# Patient Record
Sex: MALE | Race: WHITE | NOT HISPANIC OR LATINO | URBAN - NONMETROPOLITAN AREA
[De-identification: names, ages, dates, MRNs, and addresses within clinical notes are randomized per-mention and may not be internally consistent; named-entity substitution may affect disease eponyms.]

---

## 2020-11-20 ENCOUNTER — HOSPITAL ENCOUNTER (OUTPATIENT)
Facility: HOSPITAL | Age: 75
Discharge: HOME | End: 2020-11-22
Attending: EMERGENCY MEDICINE | Admitting: EMERGENCY MEDICINE
Payer: MEDICARE

## 2020-11-20 DIAGNOSIS — D64.9 ANEMIA, UNSPECIFIED TYPE: Primary | ICD-10-CM

## 2020-11-20 LAB
ABO + RH BLD: NORMAL
ALBUMIN SERPL BCP-MCNC: 3.3 G/DL (ref 3.5–5.2)
ALP SERPL-CCNC: 49 U/L (ref 55–135)
ALT SERPL W/O P-5'-P-CCNC: 28 U/L (ref 10–44)
ANION GAP SERPL CALC-SCNC: 8 MMOL/L (ref 8–16)
ANISOCYTOSIS BLD QL SMEAR: SLIGHT
APTT BLDCRRT: 23.6 SEC (ref 21–32)
AST SERPL-CCNC: 31 U/L (ref 10–40)
BASOPHILS # BLD AUTO: 0.08 K/UL (ref 0–0.2)
BASOPHILS NFR BLD: 1.3 % (ref 0–1.9)
BILIRUB SERPL-MCNC: 0.7 MG/DL (ref 0.1–1)
BLD GP AB SCN CELLS X3 SERPL QL: NORMAL
BLD PROD TYP BPU: NORMAL
BLD PROD TYP BPU: NORMAL
BLOOD UNIT EXPIRATION DATE: NORMAL
BLOOD UNIT EXPIRATION DATE: NORMAL
BLOOD UNIT TYPE CODE: 6200
BLOOD UNIT TYPE CODE: 6200
BLOOD UNIT TYPE: NORMAL
BLOOD UNIT TYPE: NORMAL
BUN SERPL-MCNC: 12 MG/DL (ref 8–23)
CALCIUM SERPL-MCNC: 9 MG/DL (ref 8.7–10.5)
CHLORIDE SERPL-SCNC: 104 MMOL/L (ref 95–110)
CO2 SERPL-SCNC: 23 MMOL/L (ref 23–29)
CODING SYSTEM: NORMAL
CODING SYSTEM: NORMAL
CREAT SERPL-MCNC: 0.7 MG/DL (ref 0.5–1.4)
CTP QC/QA: YES
DIFFERENTIAL METHOD: ABNORMAL
DISPENSE STATUS: NORMAL
DISPENSE STATUS: NORMAL
EOSINOPHIL # BLD AUTO: 0.1 K/UL (ref 0–0.5)
EOSINOPHIL NFR BLD: 1.9 % (ref 0–8)
ERYTHROCYTE [DISTWIDTH] IN BLOOD BY AUTOMATED COUNT: 23.4 % (ref 11.5–14.5)
EST. GFR  (AFRICAN AMERICAN): >60 ML/MIN/1.73 M^2
EST. GFR  (NON AFRICAN AMERICAN): >60 ML/MIN/1.73 M^2
FERRITIN SERPL-MCNC: 7 NG/ML (ref 20–300)
GLUCOSE SERPL-MCNC: 90 MG/DL (ref 70–110)
HCT VFR BLD AUTO: 24 % (ref 40–54)
HGB BLD-MCNC: 6.7 G/DL (ref 14–18)
IMM GRANULOCYTES # BLD AUTO: 0.02 K/UL (ref 0–0.04)
IMM GRANULOCYTES NFR BLD AUTO: 0.3 % (ref 0–0.5)
INR PPP: 1.1 (ref 0.8–1.2)
IRON SATN MFR SERPL: 2 % (ref 20–50)
IRON SERPL-MCNC: 12 UG/DL (ref 45–160)
LYMPHOCYTES # BLD AUTO: 1.3 K/UL (ref 1–4.8)
LYMPHOCYTES NFR BLD: 20.1 % (ref 18–48)
MAGNESIUM SERPL-MCNC: 2.1 MG/DL (ref 1.6–2.6)
MCH RBC QN AUTO: 16 PG (ref 27–31)
MCHC RBC AUTO-ENTMCNC: 27.9 G/DL (ref 32–36)
MCV RBC AUTO: 57 FL (ref 82–98)
MONOCYTES # BLD AUTO: 0.8 K/UL (ref 0.3–1)
MONOCYTES NFR BLD: 12.1 % (ref 4–15)
NEUTROPHILS # BLD AUTO: 4 K/UL (ref 1.8–7.7)
NEUTROPHILS NFR BLD: 64.3 % (ref 38–73)
NRBC BLD-RTO: 0 /100 WBC
NUM UNITS TRANS PACKED RBC: NORMAL
NUM UNITS TRANS PACKED RBC: NORMAL
PLATELET # BLD AUTO: 550 K/UL (ref 150–350)
PMV BLD AUTO: 9.3 FL (ref 9.2–12.9)
POCT GLUCOSE: 106 MG/DL (ref 70–110)
POTASSIUM SERPL-SCNC: 3.9 MMOL/L (ref 3.5–5.1)
PROT SERPL-MCNC: 6.9 G/DL (ref 6–8.4)
PROTHROMBIN TIME: 11.2 SEC (ref 9–12.5)
RBC # BLD AUTO: 4.19 M/UL (ref 4.6–6.2)
SARS-COV-2 RDRP RESP QL NAA+PROBE: NEGATIVE
SODIUM SERPL-SCNC: 135 MMOL/L (ref 136–145)
TOTAL IRON BINDING CAPACITY: 537 UG/DL (ref 250–450)
WBC # BLD AUTO: 6.27 K/UL (ref 3.9–12.7)

## 2020-11-20 PROCEDURE — 25000003 PHARM REV CODE 250: Performed by: EMERGENCY MEDICINE

## 2020-11-20 PROCEDURE — 36430 TRANSFUSION BLD/BLD COMPNT: CPT

## 2020-11-20 PROCEDURE — 36415 COLL VENOUS BLD VENIPUNCTURE: CPT

## 2020-11-20 PROCEDURE — 94761 N-INVAS EAR/PLS OXIMETRY MLT: CPT

## 2020-11-20 PROCEDURE — 80053 COMPREHEN METABOLIC PANEL: CPT

## 2020-11-20 PROCEDURE — 85025 COMPLETE CBC W/AUTO DIFF WBC: CPT

## 2020-11-20 PROCEDURE — 99900031 HC PATIENT EDUCATION (STAT)

## 2020-11-20 PROCEDURE — 85610 PROTHROMBIN TIME: CPT

## 2020-11-20 PROCEDURE — 82728 ASSAY OF FERRITIN: CPT

## 2020-11-20 PROCEDURE — P9016 RBC LEUKOCYTES REDUCED: HCPCS

## 2020-11-20 PROCEDURE — G0378 HOSPITAL OBSERVATION PER HR: HCPCS

## 2020-11-20 PROCEDURE — 83735 ASSAY OF MAGNESIUM: CPT

## 2020-11-20 PROCEDURE — 86920 COMPATIBILITY TEST SPIN: CPT

## 2020-11-20 PROCEDURE — 85730 THROMBOPLASTIN TIME PARTIAL: CPT

## 2020-11-20 PROCEDURE — 96361 HYDRATE IV INFUSION ADD-ON: CPT

## 2020-11-20 PROCEDURE — 99900035 HC TECH TIME PER 15 MIN (STAT)

## 2020-11-20 PROCEDURE — U0002 COVID-19 LAB TEST NON-CDC: HCPCS | Performed by: EMERGENCY MEDICINE

## 2020-11-20 PROCEDURE — 99285 EMERGENCY DEPT VISIT HI MDM: CPT | Mod: 25

## 2020-11-20 PROCEDURE — 83540 ASSAY OF IRON: CPT

## 2020-11-20 PROCEDURE — 86850 RBC ANTIBODY SCREEN: CPT

## 2020-11-20 PROCEDURE — 96360 HYDRATION IV INFUSION INIT: CPT

## 2020-11-20 RX ORDER — ONDANSETRON 2 MG/ML
4 INJECTION INTRAMUSCULAR; INTRAVENOUS EVERY 8 HOURS PRN
Status: DISCONTINUED | OUTPATIENT
Start: 2020-11-20 | End: 2020-11-22 | Stop reason: HOSPADM

## 2020-11-20 RX ORDER — NAPROXEN SODIUM 220 MG/1
81 TABLET, FILM COATED ORAL
COMMUNITY

## 2020-11-20 RX ORDER — FAMOTIDINE 20 MG/1
20 TABLET, FILM COATED ORAL 2 TIMES DAILY
Status: DISCONTINUED | OUTPATIENT
Start: 2020-11-20 | End: 2020-11-22 | Stop reason: HOSPADM

## 2020-11-20 RX ORDER — POTASSIUM CHLORIDE 750 MG/1
20 CAPSULE, EXTENDED RELEASE ORAL DAILY
COMMUNITY
End: 2023-10-09 | Stop reason: ALTCHOICE

## 2020-11-20 RX ORDER — GLUCOSAMINE/CHONDRO SU A 500-400 MG
1 TABLET ORAL 3 TIMES DAILY
COMMUNITY
End: 2021-01-27 | Stop reason: CLARIF

## 2020-11-20 RX ORDER — HYDROCODONE BITARTRATE AND ACETAMINOPHEN 500; 5 MG/1; MG/1
TABLET ORAL
Status: DISCONTINUED | OUTPATIENT
Start: 2020-11-20 | End: 2020-11-22 | Stop reason: HOSPADM

## 2020-11-20 RX ORDER — RAMIPRIL 5 MG/1
10 CAPSULE ORAL DAILY
Status: DISCONTINUED | OUTPATIENT
Start: 2020-11-20 | End: 2020-11-22 | Stop reason: HOSPADM

## 2020-11-20 RX ORDER — RAMIPRIL 10 MG/1
2.5 CAPSULE ORAL DAILY
Status: ON HOLD | COMMUNITY
End: 2021-02-04 | Stop reason: HOSPADM

## 2020-11-20 RX ORDER — FENOFIBRATE 145 MG/1
145 TABLET, FILM COATED ORAL DAILY
COMMUNITY
End: 2024-04-02 | Stop reason: SDUPTHER

## 2020-11-20 RX ORDER — EZETIMIBE 10 MG/1
10 TABLET ORAL DAILY
COMMUNITY
End: 2024-04-02 | Stop reason: SDUPTHER

## 2020-11-20 RX ORDER — FUROSEMIDE 20 MG/1
20 TABLET ORAL DAILY
Status: DISCONTINUED | OUTPATIENT
Start: 2020-11-20 | End: 2020-11-21

## 2020-11-20 RX ORDER — TALC
6 POWDER (GRAM) TOPICAL NIGHTLY PRN
Status: DISCONTINUED | OUTPATIENT
Start: 2020-11-20 | End: 2020-11-22 | Stop reason: HOSPADM

## 2020-11-20 RX ORDER — ASCORBIC ACID 500 MG
500 TABLET ORAL DAILY
Status: ON HOLD | COMMUNITY
End: 2021-06-07 | Stop reason: HOSPADM

## 2020-11-20 RX ORDER — FUROSEMIDE 20 MG/1
40 TABLET ORAL 2 TIMES DAILY
COMMUNITY
End: 2021-04-21

## 2020-11-20 RX ORDER — SODIUM CHLORIDE 0.9 % (FLUSH) 0.9 %
10 SYRINGE (ML) INJECTION
Status: DISCONTINUED | OUTPATIENT
Start: 2020-11-20 | End: 2020-11-22 | Stop reason: HOSPADM

## 2020-11-20 RX ORDER — SODIUM CHLORIDE 9 MG/ML
INJECTION, SOLUTION INTRAVENOUS CONTINUOUS
Status: DISCONTINUED | OUTPATIENT
Start: 2020-11-20 | End: 2020-11-22 | Stop reason: HOSPADM

## 2020-11-20 RX ADMIN — FAMOTIDINE 20 MG: 20 TABLET, FILM COATED ORAL at 09:11

## 2020-11-20 RX ADMIN — SODIUM CHLORIDE: 0.9 INJECTION, SOLUTION INTRAVENOUS at 02:11

## 2020-11-20 RX ADMIN — FUROSEMIDE 20 MG: 20 TABLET ORAL at 03:11

## 2020-11-20 RX ADMIN — SODIUM CHLORIDE: 0.9 INJECTION, SOLUTION INTRAVENOUS at 03:11

## 2020-11-20 NOTE — ED NOTES
NEUROLOGICAL:   Patient is awake , alert  and oriented x 4 . Pupils are PERRL. Gait is steady.   Moves all extremities without difficulty.   Patient reports fatigue   GCS 15    HEENT:   Head appears normocephalic  and symmetric .   Eyes appear WNL to both eyes. Patient reports no complaints  to both eyes .   Ears appear WNL. Patient reports no complaints  to both ears.   Nares appear patent . Patient reports no nose complaints .  Mouth appears moist, pink and teeth intact. Patient reports no mouth complaints.   Throat appears pink and moist . Patient reports no throat complaints.    CARDIOVASCULAR:   S1 and S2 present, no murmurs, gallops, or rubs, rate regular  and pulses palpable (2+)    OSMIN leg and feet edema +2  Patient reports no CV complaints.  .   Patient vitals are WNL.    RESPIRATORY:   Airway Clear, Open, and Patent.  Respirations are even and unlabored.   Breath sounds clear  to all lung fields.   Patient reports no respiratory complaints.     GASTROINTESTINAL:   Abdomen is soft  and non-tender x 4 quadrants. Bowel sounds are normoactive to all quadrants .   Patient reports no GI complaints .     GENITOURINARY:   Patient reports no  complaints.     MUSCULOSKELETAL:   full range of motion to all extremities, no swelling noted , no tenderness noted and no weakness noted.   Patient reports no musculoskeletal complaints     SKIN:   Skin appears warm , dry , good turgor, color normal for race and intact. Patient reports no skin complaints.

## 2020-11-20 NOTE — PLAN OF CARE
11/20/20 1430   Discharge Assessment   Assessment Type Discharge Planning Assessment   Confirmed/corrected address and phone number on facesheet? Yes   Assessment information obtained from? Patient;Caregiver  (Patient and his spouse.)   Expected Length of Stay (days) 1   Communicated expected length of stay with patient/caregiver yes   Prior to hospitilization cognitive status: Alert/Oriented   Prior to hospitalization functional status: Independent  (Patient lives with his wife. Independent with ADLs. No medical equipment. Still drives.)   Current cognitive status: Alert/Oriented   Current Functional Status: Independent   Facility Arrived From: NA   Lives With spouse   Able to Return to Prior Arrangements yes   Is patient able to care for self after discharge? Yes   Who are your caregiver(s) and their phone number(s)? Emergency contact: Linda Farr (spouse) [phone]   Patient's perception of discharge disposition home or selfcare   Readmission Within the Last 30 Days no previous admission in last 30 days   Patient currently being followed by outpatient case management? No   Patient currently receives any other outside agency services? No   Equipment Currently Used at Home none   Do you have any problems affording any of your prescribed medications? No   Is the patient taking medications as prescribed? yes   Does the patient have transportation home? Yes   Transportation Anticipated family or friend will provide   Dialysis Name and Scheduled days na   Does the patient receive services at the [facility]? No   Discharge Plan A Home with family   Discharge Plan B Home   DME Needed Upon Discharge  none   Patient/Family in Agreement with Plan yes

## 2020-11-20 NOTE — ED PROVIDER NOTES
Encounter Date: 11/20/2020       History     Chief Complaint   Patient presents with    Fatigue     sent here by Dr. Garcia for low Creatnine and low Iron.  I think I may need a blood transfusion.     This is a 75-year-old white male a presents to the emergency department stating he may need transfusion.  Was seen by primary care physician where blood work was done and told that his blood count was low.  Patient is scheduled to get a scope from Dr. Garcia next Tuesday.  He has had hemoccult of his stool which has been negative.  Patient denies any issues.  Wife also states he had iron studies which showed very low iron levels        Review of patient's allergies indicates:  No Known Allergies  Past Medical History:   Diagnosis Date    Shingles      Past Surgical History:   Procedure Laterality Date    NECK SURGERY      x 2    TONSILLECTOMY       History reviewed. No pertinent family history.  Social History     Tobacco Use    Smoking status: Never Smoker   Substance Use Topics    Alcohol use: Not on file    Drug use: Not on file     Review of Systems   Constitutional: Negative for fever.   HENT: Negative for sore throat.    Respiratory: Negative for shortness of breath.    Cardiovascular: Negative for chest pain.   Gastrointestinal: Negative for nausea.   Genitourinary: Negative for dysuria.   Musculoskeletal: Negative for back pain.   Skin: Negative for rash.   Neurological: Negative for weakness.   Hematological: Does not bruise/bleed easily.   All other systems reviewed and are negative.      Physical Exam     Initial Vitals [11/20/20 1133]   BP Pulse Resp Temp SpO2   (!) 104/55 96 19 97.9 °F (36.6 °C) 98 %      MAP       --         Physical Exam    Nursing note and vitals reviewed.  Constitutional: He appears well-developed and well-nourished. He is not diaphoretic. No distress.   HENT:   Head: Normocephalic and atraumatic.   Eyes: Conjunctivae and EOM are normal. Pupils are equal, round, and reactive  to light. Right eye exhibits no discharge. Left eye exhibits no discharge. No scleral icterus.   Neck: Normal range of motion. Neck supple. No JVD present.   Cardiovascular: Normal rate, regular rhythm, normal heart sounds and intact distal pulses.   No murmur heard.  Pulmonary/Chest: Breath sounds normal. No stridor. No respiratory distress. He has no wheezes. He has no rhonchi. He has no rales. He exhibits no tenderness.   Abdominal: Soft. Bowel sounds are normal. He exhibits no distension and no mass. There is no abdominal tenderness. There is no rebound and no guarding.   Musculoskeletal: Normal range of motion. No tenderness or edema.   Neurological: He is alert and oriented to person, place, and time. He has normal strength and normal reflexes. GCS score is 15. GCS eye subscore is 4. GCS verbal subscore is 5. GCS motor subscore is 6.   Skin: Skin is warm and dry. Capillary refill takes less than 2 seconds. There is pallor.         ED Course   Procedures  Labs Reviewed   CBC W/ AUTO DIFFERENTIAL - Abnormal; Notable for the following components:       Result Value    RBC 4.19 (*)     Hemoglobin 6.7 (*)     Hematocrit 24.0 (*)     MCV 57 (*)     MCH 16.0 (*)     MCHC 27.9 (*)     RDW 23.4 (*)     Platelets 550 (*)     All other components within normal limits   COMPREHENSIVE METABOLIC PANEL - Abnormal; Notable for the following components:    Sodium 135 (*)     Albumin 3.3 (*)     Alkaline Phosphatase 49 (*)     All other components within normal limits   IRON AND TIBC - Abnormal; Notable for the following components:    Iron 12 (*)     TIBC 537 (*)     Iron Saturation 2 (*)     All other components within normal limits   FERRITIN - Abnormal; Notable for the following components:    Ferritin 7 (*)     All other components within normal limits   MAGNESIUM   PROTIME-INR   APTT   SARS-COV-2 RDRP GENE    Narrative:     This test utilizes isothermal nucleic acid amplification   technology to detect the SARS-CoV-2 RdRp  nucleic acid segment.   The analytical sensitivity (limit of detection) is 125 genome   equivalents/mL.   A POSITIVE result implies infection with the SARS-CoV-2 virus;   the patient is presumed to be contagious.     A NEGATIVE result means that SARS-CoV-2 nucleic acids are not   present above the limit of detection. A NEGATIVE result should be   treated as presumptive. It does not rule out the possibility of   COVID-19 and should not be the sole basis for treatment decisions.   If COVID-19 is strongly suspected based on clinical and exposure   history, re-testing using an alternate molecular assay should be   considered.   This test is only for use under the Food and Drug   Administration s Emergency Use Authorization (EUA).   Commercial kits are provided by [employer].   Performance characteristics of the EUA have been independently   verified by [facility] Department of   Pathology and Laboratory Medicine.   _________________________________________________________________   The authorized Fact Sheet for Healthcare Providers and the authorized Fact   Sheet for Patients of the ID NOW COVID-19 are available on the FDA   website:     https://www.fda.gov/media/072267/download  https://www.fda.gov/media/560717/download       TYPE & SCREEN          Imaging Results    None          Medical Decision Making:   Differential Diagnosis:   Iron deficiency anemia                   ED Course as of Nov 20 1324   Fri Nov 20, 2020   1319 Discussed with Dr. García - will admit for 2 units of blood    [SD]      ED Course User Index  [SD] Jefferson Martinez MD            Clinical Impression:     ICD-10-CM ICD-9-CM   1. Anemia, unspecified type  D64.9 285.9                          ED Disposition Condition    Observation                             Jefferson Martinez MD  11/20/20 1324

## 2020-11-21 DIAGNOSIS — D50.9 IRON DEFICIENCY ANEMIA, UNSPECIFIED IRON DEFICIENCY ANEMIA TYPE: Primary | ICD-10-CM

## 2020-11-21 DIAGNOSIS — D50.9 IRON DEFICIENCY ANEMIA, UNSPECIFIED IRON DEFICIENCY ANEMIA TYPE: Primary | Chronic | ICD-10-CM

## 2020-11-21 LAB
ALBUMIN SERPL BCP-MCNC: 3.1 G/DL (ref 3.5–5.2)
ALP SERPL-CCNC: 51 U/L (ref 55–135)
ALT SERPL W/O P-5'-P-CCNC: 31 U/L (ref 10–44)
ANION GAP SERPL CALC-SCNC: 9 MMOL/L (ref 8–16)
AST SERPL-CCNC: 36 U/L (ref 10–40)
BASOPHILS # BLD AUTO: 0.07 K/UL (ref 0–0.2)
BASOPHILS NFR BLD: 0.7 % (ref 0–1.9)
BILIRUB SERPL-MCNC: 2.2 MG/DL (ref 0.1–1)
BUN SERPL-MCNC: 20 MG/DL (ref 8–23)
CALCIUM SERPL-MCNC: 8.8 MG/DL (ref 8.7–10.5)
CHLORIDE SERPL-SCNC: 104 MMOL/L (ref 95–110)
CO2 SERPL-SCNC: 24 MMOL/L (ref 23–29)
CREAT SERPL-MCNC: 0.7 MG/DL (ref 0.5–1.4)
DIFFERENTIAL METHOD: ABNORMAL
EOSINOPHIL # BLD AUTO: 0 K/UL (ref 0–0.5)
EOSINOPHIL NFR BLD: 0.3 % (ref 0–8)
ERYTHROCYTE [DISTWIDTH] IN BLOOD BY AUTOMATED COUNT: 26.5 % (ref 11.5–14.5)
EST. GFR  (AFRICAN AMERICAN): >60 ML/MIN/1.73 M^2
EST. GFR  (NON AFRICAN AMERICAN): >60 ML/MIN/1.73 M^2
GLUCOSE SERPL-MCNC: 99 MG/DL (ref 70–110)
HCT VFR BLD AUTO: 29.5 % (ref 40–54)
HGB BLD-MCNC: 8.8 G/DL (ref 14–18)
IMM GRANULOCYTES # BLD AUTO: 0.04 K/UL (ref 0–0.04)
IMM GRANULOCYTES NFR BLD AUTO: 0.4 % (ref 0–0.5)
LYMPHOCYTES # BLD AUTO: 1 K/UL (ref 1–4.8)
LYMPHOCYTES NFR BLD: 10.5 % (ref 18–48)
MCH RBC QN AUTO: 18.1 PG (ref 27–31)
MCHC RBC AUTO-ENTMCNC: 29.8 G/DL (ref 32–36)
MCV RBC AUTO: 61 FL (ref 82–98)
MONOCYTES # BLD AUTO: 1 K/UL (ref 0.3–1)
MONOCYTES NFR BLD: 9.8 % (ref 4–15)
NEUTROPHILS # BLD AUTO: 7.6 K/UL (ref 1.8–7.7)
NEUTROPHILS NFR BLD: 78.3 % (ref 38–73)
NRBC BLD-RTO: 0 /100 WBC
OB PNL STL: NEGATIVE
PLATELET # BLD AUTO: 435 K/UL (ref 150–350)
PMV BLD AUTO: 9.6 FL (ref 9.2–12.9)
POTASSIUM SERPL-SCNC: 3.7 MMOL/L (ref 3.5–5.1)
PROT SERPL-MCNC: 6.6 G/DL (ref 6–8.4)
RBC # BLD AUTO: 4.86 M/UL (ref 4.6–6.2)
SODIUM SERPL-SCNC: 137 MMOL/L (ref 136–145)
WBC # BLD AUTO: 9.66 K/UL (ref 3.9–12.7)

## 2020-11-21 PROCEDURE — 96361 HYDRATE IV INFUSION ADD-ON: CPT

## 2020-11-21 PROCEDURE — 85025 COMPLETE CBC W/AUTO DIFF WBC: CPT

## 2020-11-21 PROCEDURE — G0378 HOSPITAL OBSERVATION PER HR: HCPCS

## 2020-11-21 PROCEDURE — 25000003 PHARM REV CODE 250: Performed by: INTERNAL MEDICINE

## 2020-11-21 PROCEDURE — 25000003 PHARM REV CODE 250: Performed by: EMERGENCY MEDICINE

## 2020-11-21 PROCEDURE — 80053 COMPREHEN METABOLIC PANEL: CPT

## 2020-11-21 PROCEDURE — 94760 N-INVAS EAR/PLS OXIMETRY 1: CPT

## 2020-11-21 PROCEDURE — 99900035 HC TECH TIME PER 15 MIN (STAT)

## 2020-11-21 PROCEDURE — 36415 COLL VENOUS BLD VENIPUNCTURE: CPT

## 2020-11-21 PROCEDURE — 82272 OCCULT BLD FECES 1-3 TESTS: CPT

## 2020-11-21 PROCEDURE — 27000221 HC OXYGEN, UP TO 24 HOURS

## 2020-11-21 RX ORDER — ASCORBIC ACID 500 MG
500 TABLET ORAL DAILY
Status: DISCONTINUED | OUTPATIENT
Start: 2020-11-21 | End: 2020-11-22 | Stop reason: HOSPADM

## 2020-11-21 RX ORDER — FUROSEMIDE 20 MG/1
20 TABLET ORAL 2 TIMES DAILY
Status: DISCONTINUED | OUTPATIENT
Start: 2020-11-21 | End: 2020-11-22 | Stop reason: HOSPADM

## 2020-11-21 RX ORDER — NAPROXEN SODIUM 220 MG/1
81 TABLET, FILM COATED ORAL DAILY
Status: DISCONTINUED | OUTPATIENT
Start: 2020-11-21 | End: 2020-11-21

## 2020-11-21 RX ORDER — EZETIMIBE 10 MG/1
10 TABLET ORAL DAILY
Status: DISCONTINUED | OUTPATIENT
Start: 2020-11-21 | End: 2020-11-22 | Stop reason: HOSPADM

## 2020-11-21 RX ORDER — FENOFIBRATE 145 MG/1
145 TABLET, FILM COATED ORAL DAILY
Status: DISCONTINUED | OUTPATIENT
Start: 2020-11-21 | End: 2020-11-22 | Stop reason: HOSPADM

## 2020-11-21 RX ORDER — RAMIPRIL 5 MG/1
10 CAPSULE ORAL DAILY
Status: DISCONTINUED | OUTPATIENT
Start: 2020-11-21 | End: 2020-11-21

## 2020-11-21 RX ORDER — POTASSIUM CHLORIDE 1.5 G/1.58G
40 POWDER, FOR SOLUTION ORAL DAILY
Status: DISCONTINUED | OUTPATIENT
Start: 2020-11-21 | End: 2020-11-22 | Stop reason: HOSPADM

## 2020-11-21 RX ORDER — SODIUM, POTASSIUM,MAG SULFATES 17.5-3.13G
2 SOLUTION, RECONSTITUTED, ORAL ORAL DAILY
Qty: 1 KIT | Refills: 0 | Status: SHIPPED | OUTPATIENT
Start: 2020-11-22 | End: 2020-11-23

## 2020-11-21 RX ADMIN — RAMIPRIL 10 MG: 5 CAPSULE ORAL at 09:11

## 2020-11-21 RX ADMIN — OXYCODONE HYDROCHLORIDE AND ACETAMINOPHEN 500 MG: 500 TABLET ORAL at 11:11

## 2020-11-21 RX ADMIN — EZETIMIBE 10 MG: 10 TABLET ORAL at 11:11

## 2020-11-21 RX ADMIN — ASPIRIN 81 MG 81 MG: 81 TABLET ORAL at 11:11

## 2020-11-21 RX ADMIN — SODIUM CHLORIDE: 0.9 INJECTION, SOLUTION INTRAVENOUS at 02:11

## 2020-11-21 RX ADMIN — FUROSEMIDE 20 MG: 20 TABLET ORAL at 09:11

## 2020-11-21 RX ADMIN — FAMOTIDINE 20 MG: 20 TABLET, FILM COATED ORAL at 09:11

## 2020-11-21 RX ADMIN — FENOFIBRATE 145 MG: 145 TABLET ORAL at 11:11

## 2020-11-21 RX ADMIN — POTASSIUM CHLORIDE 40 MEQ: 1.5 FOR SOLUTION ORAL at 11:11

## 2020-11-21 RX ADMIN — Medication 6 MG: at 09:11

## 2020-11-21 NOTE — CARE UPDATE
11/21/20 0850   Patient Assessment/Suction   Level of Consciousness (AVPU) alert   Respiratory Effort Unlabored;Normal   Expansion/Accessory Muscles/Retractions no use of accessory muscles   Rhythm/Pattern, Respiratory pattern regular   PRE-TX-O2   O2 Device (Oxygen Therapy) nasal cannula   $ Is the patient on Low Flow Oxygen? Yes   Flow (L/min) 2   Oxygen Concentration (%) 28   SpO2 (!) 93 %   Pulse Oximetry Type Intermittent   $ Pulse Oximetry - Single Charge Pulse Oximetry - Single   Pulse (!) 112   Positioning Sitting on edge of bed (dangling)   Respiratory Evaluation   $ Care Plan Tech Time 15 min

## 2020-11-21 NOTE — H&P (VIEW-ONLY)
[facility]    History & Physical      Patient Name: Richard Farr  MRN: [id number]  Admission Date: 11/20/2020  Attending Physician: Drew Moreira Jr., MD   Primary Care Provider: Juan Kidd MD         Patient information was obtained from patient and ER records.     Subjective:     Principal Problem:Anemia    Chief Complaint:   Chief Complaint   Patient presents with    Fatigue     sent here by Dr. Garcia for low Creatnine and low Iron.  I think I may need a blood transfusion.        HPI: 76 y/o male with history of htn, hld, arthritis, h/o shingles, scheduled for cscope next week for evaluation of anemia and noted to have abn labs and further sent to ed for eval and noted to have low h and h and further admitted for blood transfusions and further work up    Past Medical History:   Diagnosis Date    Arthritis     Hyperlipemia     Hypertension     Shingles        Past Surgical History:   Procedure Laterality Date    NECK SURGERY      x 2    TONSILLECTOMY         Review of patient's allergies indicates:  No Known Allergies    No current facility-administered medications on file prior to encounter.      Current Outpatient Medications on File Prior to Encounter   Medication Sig    ascorbic acid, vitamin C, (VITAMIN C) 500 MG tablet Take 500 mg by mouth once daily.    aspirin 81 MG Chew Take 81 mg by mouth once daily.    ezetimibe (ZETIA) 10 mg tablet Take 10 mg by mouth once daily. 1/2 tabs    fenofibrate (TRICOR) 145 MG tablet Take 145 mg by mouth once daily.    furosemide (LASIX) 20 MG tablet Take 20 mg by mouth 2 (two) times daily.    glucosamine-chondroitin 500-400 mg tablet Take 1 tablet by mouth 3 (three) times daily.    multivitamin capsule Take 1 capsule by mouth once daily.    omega-3 fatty acids/fish oil (FISH OIL-OMEGA-3 FATTY ACIDS) 300-1,000 mg capsule Take by mouth once daily.    potassium chloride (MICRO-K) 10 MEQ CpSR Take 10 mEq by mouth once.    ramipriL  (ALTACE) 10 MG capsule Take 10 mg by mouth once daily.     Family History     None        Tobacco Use    Smoking status: Never Smoker    Smokeless tobacco: Never Used   Substance and Sexual Activity    Alcohol use: Yes     Alcohol/week: 3.0 standard drinks     Types: 3 Cans of beer per week    Drug use: Never    Sexual activity: Not on file     Review of Systems   Constitutional: Negative.    HENT: Negative.    Eyes: Negative.    Respiratory: Positive for shortness of breath.    Cardiovascular: Negative.    Gastrointestinal: Negative.    Endocrine: Negative.    Musculoskeletal: Negative.    Skin: Negative.    Neurological: Positive for weakness.   Hematological: Negative.    Psychiatric/Behavioral: Negative.      Objective:     Vital Signs (Most Recent):  Temp: 98.6 °F (37 °C) (11/21/20 0700)  Pulse: 104 (11/21/20 0700)  Resp: 18 (11/21/20 0700)  BP: (!) 123/94 (11/21/20 0700)  SpO2: (!) 90 % (11/21/20 0700) Vital Signs (24h Range):  Temp:  [97.6 °F (36.4 °C)-98.7 °F (37.1 °C)] 98.6 °F (37 °C)  Pulse:  [] 104  Resp:  [18-24] 18  SpO2:  [85 %-98 %] 90 %  BP: (102-143)/(55-94) 123/94     Weight: 71.2 kg (157 lb)  Body mass index is 24.59 kg/m².    Physical Exam  Constitutional:       Appearance: Normal appearance. He is normal weight.   HENT:      Head: Normocephalic and atraumatic.      Nose: Nose normal.      Mouth/Throat:      Mouth: Mucous membranes are dry.   Eyes:      Extraocular Movements: Extraocular movements intact.      Pupils: Pupils are equal, round, and reactive to light.   Neck:      Musculoskeletal: Normal range of motion and neck supple.   Cardiovascular:      Rate and Rhythm: Regular rhythm. Tachycardia present.      Pulses: Normal pulses.   Pulmonary:      Effort: Pulmonary effort is normal.      Breath sounds: Normal breath sounds.   Abdominal:      General: Abdomen is flat. Bowel sounds are normal.   Musculoskeletal: Normal range of motion.   Skin:     General: Skin is warm and dry.    Neurological:      General: No focal deficit present.      Mental Status: He is alert and oriented to person, place, and time. Mental status is at baseline.           CRANIAL NERVES     CN III, IV, VI   Pupils are equal, round, and reactive to light.      Significant Labs: All pertinent labs within the past 24 hours have been reviewed.  Lab Results   Component Value Date    WBC 9.66 11/21/2020    HGB 8.8 (L) 11/21/2020    HCT 29.5 (L) 11/21/2020    MCV 61 (L) 11/21/2020     (H) 11/21/2020       Recent Labs   Lab 11/21/20  0527      K 3.7      CO2 24   BUN 20   CREATININE 0.7   CALCIUM 8.8       Significant Imaging: I have reviewed and interpreted all pertinent imaging results/findings within the past 24 hours.    Assessment/Plan:     Active Diagnoses:    Diagnosis Date Noted POA    PRINCIPAL PROBLEM:  Anemia [D64.9] 11/20/2020 Yes      Problems Resolved During this Admission:     VTE Risk Mitigation (From admission, onward)         Ordered     IP VTE HIGH RISK PATIENT  Once      11/20/20 1406     Place AVINASH hose  Until discontinued      11/20/20 1406              Symptomatic anemia  htn  hld  Arthritis, unspecified    Plan :  Transfuse 2units prbc  Check fobt  Labs in am  Resume home meds  Gi and dvt ppx  Code status full  Dc smiley Gonzalez MD  Department of Hospital Medicine   [facility]

## 2020-11-21 NOTE — H&P
[facility]    History & Physical      Patient Name: Richard Farr  MRN: [id number]  Admission Date: 11/20/2020  Attending Physician: Drew Moreira Jr., MD   Primary Care Provider: Juan Kidd MD         Patient information was obtained from patient and ER records.     Subjective:     Principal Problem:Anemia    Chief Complaint:   Chief Complaint   Patient presents with    Fatigue     sent here by Dr. Garcia for low Creatnine and low Iron.  I think I may need a blood transfusion.        HPI: 76 y/o male with history of htn, hld, arthritis, h/o shingles, scheduled for cscope next week for evaluation of anemia and noted to have abn labs and further sent to ed for eval and noted to have low h and h and further admitted for blood transfusions and further work up    Past Medical History:   Diagnosis Date    Arthritis     Hyperlipemia     Hypertension     Shingles        Past Surgical History:   Procedure Laterality Date    NECK SURGERY      x 2    TONSILLECTOMY         Review of patient's allergies indicates:  No Known Allergies    No current facility-administered medications on file prior to encounter.      Current Outpatient Medications on File Prior to Encounter   Medication Sig    ascorbic acid, vitamin C, (VITAMIN C) 500 MG tablet Take 500 mg by mouth once daily.    aspirin 81 MG Chew Take 81 mg by mouth once daily.    ezetimibe (ZETIA) 10 mg tablet Take 10 mg by mouth once daily. 1/2 tabs    fenofibrate (TRICOR) 145 MG tablet Take 145 mg by mouth once daily.    furosemide (LASIX) 20 MG tablet Take 20 mg by mouth 2 (two) times daily.    glucosamine-chondroitin 500-400 mg tablet Take 1 tablet by mouth 3 (three) times daily.    multivitamin capsule Take 1 capsule by mouth once daily.    omega-3 fatty acids/fish oil (FISH OIL-OMEGA-3 FATTY ACIDS) 300-1,000 mg capsule Take by mouth once daily.    potassium chloride (MICRO-K) 10 MEQ CpSR Take 10 mEq by mouth once.    ramipriL  (ALTACE) 10 MG capsule Take 10 mg by mouth once daily.     Family History     None        Tobacco Use    Smoking status: Never Smoker    Smokeless tobacco: Never Used   Substance and Sexual Activity    Alcohol use: Yes     Alcohol/week: 3.0 standard drinks     Types: 3 Cans of beer per week    Drug use: Never    Sexual activity: Not on file     Review of Systems   Constitutional: Negative.    HENT: Negative.    Eyes: Negative.    Respiratory: Positive for shortness of breath.    Cardiovascular: Negative.    Gastrointestinal: Negative.    Endocrine: Negative.    Musculoskeletal: Negative.    Skin: Negative.    Neurological: Positive for weakness.   Hematological: Negative.    Psychiatric/Behavioral: Negative.      Objective:     Vital Signs (Most Recent):  Temp: 98.6 °F (37 °C) (11/21/20 0700)  Pulse: 104 (11/21/20 0700)  Resp: 18 (11/21/20 0700)  BP: (!) 123/94 (11/21/20 0700)  SpO2: (!) 90 % (11/21/20 0700) Vital Signs (24h Range):  Temp:  [97.6 °F (36.4 °C)-98.7 °F (37.1 °C)] 98.6 °F (37 °C)  Pulse:  [] 104  Resp:  [18-24] 18  SpO2:  [85 %-98 %] 90 %  BP: (102-143)/(55-94) 123/94     Weight: 71.2 kg (157 lb)  Body mass index is 24.59 kg/m².    Physical Exam  Constitutional:       Appearance: Normal appearance. He is normal weight.   HENT:      Head: Normocephalic and atraumatic.      Nose: Nose normal.      Mouth/Throat:      Mouth: Mucous membranes are dry.   Eyes:      Extraocular Movements: Extraocular movements intact.      Pupils: Pupils are equal, round, and reactive to light.   Neck:      Musculoskeletal: Normal range of motion and neck supple.   Cardiovascular:      Rate and Rhythm: Regular rhythm. Tachycardia present.      Pulses: Normal pulses.   Pulmonary:      Effort: Pulmonary effort is normal.      Breath sounds: Normal breath sounds.   Abdominal:      General: Abdomen is flat. Bowel sounds are normal.   Musculoskeletal: Normal range of motion.   Skin:     General: Skin is warm and dry.    Neurological:      General: No focal deficit present.      Mental Status: He is alert and oriented to person, place, and time. Mental status is at baseline.           CRANIAL NERVES     CN III, IV, VI   Pupils are equal, round, and reactive to light.      Significant Labs: All pertinent labs within the past 24 hours have been reviewed.  Lab Results   Component Value Date    WBC 9.66 11/21/2020    HGB 8.8 (L) 11/21/2020    HCT 29.5 (L) 11/21/2020    MCV 61 (L) 11/21/2020     (H) 11/21/2020       Recent Labs   Lab 11/21/20  0527      K 3.7      CO2 24   BUN 20   CREATININE 0.7   CALCIUM 8.8       Significant Imaging: I have reviewed and interpreted all pertinent imaging results/findings within the past 24 hours.    Assessment/Plan:     Active Diagnoses:    Diagnosis Date Noted POA    PRINCIPAL PROBLEM:  Anemia [D64.9] 11/20/2020 Yes      Problems Resolved During this Admission:     VTE Risk Mitigation (From admission, onward)         Ordered     IP VTE HIGH RISK PATIENT  Once      11/20/20 1406     Place AVINASH hose  Until discontinued      11/20/20 1406              Symptomatic anemia  htn  hld  Arthritis, unspecified    Plan :  Transfuse 2units prbc  Check fobt  Labs in am  Resume home meds  Gi and dvt ppx  Code status full  Dc smiley Gonzalez MD  Department of Hospital Medicine   [facility]

## 2020-11-22 VITALS
HEART RATE: 96 BPM | TEMPERATURE: 98 F | BODY MASS INDEX: 24.64 KG/M2 | HEIGHT: 67 IN | RESPIRATION RATE: 18 BRPM | OXYGEN SATURATION: 96 % | DIASTOLIC BLOOD PRESSURE: 75 MMHG | SYSTOLIC BLOOD PRESSURE: 112 MMHG | WEIGHT: 157 LBS

## 2020-11-22 PROBLEM — D50.9 IRON DEFICIENCY ANEMIA: Status: RESOLVED | Noted: 2020-11-20 | Resolved: 2020-11-22

## 2020-11-22 LAB
ALBUMIN SERPL BCP-MCNC: 3.1 G/DL (ref 3.5–5.2)
ALP SERPL-CCNC: 53 U/L (ref 55–135)
ALT SERPL W/O P-5'-P-CCNC: 76 U/L (ref 10–44)
ANION GAP SERPL CALC-SCNC: 10 MMOL/L (ref 8–16)
AST SERPL-CCNC: 110 U/L (ref 10–40)
BASOPHILS # BLD AUTO: 0.06 K/UL (ref 0–0.2)
BASOPHILS # BLD AUTO: 0.06 K/UL (ref 0–0.2)
BASOPHILS NFR BLD: 0.6 % (ref 0–1.9)
BASOPHILS NFR BLD: 0.6 % (ref 0–1.9)
BILIRUB SERPL-MCNC: 1.4 MG/DL (ref 0.1–1)
BUN SERPL-MCNC: 28 MG/DL (ref 8–23)
CALCIUM SERPL-MCNC: 8.6 MG/DL (ref 8.7–10.5)
CHLORIDE SERPL-SCNC: 107 MMOL/L (ref 95–110)
CO2 SERPL-SCNC: 21 MMOL/L (ref 23–29)
CREAT SERPL-MCNC: 0.9 MG/DL (ref 0.5–1.4)
DIFFERENTIAL METHOD: ABNORMAL
DIFFERENTIAL METHOD: ABNORMAL
EOSINOPHIL # BLD AUTO: 0 K/UL (ref 0–0.5)
EOSINOPHIL # BLD AUTO: 0 K/UL (ref 0–0.5)
EOSINOPHIL NFR BLD: 0.2 % (ref 0–8)
EOSINOPHIL NFR BLD: 0.2 % (ref 0–8)
ERYTHROCYTE [DISTWIDTH] IN BLOOD BY AUTOMATED COUNT: 27.2 % (ref 11.5–14.5)
ERYTHROCYTE [DISTWIDTH] IN BLOOD BY AUTOMATED COUNT: 27.2 % (ref 11.5–14.5)
EST. GFR  (AFRICAN AMERICAN): >60 ML/MIN/1.73 M^2
EST. GFR  (NON AFRICAN AMERICAN): >60 ML/MIN/1.73 M^2
GLUCOSE SERPL-MCNC: 104 MG/DL (ref 70–110)
HCT VFR BLD AUTO: 29.8 % (ref 40–54)
HCT VFR BLD AUTO: 29.8 % (ref 40–54)
HGB BLD-MCNC: 8.6 G/DL (ref 14–18)
HGB BLD-MCNC: 8.6 G/DL (ref 14–18)
IMM GRANULOCYTES # BLD AUTO: 0.05 K/UL (ref 0–0.04)
IMM GRANULOCYTES # BLD AUTO: 0.05 K/UL (ref 0–0.04)
IMM GRANULOCYTES NFR BLD AUTO: 0.5 % (ref 0–0.5)
IMM GRANULOCYTES NFR BLD AUTO: 0.5 % (ref 0–0.5)
LYMPHOCYTES # BLD AUTO: 1.5 K/UL (ref 1–4.8)
LYMPHOCYTES # BLD AUTO: 1.5 K/UL (ref 1–4.8)
LYMPHOCYTES NFR BLD: 15.6 % (ref 18–48)
LYMPHOCYTES NFR BLD: 15.6 % (ref 18–48)
MCH RBC QN AUTO: 17.9 PG (ref 27–31)
MCH RBC QN AUTO: 17.9 PG (ref 27–31)
MCHC RBC AUTO-ENTMCNC: 28.9 G/DL (ref 32–36)
MCHC RBC AUTO-ENTMCNC: 28.9 G/DL (ref 32–36)
MCV RBC AUTO: 62 FL (ref 82–98)
MCV RBC AUTO: 62 FL (ref 82–98)
MONOCYTES # BLD AUTO: 1 K/UL (ref 0.3–1)
MONOCYTES # BLD AUTO: 1 K/UL (ref 0.3–1)
MONOCYTES NFR BLD: 10.6 % (ref 4–15)
MONOCYTES NFR BLD: 10.6 % (ref 4–15)
NEUTROPHILS # BLD AUTO: 7.1 K/UL (ref 1.8–7.7)
NEUTROPHILS # BLD AUTO: 7.1 K/UL (ref 1.8–7.7)
NEUTROPHILS NFR BLD: 72.5 % (ref 38–73)
NEUTROPHILS NFR BLD: 72.5 % (ref 38–73)
NRBC BLD-RTO: 0 /100 WBC
NRBC BLD-RTO: 0 /100 WBC
PLATELET # BLD AUTO: 407 K/UL (ref 150–350)
PLATELET # BLD AUTO: 407 K/UL (ref 150–350)
PMV BLD AUTO: 9.6 FL (ref 9.2–12.9)
PMV BLD AUTO: 9.6 FL (ref 9.2–12.9)
POTASSIUM SERPL-SCNC: 4 MMOL/L (ref 3.5–5.1)
PROT SERPL-MCNC: 6.6 G/DL (ref 6–8.4)
RBC # BLD AUTO: 4.8 M/UL (ref 4.6–6.2)
RBC # BLD AUTO: 4.8 M/UL (ref 4.6–6.2)
SODIUM SERPL-SCNC: 138 MMOL/L (ref 136–145)
WBC # BLD AUTO: 9.81 K/UL (ref 3.9–12.7)
WBC # BLD AUTO: 9.81 K/UL (ref 3.9–12.7)

## 2020-11-22 PROCEDURE — 94760 N-INVAS EAR/PLS OXIMETRY 1: CPT

## 2020-11-22 PROCEDURE — G0378 HOSPITAL OBSERVATION PER HR: HCPCS

## 2020-11-22 PROCEDURE — 80053 COMPREHEN METABOLIC PANEL: CPT

## 2020-11-22 PROCEDURE — 25000003 PHARM REV CODE 250: Performed by: INTERNAL MEDICINE

## 2020-11-22 PROCEDURE — 96361 HYDRATE IV INFUSION ADD-ON: CPT

## 2020-11-22 PROCEDURE — 25000003 PHARM REV CODE 250: Performed by: SURGERY

## 2020-11-22 PROCEDURE — 36415 COLL VENOUS BLD VENIPUNCTURE: CPT

## 2020-11-22 PROCEDURE — 27000221 HC OXYGEN, UP TO 24 HOURS

## 2020-11-22 PROCEDURE — 85025 COMPLETE CBC W/AUTO DIFF WBC: CPT

## 2020-11-22 PROCEDURE — 99900035 HC TECH TIME PER 15 MIN (STAT)

## 2020-11-22 RX ORDER — SODIUM, POTASSIUM,MAG SULFATES 17.5-3.13G
SOLUTION, RECONSTITUTED, ORAL ORAL ONCE
Status: COMPLETED | OUTPATIENT
Start: 2020-11-22 | End: 2020-11-22

## 2020-11-22 RX ORDER — SODIUM CHLORIDE 0.9 % (FLUSH) 0.9 %
10 SYRINGE (ML) INJECTION
Status: CANCELLED | OUTPATIENT
Start: 2020-11-22

## 2020-11-22 RX ORDER — LIDOCAINE HYDROCHLORIDE 10 MG/ML
1 INJECTION, SOLUTION EPIDURAL; INFILTRATION; INTRACAUDAL; PERINEURAL ONCE
Status: CANCELLED | OUTPATIENT
Start: 2020-11-22 | End: 2020-11-22

## 2020-11-22 RX ORDER — SODIUM CHLORIDE 9 MG/ML
INJECTION, SOLUTION INTRAVENOUS CONTINUOUS
Status: CANCELLED | OUTPATIENT
Start: 2020-11-23

## 2020-11-22 RX ADMIN — SODIUM SULFATE, POTASSIUM SULFATE, MAGNESIUM SULFATE: 17.5; 3.13; 1.6 SOLUTION, CONCENTRATE ORAL at 12:11

## 2020-11-22 RX ADMIN — FENOFIBRATE 145 MG: 145 TABLET ORAL at 08:11

## 2020-11-22 RX ADMIN — EZETIMIBE 10 MG: 10 TABLET ORAL at 08:11

## 2020-11-22 RX ADMIN — POTASSIUM CHLORIDE 40 MEQ: 1.5 FOR SOLUTION ORAL at 08:11

## 2020-11-22 RX ADMIN — RAMIPRIL 10 MG: 5 CAPSULE ORAL at 08:11

## 2020-11-22 RX ADMIN — FUROSEMIDE 20 MG: 20 TABLET ORAL at 08:11

## 2020-11-22 RX ADMIN — OXYCODONE HYDROCHLORIDE AND ACETAMINOPHEN 500 MG: 500 TABLET ORAL at 08:11

## 2020-11-22 RX ADMIN — FAMOTIDINE 20 MG: 20 TABLET, FILM COATED ORAL at 08:11

## 2020-11-22 RX ADMIN — SODIUM CHLORIDE: 0.9 INJECTION, SOLUTION INTRAVENOUS at 07:11

## 2020-11-22 NOTE — PLAN OF CARE
Pt being discharged home. Instruction explained and given for procedure in morning. Pt and wife verbalizes understanding.

## 2020-11-22 NOTE — DISCHARGE INSTRUCTIONS
Nothing by mouth after midnight.  Follow instructions for suprep for 5:00pm and 9:00pm.  Bring signed consent in the morning.  Take Blood Pressure medications only.  Arrive at hospital for procedure at 9:00am.    If symptoms worsen or new symptoms occur call PCP or return to ER.  Thanks for choosing [facility].

## 2020-11-22 NOTE — PLAN OF CARE
Pt alert and oriented ambulating in room with assistance.  Denies pain or discomfort at this time.

## 2020-11-22 NOTE — NURSING
Late entry: at 1430 was in pt room, pt was sitting in chair with feet propped up on bed. He stated he was comfortable and had no complaints. His bag of fluids was changed. About 1450 pt called  stating he was on the floor. Nurses Jessica Mancera Melissa, and myself went to pts room. Pt was observed on floor with no apparent  injury and stated no pain. Pt was helped off floor to sitting on side of bed. Pt stated he went to get up from the chair to get back in bed and the chair slide from under him and he slid to the floor. Pt was moved to a room closer to nurses station for closer observation and bed in lowest position with bed alarm on. Pt was educated on calling for assistance when needed to get out of bed. Dr. García notified.

## 2020-11-22 NOTE — DISCHARGE SUMMARY
[facility] Medicine  Discharge Summary      Patient Name: Richard Farr  MRN: [id number]  Admission Date: 11/20/2020  Hospital Length of Stay: 0 days  Discharge Date and Time:  11/22/2020 11:17 AM  Attending Physician: Drew Moreira Jr., MD   Discharging Provider: Ricky Gonzalez MD  Primary Care Provider: Juan Kidd MD             Hospital Course:   Principal Problem:Anemia     Chief Complaint:        Chief Complaint   Patient presents with    Fatigue       sent here by Dr. Garcia for low Creatnine and low Iron.  I think I may need a blood transfusion.         HPI: 74 y/o male with history of htn, hld, arthritis, h/o shingles, scheduled for cscope next week for evaluation of anemia and noted to have abn labs and further sent to ed for eval and noted to have low h and h and further admitted for blood transfusions and further work up          Past Medical History:   Diagnosis Date    Arthritis      Hyperlipemia      Hypertension      Shingles           Past Surgical History:   Procedure Laterality Date    NECK SURGERY         x 2    TONSILLECTOMY             Review of patient's allergies indicates:  No Known Allergies     No current facility-administered medications on file prior to encounter.            Current Outpatient Medications on File Prior to Encounter   Medication Sig    ascorbic acid, vitamin C, (VITAMIN C) 500 MG tablet Take 500 mg by mouth once daily.    aspirin 81 MG Chew Take 81 mg by mouth once daily.    ezetimibe (ZETIA) 10 mg tablet Take 10 mg by mouth once daily. 1/2 tabs    fenofibrate (TRICOR) 145 MG tablet Take 145 mg by mouth once daily.    furosemide (LASIX) 20 MG tablet Take 20 mg by mouth 2 (two) times daily.    glucosamine-chondroitin 500-400 mg tablet Take 1 tablet by mouth 3 (three) times daily.    multivitamin capsule Take 1 capsule by mouth once daily.    omega-3 fatty acids/fish oil (FISH OIL-OMEGA-3 FATTY ACIDS) 300-1,000 mg  capsule Take by mouth once daily.    potassium chloride (MICRO-K) 10 MEQ CpSR Take 10 mEq by mouth once.    ramipriL (ALTACE) 10 MG capsule Take 10 mg by mouth once daily.          Family History      None                Tobacco Use    Smoking status: Never Smoker    Smokeless tobacco: Never Used   Substance and Sexual Activity    Alcohol use: Yes       Alcohol/week: 3.0 standard drinks       Types: 3 Cans of beer per week    Drug use: Never    Sexual activity: Not on file      Review of Systems   Constitutional: Negative.    HENT: Negative.    Eyes: Negative.    Respiratory: Positive for shortness of breath.    Cardiovascular: Negative.    Gastrointestinal: Negative.    Endocrine: Negative.    Musculoskeletal: Negative.    Skin: Negative.    Neurological: Positive for weakness.   Hematological: Negative.    Psychiatric/Behavioral: Negative.       Objective:      Vital Signs (Most Recent):  Temp: 98.6 °F (37 °C) (11/21/20 0700)  Pulse: 104 (11/21/20 0700)  Resp: 18 (11/21/20 0700)  BP: (!) 123/94 (11/21/20 0700)  SpO2: (!) 90 % (11/21/20 0700) Vital Signs (24h Range):  Temp:  [97.6 °F (36.4 °C)-98.7 °F (37.1 °C)] 98.6 °F (37 °C)  Pulse:  [] 104  Resp:  [18-24] 18  SpO2:  [85 %-98 %] 90 %  BP: (102-143)/(55-94) 123/94      Weight: 71.2 kg (157 lb)  Body mass index is 24.59 kg/m².     Physical Exam  Constitutional:       Appearance: Normal appearance. He is normal weight.   HENT:      Head: Normocephalic and atraumatic.      Nose: Nose normal.      Mouth/Throat:      Mouth: Mucous membranes are dry.   Eyes:      Extraocular Movements: Extraocular movements intact.      Pupils: Pupils are equal, round, and reactive to light.   Neck:      Musculoskeletal: Normal range of motion and neck supple.   Cardiovascular:      Rate and Rhythm: Regular rhythm. Tachycardia present.      Pulses: Normal pulses.   Pulmonary:      Effort: Pulmonary effort is normal.      Breath sounds: Normal breath sounds.   Abdominal:       General: Abdomen is flat. Bowel sounds are normal.   Musculoskeletal: Normal range of motion.   Skin:     General: Skin is warm and dry.   Neurological:      General: No focal deficit present.      Mental Status: He is alert and oriented to person, place, and time. Mental status is at baseline.             CRANIAL NERVES      CN III, IV, VI   Pupils are equal, round, and reactive to light.        Significant Labs: All pertinent labs within the past 24 hours have been reviewed.  Lab Results   Component Value Date     WBC 9.66 11/21/2020     HGB 8.8 (L) 11/21/2020     HCT 29.5 (L) 11/21/2020     MCV 61 (L) 11/21/2020      (H) 11/21/2020             Recent Labs   Lab 11/21/20  0527      K 3.7      CO2 24   BUN 20   CREATININE 0.7   CALCIUM 8.8         Significant Imaging: I have reviewed and interpreted all pertinent imaging results/findings within the past 24 hours.     Assessment/Plan:            Active Diagnoses:     Diagnosis Date Noted POA    PRINCIPAL PROBLEM:  Anemia [D64.9] 11/20/2020 Yes       Problems Resolved During this Admission:          VTE Risk Mitigation (From admission, onward)                  Ordered        IP VTE HIGH RISK PATIENT  Once      11/20/20 1406        Place AVINASH hose  Until discontinued      11/20/20 1406                  Symptomatic anemia  htn  hld  Arthritis, unspecified     Plan :  Transfuse 2units prbc  Check fobt  Labs in am  Resume home meds  Gi and dvt ppx  Code status full        h and h fairly stable  fobt negative  Fall reported yesterday, no injuries  Dc home with pcp follow up and cscope as scheduled   Consults:   Consults (From admission, onward)        Status Ordering Provider     Inpatient consult to General Surgery  Once     Provider:  Abelino Garcia MD    Acknowledged GITA RUTH     Inpatient consult to General Surgery  Once     Provider:  Abelino Garcia MD    Acknowledged HOME BARKER JR          Final Active Diagnoses:      Problems  Resolved During this Admission:    Diagnosis Date Noted Date Resolved POA    PRINCIPAL PROBLEM:  Iron deficiency anemia [D50.9] 2020 Yes      Discharged Condition: good    Disposition: Home or Self Care    Follow Up:  Follow-up Information     Drew Moreira Jr, MD In 1 week.    Specialty: Internal Medicine  Contact information:  [address] [place] [id number] [phone]                 Patient Instructions:      Diet Adult Regular     Activity as tolerated     Medications:  None (patient  at medical facility)    Significant Diagnostic Studies: Labs: All labs within the past 24 hours have been reviewed    Pending Diagnostic Studies:     None        Indwelling Lines/Drains at time of discharge:   Lines/Drains/Airways     None                 Time spent on the discharge of patient: 32 minutes  Patient was seen and examined on the date of discharge and determined to be suitable for discharge.         Ricky Gonzalez MD  Department of Hospital Medicine  [facility]

## 2020-11-22 NOTE — CARE UPDATE
11/22/20 0805   Patient Assessment/Suction   Level of Consciousness (AVPU) alert   Respiratory Effort Normal;Unlabored   Expansion/Accessory Muscles/Retractions no use of accessory muscles   Rhythm/Pattern, Respiratory pattern regular   PRE-TX-O2   O2 Device (Oxygen Therapy) nasal cannula   $ Is the patient on Low Flow Oxygen? Yes   Flow (L/min) 2   Oxygen Concentration (%) 28   SpO2 (!) 94 %   Pulse Oximetry Type Intermittent   $ Pulse Oximetry - Single Charge Pulse Oximetry - Single   Pulse 98   Positioning Sitting in chair   Respiratory Evaluation   $ Care Plan Tech Time 15 min

## 2020-11-23 ENCOUNTER — ANESTHESIA EVENT (OUTPATIENT)
Dept: ENDOSCOPY | Facility: HOSPITAL | Age: 75
End: 2020-11-23
Payer: MEDICARE

## 2020-11-23 ENCOUNTER — ANESTHESIA (OUTPATIENT)
Dept: ENDOSCOPY | Facility: HOSPITAL | Age: 75
End: 2020-11-23
Payer: MEDICARE

## 2020-11-23 ENCOUNTER — HOSPITAL ENCOUNTER (OUTPATIENT)
Facility: HOSPITAL | Age: 75
Discharge: HOME | End: 2020-11-23
Attending: SURGERY | Admitting: SURGERY
Payer: MEDICARE

## 2020-11-23 VITALS
RESPIRATION RATE: 18 BRPM | OXYGEN SATURATION: 97 % | HEART RATE: 82 BPM | TEMPERATURE: 98 F | SYSTOLIC BLOOD PRESSURE: 102 MMHG | DIASTOLIC BLOOD PRESSURE: 66 MMHG

## 2020-11-23 DIAGNOSIS — Z01.818 PRE-OP EVALUATION: ICD-10-CM

## 2020-11-23 DIAGNOSIS — D13.2 ADENOMATOUS DUODENAL POLYP: Primary | Chronic | ICD-10-CM

## 2020-11-23 DIAGNOSIS — D50.9 IRON DEFICIENCY ANEMIA, UNSPECIFIED IRON DEFICIENCY ANEMIA TYPE: ICD-10-CM

## 2020-11-23 PROBLEM — K29.00 ACUTE SUPERFICIAL GASTRITIS WITHOUT HEMORRHAGE: Status: ACTIVE | Noted: 2020-11-23

## 2020-11-23 PROBLEM — K57.30 DIVERTICULOSIS OF SIGMOID COLON: Chronic | Status: ACTIVE | Noted: 2020-11-23

## 2020-11-23 PROCEDURE — 25500020 PHARM REV CODE 255: Performed by: SURGERY

## 2020-11-23 PROCEDURE — 93010 EKG 12-LEAD: ICD-10-PCS | Mod: ,,, | Performed by: INTERNAL MEDICINE

## 2020-11-23 PROCEDURE — 93005 ELECTROCARDIOGRAM TRACING: CPT | Mod: 59

## 2020-11-23 PROCEDURE — 25000003 PHARM REV CODE 250: Performed by: SURGERY

## 2020-11-23 PROCEDURE — 27201423 OPTIME MED/SURG SUP & DEVICES STERILE SUPPLY: Performed by: SURGERY

## 2020-11-23 PROCEDURE — 37000009 HC ANESTHESIA EA ADD 15 MINS: Performed by: SURGERY

## 2020-11-23 PROCEDURE — 93010 ELECTROCARDIOGRAM REPORT: CPT | Mod: ,,, | Performed by: INTERNAL MEDICINE

## 2020-11-23 PROCEDURE — 43239 EGD BIOPSY SINGLE/MULTIPLE: CPT | Performed by: SURGERY

## 2020-11-23 PROCEDURE — 88305 TISSUE EXAM BY PATHOLOGIST: CPT | Mod: TC | Performed by: ANESTHESIOLOGY

## 2020-11-23 PROCEDURE — 87081 CULTURE SCREEN ONLY: CPT

## 2020-11-23 PROCEDURE — 45378 DIAGNOSTIC COLONOSCOPY: CPT | Performed by: SURGERY

## 2020-11-23 PROCEDURE — 37000008 HC ANESTHESIA 1ST 15 MINUTES: Performed by: SURGERY

## 2020-11-23 RX ORDER — SODIUM CHLORIDE 0.9 % (FLUSH) 0.9 %
10 SYRINGE (ML) INJECTION
Status: DISCONTINUED | OUTPATIENT
Start: 2020-11-23 | End: 2020-11-23 | Stop reason: HOSPADM

## 2020-11-23 RX ORDER — PROPOFOL 10 MG/ML
VIAL (ML) INTRAVENOUS
Status: DISCONTINUED | OUTPATIENT
Start: 2020-11-23 | End: 2020-11-23

## 2020-11-23 RX ORDER — SODIUM CHLORIDE, SODIUM LACTATE, POTASSIUM CHLORIDE, CALCIUM CHLORIDE 600; 310; 30; 20 MG/100ML; MG/100ML; MG/100ML; MG/100ML
INJECTION, SOLUTION INTRAVENOUS CONTINUOUS PRN
Status: DISCONTINUED | OUTPATIENT
Start: 2020-11-23 | End: 2020-11-23

## 2020-11-23 RX ORDER — LIDOCAINE HYDROCHLORIDE 10 MG/ML
1 INJECTION, SOLUTION EPIDURAL; INFILTRATION; INTRACAUDAL; PERINEURAL ONCE
Status: DISCONTINUED | OUTPATIENT
Start: 2020-11-23 | End: 2020-11-23 | Stop reason: HOSPADM

## 2020-11-23 RX ORDER — SODIUM CHLORIDE 9 MG/ML
INJECTION, SOLUTION INTRAVENOUS CONTINUOUS
Status: DISCONTINUED | OUTPATIENT
Start: 2020-11-23 | End: 2020-11-23 | Stop reason: HOSPADM

## 2020-11-23 RX ADMIN — Medication 25 MG: at 02:11

## 2020-11-23 RX ADMIN — IOHEXOL 100 ML: 350 INJECTION, SOLUTION INTRAVENOUS at 05:11

## 2020-11-23 RX ADMIN — TOPICAL ANESTHETIC 2 EACH: 200 SPRAY DENTAL; PERIODONTAL at 02:11

## 2020-11-23 RX ADMIN — SODIUM CHLORIDE: 0.9 INJECTION, SOLUTION INTRAVENOUS at 02:11

## 2020-11-23 RX ADMIN — SODIUM CHLORIDE: 0.9 INJECTION, SOLUTION INTRAVENOUS at 10:11

## 2020-11-23 NOTE — PLAN OF CARE
15:31 PM  RECEIVED PATIENT FROM ENDO PER STRETCHER. PATIENT TO BE HELD FOR CT-SCAN TODAY. READI-CAT GIVEN PO TO PATIENT. TOLERATED WELL.

## 2020-11-23 NOTE — INTERVAL H&P NOTE
The patient has been examined and the H&P has been reviewed:    There have been no major changes in the history and physical.  The patient did get transfused while in the hospital 3 days ago and was discharged a day later in stable condition as well as a consistent H&H.  He is now being admitted for further evaluation with an EGD and a colonoscopy.    Surgery risks, benefits and alternative options discussed and understood by patient/family.          Active Hospital Problems    Diagnosis  POA    Iron deficiency anemia [D50.9]  Yes      Resolved Hospital Problems   No resolved problems to display.

## 2020-11-23 NOTE — DISCHARGE INSTRUCTIONS
FOLLOW-UP WITH DR BARRON ON Thursday December 3,2020 AT 3:45 P.M  NO DRIVING OR DRINKING ALCOHOL FOR 24 HOURS AFTER PROCEDURE  RESUME HOME MEDICATION  RESUME CURRENT DIET  IF ANY PROBLEMS ,QUESTIONS,OR CONCERNS CALL DR BARRON OFFICE    THANKS FOR CHOOSING [facility]

## 2020-11-23 NOTE — TRANSFER OF CARE
Anesthesia Transfer of Care Note    Patient: Richard Farr    Procedure(s) Performed: Procedure(s) (LRB):  EGD, WITH CLOSED BIOPSY (N/A)  COLONOSCOPY (N/A)    Patient location: Other: ENDO    Anesthesia Type: MAC    Transport from OR: Transported from OR on room air with adequate spontaneous ventilation    Post pain: adequate analgesia    Post assessment: no apparent anesthetic complications    Post vital signs: stable    Level of consciousness: awake    Nausea/Vomiting: no nausea/vomiting    Complications: none    Transfer of care protocol was followed      Last vitals:   93/53  16 RR  81 HR  94 % O2 SAT

## 2020-11-23 NOTE — DISCHARGE SUMMARY
[facility]  Discharge Note  Short Stay    Procedure(s) (LRB):  EGD, WITH CLOSED BIOPSY (N/A)  COLONOSCOPY (N/A)    OUTCOME: Patient tolerated treatment/procedure well without complication and is now ready for discharge.    DISPOSITION: Home or Self Care    FINAL DIAGNOSIS:  Adenomatous duodenal polyp    FOLLOWUP: In clinic in 1 week for pathology report and follow-up    DISCHARGE INSTRUCTIONS:    Discharge Procedure Orders   Diet Adult Regular     Notify your health care provider if you experience any of the following:  temperature >100.4     Notify your health care provider if you experience any of the following:  persistent nausea and vomiting or diarrhea     Notify your health care provider if you experience any of the following:  severe uncontrolled pain     Activity as tolerated

## 2020-11-23 NOTE — ANESTHESIA PREPROCEDURE EVALUATION
11/23/2020  Richard Farr is a 75 y.o., male.    Anesthesia Evaluation    I have reviewed the Patient Summary Reports.   I have reviewed the NPO Status.   I have reviewed the Medications.     Review of Systems  Anesthesia Hx:  No problems with previous Anesthesia  Denies Family Hx of Anesthesia complications.   Denies Personal Hx of Anesthesia complications.   Social:  Non-Smoker    Cardiovascular:   Hypertension, well controlled Valvular problems/Murmurs, MR ECG has been reviewed. SOLET NOTE- SEVERE MR, EF 65%   Pulmonary:  Pulmonary Normal    Renal/:  Renal/ Normal     Hepatic/GI:  Hepatic/GI Normal    Musculoskeletal:   Arthritis     Neurological:  Neurology Normal    Endocrine:  Endocrine Normal    Dermatological:   SHINGLES     Lab Results   Component Value Date    WBC 9.81 11/22/2020    WBC 9.81 11/22/2020    HGB 8.6 (L) 11/22/2020    HGB 8.6 (L) 11/22/2020    HCT 29.8 (L) 11/22/2020    HCT 29.8 (L) 11/22/2020    MCV 62 (L) 11/22/2020    MCV 62 (L) 11/22/2020     (H) 11/22/2020     (H) 11/22/2020     CMP  Sodium   Date Value Ref Range Status   11/22/2020 138 136 - 145 mmol/L Final     Potassium   Date Value Ref Range Status   11/22/2020 4.0 3.5 - 5.1 mmol/L Final     Chloride   Date Value Ref Range Status   11/22/2020 107 95 - 110 mmol/L Final     CO2   Date Value Ref Range Status   11/22/2020 21 (L) 23 - 29 mmol/L Final     Glucose   Date Value Ref Range Status   11/22/2020 104 70 - 110 mg/dL Final     BUN   Date Value Ref Range Status   11/22/2020 28 (H) 8 - 23 mg/dL Final     Creatinine   Date Value Ref Range Status   11/22/2020 0.9 0.5 - 1.4 mg/dL Final     Calcium   Date Value Ref Range Status   11/22/2020 8.6 (L) 8.7 - 10.5 mg/dL Final     Total Protein   Date Value Ref Range Status   11/22/2020 6.6 6.0 - 8.4 g/dL Final     Albumin   Date Value Ref Range Status   11/22/2020 3.1  (L) 3.5 - 5.2 g/dL Final     Total Bilirubin   Date Value Ref Range Status   11/22/2020 1.4 (H) 0.1 - 1.0 mg/dL Final     Comment:     For infants and newborns, interpretation of results should be based  on gestational age, weight and in agreement with clinical  observations.  Premature Infant recommended reference ranges:  Up to 24 hours.............<8.0 mg/dL  Up to 48 hours............<12.0 mg/dL  3-5 days..................<15.0 mg/dL  6-29 days.................<15.0 mg/dL  For patients on Eltrombopag therapy, use of Dimension [place] TBIL is   not   recommended.       Alkaline Phosphatase   Date Value Ref Range Status   11/22/2020 53 (L) 55 - 135 U/L Final     AST   Date Value Ref Range Status   11/22/2020 110 (H) 10 - 40 U/L Final     ALT   Date Value Ref Range Status   11/22/2020 76 (H) 10 - 44 U/L Final     Anion Gap   Date Value Ref Range Status   11/22/2020 10 8 - 16 mmol/L Final     eGFR if    Date Value Ref Range Status   11/22/2020 >60.0 >60 mL/min/1.73 m^2 Final     eGFR if non    Date Value Ref Range Status   11/22/2020 >60.0 >60 mL/min/1.73 m^2 Final     Comment:     Calculation used to obtain the estimated glomerular filtration  rate (eGFR) is the CKD-EPI equation.       EKG shows occasional PVCs LVH and QT abnormality    Physical Exam  General:  Well nourished    Airway/Jaw/Neck:  Airway Findings: Mouth Opening: Normal Tongue: Normal  General Airway Assessment: Adult  Mallampati: III  Improves to II with phonation.  TM Distance: Normal, at least 6 cm  Jaw/Neck Findings:  Neck ROM: Normal ROM      Dental:  Dental Findings: In tact   Chest/Lungs:  Chest/Lungs Findings: Clear to auscultation, Normal Respiratory Rate     Heart/Vascular:  Heart Findings: Rate: Normal  Rhythm: Regular Rhythm  Sounds: Normal  Heart Murmur  Systolic        Mental Status:  Mental Status Findings:  Cooperative         Anesthesia Plan  Type of Anesthesia, risks & benefits discussed:  Anesthesia  Type:  MAC  Patient's Preference:   Intra-op Monitoring Plan: standard ASA monitors  Intra-op Monitoring Plan Comments:   Post Op Pain Control Plan: multimodal analgesia  Post Op Pain Control Plan Comments:   Induction:    Beta Blocker:  Patient is not currently on a Beta-Blocker (No further documentation required).       Informed Consent: Patient understands risks and agrees with Anesthesia plan.  Questions answered. Anesthesia consent signed with patient.  ASA Score: 4     Day of Surgery Review of History & Physical: I have interviewed and examined the patient. I have reviewed the patient's H&P dated:  There are no significant changes.  H&P update referred to the surgeon.         Ready For Surgery From Anesthesia Perspective.

## 2020-11-24 LAB — HELICOBACTER, RAPID UREA: POSITIVE

## 2020-11-24 NOTE — ANESTHESIA POSTPROCEDURE EVALUATION
Anesthesia Post Evaluation    Patient: Richard Farr    Procedure(s) Performed: Procedure(s) (LRB):  EGD, WITH CLOSED BIOPSY (N/A)  COLONOSCOPY (N/A)    Final Anesthesia Type: MAC    Patient location during evaluation: OPS  Patient participation: Yes- Able to Participate  Level of consciousness: awake  Post-procedure vital signs: reviewed and stable  Pain management: adequate  Airway patency: patent    PONV status at discharge: No PONV  Anesthetic complications: no      Cardiovascular status: blood pressure returned to baseline  Respiratory status: spontaneous ventilation  Hydration status: euvolemic  Follow-up not needed.          Vitals Value Taken Time   /66 11/23/20 1531   Temp 36.4 °C (97.6 °F) 11/23/20 1531   Pulse 82 11/23/20 1531   Resp 18 11/23/20 1531   SpO2 97 % 11/23/20 1531         No case tracking events are documented in the log.      Pain/Nato Score: Nato Score: 10 (11/23/2020  3:31 PM)

## 2020-12-03 DIAGNOSIS — D13.2 ADENOMATOUS DUODENAL POLYP: Primary | ICD-10-CM

## 2020-12-06 DIAGNOSIS — D13.2 ADENOMATOUS DUODENAL POLYP: Primary | ICD-10-CM

## 2020-12-06 RX ORDER — SODIUM CHLORIDE 9 MG/ML
INJECTION, SOLUTION INTRAVENOUS CONTINUOUS
Status: CANCELLED | OUTPATIENT
Start: 2020-12-14

## 2020-12-06 RX ORDER — SODIUM CHLORIDE 0.9 % (FLUSH) 0.9 %
10 SYRINGE (ML) INJECTION
Status: CANCELLED | OUTPATIENT
Start: 2020-12-06

## 2020-12-06 RX ORDER — LIDOCAINE HYDROCHLORIDE 10 MG/ML
1 INJECTION, SOLUTION EPIDURAL; INFILTRATION; INTRACAUDAL; PERINEURAL ONCE
Status: CANCELLED | OUTPATIENT
Start: 2020-12-06 | End: 2020-12-06

## 2020-12-09 ENCOUNTER — HOSPITAL ENCOUNTER (OUTPATIENT)
Facility: HOSPITAL | Age: 75
Discharge: HOME HEALTH | End: 2020-12-11
Attending: EMERGENCY MEDICINE | Admitting: EMERGENCY MEDICINE
Payer: MEDICARE

## 2020-12-09 DIAGNOSIS — I63.9 CVA (CEREBRAL VASCULAR ACCIDENT): ICD-10-CM

## 2020-12-09 DIAGNOSIS — R41.82 ALTERED MENTAL STATUS: ICD-10-CM

## 2020-12-09 LAB
ALBUMIN SERPL BCP-MCNC: 3.2 G/DL (ref 3.5–5.2)
ALP SERPL-CCNC: 69 U/L (ref 55–135)
ALT SERPL W/O P-5'-P-CCNC: 64 U/L (ref 10–44)
AMPHET+METHAMPHET UR QL: NEGATIVE
ANION GAP SERPL CALC-SCNC: 6 MMOL/L (ref 8–16)
AST SERPL-CCNC: 54 U/L (ref 10–40)
BARBITURATES UR QL SCN>200 NG/ML: NEGATIVE
BASOPHILS # BLD AUTO: 0.1 K/UL (ref 0–0.2)
BASOPHILS NFR BLD: 1.5 % (ref 0–1.9)
BENZODIAZ UR QL SCN>200 NG/ML: NEGATIVE
BILIRUB SERPL-MCNC: 1 MG/DL (ref 0.1–1)
BILIRUB UR QL STRIP: NEGATIVE
BUN SERPL-MCNC: 19 MG/DL (ref 8–23)
BZE UR QL SCN: NEGATIVE
CALCIUM SERPL-MCNC: 8.9 MG/DL (ref 8.7–10.5)
CANNABINOIDS UR QL SCN: NEGATIVE
CHLORIDE SERPL-SCNC: 103 MMOL/L (ref 95–110)
CLARITY UR: CLEAR
CO2 SERPL-SCNC: 26 MMOL/L (ref 23–29)
COLOR UR: YELLOW
CREAT SERPL-MCNC: 0.8 MG/DL (ref 0.5–1.4)
CREAT UR-MCNC: 13.6 MG/DL (ref 23–375)
CTP QC/QA: YES
DIFFERENTIAL METHOD: ABNORMAL
EOSINOPHIL # BLD AUTO: 0.2 K/UL (ref 0–0.5)
EOSINOPHIL NFR BLD: 2.4 % (ref 0–8)
ERYTHROCYTE [DISTWIDTH] IN BLOOD BY AUTOMATED COUNT: 31.8 % (ref 11.5–14.5)
EST. GFR  (AFRICAN AMERICAN): >60 ML/MIN/1.73 M^2
EST. GFR  (NON AFRICAN AMERICAN): >60 ML/MIN/1.73 M^2
GLUCOSE SERPL-MCNC: 116 MG/DL (ref 70–110)
GLUCOSE UR QL STRIP: NEGATIVE
HCT VFR BLD AUTO: 30.1 % (ref 40–54)
HGB BLD-MCNC: 8.8 G/DL (ref 14–18)
HGB UR QL STRIP: NEGATIVE
IMM GRANULOCYTES # BLD AUTO: 0.02 K/UL (ref 0–0.04)
IMM GRANULOCYTES NFR BLD AUTO: 0.3 % (ref 0–0.5)
KETONES UR QL STRIP: NEGATIVE
LACTATE SERPL-SCNC: 1.4 MMOL/L (ref 0.5–2.2)
LEUKOCYTE ESTERASE UR QL STRIP: NEGATIVE
LYMPHOCYTES # BLD AUTO: 2.2 K/UL (ref 1–4.8)
LYMPHOCYTES NFR BLD: 32.8 % (ref 18–48)
MCH RBC QN AUTO: 18.8 PG (ref 27–31)
MCHC RBC AUTO-ENTMCNC: 29.2 G/DL (ref 32–36)
MCV RBC AUTO: 64 FL (ref 82–98)
METHADONE UR QL SCN>300 NG/ML: NEGATIVE
MONOCYTES # BLD AUTO: 0.7 K/UL (ref 0.3–1)
MONOCYTES NFR BLD: 10.2 % (ref 4–15)
NEUTROPHILS # BLD AUTO: 3.5 K/UL (ref 1.8–7.7)
NEUTROPHILS NFR BLD: 52.8 % (ref 38–73)
NITRITE UR QL STRIP: NEGATIVE
NRBC BLD-RTO: 0 /100 WBC
NT-PROBNP SERPL-MCNC: 4312 PG/ML (ref 5–900)
OPIATES UR QL SCN: NEGATIVE
PCP UR QL SCN>25 NG/ML: NEGATIVE
PH UR STRIP: 7 [PH] (ref 5–8)
PLATELET # BLD AUTO: 363 K/UL (ref 150–350)
PMV BLD AUTO: 9.8 FL (ref 9.2–12.9)
POTASSIUM SERPL-SCNC: 3.7 MMOL/L (ref 3.5–5.1)
PROT SERPL-MCNC: 7 G/DL (ref 6–8.4)
PROT UR QL STRIP: NEGATIVE
RBC # BLD AUTO: 4.68 M/UL (ref 4.6–6.2)
SARS-COV-2 RDRP RESP QL NAA+PROBE: NEGATIVE
SODIUM SERPL-SCNC: 135 MMOL/L (ref 136–145)
SP GR UR STRIP: 1.01 (ref 1–1.03)
TOXICOLOGY INFORMATION: ABNORMAL
TSH SERPL DL<=0.005 MIU/L-ACNC: 3.91 UIU/ML (ref 0.4–4)
URN SPEC COLLECT METH UR: NORMAL
UROBILINOGEN UR STRIP-ACNC: 1 EU/DL
WBC # BLD AUTO: 6.58 K/UL (ref 3.9–12.7)

## 2020-12-09 PROCEDURE — 25000003 PHARM REV CODE 250: Performed by: EMERGENCY MEDICINE

## 2020-12-09 PROCEDURE — 99900035 HC TECH TIME PER 15 MIN (STAT)

## 2020-12-09 PROCEDURE — 63600175 PHARM REV CODE 636 W HCPCS: Performed by: EMERGENCY MEDICINE

## 2020-12-09 PROCEDURE — G0378 HOSPITAL OBSERVATION PER HR: HCPCS | Mod: OBSCO

## 2020-12-09 PROCEDURE — 96375 TX/PRO/DX INJ NEW DRUG ADDON: CPT

## 2020-12-09 PROCEDURE — 81003 URINALYSIS AUTO W/O SCOPE: CPT | Mod: 59

## 2020-12-09 PROCEDURE — 83605 ASSAY OF LACTIC ACID: CPT

## 2020-12-09 PROCEDURE — 85025 COMPLETE CBC W/AUTO DIFF WBC: CPT

## 2020-12-09 PROCEDURE — 96376 TX/PRO/DX INJ SAME DRUG ADON: CPT

## 2020-12-09 PROCEDURE — 99900031 HC PATIENT EDUCATION (STAT)

## 2020-12-09 PROCEDURE — 84443 ASSAY THYROID STIM HORMONE: CPT

## 2020-12-09 PROCEDURE — 96374 THER/PROPH/DIAG INJ IV PUSH: CPT

## 2020-12-09 PROCEDURE — 93005 ELECTROCARDIOGRAM TRACING: CPT

## 2020-12-09 PROCEDURE — U0002 COVID-19 LAB TEST NON-CDC: HCPCS | Performed by: EMERGENCY MEDICINE

## 2020-12-09 PROCEDURE — 93010 ELECTROCARDIOGRAM REPORT: CPT | Mod: ,,, | Performed by: INTERNAL MEDICINE

## 2020-12-09 PROCEDURE — 80053 COMPREHEN METABOLIC PANEL: CPT

## 2020-12-09 PROCEDURE — 83880 ASSAY OF NATRIURETIC PEPTIDE: CPT

## 2020-12-09 PROCEDURE — G0378 HOSPITAL OBSERVATION PER HR: HCPCS

## 2020-12-09 PROCEDURE — 99285 EMERGENCY DEPT VISIT HI MDM: CPT | Mod: 25

## 2020-12-09 PROCEDURE — 93010 EKG 12-LEAD: ICD-10-PCS | Mod: ,,, | Performed by: INTERNAL MEDICINE

## 2020-12-09 PROCEDURE — 94761 N-INVAS EAR/PLS OXIMETRY MLT: CPT

## 2020-12-09 PROCEDURE — 80307 DRUG TEST PRSMV CHEM ANLYZR: CPT

## 2020-12-09 RX ORDER — TALC
6 POWDER (GRAM) TOPICAL NIGHTLY PRN
Status: DISCONTINUED | OUTPATIENT
Start: 2020-12-09 | End: 2020-12-11 | Stop reason: HOSPADM

## 2020-12-09 RX ORDER — FUROSEMIDE 10 MG/ML
20 INJECTION INTRAMUSCULAR; INTRAVENOUS
Status: DISCONTINUED | OUTPATIENT
Start: 2020-12-09 | End: 2020-12-11 | Stop reason: HOSPADM

## 2020-12-09 RX ORDER — RAMIPRIL 5 MG/1
10 CAPSULE ORAL DAILY
Status: DISCONTINUED | OUTPATIENT
Start: 2020-12-09 | End: 2020-12-11 | Stop reason: HOSPADM

## 2020-12-09 RX ORDER — PANTOPRAZOLE SODIUM 40 MG/1
40 TABLET, DELAYED RELEASE ORAL DAILY
COMMUNITY
End: 2022-03-28

## 2020-12-09 RX ORDER — ATORVASTATIN CALCIUM 40 MG/1
40 TABLET, FILM COATED ORAL DAILY
Status: ON HOLD | COMMUNITY
End: 2020-12-11 | Stop reason: HOSPADM

## 2020-12-09 RX ORDER — SODIUM CHLORIDE 0.9 % (FLUSH) 0.9 %
10 SYRINGE (ML) INJECTION
Status: DISCONTINUED | OUTPATIENT
Start: 2020-12-09 | End: 2020-12-11 | Stop reason: HOSPADM

## 2020-12-09 RX ORDER — ACETAMINOPHEN 325 MG/1
650 TABLET ORAL EVERY 8 HOURS PRN
Status: DISCONTINUED | OUTPATIENT
Start: 2020-12-09 | End: 2020-12-11 | Stop reason: HOSPADM

## 2020-12-09 RX ORDER — TURMERIC 400 MG
CAPSULE ORAL
COMMUNITY
End: 2021-01-27 | Stop reason: CLARIF

## 2020-12-09 RX ORDER — ONDANSETRON 4 MG/1
8 TABLET, ORALLY DISINTEGRATING ORAL EVERY 8 HOURS PRN
Status: DISCONTINUED | OUTPATIENT
Start: 2020-12-09 | End: 2020-12-11 | Stop reason: HOSPADM

## 2020-12-09 RX ORDER — ATORVASTATIN CALCIUM 40 MG/1
40 TABLET, FILM COATED ORAL DAILY
Status: DISCONTINUED | OUTPATIENT
Start: 2020-12-09 | End: 2020-12-10

## 2020-12-09 RX ORDER — SODIUM CHLORIDE 9 MG/ML
INJECTION, SOLUTION INTRAVENOUS CONTINUOUS
Status: DISCONTINUED | OUTPATIENT
Start: 2020-12-09 | End: 2020-12-11 | Stop reason: HOSPADM

## 2020-12-09 RX ORDER — PANTOPRAZOLE SODIUM 40 MG/1
40 TABLET, DELAYED RELEASE ORAL DAILY
Status: DISCONTINUED | OUTPATIENT
Start: 2020-12-09 | End: 2020-12-11 | Stop reason: HOSPADM

## 2020-12-09 RX ADMIN — FUROSEMIDE 20 MG: 10 INJECTION, SOLUTION INTRAVENOUS at 12:12

## 2020-12-09 RX ADMIN — PIPERACILLIN AND TAZOBACTAM 4.5 G: 4; .5 INJECTION, POWDER, LYOPHILIZED, FOR SOLUTION INTRAVENOUS; PARENTERAL at 08:12

## 2020-12-09 RX ADMIN — SODIUM CHLORIDE: 0.9 INJECTION, SOLUTION INTRAVENOUS at 11:12

## 2020-12-09 RX ADMIN — FUROSEMIDE 20 MG: 10 INJECTION, SOLUTION INTRAVENOUS at 11:12

## 2020-12-09 RX ADMIN — PIPERACILLIN AND TAZOBACTAM 4.5 G: 4; .5 INJECTION, POWDER, LYOPHILIZED, FOR SOLUTION INTRAVENOUS; PARENTERAL at 01:12

## 2020-12-09 RX ADMIN — PANTOPRAZOLE SODIUM 40 MG: 40 TABLET, DELAYED RELEASE ORAL at 12:12

## 2020-12-09 NOTE — PLAN OF CARE
12/09/20 1311   SUAREZ Message   Medicare Outpatient and Observation Notification regarding financial responsibility Explained to patient/caregiver;Given to patient/caregiver;Signed/date by patient/caregiver   Date SUAREZ was signed 12/09/20   Time SUAREZ was signed 1310   Suarez obtained

## 2020-12-09 NOTE — PLAN OF CARE
"   12/09/20 1314   Discharge Assessment   Assessment Type Discharge Planning Assessment   Confirmed/corrected address and phone number on facesheet? Yes   Assessment information obtained from? Caregiver   Expected Length of Stay (days) 2   Communicated expected length of stay with patient/caregiver yes   Prior to hospitilization cognitive status: Not Oriented to Time;Not Oriented to Place   Prior to hospitalization functional status: Independent   Current cognitive status: Alert/Oriented   Current Functional Status: Independent   Lives With spouse   Able to Return to Prior Arrangements yes   Is patient able to care for self after discharge? Yes   Who are your caregiver(s) and their phone number(s)? Linda Farr [phone]   Patient's perception of discharge disposition home or selfcare   Readmission Within the Last 30 Days no previous admission in last 30 days   Patient currently receives any other outside agency services? No   Equipment Currently Used at Home none   Do you have any problems affording any of your prescribed medications? No   Is the patient taking medications as prescribed? yes   Does the patient have transportation home? Yes   Transportation Anticipated family or friend will provide   Discharge Plan A Home   Discharge Plan B Home   DME Needed Upon Discharge  none   Patient/Family in Agreement with Plan yes     DCP obtained from patient's spouse, Ms. Mckeon.  She states that patient is fully independent at home and requires no DME.  She states that she does have 5 steps to go into home and that patient does struggle a bit to get up but refuses to use a walker as he states, "I am not old and do not need walker."  Patient's spouse fixes meds for patient in pill box but patient takes own meds and takes them appropriately.  She denies recent med changes that could cause patient's confusion.  She states that she notices changes in patient's medical status since last time he was admitted and got a " blood transfusion.  Offered home health services and she states she doesn't think they need it.

## 2020-12-09 NOTE — ED PROVIDER NOTES
Encounter Date: 12/9/2020       History   No chief complaint on file.    This is a 75-year-old white male presents to the emergency department with his wife who reports he has been confused on and off for about a week, but last night was extremely confused.  Head turned on all the lights in the house looking for a spoon, was up till 3:00 a.m..  Denies any fever.  Patient denies any shortness of breath or chest pain.  Patient with recent EGD and colonoscopy due to anemia, and is scheduled for a polyp removal by  early next week.        Review of patient's allergies indicates:  No Known Allergies  Past Medical History:   Diagnosis Date    Arthritis     Hyperlipemia     Hypertension     Shingles      Past Surgical History:   Procedure Laterality Date    COLONOSCOPY N/A 11/23/2020    Procedure: COLONOSCOPY;  Surgeon: Abelino Garcia MD;  Location: [facility];  Service: General;  Laterality: N/A;    NECK SURGERY      x 2    TONSILLECTOMY       No family history on file.  Social History     Tobacco Use    Smoking status: Never Smoker    Smokeless tobacco: Never Used   Substance Use Topics    Alcohol use: Yes     Alcohol/week: 3.0 standard drinks     Types: 3 Cans of beer per week    Drug use: Never     Review of Systems   Constitutional: Negative for fever.   HENT: Negative for sore throat.    Respiratory: Negative for shortness of breath.    Cardiovascular: Negative for chest pain.   Gastrointestinal: Negative for nausea.   Genitourinary: Negative for dysuria.   Musculoskeletal: Negative for back pain.   Skin: Negative for rash.   Neurological: Negative for weakness.        Positive for confusion   Hematological: Does not bruise/bleed easily.   All other systems reviewed and are negative.      Physical Exam     Initial Vitals [12/09/20 0850]   BP Pulse Resp Temp SpO2   111/67 101 18 98 °F (36.7 °C) 95 %      MAP       --         Physical Exam    Nursing note and vitals reviewed.  Constitutional: He  appears well-developed and well-nourished. He is not diaphoretic. No distress.   HENT:   Head: Normocephalic and atraumatic.   Eyes: Conjunctivae and EOM are normal. Pupils are equal, round, and reactive to light. Right eye exhibits no discharge. Left eye exhibits no discharge. No scleral icterus.   Neck: Normal range of motion. Neck supple. No JVD present.   Cardiovascular: Normal rate, regular rhythm, normal heart sounds and intact distal pulses.   No murmur heard.  Pulmonary/Chest: Breath sounds normal. No stridor. No respiratory distress. He has no wheezes. He has no rhonchi. He has no rales. He exhibits no tenderness.   Abdominal: Soft. Bowel sounds are normal. He exhibits no distension and no mass. There is no abdominal tenderness. There is no rebound and no guarding.   Musculoskeletal: Normal range of motion. Edema present. No tenderness.      Comments: 2+ edema to lower extremities wife says is chronic   Neurological: He is alert and oriented to person, place, and time. He has normal strength and normal reflexes. GCS score is 15. GCS eye subscore is 4. GCS verbal subscore is 5. GCS motor subscore is 6.   Skin: Skin is warm and dry. Capillary refill takes less than 2 seconds.         ED Course   Procedures  Labs Reviewed   CBC W/ AUTO DIFFERENTIAL - Abnormal; Notable for the following components:       Result Value    Hemoglobin 8.8 (*)     Hematocrit 30.1 (*)     MCV 64 (*)     MCH 18.8 (*)     MCHC 29.2 (*)     RDW 31.8 (*)     Platelets 363 (*)     All other components within normal limits   COMPREHENSIVE METABOLIC PANEL - Abnormal; Notable for the following components:    Sodium 135 (*)     Glucose 116 (*)     Albumin 3.2 (*)     AST 54 (*)     ALT 64 (*)     Anion Gap 6 (*)     All other components within normal limits   DRUG SCREEN PANEL, URINE EMERGENCY - Abnormal; Notable for the following components:    Creatinine, Urine 13.6 (*)     All other components within normal limits    Narrative:      Specimen Source->Urine   NT-PRO NATRIURETIC PEPTIDE - Abnormal; Notable for the following components:    NT-proBNP 4312 (*)     All other components within normal limits   LACTIC ACID, PLASMA   URINALYSIS, REFLEX TO URINE CULTURE    Narrative:     Specimen Source->Urine   TSH   SARS-COV-2 RDRP GENE    Narrative:     This test utilizes isothermal nucleic acid amplification   technology to detect the SARS-CoV-2 RdRp nucleic acid segment.   The analytical sensitivity (limit of detection) is 125 genome   equivalents/mL.   A POSITIVE result implies infection with the SARS-CoV-2 virus;   the patient is presumed to be contagious.     A NEGATIVE result means that SARS-CoV-2 nucleic acids are not   present above the limit of detection. A NEGATIVE result should be   treated as presumptive. It does not rule out the possibility of   COVID-19 and should not be the sole basis for treatment decisions.   If COVID-19 is strongly suspected based on clinical and exposure   history, re-testing using an alternate molecular assay should be   considered.   This test is only for use under the Food and Drug   Administration s Emergency Use Authorization (EUA).   Commercial kits are provided by [employer].   Performance characteristics of the EUA have been independently   verified by [facility] Department of   Pathology and Laboratory Medicine.   _________________________________________________________________   The authorized Fact Sheet for Healthcare Providers and the authorized Fact   Sheet for Patients of the ID NOW COVID-19 are available on the FDA   website:     https://www.fda.gov/media/318977/download  https://www.fda.gov/media/757395/download         EKG Readings: (Independently Interpreted)   Initial Reading: No STEMI. Rhythm: Normal Sinus Rhythm. Heart Rate: 90 year. Ectopy: PVCs. Conduction: Normal. ST Segments: Normal ST Segments. T Waves: Normal. Axis: Normal. Clinical Impression: Normal Sinus Rhythm with  PVCs   LVH     ECG Results          EKG 12-lead (In process)  Result time 12/09/20 09:29:18    In process by Interface, Lab In [facility] (12/09/20 09:29:18)                 Narrative:    Test Reason : R41.82,    Vent. Rate : 098 BPM     Atrial Rate : 098 BPM     P-R Int : 148 ms          QRS Dur : 108 ms      QT Int : 380 ms       P-R-T Axes : 076 087 -59 degrees     QTc Int : 485 ms    Sinus rhythm with occasional Premature ventricular complexes  LVH with repolarization abnormality  Prolonged QT  Abnormal ECG  When compared with ECG of 23-NOV-2020 09:49,  Nonspecific T wave abnormality has replaced inverted T waves in Lateral  leads    Referred By: [email or web address]   SELF           Confirmed By:                             Imaging Results          CT Head Without Contrast (Final result)  Result time 12/09/20 09:34:43    Final result by Hattie De Leon MD (12/09/20 09:34:43)                 Impression:      No acute intracranial abnormalities      Electronically signed by: Hattie De Leon MD  Date:    12/09/2020  Time:    09:34             Narrative:    EXAMINATION:  CT HEAD WITHOUT CONTRAST    CLINICAL HISTORY:  Altered mental status;    CT/Cardiac Nuclear exams in prior 12 months: 1    TECHNIQUE:  Axial head CT performed without IV contrast.  Iterative reconstruction utilized.    COMPARISON:  None    FINDINGS:  No evidence of intracranial hemorrhage, mass or mass effect.    Small remote appearing right lentiform lacunar infarct, versus dilated perivascular space.  Imaged sinuses, mastoids and middle ear cavities are clear.                               X-Ray Chest 1 View (Final result)  Result time 12/09/20 09:52:11    Final result by Hattie De Leon MD (12/09/20 09:52:11)                 Impression:      Elevated right hemidiaphragm, with pleural effusion and associated atelectasis versus pneumonia      Electronically signed by: Hattie De Leon MD  Date:    12/09/2020  Time:    09:52             Narrative:     EXAMINATION:  XR CHEST 1 VIEW    CLINICAL HISTORY:  Altered mental status, unspecified    COMPARISON:  CT abdomen and pelvis 11/23/2020    FINDINGS:  Elevated right hemidiaphragm with small right pleural effusion, adjacent atelectasis and/or pneumonia.    Right upper lung, and left lung are otherwise grossly clear.  Normal size cardiac silhouette.  Cervical spine fusion hardware noted.                                 Medical Decision Making:   Differential Diagnosis:   UTI, anemia, confusion, dementia, COVID-19                   ED Course as of Dec 09 1040   Wed Dec 09, 2020   0921 Rapid COVID is negative    [SD]   0938 CT head is negative for any acute abnormalities    [SD]   0957 Calling Dr. Moreira for possible admission    [SD]      ED Course User Index  [SD] Jefferson Martinez MD            Clinical Impression:       ICD-10-CM ICD-9-CM   1. Altered mental status  R41.82 780.97                          ED Disposition Condition    Observation                             Jefferson Martinez MD  12/09/20 1040

## 2020-12-10 ENCOUNTER — CLINICAL SUPPORT (OUTPATIENT)
Dept: CARDIOLOGY | Facility: HOSPITAL | Age: 75
End: 2020-12-10
Payer: MEDICARE

## 2020-12-10 VITALS — HEIGHT: 67 IN | WEIGHT: 157 LBS | BODY MASS INDEX: 24.64 KG/M2

## 2020-12-10 PROBLEM — I63.9 CVA (CEREBRAL VASCULAR ACCIDENT): Status: ACTIVE | Noted: 2020-12-10

## 2020-12-10 PROBLEM — I10 ESSENTIAL HYPERTENSION: Status: ACTIVE | Noted: 2020-12-10

## 2020-12-10 PROBLEM — E78.5 HYPERLIPEMIA: Status: ACTIVE | Noted: 2020-12-10

## 2020-12-10 LAB
AORTIC ROOT ANNULUS: 2.81 CM
AV INDEX (PROSTH): 0.85
AV MEAN GRADIENT: 1 MMHG
AV PEAK GRADIENT: 3 MMHG
AV VALVE AREA: 2.64 CM2
AV VELOCITY RATIO: 0.88
BSA FOR ECHO PROCEDURE: 1.83 M2
CV ECHO LV RWT: 0.44 CM
DOP CALC AO PEAK VEL: 0.85 M/S
DOP CALC AO VTI: 14.54 CM
DOP CALC LVOT AREA: 3.1 CM2
DOP CALC LVOT DIAMETER: 1.99 CM
DOP CALC LVOT PEAK VEL: 0.75 M/S
DOP CALC LVOT STROKE VOLUME: 38.45 CM3
DOP CALCLVOT PEAK VEL VTI: 12.37 CM
E WAVE DECELERATION TIME: 133.29 MSEC
E/A RATIO: 1.3
ECHO LV POSTERIOR WALL: 1.29 CM (ref 0.6–1.1)
FRACTIONAL SHORTENING: 40 % (ref 28–44)
INTERVENTRICULAR SEPTUM: 1.08 CM (ref 0.6–1.1)
LEFT ATRIUM SIZE: 4.63 CM
LEFT INTERNAL DIMENSION IN SYSTOLE: 3.5 CM (ref 2.1–4)
LEFT VENTRICLE DIASTOLIC VOLUME INDEX: 91.59 ML/M2
LEFT VENTRICLE DIASTOLIC VOLUME: 167.09 ML
LEFT VENTRICLE MASS INDEX: 161 G/M2
LEFT VENTRICLE SYSTOLIC VOLUME INDEX: 28 ML/M2
LEFT VENTRICLE SYSTOLIC VOLUME: 51.01 ML
LEFT VENTRICULAR INTERNAL DIMENSION IN DIASTOLE: 5.81 CM (ref 3.5–6)
LEFT VENTRICULAR MASS: 292.82 G
MV PEAK A VEL: 0.99 M/S
MV PEAK E VEL: 1.29 M/S
PISA MRMAX VEL: 0.04 M/S
PISA TR MAX VEL: 3.18 M/S
PV PEAK VELOCITY: 0.77 CM/S
RA PRESSURE: 3 MMHG
RIGHT VENTRICULAR END-DIASTOLIC DIMENSION: 2.79 CM
TR MAX PG: 40 MMHG
TV REST PULMONARY ARTERY PRESSURE: 43 MMHG

## 2020-12-10 PROCEDURE — 25000003 PHARM REV CODE 250: Performed by: EMERGENCY MEDICINE

## 2020-12-10 PROCEDURE — 63600175 PHARM REV CODE 636 W HCPCS: Performed by: INTERNAL MEDICINE

## 2020-12-10 PROCEDURE — 93306 TTE W/DOPPLER COMPLETE: CPT

## 2020-12-10 PROCEDURE — 99900031 HC PATIENT EDUCATION (STAT)

## 2020-12-10 PROCEDURE — 99900035 HC TECH TIME PER 15 MIN (STAT)

## 2020-12-10 PROCEDURE — 94761 N-INVAS EAR/PLS OXIMETRY MLT: CPT

## 2020-12-10 PROCEDURE — 97165 OT EVAL LOW COMPLEX 30 MIN: CPT

## 2020-12-10 PROCEDURE — 97161 PT EVAL LOW COMPLEX 20 MIN: CPT

## 2020-12-10 PROCEDURE — G0378 HOSPITAL OBSERVATION PER HR: HCPCS | Mod: OBSCO

## 2020-12-10 PROCEDURE — 63600175 PHARM REV CODE 636 W HCPCS: Performed by: EMERGENCY MEDICINE

## 2020-12-10 PROCEDURE — 96376 TX/PRO/DX INJ SAME DRUG ADON: CPT

## 2020-12-10 PROCEDURE — G0378 HOSPITAL OBSERVATION PER HR: HCPCS

## 2020-12-10 PROCEDURE — 25000003 PHARM REV CODE 250: Performed by: INTERNAL MEDICINE

## 2020-12-10 RX ORDER — ATORVASTATIN CALCIUM 80 MG/1
80 TABLET, FILM COATED ORAL NIGHTLY
Status: DISCONTINUED | OUTPATIENT
Start: 2020-12-10 | End: 2020-12-11 | Stop reason: HOSPADM

## 2020-12-10 RX ORDER — CLOPIDOGREL BISULFATE 75 MG/1
75 TABLET ORAL DAILY
Status: DISCONTINUED | OUTPATIENT
Start: 2020-12-10 | End: 2020-12-11 | Stop reason: HOSPADM

## 2020-12-10 RX ADMIN — FUROSEMIDE 20 MG: 10 INJECTION, SOLUTION INTRAVENOUS at 12:12

## 2020-12-10 RX ADMIN — RAMIPRIL 10 MG: 5 CAPSULE ORAL at 08:12

## 2020-12-10 RX ADMIN — PIPERACILLIN AND TAZOBACTAM 4.5 G: 4; .5 INJECTION, POWDER, LYOPHILIZED, FOR SOLUTION INTRAVENOUS; PARENTERAL at 04:12

## 2020-12-10 RX ADMIN — PIPERACILLIN AND TAZOBACTAM 4.5 G: 4; .5 INJECTION, POWDER, LYOPHILIZED, FOR SOLUTION INTRAVENOUS; PARENTERAL at 12:12

## 2020-12-10 RX ADMIN — ATORVASTATIN CALCIUM 80 MG: 80 TABLET, FILM COATED ORAL at 08:12

## 2020-12-10 RX ADMIN — PANTOPRAZOLE SODIUM 40 MG: 40 TABLET, DELAYED RELEASE ORAL at 08:12

## 2020-12-10 RX ADMIN — CLOPIDOGREL 75 MG: 75 TABLET, FILM COATED ORAL at 03:12

## 2020-12-10 RX ADMIN — ATORVASTATIN CALCIUM 40 MG: 40 TABLET, FILM COATED ORAL at 08:12

## 2020-12-10 RX ADMIN — FUROSEMIDE 20 MG: 10 INJECTION, SOLUTION INTRAVENOUS at 11:12

## 2020-12-10 RX ADMIN — PIPERACILLIN AND TAZOBACTAM 4.5 G: 4; .5 INJECTION, POWDER, LYOPHILIZED, FOR SOLUTION INTRAVENOUS; PARENTERAL at 07:12

## 2020-12-10 NOTE — ASSESSMENT & PLAN NOTE
Current treatment: Atorvastatin 40 mg, ezetimibe 10 mg daily.  Patient also on TriCor 145 mg daily.  Will increase atorvastatin to 80 mg given acute CVA.

## 2020-12-10 NOTE — CONSULTS
[facility]  Cardiology  Consult Note    Patient Name: Richard Farr  Patient : 1945  MRN: [id number]  Admission Date: 2020  Hospital Length of Stay: 0 days  Code Status: Full Code   Attending Provider: Drew Moreira Jr., MD   Consulting Provider: CHAVEZ Felix  Primary Care Physician: Juan Kidd MD  Principal Problem:CVA (cerebral vascular accident)      Patient information was obtained from patient, caregiver / friend, past medical records and ER records.     Consults  Subjective:     Chief Complaint:  Altered mental status     HPI:   Patient is a 75-year-old male with severe mitral regurgitation, hypertension, hyperlipidemia and anemia.    Presented to the emergency department for confusion.  Patient was looking around the house for a spoon at about 3 am and was significantly confused from baseline. CT in ER showed a remote infarction and MRI confirmed an acute CVA in the frontal lobe region.      He denies any recent chest pain or shortness of breath.  This morning during exam, he is oriented to person and place but is describing events that did not happen per his significant other.  Telemetry shows sinus rhythm.  No atrial fibrillation noted at this time.  Further work up is pending.  Patient was planned for GI procedure on Monday for polypectomy.     Past Medical History:   Diagnosis Date    Arthritis     Hyperlipemia     Hypertension     Shingles        Past Surgical History:   Procedure Laterality Date    COLONOSCOPY N/A 2020    Procedure: COLONOSCOPY;  Surgeon: Abelino Garcia MD;  Location: [facility];  Service: General;  Laterality: N/A;    NECK SURGERY      x 2    TONSILLECTOMY         Review of patient's allergies indicates:  No Known Allergies    No current facility-administered medications on file prior to encounter.      Current Outpatient Medications on File Prior to Encounter   Medication Sig    ascorbic acid, vitamin C, (VITAMIN C) 500 MG tablet  Take 500 mg by mouth once daily.    aspirin 81 MG Chew Take 81 mg by mouth once daily.    atorvastatin (LIPITOR) 40 MG tablet Take 40 mg by mouth once daily.    ezetimibe (ZETIA) 10 mg tablet Take 10 mg by mouth once daily. 1/2 tabs    fenofibrate (TRICOR) 145 MG tablet Take 145 mg by mouth once daily.    furosemide (LASIX) 20 MG tablet Take 20 mg by mouth 2 (two) times daily.    glucosamine-chondroitin 500-400 mg tablet Take 1 tablet by mouth 3 (three) times daily.    multivitamin capsule Take 1 capsule by mouth once daily.    omega-3 fatty acids/fish oil (FISH OIL-OMEGA-3 FATTY ACIDS) 300-1,000 mg capsule Take by mouth once daily.    pantoprazole (PROTONIX) 40 MG tablet Take 40 mg by mouth once daily.    potassium chloride (MICRO-K) 10 MEQ CpSR Take 10 mEq by mouth once.    ramipriL (ALTACE) 10 MG capsule Take 10 mg by mouth once daily.    turmeric 400 mg Cap Take by mouth.     Family History     None        Tobacco Use    Smoking status: Never Smoker    Smokeless tobacco: Never Used   Substance and Sexual Activity    Alcohol use: Yes     Alcohol/week: 3.0 standard drinks     Types: 3 Cans of beer per week    Drug use: Never    Sexual activity: Not on file     Review of Systems   Constitutional: Positive for activity change. Negative for fatigue and fever.   HENT: Negative.    Eyes: Negative.    Respiratory: Negative for chest tightness, shortness of breath and wheezing.    Cardiovascular: Positive for leg swelling. Negative for chest pain and palpitations.   Gastrointestinal: Negative.    Endocrine: Negative.    Genitourinary: Negative.    Musculoskeletal: Negative.    Skin: Negative.    Allergic/Immunologic: Negative.    Neurological: Positive for weakness. Negative for dizziness, syncope, facial asymmetry, speech difficulty and headaches.        Weakness to left upper extremity from prior injury.  No acute changes    Psychiatric/Behavioral: Positive for confusion.     Objective:     Vital Signs  (Most Recent):  Temp: 97.4 °F (36.3 °C) (12/10/20 0724)  Pulse: 106 (12/10/20 0724)  Resp: 18 (12/10/20 0724)  BP: 113/64 (12/10/20 0724)  SpO2: (!) 92 % (12/10/20 0724) Vital Signs (24h Range):  Temp:  [97.4 °F (36.3 °C)-98.3 °F (36.8 °C)] 97.4 °F (36.3 °C)  Pulse:  [] 106  Resp:  [16-18] 18  SpO2:  [90 %-97 %] 92 %  BP: ()/(55-81) 113/64     Weight: 71.2 kg (157 lb)  Body mass index is 24.59 kg/m².    SpO2: (!) 92 %  O2 Device (Oxygen Therapy): room air      Intake/Output Summary (Last 24 hours) at 12/10/2020 0936  Last data filed at 12/10/2020 0600  Gross per 24 hour   Intake 1685 ml   Output --   Net 1685 ml       Lines/Drains/Airways     Peripheral Intravenous Line                 Peripheral IV - Single Lumen 20 0904 18 G Left Forearm 1 day                Physical Exam  Vitals signs and nursing note reviewed.   Constitutional:       General: He is not in acute distress.     Appearance: Normal appearance. He is normal weight. He is not ill-appearing.   HENT:      Head: Normocephalic.      Nose: Nose normal.      Mouth/Throat:      Mouth: Mucous membranes are moist.   Eyes:      Pupils: Pupils are equal, round, and reactive to light.   Neck:      Musculoskeletal: Normal range of motion.   Cardiovascular:      Rate and Rhythm: Normal rate and regular rhythm.      Heart sounds: Murmur present. Systolic murmur present with a grade of 3/6.   Pulmonary:      Effort: Pulmonary effort is normal. No respiratory distress.      Breath sounds: Normal breath sounds. No wheezing or rales.   Abdominal:      General: Abdomen is flat. Bowel sounds are normal.      Palpations: Abdomen is soft.   Musculoskeletal: Normal range of motion.      Right lower le+ Edema present.      Left lower le+ Edema present.   Skin:     General: Skin is warm and dry.      Capillary Refill: Capillary refill takes less than 2 seconds.   Neurological:      Mental Status: He is alert.      Motor: Weakness present.       Comments: Weakness to LUE, chronic    Psychiatric:         Mood and Affect: Mood normal.         Behavior: Behavior normal.         Significant Labs:  All pertinent lab results from the last 24 hours have been reviewed.   Recent Lab Results       12/09/20 0922 12/09/20 0921 12/09/20 0908 12/09/20  0907        Benzodiazepines   Negative         Methadone metabolites   Negative         Phencyclidine   Negative         Albumin     3.2       Alkaline Phosphatase     69       ALT     64       Amphetamine Screen, Ur   Negative         Anion Gap     6       Appearance, UA Clear           AST     54       Barbiturate Screen, Ur   Negative         Baso #     0.10       Basophil %     1.5       Bilirubin (UA) Negative           BILIRUBIN TOTAL     1.0  Comment:  For infants and newborns, interpretation of results should be based  on gestational age, weight and in agreement with clinical  observations.  Premature Infant recommended reference ranges:  Up to 24 hours.............<8.0 mg/dL  Up to 48 hours............<12.0 mg/dL  3-5 days..................<15.0 mg/dL  6-29 days.................<15.0 mg/dL  For patients on Eltrombopag therapy, use of Dimension [place] TBIL is   not   recommended.         BUN     19       Calcium     8.9       Chloride     103       CO2     26       Cocaine (Metab.)   Negative         Color, UA Yellow           Creatinine     0.8       Creatinine, Urine   13.6  Comment:  The random urine reference ranges provided were established   for 24 hour urine collections.  No reference ranges exist for  random urine specimens.  Correlate clinically.           Differential Method     Automated       eGFR if      >60.0       eGFR if non      >60.0  Comment:  Calculation used to obtain the estimated glomerular filtration  rate (eGFR) is the CKD-EPI equation.          Eos #     0.2       Eosinophil %     2.4       Glucose     116       Glucose, UA Negative           Gran #  (ANC)     3.5       Gran %     52.8       Hematocrit     30.1       Hemoglobin     8.8       Immature Grans (Abs)     0.02  Comment:  Mild elevation in immature granulocytes is non specific and   can be seen in a variety of conditions including stress response,   acute inflammation, trauma and pregnancy. Correlation with other   laboratory and clinical findings is essential.         Immature Granulocytes     0.3       Ketones, UA Negative           Lactate, Jorge       1.4     Leukocytes, UA Negative           Lymph #     2.2       Lymph %     32.8       MCH     18.8       MCHC     29.2       MCV     64       Mono #     0.7       Mono %     10.2       MPV     9.8       NITRITE UA Negative           nRBC     0       NT-proBNP     4312       Occult Blood UA Negative           Opiate Scrn, Ur   Negative         pH, UA 7.0           Platelets     363       Potassium     3.7       PROTEIN TOTAL     7.0       Protein, UA Negative  Comment:  Recommend a 24 hour urine protein or a urine   protein/creatinine ratio if globulin induced proteinuria is  clinically suspected.              Acceptable   Yes         RBC     4.68       RDW     31.8       SARS-CoV-2 RNA, Amplification, Qual   Negative         Sodium     135       Specific [place], UA 1.010           Specimen UA Urine, Clean Catch           Marijuana (THC) Metabolite   Negative         Toxicology Information   SEE COMMENT  Comment:  This screen includes the following classes of drugs at the   listed cut-off:  Benzodiazepines                  200 ng/ml  Methadone                        300 ng/ml  Cocaine metabolite               300 ng/ml  Opiates                         2000 ng/ml  Barbiturates                     200 ng/ml  Amphetamines                    1000 ng/ml  Marijuana metabs (THC)            50 ng/ml  Phencyclidine (PCP)               25 ng/ml  **Intended use : The UDS methods provide only a preliminary result.    A more   specific alternate  chemical method must be used in order to obtain a   confirmed analytical result.  Gas chromatography mass spectrometry   (GC/MS)   is the preferred confirmatory method.  Clinical consideration and   professional judgement should be applied to any drug of abuse test   result.    Particularly when preliminary results are used.     ** Results:  Positive results are only preliminary and only suggest   the   sample may contain the analyte being tested.  Negative results   indicate that   the sample does not contain the analyte or the analyte is present in   concentrations below the cut-off level.           TSH     3.910  Comment:  ATTENTION: The use of Biotin Supplements may interfere with this   assay.         UROBILINOGEN UA 1.0           WBC     6.58             Significant Imaging:  Imaging Results          CT Head Without Contrast (Final result)  Result time 12/09/20 09:34:43    Final result by Hattie De Leon MD (12/09/20 09:34:43)                 Impression:      No acute intracranial abnormalities      Electronically signed by: Hattie De Leon MD  Date:    12/09/2020  Time:    09:34             Narrative:    EXAMINATION:  CT HEAD WITHOUT CONTRAST    CLINICAL HISTORY:  Altered mental status;    CT/Cardiac Nuclear exams in prior 12 months: 1    TECHNIQUE:  Axial head CT performed without IV contrast.  Iterative reconstruction utilized.    COMPARISON:  None    FINDINGS:  No evidence of intracranial hemorrhage, mass or mass effect.    Small remote appearing right lentiform lacunar infarct, versus dilated perivascular space.  Imaged sinuses, mastoids and middle ear cavities are clear.                               X-Ray Chest 1 View (Final result)  Result time 12/09/20 09:52:11    Final result by Hattie De Leon MD (12/09/20 09:52:11)                 Impression:      Elevated right hemidiaphragm, with pleural effusion and associated atelectasis versus pneumonia      Electronically signed by: Hattie De Leon  MD  Date:    12/09/2020  Time:    09:52             Narrative:    EXAMINATION:  XR CHEST 1 VIEW    CLINICAL HISTORY:  Altered mental status, unspecified    COMPARISON:  CT abdomen and pelvis 11/23/2020    FINDINGS:  Elevated right hemidiaphragm with small right pleural effusion, adjacent atelectasis and/or pneumonia.    Right upper lung, and left lung are otherwise grossly clear.  Normal size cardiac silhouette.  Cervical spine fusion hardware noted.                                          ECHO:       Bubble study pending.     CAROTID: Pending    ECG Sinus rhythm     TELEMETRY: Sinus rhythm       MEDICATIONS:     Current Facility-Administered Medications:     0.9%  NaCl infusion, , Intravenous, Continuous, Drew Moreira Jr., MD, Last Rate: 75 mL/hr at 12/09/20 1108    acetaminophen tablet 650 mg, 650 mg, Oral, Q8H PRN, Drew Moreira Jr., MD    atorvastatin tablet 80 mg, 80 mg, Oral, QHS, Drew Moreira Jr., MD    clopidogreL tablet 75 mg, 75 mg, Oral, Daily, Drew Moreira Jr., MD    furosemide injection 20 mg, 20 mg, Intravenous, Q12H, Drew Moreira Jr., MD, 20 mg at 12/09/20 2345    melatonin tablet 6 mg, 6 mg, Oral, Nightly PRN, Drew Moreira Jr., MD    ondansetron disintegrating tablet 8 mg, 8 mg, Oral, Q8H PRN, Drew Moreira Jr., MD    pantoprazole EC tablet 40 mg, 40 mg, Oral, Daily, Drew Moreira Jr., MD, 40 mg at 12/10/20 0836    piperacillin-tazobactam 4.5 g in dextrose 5 % 100 mL IVPB (ready to mix system), 4.5 g, Intravenous, Q8H, Drew Moreira Jr., MD, Last Rate: 25 mL/hr at 12/10/20 0414, 4.5 g at 12/10/20 0414    ramipriL capsule 10 mg, 10 mg, Oral, Daily, Drew Moreira Jr., MD, 10 mg at 12/10/20 0836    sodium chloride 0.9% flush 10 mL, 10 mL, Intravenous, PRN, Drew Moreira Jr., MD      Assessment and Plan:     Active Diagnoses:    Diagnosis Date Noted POA    PRINCIPAL PROBLEM:  CVA (cerebral vascular accident) [I63.9] 12/10/2020 Unknown    Hyperlipemia  [E78.5] 12/10/2020 Unknown    Essential hypertension [I10] 12/10/2020 Unknown    Altered mental status [R41.82] 12/09/2020 Yes      Problems Resolved During this Admission:       VTE Risk Mitigation (From admission, onward)         Ordered     IP VTE HIGH RISK PATIENT  Once      12/09/20 1128     Place AVINASH hose  Until discontinued      12/09/20 1128                1.  CVA:  -MRI confirmed acute CVA.  Will plan for carotid US today and echo with bubble study.  -Patient's aspirin was on hold at home.  Recommend plavix 75mg PO daily per PCP order but need to discuss with general surgery concerning pending procedure for Monday.    -No atrial fibrillation seen at this time.  Plan for telemetry monitor for 4 weeks after discharge if no atrial fibrillation seen during hospital stay.     2.  HTN:  Blood pressure controlled     3.  Hyperlipidemia:  Continue statin     4.  Severe mitral regurgitation with Pulmonary hypertension:  Plan for GASPER in the future and likely valve repair via mitral clip vs surgical repair    5.  Anemia:  H/H stable, defer to IM and surgery      Thank you for your consult.          CHAVEZ Felix  Cardiology  [facility]  12/10/2020

## 2020-12-10 NOTE — SUBJECTIVE & OBJECTIVE
Past Medical History:   Diagnosis Date    Arthritis     Hyperlipemia     Hypertension     Shingles        Past Surgical History:   Procedure Laterality Date    COLONOSCOPY N/A 11/23/2020    Procedure: COLONOSCOPY;  Surgeon: Abelino Garcia MD;  Location: [facility];  Service: General;  Laterality: N/A;    NECK SURGERY      x 2    TONSILLECTOMY         Review of patient's allergies indicates:  No Known Allergies    No current facility-administered medications on file prior to encounter.      Current Outpatient Medications on File Prior to Encounter   Medication Sig    atorvastatin (LIPITOR) 40 MG tablet Take 40 mg by mouth once daily.    pantoprazole (PROTONIX) 40 MG tablet Take 40 mg by mouth once daily.    turmeric 400 mg Cap Take by mouth.    ascorbic acid, vitamin C, (VITAMIN C) 500 MG tablet Take 500 mg by mouth once daily.    aspirin 81 MG Chew Take 81 mg by mouth once daily.    ezetimibe (ZETIA) 10 mg tablet Take 10 mg by mouth once daily. 1/2 tabs    fenofibrate (TRICOR) 145 MG tablet Take 145 mg by mouth once daily.    furosemide (LASIX) 20 MG tablet Take 20 mg by mouth 2 (two) times daily.    glucosamine-chondroitin 500-400 mg tablet Take 1 tablet by mouth 3 (three) times daily.    multivitamin capsule Take 1 capsule by mouth once daily.    omega-3 fatty acids/fish oil (FISH OIL-OMEGA-3 FATTY ACIDS) 300-1,000 mg capsule Take by mouth once daily.    potassium chloride (MICRO-K) 10 MEQ CpSR Take 10 mEq by mouth once.    ramipriL (ALTACE) 10 MG capsule Take 10 mg by mouth once daily.     Family History     None        Tobacco Use    Smoking status: Never Smoker    Smokeless tobacco: Never Used   Substance and Sexual Activity    Alcohol use: Yes     Alcohol/week: 3.0 standard drinks     Types: 3 Cans of beer per week    Drug use: Never    Sexual activity: Not on file     Review of Systems   Constitutional: Negative for chills and fever.   HENT: Negative for rhinorrhea, sneezing and sore  throat.    Eyes: Negative for pain and visual disturbance.   Respiratory: Negative for cough and shortness of breath.    Cardiovascular: Negative for chest pain.   Gastrointestinal: Negative for abdominal pain, constipation and diarrhea.   Endocrine: Negative for cold intolerance and heat intolerance.   Genitourinary: Negative for difficulty urinating.   Musculoskeletal: Negative for arthralgias and joint swelling.   Skin: Negative for rash.   Allergic/Immunologic: Negative for environmental allergies.   Neurological: Negative for dizziness, syncope and weakness.   Hematological: Does not bruise/bleed easily.   Psychiatric/Behavioral: Negative for dysphoric mood. The patient is not nervous/anxious.      Objective:     Vital Signs (Most Recent):  Temp: 97.4 °F (36.3 °C) (12/10/20 0724)  Pulse: 106 (12/10/20 0724)  Resp: 18 (12/10/20 0724)  BP: 113/64 (12/10/20 0724)  SpO2: (!) 92 % (12/10/20 0724) Vital Signs (24h Range):  Temp:  [97.4 °F (36.3 °C)-98.3 °F (36.8 °C)] 97.4 °F (36.3 °C)  Pulse:  [] 106  Resp:  [16-18] 18  SpO2:  [90 %-97 %] 92 %  BP: ()/(55-81) 113/64     Weight: 71.2 kg (157 lb)  Body mass index is 24.59 kg/m².    Physical Exam  Vitals signs and nursing note reviewed.   Constitutional:       Appearance: Normal appearance.      Comments: Patient confused.  Slightly dysarthric.   HENT:      Head: Normocephalic and atraumatic.      Nose: Nose normal.   Eyes:      Extraocular Movements: Extraocular movements intact.      Pupils: Pupils are equal, round, and reactive to light.   Neck:      Musculoskeletal: Normal range of motion and neck supple.   Cardiovascular:      Rate and Rhythm: Normal rate and regular rhythm.      Heart sounds: Murmur present. No friction rub. No gallop.    Pulmonary:      Effort: Pulmonary effort is normal.      Breath sounds: Normal breath sounds.   Abdominal:      General: Abdomen is flat. Bowel sounds are normal.      Palpations: Abdomen is soft.   Musculoskeletal:          General: No swelling or deformity.      Comments: Contractures noted to bilateral hands with some ulnar deviation.   Skin:     General: Skin is warm and dry.      Capillary Refill: Capillary refill takes less than 2 seconds.   Neurological:      Comments: Patient is oriented to person and place but is easily confused.  He is repeating himself.  He does have some evidence of bilateral Tatiana weakness as well as contractures of both hands which he attributes to carpal tunnel syndrome.   Psychiatric:         Mood and Affect: Mood normal.           CRANIAL NERVES     CN III, IV, VI   Pupils are equal, round, and reactive to light.    MEDICATIONS  INFUSION:   sodium chloride 0.9% 75 mL/hr at 12/09/20 1108      SCHEDULED:   atorvastatin  40 mg Oral Daily    furosemide (LASIX) IV  20 mg Intravenous Q12H    pantoprazole  40 mg Oral Daily    piperacillin-tazobactam (ZOSYN) IVPB  4.5 g Intravenous Q8H    ramipriL  10 mg Oral Daily     PRN MEDS:acetaminophen, melatonin, ondansetron, sodium chloride 0.9%    Lab Results   Component Value Date    WBC 6.58 12/09/2020    RBC 4.68 12/09/2020    HGB 8.8 (L) 12/09/2020    HCT 30.1 (L) 12/09/2020    MCV 64 (L) 12/09/2020    MCH 18.8 (L) 12/09/2020    MCHC 29.2 (L) 12/09/2020    RDW 31.8 (H) 12/09/2020     (H) 12/09/2020    MPV 9.8 12/09/2020    GRAN 3.5 12/09/2020    GRAN 52.8 12/09/2020    LYMPH 2.2 12/09/2020    LYMPH 32.8 12/09/2020    MONO 0.7 12/09/2020    MONO 10.2 12/09/2020    EOS 0.2 12/09/2020    BASO 0.10 12/09/2020    EOSINOPHIL 2.4 12/09/2020    BASOPHIL 1.5 12/09/2020    MG 2.1 11/20/2020        CMP  Lab Results   Component Value Date     (L) 12/09/2020    K 3.7 12/09/2020     12/09/2020    CO2 26 12/09/2020     (H) 12/09/2020    BUN 19 12/09/2020    CREATININE 0.8 12/09/2020    CALCIUM 8.9 12/09/2020    PROT 7.0 12/09/2020    ALBUMIN 3.2 (L) 12/09/2020    BILITOT 1.0 12/09/2020    ALKPHOS 69 12/09/2020    AST 54 (H) 12/09/2020     ALT 64 (H) 12/09/2020    ANIONGAP 6 (L) 12/09/2020    ESTGFRAFRICA >60.0 12/09/2020    EGFRNONAA >60.0 12/09/2020        No results found for: LABBLOO, LABURIN, RESPIRATORYC, GSRESP     Imaging Results          CT Head Without Contrast (Final result)  Result time 12/09/20 09:34:43    Final result by Hattie De Leon MD (12/09/20 09:34:43)                 Impression:      No acute intracranial abnormalities      Electronically signed by: Hattie De Leon MD  Date:    12/09/2020  Time:    09:34             Narrative:    EXAMINATION:  CT HEAD WITHOUT CONTRAST    CLINICAL HISTORY:  Altered mental status;    CT/Cardiac Nuclear exams in prior 12 months: 1    TECHNIQUE:  Axial head CT performed without IV contrast.  Iterative reconstruction utilized.    COMPARISON:  None    FINDINGS:  No evidence of intracranial hemorrhage, mass or mass effect.    Small remote appearing right lentiform lacunar infarct, versus dilated perivascular space.  Imaged sinuses, mastoids and middle ear cavities are clear.                               X-Ray Chest 1 View (Final result)  Result time 12/09/20 09:52:11    Final result by Hattie De Leon MD (12/09/20 09:52:11)                 Impression:      Elevated right hemidiaphragm, with pleural effusion and associated atelectasis versus pneumonia      Electronically signed by: Hattie De Leon MD  Date:    12/09/2020  Time:    09:52             Narrative:    EXAMINATION:  XR CHEST 1 VIEW    CLINICAL HISTORY:  Altered mental status, unspecified    COMPARISON:  CT abdomen and pelvis 11/23/2020    FINDINGS:  Elevated right hemidiaphragm with small right pleural effusion, adjacent atelectasis and/or pneumonia.    Right upper lung, and left lung are otherwise grossly clear.  Normal size cardiac silhouette.  Cervical spine fusion hardware noted.                                  Significant Labs: All pertinent labs within the past 24 hours have been reviewed.    Significant Imaging: I have reviewed  and interpreted all pertinent imaging results/findings within the past 24 hours.

## 2020-12-10 NOTE — PT/OT/SLP EVAL
Physical Therapy Evaluation    Patient Name:  Richard Farr   MRN:  [id number]    Recommendations:     Discharge Recommendations:    home with home health   Discharge Equipment Recommendations:   none  Barriers to discharge: current functional status    Assessment:     Richard Farr is a 75 y.o. male admitted with a medical diagnosis of CVA (cerebral vascular accident).  He presents with the following impairments/functional limitations:    decreased strength and mobility impacting gait, transfers, balance, coordination, safety.    Rehab Prognosis: Good; patient would benefit from acute skilled PT services to address these deficits and reach maximum level of function.    Recent Surgery: * No surgery found *      Plan:     During this hospitalization, patient to be seen (up to 12 times per week.) to address the identified rehab impairments via gait training, therapeutic activities, therapeutic exercises and progress toward the following goals:    · Plan of Care Expires:  12/17/20    Subjective     Chief Complaint: Patient reports feeling better today. Patient reports he is awaiting MRI. Patient ambulates to bathroom with staff assistance. Wife at bedside. Patient sitting up in bedside chair due to reporting bed uncomfortable.   Patient/Family Comments/goals: to return home to [facility].   Pain/Comfort:  ·  none reported    Patients cultural, spiritual, Voodoo conflicts given the current situation: no    Living Environment:  Patient lives with his wife. He does not use an assistive device. Patient reports difficulty ascending/descending steps at times at home and difficulty with balance at times due to previous neck injuries with surgery.   Prior to admission, patients level of function was independent.  Equipment used at home: none.   Upon discharge, patient will have assistance from wife.    Objective:     Communicated with nurse prior to session.  Patient found up in chair with peripheral IV  upon PT entry to  room.    General Precautions: Standard, fall   Orthopedic Precautions:    Braces:       Exams:  · RLE ROM: WFL  · RLE Strength: Deficits: 4-/5  · LLE ROM: WFL  · LLE Strength: Deficits: 4-/5    Functional Mobility:  · Transfers:     · Sit to Stand:  minimum assistance with no AD  · Bed to Chair: minimum assistance with  no AD  using  Stand Pivot  · Gait: 30 feet min assist no AD unsteady gait Wide HERB  · Balance: standing fair-/poor+    Therapeutic Activities and Exercises:   (B) LE therex heel raises/toe raises, LAQ, hip flexion x20 reps each.     Patient left up in chair with all lines intact, call button in reach and wife present.    GOALS:   Multidisciplinary Problems     Physical Therapy Goals        Problem: Physical Therapy Goal    Goal Priority Disciplines Outcome Goal Variances Interventions   Physical Therapy Goal     PT, PT/OT      Description: Goals to be met by: 12/17/2020     Patient will increase functional independence with mobility by performing:     Bed to chair transfer with [place]   Gait  x 100 feet with [place] using no AD.    Lower extremity exercise program  to impact functional use (B) LE.                    History:     Past Medical History:   Diagnosis Date    Arthritis     Hyperlipemia     Hypertension     Shingles        Past Surgical History:   Procedure Laterality Date    COLONOSCOPY N/A 11/23/2020    Procedure: COLONOSCOPY;  Surgeon: Abelino Garcia MD;  Location: [facility];  Service: General;  Laterality: N/A;    NECK SURGERY      x 2    TONSILLECTOMY         Time Tracking:     PT Received On: 12/10/20  PT Start Time: 0945     PT Stop Time: 1015  PT Total Time (min): 30 min     Billable Minutes: Evaluation 30      Jada Ambrocio PT  12/10/2020

## 2020-12-10 NOTE — HPI
History   No chief complaint on file.    This is a 75-year-old white male presents to the emergency department with his wife who reports he has been confused on and off for about a week, but last night was extremely confused.  Head turned on all the lights in the house looking for a spoon, was up till 3:00 a.m..  Denies any fever.  Patient denies any shortness of breath or chest pain.  Patient with recent EGD and colonoscopy due to anemia, and is scheduled for a polyp removal by  early next week.

## 2020-12-10 NOTE — HOSPITAL COURSE
12/10/2020: Patient presented to the hospital due to worsening confusion out of proportion to his baseline.  Initial CT was negative.  For completeness the patient was admitted provide IV fluids and an MRI was pursued.  There is a question of an acute punctate high left frontal parietal subcortical infarct which likely explains his worsening confusion.  He will be placed on aspirin and statin.  Echocardiogram and carotid ultrasound will be pursued.  We will consult his cardiologist Dr. Yanez as a courtesy and for assistance with medical history.    12/11/2020: Patient appears to be back at his baseline.  Echocardiogram fairly unchanged from previous per cardiology records.  Carotid ultrasound does show some bilateral plaque but no significant stenosis.  Monitor has been placed to rule out underlying atrial fibrillation though none of that has been seen throughout his hospital stay.  Currently safe for discharge home.

## 2020-12-10 NOTE — PT/OT/SLP PROGRESS
Speech Language Pathology      Richard Farr  MRN: [id number]    Patient not seen today secondary to patient in testing, Echo and bubble test.      JOVAN DEXTER, KEVON-SLP

## 2020-12-10 NOTE — ASSESSMENT & PLAN NOTE
Patient presents with worsening confusion and delirium.    MRI reveals a potential acute punctate high left frontal parietal subcortical infarct.    We will pursue echocardiography, telemetry, carotid ultrasound.  Cardiology has been consulted for assistance given his cardiovascular history.    Patient on aspirin in the outpatient setting will therefore add Plavix due to aspirin failure.

## 2020-12-10 NOTE — H&P
[facility] Medicine  History & Physical    Patient Name: Richard Farr  MRN: [id number]  Admission Date: 12/9/2020  Attending Physician: Drew Moreira Jr., MD   Primary Care Provider: Juan Kidd MD         Patient information was obtained from ER records.     Subjective:     Principal Problem:<principal problem not specified>    Chief Complaint: No chief complaint on file.       HPI:   History   No chief complaint on file.    This is a 75-year-old white male presents to the emergency department with his wife who reports he has been confused on and off for about a week, but last night was extremely confused.  Head turned on all the lights in the house looking for a spoon, was up till 3:00 a.m..  Denies any fever.  Patient denies any shortness of breath or chest pain.  Patient with recent EGD and colonoscopy due to anemia, and is scheduled for a polyp removal by  early next week.      Past Medical History:   Diagnosis Date    Arthritis     Hyperlipemia     Hypertension     Shingles        Past Surgical History:   Procedure Laterality Date    COLONOSCOPY N/A 11/23/2020    Procedure: COLONOSCOPY;  Surgeon: Abelino Garcia MD;  Location: [facility];  Service: General;  Laterality: N/A;    NECK SURGERY      x 2    TONSILLECTOMY         Review of patient's allergies indicates:  No Known Allergies    No current facility-administered medications on file prior to encounter.      Current Outpatient Medications on File Prior to Encounter   Medication Sig    atorvastatin (LIPITOR) 40 MG tablet Take 40 mg by mouth once daily.    pantoprazole (PROTONIX) 40 MG tablet Take 40 mg by mouth once daily.    turmeric 400 mg Cap Take by mouth.    ascorbic acid, vitamin C, (VITAMIN C) 500 MG tablet Take 500 mg by mouth once daily.    aspirin 81 MG Chew Take 81 mg by mouth once daily.    ezetimibe (ZETIA) 10 mg tablet Take 10 mg by mouth once daily. 1/2 tabs    fenofibrate (TRICOR) 145  MG tablet Take 145 mg by mouth once daily.    furosemide (LASIX) 20 MG tablet Take 20 mg by mouth 2 (two) times daily.    glucosamine-chondroitin 500-400 mg tablet Take 1 tablet by mouth 3 (three) times daily.    multivitamin capsule Take 1 capsule by mouth once daily.    omega-3 fatty acids/fish oil (FISH OIL-OMEGA-3 FATTY ACIDS) 300-1,000 mg capsule Take by mouth once daily.    potassium chloride (MICRO-K) 10 MEQ CpSR Take 10 mEq by mouth once.    ramipriL (ALTACE) 10 MG capsule Take 10 mg by mouth once daily.     Family History     None        Tobacco Use    Smoking status: Never Smoker    Smokeless tobacco: Never Used   Substance and Sexual Activity    Alcohol use: Yes     Alcohol/week: 3.0 standard drinks     Types: 3 Cans of beer per week    Drug use: Never    Sexual activity: Not on file     Review of Systems   Constitutional: Negative for chills and fever.   HENT: Negative for rhinorrhea, sneezing and sore throat.    Eyes: Negative for pain and visual disturbance.   Respiratory: Negative for cough and shortness of breath.    Cardiovascular: Negative for chest pain.   Gastrointestinal: Negative for abdominal pain, constipation and diarrhea.   Endocrine: Negative for cold intolerance and heat intolerance.   Genitourinary: Negative for difficulty urinating.   Musculoskeletal: Negative for arthralgias and joint swelling.   Skin: Negative for rash.   Allergic/Immunologic: Negative for environmental allergies.   Neurological: Negative for dizziness, syncope and weakness.   Hematological: Does not bruise/bleed easily.   Psychiatric/Behavioral: Negative for dysphoric mood. The patient is not nervous/anxious.      Objective:     Vital Signs (Most Recent):  Temp: 97.4 °F (36.3 °C) (12/10/20 0724)  Pulse: 106 (12/10/20 0724)  Resp: 18 (12/10/20 0724)  BP: 113/64 (12/10/20 0724)  SpO2: (!) 92 % (12/10/20 0724) Vital Signs (24h Range):  Temp:  [97.4 °F (36.3 °C)-98.3 °F (36.8 °C)] 97.4 °F (36.3 °C)  Pulse:   [] 106  Resp:  [16-18] 18  SpO2:  [90 %-97 %] 92 %  BP: ()/(55-81) 113/64     Weight: 71.2 kg (157 lb)  Body mass index is 24.59 kg/m².    Physical Exam  Vitals signs and nursing note reviewed.   Constitutional:       Appearance: Normal appearance.      Comments: Patient confused.  Slightly dysarthric.   HENT:      Head: Normocephalic and atraumatic.      Nose: Nose normal.   Eyes:      Extraocular Movements: Extraocular movements intact.      Pupils: Pupils are equal, round, and reactive to light.   Neck:      Musculoskeletal: Normal range of motion and neck supple.   Cardiovascular:      Rate and Rhythm: Normal rate and regular rhythm.      Heart sounds: Murmur present. No friction rub. No gallop.    Pulmonary:      Effort: Pulmonary effort is normal.      Breath sounds: Normal breath sounds.   Abdominal:      General: Abdomen is flat. Bowel sounds are normal.      Palpations: Abdomen is soft.   Musculoskeletal:         General: No swelling or deformity.      Comments: Contractures noted to bilateral hands with some ulnar deviation.   Skin:     General: Skin is warm and dry.      Capillary Refill: Capillary refill takes less than 2 seconds.   Neurological:      Comments: Patient is oriented to person and place but is easily confused.  He is repeating himself.  He does have some evidence of bilateral Tatiana weakness as well as contractures of both hands which he attributes to carpal tunnel syndrome.   Psychiatric:         Mood and Affect: Mood normal.           CRANIAL NERVES     CN III, IV, VI   Pupils are equal, round, and reactive to light.    MEDICATIONS  INFUSION:   sodium chloride 0.9% 75 mL/hr at 12/09/20 1108      SCHEDULED:   atorvastatin  40 mg Oral Daily    furosemide (LASIX) IV  20 mg Intravenous Q12H    pantoprazole  40 mg Oral Daily    piperacillin-tazobactam (ZOSYN) IVPB  4.5 g Intravenous Q8H    ramipriL  10 mg Oral Daily     PRN MEDS:acetaminophen, melatonin, ondansetron, sodium  chloride 0.9%    Lab Results   Component Value Date    WBC 6.58 12/09/2020    RBC 4.68 12/09/2020    HGB 8.8 (L) 12/09/2020    HCT 30.1 (L) 12/09/2020    MCV 64 (L) 12/09/2020    MCH 18.8 (L) 12/09/2020    MCHC 29.2 (L) 12/09/2020    RDW 31.8 (H) 12/09/2020     (H) 12/09/2020    MPV 9.8 12/09/2020    GRAN 3.5 12/09/2020    GRAN 52.8 12/09/2020    LYMPH 2.2 12/09/2020    LYMPH 32.8 12/09/2020    MONO 0.7 12/09/2020    MONO 10.2 12/09/2020    EOS 0.2 12/09/2020    BASO 0.10 12/09/2020    EOSINOPHIL 2.4 12/09/2020    BASOPHIL 1.5 12/09/2020    MG 2.1 11/20/2020        CMP  Lab Results   Component Value Date     (L) 12/09/2020    K 3.7 12/09/2020     12/09/2020    CO2 26 12/09/2020     (H) 12/09/2020    BUN 19 12/09/2020    CREATININE 0.8 12/09/2020    CALCIUM 8.9 12/09/2020    PROT 7.0 12/09/2020    ALBUMIN 3.2 (L) 12/09/2020    BILITOT 1.0 12/09/2020    ALKPHOS 69 12/09/2020    AST 54 (H) 12/09/2020    ALT 64 (H) 12/09/2020    ANIONGAP 6 (L) 12/09/2020    ESTGFRAFRICA >60.0 12/09/2020    EGFRNONAA >60.0 12/09/2020        No results found for: LABBLOO, LABURIN, RESPIRATORYC, GSRESP     Imaging Results          CT Head Without Contrast (Final result)  Result time 12/09/20 09:34:43    Final result by Hattie De Leon MD (12/09/20 09:34:43)                 Impression:      No acute intracranial abnormalities      Electronically signed by: Hattie De Leon MD  Date:    12/09/2020  Time:    09:34             Narrative:    EXAMINATION:  CT HEAD WITHOUT CONTRAST    CLINICAL HISTORY:  Altered mental status;    CT/Cardiac Nuclear exams in prior 12 months: 1    TECHNIQUE:  Axial head CT performed without IV contrast.  Iterative reconstruction utilized.    COMPARISON:  None    FINDINGS:  No evidence of intracranial hemorrhage, mass or mass effect.    Small remote appearing right lentiform lacunar infarct, versus dilated perivascular space.  Imaged sinuses, mastoids and middle ear cavities are clear.                                X-Ray Chest 1 View (Final result)  Result time 12/09/20 09:52:11    Final result by Hattie De Leon MD (12/09/20 09:52:11)                 Impression:      Elevated right hemidiaphragm, with pleural effusion and associated atelectasis versus pneumonia      Electronically signed by: Hattie De Leon MD  Date:    12/09/2020  Time:    09:52             Narrative:    EXAMINATION:  XR CHEST 1 VIEW    CLINICAL HISTORY:  Altered mental status, unspecified    COMPARISON:  CT abdomen and pelvis 11/23/2020    FINDINGS:  Elevated right hemidiaphragm with small right pleural effusion, adjacent atelectasis and/or pneumonia.    Right upper lung, and left lung are otherwise grossly clear.  Normal size cardiac silhouette.  Cervical spine fusion hardware noted.                                  Significant Labs: All pertinent labs within the past 24 hours have been reviewed.    Significant Imaging: I have reviewed and interpreted all pertinent imaging results/findings within the past 24 hours.    Assessment/Plan:     Essential hypertension  Appears well controlled.  Continue home dose of ramipril.      Hyperlipemia  Current treatment: Atorvastatin 40 mg, ezetimibe 10 mg daily.  Patient also on TriCor 145 mg daily.  Will increase atorvastatin to 80 mg given acute CVA.      CVA (cerebral vascular accident)  Patient presents with worsening confusion and delirium.    MRI reveals a potential acute punctate high left frontal parietal subcortical infarct.    We will pursue echocardiography, telemetry, carotid ultrasound.  Cardiology has been consulted for assistance given his cardiovascular history.    Patient on aspirin in the outpatient setting will therefore add Plavix due to aspirin failure.      VTE Risk Mitigation (From admission, onward)         Ordered     IP VTE HIGH RISK PATIENT  Once      12/09/20 1128     Place AVINASH hose  Until discontinued      12/09/20 1128                   Drew Moreira Jr,  MD  Department of Hospital Medicine   [facility]

## 2020-12-11 VITALS
BODY MASS INDEX: 24.64 KG/M2 | WEIGHT: 157 LBS | HEIGHT: 67 IN | RESPIRATION RATE: 20 BRPM | HEART RATE: 108 BPM | DIASTOLIC BLOOD PRESSURE: 69 MMHG | TEMPERATURE: 98 F | SYSTOLIC BLOOD PRESSURE: 110 MMHG | OXYGEN SATURATION: 93 %

## 2020-12-11 PROCEDURE — 99900031 HC PATIENT EDUCATION (STAT)

## 2020-12-11 PROCEDURE — 92523 SPEECH SOUND LANG COMPREHEN: CPT

## 2020-12-11 PROCEDURE — 99900035 HC TECH TIME PER 15 MIN (STAT)

## 2020-12-11 PROCEDURE — 25000003 PHARM REV CODE 250: Performed by: INTERNAL MEDICINE

## 2020-12-11 PROCEDURE — G0378 HOSPITAL OBSERVATION PER HR: HCPCS

## 2020-12-11 PROCEDURE — 97110 THERAPEUTIC EXERCISES: CPT

## 2020-12-11 PROCEDURE — 97116 GAIT TRAINING THERAPY: CPT

## 2020-12-11 PROCEDURE — 63600175 PHARM REV CODE 636 W HCPCS: Performed by: INTERNAL MEDICINE

## 2020-12-11 PROCEDURE — 96376 TX/PRO/DX INJ SAME DRUG ADON: CPT

## 2020-12-11 PROCEDURE — 94761 N-INVAS EAR/PLS OXIMETRY MLT: CPT

## 2020-12-11 RX ORDER — ATORVASTATIN CALCIUM 80 MG/1
80 TABLET, FILM COATED ORAL NIGHTLY
Qty: 90 TABLET | Refills: 0 | Status: SHIPPED | OUTPATIENT
Start: 2020-12-11 | End: 2021-03-15

## 2020-12-11 RX ORDER — CLOPIDOGREL BISULFATE 75 MG/1
75 TABLET ORAL DAILY
Qty: 30 TABLET | Refills: 2 | Status: SHIPPED | OUTPATIENT
Start: 2020-12-12 | End: 2021-03-15

## 2020-12-11 RX ADMIN — PIPERACILLIN AND TAZOBACTAM 4.5 G: 4; .5 INJECTION, POWDER, LYOPHILIZED, FOR SOLUTION INTRAVENOUS; PARENTERAL at 03:12

## 2020-12-11 RX ADMIN — PIPERACILLIN AND TAZOBACTAM 4.5 G: 4; .5 INJECTION, POWDER, LYOPHILIZED, FOR SOLUTION INTRAVENOUS; PARENTERAL at 12:12

## 2020-12-11 RX ADMIN — RAMIPRIL 10 MG: 5 CAPSULE ORAL at 08:12

## 2020-12-11 RX ADMIN — PANTOPRAZOLE SODIUM 40 MG: 40 TABLET, DELAYED RELEASE ORAL at 08:12

## 2020-12-11 RX ADMIN — CLOPIDOGREL 75 MG: 75 TABLET, FILM COATED ORAL at 08:12

## 2020-12-11 RX ADMIN — FUROSEMIDE 20 MG: 10 INJECTION, SOLUTION INTRAVENOUS at 12:12

## 2020-12-11 NOTE — PT/OT/SLP PROGRESS
Physical Therapy  Treatment    Richard Farr   MRN: [id number]   Admitting Diagnosis: CVA (cerebral vascular accident)       Start time: 1030  Stop time: 1055       Billable Minutes:  Gait Training 15 and Therapeutic Exercise 10                     General Precautions: Standard, fall    Spiritual, Cultural Beliefs, Christianity Practices, Values that Affect Care: no    Subjective:  Communicated with nurse prior to and after session.    re: KHUSHI recommendations     pain: pt reports some pain in B hands/ arthritic and chronic. FACES scale 2/10    Objective:        Functional Mobility:  Bed Mobility:    NA, pt is reclined in recliner, states he sleeps in recliner as bed is not comfortable    Transfers:   -sit to/from stand from recliner contact guard assist  -turn and back to chair from gait SBA    Gait:     Performed gait training in room (pt declined gait in hallway) x 50' with contact guard to SBA, pt has WBOS with short step thru pattern,noted decreased stability. Pt also has chronic edema in feet which likely interferes with balance.     Stairs:    Balance:   Static Sit: GOOD: Takes MODERATE challenges from all directions  Dynamic Sit: FAIR+: Maintains balance through MINIMAL excursions of active trunk motion  Static Stand: FAIR: Maintains without assist but unable to take challenges  Dynamic stand: FAIR: Needs CONTACT GUARD during gait       Therapeutic Activities and Exercises:  Reviewed standing and seated B LE there ex for pt to perform at home. Case management notified me that pt is declining a RW and does not feel he needs HH. Discussed safety with pt and his wife. Recommended pt use the RW or at very least the cane to improve stability in the home/avoid a fall with possible injury as this would result in much longer hospitalization and course of therapy. Pt was not opposed and seemed to be listening to recommendations. Pt also stated he has a treadmill and stepper at home. Discussed fall risk with this  as well and encouraged pt to allow HHPT to come out to home and work on balance as well as progress him safely on HEP to include his treadmill and stepper as they deem safe. Pt did voice understanding.    Patient left reclined in recliner with call button in reach and wife present.    Assessment:  Richard Farr is a 75 y.o. male with a medical diagnosis of CVA (cerebral vascular accident) and presents with improving mobility though he demonstrates some balance deficits that would benefit from HH interventions to address safety in the home and balance work.    Rehab identified problem list/impairments:      Rehab potential is good.    Activity tolerance: Good    Discharge recommendations:   HHPT    Barriers to discharge:      Equipment recommendations:   pt has access to RW and cane at home    GOALS:   Multidisciplinary Problems     Physical Therapy Goals        Problem: Physical Therapy Goal    Goal Priority Disciplines Outcome Goal Variances Interventions   Physical Therapy Goal     PT, PT/OT      Description: Goals to be met by: 12/17/2020     Patient will increase functional independence with mobility by performing:     Bed to chair transfer with Kamas   Gait  x 100 feet with Kamas using no AD.    Lower extremity exercise program  to impact functional use (B) LE.                    PLAN:    Patient to be seen (up to 12 times per week.)  to address the above listed problems via gait training, therapeutic activities, therapeutic exercises  Plan of Care expires: 12/17/20  Plan of Care reviewed with: patient, spouse         Haydee Marcos, PT  12/11/2020

## 2020-12-11 NOTE — PT/OT/SLP EVAL
Speech Language Pathology Evaluation  Cognitive/Bedside Swallow    Patient Name:  Richard Farr   MRN:  [id number]  Admitting Diagnosis: CVA (cerebral vascular accident)    Recommendations:       Diet recommendations:  Regular diet consistency/Thin liquids    General Precautions: Standard, fall    History:     Past Medical History:   Diagnosis Date    Arthritis     Hyperlipemia     Hypertension     Shingles        Past Surgical History:   Procedure Laterality Date    COLONOSCOPY N/A 11/23/2020    Procedure: COLONOSCOPY;  Surgeon: Abelino Garcia MD;  Location: [facility];  Service: General;  Laterality: N/A;    NECK SURGERY      x 2    TONSILLECTOMY         Social History: Patient lives with his wife.    Prior diet: Regular diet consistency/Thin liquids  .    Occupation/hobbies/homemaking: Patient enjoys taking care of his grandchildren's dogs and visiting with family. 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 His wife was present during the evaluation.   Patient goals: to return home     Pain/Comfort:  · Pain Rating 1: 0/10    Objective:     Cognitive Status:    Orientation Oriented x4   Sequencing and simple problem solving- within functional limits   He was able to recall recent information/events without difficulty.      Receptive Language:   Comprehension:      WFL    Expressive Language:  Verbal:    WFL      Motor Speech:  WFL    Voice:   WFL    Oral Musculature Evaluation  · Oral Musculature: WFL  · Dentition: present and adequate  · Secretion Management: adequate  · Mandibular Strength and Mobility: WFL  · Oral Labial Strength and Mobility: WFL    Bedside Swallow Eval:   Consistencies Assessed:  · Thin liquids straw sips x 2 oz   · Solids cracker x 1     Oral Phase:   · WFL    Pharyngeal Phase:   · no overt clinical signs/symptoms of pharyngeal dysphagia      Assessment:     Richard Farr is a 75 y.o. male with a  CVA.       Plan:     · Plan of Care reviewed with:  patient   · SLP Follow-Up:  No      Discharge recommendations:    home with his wife     Time Tracking:     Speech Start Time:  1130  Speech Stop Time:  1153     Speech Total Time (min):  23 min    Billable Minutes: Eval 15  and Eval Swallow and Oral Function 8    JOVAN DEXTER CCC-SLP  12/11/2020

## 2020-12-11 NOTE — PLAN OF CARE
No acute episodes throughout shift, Pt remains free from injury/falls, Pt stayed in recliner to sleep, up to bathroom with assistance, no c/o pain and denies needs at this time. Will continue to monitor.

## 2020-12-11 NOTE — PLAN OF CARE
12/11/20 1416   Final Note   Anticipated Discharge Disposition Home-Health   What phone number can be called within the next 1-3 days to see how you are doing after discharge? [phone]   Hospital Follow Up  Appt(s) scheduled? No  (Patient to make appt as MD's office is currently closed.)   Post-Acute Status   Post-Acute Authorization Home Health   Home Health Status Set-up Complete   Patient choice form signed by patient/caregiver List with quality metrics by geographic area provided;List from CMS Compare     Patient discharged to [facility] w[facility].  Initially declining outpatient services and now they are wanting home health services.  Referral faxed to [facility] and notified Violeta of new referral.  Informed primary nurse patient was clear to dc from cm stand point.

## 2020-12-11 NOTE — PT/OT/SLP EVAL
"Occupational Therapy   Evaluation    Name: Richard Farr  MRN: [id number]  Admitting Diagnosis:  CVA (cerebral vascular accident)      Recommendations:     Discharge Recommendations:    Discharge Equipment Recommendations:     Barriers to discharge:       Assessment:     Richard Farr is a 75 y.o. male with a medical diagnosis of CVA (cerebral vascular accident).   Performance deficits affecting function: weakness, impaired endurance, impaired self care skills, impaired functional mobilty, gait instability, decreased upper extremity function, decreased safety awareness, decreased coordination, impaired balance.      Rehab Prognosis: Good; patient would benefit from acute skilled OT services to address these deficits and reach maximum level of function.       Plan:     Patient to be seen 6 x/week to address the above listed problems via self-care/home management, therapeutic activities, therapeutic exercises  · Plan of Care Expires:    · Plan of Care Reviewed with:      Subjective     Chief Complaint: "I'm weaker"  Patient/Family Comments/goals: to get back home to regular activity    Occupational Profile:  Living Environment: home with spouse  Previous level of function: independent with all ADLs, some trouble with zippers and containers due to B hand weakness, decreased ROM in L shoulder due to old RC injury  Roles and Routines: lives with spouse, not working  Equipment Used at Home:  none  Assistance upon Discharge: some help from spouse    Pain/Comfort:  · Pain Rating 1: 0/10    Patients cultural, spiritual, Congregation conflicts given the current situation: no    Objective:     Communicated with: PT prior to session.  Patient found up in chair with peripheral IV upon OT entry to room.    General Precautions: Standard, fall   Orthopedic Precautions:    Braces:       Occupational Performance:    Bed Mobility:    · None completed    Functional Mobility/Transfers:  · Patient completed Sit <> Stand Transfer with " supervision  with  no assistive device   · Functional Mobility: supervision with NO AD    Activities of Daily Living:  · Feeding:  stand by assistance for set up  · Grooming: stand by assistance for set-up  · Lower Body Dressing: stand by assistance for set-up    Cognitive/Visual Perceptual:  Cognitive/Psychosocial Skills:     -       Oriented to: Person, Place, Time and Situation     Physical Exam:  Balance:    -       static standing:  good  Upper Extremity Range of Motion:     -       Right Upper Extremity: WFL  -       Left Upper Extremity: Deficits: dereased shoulder ROM due to old RC injury  Upper Extremity Strength:    -       Right Upper Extremity: WFL  -       Left Upper Extremity: Deficits: decreased (3/5) due to old RC injury   Strength:    -       Right Upper Extremity: WFL  -       Left Upper Extremity: WFL  Fine Motor Coordination:    -       Impaired  decreased ability to open small containers and complete fasterners, etc.    AMPAC 6 Click ADL:  AMPAC Total Score: 18    Treatment & Education:  Pt presented seated in bedside recliner, agreeable to evaluation.  Completed evaluation of strength, balance, ROM and ADLs as noted above.  Pt and spouse in room very agreeable to treament.    Education:    Patient left up in chair with all lines intact, call button in reach and spouse present    GOALS:   Multidisciplinary Problems     Occupational Therapy Goals        Problem: Occupational Therapy Goal    Goal Priority Disciplines Outcome Interventions   Occupational Therapy Goal     OT, PT/OT     Description:   Patient will increase functional independence with ADLs by performing:    LE Dressing with [place]ie.  Grooming while standing with [place].  Toileting from bedside commode with [place]ie for hygiene and clothing management.   Toilet transfer to bedside commode with McDuffie.  Increased functional strength to WFL for RUE.                     History:     Past Medical History:    Diagnosis Date    Arthritis     Hyperlipemia     Hypertension     Shingles        Past Surgical History:   Procedure Laterality Date    COLONOSCOPY N/A 11/23/2020    Procedure: COLONOSCOPY;  Surgeon: Abelino Garcia MD;  Location: [facility];  Service: General;  Laterality: N/A;    NECK SURGERY      x 2    TONSILLECTOMY         Time Tracking:     OT Date of Treatment:    OT Start Time: 1145  OT Stop Time: 1155  OT Total Time (min): 10 min    Billable Minutes:Evaluation 10    Yolanda Hunter OT  12/11/2020

## 2020-12-11 NOTE — NURSING
1449  DISCHARGE INSTRUCTIONS GIVEN TO PATIENT.  SALINE-LOCK D/C'D WITH CATHETER INTACT.  TELEMETRY D/C'D AS WELL.   VOICED COMPLETE UNDERSTANDING OF ALL INSTRUCTIONS.  REPORT CALLED TO [facility]   THEY WILL GO SEE PATIENT TOMORROW.  PATIENT LEFT FLOOR IN STABLE CONDITION PER WHEEL CHAIR.  SAFETY MAINTAINED.  BENEDICT SCOTT LPN

## 2020-12-11 NOTE — DISCHARGE SUMMARY
[facility] Medicine  Discharge Summary      Patient Name: Richard Farr  MRN: [id number]  Admission Date: 12/9/2020  Hospital Length of Stay: 0 days  Discharge Date and Time:  12/11/2020 1:46 PM  Attending Physician: Drew Moreira Jr., MD   Discharging Provider: Drew Moreira Jr, MD  Primary Care Provider: Juan Kidd MD      HPI:   History   No chief complaint on file.    This is a 75-year-old white male presents to the emergency department with his wife who reports he has been confused on and off for about a week, but last night was extremely confused.  Head turned on all the lights in the house looking for a spoon, was up till 3:00 a.m..  Denies any fever.  Patient denies any shortness of breath or chest pain.  Patient with recent EGD and colonoscopy due to anemia, and is scheduled for a polyp removal by  early next week.      * No surgery found *      Hospital Course:   12/10/2020: Patient presented to the hospital due to worsening confusion out of proportion to his baseline.  Initial CT was negative.  For completeness the patient was admitted provide IV fluids and an MRI was pursued.  There is a question of an acute punctate high left frontal parietal subcortical infarct which likely explains his worsening confusion.  He will be placed on aspirin and statin.  Echocardiogram and carotid ultrasound will be pursued.  We will consult his cardiologist Dr. Yanez as a courtesy and for assistance with medical history.    12/11/2020: Patient appears to be back at his baseline.  Echocardiogram fairly unchanged from previous per cardiology records.  Carotid ultrasound does show some bilateral plaque but no significant stenosis.  Monitor has been placed to rule out underlying atrial fibrillation though none of that has been seen throughout his hospital stay.  Currently safe for discharge home.     Consults:   Consults (From admission, onward)        Status Ordering Provider      Inpatient consult to Cardiology  Once     Provider:  Vaughn Yanez MD    Completed [facility]     Inpatient consult to General Surgery  Once     Provider:  Abelino Garcia MD    Acknowledged [facility]          * CVA (cerebral vascular accident)  Patient presents with worsening confusion and delirium.    MRI reveals a potential acute punctate high left frontal parietal subcortical infarct.    Appreciate cardiology's input.  No evidence of atrial fibrillation at this point however who based on a monitor.  He has nonobstructive carotid disease and his echocardiogram is fairly unchanged from previous.    Patient on aspirin in the outpatient setting will therefore add Plavix due to aspirin failure.    Altered mental status  This is resolved.  Patient is back at his baseline.        Final Active Diagnoses:    Diagnosis Date Noted POA    PRINCIPAL PROBLEM:  CVA (cerebral vascular accident) [I63.9] 12/10/2020 Yes    Hyperlipemia [E78.5] 12/10/2020 Unknown    Essential hypertension [I10] 12/10/2020 Unknown    Altered mental status [R41.82] 12/09/2020 Yes      Problems Resolved During this Admission:       Discharged Condition: good    Disposition:     Follow Up:    Patient Instructions:   No discharge procedures on file.    Significant Diagnostic Studies: Labs: All labs within the past 24 hours have been reviewed    Pending Diagnostic Studies:     Procedure Component Value Units Date/Time    US Carotid Bilateral [[id number]]     Order Status: Sent Lab Status: No result          Medications:  Reconciled Home Medications:      Medication List      ASK your doctor about these medications    aspirin 81 MG Chew  Take 81 mg by mouth once daily.     atorvastatin 40 MG tablet  Commonly known as: LIPITOR  Take 40 mg by mouth once daily.     ezetimibe 10 mg tablet  Commonly known as: ZETIA  Take 10 mg by mouth once daily. 1/2 tabs     fenofibrate 145 MG tablet  Commonly known as: TRICOR  Take 145 mg by mouth  once daily.     fish oil-omega-3 fatty acids 300-1,000 mg capsule  Take by mouth once daily.     furosemide 20 MG tablet  Commonly known as: LASIX  Take 20 mg by mouth 2 (two) times daily.     glucosamine-chondroitin 500-400 mg tablet  Take 1 tablet by mouth 3 (three) times daily.     multivitamin capsule  Take 1 capsule by mouth once daily.     pantoprazole 40 MG tablet  Commonly known as: PROTONIX  Take 40 mg by mouth once daily.     potassium chloride 10 MEQ Cpsr  Commonly known as: MICRO-K  Take 10 mEq by mouth once.     ramipriL 10 MG capsule  Commonly known as: ALTACE  Take 10 mg by mouth once daily.     turmeric 400 mg Cap  Take by mouth.     VITAMIN C 500 MG tablet  Generic drug: ascorbic acid (vitamin C)  Take 500 mg by mouth once daily.            Indwelling Lines/Drains at time of discharge:   Lines/Drains/Airways     None                 Time spent on the discharge of patient: 60 minutes  Patient was seen and examined on the date of discharge and determined to be suitable for discharge.         Drew Moreira Jr, MD  Department of Hospital Medicine  [facility]

## 2020-12-11 NOTE — ASSESSMENT & PLAN NOTE
Patient presents with worsening confusion and delirium.    MRI reveals a potential acute punctate high left frontal parietal subcortical infarct.    Appreciate cardiology's input.  No evidence of atrial fibrillation at this point however who based on a monitor.  He has nonobstructive carotid disease and his echocardiogram is fairly unchanged from previous.    Patient on aspirin in the outpatient setting will therefore add Plavix due to aspirin failure.

## 2021-01-10 ENCOUNTER — IMMUNIZATION (OUTPATIENT)
Dept: OBSTETRICS AND GYNECOLOGY | Facility: CLINIC | Age: 76
End: 2021-01-10
Payer: MEDICARE

## 2021-01-10 DIAGNOSIS — Z23 NEED FOR VACCINATION: ICD-10-CM

## 2021-01-10 PROCEDURE — 91300 COVID-19, MRNA, LNP-S, PF, 30 MCG/0.3 ML DOSE VACCINE: CPT | Mod: PBBFAC

## 2021-01-26 ENCOUNTER — APPOINTMENT (OUTPATIENT)
Dept: LAB | Facility: HOSPITAL | Age: 76
End: 2021-01-26
Attending: INTERNAL MEDICINE
Payer: MEDICARE

## 2021-01-26 DIAGNOSIS — Z01.818 OTHER SPECIFIED PRE-OPERATIVE EXAMINATION: Primary | ICD-10-CM

## 2021-01-26 LAB — SARS-COV-2 RNA RESP QL NAA+PROBE: NOT DETECTED

## 2021-01-26 PROCEDURE — U0002 COVID-19 LAB TEST NON-CDC: HCPCS

## 2021-01-29 ENCOUNTER — HOSPITAL ENCOUNTER (EMERGENCY)
Facility: HOSPITAL | Age: 76
Discharge: ANOTHER HOSPITAL | End: 2021-01-30
Attending: EMERGENCY MEDICINE
Payer: MEDICARE

## 2021-01-29 DIAGNOSIS — Z45.2 ENCOUNTER FOR CENTRAL LINE PLACEMENT: ICD-10-CM

## 2021-01-29 DIAGNOSIS — D64.9 ANEMIA, UNSPECIFIED TYPE: ICD-10-CM

## 2021-01-29 DIAGNOSIS — A41.9 SEPSIS, DUE TO UNSPECIFIED ORGANISM, UNSPECIFIED WHETHER ACUTE ORGAN DYSFUNCTION PRESENT: Primary | ICD-10-CM

## 2021-01-29 DIAGNOSIS — R53.1 WEAKNESS: ICD-10-CM

## 2021-01-29 DIAGNOSIS — K81.9 CHOLECYSTITIS: ICD-10-CM

## 2021-01-29 PROBLEM — I34.0 MITRAL REGURGITATION: Status: ACTIVE | Noted: 2021-01-29

## 2021-01-29 PROCEDURE — 96375 TX/PRO/DX INJ NEW DRUG ADDON: CPT

## 2021-01-29 PROCEDURE — 84484 ASSAY OF TROPONIN QUANT: CPT

## 2021-01-29 PROCEDURE — 83605 ASSAY OF LACTIC ACID: CPT

## 2021-01-29 PROCEDURE — U0002 COVID-19 LAB TEST NON-CDC: HCPCS | Performed by: EMERGENCY MEDICINE

## 2021-01-29 PROCEDURE — 86900 BLOOD TYPING SEROLOGIC ABO: CPT

## 2021-01-29 PROCEDURE — 83880 ASSAY OF NATRIURETIC PEPTIDE: CPT

## 2021-01-29 PROCEDURE — 96367 TX/PROPH/DG ADDL SEQ IV INF: CPT

## 2021-01-29 PROCEDURE — 96366 THER/PROPH/DIAG IV INF ADDON: CPT

## 2021-01-29 PROCEDURE — 83690 ASSAY OF LIPASE: CPT

## 2021-01-29 PROCEDURE — 80053 COMPREHEN METABOLIC PANEL: CPT | Mod: 91

## 2021-01-29 PROCEDURE — 87040 BLOOD CULTURE FOR BACTERIA: CPT | Mod: 59

## 2021-01-29 PROCEDURE — 93005 ELECTROCARDIOGRAM TRACING: CPT

## 2021-01-29 PROCEDURE — 36415 COLL VENOUS BLD VENIPUNCTURE: CPT

## 2021-01-29 PROCEDURE — 96365 THER/PROPH/DIAG IV INF INIT: CPT

## 2021-01-29 PROCEDURE — 85025 COMPLETE CBC W/AUTO DIFF WBC: CPT

## 2021-01-29 PROCEDURE — 36430 TRANSFUSION BLD/BLD COMPNT: CPT

## 2021-01-29 PROCEDURE — 99285 EMERGENCY DEPT VISIT HI MDM: CPT | Mod: 25

## 2021-01-29 PROCEDURE — 25000003 PHARM REV CODE 250: Performed by: EMERGENCY MEDICINE

## 2021-01-29 PROCEDURE — 96376 TX/PRO/DX INJ SAME DRUG ADON: CPT

## 2021-01-29 PROCEDURE — 36556 INSERT NON-TUNNEL CV CATH: CPT

## 2021-01-29 PROCEDURE — 86920 COMPATIBILITY TEST SPIN: CPT

## 2021-01-29 RX ADMIN — SODIUM CHLORIDE 1000 ML: 0.9 INJECTION, SOLUTION INTRAVENOUS at 11:01

## 2021-01-30 ENCOUNTER — HOSPITAL ENCOUNTER (INPATIENT)
Facility: HOSPITAL | Age: 76
LOS: 5 days | Discharge: HOME HEALTH | DRG: 291 | End: 2021-02-04
Attending: INTERNAL MEDICINE | Admitting: INTERNAL MEDICINE
Payer: MEDICARE

## 2021-01-30 VITALS
OXYGEN SATURATION: 96 % | BODY MASS INDEX: 25.43 KG/M2 | HEART RATE: 73 BPM | WEIGHT: 162 LBS | DIASTOLIC BLOOD PRESSURE: 64 MMHG | SYSTOLIC BLOOD PRESSURE: 103 MMHG | RESPIRATION RATE: 22 BRPM | TEMPERATURE: 98 F | HEIGHT: 67 IN

## 2021-01-30 DIAGNOSIS — R80.9 NEPHROTIC RANGE PROTEINURIA: ICD-10-CM

## 2021-01-30 DIAGNOSIS — I10 ESSENTIAL HYPERTENSION: ICD-10-CM

## 2021-01-30 DIAGNOSIS — J18.9 SEPSIS DUE TO PNEUMONIA: ICD-10-CM

## 2021-01-30 DIAGNOSIS — I34.0 SEVERE MITRAL REGURGITATION: Primary | ICD-10-CM

## 2021-01-30 DIAGNOSIS — A41.9 SEPSIS DUE TO PNEUMONIA: ICD-10-CM

## 2021-01-30 DIAGNOSIS — J90 PLEURAL EFFUSION, RIGHT: ICD-10-CM

## 2021-01-30 DIAGNOSIS — R53.81 DEBILITY: ICD-10-CM

## 2021-01-30 DIAGNOSIS — I50.32 CHRONIC DIASTOLIC CONGESTIVE HEART FAILURE: ICD-10-CM

## 2021-01-30 DIAGNOSIS — D50.9 IRON DEFICIENCY ANEMIA, UNSPECIFIED IRON DEFICIENCY ANEMIA TYPE: ICD-10-CM

## 2021-01-30 DIAGNOSIS — E87.20 LACTIC ACIDOSIS: ICD-10-CM

## 2021-01-30 DIAGNOSIS — K72.00 SHOCK LIVER: ICD-10-CM

## 2021-01-30 DIAGNOSIS — I50.9 ACUTE DECOMPENSATED HEART FAILURE: ICD-10-CM

## 2021-01-30 DIAGNOSIS — I50.33 ACUTE ON CHRONIC DIASTOLIC CONGESTIVE HEART FAILURE: ICD-10-CM

## 2021-01-30 PROBLEM — E78.2 MIXED HYPERLIPIDEMIA: Status: ACTIVE | Noted: 2020-12-10

## 2021-01-30 PROBLEM — N17.9 AKI (ACUTE KIDNEY INJURY): Status: ACTIVE | Noted: 2021-01-30

## 2021-01-30 PROBLEM — I07.1 TRICUSPID REGURGITATION: Status: ACTIVE | Noted: 2021-01-30

## 2021-01-30 PROBLEM — R57.9 SHOCK: Status: ACTIVE | Noted: 2021-01-30

## 2021-01-30 LAB
ABO + RH BLD: NORMAL
ALBUMIN SERPL BCP-MCNC: 3.1 G/DL (ref 3.5–5.2)
ALBUMIN SERPL BCP-MCNC: 3.1 G/DL (ref 3.5–5.2)
ALBUMIN SERPL BCP-MCNC: 3.3 G/DL (ref 3.5–5.2)
ALLENS TEST: ABNORMAL
ALP SERPL-CCNC: 85 U/L (ref 55–135)
ALP SERPL-CCNC: 86 U/L (ref 55–135)
ALP SERPL-CCNC: 94 U/L (ref 55–135)
ALT SERPL W/O P-5'-P-CCNC: 1100 U/L (ref 10–44)
ALT SERPL W/O P-5'-P-CCNC: 646 U/L (ref 10–44)
ALT SERPL W/O P-5'-P-CCNC: 673 U/L (ref 10–44)
ANION GAP SERPL CALC-SCNC: 13 MMOL/L (ref 8–16)
ANION GAP SERPL CALC-SCNC: 14 MMOL/L (ref 8–16)
ANION GAP SERPL CALC-SCNC: 15 MMOL/L (ref 8–16)
ANION GAP SERPL CALC-SCNC: 9 MMOL/L (ref 8–16)
ANISOCYTOSIS BLD QL SMEAR: ABNORMAL
ANISOCYTOSIS BLD QL SMEAR: SLIGHT
APAP SERPL-MCNC: <3 UG/ML (ref 10–20)
APTT BLDCRRT: 28.8 SEC (ref 21–32)
AST SERPL-CCNC: 1031 U/L (ref 10–40)
AST SERPL-CCNC: 1080 U/L (ref 10–40)
AST SERPL-CCNC: 2083 U/L (ref 10–40)
BACTERIA #/AREA URNS HPF: ABNORMAL /HPF
BASOPHILS # BLD AUTO: 0.02 K/UL (ref 0–0.2)
BASOPHILS # BLD AUTO: 0.03 K/UL (ref 0–0.2)
BASOPHILS # BLD AUTO: 0.05 K/UL (ref 0–0.2)
BASOPHILS NFR BLD: 0.2 % (ref 0–1.9)
BASOPHILS NFR BLD: 0.2 % (ref 0–1.9)
BASOPHILS NFR BLD: 0.4 % (ref 0–1.9)
BILIRUB SERPL-MCNC: 1.6 MG/DL (ref 0.1–1)
BILIRUB SERPL-MCNC: 1.8 MG/DL (ref 0.1–1)
BILIRUB SERPL-MCNC: 2.2 MG/DL (ref 0.1–1)
BILIRUB UR QL STRIP: NEGATIVE
BLD GP AB SCN CELLS X3 SERPL QL: NORMAL
BLD PROD TYP BPU: NORMAL
BLD PROD TYP BPU: NORMAL
BLOOD UNIT EXPIRATION DATE: NORMAL
BLOOD UNIT EXPIRATION DATE: NORMAL
BLOOD UNIT TYPE CODE: 6200
BLOOD UNIT TYPE CODE: 6200
BLOOD UNIT TYPE: NORMAL
BLOOD UNIT TYPE: NORMAL
BNP SERPL-MCNC: 2711 PG/ML (ref 0–99)
BUN SERPL-MCNC: 59 MG/DL (ref 8–23)
BUN SERPL-MCNC: 60 MG/DL (ref 8–23)
BUN SERPL-MCNC: 65 MG/DL (ref 8–23)
BUN SERPL-MCNC: 70 MG/DL (ref 8–23)
BURR CELLS BLD QL SMEAR: ABNORMAL
CALCIUM SERPL-MCNC: 7.9 MG/DL (ref 8.7–10.5)
CALCIUM SERPL-MCNC: 8 MG/DL (ref 8.7–10.5)
CALCIUM SERPL-MCNC: 8.3 MG/DL (ref 8.7–10.5)
CALCIUM SERPL-MCNC: 8.4 MG/DL (ref 8.7–10.5)
CHLORIDE SERPL-SCNC: 104 MMOL/L (ref 95–110)
CHLORIDE SERPL-SCNC: 105 MMOL/L (ref 95–110)
CHLORIDE SERPL-SCNC: 105 MMOL/L (ref 95–110)
CHLORIDE SERPL-SCNC: 106 MMOL/L (ref 95–110)
CLARITY UR: ABNORMAL
CO2 SERPL-SCNC: 17 MMOL/L (ref 23–29)
CO2 SERPL-SCNC: 19 MMOL/L (ref 23–29)
CO2 SERPL-SCNC: 21 MMOL/L (ref 23–29)
CO2 SERPL-SCNC: 24 MMOL/L (ref 23–29)
CODING SYSTEM: NORMAL
CODING SYSTEM: NORMAL
COLOR UR: YELLOW
CORRECTED TEMPERATURE (PCO2): 32.8 MMHG
CORRECTED TEMPERATURE (PH): 7.25
CORRECTED TEMPERATURE (PO2): 165 MMHG
CREAT SERPL-MCNC: 2 MG/DL (ref 0.5–1.4)
CREAT SERPL-MCNC: 2.1 MG/DL (ref 0.5–1.4)
CREAT SERPL-MCNC: 2.1 MG/DL (ref 0.5–1.4)
CREAT SERPL-MCNC: 2.3 MG/DL (ref 0.5–1.4)
CREAT UR-MCNC: 101 MG/DL (ref 23–375)
CTP QC/QA: YES
DELSYS: ABNORMAL
DIFFERENTIAL METHOD: ABNORMAL
DISPENSE STATUS: NORMAL
DISPENSE STATUS: NORMAL
EOSINOPHIL # BLD AUTO: 0 K/UL (ref 0–0.5)
EOSINOPHIL NFR BLD: 0 % (ref 0–8)
EOSINOPHIL NFR BLD: 0.1 % (ref 0–8)
EOSINOPHIL NFR BLD: 0.3 % (ref 0–8)
ERYTHROCYTE [DISTWIDTH] IN BLOOD BY AUTOMATED COUNT: 28.3 % (ref 11.5–14.5)
ERYTHROCYTE [DISTWIDTH] IN BLOOD BY AUTOMATED COUNT: 28.4 % (ref 11.5–14.5)
ERYTHROCYTE [DISTWIDTH] IN BLOOD BY AUTOMATED COUNT: 28.5 % (ref 11.5–14.5)
EST. GFR  (AFRICAN AMERICAN): 31 ML/MIN/1.73 M^2
EST. GFR  (AFRICAN AMERICAN): 34.6 ML/MIN/1.73 M^2
EST. GFR  (AFRICAN AMERICAN): 34.6 ML/MIN/1.73 M^2
EST. GFR  (AFRICAN AMERICAN): 36.7 ML/MIN/1.73 M^2
EST. GFR  (NON AFRICAN AMERICAN): 26.8 ML/MIN/1.73 M^2
EST. GFR  (NON AFRICAN AMERICAN): 29.9 ML/MIN/1.73 M^2
EST. GFR  (NON AFRICAN AMERICAN): 29.9 ML/MIN/1.73 M^2
EST. GFR  (NON AFRICAN AMERICAN): 31.7 ML/MIN/1.73 M^2
ESTIMATED AVG GLUCOSE: 123 MG/DL (ref 68–131)
FIO2: 100 %
GLUCOSE SERPL-MCNC: 100 MG/DL (ref 70–110)
GLUCOSE SERPL-MCNC: 124 MG/DL (ref 70–110)
GLUCOSE SERPL-MCNC: 126 MG/DL (ref 70–110)
GLUCOSE SERPL-MCNC: 45 MG/DL (ref 70–110)
GLUCOSE UR QL STRIP: NEGATIVE
HBA1C MFR BLD: 5.9 % (ref 4–5.6)
HCO3 UR-SCNC: 15.2 MMOL/L
HCO3 UR-SCNC: 23.2 MMOL/L (ref 24–28)
HCT VFR BLD AUTO: 26.9 % (ref 40–54)
HCT VFR BLD AUTO: 30.1 % (ref 40–54)
HCT VFR BLD AUTO: 30.6 % (ref 40–54)
HGB BLD-MCNC: 7.8 G/DL (ref 14–18)
HGB BLD-MCNC: 9.2 G/DL (ref 14–18)
HGB BLD-MCNC: 9.5 G/DL (ref 14–18)
HGB UR QL STRIP: ABNORMAL
HYALINE CASTS #/AREA URNS LPF: 0 /LPF
HYPOCHROMIA BLD QL SMEAR: ABNORMAL
HYPOCHROMIA BLD QL SMEAR: ABNORMAL
IMM GRANULOCYTES # BLD AUTO: 0.07 K/UL (ref 0–0.04)
IMM GRANULOCYTES # BLD AUTO: 0.1 K/UL (ref 0–0.04)
IMM GRANULOCYTES # BLD AUTO: 0.1 K/UL (ref 0–0.04)
IMM GRANULOCYTES NFR BLD AUTO: 0.5 % (ref 0–0.5)
IMM GRANULOCYTES NFR BLD AUTO: 0.8 % (ref 0–0.5)
IMM GRANULOCYTES NFR BLD AUTO: 0.9 % (ref 0–0.5)
INR PPP: 1.9 (ref 0.8–1.2)
KETONES UR QL STRIP: NEGATIVE
LACTATE SERPL-SCNC: 1.6 MMOL/L (ref 0.5–2.2)
LACTATE SERPL-SCNC: 11.4 MMOL/L (ref 0.5–2.2)
LACTATE SERPL-SCNC: 13.5 MMOL/L (ref 0.5–2.2)
LEUKOCYTE ESTERASE UR QL STRIP: ABNORMAL
LIPASE SERPL-CCNC: 315 U/L (ref 23–300)
LYMPHOCYTES # BLD AUTO: 1.4 K/UL (ref 1–4.8)
LYMPHOCYTES # BLD AUTO: 1.9 K/UL (ref 1–4.8)
LYMPHOCYTES # BLD AUTO: 2.6 K/UL (ref 1–4.8)
LYMPHOCYTES NFR BLD: 10.9 % (ref 18–48)
LYMPHOCYTES NFR BLD: 13.6 % (ref 18–48)
LYMPHOCYTES NFR BLD: 22.5 % (ref 18–48)
Lab: ABNORMAL
MAGNESIUM SERPL-MCNC: 2.1 MG/DL (ref 1.6–2.6)
MCH RBC QN AUTO: 21.1 PG (ref 27–31)
MCH RBC QN AUTO: 22.9 PG (ref 27–31)
MCH RBC QN AUTO: 23.2 PG (ref 27–31)
MCHC RBC AUTO-ENTMCNC: 29 G/DL (ref 32–36)
MCHC RBC AUTO-ENTMCNC: 30.6 G/DL (ref 32–36)
MCHC RBC AUTO-ENTMCNC: 31 G/DL (ref 32–36)
MCV RBC AUTO: 73 FL (ref 82–98)
MCV RBC AUTO: 75 FL (ref 82–98)
MCV RBC AUTO: 75 FL (ref 82–98)
MICROSCOPIC COMMENT: ABNORMAL
MODIFIED ALLEN'S TEST: ABNORMAL
MONOCYTES # BLD AUTO: 1.5 K/UL (ref 0.3–1)
MONOCYTES # BLD AUTO: 1.5 K/UL (ref 0.3–1)
MONOCYTES # BLD AUTO: 1.8 K/UL (ref 0.3–1)
MONOCYTES NFR BLD: 11.4 % (ref 4–15)
MONOCYTES NFR BLD: 12.7 % (ref 4–15)
MONOCYTES NFR BLD: 12.8 % (ref 4–15)
NEUTROPHILS # BLD AUTO: 10 K/UL (ref 1.8–7.7)
NEUTROPHILS # BLD AUTO: 7.2 K/UL (ref 1.8–7.7)
NEUTROPHILS # BLD AUTO: 9.9 K/UL (ref 1.8–7.7)
NEUTROPHILS NFR BLD: 63.2 % (ref 38–73)
NEUTROPHILS NFR BLD: 72.8 % (ref 38–73)
NEUTROPHILS NFR BLD: 76.7 % (ref 38–73)
NITRITE UR QL STRIP: NEGATIVE
NOTIFIED BY: ABNORMAL
NRBC BLD-RTO: 2 /100 WBC
NRBC BLD-RTO: 2 /100 WBC
NRBC BLD-RTO: 8 /100 WBC
NT-PROBNP SERPL-MCNC: 9281 PG/ML (ref 5–900)
NUM UNITS TRANS PACKED RBC: NORMAL
NUM UNITS TRANS PACKED RBC: NORMAL
O2DEVICE: ABNORMAL
OB PNL STL: NEGATIVE
OVALOCYTES BLD QL SMEAR: ABNORMAL
PCO2 BLDA: 32.8 MMHG (ref 35–45)
PCO2 BLDA: 39.4 MMHG (ref 35–45)
PEAK FLOW: 15
PH SMN: 7.25 [PH] (ref 7.34–7.45)
PH SMN: 7.38 [PH] (ref 7.35–7.45)
PH UR STRIP: 6 [PH] (ref 5–8)
PHOSPHATE SERPL-MCNC: 6.4 MG/DL (ref 2.7–4.5)
PLATELET # BLD AUTO: 236 K/UL (ref 150–350)
PLATELET # BLD AUTO: 260 K/UL (ref 150–350)
PLATELET # BLD AUTO: 342 K/UL (ref 150–350)
PLATELET BLD QL SMEAR: ABNORMAL
PMV BLD AUTO: ABNORMAL FL (ref 9.2–12.9)
PO2 BLDA: 165 MMHG (ref 80–100)
PO2 BLDA: 27 MMHG (ref 40–60)
POC BASE DEFICIT: -12.7 MMOL/L
POC BE: -2 MMOL/L
POC NOTIFIED NOTE: ABNORMAL
POC PERFORMED BY: ABNORMAL
POC SATURATED O2: 49 % (ref 95–100)
POC SATURATED O2: 99.9 %
POC TCO2: 24 MMOL/L (ref 24–29)
POC TEMPERATURE: 37 C
POCT GLUCOSE: 146 MG/DL (ref 70–110)
POIKILOCYTOSIS BLD QL SMEAR: ABNORMAL
POIKILOCYTOSIS BLD QL SMEAR: SLIGHT
POLYCHROMASIA BLD QL SMEAR: ABNORMAL
POTASSIUM SERPL-SCNC: 4.3 MMOL/L (ref 3.5–5.1)
POTASSIUM SERPL-SCNC: 4.5 MMOL/L (ref 3.5–5.1)
POTASSIUM SERPL-SCNC: 4.9 MMOL/L (ref 3.5–5.1)
POTASSIUM SERPL-SCNC: 5.7 MMOL/L (ref 3.5–5.1)
PROT SERPL-MCNC: 5.9 G/DL (ref 6–8.4)
PROT SERPL-MCNC: 6.3 G/DL (ref 6–8.4)
PROT SERPL-MCNC: 7.1 G/DL (ref 6–8.4)
PROT UR QL STRIP: ABNORMAL
PROT UR-MCNC: 1015 MG/DL (ref 0–15)
PROT/CREAT UR: 10.05 MG/G{CREAT} (ref 0–0.2)
PROTHROMBIN TIME: 20.3 SEC (ref 9–12.5)
PROVIDER NOTIFIED: ABNORMAL
RBC # BLD AUTO: 3.69 M/UL (ref 4.6–6.2)
RBC # BLD AUTO: 4.01 M/UL (ref 4.6–6.2)
RBC # BLD AUTO: 4.1 M/UL (ref 4.6–6.2)
RBC #/AREA URNS HPF: 5 /HPF (ref 0–4)
SALICYLATES SERPL-MCNC: <5 MG/DL (ref 15–30)
SAMPLE: ABNORMAL
SARS-COV-2 RDRP RESP QL NAA+PROBE: NEGATIVE
SCHISTOCYTES BLD QL SMEAR: ABNORMAL
SCHISTOCYTES BLD QL SMEAR: PRESENT
SITE: ABNORMAL
SODIUM SERPL-SCNC: 137 MMOL/L (ref 136–145)
SODIUM SERPL-SCNC: 138 MMOL/L (ref 136–145)
SODIUM SERPL-SCNC: 138 MMOL/L (ref 136–145)
SODIUM SERPL-SCNC: 139 MMOL/L (ref 136–145)
SP GR UR STRIP: 1.02 (ref 1–1.03)
SPECIMEN SOURCE: ABNORMAL
SQUAMOUS #/AREA URNS HPF: 11 /HPF
TARGETS BLD QL SMEAR: ABNORMAL
TROPONIN I SERPL DL<=0.01 NG/ML-MCNC: 0.03 NG/ML (ref 0–0.03)
TROPONIN I SERPL DL<=0.01 NG/ML-MCNC: 0.22 NG/ML (ref 0–0.03)
TROPONIN I SERPL DL<=0.01 NG/ML-MCNC: 0.32 NG/ML (ref 0–0.03)
URN SPEC COLLECT METH UR: ABNORMAL
UROBILINOGEN UR STRIP-ACNC: 1 EU/DL
WBC # BLD AUTO: 11.38 K/UL (ref 3.9–12.7)
WBC # BLD AUTO: 12.87 K/UL (ref 3.9–12.7)
WBC # BLD AUTO: 13.72 K/UL (ref 3.9–12.7)
WBC #/AREA URNS HPF: 8 /HPF (ref 0–5)

## 2021-01-30 PROCEDURE — 99291 CRITICAL CARE FIRST HOUR: CPT | Mod: GC,,, | Performed by: EMERGENCY MEDICINE

## 2021-01-30 PROCEDURE — 63600175 PHARM REV CODE 636 W HCPCS: Performed by: EMERGENCY MEDICINE

## 2021-01-30 PROCEDURE — 82272 OCCULT BLD FECES 1-3 TESTS: CPT

## 2021-01-30 PROCEDURE — 82803 BLOOD GASES ANY COMBINATION: CPT

## 2021-01-30 PROCEDURE — 25000003 PHARM REV CODE 250: Performed by: EMERGENCY MEDICINE

## 2021-01-30 PROCEDURE — 25000003 PHARM REV CODE 250: Performed by: STUDENT IN AN ORGANIZED HEALTH CARE EDUCATION/TRAINING PROGRAM

## 2021-01-30 PROCEDURE — 85730 THROMBOPLASTIN TIME PARTIAL: CPT

## 2021-01-30 PROCEDURE — 99291 PR CRITICAL CARE, E/M 30-74 MINUTES: ICD-10-PCS | Mod: GC,,, | Performed by: EMERGENCY MEDICINE

## 2021-01-30 PROCEDURE — 85610 PROTHROMBIN TIME: CPT

## 2021-01-30 PROCEDURE — 96366 THER/PROPH/DIAG IV INF ADDON: CPT

## 2021-01-30 PROCEDURE — 93010 ELECTROCARDIOGRAM REPORT: CPT | Mod: ,,, | Performed by: INTERNAL MEDICINE

## 2021-01-30 PROCEDURE — 99233 SBSQ HOSP IP/OBS HIGH 50: CPT | Mod: ,,, | Performed by: INTERNAL MEDICINE

## 2021-01-30 PROCEDURE — 83735 ASSAY OF MAGNESIUM: CPT

## 2021-01-30 PROCEDURE — 87040 BLOOD CULTURE FOR BACTERIA: CPT

## 2021-01-30 PROCEDURE — 83605 ASSAY OF LACTIC ACID: CPT

## 2021-01-30 PROCEDURE — 82962 GLUCOSE BLOOD TEST: CPT

## 2021-01-30 PROCEDURE — 81000 URINALYSIS NONAUTO W/SCOPE: CPT

## 2021-01-30 PROCEDURE — 80048 BASIC METABOLIC PNL TOTAL CA: CPT

## 2021-01-30 PROCEDURE — 36600 WITHDRAWAL OF ARTERIAL BLOOD: CPT

## 2021-01-30 PROCEDURE — 63600175 PHARM REV CODE 636 W HCPCS: Performed by: STUDENT IN AN ORGANIZED HEALTH CARE EDUCATION/TRAINING PROGRAM

## 2021-01-30 PROCEDURE — 80053 COMPREHEN METABOLIC PANEL: CPT | Mod: 91

## 2021-01-30 PROCEDURE — P9016 RBC LEUKOCYTES REDUCED: HCPCS

## 2021-01-30 PROCEDURE — 80179 DRUG ASSAY SALICYLATE: CPT

## 2021-01-30 PROCEDURE — 20000000 HC ICU ROOM

## 2021-01-30 PROCEDURE — 80143 DRUG ASSAY ACETAMINOPHEN: CPT

## 2021-01-30 PROCEDURE — 96375 TX/PRO/DX INJ NEW DRUG ADDON: CPT

## 2021-01-30 PROCEDURE — 85025 COMPLETE CBC W/AUTO DIFF WBC: CPT | Mod: 91

## 2021-01-30 PROCEDURE — 83880 ASSAY OF NATRIURETIC PEPTIDE: CPT

## 2021-01-30 PROCEDURE — 36415 COLL VENOUS BLD VENIPUNCTURE: CPT

## 2021-01-30 PROCEDURE — 80053 COMPREHEN METABOLIC PANEL: CPT

## 2021-01-30 PROCEDURE — 96376 TX/PRO/DX INJ SAME DRUG ADON: CPT

## 2021-01-30 PROCEDURE — 99233 PR SUBSEQUENT HOSPITAL CARE,LEVL III: ICD-10-PCS | Mod: ,,, | Performed by: INTERNAL MEDICINE

## 2021-01-30 PROCEDURE — 84100 ASSAY OF PHOSPHORUS: CPT

## 2021-01-30 PROCEDURE — 83036 HEMOGLOBIN GLYCOSYLATED A1C: CPT

## 2021-01-30 PROCEDURE — S0030 INJECTION, METRONIDAZOLE: HCPCS | Performed by: STUDENT IN AN ORGANIZED HEALTH CARE EDUCATION/TRAINING PROGRAM

## 2021-01-30 PROCEDURE — 93010 EKG 12-LEAD: ICD-10-PCS | Mod: ,,, | Performed by: INTERNAL MEDICINE

## 2021-01-30 PROCEDURE — 99900035 HC TECH TIME PER 15 MIN (STAT)

## 2021-01-30 PROCEDURE — 86703 HIV-1/HIV-2 1 RESULT ANTBDY: CPT

## 2021-01-30 PROCEDURE — 96367 TX/PROPH/DG ADDL SEQ IV INF: CPT

## 2021-01-30 PROCEDURE — 93005 ELECTROCARDIOGRAM TRACING: CPT

## 2021-01-30 PROCEDURE — 84484 ASSAY OF TROPONIN QUANT: CPT

## 2021-01-30 PROCEDURE — 83605 ASSAY OF LACTIC ACID: CPT | Mod: 91

## 2021-01-30 PROCEDURE — 96365 THER/PROPH/DIAG IV INF INIT: CPT

## 2021-01-30 PROCEDURE — 82570 ASSAY OF URINE CREATININE: CPT

## 2021-01-30 PROCEDURE — 80074 ACUTE HEPATITIS PANEL: CPT

## 2021-01-30 RX ORDER — SODIUM CHLORIDE 0.9 % (FLUSH) 0.9 %
10 SYRINGE (ML) INJECTION
Status: DISCONTINUED | OUTPATIENT
Start: 2021-01-30 | End: 2021-02-04 | Stop reason: HOSPADM

## 2021-01-30 RX ORDER — DEXTROSE 50 % IN WATER (D50W) INTRAVENOUS SYRINGE
25
Status: COMPLETED | OUTPATIENT
Start: 2021-01-30 | End: 2021-01-30

## 2021-01-30 RX ORDER — MUPIROCIN 20 MG/G
OINTMENT TOPICAL 2 TIMES DAILY
Status: DISPENSED | OUTPATIENT
Start: 2021-01-30 | End: 2021-02-04

## 2021-01-30 RX ORDER — NAPROXEN SODIUM 220 MG/1
81 TABLET, FILM COATED ORAL DAILY
Status: DISCONTINUED | OUTPATIENT
Start: 2021-01-31 | End: 2021-02-04 | Stop reason: HOSPADM

## 2021-01-30 RX ORDER — FUROSEMIDE 10 MG/ML
80 INJECTION INTRAMUSCULAR; INTRAVENOUS EVERY 8 HOURS
Status: COMPLETED | OUTPATIENT
Start: 2021-01-30 | End: 2021-02-04

## 2021-01-30 RX ORDER — HYDROCODONE BITARTRATE AND ACETAMINOPHEN 500; 5 MG/1; MG/1
TABLET ORAL
Status: DISCONTINUED | OUTPATIENT
Start: 2021-01-30 | End: 2021-01-30 | Stop reason: HOSPADM

## 2021-01-30 RX ORDER — CALCIUM GLUCONATE 20 MG/ML
1 INJECTION, SOLUTION INTRAVENOUS
Status: COMPLETED | OUTPATIENT
Start: 2021-01-30 | End: 2021-01-30

## 2021-01-30 RX ORDER — METRONIDAZOLE 500 MG/100ML
500 INJECTION, SOLUTION INTRAVENOUS
Status: DISCONTINUED | OUTPATIENT
Start: 2021-01-30 | End: 2021-02-01

## 2021-01-30 RX ORDER — ONDANSETRON 2 MG/ML
4 INJECTION INTRAMUSCULAR; INTRAVENOUS EVERY 8 HOURS PRN
Status: DISCONTINUED | OUTPATIENT
Start: 2021-01-30 | End: 2021-02-04 | Stop reason: HOSPADM

## 2021-01-30 RX ORDER — PROMETHAZINE HYDROCHLORIDE 12.5 MG/1
25 TABLET ORAL EVERY 6 HOURS PRN
Status: DISCONTINUED | OUTPATIENT
Start: 2021-01-30 | End: 2021-02-04 | Stop reason: HOSPADM

## 2021-01-30 RX ORDER — CEFEPIME HYDROCHLORIDE 2 G/1
2 INJECTION, POWDER, FOR SOLUTION INTRAVENOUS
Status: DISCONTINUED | OUTPATIENT
Start: 2021-01-30 | End: 2021-01-31

## 2021-01-30 RX ORDER — SODIUM BICARBONATE 1 MEQ/ML
50 SYRINGE (ML) INTRAVENOUS
Status: DISCONTINUED | OUTPATIENT
Start: 2021-01-30 | End: 2021-01-30

## 2021-01-30 RX ORDER — VANCOMYCIN HCL IN 5 % DEXTROSE 1G/250ML
1000 PLASTIC BAG, INJECTION (ML) INTRAVENOUS
Status: COMPLETED | OUTPATIENT
Start: 2021-01-30 | End: 2021-01-30

## 2021-01-30 RX ORDER — SODIUM BICARBONATE 1 MEQ/ML
50 SYRINGE (ML) INTRAVENOUS
Status: COMPLETED | OUTPATIENT
Start: 2021-01-30 | End: 2021-01-30

## 2021-01-30 RX ORDER — HEPARIN SODIUM 5000 [USP'U]/ML
5000 INJECTION, SOLUTION INTRAVENOUS; SUBCUTANEOUS EVERY 8 HOURS
Status: DISCONTINUED | OUTPATIENT
Start: 2021-01-30 | End: 2021-02-04 | Stop reason: HOSPADM

## 2021-01-30 RX ORDER — NOREPINEPHRINE BITARTRATE/D5W 4MG/250ML
0.05 PLASTIC BAG, INJECTION (ML) INTRAVENOUS CONTINUOUS
Status: DISCONTINUED | OUTPATIENT
Start: 2021-01-30 | End: 2021-01-30 | Stop reason: HOSPADM

## 2021-01-30 RX ADMIN — DEXTROSE MONOHYDRATE 25 G: 25 INJECTION, SOLUTION INTRAVENOUS at 12:01

## 2021-01-30 RX ADMIN — SODIUM BICARBONATE 50 MEQ: 84 INJECTION, SOLUTION INTRAVENOUS at 12:01

## 2021-01-30 RX ADMIN — FUROSEMIDE 80 MG: 10 INJECTION, SOLUTION INTRAMUSCULAR; INTRAVENOUS at 09:01

## 2021-01-30 RX ADMIN — HEPARIN SODIUM 5000 UNITS: 5000 INJECTION INTRAVENOUS; SUBCUTANEOUS at 09:01

## 2021-01-30 RX ADMIN — SODIUM BICARBONATE 50 MEQ: 84 INJECTION, SOLUTION INTRAVENOUS at 01:01

## 2021-01-30 RX ADMIN — MUPIROCIN: 20 OINTMENT TOPICAL at 08:01

## 2021-01-30 RX ADMIN — METRONIDAZOLE 500 MG: 500 INJECTION, SOLUTION INTRAVENOUS at 01:01

## 2021-01-30 RX ADMIN — SODIUM CHLORIDE: 0.9 INJECTION, SOLUTION INTRAVENOUS at 02:01

## 2021-01-30 RX ADMIN — VANCOMYCIN HYDROCHLORIDE 500 MG: 500 INJECTION, POWDER, LYOPHILIZED, FOR SOLUTION INTRAVENOUS at 12:01

## 2021-01-30 RX ADMIN — FUROSEMIDE 80 MG: 10 INJECTION, SOLUTION INTRAMUSCULAR; INTRAVENOUS at 01:01

## 2021-01-30 RX ADMIN — CALCIUM GLUCONATE 1000 MG: 20 INJECTION, SOLUTION INTRAVENOUS at 01:01

## 2021-01-30 RX ADMIN — CEFEPIME 2 G: 2 INJECTION, POWDER, FOR SOLUTION INTRAVENOUS at 11:01

## 2021-01-30 RX ADMIN — METRONIDAZOLE 500 MG: 500 INJECTION, SOLUTION INTRAVENOUS at 09:01

## 2021-01-30 RX ADMIN — MUPIROCIN: 20 OINTMENT TOPICAL at 12:01

## 2021-01-30 RX ADMIN — CEFEPIME 2 G: 2 INJECTION, POWDER, FOR SOLUTION INTRAVENOUS at 12:01

## 2021-01-30 RX ADMIN — VANCOMYCIN HYDROCHLORIDE 1000 MG: 1 INJECTION, POWDER, LYOPHILIZED, FOR SOLUTION INTRAVENOUS at 02:01

## 2021-01-30 RX ADMIN — CEFTRIAXONE 2 G: 2 INJECTION, SOLUTION INTRAVENOUS at 12:01

## 2021-01-30 RX ADMIN — PIPERACILLIN AND TAZOBACTAM 4.5 G: 4; .5 INJECTION, POWDER, LYOPHILIZED, FOR SOLUTION INTRAVENOUS; PARENTERAL at 04:01

## 2021-01-30 RX ADMIN — SODIUM POLYSTYRENE SULFONATE 15 G: 15 SUSPENSION ORAL; RECTAL at 01:01

## 2021-01-30 RX ADMIN — HEPARIN SODIUM 5000 UNITS: 5000 INJECTION INTRAVENOUS; SUBCUTANEOUS at 02:01

## 2021-01-31 PROBLEM — J90 PLEURAL EFFUSION, RIGHT: Status: ACTIVE | Noted: 2021-01-31

## 2021-01-31 PROBLEM — R80.9 NEPHROTIC RANGE PROTEINURIA: Status: ACTIVE | Noted: 2021-01-31

## 2021-01-31 LAB
ALBUMIN SERPL BCP-MCNC: 3.1 G/DL (ref 3.5–5.2)
ALLENS TEST: ABNORMAL
ALP SERPL-CCNC: 82 U/L (ref 55–135)
ALT SERPL W/O P-5'-P-CCNC: 1182 U/L (ref 10–44)
ANION GAP SERPL CALC-SCNC: 12 MMOL/L (ref 8–16)
AST SERPL-CCNC: 1903 U/L (ref 10–40)
BACTERIA #/AREA URNS AUTO: ABNORMAL /HPF
BILIRUB SERPL-MCNC: 2.2 MG/DL (ref 0.1–1)
BILIRUB UR QL STRIP: NEGATIVE
BUN SERPL-MCNC: 77 MG/DL (ref 8–23)
C3 SERPL-MCNC: 55 MG/DL (ref 50–180)
C4 SERPL-MCNC: 14 MG/DL (ref 11–44)
CA-I BLDV-SCNC: 1.02 MMOL/L (ref 1.06–1.42)
CALCIUM SERPL-MCNC: 7.8 MG/DL (ref 8.7–10.5)
CCP AB SER IA-ACNC: <0.5 U/ML
CHLORIDE SERPL-SCNC: 103 MMOL/L (ref 95–110)
CLARITY UR REFRACT.AUTO: ABNORMAL
CO2 SERPL-SCNC: 23 MMOL/L (ref 23–29)
COLOR UR AUTO: ABNORMAL
CREAT SERPL-MCNC: 2.6 MG/DL (ref 0.5–1.4)
DELSYS: ABNORMAL
EST. GFR  (AFRICAN AMERICAN): 26.7 ML/MIN/1.73 M^2
EST. GFR  (NON AFRICAN AMERICAN): 23.1 ML/MIN/1.73 M^2
GLUCOSE SERPL-MCNC: 88 MG/DL (ref 70–110)
GLUCOSE UR QL STRIP: NEGATIVE
HCO3 UR-SCNC: 24.3 MMOL/L (ref 24–28)
HGB UR QL STRIP: ABNORMAL
HYALINE CASTS UR QL AUTO: 4 /LPF
INR PPP: 2 (ref 0.8–1.2)
KETONES UR QL STRIP: NEGATIVE
LEUKOCYTE ESTERASE UR QL STRIP: ABNORMAL
MAGNESIUM SERPL-MCNC: 2 MG/DL (ref 1.6–2.6)
MICROSCOPIC COMMENT: ABNORMAL
NITRITE UR QL STRIP: NEGATIVE
PCO2 BLDA: 39.1 MMHG (ref 35–45)
PH SMN: 7.4 [PH] (ref 7.35–7.45)
PH UR STRIP: 5 [PH] (ref 5–8)
PHOSPHATE SERPL-MCNC: 5.8 MG/DL (ref 2.7–4.5)
PO2 BLDA: 27 MMHG (ref 40–60)
POC BE: -1 MMOL/L
POC SATURATED O2: 52 % (ref 95–100)
POC TCO2: 25 MMOL/L (ref 24–29)
POTASSIUM SERPL-SCNC: 3.9 MMOL/L (ref 3.5–5.1)
PROT SERPL-MCNC: 5.9 G/DL (ref 6–8.4)
PROT UR QL STRIP: NEGATIVE
PROTHROMBIN TIME: 20.8 SEC (ref 9–12.5)
RBC #/AREA URNS AUTO: 3 /HPF (ref 0–4)
RHEUMATOID FACT SERPL-ACNC: <10 IU/ML (ref 0–15)
SAMPLE: ABNORMAL
SITE: ABNORMAL
SODIUM SERPL-SCNC: 138 MMOL/L (ref 136–145)
SP GR UR STRIP: 1 (ref 1–1.03)
URN SPEC COLLECT METH UR: ABNORMAL
VANCOMYCIN SERPL-MCNC: 13.5 UG/ML
WBC #/AREA URNS AUTO: 3 /HPF (ref 0–5)

## 2021-01-31 PROCEDURE — 85610 PROTHROMBIN TIME: CPT

## 2021-01-31 PROCEDURE — 99233 PR SUBSEQUENT HOSPITAL CARE,LEVL III: ICD-10-PCS | Mod: ,,, | Performed by: INTERNAL MEDICINE

## 2021-01-31 PROCEDURE — 86160 COMPLEMENT ANTIGEN: CPT

## 2021-01-31 PROCEDURE — 25000003 PHARM REV CODE 250: Performed by: EMERGENCY MEDICINE

## 2021-01-31 PROCEDURE — 97165 OT EVAL LOW COMPLEX 30 MIN: CPT

## 2021-01-31 PROCEDURE — 63600175 PHARM REV CODE 636 W HCPCS: Performed by: STUDENT IN AN ORGANIZED HEALTH CARE EDUCATION/TRAINING PROGRAM

## 2021-01-31 PROCEDURE — 63600175 PHARM REV CODE 636 W HCPCS: Performed by: EMERGENCY MEDICINE

## 2021-01-31 PROCEDURE — 20600001 HC STEP DOWN PRIVATE ROOM

## 2021-01-31 PROCEDURE — 94761 N-INVAS EAR/PLS OXIMETRY MLT: CPT

## 2021-01-31 PROCEDURE — 99233 SBSQ HOSP IP/OBS HIGH 50: CPT | Mod: GC,,, | Performed by: EMERGENCY MEDICINE

## 2021-01-31 PROCEDURE — 81001 URINALYSIS AUTO W/SCOPE: CPT

## 2021-01-31 PROCEDURE — 82330 ASSAY OF CALCIUM: CPT

## 2021-01-31 PROCEDURE — S0030 INJECTION, METRONIDAZOLE: HCPCS | Performed by: STUDENT IN AN ORGANIZED HEALTH CARE EDUCATION/TRAINING PROGRAM

## 2021-01-31 PROCEDURE — 97530 THERAPEUTIC ACTIVITIES: CPT

## 2021-01-31 PROCEDURE — 80053 COMPREHEN METABOLIC PANEL: CPT

## 2021-01-31 PROCEDURE — 86200 CCP ANTIBODY: CPT

## 2021-01-31 PROCEDURE — 82803 BLOOD GASES ANY COMBINATION: CPT

## 2021-01-31 PROCEDURE — 25000003 PHARM REV CODE 250: Performed by: STUDENT IN AN ORGANIZED HEALTH CARE EDUCATION/TRAINING PROGRAM

## 2021-01-31 PROCEDURE — 99900035 HC TECH TIME PER 15 MIN (STAT)

## 2021-01-31 PROCEDURE — C1751 CATH, INF, PER/CENT/MIDLINE: HCPCS

## 2021-01-31 PROCEDURE — 80202 ASSAY OF VANCOMYCIN: CPT

## 2021-01-31 PROCEDURE — 86160 COMPLEMENT ANTIGEN: CPT | Mod: 59

## 2021-01-31 PROCEDURE — 83735 ASSAY OF MAGNESIUM: CPT

## 2021-01-31 PROCEDURE — 27000221 HC OXYGEN, UP TO 24 HOURS

## 2021-01-31 PROCEDURE — 86431 RHEUMATOID FACTOR QUANT: CPT

## 2021-01-31 PROCEDURE — 99233 SBSQ HOSP IP/OBS HIGH 50: CPT | Mod: ,,, | Performed by: INTERNAL MEDICINE

## 2021-01-31 PROCEDURE — 36415 COLL VENOUS BLD VENIPUNCTURE: CPT

## 2021-01-31 PROCEDURE — 99233 PR SUBSEQUENT HOSPITAL CARE,LEVL III: ICD-10-PCS | Mod: GC,,, | Performed by: EMERGENCY MEDICINE

## 2021-01-31 PROCEDURE — 84100 ASSAY OF PHOSPHORUS: CPT

## 2021-01-31 RX ORDER — CEFEPIME HYDROCHLORIDE 2 G/1
2 INJECTION, POWDER, FOR SOLUTION INTRAVENOUS
Status: DISCONTINUED | OUTPATIENT
Start: 2021-01-31 | End: 2021-02-02

## 2021-01-31 RX ADMIN — METRONIDAZOLE 500 MG: 500 INJECTION, SOLUTION INTRAVENOUS at 11:01

## 2021-01-31 RX ADMIN — METRONIDAZOLE 500 MG: 500 INJECTION, SOLUTION INTRAVENOUS at 02:01

## 2021-01-31 RX ADMIN — FUROSEMIDE 80 MG: 10 INJECTION, SOLUTION INTRAMUSCULAR; INTRAVENOUS at 05:01

## 2021-01-31 RX ADMIN — HEPARIN SODIUM 5000 UNITS: 5000 INJECTION INTRAVENOUS; SUBCUTANEOUS at 02:01

## 2021-01-31 RX ADMIN — CEFEPIME 2 G: 2 INJECTION, POWDER, FOR SOLUTION INTRAVENOUS at 11:01

## 2021-01-31 RX ADMIN — MUPIROCIN: 20 OINTMENT TOPICAL at 11:01

## 2021-01-31 RX ADMIN — METRONIDAZOLE 500 MG: 500 INJECTION, SOLUTION INTRAVENOUS at 05:01

## 2021-01-31 RX ADMIN — HEPARIN SODIUM 5000 UNITS: 5000 INJECTION INTRAVENOUS; SUBCUTANEOUS at 05:01

## 2021-01-31 RX ADMIN — FUROSEMIDE 80 MG: 10 INJECTION, SOLUTION INTRAMUSCULAR; INTRAVENOUS at 02:01

## 2021-01-31 RX ADMIN — HEPARIN SODIUM 5000 UNITS: 5000 INJECTION INTRAVENOUS; SUBCUTANEOUS at 11:01

## 2021-01-31 RX ADMIN — ASPIRIN 81 MG CHEWABLE TABLET 81 MG: 81 TABLET CHEWABLE at 08:01

## 2021-01-31 RX ADMIN — FUROSEMIDE 80 MG: 10 INJECTION, SOLUTION INTRAMUSCULAR; INTRAVENOUS at 11:01

## 2021-02-01 PROBLEM — I50.23 ACUTE ON CHRONIC SYSTOLIC CONGESTIVE HEART FAILURE: Status: ACTIVE | Noted: 2021-02-01

## 2021-02-01 PROBLEM — I50.33 ACUTE ON CHRONIC DIASTOLIC CONGESTIVE HEART FAILURE: Status: ACTIVE | Noted: 2021-02-01

## 2021-02-01 LAB
ALBUMIN SERPL BCP-MCNC: 3.2 G/DL (ref 3.5–5.2)
ALP SERPL-CCNC: 87 U/L (ref 55–135)
ALT SERPL W/O P-5'-P-CCNC: 957 U/L (ref 10–44)
ANION GAP SERPL CALC-SCNC: 15 MMOL/L (ref 8–16)
AST SERPL-CCNC: 1026 U/L (ref 10–40)
BILIRUB SERPL-MCNC: 3.3 MG/DL (ref 0.1–1)
BUN SERPL-MCNC: 77 MG/DL (ref 8–23)
CALCIUM SERPL-MCNC: 8.2 MG/DL (ref 8.7–10.5)
CHLORIDE SERPL-SCNC: 100 MMOL/L (ref 95–110)
CO2 SERPL-SCNC: 23 MMOL/L (ref 23–29)
CREAT SERPL-MCNC: 2.5 MG/DL (ref 0.5–1.4)
EST. GFR  (AFRICAN AMERICAN): 28 ML/MIN/1.73 M^2
EST. GFR  (NON AFRICAN AMERICAN): 24.2 ML/MIN/1.73 M^2
GLUCOSE SERPL-MCNC: 152 MG/DL (ref 70–110)
HAV IGM SERPL QL IA: NEGATIVE
HBV CORE IGM SERPL QL IA: NEGATIVE
HBV SURFACE AG SERPL QL IA: NEGATIVE
HCV AB SERPL QL IA: NEGATIVE
HIV 1+2 AB+HIV1 P24 AG SERPL QL IA: NEGATIVE
INR PPP: 1.7 (ref 0.8–1.2)
MAGNESIUM SERPL-MCNC: 1.9 MG/DL (ref 1.6–2.6)
PHOSPHATE SERPL-MCNC: 3.9 MG/DL (ref 2.7–4.5)
POTASSIUM SERPL-SCNC: 3.3 MMOL/L (ref 3.5–5.1)
PROT SERPL-MCNC: 6.3 G/DL (ref 6–8.4)
PROTHROMBIN TIME: 17.7 SEC (ref 9–12.5)
SODIUM SERPL-SCNC: 138 MMOL/L (ref 136–145)

## 2021-02-01 PROCEDURE — 83735 ASSAY OF MAGNESIUM: CPT

## 2021-02-01 PROCEDURE — 97530 THERAPEUTIC ACTIVITIES: CPT

## 2021-02-01 PROCEDURE — 94761 N-INVAS EAR/PLS OXIMETRY MLT: CPT

## 2021-02-01 PROCEDURE — 25000003 PHARM REV CODE 250: Performed by: HOSPITALIST

## 2021-02-01 PROCEDURE — 27000221 HC OXYGEN, UP TO 24 HOURS

## 2021-02-01 PROCEDURE — 97161 PT EVAL LOW COMPLEX 20 MIN: CPT

## 2021-02-01 PROCEDURE — 25000003 PHARM REV CODE 250: Performed by: STUDENT IN AN ORGANIZED HEALTH CARE EDUCATION/TRAINING PROGRAM

## 2021-02-01 PROCEDURE — 99223 1ST HOSP IP/OBS HIGH 75: CPT | Mod: GC,,, | Performed by: HOSPITALIST

## 2021-02-01 PROCEDURE — 99223 PR INITIAL HOSPITAL CARE,LEVL III: ICD-10-PCS | Mod: GC,,, | Performed by: HOSPITALIST

## 2021-02-01 PROCEDURE — S0030 INJECTION, METRONIDAZOLE: HCPCS | Performed by: STUDENT IN AN ORGANIZED HEALTH CARE EDUCATION/TRAINING PROGRAM

## 2021-02-01 PROCEDURE — 99233 PR SUBSEQUENT HOSPITAL CARE,LEVL III: ICD-10-PCS | Mod: ,,, | Performed by: INTERNAL MEDICINE

## 2021-02-01 PROCEDURE — 80053 COMPREHEN METABOLIC PANEL: CPT

## 2021-02-01 PROCEDURE — 63600175 PHARM REV CODE 636 W HCPCS: Performed by: STUDENT IN AN ORGANIZED HEALTH CARE EDUCATION/TRAINING PROGRAM

## 2021-02-01 PROCEDURE — 85610 PROTHROMBIN TIME: CPT

## 2021-02-01 PROCEDURE — 99233 SBSQ HOSP IP/OBS HIGH 50: CPT | Mod: ,,, | Performed by: INTERNAL MEDICINE

## 2021-02-01 PROCEDURE — 20600001 HC STEP DOWN PRIVATE ROOM

## 2021-02-01 PROCEDURE — 63600175 PHARM REV CODE 636 W HCPCS: Performed by: EMERGENCY MEDICINE

## 2021-02-01 PROCEDURE — 84100 ASSAY OF PHOSPHORUS: CPT

## 2021-02-01 RX ORDER — POTASSIUM CHLORIDE 20 MEQ/1
40 TABLET, EXTENDED RELEASE ORAL ONCE
Status: COMPLETED | OUTPATIENT
Start: 2021-02-01 | End: 2021-02-01

## 2021-02-01 RX ORDER — METOLAZONE 2.5 MG/1
2.5 TABLET ORAL EVERY 8 HOURS
Status: DISCONTINUED | OUTPATIENT
Start: 2021-02-01 | End: 2021-02-01

## 2021-02-01 RX ORDER — METOLAZONE 2.5 MG/1
2.5 TABLET ORAL DAILY
Status: DISCONTINUED | OUTPATIENT
Start: 2021-02-02 | End: 2021-02-01

## 2021-02-01 RX ADMIN — METRONIDAZOLE 500 MG: 500 INJECTION, SOLUTION INTRAVENOUS at 02:02

## 2021-02-01 RX ADMIN — METOLAZONE 2.5 MG: 2.5 TABLET ORAL at 02:02

## 2021-02-01 RX ADMIN — CEFEPIME 2 G: 2 INJECTION, POWDER, FOR SOLUTION INTRAVENOUS at 09:02

## 2021-02-01 RX ADMIN — HEPARIN SODIUM 5000 UNITS: 5000 INJECTION INTRAVENOUS; SUBCUTANEOUS at 02:02

## 2021-02-01 RX ADMIN — POTASSIUM CHLORIDE 40 MEQ: 1500 TABLET, EXTENDED RELEASE ORAL at 02:02

## 2021-02-01 RX ADMIN — FUROSEMIDE 80 MG: 10 INJECTION, SOLUTION INTRAMUSCULAR; INTRAVENOUS at 06:02

## 2021-02-01 RX ADMIN — MUPIROCIN: 20 OINTMENT TOPICAL at 09:02

## 2021-02-01 RX ADMIN — FUROSEMIDE 80 MG: 10 INJECTION, SOLUTION INTRAMUSCULAR; INTRAVENOUS at 03:02

## 2021-02-01 RX ADMIN — HEPARIN SODIUM 5000 UNITS: 5000 INJECTION INTRAVENOUS; SUBCUTANEOUS at 06:02

## 2021-02-01 RX ADMIN — MUPIROCIN: 20 OINTMENT TOPICAL at 08:02

## 2021-02-01 RX ADMIN — FUROSEMIDE 80 MG: 10 INJECTION, SOLUTION INTRAMUSCULAR; INTRAVENOUS at 09:02

## 2021-02-01 RX ADMIN — HEPARIN SODIUM 5000 UNITS: 5000 INJECTION INTRAVENOUS; SUBCUTANEOUS at 09:02

## 2021-02-01 RX ADMIN — ASPIRIN 81 MG CHEWABLE TABLET 81 MG: 81 TABLET CHEWABLE at 08:02

## 2021-02-01 RX ADMIN — METRONIDAZOLE 500 MG: 500 INJECTION, SOLUTION INTRAVENOUS at 06:02

## 2021-02-02 ENCOUNTER — RESEARCH ENCOUNTER (OUTPATIENT)
Dept: RESEARCH | Facility: HOSPITAL | Age: 76
End: 2021-02-02

## 2021-02-02 LAB
ALBUMIN SERPL BCP-MCNC: 3.1 G/DL (ref 3.5–5.2)
ALP SERPL-CCNC: 83 U/L (ref 55–135)
ALT SERPL W/O P-5'-P-CCNC: 768 U/L (ref 10–44)
ANION GAP SERPL CALC-SCNC: 13 MMOL/L (ref 8–16)
ANISOCYTOSIS BLD QL SMEAR: SLIGHT
AST SERPL-CCNC: 629 U/L (ref 10–40)
BASOPHILS # BLD AUTO: 0.06 K/UL (ref 0–0.2)
BASOPHILS NFR BLD: 0.5 % (ref 0–1.9)
BILIRUB SERPL-MCNC: 3.5 MG/DL (ref 0.1–1)
BUN SERPL-MCNC: 71 MG/DL (ref 8–23)
BURR CELLS BLD QL SMEAR: ABNORMAL
CALCIUM SERPL-MCNC: 8.5 MG/DL (ref 8.7–10.5)
CHLORIDE SERPL-SCNC: 98 MMOL/L (ref 95–110)
CO2 SERPL-SCNC: 32 MMOL/L (ref 23–29)
CREAT SERPL-MCNC: 2 MG/DL (ref 0.5–1.4)
CREAT UR-MCNC: 23 MG/DL (ref 23–375)
DIFFERENTIAL METHOD: ABNORMAL
EOSINOPHIL # BLD AUTO: 0.6 K/UL (ref 0–0.5)
EOSINOPHIL NFR BLD: 4.9 % (ref 0–8)
ERYTHROCYTE [DISTWIDTH] IN BLOOD BY AUTOMATED COUNT: 30.2 % (ref 11.5–14.5)
EST. GFR  (AFRICAN AMERICAN): 36.7 ML/MIN/1.73 M^2
EST. GFR  (NON AFRICAN AMERICAN): 31.7 ML/MIN/1.73 M^2
GLUCOSE SERPL-MCNC: 95 MG/DL (ref 70–110)
HCT VFR BLD AUTO: 30.2 % (ref 40–54)
HGB BLD-MCNC: 9.6 G/DL (ref 14–18)
HYPOCHROMIA BLD QL SMEAR: ABNORMAL
IMM GRANULOCYTES # BLD AUTO: 0.06 K/UL (ref 0–0.04)
IMM GRANULOCYTES NFR BLD AUTO: 0.5 % (ref 0–0.5)
INR PPP: 1.5 (ref 0.8–1.2)
LYMPHOCYTES # BLD AUTO: 1.4 K/UL (ref 1–4.8)
LYMPHOCYTES NFR BLD: 12.3 % (ref 18–48)
MAGNESIUM SERPL-MCNC: 1.9 MG/DL (ref 1.6–2.6)
MCH RBC QN AUTO: 23.1 PG (ref 27–31)
MCHC RBC AUTO-ENTMCNC: 31.8 G/DL (ref 32–36)
MCV RBC AUTO: 73 FL (ref 82–98)
MONOCYTES # BLD AUTO: 1.4 K/UL (ref 0.3–1)
MONOCYTES NFR BLD: 11.9 % (ref 4–15)
NEUTROPHILS # BLD AUTO: 8.2 K/UL (ref 1.8–7.7)
NEUTROPHILS NFR BLD: 69.9 % (ref 38–73)
NRBC BLD-RTO: 0 /100 WBC
OVALOCYTES BLD QL SMEAR: ABNORMAL
PHOSPHATE SERPL-MCNC: 3.7 MG/DL (ref 2.7–4.5)
PLATELET # BLD AUTO: 261 K/UL (ref 150–350)
PLATELET BLD QL SMEAR: ABNORMAL
PMV BLD AUTO: ABNORMAL FL (ref 9.2–12.9)
POIKILOCYTOSIS BLD QL SMEAR: SLIGHT
POLYCHROMASIA BLD QL SMEAR: ABNORMAL
POTASSIUM SERPL-SCNC: 3.2 MMOL/L (ref 3.5–5.1)
PROT SERPL-MCNC: 6.1 G/DL (ref 6–8.4)
PROT UR-MCNC: <7 MG/DL (ref 0–15)
PROT/CREAT UR: NORMAL MG/G{CREAT} (ref 0–0.2)
PROTHROMBIN TIME: 15.7 SEC (ref 9–12.5)
RBC # BLD AUTO: 4.15 M/UL (ref 4.6–6.2)
SCHISTOCYTES BLD QL SMEAR: ABNORMAL
SODIUM SERPL-SCNC: 143 MMOL/L (ref 136–145)
TARGETS BLD QL SMEAR: ABNORMAL
WBC # BLD AUTO: 11.73 K/UL (ref 3.9–12.7)

## 2021-02-02 PROCEDURE — 84100 ASSAY OF PHOSPHORUS: CPT

## 2021-02-02 PROCEDURE — 84165 PROTEIN E-PHORESIS SERUM: CPT | Mod: 26,,, | Performed by: PATHOLOGY

## 2021-02-02 PROCEDURE — 86256 FLUORESCENT ANTIBODY TITER: CPT

## 2021-02-02 PROCEDURE — 99233 SBSQ HOSP IP/OBS HIGH 50: CPT | Mod: GC,,, | Performed by: HOSPITALIST

## 2021-02-02 PROCEDURE — 97530 THERAPEUTIC ACTIVITIES: CPT

## 2021-02-02 PROCEDURE — 25000003 PHARM REV CODE 250: Performed by: EMERGENCY MEDICINE

## 2021-02-02 PROCEDURE — 99232 PR SUBSEQUENT HOSPITAL CARE,LEVL II: ICD-10-PCS | Mod: ,,, | Performed by: HOSPITALIST

## 2021-02-02 PROCEDURE — 63600175 PHARM REV CODE 636 W HCPCS: Performed by: STUDENT IN AN ORGANIZED HEALTH CARE EDUCATION/TRAINING PROGRAM

## 2021-02-02 PROCEDURE — 20600001 HC STEP DOWN PRIVATE ROOM

## 2021-02-02 PROCEDURE — 85610 PROTHROMBIN TIME: CPT

## 2021-02-02 PROCEDURE — 25000003 PHARM REV CODE 250: Performed by: STUDENT IN AN ORGANIZED HEALTH CARE EDUCATION/TRAINING PROGRAM

## 2021-02-02 PROCEDURE — 85025 COMPLETE CBC W/AUTO DIFF WBC: CPT

## 2021-02-02 PROCEDURE — 83735 ASSAY OF MAGNESIUM: CPT

## 2021-02-02 PROCEDURE — 99233 PR SUBSEQUENT HOSPITAL CARE,LEVL III: ICD-10-PCS | Mod: GC,,, | Performed by: HOSPITALIST

## 2021-02-02 PROCEDURE — 84165 PATHOLOGIST INTERPRETATION SPE: ICD-10-PCS | Mod: 26,,, | Performed by: PATHOLOGY

## 2021-02-02 PROCEDURE — 83520 IMMUNOASSAY QUANT NOS NONAB: CPT

## 2021-02-02 PROCEDURE — 99232 SBSQ HOSP IP/OBS MODERATE 35: CPT | Mod: ,,, | Performed by: HOSPITALIST

## 2021-02-02 PROCEDURE — 97535 SELF CARE MNGMENT TRAINING: CPT

## 2021-02-02 PROCEDURE — 25000003 PHARM REV CODE 250: Performed by: HOSPITALIST

## 2021-02-02 PROCEDURE — 82570 ASSAY OF URINE CREATININE: CPT

## 2021-02-02 PROCEDURE — 84165 PROTEIN E-PHORESIS SERUM: CPT

## 2021-02-02 PROCEDURE — 80053 COMPREHEN METABOLIC PANEL: CPT

## 2021-02-02 RX ORDER — POTASSIUM CHLORIDE 20 MEQ/1
40 TABLET, EXTENDED RELEASE ORAL ONCE
Status: COMPLETED | OUTPATIENT
Start: 2021-02-02 | End: 2021-02-02

## 2021-02-02 RX ADMIN — POTASSIUM CHLORIDE 40 MEQ: 1500 TABLET, EXTENDED RELEASE ORAL at 08:02

## 2021-02-02 RX ADMIN — HEPARIN SODIUM 5000 UNITS: 5000 INJECTION INTRAVENOUS; SUBCUTANEOUS at 10:02

## 2021-02-02 RX ADMIN — ASPIRIN 81 MG CHEWABLE TABLET 81 MG: 81 TABLET CHEWABLE at 09:02

## 2021-02-02 RX ADMIN — HEPARIN SODIUM 5000 UNITS: 5000 INJECTION INTRAVENOUS; SUBCUTANEOUS at 05:02

## 2021-02-02 RX ADMIN — FUROSEMIDE 80 MG: 10 INJECTION, SOLUTION INTRAMUSCULAR; INTRAVENOUS at 03:02

## 2021-02-02 RX ADMIN — HEPARIN SODIUM 5000 UNITS: 5000 INJECTION INTRAVENOUS; SUBCUTANEOUS at 02:02

## 2021-02-02 RX ADMIN — MUPIROCIN: 20 OINTMENT TOPICAL at 10:02

## 2021-02-02 RX ADMIN — FUROSEMIDE 80 MG: 10 INJECTION, SOLUTION INTRAMUSCULAR; INTRAVENOUS at 10:02

## 2021-02-02 RX ADMIN — FUROSEMIDE 80 MG: 10 INJECTION, SOLUTION INTRAMUSCULAR; INTRAVENOUS at 05:02

## 2021-02-03 PROBLEM — I07.1 TRICUSPID REGURGITATION: Chronic | Status: ACTIVE | Noted: 2021-01-30

## 2021-02-03 PROBLEM — Z86.73 HISTORY OF STROKE: Chronic | Status: ACTIVE | Noted: 2020-12-10

## 2021-02-03 PROBLEM — I34.0 SEVERE MITRAL REGURGITATION: Chronic | Status: ACTIVE | Noted: 2021-01-29

## 2021-02-03 PROBLEM — E78.2 MIXED HYPERLIPIDEMIA: Chronic | Status: ACTIVE | Noted: 2020-12-10

## 2021-02-03 PROBLEM — R41.82 ALTERED MENTAL STATUS: Status: RESOLVED | Noted: 2020-12-09 | Resolved: 2021-02-03

## 2021-02-03 PROBLEM — I10 ESSENTIAL HYPERTENSION: Chronic | Status: ACTIVE | Noted: 2020-12-10

## 2021-02-03 LAB
ACANTHOCYTES BLD QL SMEAR: PRESENT
ALBUMIN SERPL BCP-MCNC: 3.4 G/DL (ref 3.5–5.2)
ALBUMIN SERPL ELPH-MCNC: 3.29 G/DL (ref 3.35–5.55)
ALP SERPL-CCNC: 93 U/L (ref 55–135)
ALPHA1 GLOB SERPL ELPH-MCNC: 0.24 G/DL (ref 0.17–0.41)
ALPHA2 GLOB SERPL ELPH-MCNC: 0.54 G/DL (ref 0.43–0.99)
ALT SERPL W/O P-5'-P-CCNC: 646 U/L (ref 10–44)
ANION GAP SERPL CALC-SCNC: 11 MMOL/L (ref 8–16)
ANISOCYTOSIS BLD QL SMEAR: ABNORMAL
AST SERPL-CCNC: 430 U/L (ref 10–40)
B-GLOBULIN SERPL ELPH-MCNC: 0.77 G/DL (ref 0.5–1.1)
BASOPHILS # BLD AUTO: 0.07 K/UL (ref 0–0.2)
BASOPHILS NFR BLD: 0.5 % (ref 0–1.9)
BILIRUB SERPL-MCNC: 4.4 MG/DL (ref 0.1–1)
BUN SERPL-MCNC: 72 MG/DL (ref 8–23)
BURR CELLS BLD QL SMEAR: ABNORMAL
CALCIUM SERPL-MCNC: 9 MG/DL (ref 8.7–10.5)
CHLORIDE SERPL-SCNC: 92 MMOL/L (ref 95–110)
CO2 SERPL-SCNC: 39 MMOL/L (ref 23–29)
CREAT SERPL-MCNC: 1.9 MG/DL (ref 0.5–1.4)
DACRYOCYTES BLD QL SMEAR: ABNORMAL
DIFFERENTIAL METHOD: ABNORMAL
EOSINOPHIL # BLD AUTO: 0.8 K/UL (ref 0–0.5)
EOSINOPHIL NFR BLD: 5.6 % (ref 0–8)
ERYTHROCYTE [DISTWIDTH] IN BLOOD BY AUTOMATED COUNT: 31 % (ref 11.5–14.5)
EST. GFR  (AFRICAN AMERICAN): 39 ML/MIN/1.73 M^2
EST. GFR  (NON AFRICAN AMERICAN): 33.7 ML/MIN/1.73 M^2
GAMMA GLOB SERPL ELPH-MCNC: 1.25 G/DL (ref 0.67–1.58)
GLUCOSE SERPL-MCNC: 101 MG/DL (ref 70–110)
HCT VFR BLD AUTO: 32 % (ref 40–54)
HGB BLD-MCNC: 10.3 G/DL (ref 14–18)
HYPOCHROMIA BLD QL SMEAR: ABNORMAL
IMM GRANULOCYTES # BLD AUTO: 0.06 K/UL (ref 0–0.04)
IMM GRANULOCYTES NFR BLD AUTO: 0.4 % (ref 0–0.5)
INR PPP: 1.3 (ref 0.8–1.2)
KAPPA LC SER QL IA: 8.56 MG/DL (ref 0.33–1.94)
KAPPA LC/LAMBDA SER IA: 2.01 (ref 0.26–1.65)
LAMBDA LC SER QL IA: 4.26 MG/DL (ref 0.57–2.63)
LYMPHOCYTES # BLD AUTO: 2.3 K/UL (ref 1–4.8)
LYMPHOCYTES NFR BLD: 16.7 % (ref 18–48)
MAGNESIUM SERPL-MCNC: 1.9 MG/DL (ref 1.6–2.6)
MCH RBC QN AUTO: 23.4 PG (ref 27–31)
MCHC RBC AUTO-ENTMCNC: 32.2 G/DL (ref 32–36)
MCV RBC AUTO: 73 FL (ref 82–98)
MONOCYTES # BLD AUTO: 1.8 K/UL (ref 0.3–1)
MONOCYTES NFR BLD: 13.3 % (ref 4–15)
NEUTROPHILS # BLD AUTO: 8.7 K/UL (ref 1.8–7.7)
NEUTROPHILS NFR BLD: 63.5 % (ref 38–73)
NRBC BLD-RTO: 0 /100 WBC
OVALOCYTES BLD QL SMEAR: ABNORMAL
PATHOLOGIST INTERPRETATION SPE: NORMAL
PHOSPHATE SERPL-MCNC: 3.3 MG/DL (ref 2.7–4.5)
PLATELET # BLD AUTO: 290 K/UL (ref 150–350)
PLATELET BLD QL SMEAR: ABNORMAL
PMV BLD AUTO: ABNORMAL FL (ref 9.2–12.9)
POIKILOCYTOSIS BLD QL SMEAR: ABNORMAL
POLYCHROMASIA BLD QL SMEAR: ABNORMAL
POTASSIUM SERPL-SCNC: 3.4 MMOL/L (ref 3.5–5.1)
PROT SERPL-MCNC: 6.1 G/DL (ref 6–8.4)
PROT SERPL-MCNC: 6.7 G/DL (ref 6–8.4)
PROTHROMBIN TIME: 14.3 SEC (ref 9–12.5)
RBC # BLD AUTO: 4.41 M/UL (ref 4.6–6.2)
SCHISTOCYTES BLD QL SMEAR: ABNORMAL
SCHISTOCYTES BLD QL SMEAR: PRESENT
SODIUM SERPL-SCNC: 142 MMOL/L (ref 136–145)
SPHEROCYTES BLD QL SMEAR: ABNORMAL
TARGETS BLD QL SMEAR: ABNORMAL
WBC # BLD AUTO: 13.75 K/UL (ref 3.9–12.7)

## 2021-02-03 PROCEDURE — 83735 ASSAY OF MAGNESIUM: CPT

## 2021-02-03 PROCEDURE — 99232 SBSQ HOSP IP/OBS MODERATE 35: CPT | Mod: GC,,, | Performed by: INTERNAL MEDICINE

## 2021-02-03 PROCEDURE — 25000003 PHARM REV CODE 250: Performed by: INTERNAL MEDICINE

## 2021-02-03 PROCEDURE — 85610 PROTHROMBIN TIME: CPT

## 2021-02-03 PROCEDURE — 99232 PR SUBSEQUENT HOSPITAL CARE,LEVL II: ICD-10-PCS | Mod: GC,,, | Performed by: INTERNAL MEDICINE

## 2021-02-03 PROCEDURE — 99232 SBSQ HOSP IP/OBS MODERATE 35: CPT | Mod: ,,, | Performed by: HOSPITALIST

## 2021-02-03 PROCEDURE — 25000003 PHARM REV CODE 250: Performed by: STUDENT IN AN ORGANIZED HEALTH CARE EDUCATION/TRAINING PROGRAM

## 2021-02-03 PROCEDURE — 94760 N-INVAS EAR/PLS OXIMETRY 1: CPT

## 2021-02-03 PROCEDURE — 97116 GAIT TRAINING THERAPY: CPT | Mod: CQ

## 2021-02-03 PROCEDURE — 85025 COMPLETE CBC W/AUTO DIFF WBC: CPT

## 2021-02-03 PROCEDURE — 99232 PR SUBSEQUENT HOSPITAL CARE,LEVL II: ICD-10-PCS | Mod: ,,, | Performed by: HOSPITALIST

## 2021-02-03 PROCEDURE — 20600001 HC STEP DOWN PRIVATE ROOM

## 2021-02-03 PROCEDURE — 84100 ASSAY OF PHOSPHORUS: CPT

## 2021-02-03 PROCEDURE — 63600175 PHARM REV CODE 636 W HCPCS: Performed by: STUDENT IN AN ORGANIZED HEALTH CARE EDUCATION/TRAINING PROGRAM

## 2021-02-03 PROCEDURE — 63600175 PHARM REV CODE 636 W HCPCS: Performed by: HOSPITALIST

## 2021-02-03 PROCEDURE — 80053 COMPREHEN METABOLIC PANEL: CPT

## 2021-02-03 RX ORDER — FUROSEMIDE 40 MG/1
40 TABLET ORAL 2 TIMES DAILY
Status: DISCONTINUED | OUTPATIENT
Start: 2021-02-04 | End: 2021-02-04 | Stop reason: HOSPADM

## 2021-02-03 RX ORDER — POTASSIUM CHLORIDE 20 MEQ/1
40 TABLET, EXTENDED RELEASE ORAL
Status: COMPLETED | OUTPATIENT
Start: 2021-02-03 | End: 2021-02-03

## 2021-02-03 RX ADMIN — POTASSIUM CHLORIDE 40 MEQ: 1500 TABLET, EXTENDED RELEASE ORAL at 10:02

## 2021-02-03 RX ADMIN — MUPIROCIN: 20 OINTMENT TOPICAL at 09:02

## 2021-02-03 RX ADMIN — HEPARIN SODIUM 5000 UNITS: 5000 INJECTION INTRAVENOUS; SUBCUTANEOUS at 05:02

## 2021-02-03 RX ADMIN — FUROSEMIDE 80 MG: 10 INJECTION, SOLUTION INTRAMUSCULAR; INTRAVENOUS at 05:02

## 2021-02-03 RX ADMIN — FUROSEMIDE 80 MG: 10 INJECTION, SOLUTION INTRAMUSCULAR; INTRAVENOUS at 02:02

## 2021-02-03 RX ADMIN — FUROSEMIDE 80 MG: 10 INJECTION, SOLUTION INTRAMUSCULAR; INTRAVENOUS at 10:02

## 2021-02-03 RX ADMIN — POTASSIUM CHLORIDE 40 MEQ: 1500 TABLET, EXTENDED RELEASE ORAL at 05:02

## 2021-02-03 RX ADMIN — HEPARIN SODIUM 5000 UNITS: 5000 INJECTION INTRAVENOUS; SUBCUTANEOUS at 09:02

## 2021-02-03 RX ADMIN — ASPIRIN 81 MG CHEWABLE TABLET 81 MG: 81 TABLET CHEWABLE at 09:02

## 2021-02-03 RX ADMIN — HEPARIN SODIUM 5000 UNITS: 5000 INJECTION INTRAVENOUS; SUBCUTANEOUS at 02:02

## 2021-02-04 VITALS
HEART RATE: 89 BPM | TEMPERATURE: 98 F | WEIGHT: 176.81 LBS | SYSTOLIC BLOOD PRESSURE: 105 MMHG | BODY MASS INDEX: 27.75 KG/M2 | HEIGHT: 67 IN | DIASTOLIC BLOOD PRESSURE: 56 MMHG | OXYGEN SATURATION: 97 % | RESPIRATION RATE: 16 BRPM

## 2021-02-04 PROBLEM — K72.00 SHOCK LIVER: Status: RESOLVED | Noted: 2021-01-30 | Resolved: 2021-02-04

## 2021-02-04 PROBLEM — R57.9 SHOCK: Status: RESOLVED | Noted: 2021-01-30 | Resolved: 2021-02-04

## 2021-02-04 PROBLEM — I50.9 ACUTE DECOMPENSATED HEART FAILURE: Status: RESOLVED | Noted: 2021-01-30 | Resolved: 2021-02-04

## 2021-02-04 LAB
ALBUMIN SERPL BCP-MCNC: 3.2 G/DL (ref 3.5–5.2)
ALP SERPL-CCNC: 96 U/L (ref 55–135)
ALT SERPL W/O P-5'-P-CCNC: 464 U/L (ref 10–44)
ANION GAP SERPL CALC-SCNC: 12 MMOL/L (ref 8–16)
AST SERPL-CCNC: 242 U/L (ref 10–40)
BACTERIA BLD CULT: NORMAL
BASOPHILS # BLD AUTO: 0.07 K/UL (ref 0–0.2)
BASOPHILS NFR BLD: 0.6 % (ref 0–1.9)
BILIRUB SERPL-MCNC: 3.8 MG/DL (ref 0.1–1)
BUN SERPL-MCNC: 61 MG/DL (ref 8–23)
CALCIUM SERPL-MCNC: 9 MG/DL (ref 8.7–10.5)
CHLORIDE SERPL-SCNC: 92 MMOL/L (ref 95–110)
CO2 SERPL-SCNC: 37 MMOL/L (ref 23–29)
CREAT SERPL-MCNC: 1.6 MG/DL (ref 0.5–1.4)
DIFFERENTIAL METHOD: ABNORMAL
EOSINOPHIL # BLD AUTO: 0.9 K/UL (ref 0–0.5)
EOSINOPHIL NFR BLD: 7.6 % (ref 0–8)
ERYTHROCYTE [DISTWIDTH] IN BLOOD BY AUTOMATED COUNT: 32 % (ref 11.5–14.5)
EST. GFR  (AFRICAN AMERICAN): 48 ML/MIN/1.73 M^2
EST. GFR  (NON AFRICAN AMERICAN): 41.5 ML/MIN/1.73 M^2
GLUCOSE SERPL-MCNC: 98 MG/DL (ref 70–110)
HCT VFR BLD AUTO: 31.6 % (ref 40–54)
HGB BLD-MCNC: 9.9 G/DL (ref 14–18)
IMM GRANULOCYTES # BLD AUTO: 0.05 K/UL (ref 0–0.04)
IMM GRANULOCYTES NFR BLD AUTO: 0.4 % (ref 0–0.5)
INR PPP: 1.3 (ref 0.8–1.2)
LYMPHOCYTES # BLD AUTO: 2.1 K/UL (ref 1–4.8)
LYMPHOCYTES NFR BLD: 18.5 % (ref 18–48)
MAGNESIUM SERPL-MCNC: 1.9 MG/DL (ref 1.6–2.6)
MCH RBC QN AUTO: 22.8 PG (ref 27–31)
MCHC RBC AUTO-ENTMCNC: 31.3 G/DL (ref 32–36)
MCV RBC AUTO: 73 FL (ref 82–98)
MONOCYTES # BLD AUTO: 1.9 K/UL (ref 0.3–1)
MONOCYTES NFR BLD: 16.4 % (ref 4–15)
NEUTROPHILS # BLD AUTO: 6.4 K/UL (ref 1.8–7.7)
NEUTROPHILS NFR BLD: 56.5 % (ref 38–73)
NRBC BLD-RTO: 0 /100 WBC
PHOSPHATE SERPL-MCNC: 3 MG/DL (ref 2.7–4.5)
PLATELET # BLD AUTO: 280 K/UL (ref 150–350)
PMV BLD AUTO: ABNORMAL FL (ref 9.2–12.9)
POTASSIUM SERPL-SCNC: 3.7 MMOL/L (ref 3.5–5.1)
PROT SERPL-MCNC: 6.4 G/DL (ref 6–8.4)
PROTHROMBIN TIME: 13.8 SEC (ref 9–12.5)
RBC # BLD AUTO: 4.35 M/UL (ref 4.6–6.2)
SODIUM SERPL-SCNC: 141 MMOL/L (ref 136–145)
WBC # BLD AUTO: 11.38 K/UL (ref 3.9–12.7)

## 2021-02-04 PROCEDURE — 25000003 PHARM REV CODE 250: Performed by: HOSPITALIST

## 2021-02-04 PROCEDURE — 99239 HOSP IP/OBS DSCHRG MGMT >30: CPT | Mod: ,,, | Performed by: HOSPITALIST

## 2021-02-04 PROCEDURE — 99239 PR HOSPITAL DISCHARGE DAY,>30 MIN: ICD-10-PCS | Mod: ,,, | Performed by: HOSPITALIST

## 2021-02-04 PROCEDURE — 80053 COMPREHEN METABOLIC PANEL: CPT

## 2021-02-04 PROCEDURE — 94760 N-INVAS EAR/PLS OXIMETRY 1: CPT

## 2021-02-04 PROCEDURE — 85025 COMPLETE CBC W/AUTO DIFF WBC: CPT

## 2021-02-04 PROCEDURE — 63600175 PHARM REV CODE 636 W HCPCS: Performed by: STUDENT IN AN ORGANIZED HEALTH CARE EDUCATION/TRAINING PROGRAM

## 2021-02-04 PROCEDURE — 85610 PROTHROMBIN TIME: CPT

## 2021-02-04 PROCEDURE — 83735 ASSAY OF MAGNESIUM: CPT

## 2021-02-04 PROCEDURE — 25000003 PHARM REV CODE 250: Performed by: STUDENT IN AN ORGANIZED HEALTH CARE EDUCATION/TRAINING PROGRAM

## 2021-02-04 PROCEDURE — 84100 ASSAY OF PHOSPHORUS: CPT

## 2021-02-04 PROCEDURE — 63600175 PHARM REV CODE 636 W HCPCS: Performed by: HOSPITALIST

## 2021-02-04 RX ORDER — METOLAZONE 2.5 MG/1
2.5 TABLET ORAL DAILY
Qty: 30 TABLET | Refills: 11 | Status: SHIPPED | OUTPATIENT
Start: 2021-02-04 | End: 2021-04-21

## 2021-02-04 RX ORDER — LANOLIN ALCOHOL/MO/W.PET/CERES
400 CREAM (GRAM) TOPICAL ONCE
Status: COMPLETED | OUTPATIENT
Start: 2021-02-04 | End: 2021-02-04

## 2021-02-04 RX ADMIN — ASPIRIN 81 MG CHEWABLE TABLET 81 MG: 81 TABLET CHEWABLE at 09:02

## 2021-02-04 RX ADMIN — POTASSIUM BICARBONATE 25 MEQ: 977.5 TABLET, EFFERVESCENT ORAL at 09:02

## 2021-02-04 RX ADMIN — FUROSEMIDE 80 MG: 10 INJECTION, SOLUTION INTRAMUSCULAR; INTRAVENOUS at 07:02

## 2021-02-04 RX ADMIN — FUROSEMIDE 40 MG: 40 TABLET ORAL at 09:02

## 2021-02-04 RX ADMIN — HEPARIN SODIUM 5000 UNITS: 5000 INJECTION INTRAVENOUS; SUBCUTANEOUS at 05:02

## 2021-02-04 RX ADMIN — Medication 400 MG: at 09:02

## 2021-02-05 ENCOUNTER — PATIENT OUTREACH (OUTPATIENT)
Dept: ADMINISTRATIVE | Facility: CLINIC | Age: 76
End: 2021-02-05

## 2021-02-05 LAB
PHOSPHOLIPASE A2 RECEPTOR, ELISA: <2 RU/ML
PHOSPHOLIPASE A2 RECEPTOR, IFA: NEGATIVE

## 2021-02-11 ENCOUNTER — IMMUNIZATION (OUTPATIENT)
Dept: OBSTETRICS AND GYNECOLOGY | Facility: CLINIC | Age: 76
End: 2021-02-11
Payer: MEDICARE

## 2021-02-11 DIAGNOSIS — Z23 NEED FOR VACCINATION: Primary | ICD-10-CM

## 2021-02-11 PROCEDURE — 0002A COVID-19, MRNA, LNP-S, PF, 30 MCG/0.3 ML DOSE VACCINE: CPT | Mod: PBBFAC | Performed by: ANESTHESIOLOGY

## 2021-02-11 PROCEDURE — 91300 COVID-19, MRNA, LNP-S, PF, 30 MCG/0.3 ML DOSE VACCINE: CPT | Mod: PBBFAC | Performed by: ANESTHESIOLOGY

## 2021-02-18 PROBLEM — M25.50 JOINT PAIN: Status: ACTIVE | Noted: 2021-02-18

## 2021-02-18 PROBLEM — K21.9 GERD (GASTROESOPHAGEAL REFLUX DISEASE): Status: ACTIVE | Noted: 2021-02-18

## 2021-03-18 PROBLEM — R53.1 WEAKNESS: Status: ACTIVE | Noted: 2021-03-18

## 2021-03-29 ENCOUNTER — LAB VISIT (OUTPATIENT)
Dept: LAB | Facility: HOSPITAL | Age: 76
End: 2021-03-29
Attending: INTERNAL MEDICINE
Payer: MEDICARE

## 2021-03-29 DIAGNOSIS — Z86.73 PERSONAL HISTORY OF TRANSIENT CEREBRAL ISCHEMIA: ICD-10-CM

## 2021-03-29 DIAGNOSIS — I11.0 HYPERTENSIVE HEART DISEASE WITH CONGESTIVE HEART FAILURE: Primary | ICD-10-CM

## 2021-03-29 DIAGNOSIS — I50.33 ACUTE ON CHRONIC DIASTOLIC HEART FAILURE: ICD-10-CM

## 2021-03-29 DIAGNOSIS — D80.9 SELECTIVE IMMUNOGLOBULIN DEFICIENCY: ICD-10-CM

## 2021-03-29 LAB
ANION GAP SERPL CALC-SCNC: 10 MMOL/L (ref 8–16)
BUN SERPL-MCNC: 47 MG/DL (ref 8–23)
CALCIUM SERPL-MCNC: 9.1 MG/DL (ref 8.7–10.5)
CHLORIDE SERPL-SCNC: 101 MMOL/L (ref 95–110)
CO2 SERPL-SCNC: 30 MMOL/L (ref 23–29)
CREAT SERPL-MCNC: 1.5 MG/DL (ref 0.5–1.4)
EST. GFR  (AFRICAN AMERICAN): 51.5 ML/MIN/1.73 M^2
EST. GFR  (NON AFRICAN AMERICAN): 44.6 ML/MIN/1.73 M^2
GLUCOSE SERPL-MCNC: 134 MG/DL (ref 70–110)
POTASSIUM SERPL-SCNC: 3.7 MMOL/L (ref 3.5–5.1)
SODIUM SERPL-SCNC: 141 MMOL/L (ref 136–145)

## 2021-03-29 PROCEDURE — 80048 BASIC METABOLIC PNL TOTAL CA: CPT | Performed by: INTERNAL MEDICINE

## 2021-03-29 PROCEDURE — 36415 COLL VENOUS BLD VENIPUNCTURE: CPT | Performed by: INTERNAL MEDICINE

## 2021-04-26 ENCOUNTER — APPOINTMENT (OUTPATIENT)
Dept: LAB | Facility: HOSPITAL | Age: 76
End: 2021-04-26
Attending: INTERNAL MEDICINE
Payer: MEDICARE

## 2021-04-26 DIAGNOSIS — Z01.810 PRE-OPERATIVE CARDIOVASCULAR EXAMINATION: Primary | ICD-10-CM

## 2021-04-26 LAB — SARS-COV-2 RNA RESP QL NAA+PROBE: NOT DETECTED

## 2021-04-26 PROCEDURE — U0002 COVID-19 LAB TEST NON-CDC: HCPCS | Performed by: INTERNAL MEDICINE

## 2021-04-27 PROBLEM — I34.0 MITRAL VALVE INSUFFICIENCY: Status: ACTIVE | Noted: 2021-04-27

## 2021-05-04 ENCOUNTER — LAB VISIT (OUTPATIENT)
Dept: LAB | Facility: HOSPITAL | Age: 76
End: 2021-05-04
Attending: INTERNAL MEDICINE
Payer: MEDICARE

## 2021-05-04 DIAGNOSIS — I50.33 ACUTE ON CHRONIC DIASTOLIC HEART FAILURE: Primary | ICD-10-CM

## 2021-05-04 DIAGNOSIS — I11.0 HYPERTENSIVE HEART DISEASE WITH CONGESTIVE HEART FAILURE: ICD-10-CM

## 2021-05-04 LAB
ANION GAP SERPL CALC-SCNC: 8 MMOL/L (ref 8–16)
BUN SERPL-MCNC: 25 MG/DL (ref 8–23)
CALCIUM SERPL-MCNC: 8.8 MG/DL (ref 8.7–10.5)
CHLORIDE SERPL-SCNC: 103 MMOL/L (ref 95–110)
CO2 SERPL-SCNC: 29 MMOL/L (ref 23–29)
CREAT SERPL-MCNC: 1 MG/DL (ref 0.5–1.4)
EST. GFR  (AFRICAN AMERICAN): >60 ML/MIN/1.73 M^2
EST. GFR  (NON AFRICAN AMERICAN): >60 ML/MIN/1.73 M^2
GLUCOSE SERPL-MCNC: 121 MG/DL (ref 70–110)
NT-PROBNP SERPL-MCNC: ABNORMAL PG/ML (ref 5–1800)
POTASSIUM SERPL-SCNC: 3.2 MMOL/L (ref 3.5–5.1)
SODIUM SERPL-SCNC: 140 MMOL/L (ref 136–145)

## 2021-05-04 PROCEDURE — 80048 BASIC METABOLIC PNL TOTAL CA: CPT | Performed by: INTERNAL MEDICINE

## 2021-05-04 PROCEDURE — 83880 ASSAY OF NATRIURETIC PEPTIDE: CPT | Performed by: INTERNAL MEDICINE

## 2021-05-04 PROCEDURE — 36415 COLL VENOUS BLD VENIPUNCTURE: CPT | Performed by: INTERNAL MEDICINE

## 2021-05-07 ENCOUNTER — HOSPITAL ENCOUNTER (INPATIENT)
Facility: HOSPITAL | Age: 76
LOS: 2 days | Discharge: HOME HEALTH | DRG: 293 | End: 2021-05-11
Attending: EMERGENCY MEDICINE | Admitting: INTERNAL MEDICINE
Payer: MEDICARE

## 2021-05-07 DIAGNOSIS — D64.9 SYMPTOMATIC ANEMIA: ICD-10-CM

## 2021-05-07 DIAGNOSIS — R06.02 SOB (SHORTNESS OF BREATH): Primary | ICD-10-CM

## 2021-05-07 DIAGNOSIS — D64.9 ACUTE ON CHRONIC ANEMIA: ICD-10-CM

## 2021-05-07 DIAGNOSIS — I34.0 SEVERE MITRAL VALVE REGURGITATION: ICD-10-CM

## 2021-05-07 LAB
ALBUMIN SERPL BCP-MCNC: 2.9 G/DL (ref 3.5–5.2)
ALP SERPL-CCNC: 54 U/L (ref 55–135)
ALT SERPL W/O P-5'-P-CCNC: 35 U/L (ref 10–44)
ANION GAP SERPL CALC-SCNC: 5 MMOL/L (ref 8–16)
AST SERPL-CCNC: 57 U/L (ref 10–40)
BASOPHILS # BLD AUTO: 0.12 K/UL (ref 0–0.2)
BASOPHILS NFR BLD: 1.1 % (ref 0–1.9)
BILIRUB SERPL-MCNC: 2.4 MG/DL (ref 0.1–1)
BUN SERPL-MCNC: 28 MG/DL (ref 8–23)
CALCIUM SERPL-MCNC: 9.2 MG/DL (ref 8.7–10.5)
CHLORIDE SERPL-SCNC: 103 MMOL/L (ref 95–110)
CO2 SERPL-SCNC: 31 MMOL/L (ref 23–29)
CREAT SERPL-MCNC: 1.2 MG/DL (ref 0.5–1.4)
CTP QC/QA: YES
DIFFERENTIAL METHOD: ABNORMAL
EOSINOPHIL # BLD AUTO: 0.2 K/UL (ref 0–0.5)
EOSINOPHIL NFR BLD: 1.7 % (ref 0–8)
ERYTHROCYTE [DISTWIDTH] IN BLOOD BY AUTOMATED COUNT: 23.6 % (ref 11.5–14.5)
EST. GFR  (AFRICAN AMERICAN): >60 ML/MIN/1.73 M^2
EST. GFR  (NON AFRICAN AMERICAN): 58.4 ML/MIN/1.73 M^2
GLUCOSE SERPL-MCNC: 134 MG/DL (ref 70–110)
HCT VFR BLD AUTO: 26.8 % (ref 40–54)
HGB BLD-MCNC: 8.9 G/DL (ref 14–18)
IMM GRANULOCYTES # BLD AUTO: 0.03 K/UL (ref 0–0.04)
IMM GRANULOCYTES NFR BLD AUTO: 0.3 % (ref 0–0.5)
LYMPHOCYTES # BLD AUTO: 1.6 K/UL (ref 1–4.8)
LYMPHOCYTES NFR BLD: 14.4 % (ref 18–48)
MCH RBC QN AUTO: 28.9 PG (ref 27–31)
MCHC RBC AUTO-ENTMCNC: 33.2 G/DL (ref 32–36)
MCV RBC AUTO: 87 FL (ref 82–98)
MONOCYTES # BLD AUTO: 1 K/UL (ref 0.3–1)
MONOCYTES NFR BLD: 9.5 % (ref 4–15)
NEUTROPHILS # BLD AUTO: 8 K/UL (ref 1.8–7.7)
NEUTROPHILS NFR BLD: 73 % (ref 38–73)
NRBC BLD-RTO: 0 /100 WBC
NT-PROBNP SERPL-MCNC: ABNORMAL PG/ML (ref 5–1800)
PLATELET # BLD AUTO: 569 K/UL (ref 150–450)
PMV BLD AUTO: 10.4 FL (ref 9.2–12.9)
POTASSIUM SERPL-SCNC: 4.6 MMOL/L (ref 3.5–5.1)
PROT SERPL-MCNC: 6.9 G/DL (ref 6–8.4)
RBC # BLD AUTO: 3.08 M/UL (ref 4.6–6.2)
SARS-COV-2 RDRP RESP QL NAA+PROBE: NEGATIVE
SODIUM SERPL-SCNC: 139 MMOL/L (ref 136–145)
TROPONIN I SERPL DL<=0.01 NG/ML-MCNC: 0.03 NG/ML (ref 0–0.03)
WBC # BLD AUTO: 10.87 K/UL (ref 3.9–12.7)

## 2021-05-07 PROCEDURE — 36415 COLL VENOUS BLD VENIPUNCTURE: CPT | Performed by: EMERGENCY MEDICINE

## 2021-05-07 PROCEDURE — 83880 ASSAY OF NATRIURETIC PEPTIDE: CPT | Performed by: EMERGENCY MEDICINE

## 2021-05-07 PROCEDURE — 86900 BLOOD TYPING SEROLOGIC ABO: CPT | Performed by: EMERGENCY MEDICINE

## 2021-05-07 PROCEDURE — 80053 COMPREHEN METABOLIC PANEL: CPT | Performed by: EMERGENCY MEDICINE

## 2021-05-07 PROCEDURE — 84484 ASSAY OF TROPONIN QUANT: CPT | Performed by: EMERGENCY MEDICINE

## 2021-05-07 PROCEDURE — 85025 COMPLETE CBC W/AUTO DIFF WBC: CPT | Performed by: EMERGENCY MEDICINE

## 2021-05-07 PROCEDURE — 86920 COMPATIBILITY TEST SPIN: CPT | Performed by: EMERGENCY MEDICINE

## 2021-05-07 PROCEDURE — 99285 EMERGENCY DEPT VISIT HI MDM: CPT | Mod: 25

## 2021-05-07 PROCEDURE — U0002 COVID-19 LAB TEST NON-CDC: HCPCS | Performed by: EMERGENCY MEDICINE

## 2021-05-07 RX ORDER — HYDROCODONE BITARTRATE AND ACETAMINOPHEN 500; 5 MG/1; MG/1
TABLET ORAL
Status: DISCONTINUED | OUTPATIENT
Start: 2021-05-08 | End: 2021-05-11 | Stop reason: HOSPADM

## 2021-05-08 PROBLEM — I50.43 ACUTE ON CHRONIC COMBINED SYSTOLIC AND DIASTOLIC HEART FAILURE: Status: ACTIVE | Noted: 2021-02-01

## 2021-05-08 PROBLEM — R06.02 SOB (SHORTNESS OF BREATH): Status: ACTIVE | Noted: 2021-05-08

## 2021-05-08 LAB
ABO + RH BLD: NORMAL
BASOPHILS # BLD AUTO: 0.14 K/UL (ref 0–0.2)
BASOPHILS NFR BLD: 1.2 % (ref 0–1.9)
BLD GP AB SCN CELLS X3 SERPL QL: NORMAL
BLD PROD TYP BPU: NORMAL
BLD PROD TYP BPU: NORMAL
BLOOD UNIT EXPIRATION DATE: NORMAL
BLOOD UNIT EXPIRATION DATE: NORMAL
BLOOD UNIT TYPE CODE: 6200
BLOOD UNIT TYPE CODE: 6200
BLOOD UNIT TYPE: NORMAL
BLOOD UNIT TYPE: NORMAL
CODING SYSTEM: NORMAL
CODING SYSTEM: NORMAL
DIFFERENTIAL METHOD: ABNORMAL
DISPENSE STATUS: NORMAL
DISPENSE STATUS: NORMAL
EOSINOPHIL # BLD AUTO: 0.1 K/UL (ref 0–0.5)
EOSINOPHIL NFR BLD: 0.8 % (ref 0–8)
ERYTHROCYTE [DISTWIDTH] IN BLOOD BY AUTOMATED COUNT: 20.2 % (ref 11.5–14.5)
HCT VFR BLD AUTO: 34.8 % (ref 40–54)
HGB BLD-MCNC: 11.7 G/DL (ref 14–18)
IMM GRANULOCYTES # BLD AUTO: 0.08 K/UL (ref 0–0.04)
IMM GRANULOCYTES NFR BLD AUTO: 0.7 % (ref 0–0.5)
LYMPHOCYTES # BLD AUTO: 2.5 K/UL (ref 1–4.8)
LYMPHOCYTES NFR BLD: 21.3 % (ref 18–48)
MCH RBC QN AUTO: 29.3 PG (ref 27–31)
MCHC RBC AUTO-ENTMCNC: 33.6 G/DL (ref 32–36)
MCV RBC AUTO: 87 FL (ref 82–98)
MONOCYTES # BLD AUTO: 1.3 K/UL (ref 0.3–1)
MONOCYTES NFR BLD: 11.4 % (ref 4–15)
NEUTROPHILS # BLD AUTO: 7.5 K/UL (ref 1.8–7.7)
NEUTROPHILS NFR BLD: 64.6 % (ref 38–73)
NRBC BLD-RTO: 0 /100 WBC
NUM UNITS TRANS PACKED RBC: NORMAL
NUM UNITS TRANS PACKED RBC: NORMAL
PLATELET # BLD AUTO: 494 K/UL (ref 150–450)
PMV BLD AUTO: 10.7 FL (ref 9.2–12.9)
RBC # BLD AUTO: 4 M/UL (ref 4.6–6.2)
WBC # BLD AUTO: 11.55 K/UL (ref 3.9–12.7)

## 2021-05-08 PROCEDURE — P9016 RBC LEUKOCYTES REDUCED: HCPCS | Performed by: EMERGENCY MEDICINE

## 2021-05-08 PROCEDURE — 25000003 PHARM REV CODE 250: Performed by: INTERNAL MEDICINE

## 2021-05-08 PROCEDURE — G0378 HOSPITAL OBSERVATION PER HR: HCPCS

## 2021-05-08 PROCEDURE — 36415 COLL VENOUS BLD VENIPUNCTURE: CPT | Performed by: EMERGENCY MEDICINE

## 2021-05-08 PROCEDURE — 96372 THER/PROPH/DIAG INJ SC/IM: CPT | Mod: 59

## 2021-05-08 PROCEDURE — 96374 THER/PROPH/DIAG INJ IV PUSH: CPT

## 2021-05-08 PROCEDURE — 36430 TRANSFUSION BLD/BLD COMPNT: CPT

## 2021-05-08 PROCEDURE — 96376 TX/PRO/DX INJ SAME DRUG ADON: CPT

## 2021-05-08 PROCEDURE — 85025 COMPLETE CBC W/AUTO DIFF WBC: CPT | Performed by: EMERGENCY MEDICINE

## 2021-05-08 PROCEDURE — 63600175 PHARM REV CODE 636 W HCPCS: Performed by: INTERNAL MEDICINE

## 2021-05-08 RX ORDER — TALC
6 POWDER (GRAM) TOPICAL NIGHTLY PRN
Status: DISCONTINUED | OUTPATIENT
Start: 2021-05-08 | End: 2021-05-11 | Stop reason: HOSPADM

## 2021-05-08 RX ORDER — CLOPIDOGREL BISULFATE 75 MG/1
75 TABLET ORAL DAILY
Status: DISCONTINUED | OUTPATIENT
Start: 2021-05-08 | End: 2021-05-11 | Stop reason: HOSPADM

## 2021-05-08 RX ORDER — ATORVASTATIN CALCIUM 80 MG/1
80 TABLET, FILM COATED ORAL NIGHTLY
Status: DISCONTINUED | OUTPATIENT
Start: 2021-05-08 | End: 2021-05-11 | Stop reason: HOSPADM

## 2021-05-08 RX ORDER — SODIUM CHLORIDE 0.9 % (FLUSH) 0.9 %
10 SYRINGE (ML) INJECTION
Status: DISCONTINUED | OUTPATIENT
Start: 2021-05-08 | End: 2021-05-11 | Stop reason: HOSPADM

## 2021-05-08 RX ORDER — NAPROXEN SODIUM 220 MG/1
81 TABLET, FILM COATED ORAL DAILY
Status: DISCONTINUED | OUTPATIENT
Start: 2021-05-08 | End: 2021-05-11 | Stop reason: HOSPADM

## 2021-05-08 RX ORDER — PANTOPRAZOLE SODIUM 40 MG/1
40 TABLET, DELAYED RELEASE ORAL DAILY
Status: DISCONTINUED | OUTPATIENT
Start: 2021-05-08 | End: 2021-05-11 | Stop reason: HOSPADM

## 2021-05-08 RX ORDER — FUROSEMIDE 40 MG/1
40 TABLET ORAL NIGHTLY
Status: DISCONTINUED | OUTPATIENT
Start: 2021-05-08 | End: 2021-05-09

## 2021-05-08 RX ORDER — FUROSEMIDE 10 MG/ML
40 INJECTION INTRAMUSCULAR; INTRAVENOUS
Status: DISCONTINUED | OUTPATIENT
Start: 2021-05-08 | End: 2021-05-11 | Stop reason: HOSPADM

## 2021-05-08 RX ORDER — FENOFIBRATE 145 MG/1
145 TABLET, FILM COATED ORAL DAILY
Status: DISCONTINUED | OUTPATIENT
Start: 2021-05-08 | End: 2021-05-11 | Stop reason: HOSPADM

## 2021-05-08 RX ORDER — ENOXAPARIN SODIUM 100 MG/ML
40 INJECTION SUBCUTANEOUS EVERY 24 HOURS
Status: DISCONTINUED | OUTPATIENT
Start: 2021-05-08 | End: 2021-05-11 | Stop reason: HOSPADM

## 2021-05-08 RX ORDER — ONDANSETRON 2 MG/ML
4 INJECTION INTRAMUSCULAR; INTRAVENOUS EVERY 8 HOURS PRN
Status: DISCONTINUED | OUTPATIENT
Start: 2021-05-08 | End: 2021-05-11 | Stop reason: HOSPADM

## 2021-05-08 RX ORDER — EZETIMIBE 10 MG/1
10 TABLET ORAL DAILY
Status: DISCONTINUED | OUTPATIENT
Start: 2021-05-08 | End: 2021-05-11 | Stop reason: HOSPADM

## 2021-05-08 RX ORDER — ASCORBIC ACID 500 MG
500 TABLET ORAL DAILY
Status: DISCONTINUED | OUTPATIENT
Start: 2021-05-08 | End: 2021-05-11 | Stop reason: HOSPADM

## 2021-05-08 RX ADMIN — CLOPIDOGREL 75 MG: 75 TABLET, FILM COATED ORAL at 10:05

## 2021-05-08 RX ADMIN — ENOXAPARIN SODIUM 40 MG: 40 INJECTION SUBCUTANEOUS at 05:05

## 2021-05-08 RX ADMIN — Medication 6 MG: at 09:05

## 2021-05-08 RX ADMIN — ATORVASTATIN CALCIUM 80 MG: 80 TABLET, FILM COATED ORAL at 09:05

## 2021-05-08 RX ADMIN — FENOFIBRATE 145 MG: 145 TABLET, FILM COATED ORAL at 10:05

## 2021-05-08 RX ADMIN — FUROSEMIDE 40 MG: 10 INJECTION, SOLUTION INTRAVENOUS at 10:05

## 2021-05-08 RX ADMIN — ASPIRIN 81 MG CHEWABLE TABLET 81 MG: 81 TABLET CHEWABLE at 10:05

## 2021-05-08 RX ADMIN — PANTOPRAZOLE SODIUM 40 MG: 40 TABLET, DELAYED RELEASE ORAL at 10:05

## 2021-05-08 RX ADMIN — OXYCODONE HYDROCHLORIDE AND ACETAMINOPHEN 500 MG: 500 TABLET ORAL at 10:05

## 2021-05-08 RX ADMIN — EZETIMIBE 10 MG: 10 TABLET ORAL at 10:05

## 2021-05-08 RX ADMIN — FUROSEMIDE 40 MG: 10 INJECTION, SOLUTION INTRAVENOUS at 09:05

## 2021-05-09 LAB
BASOPHILS # BLD AUTO: 0.11 K/UL (ref 0–0.2)
BASOPHILS NFR BLD: 1.2 % (ref 0–1.9)
DIFFERENTIAL METHOD: ABNORMAL
EOSINOPHIL # BLD AUTO: 0.4 K/UL (ref 0–0.5)
EOSINOPHIL NFR BLD: 4.6 % (ref 0–8)
ERYTHROCYTE [DISTWIDTH] IN BLOOD BY AUTOMATED COUNT: 20.3 % (ref 11.5–14.5)
HCT VFR BLD AUTO: 32.1 % (ref 40–54)
HGB BLD-MCNC: 10.9 G/DL (ref 14–18)
IMM GRANULOCYTES # BLD AUTO: 0.03 K/UL (ref 0–0.04)
IMM GRANULOCYTES NFR BLD AUTO: 0.3 % (ref 0–0.5)
LYMPHOCYTES # BLD AUTO: 2.3 K/UL (ref 1–4.8)
LYMPHOCYTES NFR BLD: 24.7 % (ref 18–48)
MCH RBC QN AUTO: 29.2 PG (ref 27–31)
MCHC RBC AUTO-ENTMCNC: 34 G/DL (ref 32–36)
MCV RBC AUTO: 86 FL (ref 82–98)
MONOCYTES # BLD AUTO: 0.9 K/UL (ref 0.3–1)
MONOCYTES NFR BLD: 9.9 % (ref 4–15)
NEUTROPHILS # BLD AUTO: 5.5 K/UL (ref 1.8–7.7)
NEUTROPHILS NFR BLD: 59.3 % (ref 38–73)
NRBC BLD-RTO: 0 /100 WBC
PLATELET # BLD AUTO: 500 K/UL (ref 150–450)
PMV BLD AUTO: 11.8 FL (ref 9.2–12.9)
RBC # BLD AUTO: 3.73 M/UL (ref 4.6–6.2)
WBC # BLD AUTO: 9.23 K/UL (ref 3.9–12.7)

## 2021-05-09 PROCEDURE — 11000001 HC ACUTE MED/SURG PRIVATE ROOM

## 2021-05-09 PROCEDURE — 63600175 PHARM REV CODE 636 W HCPCS: Performed by: INTERNAL MEDICINE

## 2021-05-09 PROCEDURE — 36415 COLL VENOUS BLD VENIPUNCTURE: CPT | Performed by: INTERNAL MEDICINE

## 2021-05-09 PROCEDURE — 85025 COMPLETE CBC W/AUTO DIFF WBC: CPT | Performed by: INTERNAL MEDICINE

## 2021-05-09 PROCEDURE — 96376 TX/PRO/DX INJ SAME DRUG ADON: CPT

## 2021-05-09 PROCEDURE — 25000003 PHARM REV CODE 250: Performed by: INTERNAL MEDICINE

## 2021-05-09 RX ADMIN — ASPIRIN 81 MG CHEWABLE TABLET 81 MG: 81 TABLET CHEWABLE at 09:05

## 2021-05-09 RX ADMIN — FENOFIBRATE 145 MG: 145 TABLET, FILM COATED ORAL at 09:05

## 2021-05-09 RX ADMIN — OXYCODONE HYDROCHLORIDE AND ACETAMINOPHEN 500 MG: 500 TABLET ORAL at 09:05

## 2021-05-09 RX ADMIN — ATORVASTATIN CALCIUM 80 MG: 80 TABLET, FILM COATED ORAL at 09:05

## 2021-05-09 RX ADMIN — EZETIMIBE 10 MG: 10 TABLET ORAL at 09:05

## 2021-05-09 RX ADMIN — PANTOPRAZOLE SODIUM 40 MG: 40 TABLET, DELAYED RELEASE ORAL at 09:05

## 2021-05-09 RX ADMIN — ENOXAPARIN SODIUM 40 MG: 40 INJECTION SUBCUTANEOUS at 04:05

## 2021-05-09 RX ADMIN — CLOPIDOGREL 75 MG: 75 TABLET, FILM COATED ORAL at 09:05

## 2021-05-09 RX ADMIN — FUROSEMIDE 40 MG: 10 INJECTION, SOLUTION INTRAVENOUS at 09:05

## 2021-05-10 PROBLEM — I95.9 HYPOTENSION: Status: ACTIVE | Noted: 2021-05-10

## 2021-05-10 LAB
BASOPHILS # BLD AUTO: 0.1 K/UL (ref 0–0.2)
BASOPHILS NFR BLD: 1.1 % (ref 0–1.9)
DIFFERENTIAL METHOD: ABNORMAL
EOSINOPHIL # BLD AUTO: 0.4 K/UL (ref 0–0.5)
EOSINOPHIL NFR BLD: 4.7 % (ref 0–8)
ERYTHROCYTE [DISTWIDTH] IN BLOOD BY AUTOMATED COUNT: 20.1 % (ref 11.5–14.5)
HCT VFR BLD AUTO: 31.8 % (ref 40–54)
HGB BLD-MCNC: 10.7 G/DL (ref 14–18)
IMM GRANULOCYTES # BLD AUTO: 0.03 K/UL (ref 0–0.04)
IMM GRANULOCYTES NFR BLD AUTO: 0.3 % (ref 0–0.5)
LYMPHOCYTES # BLD AUTO: 2 K/UL (ref 1–4.8)
LYMPHOCYTES NFR BLD: 21.3 % (ref 18–48)
MCH RBC QN AUTO: 29.5 PG (ref 27–31)
MCHC RBC AUTO-ENTMCNC: 33.6 G/DL (ref 32–36)
MCV RBC AUTO: 88 FL (ref 82–98)
MONOCYTES # BLD AUTO: 0.8 K/UL (ref 0.3–1)
MONOCYTES NFR BLD: 8.9 % (ref 4–15)
NEUTROPHILS # BLD AUTO: 5.9 K/UL (ref 1.8–7.7)
NEUTROPHILS NFR BLD: 63.7 % (ref 38–73)
NRBC BLD-RTO: 0 /100 WBC
PLATELET # BLD AUTO: 463 K/UL (ref 150–450)
PMV BLD AUTO: 10.8 FL (ref 9.2–12.9)
RBC # BLD AUTO: 3.63 M/UL (ref 4.6–6.2)
WBC # BLD AUTO: 9.19 K/UL (ref 3.9–12.7)

## 2021-05-10 PROCEDURE — 25000003 PHARM REV CODE 250: Performed by: INTERNAL MEDICINE

## 2021-05-10 PROCEDURE — 97165 OT EVAL LOW COMPLEX 30 MIN: CPT

## 2021-05-10 PROCEDURE — 36415 COLL VENOUS BLD VENIPUNCTURE: CPT | Performed by: INTERNAL MEDICINE

## 2021-05-10 PROCEDURE — 63600175 PHARM REV CODE 636 W HCPCS: Performed by: INTERNAL MEDICINE

## 2021-05-10 PROCEDURE — A4216 STERILE WATER/SALINE, 10 ML: HCPCS | Performed by: INTERNAL MEDICINE

## 2021-05-10 PROCEDURE — 11000001 HC ACUTE MED/SURG PRIVATE ROOM

## 2021-05-10 PROCEDURE — 97161 PT EVAL LOW COMPLEX 20 MIN: CPT

## 2021-05-10 PROCEDURE — 85025 COMPLETE CBC W/AUTO DIFF WBC: CPT | Performed by: INTERNAL MEDICINE

## 2021-05-10 RX ADMIN — ASPIRIN 81 MG CHEWABLE TABLET 81 MG: 81 TABLET CHEWABLE at 10:05

## 2021-05-10 RX ADMIN — ENOXAPARIN SODIUM 40 MG: 40 INJECTION SUBCUTANEOUS at 05:05

## 2021-05-10 RX ADMIN — EZETIMIBE 10 MG: 10 TABLET ORAL at 10:05

## 2021-05-10 RX ADMIN — CLOPIDOGREL 75 MG: 75 TABLET, FILM COATED ORAL at 10:05

## 2021-05-10 RX ADMIN — OXYCODONE HYDROCHLORIDE AND ACETAMINOPHEN 500 MG: 500 TABLET ORAL at 10:05

## 2021-05-10 RX ADMIN — PANTOPRAZOLE SODIUM 40 MG: 40 TABLET, DELAYED RELEASE ORAL at 10:05

## 2021-05-10 RX ADMIN — FUROSEMIDE 40 MG: 10 INJECTION, SOLUTION INTRAVENOUS at 10:05

## 2021-05-10 RX ADMIN — ATORVASTATIN CALCIUM 80 MG: 80 TABLET, FILM COATED ORAL at 09:05

## 2021-05-10 RX ADMIN — Medication 10 ML: at 10:05

## 2021-05-10 RX ADMIN — FENOFIBRATE 145 MG: 145 TABLET, FILM COATED ORAL at 10:05

## 2021-05-11 VITALS
RESPIRATION RATE: 22 BRPM | SYSTOLIC BLOOD PRESSURE: 105 MMHG | OXYGEN SATURATION: 93 % | DIASTOLIC BLOOD PRESSURE: 63 MMHG | WEIGHT: 133.19 LBS | TEMPERATURE: 97 F | BODY MASS INDEX: 20.9 KG/M2 | HEIGHT: 67 IN | HEART RATE: 102 BPM

## 2021-05-11 LAB
ALBUMIN SERPL BCP-MCNC: 2.5 G/DL (ref 3.5–5.2)
ALP SERPL-CCNC: 47 U/L (ref 55–135)
ALT SERPL W/O P-5'-P-CCNC: 35 U/L (ref 10–44)
ANION GAP SERPL CALC-SCNC: 7 MMOL/L (ref 8–16)
AST SERPL-CCNC: 56 U/L (ref 10–40)
BASOPHILS # BLD AUTO: 0.1 K/UL (ref 0–0.2)
BASOPHILS NFR BLD: 1.2 % (ref 0–1.9)
BILIRUB SERPL-MCNC: 2.5 MG/DL (ref 0.1–1)
BUN SERPL-MCNC: 23 MG/DL (ref 8–23)
CALCIUM SERPL-MCNC: 8.7 MG/DL (ref 8.7–10.5)
CHLORIDE SERPL-SCNC: 106 MMOL/L (ref 95–110)
CO2 SERPL-SCNC: 29 MMOL/L (ref 23–29)
CREAT SERPL-MCNC: 0.7 MG/DL (ref 0.5–1.4)
DIFFERENTIAL METHOD: ABNORMAL
EOSINOPHIL # BLD AUTO: 0.3 K/UL (ref 0–0.5)
EOSINOPHIL NFR BLD: 3.8 % (ref 0–8)
ERYTHROCYTE [DISTWIDTH] IN BLOOD BY AUTOMATED COUNT: 20.5 % (ref 11.5–14.5)
EST. GFR  (AFRICAN AMERICAN): >60 ML/MIN/1.73 M^2
EST. GFR  (NON AFRICAN AMERICAN): >60 ML/MIN/1.73 M^2
GLUCOSE SERPL-MCNC: 89 MG/DL (ref 70–110)
HCT VFR BLD AUTO: 30.3 % (ref 40–54)
HGB BLD-MCNC: 10.3 G/DL (ref 14–18)
IMM GRANULOCYTES # BLD AUTO: 0.03 K/UL (ref 0–0.04)
IMM GRANULOCYTES NFR BLD AUTO: 0.4 % (ref 0–0.5)
LYMPHOCYTES # BLD AUTO: 1.6 K/UL (ref 1–4.8)
LYMPHOCYTES NFR BLD: 19.4 % (ref 18–48)
MCH RBC QN AUTO: 29.3 PG (ref 27–31)
MCHC RBC AUTO-ENTMCNC: 34 G/DL (ref 32–36)
MCV RBC AUTO: 86 FL (ref 82–98)
MONOCYTES # BLD AUTO: 0.8 K/UL (ref 0.3–1)
MONOCYTES NFR BLD: 10.3 % (ref 4–15)
NEUTROPHILS # BLD AUTO: 5.3 K/UL (ref 1.8–7.7)
NEUTROPHILS NFR BLD: 64.9 % (ref 38–73)
NRBC BLD-RTO: 0 /100 WBC
PLATELET # BLD AUTO: 478 K/UL (ref 150–450)
PMV BLD AUTO: 11 FL (ref 9.2–12.9)
POTASSIUM SERPL-SCNC: 3.2 MMOL/L (ref 3.5–5.1)
PROT SERPL-MCNC: 5.9 G/DL (ref 6–8.4)
RBC # BLD AUTO: 3.51 M/UL (ref 4.6–6.2)
SODIUM SERPL-SCNC: 142 MMOL/L (ref 136–145)
WBC # BLD AUTO: 8.18 K/UL (ref 3.9–12.7)

## 2021-05-11 PROCEDURE — 85025 COMPLETE CBC W/AUTO DIFF WBC: CPT | Performed by: INTERNAL MEDICINE

## 2021-05-11 PROCEDURE — 25000003 PHARM REV CODE 250: Performed by: INTERNAL MEDICINE

## 2021-05-11 PROCEDURE — 63600175 PHARM REV CODE 636 W HCPCS: Performed by: INTERNAL MEDICINE

## 2021-05-11 PROCEDURE — 80053 COMPREHEN METABOLIC PANEL: CPT | Performed by: FAMILY MEDICINE

## 2021-05-11 RX ADMIN — CLOPIDOGREL 75 MG: 75 TABLET, FILM COATED ORAL at 08:05

## 2021-05-11 RX ADMIN — ASPIRIN 81 MG CHEWABLE TABLET 81 MG: 81 TABLET CHEWABLE at 08:05

## 2021-05-11 RX ADMIN — OXYCODONE HYDROCHLORIDE AND ACETAMINOPHEN 500 MG: 500 TABLET ORAL at 08:05

## 2021-05-11 RX ADMIN — PANTOPRAZOLE SODIUM 40 MG: 40 TABLET, DELAYED RELEASE ORAL at 08:05

## 2021-05-11 RX ADMIN — FENOFIBRATE 145 MG: 145 TABLET, FILM COATED ORAL at 08:05

## 2021-05-11 RX ADMIN — EZETIMIBE 10 MG: 10 TABLET ORAL at 08:05

## 2021-05-11 RX ADMIN — FUROSEMIDE 40 MG: 10 INJECTION, SOLUTION INTRAVENOUS at 11:05

## 2021-05-12 ENCOUNTER — PATIENT OUTREACH (OUTPATIENT)
Dept: ADMINISTRATIVE | Facility: CLINIC | Age: 76
End: 2021-05-12

## 2021-05-14 PROBLEM — R60.0 LOCALIZED EDEMA: Status: ACTIVE | Noted: 2021-05-14

## 2021-05-18 PROBLEM — I50.33 ACUTE ON CHRONIC DIASTOLIC CONGESTIVE HEART FAILURE, NYHA CLASS 3: Status: ACTIVE | Noted: 2021-05-18

## 2021-05-19 PROBLEM — E63.9 INADEQUATE DIETARY ENERGY INTAKE: Status: ACTIVE | Noted: 2021-05-19

## 2021-05-20 PROBLEM — D62 ACUTE BLOOD LOSS ANEMIA: Status: ACTIVE | Noted: 2021-05-20

## 2021-05-23 PROBLEM — E07.9 THYROID DYSFUNCTION: Status: ACTIVE | Noted: 2021-05-23

## 2021-05-25 PROBLEM — D62 ACUTE BLOOD LOSS ANEMIA: Status: RESOLVED | Noted: 2021-05-20 | Resolved: 2021-05-25

## 2021-06-01 PROBLEM — E63.9 INADEQUATE DIETARY ENERGY INTAKE: Status: RESOLVED | Noted: 2021-05-19 | Resolved: 2021-06-01

## 2021-06-07 PROBLEM — I50.32 CHRONIC HEART FAILURE WITH PRESERVED EJECTION FRACTION (HFPEF): Status: ACTIVE | Noted: 2021-05-18

## 2021-06-07 PROBLEM — E05.80 SECONDARY HYPERTHYROIDISM: Status: ACTIVE | Noted: 2021-06-07

## 2021-06-07 PROBLEM — D53.9 MACROCYTIC ANEMIA: Status: ACTIVE | Noted: 2021-06-07

## 2021-06-07 PROBLEM — I27.20 MODERATE TO SEVERE PULMONARY HYPERTENSION: Status: ACTIVE | Noted: 2021-06-07

## 2021-06-21 ENCOUNTER — LAB VISIT (OUTPATIENT)
Dept: LAB | Facility: HOSPITAL | Age: 76
End: 2021-06-21
Attending: INTERNAL MEDICINE
Payer: MEDICARE

## 2021-06-21 DIAGNOSIS — D62 ACUTE POSTHEMORRHAGIC ANEMIA: ICD-10-CM

## 2021-06-21 DIAGNOSIS — I11.0 HYPERTENSIVE HEART DISEASE WITH CONGESTIVE HEART FAILURE: Primary | ICD-10-CM

## 2021-06-21 DIAGNOSIS — Z79.02 ENCOUNTER FOR LONG-TERM (CURRENT) USE OF ANTIPLATELETS/ANTITHROMBOTICS: ICD-10-CM

## 2021-06-21 DIAGNOSIS — I25.10 CORONARY ATHEROSCLEROSIS OF NATIVE CORONARY ARTERY: ICD-10-CM

## 2021-06-21 LAB
ANION GAP SERPL CALC-SCNC: 9 MMOL/L (ref 8–16)
BASOPHILS # BLD AUTO: 0.07 K/UL (ref 0–0.2)
BASOPHILS NFR BLD: 1.1 % (ref 0–1.9)
BUN SERPL-MCNC: 13 MG/DL (ref 8–23)
CALCIUM SERPL-MCNC: 9.2 MG/DL (ref 8.7–10.5)
CHLORIDE SERPL-SCNC: 108 MMOL/L (ref 95–110)
CO2 SERPL-SCNC: 26 MMOL/L (ref 23–29)
CREAT SERPL-MCNC: 0.4 MG/DL (ref 0.5–1.4)
DIFFERENTIAL METHOD: ABNORMAL
EOSINOPHIL # BLD AUTO: 0.2 K/UL (ref 0–0.5)
EOSINOPHIL NFR BLD: 2.8 % (ref 0–8)
ERYTHROCYTE [DISTWIDTH] IN BLOOD BY AUTOMATED COUNT: 16.6 % (ref 11.5–14.5)
EST. GFR  (AFRICAN AMERICAN): >60 ML/MIN/1.73 M^2
EST. GFR  (NON AFRICAN AMERICAN): >60 ML/MIN/1.73 M^2
GLUCOSE SERPL-MCNC: 79 MG/DL (ref 70–110)
HCT VFR BLD AUTO: 26.6 % (ref 40–54)
HGB BLD-MCNC: 8.6 G/DL (ref 14–18)
IMM GRANULOCYTES # BLD AUTO: 0.02 K/UL (ref 0–0.04)
IMM GRANULOCYTES NFR BLD AUTO: 0.3 % (ref 0–0.5)
LYMPHOCYTES # BLD AUTO: 1.7 K/UL (ref 1–4.8)
LYMPHOCYTES NFR BLD: 26.7 % (ref 18–48)
MCH RBC QN AUTO: 33.3 PG (ref 27–31)
MCHC RBC AUTO-ENTMCNC: 32.3 G/DL (ref 32–36)
MCV RBC AUTO: 103 FL (ref 82–98)
MONOCYTES # BLD AUTO: 0.7 K/UL (ref 0.3–1)
MONOCYTES NFR BLD: 10.2 % (ref 4–15)
NEUTROPHILS # BLD AUTO: 3.8 K/UL (ref 1.8–7.7)
NEUTROPHILS NFR BLD: 58.9 % (ref 38–73)
NRBC BLD-RTO: 0 /100 WBC
PLATELET # BLD AUTO: 352 K/UL (ref 150–450)
PMV BLD AUTO: 10.1 FL (ref 9.2–12.9)
POTASSIUM SERPL-SCNC: 4 MMOL/L (ref 3.5–5.1)
RBC # BLD AUTO: 2.58 M/UL (ref 4.6–6.2)
SODIUM SERPL-SCNC: 143 MMOL/L (ref 136–145)
WBC # BLD AUTO: 6.37 K/UL (ref 3.9–12.7)

## 2021-06-21 PROCEDURE — 80048 BASIC METABOLIC PNL TOTAL CA: CPT | Performed by: INTERNAL MEDICINE

## 2021-06-21 PROCEDURE — 85025 COMPLETE CBC W/AUTO DIFF WBC: CPT | Performed by: INTERNAL MEDICINE

## 2021-06-21 PROCEDURE — 36415 COLL VENOUS BLD VENIPUNCTURE: CPT | Performed by: INTERNAL MEDICINE

## 2021-08-04 ENCOUNTER — LAB VISIT (OUTPATIENT)
Dept: LAB | Facility: HOSPITAL | Age: 76
End: 2021-08-04
Attending: INTERNAL MEDICINE
Payer: MEDICARE

## 2021-08-04 DIAGNOSIS — R53.1 ASTHENIA: Primary | ICD-10-CM

## 2021-08-04 DIAGNOSIS — I34.89 MYXOID TRANSFORMATION OF MITRAL VALVE: ICD-10-CM

## 2021-08-04 DIAGNOSIS — E78.5 HYPERLIPEMIA: ICD-10-CM

## 2021-08-04 DIAGNOSIS — I95.9 HYPOTENSION, UNSPECIFIED: ICD-10-CM

## 2021-08-04 DIAGNOSIS — E53.9 VITAMIN B-COMPLEX DEFICIENCY: ICD-10-CM

## 2021-08-04 DIAGNOSIS — E55.9 AVITAMINOSIS D: ICD-10-CM

## 2021-08-04 LAB
ALBUMIN SERPL BCP-MCNC: 3.4 G/DL (ref 3.5–5.2)
ALBUMIN/CREAT UR: NORMAL MG/MG (ref 0–30)
ALP SERPL-CCNC: 80 U/L (ref 55–135)
ALT SERPL W/O P-5'-P-CCNC: 21 U/L (ref 10–44)
ANION GAP SERPL CALC-SCNC: 5 MMOL/L (ref 8–16)
AST SERPL-CCNC: 34 U/L (ref 10–40)
BASOPHILS # BLD AUTO: 0.07 K/UL (ref 0–0.2)
BASOPHILS NFR BLD: 1.1 % (ref 0–1.9)
BILIRUB SERPL-MCNC: 0.8 MG/DL (ref 0.1–1)
BUN SERPL-MCNC: 18 MG/DL (ref 8–23)
CALCIUM SERPL-MCNC: 9.4 MG/DL (ref 8.7–10.5)
CHLORIDE SERPL-SCNC: 106 MMOL/L (ref 95–110)
CHOLEST SERPL-MCNC: 123 MG/DL (ref 120–199)
CHOLEST/HDLC SERPL: 3.2 {RATIO} (ref 2–5)
CO2 SERPL-SCNC: 31 MMOL/L (ref 23–29)
CREAT SERPL-MCNC: 0.7 MG/DL (ref 0.5–1.4)
CREAT UR-MCNC: 87.9 MG/DL (ref 23–375)
DIFFERENTIAL METHOD: ABNORMAL
EOSINOPHIL # BLD AUTO: 0.3 K/UL (ref 0–0.5)
EOSINOPHIL NFR BLD: 4.6 % (ref 0–8)
ERYTHROCYTE [DISTWIDTH] IN BLOOD BY AUTOMATED COUNT: 14.8 % (ref 11.5–14.5)
EST. GFR  (AFRICAN AMERICAN): >60 ML/MIN/1.73 M^2
EST. GFR  (NON AFRICAN AMERICAN): >60 ML/MIN/1.73 M^2
FERRITIN SERPL-MCNC: 58 NG/ML (ref 20–300)
GLUCOSE SERPL-MCNC: 75 MG/DL (ref 70–110)
HCT VFR BLD AUTO: 32.5 % (ref 40–54)
HDLC SERPL-MCNC: 38 MG/DL (ref 40–75)
HDLC SERPL: 30.9 % (ref 20–50)
HGB BLD-MCNC: 10.4 G/DL (ref 14–18)
IMM GRANULOCYTES # BLD AUTO: 0.02 K/UL (ref 0–0.04)
IMM GRANULOCYTES NFR BLD AUTO: 0.3 % (ref 0–0.5)
IRON SATN MFR SERPL: 44 % (ref 20–50)
IRON SERPL-MCNC: 179 UG/DL (ref 45–160)
LDLC SERPL CALC-MCNC: 69.6 MG/DL (ref 63–159)
LYMPHOCYTES # BLD AUTO: 1.8 K/UL (ref 1–4.8)
LYMPHOCYTES NFR BLD: 28 % (ref 18–48)
MCH RBC QN AUTO: 31.9 PG (ref 27–31)
MCHC RBC AUTO-ENTMCNC: 32 G/DL (ref 32–36)
MCV RBC AUTO: 100 FL (ref 82–98)
MICROALBUMIN UR DL<=1MG/L-MCNC: <5 MG/L
MONOCYTES # BLD AUTO: 0.7 K/UL (ref 0.3–1)
MONOCYTES NFR BLD: 10.8 % (ref 4–15)
NEUTROPHILS # BLD AUTO: 3.6 K/UL (ref 1.8–7.7)
NEUTROPHILS NFR BLD: 55.2 % (ref 38–73)
NONHDLC SERPL-MCNC: 85 MG/DL
NRBC BLD-RTO: 0 /100 WBC
PLATELET # BLD AUTO: 289 K/UL (ref 150–450)
PMV BLD AUTO: 10.6 FL (ref 9.2–12.9)
POTASSIUM SERPL-SCNC: 4.2 MMOL/L (ref 3.5–5.1)
PROT SERPL-MCNC: 6.9 G/DL (ref 6–8.4)
RBC # BLD AUTO: 3.26 M/UL (ref 4.6–6.2)
RETICS/RBC NFR AUTO: 2.3 % (ref 0.4–2)
SODIUM SERPL-SCNC: 142 MMOL/L (ref 136–145)
T4 FREE SERPL-MCNC: 1.2 NG/DL (ref 0.71–1.51)
TOTAL IRON BINDING CAPACITY: 408 UG/DL (ref 250–450)
TRIGL SERPL-MCNC: 77 MG/DL (ref 30–150)
TSH SERPL DL<=0.005 MIU/L-ACNC: 7.21 UIU/ML (ref 0.4–4)
VIT B12 SERPL-MCNC: 383 PG/ML (ref 210–950)
WBC # BLD AUTO: 6.51 K/UL (ref 3.9–12.7)

## 2021-08-04 PROCEDURE — 82306 VITAMIN D 25 HYDROXY: CPT | Performed by: INTERNAL MEDICINE

## 2021-08-04 PROCEDURE — 85045 AUTOMATED RETICULOCYTE COUNT: CPT | Performed by: INTERNAL MEDICINE

## 2021-08-04 PROCEDURE — 80061 LIPID PANEL: CPT | Performed by: INTERNAL MEDICINE

## 2021-08-04 PROCEDURE — 84443 ASSAY THYROID STIM HORMONE: CPT | Performed by: INTERNAL MEDICINE

## 2021-08-04 PROCEDURE — 82607 VITAMIN B-12: CPT | Mod: GA | Performed by: INTERNAL MEDICINE

## 2021-08-04 PROCEDURE — 80053 COMPREHEN METABOLIC PANEL: CPT | Performed by: INTERNAL MEDICINE

## 2021-08-04 PROCEDURE — 85025 COMPLETE CBC W/AUTO DIFF WBC: CPT | Performed by: INTERNAL MEDICINE

## 2021-08-04 PROCEDURE — 82043 UR ALBUMIN QUANTITATIVE: CPT | Performed by: INTERNAL MEDICINE

## 2021-08-04 PROCEDURE — 82746 ASSAY OF FOLIC ACID SERUM: CPT | Mod: GA | Performed by: INTERNAL MEDICINE

## 2021-08-04 PROCEDURE — 83550 IRON BINDING TEST: CPT | Mod: GA | Performed by: INTERNAL MEDICINE

## 2021-08-04 PROCEDURE — 36415 COLL VENOUS BLD VENIPUNCTURE: CPT | Performed by: INTERNAL MEDICINE

## 2021-08-04 PROCEDURE — 84439 ASSAY OF FREE THYROXINE: CPT | Performed by: INTERNAL MEDICINE

## 2021-08-04 PROCEDURE — 82570 ASSAY OF URINE CREATININE: CPT | Performed by: INTERNAL MEDICINE

## 2021-08-04 PROCEDURE — 82728 ASSAY OF FERRITIN: CPT | Mod: GA | Performed by: INTERNAL MEDICINE

## 2021-08-05 LAB — 25(OH)D3+25(OH)D2 SERPL-MCNC: 21 NG/ML (ref 30–96)

## 2021-10-07 ENCOUNTER — IMMUNIZATION (OUTPATIENT)
Dept: OBSTETRICS AND GYNECOLOGY | Facility: CLINIC | Age: 76
End: 2021-10-07
Payer: MEDICARE

## 2021-10-07 DIAGNOSIS — Z23 NEED FOR VACCINATION: Primary | ICD-10-CM

## 2021-10-07 PROCEDURE — 91300 COVID-19, MRNA, LNP-S, PF, 30 MCG/0.3 ML DOSE VACCINE: CPT | Mod: PBBFAC

## 2022-01-10 PROBLEM — E88.89 DEFICIENCY OF COENZYME Q10: Status: ACTIVE | Noted: 2022-01-10

## 2022-01-10 PROBLEM — E61.8 DEFICIENCY OF COENZYME Q10: Status: ACTIVE | Noted: 2022-01-10

## 2022-01-10 PROBLEM — Z00.00 ROUTINE GENERAL MEDICAL EXAMINATION AT A HEALTH CARE FACILITY: Status: ACTIVE | Noted: 2022-01-10

## 2022-01-10 PROBLEM — H61.20 CERUMEN IMPACTION: Status: ACTIVE | Noted: 2022-01-10

## 2022-01-10 PROBLEM — E63.0 ESSENTIAL FATTY ACID (EFA) DEFICIENCY: Status: ACTIVE | Noted: 2022-01-10

## 2022-04-11 PROBLEM — Z00.00 ROUTINE GENERAL MEDICAL EXAMINATION AT A HEALTH CARE FACILITY: Status: RESOLVED | Noted: 2022-01-10 | Resolved: 2022-04-11

## 2022-05-23 PROBLEM — H61.20 CERUMEN IMPACTION: Status: RESOLVED | Noted: 2022-01-10 | Resolved: 2022-05-23

## 2022-09-16 PROBLEM — Z23 ENCOUNTER FOR IMMUNIZATION: Status: ACTIVE | Noted: 2022-09-16

## 2022-12-29 ENCOUNTER — LAB VISIT (OUTPATIENT)
Dept: LAB | Facility: HOSPITAL | Age: 77
End: 2022-12-29
Attending: FAMILY MEDICINE
Payer: MEDICARE

## 2022-12-29 DIAGNOSIS — N30.01 ACUTE CYSTITIS WITH HEMATURIA: ICD-10-CM

## 2022-12-29 LAB
BACTERIA #/AREA URNS HPF: ABNORMAL /HPF
BILIRUB UR QL STRIP: ABNORMAL
CLARITY UR: ABNORMAL
COLOR UR: YELLOW
GLUCOSE UR QL STRIP: NEGATIVE
HGB UR QL STRIP: ABNORMAL
HYALINE CASTS #/AREA URNS LPF: 0 /LPF
KETONES UR QL STRIP: ABNORMAL
LEUKOCYTE ESTERASE UR QL STRIP: ABNORMAL
MICROSCOPIC COMMENT: ABNORMAL
NITRITE UR QL STRIP: POSITIVE
PH UR STRIP: 5 [PH] (ref 5–8)
PROT UR QL STRIP: ABNORMAL
RBC #/AREA URNS HPF: 78 /HPF (ref 0–4)
SP GR UR STRIP: 1.02 (ref 1–1.03)
SQUAMOUS #/AREA URNS HPF: 28 /HPF
URN SPEC COLLECT METH UR: ABNORMAL
UROBILINOGEN UR STRIP-ACNC: 1 EU/DL
WBC #/AREA URNS HPF: >100 /HPF (ref 0–5)

## 2022-12-29 PROCEDURE — 81000 URINALYSIS NONAUTO W/SCOPE: CPT | Performed by: FAMILY MEDICINE

## 2022-12-29 PROCEDURE — 87186 SC STD MICRODIL/AGAR DIL: CPT | Performed by: FAMILY MEDICINE

## 2022-12-29 PROCEDURE — 87077 CULTURE AEROBIC IDENTIFY: CPT | Performed by: FAMILY MEDICINE

## 2022-12-29 PROCEDURE — 87088 URINE BACTERIA CULTURE: CPT | Performed by: FAMILY MEDICINE

## 2022-12-29 PROCEDURE — 87086 URINE CULTURE/COLONY COUNT: CPT | Performed by: FAMILY MEDICINE

## 2022-12-29 NOTE — PROGRESS NOTES
Please inform Mr Ramos that he had a nitrite positive UTI for which we sent in antibiotics for him to [facility]. He can let us know if he doesn't feel some improvement by Monday. We are still waiting for the culture and will be in touch

## 2023-01-01 LAB — BACTERIA UR CULT: ABNORMAL

## 2023-03-24 PROBLEM — Z00.00 ROUTINE GENERAL MEDICAL EXAMINATION AT A HEALTH CARE FACILITY: Status: ACTIVE | Noted: 2023-03-24

## 2023-03-24 PROBLEM — R21 RASH: Status: ACTIVE | Noted: 2023-03-24

## 2023-05-05 PROBLEM — N30.00 ACUTE CYSTITIS WITHOUT HEMATURIA: Status: ACTIVE | Noted: 2023-05-05

## 2023-06-26 PROBLEM — Z00.00 ROUTINE GENERAL MEDICAL EXAMINATION AT A HEALTH CARE FACILITY: Status: RESOLVED | Noted: 2023-03-24 | Resolved: 2023-06-26

## 2023-07-11 ENCOUNTER — LAB VISIT (OUTPATIENT)
Dept: LAB | Facility: HOSPITAL | Age: 78
End: 2023-07-11
Attending: FAMILY MEDICINE
Payer: MEDICARE

## 2023-07-11 DIAGNOSIS — N30.00 ACUTE CYSTITIS WITHOUT HEMATURIA: ICD-10-CM

## 2023-07-11 LAB
BACTERIA #/AREA URNS HPF: ABNORMAL /HPF
BILIRUB UR QL STRIP: NEGATIVE
CLARITY UR: ABNORMAL
COLOR UR: YELLOW
GLUCOSE UR QL STRIP: ABNORMAL
HGB UR QL STRIP: ABNORMAL
HYALINE CASTS #/AREA URNS LPF: 3.1 /LPF
KETONES UR QL STRIP: NEGATIVE
LEUKOCYTE ESTERASE UR QL STRIP: ABNORMAL
MICROSCOPIC COMMENT: ABNORMAL
NITRITE UR QL STRIP: NEGATIVE
PH UR STRIP: 6 [PH] (ref 5–8)
PROT UR QL STRIP: NEGATIVE
RBC #/AREA URNS HPF: 4 /HPF (ref 0–4)
SP GR UR STRIP: 1.01 (ref 1–1.03)
SQUAMOUS #/AREA URNS HPF: 1 /HPF
URN SPEC COLLECT METH UR: ABNORMAL
UROBILINOGEN UR STRIP-ACNC: NEGATIVE EU/DL
WBC #/AREA URNS HPF: >100 /HPF (ref 0–5)
YEAST URNS QL MICRO: ABNORMAL

## 2023-07-11 PROCEDURE — 87088 URINE BACTERIA CULTURE: CPT | Performed by: FAMILY MEDICINE

## 2023-07-11 PROCEDURE — 87186 SC STD MICRODIL/AGAR DIL: CPT | Mod: 59 | Performed by: FAMILY MEDICINE

## 2023-07-11 PROCEDURE — 87086 URINE CULTURE/COLONY COUNT: CPT | Performed by: FAMILY MEDICINE

## 2023-07-11 PROCEDURE — 87077 CULTURE AEROBIC IDENTIFY: CPT | Performed by: FAMILY MEDICINE

## 2023-07-11 PROCEDURE — 81000 URINALYSIS NONAUTO W/SCOPE: CPT | Performed by: FAMILY MEDICINE

## 2023-07-13 LAB — BACTERIA UR CULT: ABNORMAL

## 2023-07-13 NOTE — PROGRESS NOTES
Please let Mr Richard or Mrs Mckeon (spouse) know his urine grew our gram neg rods. We will cover him w/ Cipro   (Spitalenrrique)  while awaiting sensitivities

## 2023-08-28 ENCOUNTER — LAB VISIT (OUTPATIENT)
Dept: LAB | Facility: HOSPITAL | Age: 78
End: 2023-08-28
Attending: INTERNAL MEDICINE
Payer: MEDICARE

## 2023-08-28 DIAGNOSIS — D50.8 IRON DEFICIENCY ANEMIA SECONDARY TO INADEQUATE DIETARY IRON INTAKE: ICD-10-CM

## 2023-08-28 DIAGNOSIS — E78.2 MIXED HYPERLIPIDEMIA: ICD-10-CM

## 2023-08-28 DIAGNOSIS — I34.0 MITRAL VALVE INSUFFICIENCY, UNSPECIFIED ETIOLOGY: ICD-10-CM

## 2023-08-28 DIAGNOSIS — I50.32 CHRONIC HEART FAILURE WITH PRESERVED EJECTION FRACTION (HFPEF): ICD-10-CM

## 2023-08-28 DIAGNOSIS — I10 ESSENTIAL HYPERTENSION: ICD-10-CM

## 2023-08-28 DIAGNOSIS — I95.9 HYPOTENSION, UNSPECIFIED HYPOTENSION TYPE: ICD-10-CM

## 2023-08-28 DIAGNOSIS — I50.32 CHRONIC DIASTOLIC HEART FAILURE: ICD-10-CM

## 2023-08-28 DIAGNOSIS — N18.2 CHRONIC KIDNEY DISEASE, STAGE II (MILD): ICD-10-CM

## 2023-08-28 DIAGNOSIS — Z79.899 ENCOUNTER FOR LONG-TERM (CURRENT) USE OF OTHER MEDICATIONS: ICD-10-CM

## 2023-08-28 DIAGNOSIS — E63.0 ESSENTIAL FATTY ACID (EFA) DEFICIENCY: ICD-10-CM

## 2023-08-28 DIAGNOSIS — D53.9 MACROCYTIC ANEMIA: ICD-10-CM

## 2023-08-28 DIAGNOSIS — E07.9 THYROID DYSFUNCTION: ICD-10-CM

## 2023-08-28 DIAGNOSIS — E88.89 DEFICIENCY OF COENZYME Q10: ICD-10-CM

## 2023-08-28 LAB
ALBUMIN SERPL BCP-MCNC: 3.7 G/DL (ref 3.5–5.2)
ALBUMIN/CREAT UR: ABNORMAL UG/MG (ref 0–30)
ALP SERPL-CCNC: 45 U/L (ref 55–135)
ALT SERPL W/O P-5'-P-CCNC: 23 U/L (ref 10–44)
ANION GAP SERPL CALC-SCNC: 3 MMOL/L (ref 8–16)
ANISOCYTOSIS BLD QL SMEAR: ABNORMAL
AST SERPL-CCNC: 32 U/L (ref 10–40)
BASOPHILS # BLD AUTO: 0.1 K/UL (ref 0–0.2)
BASOPHILS NFR BLD: 1.3 % (ref 0–1.9)
BILIRUB SERPL-MCNC: 0.7 MG/DL (ref 0.1–1)
BUN SERPL-MCNC: 21 MG/DL (ref 8–23)
CALCIUM SERPL-MCNC: 9.3 MG/DL (ref 8.7–10.5)
CHLORIDE SERPL-SCNC: 107 MMOL/L (ref 95–110)
CHOLEST SERPL-MCNC: 85 MG/DL (ref 120–199)
CHOLEST/HDLC SERPL: 2.3 {RATIO} (ref 2–5)
CO2 SERPL-SCNC: 28 MMOL/L (ref 23–29)
CREAT SERPL-MCNC: 0.9 MG/DL (ref 0.5–1.4)
CREAT UR-MCNC: <13 MG/DL (ref 23–375)
DACRYOCYTES BLD QL SMEAR: ABNORMAL
DIFFERENTIAL METHOD: ABNORMAL
EOSINOPHIL # BLD AUTO: 0.2 K/UL (ref 0–0.5)
EOSINOPHIL NFR BLD: 2.5 % (ref 0–8)
ERYTHROCYTE [DISTWIDTH] IN BLOOD BY AUTOMATED COUNT: 24.5 % (ref 11.5–14.5)
EST. GFR  (NO RACE VARIABLE): >60 ML/MIN/1.73 M^2
ESTIMATED AVG GLUCOSE: ABNORMAL MG/DL (ref 68–131)
GLUCOSE SERPL-MCNC: 109 MG/DL (ref 70–110)
HBA1C MFR BLD: <3.8 % (ref 4–5.6)
HCT VFR BLD AUTO: 22.2 % (ref 40–54)
HDLC SERPL-MCNC: 37 MG/DL (ref 40–75)
HDLC SERPL: 43.5 % (ref 20–50)
HGB BLD-MCNC: 6 G/DL (ref 14–18)
HYPOCHROMIA BLD QL SMEAR: ABNORMAL
IMM GRANULOCYTES # BLD AUTO: 0.03 K/UL (ref 0–0.04)
IMM GRANULOCYTES NFR BLD AUTO: 0.4 % (ref 0–0.5)
IRON SATN MFR SERPL: 3 % (ref 20–50)
IRON SERPL-MCNC: 19 UG/DL (ref 45–160)
LDLC SERPL CALC-MCNC: 40.6 MG/DL (ref 63–159)
LYMPHOCYTES # BLD AUTO: 2.3 K/UL (ref 1–4.8)
LYMPHOCYTES NFR BLD: 30.8 % (ref 18–48)
MAGNESIUM SERPL-MCNC: 2.3 MG/DL (ref 1.6–2.6)
MCH RBC QN AUTO: 16.3 PG (ref 27–31)
MCHC RBC AUTO-ENTMCNC: 27 G/DL (ref 32–36)
MCV RBC AUTO: 60 FL (ref 82–98)
MICROALBUMIN UR DL<=1MG/L-MCNC: 32.8 MG/L
MONOCYTES # BLD AUTO: 0.8 K/UL (ref 0.3–1)
MONOCYTES NFR BLD: 10.8 % (ref 4–15)
NEUTROPHILS # BLD AUTO: 4.1 K/UL (ref 1.8–7.7)
NEUTROPHILS NFR BLD: 54.2 % (ref 38–73)
NONHDLC SERPL-MCNC: 48 MG/DL
NRBC BLD-RTO: 0 /100 WBC
OVALOCYTES BLD QL SMEAR: ABNORMAL
PLATELET # BLD AUTO: 498 K/UL (ref 150–450)
PLATELET BLD QL SMEAR: ABNORMAL
PMV BLD AUTO: 9.8 FL (ref 9.2–12.9)
POIKILOCYTOSIS BLD QL SMEAR: ABNORMAL
POLYCHROMASIA BLD QL SMEAR: ABNORMAL
POTASSIUM SERPL-SCNC: 3.7 MMOL/L (ref 3.5–5.1)
PROT SERPL-MCNC: 7.9 G/DL (ref 6–8.4)
RBC # BLD AUTO: 3.68 M/UL (ref 4.6–6.2)
RETICS/RBC NFR AUTO: 2.1 % (ref 0.4–2)
SCHISTOCYTES BLD QL SMEAR: ABNORMAL
SCHISTOCYTES BLD QL SMEAR: PRESENT
SODIUM SERPL-SCNC: 138 MMOL/L (ref 136–145)
TARGETS BLD QL SMEAR: ABNORMAL
TOTAL IRON BINDING CAPACITY: 585 UG/DL (ref 250–450)
TRIGL SERPL-MCNC: 37 MG/DL (ref 30–150)
WBC # BLD AUTO: 7.6 K/UL (ref 3.9–12.7)

## 2023-08-28 PROCEDURE — 80053 COMPREHEN METABOLIC PANEL: CPT | Performed by: INTERNAL MEDICINE

## 2023-08-28 PROCEDURE — 36415 COLL VENOUS BLD VENIPUNCTURE: CPT | Performed by: INTERNAL MEDICINE

## 2023-08-28 PROCEDURE — 83540 ASSAY OF IRON: CPT | Performed by: INTERNAL MEDICINE

## 2023-08-28 PROCEDURE — 83735 ASSAY OF MAGNESIUM: CPT | Performed by: INTERNAL MEDICINE

## 2023-08-28 PROCEDURE — 85045 AUTOMATED RETICULOCYTE COUNT: CPT | Performed by: INTERNAL MEDICINE

## 2023-08-28 PROCEDURE — 82570 ASSAY OF URINE CREATININE: CPT | Performed by: INTERNAL MEDICINE

## 2023-08-28 PROCEDURE — 83550 IRON BINDING TEST: CPT | Performed by: INTERNAL MEDICINE

## 2023-08-28 PROCEDURE — 80061 LIPID PANEL: CPT | Performed by: INTERNAL MEDICINE

## 2023-08-28 PROCEDURE — 85025 COMPLETE CBC W/AUTO DIFF WBC: CPT | Performed by: INTERNAL MEDICINE

## 2023-08-28 PROCEDURE — 83036 HEMOGLOBIN GLYCOSYLATED A1C: CPT | Performed by: INTERNAL MEDICINE

## 2023-09-20 ENCOUNTER — HOSPITAL ENCOUNTER (INPATIENT)
Facility: HOSPITAL | Age: 78
LOS: 1 days | Discharge: HOME | DRG: 812 | End: 2023-09-21
Attending: EMERGENCY MEDICINE | Admitting: INTERNAL MEDICINE
Payer: MEDICARE

## 2023-09-20 DIAGNOSIS — D64.9 SYMPTOMATIC ANEMIA: Primary | ICD-10-CM

## 2023-09-20 LAB
ABO + RH BLD: NORMAL
ALBUMIN SERPL BCP-MCNC: 3.3 G/DL (ref 3.5–5.2)
ALP SERPL-CCNC: 46 U/L (ref 55–135)
ALT SERPL W/O P-5'-P-CCNC: 24 U/L (ref 10–44)
ANION GAP SERPL CALC-SCNC: 6 MMOL/L (ref 3–11)
ANISOCYTOSIS BLD QL SMEAR: ABNORMAL
APTT PPP: 24.8 SEC (ref 21–32)
AST SERPL-CCNC: 28 U/L (ref 10–40)
BACTERIA #/AREA URNS HPF: ABNORMAL /HPF
BASOPHILS # BLD AUTO: 0.04 K/UL (ref 0–0.2)
BASOPHILS NFR BLD: 0.6 % (ref 0–1.9)
BILIRUB SERPL-MCNC: 0.7 MG/DL (ref 0.1–1)
BILIRUB UR QL STRIP: NEGATIVE
BLD GP AB SCN CELLS X3 SERPL QL: NORMAL
BUN SERPL-MCNC: 23 MG/DL (ref 8–23)
BURR CELLS BLD QL SMEAR: ABNORMAL
CALCIUM SERPL-MCNC: 8.5 MG/DL (ref 8.7–10.5)
CHLORIDE SERPL-SCNC: 104 MMOL/L (ref 95–110)
CLARITY UR: ABNORMAL
CO2 SERPL-SCNC: 27 MMOL/L (ref 23–29)
COLOR UR: YELLOW
CREAT SERPL-MCNC: 0.9 MG/DL (ref 0.5–1.4)
DACRYOCYTES BLD QL SMEAR: ABNORMAL
DIFFERENTIAL METHOD: ABNORMAL
EOSINOPHIL # BLD AUTO: 0 K/UL (ref 0–0.5)
EOSINOPHIL NFR BLD: 0.6 % (ref 0–8)
ERYTHROCYTE [DISTWIDTH] IN BLOOD BY AUTOMATED COUNT: 22.9 % (ref 11.5–14.5)
EST. GFR  (NO RACE VARIABLE): >60 ML/MIN/1.73 M^2
ESTIMATED AVG GLUCOSE: ABNORMAL MG/DL (ref 68–131)
FERRITIN SERPL-MCNC: 3 NG/ML (ref 20–300)
GLUCOSE SERPL-MCNC: 132 MG/DL (ref 70–110)
GLUCOSE UR QL STRIP: NEGATIVE
HBA1C MFR BLD: <3.8 % (ref 4–5.6)
HCT VFR BLD AUTO: 17.8 % (ref 40–54)
HGB BLD-MCNC: 4.7 G/DL (ref 14–18)
HGB UR QL STRIP: NEGATIVE
HYALINE CASTS #/AREA URNS LPF: 0.3 /LPF
HYPOCHROMIA BLD QL SMEAR: ABNORMAL
IMM GRANULOCYTES # BLD AUTO: 0.03 K/UL (ref 0–0.04)
IMM GRANULOCYTES NFR BLD AUTO: 0.5 % (ref 0–0.5)
INR PPP: 1.2 (ref 0.8–1.2)
IRON SATN MFR SERPL: 2 % (ref 20–50)
IRON SERPL-MCNC: 11 UG/DL (ref 45–160)
KETONES UR QL STRIP: NEGATIVE
LEUKOCYTE ESTERASE UR QL STRIP: ABNORMAL
LYMPHOCYTES # BLD AUTO: 0.9 K/UL (ref 1–4.8)
LYMPHOCYTES NFR BLD: 14 % (ref 18–48)
MCH RBC QN AUTO: 15.9 PG (ref 27–31)
MCHC RBC AUTO-ENTMCNC: 26.4 G/DL (ref 32–36)
MCV RBC AUTO: 60 FL (ref 82–98)
MICROSCOPIC COMMENT: ABNORMAL
MONOCYTES # BLD AUTO: 0.6 K/UL (ref 0.3–1)
MONOCYTES NFR BLD: 9.2 % (ref 4–15)
NEUTROPHILS # BLD AUTO: 5 K/UL (ref 1.8–7.7)
NEUTROPHILS NFR BLD: 75.1 % (ref 38–73)
NITRITE UR QL STRIP: POSITIVE
NRBC BLD-RTO: 1 /100 WBC
OB PNL STL: NEGATIVE
OVALOCYTES BLD QL SMEAR: ABNORMAL
PH UR STRIP: 6 [PH] (ref 5–8)
PLATELET # BLD AUTO: 490 K/UL (ref 150–450)
PLATELET BLD QL SMEAR: ABNORMAL
PMV BLD AUTO: 9.8 FL (ref 9.2–12.9)
POIKILOCYTOSIS BLD QL SMEAR: ABNORMAL
POTASSIUM SERPL-SCNC: 3.7 MMOL/L (ref 3.5–5.1)
PROT SERPL-MCNC: 7.2 G/DL (ref 6–8.4)
PROT UR QL STRIP: NEGATIVE
PROTHROMBIN TIME: 13 SEC (ref 9–12.5)
RBC # BLD AUTO: 2.95 M/UL (ref 4.6–6.2)
RBC #/AREA URNS HPF: 3 /HPF (ref 0–4)
SCHISTOCYTES BLD QL SMEAR: ABNORMAL
SCHISTOCYTES BLD QL SMEAR: PRESENT
SODIUM SERPL-SCNC: 137 MMOL/L (ref 136–145)
SP GR UR STRIP: 1.01 (ref 1–1.03)
SPECIMEN OUTDATE: NORMAL
SQUAMOUS #/AREA URNS HPF: 0 /HPF
STOMATOCYTES BLD QL SMEAR: PRESENT
TARGETS BLD QL SMEAR: ABNORMAL
TOTAL IRON BINDING CAPACITY: 545 UG/DL (ref 250–450)
URN SPEC COLLECT METH UR: ABNORMAL
UROBILINOGEN UR STRIP-ACNC: 1 EU/DL
VIT B12 SERPL-MCNC: 1372 PG/ML (ref 210–950)
WBC # BLD AUTO: 6.62 K/UL (ref 3.9–12.7)
WBC #/AREA URNS HPF: >100 /HPF (ref 0–5)

## 2023-09-20 PROCEDURE — 85730 THROMBOPLASTIN TIME PARTIAL: CPT | Performed by: EMERGENCY MEDICINE

## 2023-09-20 PROCEDURE — 85025 COMPLETE CBC W/AUTO DIFF WBC: CPT | Performed by: EMERGENCY MEDICINE

## 2023-09-20 PROCEDURE — 81000 URINALYSIS NONAUTO W/SCOPE: CPT | Performed by: EMERGENCY MEDICINE

## 2023-09-20 PROCEDURE — P9016 RBC LEUKOCYTES REDUCED: HCPCS | Performed by: EMERGENCY MEDICINE

## 2023-09-20 PROCEDURE — 86850 RBC ANTIBODY SCREEN: CPT | Performed by: EMERGENCY MEDICINE

## 2023-09-20 PROCEDURE — 87186 SC STD MICRODIL/AGAR DIL: CPT | Performed by: EMERGENCY MEDICINE

## 2023-09-20 PROCEDURE — 82272 OCCULT BLD FECES 1-3 TESTS: CPT | Performed by: EMERGENCY MEDICINE

## 2023-09-20 PROCEDURE — 87088 URINE BACTERIA CULTURE: CPT | Performed by: EMERGENCY MEDICINE

## 2023-09-20 PROCEDURE — 86920 COMPATIBILITY TEST SPIN: CPT | Performed by: EMERGENCY MEDICINE

## 2023-09-20 PROCEDURE — 99291 CRITICAL CARE FIRST HOUR: CPT

## 2023-09-20 PROCEDURE — 85610 PROTHROMBIN TIME: CPT | Performed by: EMERGENCY MEDICINE

## 2023-09-20 PROCEDURE — 83036 HEMOGLOBIN GLYCOSYLATED A1C: CPT | Performed by: EMERGENCY MEDICINE

## 2023-09-20 PROCEDURE — 80053 COMPREHEN METABOLIC PANEL: CPT | Performed by: EMERGENCY MEDICINE

## 2023-09-20 PROCEDURE — 83550 IRON BINDING TEST: CPT | Performed by: EMERGENCY MEDICINE

## 2023-09-20 PROCEDURE — 82728 ASSAY OF FERRITIN: CPT | Performed by: EMERGENCY MEDICINE

## 2023-09-20 PROCEDURE — 87077 CULTURE AEROBIC IDENTIFY: CPT | Performed by: EMERGENCY MEDICINE

## 2023-09-20 PROCEDURE — 25000003 PHARM REV CODE 250: Performed by: EMERGENCY MEDICINE

## 2023-09-20 PROCEDURE — 36415 COLL VENOUS BLD VENIPUNCTURE: CPT | Performed by: EMERGENCY MEDICINE

## 2023-09-20 PROCEDURE — 82607 VITAMIN B-12: CPT | Performed by: EMERGENCY MEDICINE

## 2023-09-20 PROCEDURE — 83540 ASSAY OF IRON: CPT | Performed by: EMERGENCY MEDICINE

## 2023-09-20 PROCEDURE — 36430 TRANSFUSION BLD/BLD COMPNT: CPT

## 2023-09-20 PROCEDURE — 87086 URINE CULTURE/COLONY COUNT: CPT | Performed by: EMERGENCY MEDICINE

## 2023-09-20 PROCEDURE — 21400001 HC TELEMETRY ROOM

## 2023-09-20 RX ORDER — GLUCAGON 1 MG
1 KIT INJECTION
Status: DISCONTINUED | OUTPATIENT
Start: 2023-09-20 | End: 2023-09-20

## 2023-09-20 RX ORDER — NAPROXEN SODIUM 220 MG/1
81 TABLET, FILM COATED ORAL DAILY
Status: DISCONTINUED | OUTPATIENT
Start: 2023-09-21 | End: 2023-09-21 | Stop reason: HOSPADM

## 2023-09-20 RX ORDER — EZETIMIBE 10 MG/1
10 TABLET ORAL DAILY
Status: DISCONTINUED | OUTPATIENT
Start: 2023-09-21 | End: 2023-09-21 | Stop reason: HOSPADM

## 2023-09-20 RX ORDER — INSULIN ASPART 100 [IU]/ML
0-5 INJECTION, SOLUTION INTRAVENOUS; SUBCUTANEOUS
Status: DISCONTINUED | OUTPATIENT
Start: 2023-09-20 | End: 2023-09-20

## 2023-09-20 RX ORDER — SODIUM CHLORIDE 0.9 % (FLUSH) 0.9 %
10 SYRINGE (ML) INJECTION
Status: DISCONTINUED | OUTPATIENT
Start: 2023-09-20 | End: 2023-09-21 | Stop reason: HOSPADM

## 2023-09-20 RX ORDER — TALC
6 POWDER (GRAM) TOPICAL NIGHTLY PRN
Status: DISCONTINUED | OUTPATIENT
Start: 2023-09-20 | End: 2023-09-21 | Stop reason: HOSPADM

## 2023-09-20 RX ORDER — PANTOPRAZOLE SODIUM 40 MG/1
40 TABLET, DELAYED RELEASE ORAL DAILY
Status: DISCONTINUED | OUTPATIENT
Start: 2023-09-21 | End: 2023-09-21 | Stop reason: HOSPADM

## 2023-09-20 RX ORDER — HYDROCODONE BITARTRATE AND ACETAMINOPHEN 500; 5 MG/1; MG/1
TABLET ORAL
Status: DISCONTINUED | OUTPATIENT
Start: 2023-09-20 | End: 2023-09-21 | Stop reason: HOSPADM

## 2023-09-20 RX ORDER — LEVOTHYROXINE SODIUM 50 UG/1
50 TABLET ORAL
Status: DISCONTINUED | OUTPATIENT
Start: 2023-09-21 | End: 2023-09-21 | Stop reason: HOSPADM

## 2023-09-20 RX ORDER — ACETAMINOPHEN 325 MG/1
650 TABLET ORAL EVERY 8 HOURS PRN
Status: DISCONTINUED | OUTPATIENT
Start: 2023-09-20 | End: 2023-09-20

## 2023-09-20 RX ORDER — IBUPROFEN 200 MG
16 TABLET ORAL
Status: DISCONTINUED | OUTPATIENT
Start: 2023-09-20 | End: 2023-09-20

## 2023-09-20 RX ORDER — ATORVASTATIN CALCIUM 80 MG/1
80 TABLET, FILM COATED ORAL NIGHTLY
Status: DISCONTINUED | OUTPATIENT
Start: 2023-09-20 | End: 2023-09-21 | Stop reason: HOSPADM

## 2023-09-20 RX ORDER — IBUPROFEN 200 MG
24 TABLET ORAL
Status: DISCONTINUED | OUTPATIENT
Start: 2023-09-20 | End: 2023-09-20

## 2023-09-20 RX ORDER — ONDANSETRON 2 MG/ML
4 INJECTION INTRAMUSCULAR; INTRAVENOUS EVERY 8 HOURS PRN
Status: DISCONTINUED | OUTPATIENT
Start: 2023-09-20 | End: 2023-09-21 | Stop reason: HOSPADM

## 2023-09-20 RX ADMIN — ATORVASTATIN CALCIUM 80 MG: 80 TABLET, FILM COATED ORAL at 09:09

## 2023-09-20 NOTE — ED PROVIDER NOTES
Encounter Date: 9/20/2023       History     Chief Complaint   Patient presents with    Dizziness     Patient reports dizziness this morning. Hemoglobin was 6 on last labs 3 weeks ago. Referred to ED by Lillie     78-year-old male with history of anemia, gastric ulcer requiring transfusion, hypertension presents to the emergency department after having blood work performed 3 weeks ago, hemoglobin was 6.0.  Patient's daughter called his primary care physician today, was told to come to the emergency department to address this.  His only complaint is generalized weakness.  He is pale.  Denies any obvious blood in stool.  Denies any abdominal pain.  No nausea vomiting.  Denies any chest pain or shortness of breath      Review of patient's allergies indicates:  No Known Allergies  Past Medical History:   Diagnosis Date    Anemia     Arthritis     Bilateral lower extremity edema     Carpal tunnel syndrome, bilateral     Chronic diastolic congestive heart failure     Colon polyp     Coronary artery disease     Depression due to physical illness     Encounter for blood transfusion     Gastric ulcer     History of herpes zoster     Hyperlipemia     Hypertension     Moderate tricuspid insufficiency     NSVT (nonsustained ventricular tachycardia)     PAC (premature atrial contraction)     Patent foramen ovale with right to left shunt     Pneumonia     Pulmonary hypertension     PVC's (premature ventricular contractions)     Severe mitral regurgitation     Shingles     Stroke     MINI TRAUMA; RIGHT SIDED WEAKNESS     Past Surgical History:   Procedure Laterality Date    CARDIAC CATHETERIZATION      COLONOSCOPY N/A 11/23/2020    Procedure: COLONOSCOPY;  Surgeon: Abelino Garcia MD;  Location: [facility];  Service: General;  Laterality: N/A;    ESOPHAGOGASTRODUODENOSCOPY      ESOPHAGOGASTRODUODENOSCOPY N/A 5/21/2021    Procedure: EGD (ESOPHAGOGASTRODUODENOSCOPY);  Surgeon: Sunny Rea MD;  [facility];  Service:  Endoscopy;  Laterality: N/A;  COVID-VACCINATED    IMPLANTATION OF MITRAL VALVE LEAFLET CLIP Right 2021    Procedure: INSERTION, LEAFLET CLIP, MITRAL VALVE;  Surgeon: Zen Reina MD;  Location: [facility] CATH;  Service: Cardiovascular;  Laterality: Right;    NECK SURGERY      anterior cervical fusion x 2    TONSILLECTOMY      TRANSESOPHAGEAL ECHOCARDIOGRAPHY       Family History   Problem Relation Age of Onset    Stroke Mother     Heart attack Father     Cancer Brother      Social History     Tobacco Use    Smoking status: Former     Types: Cigars     Start date:      Quit date:      Years since quittin.7    Smokeless tobacco: Never   Substance Use Topics    Alcohol use: Not Currently    Drug use: Never     Review of Systems   Constitutional:  Negative for fever.   HENT:  Negative for sore throat.    Respiratory:  Negative for shortness of breath.    Cardiovascular:  Negative for chest pain.   Gastrointestinal:  Negative for nausea.   Genitourinary:  Negative for dysuria.   Musculoskeletal:  Negative for back pain.   Skin:  Positive for pallor. Negative for rash.   Neurological:  Positive for weakness.   Hematological:  Does not bruise/bleed easily.       Physical Exam     Initial Vitals [23 1417]   BP Pulse Resp Temp SpO2   (!) 115/54 91 20 98.3 °F (36.8 °C) 98 %      MAP       --         Physical Exam    Nursing note and vitals reviewed.  Constitutional: He appears well-developed and well-nourished. He is not diaphoretic. No distress.   HENT:   Head: Normocephalic and atraumatic.   Mouth/Throat: Oropharynx is clear and moist.   Eyes: Conjunctivae and EOM are normal. Pupils are equal, round, and reactive to light. Right eye exhibits no discharge. Left eye exhibits no discharge. No scleral icterus.   Neck: Neck supple. No JVD present.   Normal range of motion.  Cardiovascular:  Normal rate, regular rhythm, normal heart sounds and intact distal pulses.           No murmur  heard.  Pulmonary/Chest: Breath sounds normal. No stridor. No respiratory distress. He has no wheezes. He has no rhonchi. He has no rales. He exhibits no tenderness.   Abdominal: Abdomen is soft. Bowel sounds are normal. He exhibits no distension and no mass. There is no abdominal tenderness. There is no rebound and no guarding.   Genitourinary:    Genitourinary Comments: Rectal exam performed with family and female RN at bedside, stool is brown in color, no overt melena no hematochezia no hemorrhoids     Musculoskeletal:         General: No tenderness or edema. Normal range of motion.      Cervical back: Normal range of motion and neck supple.     Neurological: He is alert and oriented to person, place, and time. He has normal strength. GCS score is 15. GCS eye subscore is 4. GCS verbal subscore is 5. GCS motor subscore is 6.   Skin: Skin is warm and dry. Capillary refill takes less than 2 seconds. There is pallor.         ED Course   Critical Care    Date/Time: 9/20/2023 3:12 PM    Performed by: Jefferson Martinez MD  Authorized by: Drew Moreira Jr., MD  Direct patient critical care time: 10 minutes  Additional history critical care time: 10 minutes  Ordering / reviewing critical care time: 10 minutes  Documentation critical care time: 10 minutes  Consulting other physicians critical care time: 10 minutes  Consult with family critical care time: 10 minutes  Total critical care time (exclusive of procedural time) : 60 minutes  Critical care was necessary to treat or prevent imminent or life-threatening deterioration of the following conditions: Symptomatic anemia requiring transfusion.  Critical care was time spent personally by me on the following activities: review of old charts, re-evaluation of patient's condition, pulse oximetry, ordering and review of laboratory studies, ordering and performing treatments and interventions, obtaining history from patient or surrogate, examination of patient, evaluation  of patient's response to treatment, discussions with primary provider and development of treatment plan with patient or surrogate.        Labs Reviewed   CBC W/ AUTO DIFFERENTIAL - Abnormal; Notable for the following components:       Result Value    RBC 2.95 (*)     Hemoglobin 4.7 (*)     Hematocrit 17.8 (*)     MCV 60 (*)     MCH 15.9 (*)     MCHC 26.4 (*)     RDW 22.9 (*)     Platelets 490 (*)     Lymph # 0.9 (*)     nRBC 1 (*)     Gran % 75.1 (*)     Lymph % 14.0 (*)     All other components within normal limits    Narrative:      H&H   critical result(s) called and verbal readback obtained from   THEE Gamez by [facility] 09/20/2023 15:01   COMPREHENSIVE METABOLIC PANEL - Abnormal; Notable for the following components:    Glucose 132 (*)     Calcium 8.5 (*)     Albumin 3.3 (*)     Alkaline Phosphatase 46 (*)     All other components within normal limits   OCCULT BLOOD X 1, STOOL   URINALYSIS, REFLEX TO URINE CULTURE   PROTIME-INR   APTT   IRON AND TIBC   VITAMIN B12   FERRITIN   TYPE & SCREEN   PREPARE RBC SOFT          Imaging Results    None          Medications   0.9%  NaCl infusion (for blood administration) (has no administration in time range)     Medical Decision Making  Amount and/or Complexity of Data Reviewed  Labs: ordered. Decision-making details documented in ED Course.    Risk  Prescription drug management.  Decision regarding hospitalization.               ED Course as of 09/20/23 1513   Wed Sep 20, 2023   1454 Occult Blood: Negative [SD]      ED Course User Index  [SD] Jefferson Martinez MD               Medical Decision Making:   Differential Diagnosis:   Anemia, GI bleed      Clinical Impression:   Final diagnoses:  [D64.9] Symptomatic anemia (Primary)        ED Disposition Condition    Admit Stable                Jefferson Martinez MD  09/20/23 1513

## 2023-09-21 VITALS
DIASTOLIC BLOOD PRESSURE: 57 MMHG | HEART RATE: 79 BPM | RESPIRATION RATE: 20 BRPM | OXYGEN SATURATION: 95 % | BODY MASS INDEX: 24.96 KG/M2 | WEIGHT: 159 LBS | HEIGHT: 67 IN | TEMPERATURE: 98 F | SYSTOLIC BLOOD PRESSURE: 113 MMHG

## 2023-09-21 PROBLEM — D64.9 SYMPTOMATIC ANEMIA: Status: ACTIVE | Noted: 2021-06-07

## 2023-09-21 PROBLEM — N39.0 UTI (URINARY TRACT INFECTION): Status: ACTIVE | Noted: 2023-09-21

## 2023-09-21 LAB
ALBUMIN SERPL BCP-MCNC: 3.1 G/DL (ref 3.5–5.2)
ALP SERPL-CCNC: 42 U/L (ref 55–135)
ALT SERPL W/O P-5'-P-CCNC: 22 U/L (ref 10–44)
ANION GAP SERPL CALC-SCNC: 2 MMOL/L (ref 3–11)
AST SERPL-CCNC: 39 U/L (ref 10–40)
BASOPHILS # BLD AUTO: 0.08 K/UL (ref 0–0.2)
BASOPHILS NFR BLD: 0.8 % (ref 0–1.9)
BILIRUB SERPL-MCNC: 2.2 MG/DL (ref 0.1–1)
BLD PROD TYP BPU: NORMAL
BLOOD UNIT EXPIRATION DATE: NORMAL
BLOOD UNIT TYPE CODE: 6200
BLOOD UNIT TYPE: NORMAL
BUN SERPL-MCNC: 17 MG/DL (ref 8–23)
CALCIUM SERPL-MCNC: 8.4 MG/DL (ref 8.7–10.5)
CHLORIDE SERPL-SCNC: 107 MMOL/L (ref 95–110)
CO2 SERPL-SCNC: 29 MMOL/L (ref 23–29)
CODING SYSTEM: NORMAL
CREAT SERPL-MCNC: 0.8 MG/DL (ref 0.5–1.4)
CROSSMATCH INTERPRETATION: NORMAL
DIFFERENTIAL METHOD: ABNORMAL
DISPENSE STATUS: NORMAL
EOSINOPHIL # BLD AUTO: 0.2 K/UL (ref 0–0.5)
EOSINOPHIL NFR BLD: 2 % (ref 0–8)
ERYTHROCYTE [DISTWIDTH] IN BLOOD BY AUTOMATED COUNT: 27.8 % (ref 11.5–14.5)
EST. GFR  (NO RACE VARIABLE): >60 ML/MIN/1.73 M^2
GLUCOSE SERPL-MCNC: 90 MG/DL (ref 70–110)
HCT VFR BLD AUTO: 30.2 % (ref 40–54)
HGB BLD-MCNC: 9.3 G/DL (ref 14–18)
IMM GRANULOCYTES # BLD AUTO: 0.03 K/UL (ref 0–0.04)
IMM GRANULOCYTES NFR BLD AUTO: 0.3 % (ref 0–0.5)
LYMPHOCYTES # BLD AUTO: 1.6 K/UL (ref 1–4.8)
LYMPHOCYTES NFR BLD: 16.4 % (ref 18–48)
MCH RBC QN AUTO: 21.5 PG (ref 27–31)
MCHC RBC AUTO-ENTMCNC: 30.8 G/DL (ref 32–36)
MCV RBC AUTO: 70 FL (ref 82–98)
MONOCYTES # BLD AUTO: 0.8 K/UL (ref 0.3–1)
MONOCYTES NFR BLD: 8.4 % (ref 4–15)
NEUTROPHILS # BLD AUTO: 7 K/UL (ref 1.8–7.7)
NEUTROPHILS NFR BLD: 72.1 % (ref 38–73)
NRBC BLD-RTO: 1 /100 WBC
NUM UNITS TRANS PACKED RBC: NORMAL
PLATELET # BLD AUTO: 386 K/UL (ref 150–450)
PMV BLD AUTO: 9.7 FL (ref 9.2–12.9)
POTASSIUM SERPL-SCNC: 3.7 MMOL/L (ref 3.5–5.1)
PROT SERPL-MCNC: 6.6 G/DL (ref 6–8.4)
RBC # BLD AUTO: 4.33 M/UL (ref 4.6–6.2)
SODIUM SERPL-SCNC: 138 MMOL/L (ref 136–145)
WBC # BLD AUTO: 9.66 K/UL (ref 3.9–12.7)

## 2023-09-21 PROCEDURE — 80053 COMPREHEN METABOLIC PANEL: CPT | Performed by: EMERGENCY MEDICINE

## 2023-09-21 PROCEDURE — 25000003 PHARM REV CODE 250: Performed by: EMERGENCY MEDICINE

## 2023-09-21 PROCEDURE — 85025 COMPLETE CBC W/AUTO DIFF WBC: CPT | Performed by: EMERGENCY MEDICINE

## 2023-09-21 PROCEDURE — P9016 RBC LEUKOCYTES REDUCED: HCPCS | Performed by: EMERGENCY MEDICINE

## 2023-09-21 PROCEDURE — 36415 COLL VENOUS BLD VENIPUNCTURE: CPT | Performed by: EMERGENCY MEDICINE

## 2023-09-21 PROCEDURE — 63600175 PHARM REV CODE 636 W HCPCS: Performed by: INTERNAL MEDICINE

## 2023-09-21 PROCEDURE — 25000003 PHARM REV CODE 250: Performed by: INTERNAL MEDICINE

## 2023-09-21 RX ORDER — CEFUROXIME AXETIL 500 MG/1
500 TABLET ORAL 2 TIMES DAILY
Qty: 14 TABLET | Refills: 0 | Status: ON HOLD | OUTPATIENT
Start: 2023-09-21 | End: 2023-10-06 | Stop reason: HOSPADM

## 2023-09-21 RX ADMIN — ASPIRIN 81 MG 81 MG: 81 TABLET ORAL at 08:09

## 2023-09-21 RX ADMIN — PANTOPRAZOLE SODIUM 40 MG: 40 TABLET, DELAYED RELEASE ORAL at 08:09

## 2023-09-21 RX ADMIN — IRON SUCROSE 500 MG: 20 INJECTION, SOLUTION INTRAVENOUS at 08:09

## 2023-09-21 RX ADMIN — LEVOTHYROXINE SODIUM 50 MCG: 50 TABLET ORAL at 06:09

## 2023-09-21 RX ADMIN — EZETIMIBE 10 MG: 10 TABLET ORAL at 08:09

## 2023-09-21 RX ADMIN — METOPROLOL SUCCINATE 12.5 MG: 25 TABLET, EXTENDED RELEASE ORAL at 08:09

## 2023-09-21 NOTE — H&P
[facility] Medicine  History & Physical    Patient Name: Richard Farr  MRN: [id number]  Patient Class: IP- Inpatient  Admission Date: 9/20/2023  Attending Physician: Drew Moreira Jr., MD   Primary Care Provider: Drew Moreira Jr., MD         Patient information was obtained from ER records.     Subjective:     Principal Problem:Symptomatic anemia    Chief Complaint:   Chief Complaint   Patient presents with    Dizziness     Patient reports dizziness this morning. Hemoglobin was 6 on last labs 3 weeks ago. Referred to ED by Lillie        HPI:   Chief Complaint   Patient presents with    Dizziness       Patient reports dizziness this morning. Hemoglobin was 6 on last labs 3 weeks ago. Referred to ED by Lillie      78-year-old male with history of anemia, gastric ulcer requiring transfusion, hypertension presents to the emergency department after having blood work performed 3 weeks ago, hemoglobin was 6.0.  Patient's daughter called his primary care physician today, was told to come to the emergency department to address this.  His only complaint is generalized weakness.  He is pale.  Denies any obvious blood in stool.  Denies any abdominal pain.  No nausea vomiting.  Denies any chest pain or shortness of breath         Past Medical History:   Diagnosis Date    Anemia     Arthritis     Bilateral lower extremity edema     Carpal tunnel syndrome, bilateral     Chronic diastolic congestive heart failure     Colon polyp     Coronary artery disease     Depression due to physical illness     Encounter for blood transfusion     Gastric ulcer     History of herpes zoster     Hyperlipemia     Hypertension     Moderate tricuspid insufficiency     NSVT (nonsustained ventricular tachycardia)     PAC (premature atrial contraction)     Patent foramen ovale with right to left shunt     Pneumonia     Pulmonary hypertension     PVC's (premature ventricular contractions)     Severe mitral  regurgitation     Shingles     Stroke     MINI TRAUMA; RIGHT SIDED WEAKNESS       Past Surgical History:   Procedure Laterality Date    CARDIAC CATHETERIZATION      COLONOSCOPY N/A 11/23/2020    Procedure: COLONOSCOPY;  Surgeon: Abelino Garcia MD;  Location: [facility];  Service: General;  Laterality: N/A;    ESOPHAGOGASTRODUODENOSCOPY      ESOPHAGOGASTRODUODENOSCOPY N/A 5/21/2021    Procedure: EGD (ESOPHAGOGASTRODUODENOSCOPY);  Surgeon: Sunny Rea MD;  Location: [facility] ENDO;  Service: Endoscopy;  Laterality: N/A;  COVID-VACCINATED    IMPLANTATION OF MITRAL VALVE LEAFLET CLIP Right 5/18/2021    Procedure: INSERTION, LEAFLET CLIP, MITRAL VALVE;  Surgeon: Zen Reina MD;  Location: [facility] CATH;  Service: Cardiovascular;  Laterality: Right;    NECK SURGERY      anterior cervical fusion x 2    TONSILLECTOMY      TRANSESOPHAGEAL ECHOCARDIOGRAPHY         Review of patient's allergies indicates:  No Known Allergies    Current Facility-Administered Medications on File Prior to Encounter   Medication    benzocaine 20 % mouth spray     Current Outpatient Medications on File Prior to Encounter   Medication Sig    aspirin 81 MG Chew Take 81 mg by mouth once daily.    atorvastatin (LIPITOR) 80 MG tablet TAKE 1 TABLET BY MOUTH EVERY EVENING    ezetimibe (ZETIA) 10 mg tablet Take 10 mg by mouth once daily.     fenofibrate (TRICOR) 145 MG tablet Take 145 mg by mouth once daily.    folic acid (FOLVITE) 1 MG tablet Take 1 tablet (1 mg total) by mouth once daily.    furosemide (LASIX) 40 MG tablet Take 1 tablet (40 mg total) by mouth once daily. (Patient taking differently: Take 20 mg by mouth once daily.)    JARDIANCE 10 mg tablet Take 10 mg by mouth once daily.    levothyroxine (SYNTHROID) 50 MCG tablet TAKE 1 TABLET BY MOUTH EVERY DAY BEFORE BREAKFAST.    metoprolol succinate (TOPROL-XL) 25 MG 24 hr tablet TAKE 1/2 TABLET BY MOUTH EVERY DAY    pantoprazole (PROTONIX) 40 MG tablet TAKE 1 TABLET BY  MOUTH EVERY DAY    potassium chloride (MICRO-K) 10 MEQ CpSR Take 20 mEq by mouth once daily.     spironolactone (ALDACTONE) 25 MG tablet Take 0.5 tablets (12.5 mg total) by mouth once daily.     Family History       Problem Relation (Age of Onset)    Cancer Brother    Heart attack Father    Stroke Mother          Tobacco Use    Smoking status: Former     Types: Cigars     Start date:      Quit date:      Years since quittin.7    Smokeless tobacco: Never   Substance and Sexual Activity    Alcohol use: Not Currently    Drug use: Never    Sexual activity: Not on file     Review of Systems   Constitutional:  Positive for fatigue. Negative for chills and fever.   HENT:  Negative for rhinorrhea, sneezing and sore throat.    Eyes:  Negative for pain and visual disturbance.   Respiratory:  Negative for cough and shortness of breath.    Cardiovascular:  Negative for chest pain.   Gastrointestinal:  Negative for abdominal pain, constipation and diarrhea.   Endocrine: Negative for cold intolerance and heat intolerance.   Genitourinary:  Negative for difficulty urinating.   Musculoskeletal:  Negative for arthralgias and joint swelling.   Skin:  Negative for rash.   Allergic/Immunologic: Negative for environmental allergies.   Neurological:  Positive for weakness. Negative for dizziness and syncope.   Hematological:  Does not bruise/bleed easily.   Psychiatric/Behavioral:  Negative for dysphoric mood. The patient is not nervous/anxious.      Objective:     Vital Signs (Most Recent):  Temp: 97.9 °F (36.6 °C) (23)  Pulse: 85 (23)  Resp: 18 (23)  BP: (!) 126/59 (23)  SpO2: 98 % (23) Vital Signs (24h Range):  Temp:  [97.4 °F (36.3 °C)-98.8 °F (37.1 °C)] 97.9 °F (36.6 °C)  Pulse:  [72-94] 85  Resp:  [16-20] 18  SpO2:  [91 %-100 %] 98 %  BP: (109-126)/(54-69) 126/59     Weight: 72.1 kg (158 lb 15.9 oz)  Body mass index is 24.9 kg/m².     Physical Exam  Vitals  and nursing note reviewed.   Constitutional:       General: He is awake. He is not in acute distress.  HENT:      Head: Normocephalic and atraumatic.      Mouth/Throat:      Mouth: Mucous membranes are moist.      Pharynx: Oropharynx is clear.   Eyes:      Conjunctiva/sclera: Conjunctivae normal.      Pupils: Pupils are equal, round, and reactive to light.   Cardiovascular:      Rate and Rhythm: Normal rate and regular rhythm.      Heart sounds: Murmur heard.   Pulmonary:      Effort: Pulmonary effort is normal.      Breath sounds: Normal breath sounds. No wheezing or rales.   Abdominal:      General: Bowel sounds are normal. There is no distension.      Palpations: Abdomen is soft.      Tenderness: There is no abdominal tenderness.   Musculoskeletal:         General: No swelling or tenderness.      Cervical back: No rigidity or tenderness.   Skin:     General: Skin is warm and dry.   Neurological:      Mental Status: He is alert and oriented to person, place, and time.      Motor: Weakness (generalized; improved vs admit) present.      Gait: Gait abnormal.   Psychiatric:         Mood and Affect: Mood normal.         Behavior: Behavior is cooperative.         Thought Content: Thought content normal.              CRANIAL NERVES     CN III, IV, VI   Pupils are equal, round, and reactive to light.       Significant Labs: All pertinent labs within the past 24 hours have been reviewed.    Significant Imaging: I have reviewed all pertinent imaging results/findings within the past 24 hours.    Assessment/Plan:     * Symptomatic anemia  S/p 4 units.  Symptoms resolved.  hemoccult negative.       UTI (urinary tract infection)  Short course of abx at [facility].       Severe mitral regurgitation  At baseline no acute issues.     Essential hypertension  Continue home meds    BP Readings from Last 3 Encounters:   09/21/23 (!) 126/59   07/11/23 (!) 142/58   03/24/23 118/64           Iron deficiency anemia  Provide venofer infusion prior  to dc.       VTE Risk Mitigation (From admission, onward)         Ordered     IP VTE HIGH RISK PATIENT  Once         09/20/23 1608     Place sequential compression device  Until discontinued         09/20/23 1608     Place AVINASH hose  Until discontinued         09/20/23 1608                           Drew Moreira Jr, MD  Department of Hospital Medicine  [facility]

## 2023-09-21 NOTE — ASSESSMENT & PLAN NOTE
Continue home meds    BP Readings from Last 3 Encounters:   09/21/23 (!) 126/59   07/11/23 (!) 142/58   03/24/23 118/64

## 2023-09-21 NOTE — HOSPITAL COURSE
Tolerated 4 units well.  Venofer provided prior to dc.  Stable for dc with close outpatient follow up.

## 2023-09-21 NOTE — ASSESSMENT & PLAN NOTE
S/p 4 units.  Symptoms resolved.  hemoccult negative.     Recent Labs   Lab 08/28/23  0845 09/20/23  1436 09/21/23  0636   Hemoglobin 6.0 L 4.7 LL 9.3 L

## 2023-09-21 NOTE — NURSING
AVS reviewed w/ pt and spouse. IV and telemetry discontinued per discharge orders. Pt discharge home via spouse.

## 2023-09-21 NOTE — DISCHARGE SUMMARY
[facility] Medicine  Discharge Summary      Patient Name: Richard Farr  MRN: [id number]  ESTEFANY: [id number]  Patient Class: IP- Inpatient  Admission Date: 9/20/2023  Hospital Length of Stay: 1 days  Discharge Date and Time:  09/21/2023 8:22 AM  Attending Physician: Drew Moreira Jr., MD   Discharging Provider: Drew Moreira Jr, MD  Primary Care Provider: Drew Moreira Jr., MD    Primary Care Team: Networked reference to record PCT     HPI:   Chief Complaint   Patient presents with    Dizziness       Patient reports dizziness this morning. Hemoglobin was 6 on last labs 3 weeks ago. Referred to ED by Lillie      78-year-old male with history of anemia, gastric ulcer requiring transfusion, hypertension presents to the emergency department after having blood work performed 3 weeks ago, hemoglobin was 6.0.  Patient's daughter called his primary care physician today, was told to come to the emergency department to address this.  His only complaint is generalized weakness.  He is pale.  Denies any obvious blood in stool.  Denies any abdominal pain.  No nausea vomiting.  Denies any chest pain or shortness of breath         * No surgery found *      Hospital Course:   Tolerated 4 units well.  Venofer provided prior to dc.  Stable for dc with close outpatient follow up.       Goals of Care Treatment Preferences:  Code Status: Full Code      Consults:     Cardiac/Vascular  Severe mitral regurgitation  At baseline no acute issues.     Essential hypertension  Continue home meds    BP Readings from Last 3 Encounters:   09/21/23 (!) 126/59   07/11/23 (!) 142/58   03/24/23 118/64           Renal/  UTI (urinary tract infection)  Short course of abx at dc.       Oncology  * Symptomatic anemia  S/p 4 units.  Symptoms resolved.  hemoccult negative.     Recent Labs   Lab 08/28/23  0845 09/20/23  1436 09/21/23  0636   Hemoglobin 6.0 L 4.7 LL 9.3 L           Iron deficiency anemia  Provide venofer infusion  prior to dc.       Final Active Diagnoses:    Diagnosis Date Noted POA    PRINCIPAL PROBLEM:  Symptomatic anemia [D64.9] 06/07/2021 Unknown    UTI (urinary tract infection) [N39.0] 09/21/2023 Unknown    Severe mitral regurgitation [I34.0] 01/29/2021 Yes     Chronic    Essential hypertension [I10] 12/10/2020 Yes     Chronic    Iron deficiency anemia [D50.9] 11/20/2020 Yes      Problems Resolved During this Admission:       Discharged Condition: good    Disposition: Home or Self Care    Follow Up:   Follow-up Information     Drew Moreira Jr., MD Follow up in 6 day(s).    Specialty: Internal Medicine  Why: needs repeat labs  Contact information:  [address] [place] [id number]  [phone]                       Patient Instructions:      Diet diabetic     Activity as tolerated       Significant Diagnostic Studies: Labs: All labs within the past 24 hours have been reviewed    Pending Diagnostic Studies:     None         Medications:  Reconciled Home Medications:      Medication List      START taking these medications    cefUROXime 500 MG tablet  Commonly known as: CEFTIN  Take 1 tablet (500 mg total) by mouth 2 (two) times daily.        CONTINUE taking these medications    aspirin 81 MG Chew  Take 81 mg by mouth once daily.     atorvastatin 80 MG tablet  Commonly known as: LIPITOR  TAKE 1 TABLET BY MOUTH EVERY EVENING     ezetimibe 10 mg tablet  Commonly known as: ZETIA  Take 10 mg by mouth once daily.     fenofibrate 145 MG tablet  Commonly known as: TRICOR  Take 145 mg by mouth once daily.     folic acid 1 MG tablet  Commonly known as: FOLVITE  Take 1 tablet (1 mg total) by mouth once daily.     furosemide 40 MG tablet  Commonly known as: LASIX  Take 1 tablet (40 mg total) by mouth once daily.     levothyroxine 50 MCG tablet  Commonly known as: SYNTHROID  TAKE 1 TABLET BY MOUTH EVERY DAY BEFORE BREAKFAST.     metoprolol succinate 25 MG 24 hr  tablet  Commonly known as: TOPROL-XL  TAKE 1/2 TABLET BY MOUTH EVERY DAY     pantoprazole 40 MG tablet  Commonly known as: PROTONIX  TAKE 1 TABLET BY MOUTH EVERY DAY     potassium chloride 10 MEQ Cpsr  Commonly known as: MICRO-K  Take 20 mEq by mouth once daily.     spironolactone 25 MG tablet  Commonly known as: ALDACTONE  Take 0.5 tablets (12.5 mg total) by mouth once daily.        STOP taking these medications    JARDIANCE 10 mg tablet  Generic drug: empagliflozin            Indwelling Lines/Drains at time of discharge:   Lines/Drains/Airways     None                 Time spent on the discharge of patient: 60 minutes         Drew Moreira Jr, MD  Department of Hospital Medicine  [facility]

## 2023-09-21 NOTE — HPI
Chief Complaint   Patient presents with    Dizziness       Patient reports dizziness this morning. Hemoglobin was 6 on last labs 3 weeks ago. Referred to ED by Lillie      78-year-old male with history of anemia, gastric ulcer requiring transfusion, hypertension presents to the emergency department after having blood work performed 3 weeks ago, hemoglobin was 6.0.  Patient's daughter called his primary care physician today, was told to come to the emergency department to address this.  His only complaint is generalized weakness.  He is pale.  Denies any obvious blood in stool.  Denies any abdominal pain.  No nausea vomiting.  Denies any chest pain or shortness of breath

## 2023-09-21 NOTE — PLAN OF CARE
[place] [facility]  Initial Discharge Assessment       Primary Care Provider: Drew Moreira Jr., MD    Admission Diagnosis: Symptomatic anemia [D64.9]    Admission Date: 9/20/2023  Expected Discharge Date: 9/21/2023    Transition of Care Barriers: None    Payor: MEDICARE / Plan: MEDICARE PART A & B / Product Type: Government /     Extended Emergency Contact Information  Primary Emergency Contact: Linda Farr  Mobile Phone: [phone]  Relation: Spouse  Secondary Emergency Contact: LUIS NEVAREZ  Mobile Phone: [phone]  Relation: Daughter  Preferred language: English   needed? No    Discharge Plan A: Home, Home with family  Discharge Plan B: Home, Home with family      [facility] - [place], [place] - [address]  [address]  [facility] [address]  Phone: [phone] Fax: [phone]      Initial Assessment (most recent)       Adult Discharge Assessment - 09/21/23 0853          Discharge Assessment    Assessment Type Discharge Planning Assessment     When was your last doctors appointment? 05/05/23     Reason For Admission Symptomatic anemia     Walking or Climbing Stairs ambulation difficulty, requires equipment     Mobility Management rollator     Dressing/Bathing bathing difficulty, requires equipment;bathing difficulty, assistance 1 person;dressing difficulty, assistance 1 person     Dressing/Bathing Management showerchair  and grab bars     Do you have any problems with: Needs other help;Errands/Grocery     Home Accessibility other (see comments)   The patient expressed that he has a ramp that is attached to his home.    Home Layout Able to live on 1st floor     Equipment Currently Used at Home rollator;shower chair;grab bar;raised toilet     Readmission within 30 days? No     Patient currently being followed by outpatient case management? No     Do you currently have service(s) that help you manage your care at home? No     Do you take prescription  medications? No     Do you have prescription coverage? Yes     Coverage --   Unable to determine    Do you have any problems affording any of your prescribed medications? No     Is the patient taking medications as prescribed? yes     How do you get to doctors appointments? car, drives self     Are you on dialysis? No     Do you take coumadin? No     DME Needed Upon Discharge  none     Discharge Plan discussed with: Spouse/sig other;Patient     Transition of Care Barriers None     Discharge Plan A Home;Home with family     Discharge Plan B Home;Home with family        Physical Activity    On average, how many days per week do you engage in moderate to strenuous exercise (like a brisk walk)? 0 days     On average, how many minutes do you engage in exercise at this level? 0 min        Financial Resource Strain    How hard is it for you to pay for the very basics like food, housing, medical care, and heating? Not hard at all        Housing Stability    In the last 12 months, was there a time when you were not able to pay the mortgage or rent on time? No     In the last 12 months, how many places have you lived? 1     In the last 12 months, was there a time when you did not have a steady place to sleep or slept in a shelter (including now)? No        Transportation Needs    In the past 12 months, has lack of transportation kept you from medical appointments or from getting medications? No     In the past 12 months, has lack of transportation kept you from meetings, work, or from getting things needed for daily living? No        Food Insecurity    Within the past 12 months, you worried that your food would run out before you got the money to buy more. Never true     Within the past 12 months, the food you bought just didn't last and you didn't have money to get more. Never true        Stress    Do you feel stress - tense, restless, nervous, or anxious, or unable to sleep at night because your mind is troubled all the  time - these days? Only a little        Social Connections    How often do you get together with friends or relatives? Once a week     Do you belong to any clubs or organizations such as [employer] groups, unions, fraternal or athletic groups, or school groups? No     How often do you attend meetings of the clubs or organizations you belong to? Never     Are you , , , , never , or living with a partner?         Alcohol Use    Q2: How many drinks containing alcohol do you have on a typical day when you are drinking? Patient does not drink                 Initial discharge assessment is completed. Spoke to the patient in his room. He was awake, alert, and oriented to the questions being asked. The patient's spouse was present at his bedside. The patient lives at home with his spouse. He plans to return home upon discharge. He does require DME for his care. He also utilize a rollator to ambulate with. At this time, the patient does not require any assistance from case management. SW/CM will remain available.

## 2023-09-21 NOTE — SUBJECTIVE & OBJECTIVE
Past Medical History:   Diagnosis Date    Anemia     Arthritis     Bilateral lower extremity edema     Carpal tunnel syndrome, bilateral     Chronic diastolic congestive heart failure     Colon polyp     Coronary artery disease     Depression due to physical illness     Encounter for blood transfusion     Gastric ulcer     History of herpes zoster     Hyperlipemia     Hypertension     Moderate tricuspid insufficiency     NSVT (nonsustained ventricular tachycardia)     PAC (premature atrial contraction)     Patent foramen ovale with right to left shunt     Pneumonia     Pulmonary hypertension     PVC's (premature ventricular contractions)     Severe mitral regurgitation     Shingles     Stroke     MINI TRAUMA; RIGHT SIDED WEAKNESS       Past Surgical History:   Procedure Laterality Date    CARDIAC CATHETERIZATION      COLONOSCOPY N/A 11/23/2020    Procedure: COLONOSCOPY;  Surgeon: Abelino Garcia MD;  Location: [facility] ENDO;  Service: General;  Laterality: N/A;    ESOPHAGOGASTRODUODENOSCOPY      ESOPHAGOGASTRODUODENOSCOPY N/A 5/21/2021    Procedure: EGD (ESOPHAGOGASTRODUODENOSCOPY);  Surgeon: Sunny Rea MD;  Location: [facility] ENDO;  Service: Endoscopy;  Laterality: N/A;  COVID-VACCINATED    IMPLANTATION OF MITRAL VALVE LEAFLET CLIP Right 5/18/2021    Procedure: INSERTION, LEAFLET CLIP, MITRAL VALVE;  Surgeon: Zen Reina MD;  Location: [facility] CATH;  Service: Cardiovascular;  Laterality: Right;    NECK SURGERY      anterior cervical fusion x 2    TONSILLECTOMY      TRANSESOPHAGEAL ECHOCARDIOGRAPHY         Review of patient's allergies indicates:  No Known Allergies    Current Facility-Administered Medications on File Prior to Encounter   Medication    benzocaine 20 % mouth spray     Current Outpatient Medications on File Prior to Encounter   Medication Sig    aspirin 81 MG Chew Take 81 mg by mouth once daily.    atorvastatin (LIPITOR) 80 MG tablet TAKE 1 TABLET BY MOUTH EVERY EVENING    ezetimibe (ZETIA) 10 mg  tablet Take 10 mg by mouth once daily.     fenofibrate (TRICOR) 145 MG tablet Take 145 mg by mouth once daily.    folic acid (FOLVITE) 1 MG tablet Take 1 tablet (1 mg total) by mouth once daily.    furosemide (LASIX) 40 MG tablet Take 1 tablet (40 mg total) by mouth once daily. (Patient taking differently: Take 20 mg by mouth once daily.)    JARDIANCE 10 mg tablet Take 10 mg by mouth once daily.    levothyroxine (SYNTHROID) 50 MCG tablet TAKE 1 TABLET BY MOUTH EVERY DAY BEFORE BREAKFAST.    metoprolol succinate (TOPROL-XL) 25 MG 24 hr tablet TAKE 1/2 TABLET BY MOUTH EVERY DAY    pantoprazole (PROTONIX) 40 MG tablet TAKE 1 TABLET BY MOUTH EVERY DAY    potassium chloride (MICRO-K) 10 MEQ CpSR Take 20 mEq by mouth once daily.     spironolactone (ALDACTONE) 25 MG tablet Take 0.5 tablets (12.5 mg total) by mouth once daily.     Family History       Problem Relation (Age of Onset)    Cancer Brother    Heart attack Father    Stroke Mother          Tobacco Use    Smoking status: Former     Types: Cigars     Start date:      Quit date:      Years since quittin.7    Smokeless tobacco: Never   Substance and Sexual Activity    Alcohol use: Not Currently    Drug use: Never    Sexual activity: Not on file     Review of Systems   Constitutional:  Positive for fatigue. Negative for chills and fever.   HENT:  Negative for rhinorrhea, sneezing and sore throat.    Eyes:  Negative for pain and visual disturbance.   Respiratory:  Negative for cough and shortness of breath.    Cardiovascular:  Negative for chest pain.   Gastrointestinal:  Negative for abdominal pain, constipation and diarrhea.   Endocrine: Negative for cold intolerance and heat intolerance.   Genitourinary:  Negative for difficulty urinating.   Musculoskeletal:  Negative for arthralgias and joint swelling.   Skin:  Negative for rash.   Allergic/Immunologic: Negative for environmental allergies.   Neurological:  Positive for weakness. Negative for dizziness  and syncope.   Hematological:  Does not bruise/bleed easily.   Psychiatric/Behavioral:  Negative for dysphoric mood. The patient is not nervous/anxious.      Objective:     Vital Signs (Most Recent):  Temp: 97.9 °F (36.6 °C) (09/21/23 0724)  Pulse: 85 (09/21/23 0724)  Resp: 18 (09/21/23 0724)  BP: (!) 126/59 (09/21/23 0724)  SpO2: 98 % (09/21/23 0724) Vital Signs (24h Range):  Temp:  [97.4 °F (36.3 °C)-98.8 °F (37.1 °C)] 97.9 °F (36.6 °C)  Pulse:  [72-94] 85  Resp:  [16-20] 18  SpO2:  [91 %-100 %] 98 %  BP: (109-126)/(54-69) 126/59     Weight: 72.1 kg (158 lb 15.9 oz)  Body mass index is 24.9 kg/m².     Physical Exam  Vitals and nursing note reviewed.   Constitutional:       General: He is awake. He is not in acute distress.  HENT:      Head: Normocephalic and atraumatic.      Mouth/Throat:      Mouth: Mucous membranes are moist.      Pharynx: Oropharynx is clear.   Eyes:      Conjunctiva/sclera: Conjunctivae normal.      Pupils: Pupils are equal, round, and reactive to light.   Cardiovascular:      Rate and Rhythm: Normal rate and regular rhythm.      Heart sounds: Murmur heard.   Pulmonary:      Effort: Pulmonary effort is normal.      Breath sounds: Normal breath sounds. No wheezing or rales.   Abdominal:      General: Bowel sounds are normal. There is no distension.      Palpations: Abdomen is soft.      Tenderness: There is no abdominal tenderness.   Musculoskeletal:         General: No swelling or tenderness.      Cervical back: No rigidity or tenderness.   Skin:     General: Skin is warm and dry.   Neurological:      Mental Status: He is alert and oriented to person, place, and time.      Motor: Weakness (generalized; improved vs admit) present.      Gait: Gait abnormal.   Psychiatric:         Mood and Affect: Mood normal.         Behavior: Behavior is cooperative.         Thought Content: Thought content normal.              CRANIAL NERVES     CN III, IV, VI   Pupils are equal, round, and reactive to  light.       Significant Labs: All pertinent labs within the past 24 hours have been reviewed.    Significant Imaging: I have reviewed all pertinent imaging results/findings within the past 24 hours.

## 2023-09-21 NOTE — PLAN OF CARE
09/21/23 0835   Medicare Message   Important Message from Medicare regarding Discharge Appeal Rights Given to patient/caregiver;Explained to patient/caregiver;Signed/date by patient/caregiver   Date IMM was signed 09/21/23   Time IMM was signed [id number]

## 2023-09-22 ENCOUNTER — HOSPITAL ENCOUNTER (INPATIENT)
Facility: HOSPITAL | Age: 78
LOS: 13 days | Discharge: HOME HEALTH | DRG: 291 | End: 2023-10-06
Attending: EMERGENCY MEDICINE | Admitting: INTERNAL MEDICINE
Payer: MEDICARE

## 2023-09-22 ENCOUNTER — PATIENT OUTREACH (OUTPATIENT)
Dept: ADMINISTRATIVE | Facility: CLINIC | Age: 78
End: 2023-09-22
Payer: MEDICARE

## 2023-09-22 DIAGNOSIS — R13.19 OTHER DYSPHAGIA: ICD-10-CM

## 2023-09-22 DIAGNOSIS — R06.02 SOB (SHORTNESS OF BREATH): ICD-10-CM

## 2023-09-22 DIAGNOSIS — S01.81XA LACERATION OF FOREHEAD, INITIAL ENCOUNTER: ICD-10-CM

## 2023-09-22 DIAGNOSIS — I50.9 HEART FAILURE: ICD-10-CM

## 2023-09-22 DIAGNOSIS — R60.0 LOCALIZED EDEMA: ICD-10-CM

## 2023-09-22 DIAGNOSIS — R09.02 HYPOXIA: ICD-10-CM

## 2023-09-22 DIAGNOSIS — N30.00 ACUTE CYSTITIS WITHOUT HEMATURIA: ICD-10-CM

## 2023-09-22 DIAGNOSIS — I50.9 ACUTE ON CHRONIC CONGESTIVE HEART FAILURE, UNSPECIFIED HEART FAILURE TYPE: ICD-10-CM

## 2023-09-22 DIAGNOSIS — E87.71 TRANSFUSION ASSOCIATED CIRCULATORY OVERLOAD: Primary | ICD-10-CM

## 2023-09-22 LAB
ALBUMIN SERPL BCP-MCNC: 3.2 G/DL (ref 3.5–5.2)
ALP SERPL-CCNC: 50 U/L (ref 55–135)
ALT SERPL W/O P-5'-P-CCNC: 27 U/L (ref 10–44)
ANION GAP SERPL CALC-SCNC: 4 MMOL/L (ref 3–11)
AST SERPL-CCNC: 43 U/L (ref 10–40)
BACTERIA #/AREA URNS HPF: NEGATIVE /HPF
BACTERIA UR CULT: ABNORMAL
BASOPHILS # BLD AUTO: 0.05 K/UL (ref 0–0.2)
BASOPHILS NFR BLD: 0.4 % (ref 0–1.9)
BILIRUB SERPL-MCNC: 1.4 MG/DL (ref 0.1–1)
BILIRUB UR QL STRIP: NEGATIVE
BUN SERPL-MCNC: 14 MG/DL (ref 8–23)
CALCIUM SERPL-MCNC: 8.5 MG/DL (ref 8.7–10.5)
CHLORIDE SERPL-SCNC: 107 MMOL/L (ref 95–110)
CLARITY UR: ABNORMAL
CO2 SERPL-SCNC: 26 MMOL/L (ref 23–29)
COLOR UR: YELLOW
CREAT SERPL-MCNC: 0.8 MG/DL (ref 0.5–1.4)
CTP QC/QA: YES
DIFFERENTIAL METHOD: ABNORMAL
EOSINOPHIL # BLD AUTO: 0 K/UL (ref 0–0.5)
EOSINOPHIL NFR BLD: 0.3 % (ref 0–8)
ERYTHROCYTE [DISTWIDTH] IN BLOOD BY AUTOMATED COUNT: 29.6 % (ref 11.5–14.5)
EST. GFR  (NO RACE VARIABLE): >60 ML/MIN/1.73 M^2
GLUCOSE SERPL-MCNC: 186 MG/DL (ref 70–110)
GLUCOSE UR QL STRIP: NEGATIVE
HCT VFR BLD AUTO: 32.4 % (ref 40–54)
HGB BLD-MCNC: 9.8 G/DL (ref 14–18)
HGB UR QL STRIP: NEGATIVE
HYALINE CASTS #/AREA URNS LPF: 14.6 /LPF
IMM GRANULOCYTES # BLD AUTO: 0.07 K/UL (ref 0–0.04)
IMM GRANULOCYTES NFR BLD AUTO: 0.5 % (ref 0–0.5)
KETONES UR QL STRIP: NEGATIVE
LACTATE SERPL-SCNC: 0.9 MMOL/L (ref 0.5–2.2)
LEUKOCYTE ESTERASE UR QL STRIP: ABNORMAL
LYMPHOCYTES # BLD AUTO: 1.4 K/UL (ref 1–4.8)
LYMPHOCYTES NFR BLD: 10 % (ref 18–48)
MCH RBC QN AUTO: 21.6 PG (ref 27–31)
MCHC RBC AUTO-ENTMCNC: 30.2 G/DL (ref 32–36)
MCV RBC AUTO: 71 FL (ref 82–98)
MICROSCOPIC COMMENT: ABNORMAL
MONOCYTES # BLD AUTO: 1.1 K/UL (ref 0.3–1)
MONOCYTES NFR BLD: 7.6 % (ref 4–15)
NEUTROPHILS # BLD AUTO: 11.4 K/UL (ref 1.8–7.7)
NEUTROPHILS NFR BLD: 81.2 % (ref 38–73)
NITRITE UR QL STRIP: NEGATIVE
NRBC BLD-RTO: 0 /100 WBC
NT-PROBNP SERPL-MCNC: 3287 PG/ML (ref 5–1800)
PH UR STRIP: 6 [PH] (ref 5–8)
PLATELET # BLD AUTO: 402 K/UL (ref 150–450)
PMV BLD AUTO: 9.3 FL (ref 9.2–12.9)
POTASSIUM SERPL-SCNC: 3.9 MMOL/L (ref 3.5–5.1)
PROT SERPL-MCNC: 7.2 G/DL (ref 6–8.4)
PROT UR QL STRIP: ABNORMAL
RBC # BLD AUTO: 4.54 M/UL (ref 4.6–6.2)
RBC #/AREA URNS HPF: 2 /HPF (ref 0–4)
SARS-COV-2 RDRP RESP QL NAA+PROBE: NEGATIVE
SODIUM SERPL-SCNC: 137 MMOL/L (ref 136–145)
SP GR UR STRIP: 1.02 (ref 1–1.03)
SQUAMOUS #/AREA URNS HPF: 1 /HPF
TROPONIN I SERPL DL<=0.01 NG/ML-MCNC: 21.4 PG/ML (ref 0–60)
URN SPEC COLLECT METH UR: ABNORMAL
UROBILINOGEN UR STRIP-ACNC: 1 EU/DL
WBC # BLD AUTO: 13.97 K/UL (ref 3.9–12.7)
WBC #/AREA URNS HPF: >100 /HPF (ref 0–5)

## 2023-09-22 PROCEDURE — 93010 ELECTROCARDIOGRAM REPORT: CPT | Mod: ,,, | Performed by: INTERNAL MEDICINE

## 2023-09-22 PROCEDURE — 87040 BLOOD CULTURE FOR BACTERIA: CPT | Performed by: EMERGENCY MEDICINE

## 2023-09-22 PROCEDURE — 25000242 PHARM REV CODE 250 ALT 637 W/ HCPCS: Performed by: EMERGENCY MEDICINE

## 2023-09-22 PROCEDURE — 83880 ASSAY OF NATRIURETIC PEPTIDE: CPT | Performed by: EMERGENCY MEDICINE

## 2023-09-22 PROCEDURE — 80053 COMPREHEN METABOLIC PANEL: CPT | Performed by: EMERGENCY MEDICINE

## 2023-09-22 PROCEDURE — 83605 ASSAY OF LACTIC ACID: CPT | Performed by: EMERGENCY MEDICINE

## 2023-09-22 PROCEDURE — 93010 EKG 12-LEAD: ICD-10-PCS | Mod: ,,, | Performed by: INTERNAL MEDICINE

## 2023-09-22 PROCEDURE — 36415 COLL VENOUS BLD VENIPUNCTURE: CPT | Performed by: EMERGENCY MEDICINE

## 2023-09-22 PROCEDURE — 87086 URINE CULTURE/COLONY COUNT: CPT | Performed by: EMERGENCY MEDICINE

## 2023-09-22 PROCEDURE — 96374 THER/PROPH/DIAG INJ IV PUSH: CPT

## 2023-09-22 PROCEDURE — 93005 ELECTROCARDIOGRAM TRACING: CPT

## 2023-09-22 PROCEDURE — 94640 AIRWAY INHALATION TREATMENT: CPT | Mod: XB

## 2023-09-22 PROCEDURE — 81000 URINALYSIS NONAUTO W/SCOPE: CPT | Performed by: EMERGENCY MEDICINE

## 2023-09-22 PROCEDURE — 84484 ASSAY OF TROPONIN QUANT: CPT | Performed by: EMERGENCY MEDICINE

## 2023-09-22 PROCEDURE — 87635 SARS-COV-2 COVID-19 AMP PRB: CPT | Performed by: EMERGENCY MEDICINE

## 2023-09-22 PROCEDURE — 94761 N-INVAS EAR/PLS OXIMETRY MLT: CPT

## 2023-09-22 PROCEDURE — 85025 COMPLETE CBC W/AUTO DIFF WBC: CPT | Performed by: EMERGENCY MEDICINE

## 2023-09-22 PROCEDURE — 99285 EMERGENCY DEPT VISIT HI MDM: CPT | Mod: 25

## 2023-09-22 PROCEDURE — 99900035 HC TECH TIME PER 15 MIN (STAT)

## 2023-09-22 PROCEDURE — 99900031 HC PATIENT EDUCATION (STAT)

## 2023-09-22 RX ORDER — ACETAMINOPHEN 500 MG
1000 TABLET ORAL
Status: COMPLETED | OUTPATIENT
Start: 2023-09-22 | End: 2023-09-23

## 2023-09-22 RX ORDER — IPRATROPIUM BROMIDE AND ALBUTEROL SULFATE 2.5; .5 MG/3ML; MG/3ML
3 SOLUTION RESPIRATORY (INHALATION)
Status: COMPLETED | OUTPATIENT
Start: 2023-09-22 | End: 2023-09-22

## 2023-09-22 RX ADMIN — IPRATROPIUM BROMIDE AND ALBUTEROL SULFATE 3 ML: 2.5; .5 SOLUTION RESPIRATORY (INHALATION) at 11:09

## 2023-09-22 NOTE — Clinical Note
Diagnosis: SOB (shortness of breath) [588694]   Admitting Provider:: HOME BARKER JR [[id number]]   Future Attending Provider: HOME BARKER JR [[id number]]   Reason for IP Medical Treatment  (Clinical interventions that can only be accomplished in the IP setting? ) :: CHF   I certify that Inpatient services for greater than or equal to 2 midnights are medically necessary:: Yes   Plans for Post-Acute care--if anticipated (pick the single best option):: A. No post acute care anticipated at this time   Special Needs:: No Special Needs [1]

## 2023-09-22 NOTE — PROGRESS NOTES
C3 nurse attempted to contact Richard Farr for a TCC post hospital discharge follow up call. No answer. No voicemail available. The patient has a scheduled HOSFU appointment with Drew Moreira Jr., MD on 09/27/23 @ 5012.

## 2023-09-23 LAB — LACTATE SERPL-SCNC: 0.9 MMOL/L (ref 0.5–2.2)

## 2023-09-23 PROCEDURE — 63600175 PHARM REV CODE 636 W HCPCS: Performed by: EMERGENCY MEDICINE

## 2023-09-23 PROCEDURE — 36415 COLL VENOUS BLD VENIPUNCTURE: CPT | Performed by: EMERGENCY MEDICINE

## 2023-09-23 PROCEDURE — 27000221 HC OXYGEN, UP TO 24 HOURS

## 2023-09-23 PROCEDURE — 25500020 PHARM REV CODE 255: Performed by: EMERGENCY MEDICINE

## 2023-09-23 PROCEDURE — 94761 N-INVAS EAR/PLS OXIMETRY MLT: CPT

## 2023-09-23 PROCEDURE — 83605 ASSAY OF LACTIC ACID: CPT | Performed by: EMERGENCY MEDICINE

## 2023-09-23 PROCEDURE — 25000003 PHARM REV CODE 250: Performed by: INTERNAL MEDICINE

## 2023-09-23 PROCEDURE — 99900035 HC TECH TIME PER 15 MIN (STAT)

## 2023-09-23 PROCEDURE — 21400001 HC TELEMETRY ROOM

## 2023-09-23 PROCEDURE — 25000003 PHARM REV CODE 250: Performed by: EMERGENCY MEDICINE

## 2023-09-23 PROCEDURE — 99900031 HC PATIENT EDUCATION (STAT)

## 2023-09-23 RX ORDER — FUROSEMIDE 10 MG/ML
40 INJECTION INTRAMUSCULAR; INTRAVENOUS
Status: COMPLETED | OUTPATIENT
Start: 2023-09-23 | End: 2023-09-23

## 2023-09-23 RX ORDER — FOLIC ACID 1 MG/1
1 TABLET ORAL DAILY
Status: DISCONTINUED | OUTPATIENT
Start: 2023-09-23 | End: 2023-10-06 | Stop reason: HOSPADM

## 2023-09-23 RX ORDER — SODIUM CHLORIDE 0.9 % (FLUSH) 0.9 %
10 SYRINGE (ML) INJECTION
Status: DISCONTINUED | OUTPATIENT
Start: 2023-09-23 | End: 2023-10-06 | Stop reason: HOSPADM

## 2023-09-23 RX ORDER — TALC
6 POWDER (GRAM) TOPICAL NIGHTLY PRN
Status: DISCONTINUED | OUTPATIENT
Start: 2023-09-23 | End: 2023-10-06 | Stop reason: HOSPADM

## 2023-09-23 RX ORDER — ATORVASTATIN CALCIUM 80 MG/1
80 TABLET, FILM COATED ORAL NIGHTLY
Status: DISCONTINUED | OUTPATIENT
Start: 2023-09-23 | End: 2023-10-06 | Stop reason: HOSPADM

## 2023-09-23 RX ORDER — PANTOPRAZOLE SODIUM 40 MG/1
40 TABLET, DELAYED RELEASE ORAL DAILY
Status: DISCONTINUED | OUTPATIENT
Start: 2023-09-24 | End: 2023-10-01

## 2023-09-23 RX ORDER — FENOFIBRATE 145 MG/1
145 TABLET, FILM COATED ORAL DAILY
Status: DISCONTINUED | OUTPATIENT
Start: 2023-09-23 | End: 2023-10-06 | Stop reason: HOSPADM

## 2023-09-23 RX ORDER — LANOLIN ALCOHOL/MO/W.PET/CERES
1 CREAM (GRAM) TOPICAL DAILY
Status: DISCONTINUED | OUTPATIENT
Start: 2023-09-23 | End: 2023-10-06 | Stop reason: HOSPADM

## 2023-09-23 RX ORDER — EZETIMIBE 10 MG/1
10 TABLET ORAL DAILY
Status: DISCONTINUED | OUTPATIENT
Start: 2023-09-23 | End: 2023-10-06 | Stop reason: HOSPADM

## 2023-09-23 RX ORDER — FUROSEMIDE 10 MG/ML
40 INJECTION INTRAMUSCULAR; INTRAVENOUS
Status: DISCONTINUED | OUTPATIENT
Start: 2023-09-23 | End: 2023-09-23

## 2023-09-23 RX ORDER — LEVOTHYROXINE SODIUM 50 UG/1
50 TABLET ORAL
Status: DISCONTINUED | OUTPATIENT
Start: 2023-09-24 | End: 2023-10-01

## 2023-09-23 RX ORDER — ONDANSETRON 2 MG/ML
4 INJECTION INTRAMUSCULAR; INTRAVENOUS EVERY 8 HOURS PRN
Status: DISCONTINUED | OUTPATIENT
Start: 2023-09-23 | End: 2023-10-06 | Stop reason: HOSPADM

## 2023-09-23 RX ADMIN — EZETIMIBE 10 MG: 10 TABLET ORAL at 11:09

## 2023-09-23 RX ADMIN — ACETAMINOPHEN 1000 MG: 500 TABLET ORAL at 12:09

## 2023-09-23 RX ADMIN — AZITHROMYCIN MONOHYDRATE 500 MG: 500 INJECTION, POWDER, LYOPHILIZED, FOR SOLUTION INTRAVENOUS at 01:09

## 2023-09-23 RX ADMIN — FERROUS SULFATE TAB 325 MG (65 MG ELEMENTAL FE) 1 EACH: 325 (65 FE) TAB at 11:09

## 2023-09-23 RX ADMIN — FUROSEMIDE 40 MG: 10 INJECTION, SOLUTION INTRAVENOUS at 12:09

## 2023-09-23 RX ADMIN — FENOFIBRATE 145 MG: 145 TABLET, FILM COATED ORAL at 11:09

## 2023-09-23 RX ADMIN — CEFTRIAXONE SODIUM 1 G: 1 INJECTION, POWDER, FOR SOLUTION INTRAMUSCULAR; INTRAVENOUS at 01:09

## 2023-09-23 RX ADMIN — IOHEXOL 100 ML: 350 INJECTION, SOLUTION INTRAVENOUS at 12:09

## 2023-09-23 RX ADMIN — ATORVASTATIN CALCIUM 80 MG: 80 TABLET, FILM COATED ORAL at 08:09

## 2023-09-23 RX ADMIN — FUROSEMIDE 40 MG: 10 INJECTION, SOLUTION INTRAMUSCULAR; INTRAVENOUS at 09:09

## 2023-09-23 RX ADMIN — FOLIC ACID 1 MG: 1 TABLET ORAL at 11:09

## 2023-09-23 NOTE — HPI
ED HPI:  Richard Farr is a 78 y.o. male with PMHX of hypertension, hyperlipidemia, CHF, mitral regurg, tricuspid regurg, pulmonary hypertension, gastritis who presents to the emergency department C/O shortness of breath.     Patient presents after developing shortness of breath 1 hour prior to arrival.  Was recently admitted for anemia and received 4 units PRBCs in hospital as well as iron transfusion 2 days ago.  No fever but patient states he felt hot in emergency department.  Patient was diagnosed with urinary tract infection during previous hospitalization is on antibiotics.     PCP: Drew Moreira Jr., MD     IM HPI:  Patient was recently admitted by primary care and transfuse 4 units of packed red blood cells.  Patient felt much improved return home felt well for about 24 hours then began having increasing dyspnea.  Patient was readmitted found to have a urinary tract infection and a pneumonia.  Patient was started on antibiotics.  He was also given IV diuretics.  Patient has some peripheral edema but overall he states he feels dry and dehydrated.  Patient is having some oropharyngeal dysphagia and on exam I am unable to find the cause.  Patient has a history of CVA as well as a history of arthritis with several upper extremity joint deformities and contractures.

## 2023-09-23 NOTE — ED PROVIDER NOTES
EMERGENCY DEPARTMENT HISTORY AND PHYSICAL EXAM     This note is dictated on M*[employer] word recognition program.  There are word recognition mistakes and grammatical errors that are occasionally missed on review.     Date: 9/22/2023   Patient Name: Richard Farr       History of Presenting Illness      Chief Complaint   Patient presents with    Shortness of Breath     Pt reports being discharged from [facility] after receiving 4 units of packed RBCs to treat anemia. Pt reports experiencing SOB 1 hour PTA. Pt denies COPD. Denies chest pains.            Richard Farr is a 78 y.o. male with PMHX of hypertension, hyperlipidemia, CHF, mitral regurg, tricuspid regurg, pulmonary hypertension, gastritis who presents to the emergency department C/O shortness of breath.    Patient presents after developing shortness of breath 1 hour prior to arrival.  Was recently admitted for anemia and received 4 units PRBCs in hospital as well as iron transfusion 2 days ago.  No fever but patient states he felt hot in emergency department.  Patient was diagnosed with urinary tract infection during previous hospitalization is on antibiotics.    PCP: Drew Moreira Jr., MD        Current Facility-Administered Medications   Medication Dose Route Frequency Provider Last Rate Last Admin    azithromycin (ZITHROMAX) 500 mg in dextrose 5 % (D5W) 250 mL IVPB (Vial-Mate)  500 mg Intravenous Q24H Alek Shah MD        cefTRIAXone (ROCEPHIN) 1 g in dextrose 5 % in water (D5W) 100 mL IVPB (MB+)  1 g Intravenous Q24H Alek Shah MD        furosemide injection 40 mg  40 mg Intravenous Q12H Alek Shah MD         Current Outpatient Medications   Medication Sig Dispense Refill    aspirin 81 MG Chew Take 81 mg by mouth once daily.      atorvastatin (LIPITOR) 80 MG tablet TAKE 1 TABLET BY MOUTH EVERY EVENING 90 tablet 0    cefUROXime (CEFTIN) 500 MG tablet Take 1 tablet (500 mg total) by mouth 2 (two) times daily. 14 tablet 0     ezetimibe (ZETIA) 10 mg tablet Take 10 mg by mouth once daily.       fenofibrate (TRICOR) 145 MG tablet Take 145 mg by mouth once daily.      folic acid (FOLVITE) 1 MG tablet Take 1 tablet (1 mg total) by mouth once daily. 60 tablet 0    furosemide (LASIX) 40 MG tablet Take 1 tablet (40 mg total) by mouth once daily. (Patient taking differently: Take 20 mg by mouth once daily.) 90 tablet 1    levothyroxine (SYNTHROID) 50 MCG tablet TAKE 1 TABLET BY MOUTH EVERY DAY BEFORE BREAKFAST. 90 tablet 1    metoprolol succinate (TOPROL-XL) 25 MG 24 hr tablet TAKE 1/2 TABLET BY MOUTH EVERY DAY 15 tablet 2    pantoprazole (PROTONIX) 40 MG tablet TAKE 1 TABLET BY MOUTH EVERY DAY 30 tablet 2    potassium chloride (MICRO-K) 10 MEQ CpSR Take 20 mEq by mouth once daily.       spironolactone (ALDACTONE) 25 MG tablet Take 0.5 tablets (12.5 mg total) by mouth once daily. 15 tablet 11     Facility-Administered Medications Ordered in Other Encounters   Medication Dose Route Frequency Provider Last Rate Last Admin    benzocaine 20 % mouth spray   Mouth/Throat PRN Beau, Vaughn [facility] MD   1 each at 01/29/21 0638           Past History     Past Medical History:   Past Medical History:   Diagnosis Date    Anemia     Arthritis     Bilateral lower extremity edema     Carpal tunnel syndrome, bilateral     Chronic diastolic congestive heart failure     Colon polyp     Coronary artery disease     Depression due to physical illness     Encounter for blood transfusion     Gastric ulcer     History of herpes zoster     Hyperlipemia     Hypertension     Moderate tricuspid insufficiency     NSVT (nonsustained ventricular tachycardia)     PAC (premature atrial contraction)     Patent foramen ovale with right to left shunt     Pneumonia     Pulmonary hypertension     PVC's (premature ventricular contractions)     Severe mitral regurgitation     Shingles     Stroke     MINI TRAUMA; RIGHT SIDED WEAKNESS        Past Surgical History:   Past Surgical History:    Procedure Laterality Date    CARDIAC CATHETERIZATION      COLONOSCOPY N/A 2020    Procedure: COLONOSCOPY;  Surgeon: Abelino Garcia MD;  Location: [facility] ENDO;  Service: General;  Laterality: N/A;    ESOPHAGOGASTRODUODENOSCOPY      ESOPHAGOGASTRODUODENOSCOPY N/A 2021    Procedure: EGD (ESOPHAGOGASTRODUODENOSCOPY);  Surgeon: Sunny Rea MD;  Location: [facility] ENDO;  Service: Endoscopy;  Laterality: N/A;  COVID-VACCINATED    IMPLANTATION OF MITRAL VALVE LEAFLET CLIP Right 2021    Procedure: INSERTION, LEAFLET CLIP, MITRAL VALVE;  Surgeon: Zen Reina MD;  Location: [facility] CATH;  Service: Cardiovascular;  Laterality: Right;    NECK SURGERY      anterior cervical fusion x 2    TONSILLECTOMY      TRANSESOPHAGEAL ECHOCARDIOGRAPHY          Family History:   Family History   Problem Relation Age of Onset    Stroke Mother     Heart attack Father     Cancer Brother         Social History:   Social History     Tobacco Use    Smoking status: Former     Types: Cigars     Start date:      Quit date:      Years since quittin.7    Smokeless tobacco: Never   Substance Use Topics    Alcohol use: Not Currently    Drug use: Never        Allergies:   Review of patient's allergies indicates:  No Known Allergies       Review of Systems   Review of Systems   See HPI for pertinent positives and negatives       Physical Exam     Vitals:    23 2336 23 2341 23 2346 23 0009   BP:       Pulse: 104 106 109    Resp: (!) 22 (!) 24 (!) 26    Temp:    100.2 °F (37.9 °C)   TempSrc:       SpO2: 96% 98% 98%    Weight:          Physical Exam  Vitals and nursing note reviewed.   Constitutional:       General: He is not in acute distress.     Appearance: Normal appearance. He is well-developed. He is ill-appearing.   HENT:      Head: Normocephalic and atraumatic.   Eyes:      Extraocular Movements: Extraocular movements intact.      Conjunctiva/sclera: Conjunctivae normal.   Cardiovascular:       Rate and Rhythm: Regular rhythm. Tachycardia present.   Pulmonary:      Effort: Pulmonary effort is normal. Tachypnea present. No respiratory distress.      Breath sounds: Examination of the right-lower field reveals rales. Examination of the left-lower field reveals rales. Decreased breath sounds, wheezing and rales present.   Abdominal:      Palpations: Abdomen is soft.   Musculoskeletal:         General: No deformity or signs of injury. Normal range of motion.      Cervical back: Normal range of motion and neck supple. No rigidity.      Right lower leg: Edema present.      Left lower leg: Edema present.   Skin:     General: Skin is dry.      Coloration: Skin is not pale.      Findings: No rash.   Neurological:      General: No focal deficit present.      Mental Status: He is alert and oriented to person, place, and time.      Cranial Nerves: No cranial nerve deficit.      Motor: No weakness.      Coordination: Coordination normal.              Diagnostic Study Results      Labs -   Recent Results (from the past 12 hour(s))   CBC auto differential    Collection Time: 09/22/23 10:44 PM   Result Value Ref Range    WBC 13.97 (H) 3.90 - 12.70 K/uL    RBC 4.54 (L) 4.60 - 6.20 M/uL    Hemoglobin 9.8 (L) 14.0 - 18.0 g/dL    Hematocrit 32.4 (L) 40.0 - 54.0 %    MCV 71 (L) 82 - 98 fL    MCH 21.6 (L) 27.0 - 31.0 pg    MCHC 30.2 (L) 32.0 - 36.0 g/dL    RDW 29.6 (H) 11.5 - 14.5 %    Platelets 402 150 - 450 K/uL    MPV 9.3 9.2 - 12.9 fL    Immature Granulocytes 0.5 0.0 - 0.5 %    Gran # (ANC) 11.4 (H) 1.8 - 7.7 K/uL    Immature Grans (Abs) 0.07 (H) 0.00 - 0.04 K/uL    Lymph # 1.4 1.0 - 4.8 K/uL    Mono # 1.1 (H) 0.3 - 1.0 K/uL    Eos # 0.0 0.0 - 0.5 K/uL    Baso # 0.05 0.00 - 0.20 K/uL    nRBC 0 0 /100 WBC    Gran % 81.2 (H) 38.0 - 73.0 %    Lymph % 10.0 (L) 18.0 - 48.0 %    Mono % 7.6 4.0 - 15.0 %    Eosinophil % 0.3 0.0 - 8.0 %    Basophil % 0.4 0.0 - 1.9 %    Differential Method Automated    Comprehensive metabolic panel     Collection Time: 09/22/23 10:44 PM   Result Value Ref Range    Sodium 137 136 - 145 mmol/L    Potassium 3.9 3.5 - 5.1 mmol/L    Chloride 107 95 - 110 mmol/L    CO2 26 23 - 29 mmol/L    Glucose 186 (H) 70 - 110 mg/dL    BUN 14 8 - 23 mg/dL    Creatinine 0.8 0.5 - 1.4 mg/dL    Calcium 8.5 (L) 8.7 - 10.5 mg/dL    Total Protein 7.2 6.0 - 8.4 g/dL    Albumin 3.2 (L) 3.5 - 5.2 g/dL    Total Bilirubin 1.4 (H) 0.1 - 1.0 mg/dL    Alkaline Phosphatase 50 (L) 55 - 135 U/L    AST 43 (H) 10 - 40 U/L    ALT 27 10 - 44 U/L    eGFR >60.0 >60 mL/min/1.73 m^2    Anion Gap 4 3 - 11 mmol/L   NT-Pro Natriuretic Peptide    Collection Time: 09/22/23 10:44 PM   Result Value Ref Range    NT-proBNP 3287 (H) 5 - 1800 pg/mL   TROPONIN I HIGH SENSITIVITY    Collection Time: 09/22/23 10:44 PM   Result Value Ref Range    Troponin I High Sensitivity 21.4 0.0 - 60.0 pg/mL   Urinalysis, Reflex to Urine Culture Urine, Clean Catch    Collection Time: 09/22/23 10:45 PM    Specimen: Urine, Clean Catch   Result Value Ref Range    Specimen UA Urine, Clean Catch     Color, UA Yellow Yellow, Straw, Shakira    Appearance, UA Cloudy (A) Clear    pH, UA 6.0 5.0 - 8.0    Specific Gravity, UA 1.025 1.005 - 1.030    Protein, UA 1+ (A) Negative    Glucose, UA Negative Negative    Ketones, UA Negative Negative    Bilirubin (UA) Negative Negative    Occult Blood UA Negative Negative    Nitrite, UA Negative Negative    Urobilinogen, UA 1.0 <2.0 EU/dL    Leukocytes, UA 2+ (A) Negative   Urinalysis Microscopic    Collection Time: 09/22/23 10:45 PM   Result Value Ref Range    RBC, UA 2 0 - 4 /hpf    WBC, UA >100 (H) 0 - 5 /hpf    Bacteria Negative None-Occ /hpf    Squam Epithel, UA 1 /hpf    Hyaline Casts, UA 14.6 (A) 0-1/lpf /lpf    Microscopic Comment SEE COMMENT    Lactic acid, plasma #1    Collection Time: 09/22/23 10:54 PM   Result Value Ref Range    Lactate (Lactic Acid) 0.9 0.5 - 2.2 mmol/L   POCT COVID-19 Rapid Screening    Collection Time: 09/22/23 11:23 PM    Result Value Ref Range    POC Rapid COVID Negative Negative     Acceptable Yes         Radiologic Studies -    X-Ray Chest AP Portable    (Results Pending)   CTA Chest Non-Coronary (PE Studies)    (Results Pending)        Medications given in the ED-   Medications   cefTRIAXone (ROCEPHIN) 1 g in dextrose 5 % in water (D5W) 100 mL IVPB (MB+) (has no administration in time range)   azithromycin (ZITHROMAX) 500 mg in dextrose 5 % (D5W) 250 mL IVPB (Vial-Mate) (has no administration in time range)   furosemide injection 40 mg (has no administration in time range)   albuterol-ipratropium 2.5 mg-0.5 mg/3 mL nebulizer solution 3 mL (3 mLs Nebulization Given 9/22/23 2346)   iohexoL (OMNIPAQUE 350) injection 100 mL (100 mLs Intravenous Given 9/23/23 0003)   acetaminophen tablet 1,000 mg (1,000 mg Oral Given 9/23/23 0009)   furosemide injection 40 mg (40 mg Intravenous Given 9/23/23 0023)           Medical Decision Making    I am the first provider for this patient.     I reviewed the vital signs, available nursing notes, past medical history, past surgical history, family history and social history.     Vital Signs:  Reviewed the patient's vital signs.     Pulse Oximetry Analysis and Interpretation:    98% on NC, hypoxia requiring supplemental oxygen      EKG Interpretation: (Per my independent interpretation, pending formal read)   Interpreted by Alek Shah MD    Sinus tachycardia rate of 111, LVH, occasional PVCs, no STEMI     CXR  Interpretation: (Per my independent interpretation, pending formal read)   CXR read by Dr. Alek Shah     Cardiomegaly, no focal infiltrate, no pleural effusion, similar to prior study, hypoinflated lung fields     External Test Results (Pertinent to encounter):    Records Reviewed: Nursing Notes and Old Medical Records    History Obtained By: Patient and Spouse    Provider Notes: Richard Farr is a 78 y.o. male with SOB    Co-morbidities Considered: CHF, valvular  disease, recent blood transfusion    Differential Diagnosis: TRALI/TACO, Sepsis, PNA, UTI, CHF, hypervolemia      ED Course:    12:40 AM  Patient presents with shortness breath and increased work of breathing.  Borderline febrile in .2.  Patient tachypneic and tachycardic requiring supplemental oxygen.  Not in overt respiratory distress.  Patient with chronic lower extremity edema and history of CHF and valvular disease.  Receive 4 units PRBC over 48 hours ago.  Chest x-ray demonstrates cardiomegaly, no focal infiltrate per my read.  CTA was ordered to evaluate for PE.  Possible trolley however is now been over 2 days since transfusion and no overt pulmonary edema on chest x-ray.  More likely volume overload.  Patient is hypertensive and will diurese.  Currently on antibiotics for UTI.  No evidence for pneumonia.  Lactate not elevated.  Troponin not elevated.  ProBNP mildly elevated but decreased from prior values.    ED Course as of 09/23/23 0054   Sat Sep 23, 2023   0019 WBC(!): 13.97 [MO]   0019 Hemoglobin(!): 9.8 [MO]   0019 Creatinine: 0.8 [MO]   0020 Leukocytes, UA(!): 2+ [MO]   0020 WBC, UA(!): >100 [MO]   0020 Bacteria, UA: Negative [MO]   0020 Troponin I High Sensitivity: 21.4 [MO]   0020 NT-proBNP(!): 3287 [MO]   0036 Lactate, Jorge: 0.9 [MO]      ED Course User Index  [MO] Alek Shah MD     CONSULT NOTE:    12:51 AM      Dr. Shah discussed care with?Dr Pierce, Hospitalist   It was a standard discussion,?including history of patients chief complaint, available diagnostic results, and treatment course.?Discussed case. Agrees with admission    12:52 AM  Reviewed CT a report which demonstrates no PE, small right pleural effusion and subsegmental airspace disease of lower lobes which is likely an at length this is but may represent a pneumonia.  Will plan on admitting patient for diuresis.  Given diagnosis of UTI mild leukocytosis will transition to IV antibiotics that would cover both the  UTI and potential pneumonia.  Low suspicion for TRALI given lack of significant airspace disease.  Suspect this is more likely overload in the setting of congestive heart failure and valvular disease             Problems Addressed:  CHF, acute hypoxic respiratory failure    Procedures:   Procedures       Diagnosis and Disposition     Critical Care:             CLINICAL IMPRESSION:         1. Transfusion associated circulatory overload    2. SOB (shortness of breath)    3. Acute on chronic congestive heart failure, unspecified heart failure type    4. Hypoxia              PLAN:   1. Admit to Hospital  2.      Medication List        ASK your doctor about these medications      aspirin 81 MG Chew     atorvastatin 80 MG tablet  Commonly known as: LIPITOR  TAKE 1 TABLET BY MOUTH EVERY EVENING     cefUROXime 500 MG tablet  Commonly known as: CEFTIN  Take 1 tablet (500 mg total) by mouth 2 (two) times daily.     ezetimibe 10 mg tablet  Commonly known as: ZETIA     fenofibrate 145 MG tablet  Commonly known as: TRICOR     folic acid 1 MG tablet  Commonly known as: FOLVITE  Take 1 tablet (1 mg total) by mouth once daily.     furosemide 40 MG tablet  Commonly known as: LASIX  Take 1 tablet (40 mg total) by mouth once daily.     levothyroxine 50 MCG tablet  Commonly known as: SYNTHROID  TAKE 1 TABLET BY MOUTH EVERY DAY BEFORE BREAKFAST.     metoprolol succinate 25 MG 24 hr tablet  Commonly known as: TOPROL-XL  TAKE 1/2 TABLET BY MOUTH EVERY DAY     pantoprazole 40 MG tablet  Commonly known as: PROTONIX  TAKE 1 TABLET BY MOUTH EVERY DAY     potassium chloride 10 MEQ Cpsr  Commonly known as: MICRO-K     spironolactone 25 MG tablet  Commonly known as: ALDACTONE  Take 0.5 tablets (12.5 mg total) by mouth once daily.             3. No follow-up provider specified.     _______________________________     Please note that this dictation was completed with [employer] the [employer] voice recognition software.  Quite often unanticipated  grammatical, syntax, homophones, and other interpretive errors are inadvertently transcribed by the computer software.  Please disregard these errors.  Please excuse any errors that have escaped final proofreading.             Alek Shah MD  09/23/23 0054

## 2023-09-23 NOTE — SUBJECTIVE & OBJECTIVE
Past Medical History:   Diagnosis Date    Anemia     Arthritis     Bilateral lower extremity edema     Carpal tunnel syndrome, bilateral     Chronic diastolic congestive heart failure     Colon polyp     Coronary artery disease     Depression due to physical illness     Encounter for blood transfusion     Gastric ulcer     History of herpes zoster     Hyperlipemia     Hypertension     Moderate tricuspid insufficiency     NSVT (nonsustained ventricular tachycardia)     PAC (premature atrial contraction)     Patent foramen ovale with right to left shunt     Pneumonia     Pulmonary hypertension     PVC's (premature ventricular contractions)     Severe mitral regurgitation     Shingles     Stroke     MINI TRAUMA; RIGHT SIDED WEAKNESS       Past Surgical History:   Procedure Laterality Date    CARDIAC CATHETERIZATION      COLONOSCOPY N/A 11/23/2020    Procedure: COLONOSCOPY;  Surgeon: Abelino Garcia MD;  Location: [facility] ENDO;  Service: General;  Laterality: N/A;    ESOPHAGOGASTRODUODENOSCOPY      ESOPHAGOGASTRODUODENOSCOPY N/A 5/21/2021    Procedure: EGD (ESOPHAGOGASTRODUODENOSCOPY);  Surgeon: Sunny Rea MD;  Location: [facility] ENDO;  Service: Endoscopy;  Laterality: N/A;  COVID-VACCINATED    IMPLANTATION OF MITRAL VALVE LEAFLET CLIP Right 5/18/2021    Procedure: INSERTION, LEAFLET CLIP, MITRAL VALVE;  Surgeon: Zen Reina MD;  Location: [facility] CATH;  Service: Cardiovascular;  Laterality: Right;    NECK SURGERY      anterior cervical fusion x 2    TONSILLECTOMY      TRANSESOPHAGEAL ECHOCARDIOGRAPHY         Review of patient's allergies indicates:  No Known Allergies    Current Facility-Administered Medications on File Prior to Encounter   Medication    benzocaine 20 % mouth spray     Current Outpatient Medications on File Prior to Encounter   Medication Sig    aspirin 81 MG Chew Take 81 mg by mouth once daily.    atorvastatin (LIPITOR) 80 MG tablet TAKE 1 TABLET BY MOUTH EVERY EVENING    cefUROXime (CEFTIN) 500  MG tablet Take 1 tablet (500 mg total) by mouth 2 (two) times daily.    ezetimibe (ZETIA) 10 mg tablet Take 10 mg by mouth once daily.     fenofibrate (TRICOR) 145 MG tablet Take 145 mg by mouth once daily.    folic acid (FOLVITE) 1 MG tablet Take 1 tablet (1 mg total) by mouth once daily.    furosemide (LASIX) 40 MG tablet Take 1 tablet (40 mg total) by mouth once daily. (Patient taking differently: Take 20 mg by mouth once daily.)    levothyroxine (SYNTHROID) 50 MCG tablet TAKE 1 TABLET BY MOUTH EVERY DAY BEFORE BREAKFAST.    metoprolol succinate (TOPROL-XL) 25 MG 24 hr tablet TAKE 1/2 TABLET BY MOUTH EVERY DAY    pantoprazole (PROTONIX) 40 MG tablet TAKE 1 TABLET BY MOUTH EVERY DAY    potassium chloride (MICRO-K) 10 MEQ CpSR Take 20 mEq by mouth once daily.     spironolactone (ALDACTONE) 25 MG tablet Take 0.5 tablets (12.5 mg total) by mouth once daily.     Family History       Problem Relation (Age of Onset)    Cancer Brother    Heart attack Father    Stroke Mother          Tobacco Use    Smoking status: Former     Types: Cigars     Start date:      Quit date:      Years since quittin.7    Smokeless tobacco: Never   Substance and Sexual Activity    Alcohol use: Not Currently    Drug use: Never    Sexual activity: Not on file     Review of Systems   Constitutional:  Positive for activity change and appetite change. Negative for chills and fever.   HENT:  Positive for trouble swallowing. Negative for ear pain, mouth sores, nosebleeds and sore throat.    Eyes:  Negative for visual disturbance.   Respiratory:  Positive for cough and shortness of breath. Negative for wheezing.    Cardiovascular:  Positive for leg swelling. Negative for chest pain and palpitations.   Gastrointestinal:  Negative for abdominal distention, abdominal pain, blood in stool, diarrhea, nausea and vomiting.   Endocrine: Negative for polyphagia.   Genitourinary:  Positive for frequency. Negative for difficulty urinating, dysuria  and flank pain.   Musculoskeletal:  Positive for arthralgias, gait problem and joint swelling.   Skin:  Negative for rash.   Neurological:  Positive for weakness. Negative for dizziness, tremors, seizures, syncope and headaches.   Hematological:  Negative for adenopathy.   Psychiatric/Behavioral:  Negative for agitation, confusion and hallucinations. The patient is not nervous/anxious.      Objective:     Vital Signs (Most Recent):  Temp: 97.6 °F (36.4 °C) (09/23/23 0655)  Pulse: 89 (09/23/23 0854)  Resp: (!) 22 (09/23/23 0655)  BP: (!) 112/55 (09/23/23 0655)  SpO2: (S) 96 % (09/23/23 0823) Vital Signs (24h Range):  Temp:  [97.5 °F (36.4 °C)-100.2 °F (37.9 °C)] 97.6 °F (36.4 °C)  Pulse:  [] 89  Resp:  [16-33] 22  SpO2:  [88 %-100 %] 96 %  BP: ()/(47-72) 112/55     Weight: 71.7 kg (158 lb)  Body mass index is 24.74 kg/m².     Physical Exam  Vitals and nursing note reviewed.   Constitutional:       General: He is not in acute distress.     Appearance: He is ill-appearing. He is not toxic-appearing or diaphoretic.   HENT:      Head: Normocephalic and atraumatic.      Nose: Nose normal. No congestion or rhinorrhea.      Mouth/Throat:      Mouth: Mucous membranes are dry.      Pharynx: No oropharyngeal exudate or posterior oropharyngeal erythema.   Eyes:      General: No scleral icterus.  Neck:      Vascular: No carotid bruit.   Cardiovascular:      Rate and Rhythm: Normal rate and regular rhythm.      Heart sounds: Normal heart sounds. No murmur heard.     No friction rub. No gallop.   Pulmonary:      Effort: Respiratory distress present.      Breath sounds: No stridor. Rhonchi present. No wheezing or rales.   Chest:      Chest wall: No tenderness.   Abdominal:      General: There is no distension.      Palpations: There is no mass.      Tenderness: There is no abdominal tenderness. There is no right CVA tenderness, left CVA tenderness, guarding or rebound.      Hernia: No hernia is present.    Musculoskeletal:         General: Deformity present. No tenderness.      Cervical back: Neck supple. No rigidity.      Right lower leg: Edema present.      Left lower leg: Edema present.   Lymphadenopathy:      Cervical: No cervical adenopathy.   Skin:     Capillary Refill: Capillary refill takes less than 2 seconds.      Coloration: Skin is not jaundiced or pale.      Findings: No rash.   Neurological:      Cranial Nerves: No cranial nerve deficit.      Sensory: No sensory deficit.      Motor: Weakness present.      Coordination: Coordination normal.      Gait: Gait abnormal.   Psychiatric:         Mood and Affect: Mood normal.         Behavior: Behavior normal.         Thought Content: Thought content normal.         Judgment: Judgment normal.                Significant Labs: All pertinent labs within the past 24 hours have been reviewed.    Significant Imaging: I have reviewed all pertinent imaging results/findings within the past 24 hours.

## 2023-09-23 NOTE — PLAN OF CARE
[facility]  Initial Discharge Assessment       Primary Care Provider: Drew Moreira Jr., MD    Admission Diagnosis: SOB (shortness of breath) [R06.02]  Hypoxia [R09.02]  Transfusion associated circulatory overload [E87.71]  Acute on chronic congestive heart failure, unspecified heart failure type [I50.9]    Admission Date: 9/22/2023  Expected Discharge Date:     Transition of Care Barriers: None    Payor: MEDICARE / Plan: MEDICARE PART A & B / Product Type: Government /     Extended Emergency Contact Information  Primary Emergency Contact: Linda Farr  Mobile Phone: [phone]  Relation: Spouse  Secondary Emergency Contact: LUIS NEVAREZ  Mobile Phone: [phone]  Relation: Daughter  Preferred language: English   needed? No    Discharge Plan A: Home with family  Discharge Plan B: Home with family, Home Health      [place], [place] - [address]  [address]  [facility] [address]  Phone: [phone] Fax: [phone]      Initial Assessment (most recent)       Adult Discharge Assessment - 09/23/23 1305          Discharge Assessment    Confirmed/corrected address, phone number and insurance Yes     Confirmed Demographics Correct on Facesheet     Source of Information patient     Communicated JOSÉ with patient/caregiver Date not available/Unable to determine     Reason For Admission short of breath     People in Home spouse     Facility Arrived From: home     Do you expect to return to your current living situation? Yes     Do you have help at home or someone to help you manage your care at home? Yes     Who are your caregiver(s) and their phone number(s)? Linda Farr, wife [phone]     Prior to hospitilization cognitive status: Alert/Oriented     Current cognitive status: Alert/Oriented     Walking or Climbing Stairs ambulation difficulty, requires equipment     Mobility Management has walker at home     Dressing/Bathing bathing  difficulty, assistance 1 person     Home Accessibility stairs to enter home     Number of Stairs, Main Entrance four     Stair Railings, Main Entrance railings safe and in good condition     Home Layout Able to live on 1st floor     Equipment Currently Used at Home walker, rolling     Readmission within 30 days? Yes     Patient currently being followed by outpatient case management? Unable to determine (comments)     Do you currently have service(s) that help you manage your care at home? No     Do you take prescription medications? Yes     Do you have any problems affording any of your prescribed medications? No     Is the patient taking medications as prescribed? --   not known    Who is going to help you get home at discharge? family, son lives very nearby, daughter moving onto lot also     How do you get to doctors appointments? family or friend will provide     Are you on dialysis? No     Do you take coumadin? No     DME Needed Upon Discharge  none     Discharge Plan discussed with: Patient     Transition of Care Barriers None     Discharge Plan A Home with family     Discharge Plan B Home with family;Home Health                   Patient confirms contact information for his wife, Linda Farr, stating she is main contact.  Lives with his wife locally,  son lives very close to them.  [facility] has family to check on he and his wife daily.  He has a walker at home, [facility] no other DME, no oxygen at home.  [facility] is not using a home health service, and doesn't believe he needs that.  He plans to return home when discharged.  Uses [facility].  [facility] has handrails to enter home.

## 2023-09-23 NOTE — H&P
[facility] Medicine  History & Physical    Patient Name: Richard Farr  MRN: [id number]  Patient Class: IP- Inpatient  Admission Date: 9/22/2023  Attending Physician: Jd Pierce III, MD  Primary Care Provider: Drew Moreira Jr., MD         Patient information was obtained from patient, past medical records and ER records.     Subjective:     Principal Problem:<principal problem not specified>    Chief Complaint:   Chief Complaint   Patient presents with    Shortness of Breath     Pt reports being discharged from [facility] after receiving 4 units of packed RBCs to treat anemia. Pt reports experiencing SOB 1 hour PTA. Pt denies COPD. Denies chest pains.         HPI: ED HPI:  Richard Farr is a 78 y.o. male with PMHX of hypertension, hyperlipidemia, CHF, mitral regurg, tricuspid regurg, pulmonary hypertension, gastritis who presents to the emergency department C/O shortness of breath.     Patient presents after developing shortness of breath 1 hour prior to arrival.  Was recently admitted for anemia and received 4 units PRBCs in hospital as well as iron transfusion 2 days ago.  No fever but patient states he felt hot in emergency department.  Patient was diagnosed with urinary tract infection during previous hospitalization is on antibiotics.     PCP: Drew Moreira Jr., MD     IM HPI:  Patient was recently admitted by primary care and transfuse 4 units of packed red blood cells.  Patient felt much improved return home felt well for about 24 hours then began having increasing dyspnea.  Patient was readmitted found to have a urinary tract infection and a pneumonia.  Patient was started on antibiotics.  He was also given IV diuretics.  Patient has some peripheral edema but overall he states he feels dry and dehydrated.  Patient is having some oropharyngeal dysphagia and on exam I am unable to find the cause.  Patient has a history of CVA as well as a history of arthritis with several upper  extremity joint deformities and contractures.      Past Medical History:   Diagnosis Date    Anemia     Arthritis     Bilateral lower extremity edema     Carpal tunnel syndrome, bilateral     Chronic diastolic congestive heart failure     Colon polyp     Coronary artery disease     Depression due to physical illness     Encounter for blood transfusion     Gastric ulcer     History of herpes zoster     Hyperlipemia     Hypertension     Moderate tricuspid insufficiency     NSVT (nonsustained ventricular tachycardia)     PAC (premature atrial contraction)     Patent foramen ovale with right to left shunt     Pneumonia     Pulmonary hypertension     PVC's (premature ventricular contractions)     Severe mitral regurgitation     Shingles     Stroke     MINI TRAUMA; RIGHT SIDED WEAKNESS       Past Surgical History:   Procedure Laterality Date    CARDIAC CATHETERIZATION      COLONOSCOPY N/A 11/23/2020    Procedure: COLONOSCOPY;  Surgeon: Abelino Garcia MD;  [facility] ENDO;  Service: General;  Laterality: N/A;    ESOPHAGOGASTRODUODENOSCOPY      ESOPHAGOGASTRODUODENOSCOPY N/A 5/21/2021    Procedure: EGD (ESOPHAGOGASTRODUODENOSCOPY);  Surgeon: Sunny Rea MD;  [facility]  Service: Endoscopy;  Laterality: N/A;  COVID-VACCINATED    IMPLANTATION OF MITRAL VALVE LEAFLET CLIP Right 5/18/2021    Procedure: INSERTION, LEAFLET CLIP, MITRAL VALVE;  Surgeon: Zen Reina MD;  [facility];  Service: Cardiovascular;  Laterality: Right;    NECK SURGERY      anterior cervical fusion x 2    TONSILLECTOMY      TRANSESOPHAGEAL ECHOCARDIOGRAPHY         Review of patient's allergies indicates:  No Known Allergies    Current Facility-Administered Medications on File Prior to Encounter   Medication    benzocaine 20 % mouth spray     Current Outpatient Medications on File Prior to Encounter   Medication Sig    aspirin 81 MG Chew Take 81 mg by mouth once daily.    atorvastatin  (LIPITOR) 80 MG tablet TAKE 1 TABLET BY MOUTH EVERY EVENING    cefUROXime (CEFTIN) 500 MG tablet Take 1 tablet (500 mg total) by mouth 2 (two) times daily.    ezetimibe (ZETIA) 10 mg tablet Take 10 mg by mouth once daily.     fenofibrate (TRICOR) 145 MG tablet Take 145 mg by mouth once daily.    folic acid (FOLVITE) 1 MG tablet Take 1 tablet (1 mg total) by mouth once daily.    furosemide (LASIX) 40 MG tablet Take 1 tablet (40 mg total) by mouth once daily. (Patient taking differently: Take 20 mg by mouth once daily.)    levothyroxine (SYNTHROID) 50 MCG tablet TAKE 1 TABLET BY MOUTH EVERY DAY BEFORE BREAKFAST.    metoprolol succinate (TOPROL-XL) 25 MG 24 hr tablet TAKE 1/2 TABLET BY MOUTH EVERY DAY    pantoprazole (PROTONIX) 40 MG tablet TAKE 1 TABLET BY MOUTH EVERY DAY    potassium chloride (MICRO-K) 10 MEQ CpSR Take 20 mEq by mouth once daily.     spironolactone (ALDACTONE) 25 MG tablet Take 0.5 tablets (12.5 mg total) by mouth once daily.     Family History       Problem Relation (Age of Onset)    Cancer Brother    Heart attack Father    Stroke Mother          Tobacco Use    Smoking status: Former     Types: Cigars     Start date:      Quit date:      Years since quittin.7    Smokeless tobacco: Never   Substance and Sexual Activity    Alcohol use: Not Currently    Drug use: Never    Sexual activity: Not on file     Review of Systems   Constitutional:  Positive for activity change and appetite change. Negative for chills and fever.   HENT:  Positive for trouble swallowing. Negative for ear pain, mouth sores, nosebleeds and sore throat.    Eyes:  Negative for visual disturbance.   Respiratory:  Positive for cough and shortness of breath. Negative for wheezing.    Cardiovascular:  Positive for leg swelling. Negative for chest pain and palpitations.   Gastrointestinal:  Negative for abdominal distention, abdominal pain, blood in stool, diarrhea, nausea and vomiting.   Endocrine: Negative  for polyphagia.   Genitourinary:  Positive for frequency. Negative for difficulty urinating, dysuria and flank pain.   Musculoskeletal:  Positive for arthralgias, gait problem and joint swelling.   Skin:  Negative for rash.   Neurological:  Positive for weakness. Negative for dizziness, tremors, seizures, syncope and headaches.   Hematological:  Negative for adenopathy.   Psychiatric/Behavioral:  Negative for agitation, confusion and hallucinations. The patient is not nervous/anxious.      Objective:     Vital Signs (Most Recent):  Temp: 97.6 °F (36.4 °C) (09/23/23 0655)  Pulse: 89 (09/23/23 0854)  Resp: (!) 22 (09/23/23 0655)  BP: (!) 112/55 (09/23/23 0655)  SpO2: (S) 96 % (09/23/23 0823) Vital Signs (24h Range):  Temp:  [97.5 °F (36.4 °C)-100.2 °F (37.9 °C)] 97.6 °F (36.4 °C)  Pulse:  [] 89  Resp:  [16-33] 22  SpO2:  [88 %-100 %] 96 %  BP: ()/(47-72) 112/55     Weight: 71.7 kg (158 lb)  Body mass index is 24.74 kg/m².     Physical Exam  Vitals and nursing note reviewed.   Constitutional:       General: He is not in acute distress.     Appearance: He is ill-appearing. He is not toxic-appearing or diaphoretic.   HENT:      Head: Normocephalic and atraumatic.      Nose: Nose normal. No congestion or rhinorrhea.      Mouth/Throat:      Mouth: Mucous membranes are dry.      Pharynx: No oropharyngeal exudate or posterior oropharyngeal erythema.   Eyes:      General: No scleral icterus.  Neck:      Vascular: No carotid bruit.   Cardiovascular:      Rate and Rhythm: Normal rate and regular rhythm.      Heart sounds: Normal heart sounds. No murmur heard.     No friction rub. No gallop.   Pulmonary:      Effort: Respiratory distress present.      Breath sounds: No stridor. Rhonchi present. No wheezing or rales.   Chest:      Chest wall: No tenderness.   Abdominal:      General: There is no distension.      Palpations: There is no mass.      Tenderness: There is no abdominal tenderness. There is no right CVA  tenderness, left CVA tenderness, guarding or rebound.      Hernia: No hernia is present.   Musculoskeletal:         General: Deformity present. No tenderness.      Cervical back: Neck supple. No rigidity.      Right lower leg: Edema present.      Left lower leg: Edema present.   Lymphadenopathy:      Cervical: No cervical adenopathy.   Skin:     Capillary Refill: Capillary refill takes less than 2 seconds.      Coloration: Skin is not jaundiced or pale.      Findings: No rash.   Neurological:      Cranial Nerves: No cranial nerve deficit.      Sensory: No sensory deficit.      Motor: Weakness present.      Coordination: Coordination normal.      Gait: Gait abnormal.   Psychiatric:         Mood and Affect: Mood normal.         Behavior: Behavior normal.         Thought Content: Thought content normal.         Judgment: Judgment normal.                Significant Labs: All pertinent labs within the past 24 hours have been reviewed.    Significant Imaging: I have reviewed all pertinent imaging results/findings within the past 24 hours.    Assessment/Plan:     Acute cystitis without hematuria  Continue antibiotics awaiting cultures      Severe group 5 pulmonary hypertension        Acute on chronic combined systolic and diastolic heart failure  Patient seems intravascularly dry.  Hold IV Lasix for now.      Pleural effusion, right  Unsure if this has been investigated will defer to primary.      Nephrotic range proteinuria        Essential hypertension  Vital signs noted continue antihypertensives      Mixed hyperlipidemia        Diverticulosis of sigmoid colon        Acute superficial gastritis without hemorrhage  Continue home treatment      Iron deficiency anemia  Patient states he had upper and lower endoscopy in 2021        VTE Risk Mitigation (From admission, onward)         Ordered     IP VTE HIGH RISK PATIENT  Once         09/23/23 0215     Place sequential compression device  Until discontinued         09/23/23  021                           Jd Pierce III, MD  Department of Hospital Medicine  [facility]

## 2023-09-23 NOTE — NURSING
Wife called nurse for assistance, saying patient was drinking water and choked. Patient states he only started choking due to his head of bed not being high enough. Will let day shift know to monitor swallowing next shift for further evaluation. Patient remained stable at the remainder of shift. Patient is in bed resting, call bell and remote in reach for assistance.

## 2023-09-23 NOTE — NURSING
Patient arrived to room 641. Azithromycin infusing , vitals stable, wife at bedside. Patient is on 3.5L via NC. Tele box 2345 with 02 continuous 02 monitoring. Call bell and remote in reach for assistance.

## 2023-09-23 NOTE — NURSING
Plan of care discussed with patient and wife at bedside. Patient home medications started. Lasix discontinued. Patient has complaints of difficulty swallowing. Thickener provided during breakfast. He did not tolerate well. He had intermittent gagging while swallowing water with thickener and food.  Oxygen saturation remains stable. Breath sounds are clear but does sound like possible congestion in the upper airways.  Patient tolerated meds when crushed with pudding. Dr. Pierce made aware and assessed patient further. New order to change diet to soft/bite sized and ST order placed. Patient was able to tolerate potatoes for lunch. No further complaints. Will continue to crush meds and make meal adjustments based on patients tolerance.

## 2023-09-24 PROBLEM — I27.20 MODERATE TO SEVERE PULMONARY HYPERTENSION: Status: RESOLVED | Noted: 2021-06-07 | Resolved: 2023-09-24

## 2023-09-24 PROBLEM — E87.6 HYPOKALEMIA: Status: ACTIVE | Noted: 2023-09-24

## 2023-09-24 PROBLEM — R13.19 OTHER DYSPHAGIA: Status: ACTIVE | Noted: 2023-09-24

## 2023-09-24 PROBLEM — R80.9 NEPHROTIC RANGE PROTEINURIA: Status: RESOLVED | Noted: 2021-01-31 | Resolved: 2023-09-24

## 2023-09-24 PROBLEM — K57.30 DIVERTICULOSIS OF SIGMOID COLON: Chronic | Status: RESOLVED | Noted: 2020-11-23 | Resolved: 2023-09-24

## 2023-09-24 LAB
ALBUMIN SERPL BCP-MCNC: 3 G/DL (ref 3.5–5.2)
ALP SERPL-CCNC: 49 U/L (ref 55–135)
ALT SERPL W/O P-5'-P-CCNC: 31 U/L (ref 10–44)
ANION GAP SERPL CALC-SCNC: 2 MMOL/L (ref 3–11)
AST SERPL-CCNC: 42 U/L (ref 10–40)
BACTERIA UR CULT: NORMAL
BASOPHILS # BLD AUTO: 0.07 K/UL (ref 0–0.2)
BASOPHILS NFR BLD: 0.8 % (ref 0–1.9)
BILIRUB SERPL-MCNC: 1.1 MG/DL (ref 0.1–1)
BUN SERPL-MCNC: 10 MG/DL (ref 8–23)
CALCIUM SERPL-MCNC: 8.9 MG/DL (ref 8.7–10.5)
CHLORIDE SERPL-SCNC: 103 MMOL/L (ref 95–110)
CO2 SERPL-SCNC: 35 MMOL/L (ref 23–29)
CREAT SERPL-MCNC: 0.5 MG/DL (ref 0.5–1.4)
DIFFERENTIAL METHOD: ABNORMAL
EOSINOPHIL # BLD AUTO: 0.2 K/UL (ref 0–0.5)
EOSINOPHIL NFR BLD: 2.6 % (ref 0–8)
ERYTHROCYTE [DISTWIDTH] IN BLOOD BY AUTOMATED COUNT: 31.2 % (ref 11.5–14.5)
EST. GFR  (NO RACE VARIABLE): >60 ML/MIN/1.73 M^2
GLUCOSE SERPL-MCNC: 101 MG/DL (ref 70–110)
HCT VFR BLD AUTO: 32.2 % (ref 40–54)
HGB BLD-MCNC: 9.5 G/DL (ref 14–18)
IMM GRANULOCYTES # BLD AUTO: 0.04 K/UL (ref 0–0.04)
IMM GRANULOCYTES NFR BLD AUTO: 0.5 % (ref 0–0.5)
LYMPHOCYTES # BLD AUTO: 1.2 K/UL (ref 1–4.8)
LYMPHOCYTES NFR BLD: 14.7 % (ref 18–48)
MAGNESIUM SERPL-MCNC: 2.1 MG/DL (ref 1.6–2.6)
MCH RBC QN AUTO: 21.6 PG (ref 27–31)
MCHC RBC AUTO-ENTMCNC: 29.5 G/DL (ref 32–36)
MCV RBC AUTO: 73 FL (ref 82–98)
MONOCYTES # BLD AUTO: 0.8 K/UL (ref 0.3–1)
MONOCYTES NFR BLD: 10.1 % (ref 4–15)
NEUTROPHILS # BLD AUTO: 5.9 K/UL (ref 1.8–7.7)
NEUTROPHILS NFR BLD: 71.3 % (ref 38–73)
NRBC BLD-RTO: 0 /100 WBC
PLATELET # BLD AUTO: 380 K/UL (ref 150–450)
PMV BLD AUTO: 9.7 FL (ref 9.2–12.9)
POTASSIUM SERPL-SCNC: 3.4 MMOL/L (ref 3.5–5.1)
PROT SERPL-MCNC: 6.7 G/DL (ref 6–8.4)
RBC # BLD AUTO: 4.39 M/UL (ref 4.6–6.2)
SODIUM SERPL-SCNC: 140 MMOL/L (ref 136–145)
WBC # BLD AUTO: 8.34 K/UL (ref 3.9–12.7)

## 2023-09-24 PROCEDURE — 99900035 HC TECH TIME PER 15 MIN (STAT)

## 2023-09-24 PROCEDURE — 27000221 HC OXYGEN, UP TO 24 HOURS

## 2023-09-24 PROCEDURE — 25000003 PHARM REV CODE 250: Performed by: EMERGENCY MEDICINE

## 2023-09-24 PROCEDURE — 11000001 HC ACUTE MED/SURG PRIVATE ROOM

## 2023-09-24 PROCEDURE — 99900031 HC PATIENT EDUCATION (STAT)

## 2023-09-24 PROCEDURE — 83735 ASSAY OF MAGNESIUM: CPT | Performed by: INTERNAL MEDICINE

## 2023-09-24 PROCEDURE — 36415 COLL VENOUS BLD VENIPUNCTURE: CPT | Performed by: INTERNAL MEDICINE

## 2023-09-24 PROCEDURE — 85025 COMPLETE CBC W/AUTO DIFF WBC: CPT | Performed by: INTERNAL MEDICINE

## 2023-09-24 PROCEDURE — 63600175 PHARM REV CODE 636 W HCPCS

## 2023-09-24 PROCEDURE — 63600175 PHARM REV CODE 636 W HCPCS: Performed by: EMERGENCY MEDICINE

## 2023-09-24 PROCEDURE — 94761 N-INVAS EAR/PLS OXIMETRY MLT: CPT

## 2023-09-24 PROCEDURE — 25000003 PHARM REV CODE 250: Performed by: INTERNAL MEDICINE

## 2023-09-24 PROCEDURE — 80053 COMPREHEN METABOLIC PANEL: CPT | Performed by: INTERNAL MEDICINE

## 2023-09-24 RX ORDER — LIDOCAINE HYDROCHLORIDE 10 MG/ML
1 INJECTION, SOLUTION EPIDURAL; INFILTRATION; INTRACAUDAL; PERINEURAL ONCE
Status: COMPLETED | OUTPATIENT
Start: 2023-09-24 | End: 2023-09-24

## 2023-09-24 RX ORDER — LIDOCAINE HYDROCHLORIDE 10 MG/ML
1 INJECTION, SOLUTION EPIDURAL; INFILTRATION; INTRACAUDAL; PERINEURAL ONCE
Status: DISCONTINUED | OUTPATIENT
Start: 2023-09-24 | End: 2023-09-24

## 2023-09-24 RX ORDER — POTASSIUM CHLORIDE 7.45 MG/ML
10 INJECTION INTRAVENOUS
Status: COMPLETED | OUTPATIENT
Start: 2023-09-24 | End: 2023-09-24

## 2023-09-24 RX ORDER — LIDOCAINE HYDROCHLORIDE 10 MG/ML
2 INJECTION, SOLUTION EPIDURAL; INFILTRATION; INTRACAUDAL; PERINEURAL ONCE
Status: DISCONTINUED | OUTPATIENT
Start: 2023-09-24 | End: 2023-09-24

## 2023-09-24 RX ADMIN — POTASSIUM CHLORIDE 10 MEQ: 7.46 INJECTION, SOLUTION INTRAVENOUS at 09:09

## 2023-09-24 RX ADMIN — CEFTRIAXONE SODIUM 1 G: 1 INJECTION, POWDER, FOR SOLUTION INTRAMUSCULAR; INTRAVENOUS at 01:09

## 2023-09-24 RX ADMIN — FERROUS SULFATE TAB 325 MG (65 MG ELEMENTAL FE) 1 EACH: 325 (65 FE) TAB at 09:09

## 2023-09-24 RX ADMIN — FOLIC ACID 1 MG: 1 TABLET ORAL at 09:09

## 2023-09-24 RX ADMIN — ATORVASTATIN CALCIUM 80 MG: 80 TABLET, FILM COATED ORAL at 08:09

## 2023-09-24 RX ADMIN — POTASSIUM CHLORIDE 10 MEQ: 7.46 INJECTION, SOLUTION INTRAVENOUS at 07:09

## 2023-09-24 RX ADMIN — FENOFIBRATE 145 MG: 145 TABLET, FILM COATED ORAL at 09:09

## 2023-09-24 RX ADMIN — LEVOTHYROXINE SODIUM 50 MCG: 50 TABLET ORAL at 06:09

## 2023-09-24 RX ADMIN — AZITHROMYCIN MONOHYDRATE 500 MG: 500 INJECTION, POWDER, LYOPHILIZED, FOR SOLUTION INTRAVENOUS at 01:09

## 2023-09-24 RX ADMIN — EZETIMIBE 10 MG: 10 TABLET ORAL at 09:09

## 2023-09-24 RX ADMIN — METOPROLOL SUCCINATE 12.5 MG: 25 TABLET, EXTENDED RELEASE ORAL at 09:09

## 2023-09-24 RX ADMIN — PANTOPRAZOLE SODIUM 40 MG: 40 TABLET, DELAYED RELEASE ORAL at 09:09

## 2023-09-24 RX ADMIN — LIDOCAINE HYDROCHLORIDE 10 MG: 10 INJECTION, SOLUTION EPIDURAL; INFILTRATION; INTRACAUDAL; PERINEURAL at 04:09

## 2023-09-24 NOTE — SUBJECTIVE & OBJECTIVE
Past Medical History:   Diagnosis Date    Anemia     Arthritis     Bilateral lower extremity edema     Carpal tunnel syndrome, bilateral     Chronic diastolic congestive heart failure     Colon polyp     Coronary artery disease     Depression due to physical illness     Encounter for blood transfusion     Gastric ulcer     History of herpes zoster     Hyperlipemia     Hypertension     Moderate tricuspid insufficiency     NSVT (nonsustained ventricular tachycardia)     PAC (premature atrial contraction)     Patent foramen ovale with right to left shunt     Pneumonia     Pulmonary hypertension     PVC's (premature ventricular contractions)     Severe mitral regurgitation     Shingles     Stroke     MINI TRAUMA; RIGHT SIDED WEAKNESS       Past Surgical History:   Procedure Laterality Date    CARDIAC CATHETERIZATION      COLONOSCOPY N/A 11/23/2020    Procedure: COLONOSCOPY;  Surgeon: Abelino Garcia MD;  [facility]: [facility] ENDO;  Service: General;  Laterality: N/A;    ESOPHAGOGASTRODUODENOSCOPY      ESOPHAGOGASTRODUODENOSCOPY N/A 5/21/2021    Procedure: EGD (ESOPHAGOGASTRODUODENOSCOPY);  Surgeon: Sunny Rea MD;  Location: [facility] ENDO;  Service: Endoscopy;  Laterality: N/A;  COVID-VACCINATED    IMPLANTATION OF MITRAL VALVE LEAFLET CLIP Right 5/18/2021    Procedure: INSERTION, LEAFLET CLIP, MITRAL VALVE;  Surgeon: Zen Reina MD;  Location: [facility] CATH;  Service: Cardiovascular;  Laterality: Right;    NECK SURGERY      anterior cervical fusion x 2    TONSILLECTOMY      TRANSESOPHAGEAL ECHOCARDIOGRAPHY         Review of patient's allergies indicates:  No Known Allergies    Current Facility-Administered Medications on File Prior to Encounter   Medication    benzocaine 20 % mouth spray     Current Outpatient Medications on File Prior to Encounter   Medication Sig    aspirin 81 MG Chew Take 81 mg by mouth once daily.    atorvastatin (LIPITOR) 80 MG tablet TAKE 1 TABLET BY MOUTH EVERY EVENING    cefUROXime (CEFTIN) 500  MG tablet Take 1 tablet (500 mg total) by mouth 2 (two) times daily.    ezetimibe (ZETIA) 10 mg tablet Take 10 mg by mouth once daily.     fenofibrate (TRICOR) 145 MG tablet Take 145 mg by mouth once daily.    folic acid (FOLVITE) 1 MG tablet Take 1 tablet (1 mg total) by mouth once daily.    furosemide (LASIX) 40 MG tablet Take 1 tablet (40 mg total) by mouth once daily. (Patient taking differently: Take 20 mg by mouth once daily.)    levothyroxine (SYNTHROID) 50 MCG tablet TAKE 1 TABLET BY MOUTH EVERY DAY BEFORE BREAKFAST.    metoprolol succinate (TOPROL-XL) 25 MG 24 hr tablet TAKE 1/2 TABLET BY MOUTH EVERY DAY    pantoprazole (PROTONIX) 40 MG tablet TAKE 1 TABLET BY MOUTH EVERY DAY    potassium chloride (MICRO-K) 10 MEQ CpSR Take 20 mEq by mouth once daily.     spironolactone (ALDACTONE) 25 MG tablet Take 0.5 tablets (12.5 mg total) by mouth once daily.     Family History       Problem Relation (Age of Onset)    Cancer Brother    Heart attack Father    Stroke Mother          Tobacco Use    Smoking status: Former     Types: Cigars     Start date:      Quit date:      Years since quittin.7    Smokeless tobacco: Never   Substance and Sexual Activity    Alcohol use: Not Currently    Drug use: Never    Sexual activity: Not on file     Review of Systems   Constitutional:  Positive for activity change and appetite change. Negative for chills and fever.   HENT:  Positive for trouble swallowing. Negative for ear pain, mouth sores, nosebleeds and sore throat.    Eyes:  Negative for visual disturbance.   Respiratory:  Positive for cough and shortness of breath. Negative for wheezing.    Cardiovascular:  Positive for leg swelling. Negative for chest pain and palpitations.   Gastrointestinal:  Negative for abdominal distention, abdominal pain, blood in stool, diarrhea, nausea and vomiting.   Endocrine: Negative for polyphagia.   Genitourinary:  Positive for frequency. Negative for difficulty urinating, dysuria  and flank pain.   Musculoskeletal:  Positive for arthralgias, gait problem and joint swelling.   Skin:  Negative for rash.   Neurological:  Positive for weakness. Negative for dizziness, tremors, seizures, syncope and headaches.   Hematological:  Negative for adenopathy.   Psychiatric/Behavioral:  Negative for agitation, confusion and hallucinations. The patient is not nervous/anxious.      Objective:     Vital Signs (Most Recent):  Temp: 97.7 °F (36.5 °C) (09/24/23 0449)  Pulse: 96 (09/24/23 0449)  Resp: 18 (09/24/23 0449)  BP: (!) 121/58 (09/24/23 0449)  SpO2: 98 % (09/24/23 0619) Vital Signs (24h Range):  Temp:  [97.5 °F (36.4 °C)-97.7 °F (36.5 °C)] 97.7 °F (36.5 °C)  Pulse:  [] 96  Resp:  [18-22] 18  SpO2:  [95 %-100 %] 98 %  BP: (121-139)/(58-81) 121/58     Weight: 71.7 kg (158 lb)  Body mass index is 24.74 kg/m².     Physical Exam  Vitals and nursing note reviewed.   Constitutional:       General: He is not in acute distress.     Appearance: He is ill-appearing. He is not toxic-appearing or diaphoretic.      Comments: Frail, chronically ill-appearing   HENT:      Head: Normocephalic and atraumatic.      Nose: Nose normal. No congestion or rhinorrhea.      Mouth/Throat:      Mouth: Mucous membranes are dry.      Pharynx: No oropharyngeal exudate or posterior oropharyngeal erythema.   Eyes:      General: No scleral icterus.  Neck:      Vascular: No carotid bruit.   Cardiovascular:      Rate and Rhythm: Normal rate and regular rhythm.      Heart sounds: Murmur heard.      No friction rub. No gallop.   Pulmonary:      Effort: No respiratory distress.      Breath sounds: No stridor. Wheezing, rhonchi and rales present.      Comments: Supplemental oxygen via nasal cannula  Chest:      Chest wall: No tenderness.   Abdominal:      General: There is no distension.      Palpations: There is no mass.      Tenderness: There is no abdominal tenderness. There is no right CVA tenderness, left CVA tenderness, guarding  or rebound.      Hernia: No hernia is present.   Musculoskeletal:         General: Deformity present. No tenderness.      Cervical back: Neck supple. No rigidity.      Right lower leg: Edema present.      Left lower leg: Edema present.   Lymphadenopathy:      Cervical: No cervical adenopathy.   Skin:     General: Skin is warm and dry.      Capillary Refill: Capillary refill takes less than 2 seconds.      Coloration: Skin is not jaundiced or pale.      Findings: No rash.   Neurological:      Cranial Nerves: No cranial nerve deficit.      Sensory: No sensory deficit.      Motor: Weakness present.      Coordination: Coordination normal.      Gait: Gait abnormal.   Psychiatric:         Mood and Affect: Mood normal.         Behavior: Behavior normal.         Thought Content: Thought content normal.         Judgment: Judgment normal.                Significant Labs: All pertinent labs within the past 24 hours have been reviewed.    Significant Imaging: I have reviewed all pertinent imaging results/findings within the past 24 hours.

## 2023-09-24 NOTE — PLAN OF CARE
Plan of care reviewed and ongoing.   @1000 patient had fall with injury. Laceration to forehead and nose. Skin tear on right arm. On call physician notified, no new orders given. Patient denies pain related to fall.   @3091 Dr. Berman at bedside to stitch lacerations.   Fall risk band on, telesitter at bedside. Reiterated fall risk education.

## 2023-09-24 NOTE — NURSING
Patient went into a nonsustained rhythm change. Tele monitor stated vtach, but rhythm presented more like SVT. Patient vitals remain stable, patient had no complaints of chest discomfort. Provided contacted.

## 2023-09-24 NOTE — PLAN OF CARE
Plan of care reviewed. Patient remained free of falls throughout shift. Patient tolerated all medications. 2L via NC. Expresses comfort. Continuous tele and SP02 monitoring in place. Denies pain at this time. Wife at bedside. Uses bedside commode every hour, presents with constipation, patient denies. Rocephin and Azithromycin given. Call bell and remote in reach for assistance. No further complaints at this time.

## 2023-09-24 NOTE — PLAN OF CARE
09/24/23 0927   Medicare Message   Important Message from Medicare regarding Discharge Appeal Rights Given to patient/caregiver;Explained to patient/caregiver;Signed/date by patient/caregiver;Other (comments)  (Verbal consent at bedside.)   Date IMM was signed 09/24/23   Time IMM was signed 0924

## 2023-09-24 NOTE — ASSESSMENT & PLAN NOTE
Continue current antibiotic regimen, supplemental oxygen.  Blood cultures are negative to date at this time.

## 2023-09-24 NOTE — HOSPITAL COURSE
9/24 GT:  Patient is lying in bed this morning with spouse at bedside.  Patient is frail and chronically ill-appearing, but does not appear to be in any acute distress.  Patient reports his shortness a breath is at baseline, and he is tolerating supplemental oxygen via nasal cannula.  Patient had some swallowing difficulties yesterday, SLP has been consulted for evaluation, treatment, dietary modifications have been made.  Patient's H&H is stable, and although anemic he is not at the point that he requires further transfusion.  Potassium mildly decreased, we will replete intravenously today due to swallowing difficulties and monitor with daily labs.  Blood cultures at this time are negative to date.  Patient and spouse deny any issues, concerns, questions.    9/25:  Patient eating breakfast this morning in no acute distress.  Speech therapy to evaluate the patient.  Thus far blood cultures are negative.  Patient feels back to baseline.  Urine culture without significant growth.  Consider discharge in the near future.  Patient having significant gas production check GI panel.    9/26:  No acute events overnight.  GI panel pending.  Provide iron infusion prior to potential discharge.  Patient to undergo modified barium swallow prior to discharge for completeness.  Home health will be coordinated ongoing outpatient needs.    Patient with significant aspiration by MBS.  Discuss with family potential needs for PEG placement vs ongoing ST.     9/27:  patient ct head negative.  Done for worsening dizziness/dysequilibrium after head trauma.  Awaiting  recs regarding PEG tube placement.     9/28/23:  patient resting this morning.  In favor or PEG placement tomorrow.   09/29/2023: No acute events overnight.  Planning potential endoscopy versus laparoscopy for PEG tube placement.  May need cardiac clearance.  Currently hemodynamically stable.  9/30: [place] weekend coverage. Awake and alert sitting on bedside chair. No new  complaints at this time. Patient requires cardiology evaluation for endoscopy followed by laparoscopy guided PEG tube placement. Patient has been NPO, will start peripheral TPN along with IVF.   10/1 [place] weekend coverage. No acute events overnight. Patient not able to swallow PO meds. Convert PO meds to IV or IM. Follow up echocardiogram (10/2). Follow up cardiology (VOLODYMYR Mireles) pre-operative evaluation for endoscopy study on 10/4.     10/02/2023: Patient remains NPO.  Overall stable awaiting cardiac clearance prior to potential endoscopy 10/4.  10/3/23:  patient doing well this am awaiting PEG placement tomorrow.   10/4/23:  Patient undergo PEG placement this morning.  Overall clinically stable.  10/5/23:  Patient is status post PEG tube overall tolerating well will begin trickle feeds.  10/6/23:  tolerating PEG feeding well.  Will dc home after dietary education.

## 2023-09-24 NOTE — PROGRESS NOTES
[facility] Medicine  Progress Note    Patient Name: Richard Farr  MRN: [id number]  Patient Class: IP- Inpatient   Admission Date: 9/22/2023  Length of Stay: 1 days  Attending Physician: Drew Moreira Jr., MD  Primary Care Provider: Drew Moreira Jr., MD        Subjective:     Principal Problem:Acute cystitis without hematuria        HPI:  ED HPI:  Richard Farr is a 78 y.o. male with PMHX of hypertension, hyperlipidemia, CHF, mitral regurg, tricuspid regurg, pulmonary hypertension, gastritis who presents to the emergency department C/O shortness of breath.     Patient presents after developing shortness of breath 1 hour prior to arrival.  Was recently admitted for anemia and received 4 units PRBCs in hospital as well as iron transfusion 2 days ago.  No fever but patient states he felt hot in emergency department.  Patient was diagnosed with urinary tract infection during previous hospitalization is on antibiotics.     PCP: Drew Moreira Jr., MD     IM HPI:  Patient was recently admitted by primary care and transfuse 4 units of packed red blood cells.  Patient felt much improved return home felt well for about 24 hours then began having increasing dyspnea.  Patient was readmitted found to have a urinary tract infection and a pneumonia.  Patient was started on antibiotics.  He was also given IV diuretics.  Patient has some peripheral edema but overall he states he feels dry and dehydrated.  Patient is having some oropharyngeal dysphagia and on exam I am unable to find the cause.  Patient has a history of CVA as well as a history of arthritis with several upper extremity joint deformities and contractures.      Overview/Hospital Course:  9/24 GT:  Patient is lying in bed this morning with spouse at bedside.  Patient is frail and chronically ill-appearing, but does not appear to be in any acute distress.  Patient reports his shortness a breath is at baseline, and he is tolerating supplemental  oxygen via nasal cannula.  Patient had some swallowing difficulties yesterday, SLP has been consulted for evaluation, treatment, dietary modifications have been made.  Patient's H&H is stable, and although anemic he is not at the point that he requires further transfusion.  Potassium mildly decreased, we will replete intravenously today due to swallowing difficulties and monitor with daily labs.  Blood cultures at this time are negative to date.  Patient and spouse deny any issues, concerns, questions.      Past Medical History:   Diagnosis Date    Anemia     Arthritis     Bilateral lower extremity edema     Carpal tunnel syndrome, bilateral     Chronic diastolic congestive heart failure     Colon polyp     Coronary artery disease     Depression due to physical illness     Encounter for blood transfusion     Gastric ulcer     History of herpes zoster     Hyperlipemia     Hypertension     Moderate tricuspid insufficiency     NSVT (nonsustained ventricular tachycardia)     PAC (premature atrial contraction)     Patent foramen ovale with right to left shunt     Pneumonia     Pulmonary hypertension     PVC's (premature ventricular contractions)     Severe mitral regurgitation     Shingles     Stroke     MINI TRAUMA; RIGHT SIDED WEAKNESS       Past Surgical History:   Procedure Laterality Date    CARDIAC CATHETERIZATION      COLONOSCOPY N/A 11/23/2020    Procedure: COLONOSCOPY;  Surgeon: Abelino Garica MD;  Location: [facility];  Service: General;  Laterality: N/A;    ESOPHAGOGASTRODUODENOSCOPY      ESOPHAGOGASTRODUODENOSCOPY N/A 5/21/2021    Procedure: EGD (ESOPHAGOGASTRODUODENOSCOPY);  Surgeon: Sunny Rea MD;  Location: [facility] ENDO;  Service: Endoscopy;  Laterality: N/A;  COVID-VACCINATED    IMPLANTATION OF MITRAL VALVE LEAFLET CLIP Right 5/18/2021    Procedure: INSERTION, LEAFLET CLIP, MITRAL VALVE;  Surgeon: Zen Reina MD;  Location: [facility] CATH;  Service: Cardiovascular;   Laterality: Right;    NECK SURGERY      anterior cervical fusion x 2    TONSILLECTOMY      TRANSESOPHAGEAL ECHOCARDIOGRAPHY         Review of patient's allergies indicates:  No Known Allergies    Current Facility-Administered Medications on File Prior to Encounter   Medication    benzocaine 20 % mouth spray     Current Outpatient Medications on File Prior to Encounter   Medication Sig    aspirin 81 MG Chew Take 81 mg by mouth once daily.    atorvastatin (LIPITOR) 80 MG tablet TAKE 1 TABLET BY MOUTH EVERY EVENING    cefUROXime (CEFTIN) 500 MG tablet Take 1 tablet (500 mg total) by mouth 2 (two) times daily.    ezetimibe (ZETIA) 10 mg tablet Take 10 mg by mouth once daily.     fenofibrate (TRICOR) 145 MG tablet Take 145 mg by mouth once daily.    folic acid (FOLVITE) 1 MG tablet Take 1 tablet (1 mg total) by mouth once daily.    furosemide (LASIX) 40 MG tablet Take 1 tablet (40 mg total) by mouth once daily. (Patient taking differently: Take 20 mg by mouth once daily.)    levothyroxine (SYNTHROID) 50 MCG tablet TAKE 1 TABLET BY MOUTH EVERY DAY BEFORE BREAKFAST.    metoprolol succinate (TOPROL-XL) 25 MG 24 hr tablet TAKE 1/2 TABLET BY MOUTH EVERY DAY    pantoprazole (PROTONIX) 40 MG tablet TAKE 1 TABLET BY MOUTH EVERY DAY    potassium chloride (MICRO-K) 10 MEQ CpSR Take 20 mEq by mouth once daily.     spironolactone (ALDACTONE) 25 MG tablet Take 0.5 tablets (12.5 mg total) by mouth once daily.     Family History       Problem Relation (Age of Onset)    Cancer Brother    Heart attack Father    Stroke Mother          Tobacco Use    Smoking status: Former     Types: Cigars     Start date:      Quit date: 2010     Years since quittin.7    Smokeless tobacco: Never   Substance and Sexual Activity    Alcohol use: Not Currently    Drug use: Never    Sexual activity: Not on file     Review of Systems   Constitutional:  Positive for activity change and appetite change. Negative for chills and fever.    HENT:  Positive for trouble swallowing. Negative for ear pain, mouth sores, nosebleeds and sore throat.    Eyes:  Negative for visual disturbance.   Respiratory:  Positive for cough and shortness of breath. Negative for wheezing.    Cardiovascular:  Positive for leg swelling. Negative for chest pain and palpitations.   Gastrointestinal:  Negative for abdominal distention, abdominal pain, blood in stool, diarrhea, nausea and vomiting.   Endocrine: Negative for polyphagia.   Genitourinary:  Positive for frequency. Negative for difficulty urinating, dysuria and flank pain.   Musculoskeletal:  Positive for arthralgias, gait problem and joint swelling.   Skin:  Negative for rash.   Neurological:  Positive for weakness. Negative for dizziness, tremors, seizures, syncope and headaches.   Hematological:  Negative for adenopathy.   Psychiatric/Behavioral:  Negative for agitation, confusion and hallucinations. The patient is not nervous/anxious.      Objective:     Vital Signs (Most Recent):  Temp: 97.7 °F (36.5 °C) (09/24/23 0449)  Pulse: 96 (09/24/23 0449)  Resp: 18 (09/24/23 0449)  BP: (!) 121/58 (09/24/23 0449)  SpO2: 98 % (09/24/23 0619) Vital Signs (24h Range):  Temp:  [97.5 °F (36.4 °C)-97.7 °F (36.5 °C)] 97.7 °F (36.5 °C)  Pulse:  [] 96  Resp:  [18-22] 18  SpO2:  [95 %-100 %] 98 %  BP: (121-139)/(58-81) 121/58     Weight: 71.7 kg (158 lb)  Body mass index is 24.74 kg/m².     Physical Exam  Vitals and nursing note reviewed.   Constitutional:       General: He is not in acute distress.     Appearance: He is ill-appearing. He is not toxic-appearing or diaphoretic.      Comments: Frail, chronically ill-appearing   HENT:      Head: Normocephalic and atraumatic.      Nose: Nose normal. No congestion or rhinorrhea.      Mouth/Throat:      Mouth: Mucous membranes are dry.      Pharynx: No oropharyngeal exudate or posterior oropharyngeal erythema.   Eyes:      General: No scleral icterus.  Neck:      Vascular: No  carotid bruit.   Cardiovascular:      Rate and Rhythm: Normal rate and regular rhythm.      Heart sounds: Murmur heard.      No friction rub. No gallop.   Pulmonary:      Effort: No respiratory distress.      Breath sounds: No stridor. Wheezing, rhonchi and rales present.      Comments: Supplemental oxygen via nasal cannula  Chest:      Chest wall: No tenderness.   Abdominal:      General: There is no distension.      Palpations: There is no mass.      Tenderness: There is no abdominal tenderness. There is no right CVA tenderness, left CVA tenderness, guarding or rebound.      Hernia: No hernia is present.   Musculoskeletal:         General: Deformity present. No tenderness.      Cervical back: Neck supple. No rigidity.      Right lower leg: Edema present.      Left lower leg: Edema present.   Lymphadenopathy:      Cervical: No cervical adenopathy.   Skin:     General: Skin is warm and dry.      Capillary Refill: Capillary refill takes less than 2 seconds.      Coloration: Skin is not jaundiced or pale.      Findings: No rash.   Neurological:      Cranial Nerves: No cranial nerve deficit.      Sensory: No sensory deficit.      Motor: Weakness present.      Coordination: Coordination normal.      Gait: Gait abnormal.   Psychiatric:         Mood and Affect: Mood normal.         Behavior: Behavior normal.         Thought Content: Thought content normal.         Judgment: Judgment normal.                Significant Labs: All pertinent labs within the past 24 hours have been reviewed.    Significant Imaging: I have reviewed all pertinent imaging results/findings within the past 24 hours.      Assessment/Plan:      * Acute cystitis without hematuria  Continue antibiotics awaiting cultures    9/24 GT:  Blood cultures are negative to date.    Hypokalemia  We will replete intravenously today due to dysphagia.  Follow with daily labs.      Other dysphagia  Dietary modifications have been made.  SLP consulted for evaluation  and treatment.      Acute on chronic combined systolic and diastolic heart failure  Patient seems intravascularly dry.  Hold IV Lasix for now.      Pneumonia due to infectious organism  Continue current antibiotic regimen, supplemental oxygen.  Blood cultures are negative to date at this time.      Pleural effusion, right  Unsure if this has been investigated will defer to primary.      Essential hypertension  Vital signs noted continue antihypertensives      Mixed hyperlipidemia  Continue statin.      Acute superficial gastritis without hemorrhage  Continue home treatment      Iron deficiency anemia  Patient states he had upper and lower endoscopy in 2021        VTE Risk Mitigation (From admission, onward)         Ordered     IP VTE HIGH RISK PATIENT  Once         09/23/23 0215     Place sequential compression device  Until discontinued         09/23/23 0215                Discharge Planning   JOSÉ:      Code Status: Full Code   Is the patient medically ready for discharge?:     Reason for patient still in hospital (select all that apply): Patient trending condition and Treatment  Discharge Plan A: Home with family                  RAZIA PISANO  Department of Hospital Medicine   [facility]

## 2023-09-25 ENCOUNTER — PATIENT OUTREACH (OUTPATIENT)
Dept: ADMINISTRATIVE | Facility: CLINIC | Age: 78
End: 2023-09-25
Payer: MEDICARE

## 2023-09-25 LAB
ACANTHOCYTES BLD QL SMEAR: PRESENT
ALBUMIN SERPL BCP-MCNC: 2.9 G/DL (ref 3.5–5.2)
ALP SERPL-CCNC: 47 U/L (ref 55–135)
ALT SERPL W/O P-5'-P-CCNC: 30 U/L (ref 10–44)
ANION GAP SERPL CALC-SCNC: -1 MMOL/L (ref 3–11)
ANISOCYTOSIS BLD QL SMEAR: ABNORMAL
AST SERPL-CCNC: 44 U/L (ref 10–40)
BASOPHILS # BLD AUTO: 0.07 K/UL (ref 0–0.2)
BASOPHILS NFR BLD: 0.9 % (ref 0–1.9)
BILIRUB SERPL-MCNC: 0.9 MG/DL (ref 0.1–1)
BUN SERPL-MCNC: 12 MG/DL (ref 8–23)
CALCIUM SERPL-MCNC: 9.2 MG/DL (ref 8.7–10.5)
CHLORIDE SERPL-SCNC: 102 MMOL/L (ref 95–110)
CO2 SERPL-SCNC: 36 MMOL/L (ref 23–29)
CREAT SERPL-MCNC: 0.5 MG/DL (ref 0.5–1.4)
DIFFERENTIAL METHOD: ABNORMAL
EOSINOPHIL # BLD AUTO: 0.2 K/UL (ref 0–0.5)
EOSINOPHIL NFR BLD: 2.3 % (ref 0–8)
ERYTHROCYTE [DISTWIDTH] IN BLOOD BY AUTOMATED COUNT: 31.8 % (ref 11.5–14.5)
EST. GFR  (NO RACE VARIABLE): >60 ML/MIN/1.73 M^2
GLUCOSE SERPL-MCNC: 109 MG/DL (ref 70–110)
HCT VFR BLD AUTO: 32.2 % (ref 40–54)
HGB BLD-MCNC: 9.3 G/DL (ref 14–18)
HYPOCHROMIA BLD QL SMEAR: ABNORMAL
IMM GRANULOCYTES # BLD AUTO: 0.03 K/UL (ref 0–0.04)
IMM GRANULOCYTES NFR BLD AUTO: 0.4 % (ref 0–0.5)
LYMPHOCYTES # BLD AUTO: 1.2 K/UL (ref 1–4.8)
LYMPHOCYTES NFR BLD: 14.8 % (ref 18–48)
MAGNESIUM SERPL-MCNC: 1.9 MG/DL (ref 1.6–2.6)
MCH RBC QN AUTO: 21.8 PG (ref 27–31)
MCHC RBC AUTO-ENTMCNC: 28.9 G/DL (ref 32–36)
MCV RBC AUTO: 76 FL (ref 82–98)
MONOCYTES # BLD AUTO: 0.8 K/UL (ref 0.3–1)
MONOCYTES NFR BLD: 9.9 % (ref 4–15)
NEUTROPHILS # BLD AUTO: 5.7 K/UL (ref 1.8–7.7)
NEUTROPHILS NFR BLD: 71.7 % (ref 38–73)
NRBC BLD-RTO: 0 /100 WBC
OVALOCYTES BLD QL SMEAR: ABNORMAL
PLATELET # BLD AUTO: 426 K/UL (ref 150–450)
PLATELET BLD QL SMEAR: ABNORMAL
PMV BLD AUTO: ABNORMAL FL (ref 9.2–12.9)
POIKILOCYTOSIS BLD QL SMEAR: ABNORMAL
POLYCHROMASIA BLD QL SMEAR: ABNORMAL
POTASSIUM SERPL-SCNC: 4.1 MMOL/L (ref 3.5–5.1)
PROT SERPL-MCNC: 6.6 G/DL (ref 6–8.4)
RBC # BLD AUTO: 4.26 M/UL (ref 4.6–6.2)
SCHISTOCYTES BLD QL SMEAR: ABNORMAL
SCHISTOCYTES BLD QL SMEAR: PRESENT
SODIUM SERPL-SCNC: 137 MMOL/L (ref 136–145)
SPHEROCYTES BLD QL SMEAR: ABNORMAL
STOMATOCYTES BLD QL SMEAR: PRESENT
TARGETS BLD QL SMEAR: ABNORMAL
WBC # BLD AUTO: 7.91 K/UL (ref 3.9–12.7)

## 2023-09-25 PROCEDURE — 25000003 PHARM REV CODE 250: Performed by: INTERNAL MEDICINE

## 2023-09-25 PROCEDURE — 63600175 PHARM REV CODE 636 W HCPCS: Performed by: EMERGENCY MEDICINE

## 2023-09-25 PROCEDURE — 85025 COMPLETE CBC W/AUTO DIFF WBC: CPT | Performed by: INTERNAL MEDICINE

## 2023-09-25 PROCEDURE — 36415 COLL VENOUS BLD VENIPUNCTURE: CPT | Performed by: INTERNAL MEDICINE

## 2023-09-25 PROCEDURE — 11000001 HC ACUTE MED/SURG PRIVATE ROOM

## 2023-09-25 PROCEDURE — 83735 ASSAY OF MAGNESIUM: CPT | Performed by: INTERNAL MEDICINE

## 2023-09-25 PROCEDURE — 21400001 HC TELEMETRY ROOM

## 2023-09-25 PROCEDURE — 94761 N-INVAS EAR/PLS OXIMETRY MLT: CPT

## 2023-09-25 PROCEDURE — 80053 COMPREHEN METABOLIC PANEL: CPT | Performed by: INTERNAL MEDICINE

## 2023-09-25 PROCEDURE — 99900031 HC PATIENT EDUCATION (STAT)

## 2023-09-25 PROCEDURE — 92610 EVALUATE SWALLOWING FUNCTION: CPT

## 2023-09-25 PROCEDURE — 99900035 HC TECH TIME PER 15 MIN (STAT)

## 2023-09-25 PROCEDURE — 25000003 PHARM REV CODE 250: Performed by: EMERGENCY MEDICINE

## 2023-09-25 PROCEDURE — 97166 OT EVAL MOD COMPLEX 45 MIN: CPT

## 2023-09-25 PROCEDURE — 27000221 HC OXYGEN, UP TO 24 HOURS

## 2023-09-25 PROCEDURE — 97162 PT EVAL MOD COMPLEX 30 MIN: CPT

## 2023-09-25 RX ADMIN — LEVOTHYROXINE SODIUM 50 MCG: 50 TABLET ORAL at 06:09

## 2023-09-25 RX ADMIN — PANTOPRAZOLE SODIUM 40 MG: 40 TABLET, DELAYED RELEASE ORAL at 08:09

## 2023-09-25 RX ADMIN — FENOFIBRATE 145 MG: 145 TABLET, FILM COATED ORAL at 08:09

## 2023-09-25 RX ADMIN — ATORVASTATIN CALCIUM 80 MG: 80 TABLET, FILM COATED ORAL at 08:09

## 2023-09-25 RX ADMIN — FERROUS SULFATE TAB 325 MG (65 MG ELEMENTAL FE) 1 EACH: 325 (65 FE) TAB at 08:09

## 2023-09-25 RX ADMIN — METOPROLOL SUCCINATE 12.5 MG: 25 TABLET, EXTENDED RELEASE ORAL at 08:09

## 2023-09-25 RX ADMIN — CEFTRIAXONE SODIUM 1 G: 1 INJECTION, POWDER, FOR SOLUTION INTRAMUSCULAR; INTRAVENOUS at 12:09

## 2023-09-25 RX ADMIN — AZITHROMYCIN MONOHYDRATE 500 MG: 500 INJECTION, POWDER, LYOPHILIZED, FOR SOLUTION INTRAVENOUS at 12:09

## 2023-09-25 RX ADMIN — EZETIMIBE 10 MG: 10 TABLET ORAL at 08:09

## 2023-09-25 RX ADMIN — FOLIC ACID 1 MG: 1 TABLET ORAL at 08:09

## 2023-09-25 NOTE — PLAN OF CARE
Problem: SLP  Goal: SLP Goal  Description:   1. Pt will tolerate least restrictive oral diet to maintain adequate nutrition/hydration w/o acute dysphagia-related pulmonary complication.  2. Pt will participate in modified barium swallow study to define swallow physiology and to determine therapy need.    Outcome: Goals initiated

## 2023-09-25 NOTE — SUBJECTIVE & OBJECTIVE
Past Medical History:   Diagnosis Date    Anemia     Arthritis     Bilateral lower extremity edema     Carpal tunnel syndrome, bilateral     Chronic diastolic congestive heart failure     Colon polyp     Coronary artery disease     Depression due to physical illness     Encounter for blood transfusion     Gastric ulcer     History of herpes zoster     Hyperlipemia     Hypertension     Moderate tricuspid insufficiency     NSVT (nonsustained ventricular tachycardia)     PAC (premature atrial contraction)     Patent foramen ovale with right to left shunt     Pneumonia     Pulmonary hypertension     PVC's (premature ventricular contractions)     Severe mitral regurgitation     Shingles     Stroke     MINI TRAUMA; RIGHT SIDED WEAKNESS       Past Surgical History:   Procedure Laterality Date    CARDIAC CATHETERIZATION      COLONOSCOPY N/A 11/23/2020    Procedure: COLONOSCOPY;  Surgeon: Abelino Garcia MD;  Location: [facility] ENDO;  Service: General;  Laterality: N/A;    ESOPHAGOGASTRODUODENOSCOPY      ESOPHAGOGASTRODUODENOSCOPY N/A 5/21/2021    Procedure: EGD (ESOPHAGOGASTRODUODENOSCOPY);  Surgeon: Sunny Rea MD;  Location: [facility] ENDO;  Service: Endoscopy;  Laterality: N/A;  COVID-VACCINATED    IMPLANTATION OF MITRAL VALVE LEAFLET CLIP Right 5/18/2021    Procedure: INSERTION, LEAFLET CLIP, MITRAL VALVE;  Surgeon: Zen Reina MD;  Location: [facility] CATH;  Service: Cardiovascular;  Laterality: Right;    NECK SURGERY      anterior cervical fusion x 2    TONSILLECTOMY      TRANSESOPHAGEAL ECHOCARDIOGRAPHY         Review of patient's allergies indicates:  No Known Allergies    Current Facility-Administered Medications on File Prior to Encounter   Medication    benzocaine 20 % mouth spray     Current Outpatient Medications on File Prior to Encounter   Medication Sig    aspirin 81 MG Chew Take 81 mg by mouth once daily.    atorvastatin (LIPITOR) 80 MG tablet TAKE 1 TABLET BY MOUTH EVERY EVENING    cefUROXime (CEFTIN) 500  MG tablet Take 1 tablet (500 mg total) by mouth 2 (two) times daily.    ezetimibe (ZETIA) 10 mg tablet Take 10 mg by mouth once daily.     fenofibrate (TRICOR) 145 MG tablet Take 145 mg by mouth once daily.    folic acid (FOLVITE) 1 MG tablet Take 1 tablet (1 mg total) by mouth once daily.    furosemide (LASIX) 40 MG tablet Take 1 tablet (40 mg total) by mouth once daily. (Patient taking differently: Take 20 mg by mouth once daily.)    levothyroxine (SYNTHROID) 50 MCG tablet TAKE 1 TABLET BY MOUTH EVERY DAY BEFORE BREAKFAST.    metoprolol succinate (TOPROL-XL) 25 MG 24 hr tablet TAKE 1/2 TABLET BY MOUTH EVERY DAY    pantoprazole (PROTONIX) 40 MG tablet TAKE 1 TABLET BY MOUTH EVERY DAY    potassium chloride (MICRO-K) 10 MEQ CpSR Take 20 mEq by mouth once daily.     spironolactone (ALDACTONE) 25 MG tablet Take 0.5 tablets (12.5 mg total) by mouth once daily.     Family History       Problem Relation (Age of Onset)    Cancer Brother    Heart attack Father    Stroke Mother          Tobacco Use    Smoking status: Former     Types: Cigars     Start date:      Quit date:      Years since quittin.7    Smokeless tobacco: Never   Substance and Sexual Activity    Alcohol use: Not Currently    Drug use: Never    Sexual activity: Not on file     Review of Systems   Constitutional:  Positive for activity change and appetite change. Negative for chills and fever.   HENT:  Positive for trouble swallowing. Negative for ear pain, mouth sores, nosebleeds and sore throat.    Eyes:  Negative for visual disturbance.   Respiratory:  Positive for cough and shortness of breath. Negative for wheezing.    Cardiovascular:  Positive for leg swelling. Negative for chest pain and palpitations.   Gastrointestinal:  Negative for abdominal distention, abdominal pain, blood in stool, diarrhea, nausea and vomiting.   Endocrine: Negative for polyphagia.   Genitourinary:  Positive for frequency. Negative for difficulty urinating, dysuria  and flank pain.   Musculoskeletal:  Positive for arthralgias, gait problem and joint swelling.   Skin:  Negative for rash.   Neurological:  Positive for weakness. Negative for dizziness, tremors, seizures, syncope and headaches.   Hematological:  Negative for adenopathy.   Psychiatric/Behavioral:  Negative for agitation, confusion and hallucinations. The patient is not nervous/anxious.      Objective:     Vital Signs (Most Recent):  Temp: 97.8 °F (36.6 °C) (09/25/23 0713)  Pulse: 85 (09/25/23 0827)  Resp: 14 (09/25/23 0713)  BP: 124/77 (09/25/23 0832)  SpO2: 100 % (09/25/23 0827) Vital Signs (24h Range):  Temp:  [97.6 °F (36.4 °C)-97.9 °F (36.6 °C)] 97.8 °F (36.6 °C)  Pulse:  [] 85  Resp:  [14-24] 14  SpO2:  [95 %-100 %] 100 %  BP: (119-139)/(56-78) 124/77     Weight: 71.7 kg (158 lb)  Body mass index is 24.74 kg/m².     Physical Exam  Vitals and nursing note reviewed.   Constitutional:       General: He is not in acute distress.     Appearance: He is ill-appearing. He is not toxic-appearing or diaphoretic.      Comments: Frail, chronically ill-appearing   HENT:      Head: Normocephalic and atraumatic.      Nose: Nose normal. No congestion or rhinorrhea.      Mouth/Throat:      Mouth: Mucous membranes are dry.      Pharynx: No oropharyngeal exudate or posterior oropharyngeal erythema.   Eyes:      General: No scleral icterus.  Neck:      Vascular: No carotid bruit.   Cardiovascular:      Rate and Rhythm: Normal rate and regular rhythm.      Heart sounds: Murmur heard.      No friction rub. No gallop.   Pulmonary:      Effort: No respiratory distress.      Breath sounds: No stridor. Wheezing, rhonchi and rales present.      Comments: Supplemental oxygen via nasal cannula  Chest:      Chest wall: No tenderness.   Abdominal:      General: There is no distension.      Palpations: There is no mass.      Tenderness: There is no abdominal tenderness. There is no right CVA tenderness, left CVA tenderness, guarding or  rebound.      Hernia: No hernia is present.   Musculoskeletal:         General: Deformity present. No tenderness.      Cervical back: Neck supple. No rigidity.      Right lower leg: Edema present.      Left lower leg: Edema present.   Lymphadenopathy:      Cervical: No cervical adenopathy.   Skin:     General: Skin is warm and dry.      Capillary Refill: Capillary refill takes less than 2 seconds.      Coloration: Skin is not jaundiced or pale.      Findings: No rash.   Neurological:      Cranial Nerves: No cranial nerve deficit.      Sensory: No sensory deficit.      Motor: Weakness present.      Coordination: Coordination normal.      Gait: Gait abnormal.   Psychiatric:         Mood and Affect: Mood normal.         Behavior: Behavior normal.         Thought Content: Thought content normal.         Judgment: Judgment normal.                Significant Labs: All pertinent labs within the past 24 hours have been reviewed.    Significant Imaging: I have reviewed all pertinent imaging results/findings within the past 24 hours.

## 2023-09-25 NOTE — PLAN OF CARE
Problem: Skin Injury Risk Increased  Goal: Skin Health and Integrity  9/25/2023 0241 by Fiordaliza Serrato RN  Outcome: Ongoing, Not Progressing     Problem: Infection (Pneumonia)  Goal: Resolution of Infection Signs and Symptoms  9/25/2023 0241 by Fiordaliza Serrato RN  Outcome: Ongoing, Not Progressing     Problem: Fall Injury Risk  Goal: Absence of Fall and Fall-Related Injury  Outcome: Ongoing, Not Progressing         Problem: Infection  Goal: Absence of Infection Signs and Symptoms  9/25/2023 0241 by Fiordaliza Serrato RN  Outcome: Ongoing, Progressing     Problem: Adult Inpatient Plan of Care  Goal: Plan of Care Review  9/25/2023 0241 by Fiordaliza Serrato RN  Outcome: Ongoing, Progressing  Goal: Patient-Specific Goal (Individualized)  9/25/2023 0241 by Fiordaliza Serrato RN  Outcome: Ongoing, Progressing  Goal: Absence of Hospital-Acquired Illness or Injury  9/25/2023 0241 by Fiordaliza Serrato RN  Outcome: Ongoing, Progressing  Goal: Optimal Comfort and Wellbeing  9/25/2023 0241 by Fiordaliza Serrato RN  Outcome: Ongoing, Progressing  Goal: Readiness for Transition of Care  9/25/2023 0241 by Fiordaliza Serrato RN  Outcome: Ongoing, Progressing     Problem: Fluid and Electrolyte Imbalance (Acute Kidney Injury/Impairment)  Goal: Fluid and Electrolyte Balance  9/25/2023 0241 by Fiordaliza Serrato RN  Outcome: Ongoing, Progressing     Problem: Oral Intake Inadequate (Acute Kidney Injury/Impairment)  Goal: Optimal Nutrition Intake  9/25/2023 0241 by Fiordaliza Serrato RN  Outcome: Ongoing, Progressing     Problem: Renal Function Impairment (Acute Kidney Injury/Impairment)  Goal: Effective Renal Function  9/25/2023 0241 by Fiordaliza Serrato RN  Outcome: Ongoing, Progressing     Problem: Fluid Imbalance (Pneumonia)  Goal: Fluid Balance  9/25/2023 0241 by Fiordaliza Serrato RN  Outcome: Ongoing, Progressing     Problem: Respiratory Compromise (Pneumonia)  Goal: Effective Oxygenation and Ventilation  9/25/2023 0241 by Fiordaliza Serrato RN  Outcome: Ongoing, Progressing     Problem: Impaired Wound  Healing  Goal: Optimal Wound Healing  9/25/2023 0241 by Fiordaliza Serrato, RN  Outcome: Ongoing, Progressing

## 2023-09-25 NOTE — PT/OT/SLP PROGRESS
Speech Language Pathology      Richard Farr  MRN: [id number]    MBSS ordered per consult w/ Dr. Moreira and is tentatively scheduled for 10:30 tomorrow morning.     9/25/2023 Yoshi Huang M.S. CCC-SLP

## 2023-09-25 NOTE — PT/OT/SLP EVAL
Speech Language Pathology Evaluation  Bedside Swallow    Patient Name:  Richard Farr   MRN:  [id number]  Admitting Diagnosis: Acute cystitis without hematuria    Recommendations:                 General Recommendations:  Modified barium swallow study and ENT Evaluation   Diet recommendations:  Soft & Bite Sized Diet - IDDSI Level 6, Thin liquids - IDDSI Level 0   Aspiration Precautions:  1 bite/sip at a time, Alternating bites/sips, Assistance with meals, Avoid talking while eating, Double swallow with each bite/sip, Eliminate distractions, Feed only when awake/alert, Frequent oral care, No straws, Remain upright 30 minutes post meal, Small bites/sips and Standard aspiration precautions   General Precautions: Standard, fall, aspiration  Communication strategies:  none    Assessment:     Richard Farr is a 78 y.o. male with a medical diagnosis of Acute cystitis without hematuria. Pt has a history of CVA and GERD. He presents with s/s of pharyngeal dysphagia at bedside: globus sensation of rice, intermittent throat clear, abnormal vocal quality, audible swallow, and belching post swallow. Pt appears safe for oral diet w/ precautions listed above at this time. ST is warranted for further assessment and diagnosis of swallow function.      History:     Past Medical History:   Diagnosis Date    Anemia     Arthritis     Bilateral lower extremity edema     Carpal tunnel syndrome, bilateral     Chronic diastolic congestive heart failure     Colon polyp     Coronary artery disease     Depression due to physical illness     Encounter for blood transfusion     Gastric ulcer     History of herpes zoster     Hyperlipemia     Hypertension     Moderate tricuspid insufficiency     NSVT (nonsustained ventricular tachycardia)     PAC (premature atrial contraction)     Patent foramen ovale with right to left shunt     Pneumonia     Pulmonary hypertension     PVC's (premature ventricular contractions)     Severe mitral regurgitation      Shingles     Stroke     MINI TRAUMA; RIGHT SIDED WEAKNESS       Past Surgical History:   Procedure Laterality Date    CARDIAC CATHETERIZATION      COLONOSCOPY N/A 11/23/2020    Procedure: COLONOSCOPY;  Surgeon: Abelino Garcia MD;  Location: [facility];  Service: General;  Laterality: N/A;    ESOPHAGOGASTRODUODENOSCOPY      ESOPHAGOGASTRODUODENOSCOPY N/A 5/21/2021    Procedure: EGD (ESOPHAGOGASTRODUODENOSCOPY);  Surgeon: Sunny Rea MD;  Location: [facility] ENDO;  Service: Endoscopy;  Laterality: N/A;  COVID-VACCINATED    IMPLANTATION OF MITRAL VALVE LEAFLET CLIP Right 5/18/2021    Procedure: INSERTION, LEAFLET CLIP, MITRAL VALVE;  Surgeon: Zen Reina MD;  Location: [facility] CATH;  Service: Cardiovascular;  Laterality: Right;    NECK SURGERY      anterior cervical fusion x 2    TONSILLECTOMY      TRANSESOPHAGEAL ECHOCARDIOGRAPHY         Social History: Patient lives with w/ his wife.    Prior Intubation HX:  none during this hospital encounter    Modified Barium Swallow: none previously on file    Chest X-Rays:   X-RAY CHEST 9/22/2023  FINDINGS:  Mild elevation of right hemidiaphragm.  No acute infiltrates or signs of CHF detected.  No pneumothorax or pleural effusion.  Impression:  No acute findings    CTA CHEST Non-Coronary 9/22/2023  FINDINGS:  No evidence of pulmonary emboli.  Suboptimal opacification of pulmonary arterial system.  Minimal right pleural effusion with adjacent atelectasis.  No focal areas of consolidation.  No significant pleural fluid.  No pneumothorax.  No suspicious mass or lymphadenopathy  There is image degradation secondary to motion artifact.  Impression:  No evidence of pulmonary emboli  Minimal right pleural effusion  Otherwise, as above    Prior diet: regular (bite/sized per wife) and thin liquids.    Occupation/hobbies/homemaking: Retired/ enjoys spending time w/ family.     Subjective     SLP communicates w/ nurse who reports swallowing difficulty w/ signs of choking over the  weekend nurse attempts for trial of thickened liquids w/o straw w/ some improvement. Pt now back to tolerating initial diet w/ continues w/ minimal signs of swallow difficulty but noticed some improvement while taking medication w/ thin liquids this morning. SLP provides nursing staff w/ education of risks of thickening at bedside  .   SLP communicates w/ Pt and spouse at bedside. They report some recent difficulty w/ pt swallow but report recent improvements since initial difficulty. Pt/spouse indicate change to vocal quality and wife reports she is having more difficulty understanding pt from his baseline.     SLP consult w/ physician regarding findings and recommendations for MBSS and ENT evaluation. Consult regarding MBSS prior to d/c versus in outpatient setting; MD requests MBSS prior to d/c. Plan for MBSS tomorrow.     Patient goals: no specific goals stated    Pain/Comfort:  Pain Rating 1: 0/10  Pain Rating 2: 0/10    Respiratory Status: Nasal cannula, flow 1 L/min    Objective:     Pt completes the following assessments    EAT-10 Score:    Eating Assessment Tool (EAT-10) is a questionnaire given to the patient to  his swallowing function.      Key: 0= no problem; 4= severe problem  1. My swallowing problem has caused me to lose weight. - 0  2. My swallowing problem interferes with my ability to go out for meals. - 0  3. Swallowing liquids takes extra effort. - 0  4. Swallowing solids takes extra effort. - 0  5. Swallowing pills takes extra effort. - 0  6. Swallowing is painful. - 0  7. The pleasure of eating is affected by my swallowing. - 0  8. When I swallow food sticks in my throat. - 1  9. I cough when I eat. - 1  10. Swallowing is stressful. - 0     The pt ranked his/her swallowing function as a 2/40     Interpretation: Score of greater than 3 is indicative of a swallowing disorder (Iveth et al., 2008); higher scores correlate with increased penetration-aspiration scale scores, and scores  greater than 15 results in the patient being 2.2 times more likely to aspirate (Norah et al., 2015)     References:   SANDRA Lazaro., DARREL Garcia., RAS Ferrer., RUTH ANN Ortiz., RAGHU Barr., RUTH ANN Melchor, & TELMA Ramos (2008). Validity and reliability of the Eating Assessment Tool (EAT-10). Annals of Otology, Rhinology & Laryngology, 117(12), 919-924. https://doi.org/10.1177/[email or web address]  DARREL Almazan., SHARI Arguelles., RUTH ANN Felix., SHARI Vasquez, & SANDRA Lazaro (2015). The ability of the 10-item eating assessment tool (EAT-10) to predict aspiration risk in persons with dysphagia. Annals of Otology, Rhinology & Laryngology, 124(5), 351-354. https://doi.org/10.1177/[email or web address]     PILL-5: (Ankur et al., 2019)    Key: 0= never; 1= almost never; 2=sometimes; 3= almost always; 4=always  1. Pills stick in my throat- 0  2. Pills stick in my chest- 0  3. I have a fear of swallowing pills- 0  4. My problem swallowing pills interferes with my ability to take my medicine- 0  5. I cant take my pills without crushing, coating, or using other forms of assistance- 0  Total score: 0/20    Interpretation*: less than 6 results in minimal or no pill dysphagia; greater than or equal to 6 but less than 11 results in mild to moderate, may manage with pill lubricants*; and greater than or equal to 11 results in moderate to severe pill dysphagia, may require an altered formulation of medication    *Referral to a swallowing specialist is warranted in individuals with a PILL-5 is greater than or equal to 6 to rule out obstructing pathology such an esophageal or cricopharyngeal stricture or web that may be easily treatable.    Pt alert and oriented. Follows all commands. Answers questions WFL. Pt w/ reduced speech intelligibility and abnormal vocal quality.      Oral Musculature Evaluation  Oral Musculature: general weakness  Dentition: present and adequate  Secretion Management: adequate  Mucosal Quality:  adequate  Mandibular Strength and Mobility: WFL  Oral Labial Strength and Mobility: impaired coordination  Lingual Strength and Mobility: WFL  Velar Elevation: WFL  Buccal Strength and Mobility: decreased tone  Volitional Cough: perceptually weak; improves w/ cues to produce hard cough  Volitional Swallow: WFL  Voice Prior to PO Intake: perceptually hyponasal/ strained & strangled  MPT (maximum phonation time): /a/ 14.81     Pt presents w/ facial laceration w/ stiches, bruising and possible swelling 2/2 recent fall. Pt reports past fracture to nose indicating baseline misalignment.     Bedside Swallow Eval:   Consistencies Assessed:  Thin liquids 3 oz water test attempt, SLP-regulated cup sip x5, straw  Puree tbsp bites applesauce x3   Mixed consistencies white beans w/ rice and ham tsp x3  Soft solids shredded chicken x2, smothered greens x2  Solids campbell cracker small bites x3    Oral Phase:   Mouth breathing    Pharyngeal Phase:   3 oz water test: fail; unable to complete sequential swallows   Globus sensation w/ throat clear x2 following rice   Intermittent throat clearing immediately following swallow and delayed throat clearing across consistencies  Audible swallow across consistencies improves some w/ cues for appropriate respiration-swallow pattern  Belching post swallow  Perceptually wet vocal quality post swallow     Compensatory Strategies  Cues for appropriate coordination of respiration-swallow pattern  Effortful swallow  Volitional cough/throat clear    Treatment: Aspiration and reflux precautions provided, ENT evaluation recommended, MBSS recommended.    Goals:   Multidisciplinary Problems       SLP Goals          Problem: SLP    Goal Priority Disciplines Outcome   SLP Goal     SLP Ongoing, Progressing   Description:   1. Pt will tolerate least restrictive oral diet to maintain adequate nutrition/hydration w/o acute dysphagia-related pulmonary complication.  2. Pt will participate in modified barium  swallow study to define swallow physiology and to determine therapy need.                         Plan:     Patient to be seen:  4 x/week   Plan of Care expires:  10/09/23  Plan of Care reviewed with:  patient, spouse   SLP Follow-Up:  No       Discharge recommendations:  rehabilitation facility   Barriers to Discharge:   MBSS    Time Tracking:     SLP Treatment Date:   09/25/23  Speech Start Time:  1100  Speech Stop Time:  1130     Speech Total Time (min):  30 min    Billable Minutes: Eval x30 mins     09/25/2023

## 2023-09-25 NOTE — PLAN OF CARE
Problem: Occupational Therapy  Goal: Occupational Therapy Goal  Description: Goals to be met by: 10/02/23     Patient will increase functional independence with ADLs by performing:    Feeding with Set-up Assistance.  UE Dressing with Minimal Assistance.  LE Dressing with Minimal Assistance.  Grooming while seated with Set-up Assistance.  Toileting from toilet with Minimal Assistance for hygiene and clothing management.   Bathing from  shower chair/bench with Minimal Assistance.  Toilet transfer to toilet with Stand-by Assistance.    Outcome: Established OT POC

## 2023-09-25 NOTE — PT/OT/SLP EVAL
Occupational Therapy   Evaluation    Name: Richard Farr  MRN: [id number]  Admitting Diagnosis: Acute cystitis without hematuria  Recent Surgery: * No surgery found *      Recommendations:     Discharge Recommendations: other (see comments) (Post Acute Rehabilitation)  Discharge Equipment Recommendations:  none  Barriers to discharge:  Other (Comment) (Medical status)    Assessment:     Richard Farr is a 78 y.o. male with a medical diagnosis of Acute cystitis without hematuria.  He presents with functional deficits impacting independence with ADL's including functional mobility. Performance deficits affecting function: weakness, impaired endurance, impaired self care skills, impaired functional mobility, impaired balance, impaired cognition, decreased coordination, decreased upper extremity function, decreased lower extremity function, decreased safety awareness, abnormal tone, decreased ROM, impaired fine motor, impaired cardiopulmonary response to activity, impaired joint extensibility, impaired muscle length.      Rehab Prognosis: Fair; patient would benefit from acute skilled OT services to address these deficits and reach maximum level of function.       Plan:     Patient to be seen 5 x/week to address the above listed problems via self-care/home management, therapeutic activities, therapeutic exercises, cognitive retraining  Plan of Care Expires: 10/02/23  Plan of Care Reviewed with: patient    Subjective     Chief Complaint: SOB with activity  Patient/Family Comments/goals: Pt would like to return home as soon as possible.     Occupational Profile:  Living Environment: Pt lives with his wife in a H with ramp and 4 steps to enter /exit.   Previous level of function: Minimal to Moderate Assistance  Roles and Routines: ADL's  Equipment Used at Home: walker, rolling, rollator, shower chair, grab bar, raised toilet  Assistance upon Discharge: Pt's spouse and family assist as needed in which his son lives next door.      Pain/Comfort:  Pain Rating 1: 0/10  Pain Rating 2: 0/10    Patients cultural, spiritual, Mormon conflicts given the current situation: no    Objective:     Communicated with: nurse prior to session.  Patient found up in chair with telemetry, pulse ox (continuous), peripheral IV, oxygen upon OT entry to room.    General Precautions: Standard, fall, aspiration  Orthopedic Precautions: N/A  Braces: N/A  Respiratory Status: Nasal cannula, flow 1 L/min    Occupational Performance:    Bed Mobility:    Pt did not perform bed mobility on this date.    Functional Mobility/Transfers:  Patient completed Sit <> Stand Transfer with minimum assistance  with  rolling walker   Patient completed Bed <> Chair Transfer using Step Transfer technique with minimum assistance with rolling walker  Patient completed Toilet Transfer Step Transfer technique with minimum assistance with  bedside commode  Functional Mobility: Pt ambulated 73' requiring steadying assist utilizing RW.    Activities of Daily Living:  Feeding:  supervision    Grooming: minimum assistance    Bathing: moderate assistance    Upper Body Dressing: moderate assistance    Lower Body Dressing: moderate assistance    Toileting: moderate assistance      Cognitive/Visual Perceptual:  Cognitive/Psychosocial Skills:  -       Oriented to: Person, Place, and Situation   -       Follows Commands/attention:Follows multistep  commands  -       Communication: anomia  -       Memory: Impaired STM  -       Safety awareness/insight to disability: impaired   -       Mood/Affect/Coping skills/emotional control: Cooperative and Pleasant    Physical Exam:  Postural examination/scapula alignment: -       Rounded shoulders  Dominant hand: -       Right  Upper Extremity Range of Motion:  -       Right Upper Extremity: WFL  -       Left Upper Extremity: Deficits: Minimal to 25% of normal active range with greater ROM distal than proximal  Upper Extremity Strength: -       Right Upper  Extremity: 4- to 4/5  -       Left Upper Extremity: Deficits: 1 to 2/5  Fine Motor Coordination: -       Impaired  Left hand, manipulation of objects    Gross motor coordination: Left hemiplegia/paresis    AMPAC 6 Click ADL:  AMPAC Total Score: 18    Treatment & Education:  Pt was provided education / instruction regarding role of OT and established OT POC.    Patient left HOB elevated with all lines intact, call button in reach, and nurse notified    GOALS:   Goals to be met by: 10/02/23     Patient will increase functional independence with ADLs by performing:    Feeding with Set-up Assistance.  UE Dressing with Minimal Assistance.  LE Dressing with Minimal Assistance.  Grooming while seated with Set-up Assistance.  Toileting from toilet with Minimal Assistance for hygiene and clothing management.   Bathing from  shower chair/bench with Minimal Assistance.  Toilet transfer to toilet with Stand-by Assistance.      History:     Past Medical History:   Diagnosis Date    Anemia     Arthritis     Bilateral lower extremity edema     Carpal tunnel syndrome, bilateral     Chronic diastolic congestive heart failure     Colon polyp     Coronary artery disease     Depression due to physical illness     Encounter for blood transfusion     Gastric ulcer     History of herpes zoster     Hyperlipemia     Hypertension     Moderate tricuspid insufficiency     NSVT (nonsustained ventricular tachycardia)     PAC (premature atrial contraction)     Patent foramen ovale with right to left shunt     Pneumonia     Pulmonary hypertension     PVC's (premature ventricular contractions)     Severe mitral regurgitation     Shingles     Stroke     MINI TRAUMA; RIGHT SIDED WEAKNESS         Past Surgical History:   Procedure Laterality Date    CARDIAC CATHETERIZATION      COLONOSCOPY N/A 11/23/2020    Procedure: COLONOSCOPY;  Surgeon: Abelino Garcia MD;  Location: [facility];  Service: General;  Laterality: N/A;    ESOPHAGOGASTRODUODENOSCOPY       ESOPHAGOGASTRODUODENOSCOPY N/A 5/21/2021    Procedure: EGD (ESOPHAGOGASTRODUODENOSCOPY);  Surgeon: Sunny Rea MD;  Location: [facility] ENDO;  Service: Endoscopy;  Laterality: N/A;  COVID-VACCINATED    IMPLANTATION OF MITRAL VALVE LEAFLET CLIP Right 5/18/2021    Procedure: INSERTION, LEAFLET CLIP, MITRAL VALVE;  Surgeon: Zen Reina MD;  Location: [facility] CATH;  Service: Cardiovascular;  Laterality: Right;    NECK SURGERY      anterior cervical fusion x 2    TONSILLECTOMY      TRANSESOPHAGEAL ECHOCARDIOGRAPHY         Time Tracking:     OT Date of Treatment: 09/25/23  OT Start Time: 1335  OT Stop Time: 1410  OT Total Time (min): 35 min    Billable Minutes:Evaluation 35    9/25/2023

## 2023-09-25 NOTE — ASSESSMENT & PLAN NOTE
Vital signs noted continue antihypertensives      BP Readings from Last 3 Encounters:   09/25/23 124/77   09/21/23 (!) 113/57   07/11/23 (!) 142/58

## 2023-09-25 NOTE — ASSESSMENT & PLAN NOTE
Continue antibiotics awaiting cultures    Blood cultures are negative to date.  Urine culture negative

## 2023-09-25 NOTE — PT/OT/SLP EVAL
Physical Therapy Evaluation     Patient Name: Richard Farr   MRN: [id number]  Recent Surgery: * No surgery found *      Recommendations:     Discharge Recommendations: home, other (see comments) (post acute care rehabilitation)   Discharge Equipment Recommendations: none   Barriers to discharge: None    Assessment:     Richard Farr is a 78 y.o. male admitted with a medical diagnosis of Acute cystitis without hematuria. He presents with the following impairments/functional limitations: weakness, gait instability, decreased upper extremity function, impaired cardiopulmonary response to activity, impaired endurance, impaired balance, decreased lower extremity function, impaired joint extensibility, decreased safety awareness, impaired muscle length, impaired self care skills, impaired functional mobility. Patient reports that he was walking with a walker prior to this hospital admission. He also had a CVA affecting the left side of his body from 10 years ago.  At the time of evaluation, he has decrease grasp on both hands, both hands keep falling off the walker during gait, he is on 1 liter of O2 via nasal cannula. He has dysarthria, he is difficult to understand. He was sitting on the recliner chair when this DPT found him.  He required minimal assistance with sit <> stand from the recliner chair and from the elevated toilet. He ambulated ~275 feet and 373 feet with rolling walker and 1 liter of O2 via nasal cannula with contact guard/minimal assistance.  He had a continent bladder episode  on the toilet during this therapy session.  No DME needed upon discharge from this hospital.  He will benefit from post acute care rehabilitation services to improve strength and balance. .     Rehab Prognosis: Good and Fair; patient would benefit from acute PT services to address these deficits and reach maximum level of function.    Plan:     During this hospitalization, patient to be seen 5 x/week to address the above listed  "problems via gait training, therapeutic activities, therapeutic exercises, neuromuscular re-education    Plan of Care Expires: 10/02/23    Subjective     Chief Complaint: "I need to use the bathroom."   Patient Comments/Goals: Go home.   Pain/Comfort:  Pain Rating 1: 0/10  Pain Rating Post-Intervention 1: 0/10    Social History:  Living Environment: Patient lives with their spouse in a single story home with can live on 1st floor  Prior Level of Function: Prior to admission, patient ambulated household distances using rolling walker  Equipment Used at Home: walker, rolling, rollator, shower chair, raised toilet, grab bar  DME owned (not currently used): none  Assistance Upon Discharge: significant other and family    Objective:     Communicated with nurse and patient  prior to session. Patient found up in chair with peripheral IV, oxygen, telemetry, pulse ox (continuous) upon PT entry to room.    General Precautions: Standard, fall, aspiration   Orthopedic Precautions: N/A   Braces: N/A    Respiratory Status: Nasal cannula, flow 1 L/min    Exams:  Cognition: Patient is oriented to Person and Place  RLE ROM: WFL  RLE Strength: WFL  LLE ROM: WFL  LLE Strength:  grossly graded 3/5  Postural Exam: Patient presented with the following abnormalities:    -       Rounded shoulders  -       Forward head  Skin Integrity/Edema:     -       Skin integrity: intact wound dressing on the forehead    Functional Mobility:  Gait belt applied - Yes  Bed Mobility  Seated in chair at start of session and returned to chair  Transfers  Sit to Stand: minimum assistance with rolling walker and with cues for hand placement and foot placement  Toilet Transfer: minimum assistance with rolling walker and with cues for hand placement and foot placement using Step Transfer  Gait  Patient ambulated ~275 feet and ~373 feet  with rolling walker and minimum assistance. Patient demonstrates unsteady gait, decreased step length, flexed posture, " decreased john, and both hands keep falling off the walker handle  . .. All lines remained intact throughout ambulation trail and portable Supplemental O2 1L utilized.  Balance  Sitting: modified independence  Standing: contact guard assistance    Therapeutic Activities and Exercises:   Patient educated on role of acute care PT and PT POC, safety while in hospital including calling nurse for mobility, and call light usage  Patient educated about importance of OOB mobility and remaining up in chair most of the day.  30 second chair stand: 0, unable to stand up without using both UE , high risk for falls     AM-PAC 6 CLICK MOBILITY  Total Score:17    Patient left up in chair with all lines intact, call button in reach, and RN notified.    GOALS:   Multidisciplinary Problems       Physical Therapy Goals          Problem: Physical Therapy    Goal Priority Disciplines Outcome Goal Variances Interventions   Physical Therapy Goal     PT, PT/OT Ongoing, Progressing     Description: Goals to be met by: 10/2/2023     Patient will increase functional independence with mobility by performin. Supine to sit with Supervision or Set-up Assistance.  2. Sit to supine with Supervision or Set-up Assistance.  3. Bed to chair transfer with Supervision or Set-up Assistance with rolling walker using Step Transfer technique.  4. Sit to Stand with Supervision or Set-up Assistance with rolling walker.  5. Gait  x 500  feet with Supervision or Set-up Assistance with rolling walker.  6. Lower extremity exercise program x10 reps.                          History:     Past Medical History:   Diagnosis Date    Anemia     Arthritis     Bilateral lower extremity edema     Carpal tunnel syndrome, bilateral     Chronic diastolic congestive heart failure     Colon polyp     Coronary artery disease     Depression due to physical illness     Encounter for blood transfusion     Gastric ulcer     History of herpes zoster     Hyperlipemia      Hypertension     Moderate tricuspid insufficiency     NSVT (nonsustained ventricular tachycardia)     PAC (premature atrial contraction)     Patent foramen ovale with right to left shunt     Pneumonia     Pulmonary hypertension     PVC's (premature ventricular contractions)     Severe mitral regurgitation     Shingles     Stroke     MINI TRAUMA; RIGHT SIDED WEAKNESS       Past Surgical History:   Procedure Laterality Date    CARDIAC CATHETERIZATION      COLONOSCOPY N/A 11/23/2020    Procedure: COLONOSCOPY;  Surgeon: Abelino Garcia MD;  Location: [facility] ENDO;  Service: General;  Laterality: N/A;    ESOPHAGOGASTRODUODENOSCOPY      ESOPHAGOGASTRODUODENOSCOPY N/A 5/21/2021    Procedure: EGD (ESOPHAGOGASTRODUODENOSCOPY);  Surgeon: Sunny Rea MD;  Location: [facility] ENDO;  Service: Endoscopy;  Laterality: N/A;  COVID-VACCINATED    IMPLANTATION OF MITRAL VALVE LEAFLET CLIP Right 5/18/2021    Procedure: INSERTION, LEAFLET CLIP, MITRAL VALVE;  Surgeon: Zen Reina MD;  Location: [facility] CATH;  Service: Cardiovascular;  Laterality: Right;    NECK SURGERY      anterior cervical fusion x 2    TONSILLECTOMY      TRANSESOPHAGEAL ECHOCARDIOGRAPHY         Time Tracking:     PT Received On: 09/25/23  PT Start Time: 1450  PT Stop Time: 1510  PT Total Time (min): 20 min     Billable Minutes: Evaluation moderate complexity     9/25/2023

## 2023-09-25 NOTE — PLAN OF CARE
Problem: Physical Therapy  Goal: Physical Therapy Goal  Description: Goals to be met by: 10/2/2023     Patient will increase functional independence with mobility by performin. Supine to sit with Supervision or Set-up Assistance.  2. Sit to supine with Supervision or Set-up Assistance.  3. Bed to chair transfer with Supervision or Set-up Assistance with rolling walker using Step Transfer technique.  4. Sit to Stand with Supervision or Set-up Assistance with rolling walker.  5. Gait  x 500  feet with Supervision or Set-up Assistance with rolling walker.  6. Lower extremity exercise program x10 reps.     Outcome: Plan of care established

## 2023-09-25 NOTE — PROGRESS NOTES
[facility] Medicine  Progress Note    Patient Name: Richard Farr  MRN: [id number]  Patient Class: IP- Inpatient   Admission Date: 9/22/2023  Length of Stay: 2 days  Attending Physician: Drew Moreira Jr., MD  Primary Care Provider: Drew Moreira Jr., MD        Subjective:     Principal Problem:Acute cystitis without hematuria        HPI:  ED HPI:  Richard Farr is a 78 y.o. male with PMHX of hypertension, hyperlipidemia, CHF, mitral regurg, tricuspid regurg, pulmonary hypertension, gastritis who presents to the emergency department C/O shortness of breath.     Patient presents after developing shortness of breath 1 hour prior to arrival.  Was recently admitted for anemia and received 4 units PRBCs in hospital as well as iron transfusion 2 days ago.  No fever but patient states he felt hot in emergency department.  Patient was diagnosed with urinary tract infection during previous hospitalization is on antibiotics.     PCP: Drew Moreira Jr., MD     IM HPI:  Patient was recently admitted by primary care and transfuse 4 units of packed red blood cells.  Patient felt much improved return home felt well for about 24 hours then began having increasing dyspnea.  Patient was readmitted found to have a urinary tract infection and a pneumonia.  Patient was started on antibiotics.  He was also given IV diuretics.  Patient has some peripheral edema but overall he states he feels dry and dehydrated.  Patient is having some oropharyngeal dysphagia and on exam I am unable to find the cause.  Patient has a history of CVA as well as a history of arthritis with several upper extremity joint deformities and contractures.      Overview/Hospital Course:  9/24 GT:  Patient is lying in bed this morning with spouse at bedside.  Patient is frail and chronically ill-appearing, but does not appear to be in any acute distress.  Patient reports his shortness a breath is at baseline, and he is tolerating supplemental  oxygen via nasal cannula.  Patient had some swallowing difficulties yesterday, SLP has been consulted for evaluation, treatment, dietary modifications have been made.  Patient's H&H is stable, and although anemic he is not at the point that he requires further transfusion.  Potassium mildly decreased, we will replete intravenously today due to swallowing difficulties and monitor with daily labs.  Blood cultures at this time are negative to date.  Patient and spouse deny any issues, concerns, questions.    9/25:  Patient eating breakfast this morning in no acute distress.  Speech therapy to evaluate the patient.  Thus far blood cultures are negative.  Patient feels back to baseline.  Urine culture without significant growth.  Consider discharge in the near future.  Patient having significant gas production check GI panel.      Past Medical History:   Diagnosis Date    Anemia     Arthritis     Bilateral lower extremity edema     Carpal tunnel syndrome, bilateral     Chronic diastolic congestive heart failure     Colon polyp     Coronary artery disease     Depression due to physical illness     Encounter for blood transfusion     Gastric ulcer     History of herpes zoster     Hyperlipemia     Hypertension     Moderate tricuspid insufficiency     NSVT (nonsustained ventricular tachycardia)     PAC (premature atrial contraction)     Patent foramen ovale with right to left shunt     Pneumonia     Pulmonary hypertension     PVC's (premature ventricular contractions)     Severe mitral regurgitation     Shingles     Stroke     MINI TRAUMA; RIGHT SIDED WEAKNESS       Past Surgical History:   Procedure Laterality Date    CARDIAC CATHETERIZATION      COLONOSCOPY N/A 11/23/2020    Procedure: COLONOSCOPY;  Surgeon: Abelino Garcia MD;  Location: [facility];  Service: General;  Laterality: N/A;    ESOPHAGOGASTRODUODENOSCOPY      ESOPHAGOGASTRODUODENOSCOPY N/A 5/21/2021    Procedure: EGD  (ESOPHAGOGASTRODUODENOSCOPY);  Surgeon: Sunny Rea MD;  Location: [facility] ENDO;  Service: Endoscopy;  Laterality: N/A;  COVID-VACCINATED    IMPLANTATION OF MITRAL VALVE LEAFLET CLIP Right 5/18/2021    Procedure: INSERTION, LEAFLET CLIP, MITRAL VALVE;  Surgeon: Zen Reina MD;  Location: [facility] CATH;  Service: Cardiovascular;  Laterality: Right;    NECK SURGERY      anterior cervical fusion x 2    TONSILLECTOMY      TRANSESOPHAGEAL ECHOCARDIOGRAPHY         Review of patient's allergies indicates:  No Known Allergies    Current Facility-Administered Medications on File Prior to Encounter   Medication    benzocaine 20 % mouth spray     Current Outpatient Medications on File Prior to Encounter   Medication Sig    aspirin 81 MG Chew Take 81 mg by mouth once daily.    atorvastatin (LIPITOR) 80 MG tablet TAKE 1 TABLET BY MOUTH EVERY EVENING    cefUROXime (CEFTIN) 500 MG tablet Take 1 tablet (500 mg total) by mouth 2 (two) times daily.    ezetimibe (ZETIA) 10 mg tablet Take 10 mg by mouth once daily.     fenofibrate (TRICOR) 145 MG tablet Take 145 mg by mouth once daily.    folic acid (FOLVITE) 1 MG tablet Take 1 tablet (1 mg total) by mouth once daily.    furosemide (LASIX) 40 MG tablet Take 1 tablet (40 mg total) by mouth once daily. (Patient taking differently: Take 20 mg by mouth once daily.)    levothyroxine (SYNTHROID) 50 MCG tablet TAKE 1 TABLET BY MOUTH EVERY DAY BEFORE BREAKFAST.    metoprolol succinate (TOPROL-XL) 25 MG 24 hr tablet TAKE 1/2 TABLET BY MOUTH EVERY DAY    pantoprazole (PROTONIX) 40 MG tablet TAKE 1 TABLET BY MOUTH EVERY DAY    potassium chloride (MICRO-K) 10 MEQ CpSR Take 20 mEq by mouth once daily.     spironolactone (ALDACTONE) 25 MG tablet Take 0.5 tablets (12.5 mg total) by mouth once daily.     Family History       Problem Relation (Age of Onset)    Cancer Brother    Heart attack Father    Stroke Mother          Tobacco Use    Smoking status: Former     Types:  Cigars     Start date:      Quit date:      Years since quittin.7    Smokeless tobacco: Never   Substance and Sexual Activity    Alcohol use: Not Currently    Drug use: Never    Sexual activity: Not on file     Review of Systems   Constitutional:  Positive for activity change and appetite change. Negative for chills and fever.   HENT:  Positive for trouble swallowing. Negative for ear pain, mouth sores, nosebleeds and sore throat.    Eyes:  Negative for visual disturbance.   Respiratory:  Positive for cough and shortness of breath. Negative for wheezing.    Cardiovascular:  Positive for leg swelling. Negative for chest pain and palpitations.   Gastrointestinal:  Negative for abdominal distention, abdominal pain, blood in stool, diarrhea, nausea and vomiting.   Endocrine: Negative for polyphagia.   Genitourinary:  Positive for frequency. Negative for difficulty urinating, dysuria and flank pain.   Musculoskeletal:  Positive for arthralgias, gait problem and joint swelling.   Skin:  Negative for rash.   Neurological:  Positive for weakness. Negative for dizziness, tremors, seizures, syncope and headaches.   Hematological:  Negative for adenopathy.   Psychiatric/Behavioral:  Negative for agitation, confusion and hallucinations. The patient is not nervous/anxious.      Objective:     Vital Signs (Most Recent):  Temp: 97.8 °F (36.6 °C) (23 07)  Pulse: 85 (23 0827)  Resp: 14 (23)  BP: 124/77 (23 0832)  SpO2: 100 % (23 08) Vital Signs (24h Range):  Temp:  [97.6 °F (36.4 °C)-97.9 °F (36.6 °C)] 97.8 °F (36.6 °C)  Pulse:  [] 85  Resp:  [14-24] 14  SpO2:  [95 %-100 %] 100 %  BP: (119-139)/(56-78) 124/77     Weight: 71.7 kg (158 lb)  Body mass index is 24.74 kg/m².     Physical Exam  Vitals and nursing note reviewed.   Constitutional:       General: He is not in acute distress.     Appearance: He is ill-appearing. He is not toxic-appearing or diaphoretic.       Comments: Frail, chronically ill-appearing   HENT:      Head: Normocephalic and atraumatic.      Nose: Nose normal. No congestion or rhinorrhea.      Mouth/Throat:      Mouth: Mucous membranes are dry.      Pharynx: No oropharyngeal exudate or posterior oropharyngeal erythema.   Eyes:      General: No scleral icterus.  Neck:      Vascular: No carotid bruit.   Cardiovascular:      Rate and Rhythm: Normal rate and regular rhythm.      Heart sounds: Murmur heard.      No friction rub. No gallop.   Pulmonary:      Effort: No respiratory distress.      Breath sounds: No stridor. Wheezing, rhonchi and rales present.      Comments: Supplemental oxygen via nasal cannula  Chest:      Chest wall: No tenderness.   Abdominal:      General: There is no distension.      Palpations: There is no mass.      Tenderness: There is no abdominal tenderness. There is no right CVA tenderness, left CVA tenderness, guarding or rebound.      Hernia: No hernia is present.   Musculoskeletal:         General: Deformity present. No tenderness.      Cervical back: Neck supple. No rigidity.      Right lower leg: Edema present.      Left lower leg: Edema present.   Lymphadenopathy:      Cervical: No cervical adenopathy.   Skin:     General: Skin is warm and dry.      Capillary Refill: Capillary refill takes less than 2 seconds.      Coloration: Skin is not jaundiced or pale.      Findings: No rash.   Neurological:      Cranial Nerves: No cranial nerve deficit.      Sensory: No sensory deficit.      Motor: Weakness present.      Coordination: Coordination normal.      Gait: Gait abnormal.   Psychiatric:         Mood and Affect: Mood normal.         Behavior: Behavior normal.         Thought Content: Thought content normal.         Judgment: Judgment normal.                Significant Labs: All pertinent labs within the past 24 hours have been reviewed.    Significant Imaging: I have reviewed all pertinent imaging results/findings within the past 24  hours.      Assessment/Plan:      * Acute cystitis without hematuria  Continue antibiotics awaiting cultures    Blood cultures are negative to date.  Urine culture negative    Hypokalemia  We will replete intravenously today due to dysphagia.  Follow with daily labs.      Other dysphagia  Dietary modifications have been made.  SLP consulted for evaluation and treatment.      Acute on chronic combined systolic and diastolic heart failure  Patient seems intravascularly dry.  Hold IV Lasix for now.      Pneumonia due to infectious organism  Continue current antibiotic regimen, supplemental oxygen.  Blood cultures are negative to date at this time.      Pleural effusion, right  Unsure if this has been investigated will defer to primary.    Stable by hx.       Essential hypertension  Vital signs noted continue antihypertensives      BP Readings from Last 3 Encounters:   09/25/23 124/77   09/21/23 (!) 113/57   07/11/23 (!) 142/58         Mixed hyperlipidemia  Continue statin.      Acute superficial gastritis without hemorrhage  Continue home treatment      Iron deficiency anemia  Patient states he had upper and lower endoscopy in 2021        VTE Risk Mitigation (From admission, onward)         Ordered     IP VTE HIGH RISK PATIENT  Once         09/23/23 0215     Place sequential compression device  Until discontinued         09/23/23 0215                Discharge Planning   JOSÉ:      Code Status: Full Code   Is the patient medically ready for discharge?:     Reason for patient still in hospital (select all that apply): Treatment  Discharge Plan A: Home with family                  Drew Moreira Jr, MD  Department of Hospital Medicine   [facility]

## 2023-09-26 PROBLEM — S01.81XA LACERATION OF FOREHEAD: Status: ACTIVE | Noted: 2023-09-26

## 2023-09-26 LAB
ALBUMIN SERPL BCP-MCNC: 2.8 G/DL (ref 3.5–5.2)
ALP SERPL-CCNC: 45 U/L (ref 55–135)
ALT SERPL W/O P-5'-P-CCNC: 30 U/L (ref 10–44)
ANION GAP SERPL CALC-SCNC: 2 MMOL/L (ref 3–11)
ANISOCYTOSIS BLD QL SMEAR: ABNORMAL
AST SERPL-CCNC: 40 U/L (ref 10–40)
ASTROVIRUS: NOT DETECTED
BASOPHILS # BLD AUTO: 0.07 K/UL (ref 0–0.2)
BASOPHILS NFR BLD: 0.9 % (ref 0–1.9)
BILIRUB SERPL-MCNC: 0.7 MG/DL (ref 0.1–1)
BUN SERPL-MCNC: 15 MG/DL (ref 8–23)
C COLI+JEJ+UPSA DNA STL QL NAA+NON-PROBE: NOT DETECTED
C DIF TOX TCDA+TCDB STL QL NAA+NON-PROBE: NORMAL
CALCIUM SERPL-MCNC: 8.8 MG/DL (ref 8.7–10.5)
CHLORIDE SERPL-SCNC: 101 MMOL/L (ref 95–110)
CO2 SERPL-SCNC: 33 MMOL/L (ref 23–29)
CREAT SERPL-MCNC: 0.5 MG/DL (ref 0.5–1.4)
CYCLOSPORA CAYETANENSIS: NOT DETECTED
DACRYOCYTES BLD QL SMEAR: ABNORMAL
DIFFERENTIAL METHOD: ABNORMAL
ENTEROAGGREGATIVE E COLI: NOT DETECTED
ENTEROPATHOGENIC E COLI: NOT DETECTED
EOSINOPHIL # BLD AUTO: 0.2 K/UL (ref 0–0.5)
EOSINOPHIL NFR BLD: 2.3 % (ref 0–8)
ERYTHROCYTE [DISTWIDTH] IN BLOOD BY AUTOMATED COUNT: 33 % (ref 11.5–14.5)
EST. GFR  (NO RACE VARIABLE): >60 ML/MIN/1.73 M^2
GLUCOSE SERPL-MCNC: 107 MG/DL (ref 70–110)
GPP - ADENOVIRUS 40/41: NOT DETECTED
GPP - CRYPTOSPORIDIUM: NOT DETECTED
GPP - ENTAMOEBA HISTOLYTICA: NOT DETECTED
GPP - ENTEROTOXIGENIC E COLI (ETEC): NOT DETECTED
GPP - GIARDIA LAMBLIA: NOT DETECTED
GPP - NOROVIRUS GI/GII: NOT DETECTED
GPP - ROTAVIRUS A: NOT DETECTED
GPP - SALMONELLA: NOT DETECTED
GPP - VIBRIO CHOLERA: NOT DETECTED
GPP - YERSINIA ENTEROCOLITICA: NOT DETECTED
HCT VFR BLD AUTO: 30 % (ref 40–54)
HGB BLD-MCNC: 8.8 G/DL (ref 14–18)
HYPOCHROMIA BLD QL SMEAR: ABNORMAL
IMM GRANULOCYTES # BLD AUTO: 0.03 K/UL (ref 0–0.04)
IMM GRANULOCYTES NFR BLD AUTO: 0.4 % (ref 0–0.5)
LACTATE PLASV-SCNC: NOT DETECTED MMOL/L
LYMPHOCYTES # BLD AUTO: 1.4 K/UL (ref 1–4.8)
LYMPHOCYTES NFR BLD: 17.6 % (ref 18–48)
MAGNESIUM SERPL-MCNC: 1.8 MG/DL (ref 1.6–2.6)
MCH RBC QN AUTO: 22.2 PG (ref 27–31)
MCHC RBC AUTO-ENTMCNC: 29.3 G/DL (ref 32–36)
MCV RBC AUTO: 76 FL (ref 82–98)
MONOCYTES # BLD AUTO: 0.8 K/UL (ref 0.3–1)
MONOCYTES NFR BLD: 10.2 % (ref 4–15)
NEUTROPHILS # BLD AUTO: 5.4 K/UL (ref 1.8–7.7)
NEUTROPHILS NFR BLD: 68.6 % (ref 38–73)
NRBC BLD-RTO: 0 /100 WBC
OVALOCYTES BLD QL SMEAR: ABNORMAL
PLATELET # BLD AUTO: 363 K/UL (ref 150–450)
PLATELET BLD QL SMEAR: ABNORMAL
PLESIOMONAS SHIGELLOIDES: NOT DETECTED
PMV BLD AUTO: 9.5 FL (ref 9.2–12.9)
POIKILOCYTOSIS BLD QL SMEAR: ABNORMAL
POLYCHROMASIA BLD QL SMEAR: ABNORMAL
POTASSIUM SERPL-SCNC: 3.8 MMOL/L (ref 3.5–5.1)
PROT SERPL-MCNC: 6.3 G/DL (ref 6–8.4)
RBC # BLD AUTO: 3.96 M/UL (ref 4.6–6.2)
SAPOVIRUS: NOT DETECTED
SHIGELLA SP+EIEC IPAH STL QL NAA+PROBE: NOT DETECTED
SODIUM SERPL-SCNC: 136 MMOL/L (ref 136–145)
STOMATOCYTES BLD QL SMEAR: PRESENT
TARGETS BLD QL SMEAR: ABNORMAL
VIBRIO: NOT DETECTED
WBC # BLD AUTO: 7.88 K/UL (ref 3.9–12.7)

## 2023-09-26 PROCEDURE — 11000001 HC ACUTE MED/SURG PRIVATE ROOM

## 2023-09-26 PROCEDURE — 25500020 PHARM REV CODE 255: Performed by: INTERNAL MEDICINE

## 2023-09-26 PROCEDURE — 25000003 PHARM REV CODE 250: Performed by: INTERNAL MEDICINE

## 2023-09-26 PROCEDURE — 27000221 HC OXYGEN, UP TO 24 HOURS

## 2023-09-26 PROCEDURE — 87507 IADNA-DNA/RNA PROBE TQ 12-25: CPT | Performed by: INTERNAL MEDICINE

## 2023-09-26 PROCEDURE — 63600175 PHARM REV CODE 636 W HCPCS: Performed by: INTERNAL MEDICINE

## 2023-09-26 PROCEDURE — 94761 N-INVAS EAR/PLS OXIMETRY MLT: CPT

## 2023-09-26 PROCEDURE — 80053 COMPREHEN METABOLIC PANEL: CPT | Performed by: INTERNAL MEDICINE

## 2023-09-26 PROCEDURE — 97116 GAIT TRAINING THERAPY: CPT

## 2023-09-26 PROCEDURE — 83735 ASSAY OF MAGNESIUM: CPT | Performed by: INTERNAL MEDICINE

## 2023-09-26 PROCEDURE — 12013 RPR F/E/E/N/L/M 2.6-5.0 CM: CPT | Mod: ,,, | Performed by: STUDENT IN AN ORGANIZED HEALTH CARE EDUCATION/TRAINING PROGRAM

## 2023-09-26 PROCEDURE — 97530 THERAPEUTIC ACTIVITIES: CPT

## 2023-09-26 PROCEDURE — 99900035 HC TECH TIME PER 15 MIN (STAT)

## 2023-09-26 PROCEDURE — 21400001 HC TELEMETRY ROOM

## 2023-09-26 PROCEDURE — 63600175 PHARM REV CODE 636 W HCPCS: Performed by: EMERGENCY MEDICINE

## 2023-09-26 PROCEDURE — 97110 THERAPEUTIC EXERCISES: CPT | Mod: CO

## 2023-09-26 PROCEDURE — A9698 NON-RAD CONTRAST MATERIALNOC: HCPCS | Performed by: INTERNAL MEDICINE

## 2023-09-26 PROCEDURE — 99900031 HC PATIENT EDUCATION (STAT)

## 2023-09-26 PROCEDURE — 25000003 PHARM REV CODE 250: Performed by: EMERGENCY MEDICINE

## 2023-09-26 PROCEDURE — 92611 MOTION FLUOROSCOPY/SWALLOW: CPT

## 2023-09-26 PROCEDURE — 85025 COMPLETE CBC W/AUTO DIFF WBC: CPT | Performed by: INTERNAL MEDICINE

## 2023-09-26 PROCEDURE — 12013 PR RESUPERF WND FACE 2.6-5 CM: ICD-10-PCS | Mod: ,,, | Performed by: STUDENT IN AN ORGANIZED HEALTH CARE EDUCATION/TRAINING PROGRAM

## 2023-09-26 PROCEDURE — 36415 COLL VENOUS BLD VENIPUNCTURE: CPT | Performed by: INTERNAL MEDICINE

## 2023-09-26 RX ORDER — ZOLPIDEM TARTRATE 5 MG/1
5 TABLET ORAL NIGHTLY PRN
Status: DISCONTINUED | OUTPATIENT
Start: 2023-09-26 | End: 2023-10-01

## 2023-09-26 RX ADMIN — FERROUS SULFATE TAB 325 MG (65 MG ELEMENTAL FE) 1 EACH: 325 (65 FE) TAB at 08:09

## 2023-09-26 RX ADMIN — ZOLPIDEM TARTRATE 5 MG: 5 TABLET ORAL at 10:09

## 2023-09-26 RX ADMIN — PANTOPRAZOLE SODIUM 40 MG: 40 TABLET, DELAYED RELEASE ORAL at 08:09

## 2023-09-26 RX ADMIN — EZETIMIBE 10 MG: 10 TABLET ORAL at 08:09

## 2023-09-26 RX ADMIN — METOPROLOL SUCCINATE 12.5 MG: 25 TABLET, EXTENDED RELEASE ORAL at 08:09

## 2023-09-26 RX ADMIN — IRON SUCROSE 500 MG: 20 INJECTION, SOLUTION INTRAVENOUS at 08:09

## 2023-09-26 RX ADMIN — ATORVASTATIN CALCIUM 80 MG: 80 TABLET, FILM COATED ORAL at 08:09

## 2023-09-26 RX ADMIN — BARIUM SULFATE 176 G: 960 POWDER, FOR SUSPENSION ORAL at 10:09

## 2023-09-26 RX ADMIN — Medication 6 MG: at 08:09

## 2023-09-26 RX ADMIN — CEFTRIAXONE SODIUM 1 G: 1 INJECTION, POWDER, FOR SOLUTION INTRAMUSCULAR; INTRAVENOUS at 12:09

## 2023-09-26 RX ADMIN — AZITHROMYCIN MONOHYDRATE 500 MG: 500 INJECTION, POWDER, LYOPHILIZED, FOR SOLUTION INTRAVENOUS at 01:09

## 2023-09-26 RX ADMIN — FENOFIBRATE 145 MG: 145 TABLET, FILM COATED ORAL at 08:09

## 2023-09-26 RX ADMIN — FOLIC ACID 1 MG: 1 TABLET ORAL at 08:09

## 2023-09-26 RX ADMIN — LEVOTHYROXINE SODIUM 50 MCG: 50 TABLET ORAL at 06:09

## 2023-09-26 NOTE — ASSESSMENT & PLAN NOTE
Dietary modifications have been made.  SLP consulted for evaluation and treatment.  Significant aspiration.  NPO for now.  GS to eval for PEG.

## 2023-09-26 NOTE — PT/OT/SLP PROGRESS
"Physical Therapy Treatment    Patient Name:  Richard Farr   MRN:  [id number]    Recommendations:     Discharge Recommendations: home, other (see comments) (post acute care rehabilitation)  Discharge Equipment Recommendations: none  Barriers to discharge: None    Assessment:     Richard Farr is a 78 y.o. male admitted with a medical diagnosis of Acute cystitis without hematuria.  He presents with the following impairments/functional limitations: weakness, gait instability, decreased upper extremity function, decreased ROM, impaired cardiopulmonary response to activity, impaired endurance, impaired balance, decreased lower extremity function, impaired joint extensibility, decreased safety awareness, impaired muscle length, impaired functional mobility, impaired self care skills Patient was found supine in bed with student nurses and wife present.  Patient was able to complete supine to sit with SBA.  SBA with sit <> stand from the bed, min assist with sit <> stand from a regular toilet. He ambulated ~130 feet and ~737 feet with a rollator and a dizem  on the left hand to improve grasp. Total assist with toileting. Increasing tolerance to ambulation. .    Rehab Prognosis: Good and Fair; patient would benefit from acute skilled PT services to address these deficits and reach maximum level of function.    Recent Surgery: * No surgery found *      Plan:     During this hospitalization, patient to be seen 5 x/week to address the identified rehab impairments via gait training, therapeutic activities, therapeutic exercises, neuromuscular re-education and progress toward the following goals:    Plan of Care Expires:  10/02/23    Subjective     Chief Complaint: "I am okay."   Patient/Family Comments/goals: "Go home."   Pain/Comfort:  Pain Rating 1: 0/10  Pain Rating Post-Intervention 1: 0/10      Objective:     Communicated with nurse, patient and  prior to session.  Patient found HOB elevated with telemetry, peripheral IV, " oxygen, pulse ox (continuous) upon PT entry to room.     General Precautions: Standard, fall, aspiration  Orthopedic Precautions: N/A  Braces: N/A  Respiratory Status: Nasal cannula, flow 1 L/min     Functional Mobility:  Bed Mobility:     Rolling Left:  supervision  Rolling Right: supervision  Scooting: supervision  Bridging: supervision  Supine to Sit: stand by assistance  Transfers:     Sit to Stand:  stand by assistance with rollator   Toilet Transfer: minimum assistance with  rollator   using  Step Transfer  Gait: 130 feet and 737 feet with rollator and dizem on the left hand  SBA with 1 liter of o2 via nasal cannula  Balance: Modified independent with static sitting, SBA with sttaic standing using a rollator       AM-PAC 6 CLICK MOBILITY  Turning over in bed (including adjusting bedclothes, sheets and blankets)?: 3  Sitting down on and standing up from a chair with arms (e.g., wheelchair, bedside commode, etc.): 3  Moving from lying on back to sitting on the side of the bed?: 3  Moving to and from a bed to a chair (including a wheelchair)?: 3  Need to walk in hospital room?: 3  Climbing 3-5 steps with a railing?: 3  Basic Mobility Total Score: 18       Treatment & Education:  Rolling side <> side  Supine to  sit  Scooting to the front of the bed   Sitting balance/tolerance  Sit <> stand with rollator   Standing tolerance   Tolieting   Toilet transfers  Gait with rollator x 2 trials   Chair  positioning     Patient left up in chair with all lines intact, call button in reach, nurse notified, and wife  present..    GOALS:   Multidisciplinary Problems       Physical Therapy Goals          Problem: Physical Therapy    Goal Priority Disciplines Outcome Goal Variances Interventions   Physical Therapy Goal     PT, PT/OT Ongoing, Progressing     Description: Goals to be met by: 10/2/2023     Patient will increase functional independence with mobility by performin. Supine to sit with Supervision or Set-up  Assistance.  2. Sit to supine with Supervision or Set-up Assistance.  3. Bed to chair transfer with Supervision or Set-up Assistance with rolling walker using Step Transfer technique.  4. Sit to Stand with Supervision or Set-up Assistance with rolling walker.  5. Gait  x 700  feet with Supervision or Set-up Assistance with rolling walker.  6. Lower extremity exercise program x10 reps.                          Time Tracking:     PT Received On: 09/26/23  PT Start Time: 0840     PT Stop Time: 0909  PT Total Time (min): 29 min     Billable Minutes: Gait Training 15 and Therapeutic Activity 14    Treatment Type: Treatment  PT/PTA: PT           09/26/2023

## 2023-09-26 NOTE — HPI
79yo male admitted to medicine for symptomatic anemia who presents with forehead laceration after accidental fall this AM.  GCS 15; HD stable.  No LOS.  General surgery consulted for lac repair.  Pt also presents with progressive dysphagia with severe aspiration as demonstrated on swallow study.  General surery consulted for g-tube placement.

## 2023-09-26 NOTE — ASSESSMENT & PLAN NOTE
Lac irrigated with primary suture repair  Steri strips to nasal bridge lacerations   Keep clean and dry  Follow up in general surgery clinic in 2 weeks for suture removal

## 2023-09-26 NOTE — PLAN OF CARE
Pt tried to get up off and on throughout the night. Telesitter remains in place. Medications administered per MAR. Was not able to collect stool specimen.     Problem: Infection  Goal: Absence of Infection Signs and Symptoms  Outcome: Ongoing, Progressing     Problem: Adult Inpatient Plan of Care  Goal: Plan of Care Review  Outcome: Ongoing, Progressing  Goal: Patient-Specific Goal (Individualized)  Outcome: Ongoing, Progressing  Goal: Absence of Hospital-Acquired Illness or Injury  Outcome: Ongoing, Progressing  Goal: Optimal Comfort and Wellbeing  Outcome: Ongoing, Progressing  Goal: Readiness for Transition of Care  Outcome: Ongoing, Progressing     Problem: Fluid and Electrolyte Imbalance (Acute Kidney Injury/Impairment)  Goal: Fluid and Electrolyte Balance  Outcome: Ongoing, Progressing     Problem: Oral Intake Inadequate (Acute Kidney Injury/Impairment)  Goal: Optimal Nutrition Intake  Outcome: Ongoing, Progressing     Problem: Renal Function Impairment (Acute Kidney Injury/Impairment)  Goal: Effective Renal Function  Outcome: Ongoing, Progressing     Problem: Skin Injury Risk Increased  Goal: Skin Health and Integrity  Outcome: Ongoing, Progressing     Problem: Fluid Imbalance (Pneumonia)  Goal: Fluid Balance  Outcome: Ongoing, Progressing     Problem: Infection (Pneumonia)  Goal: Resolution of Infection Signs and Symptoms  Outcome: Ongoing, Progressing     Problem: Respiratory Compromise (Pneumonia)  Goal: Effective Oxygenation and Ventilation  Outcome: Ongoing, Progressing     Problem: Impaired Wound Healing  Goal: Optimal Wound Healing  Outcome: Ongoing, Progressing     Problem: Fall Injury Risk  Goal: Absence of Fall and Fall-Related Injury  Outcome: Ongoing, Progressing

## 2023-09-26 NOTE — PLAN OF CARE
Problem: Physical Therapy  Goal: Physical Therapy Goal  Description: Goals to be met by: 10/2/2023     Patient will increase functional independence with mobility by performin. Supine to sit with Supervision or Set-up Assistance.  2. Sit to supine with Supervision or Set-up Assistance.  3. Bed to chair transfer with Supervision or Set-up Assistance with rolling walker using Step Transfer technique.  4. Sit to Stand with Supervision or Set-up Assistance with rolling walker.  5. Gait  x 700  feet with Supervision or Set-up Assistance with rolling walker.  6. Lower extremity exercise program x10 reps.     Outcome: Ongoing, Progressing

## 2023-09-26 NOTE — PROCEDURES
Modified Barium Swallow    Patient Name:  Richard Farr   MRN:  [id number]      Recommendations:     Recommendations:                General Recommendations:  ENT evaluation consider scope   Diet recommendations:  NPO, NPO    Aspiration Precautions: Consider Alternate means of nutrition/hydration and Strict aspiration precautions; FREE WATER PROTOCOL   General Precautions: Standard, fall, aspiration  Communication strategies:  none    Referral     Reason for Referral  Patient was referred for a Modified Barium Swallow Study to assess the efficiency of his/her swallow function, rule out aspiration and make recommendations regarding safe dietary consistencies, effective compensatory strategies, and safe eating environment.     Diagnosis: Acute cystitis without hematuria       History:     Past Medical History:   Diagnosis Date    Anemia     Arthritis     Bilateral lower extremity edema     Carpal tunnel syndrome, bilateral     Chronic diastolic congestive heart failure     Colon polyp     Coronary artery disease     Depression due to physical illness     Encounter for blood transfusion     Gastric ulcer     History of herpes zoster     Hyperlipemia     Hypertension     Moderate tricuspid insufficiency     NSVT (nonsustained ventricular tachycardia)     PAC (premature atrial contraction)     Patent foramen ovale with right to left shunt     Pneumonia     Pulmonary hypertension     PVC's (premature ventricular contractions)     Severe mitral regurgitation     Shingles     Stroke     MINI TRAUMA; RIGHT SIDED WEAKNESS             Past Surgical History:   Procedure Laterality Date    CARDIAC CATHETERIZATION        COLONOSCOPY N/A 11/23/2020     Procedure: COLONOSCOPY;  Surgeon: Abelino Garcia MD;  Location: [facility];  Service: General;  Laterality: N/A;    ESOPHAGOGASTRODUODENOSCOPY        ESOPHAGOGASTRODUODENOSCOPY N/A 5/21/2021     Procedure: EGD (ESOPHAGOGASTRODUODENOSCOPY);  Surgeon: Sunny Rea MD;  Location:  [facility];  Service: Endoscopy;  Laterality: N/A;  COVID-VACCINATED    IMPLANTATION OF MITRAL VALVE LEAFLET CLIP Right 5/18/2021     Procedure: INSERTION, LEAFLET CLIP, MITRAL VALVE;  Surgeon: Zen Reina MD;  Location: [facility];  Service: Cardiovascular;  Laterality: Right;    NECK SURGERY         anterior cervical fusion x 2    TONSILLECTOMY        TRANSESOPHAGEAL ECHOCARDIOGRAPHY          UPPER GI (completed in May 2021)  Findings:        The esophagus was normal.        Mild inflammation was found in the entire examined stomach.        A single 10 mm sessile polyp with no bleeding was found in the        second portion of the duodenum.   Impression:    - Normal esophagus.                          - Atrophic gastritis.                          - A single duodenal polyp.                          - No specimens collected.   Estimated Blood Loss:  Estimated blood loss: none.   Recommendation:- Return patient to [facility] for ongoing care.                          - He appears to be mostly bleeding from his mouth.       Objective:     Current Respiratory Status: 09/25/23    Alert: yes    Cooperative: yes    Follows Directions: yes    Visualization  Patient was seen in the lateral view  Patient was seen in the anterior view  Nasal cannula in view  Cervical fusion x2 observed    Oral Peripheral Examination  Oral Musculature: general weakness  Dentition: present and adequate  Secretion Management: adequate  Mucosal Quality: adequate  Mandibular Strength and Mobility: WFL  Oral Labial Strength and Mobility: impaired coordination  Lingual Strength and Mobility: WFL  Velar Elevation: WFL  Buccal Strength and Mobility: decreased tone  Volitional Cough: perceptually weak; improves w/ cues to produce hard cough  Volitional Swallow: WFL  Voice Prior to PO Intake: perceptually hyponasal/ strained & strangled    Consistencies Assessed  Thin tsp x2, cup sip, sequential straw sip    Nectar thick x2  Puree pudding  x3  Soft solids 2 diced peaches x2   Tablet x1     Oral Preparation/Oral Phase  WFL- Pt with adequate bolus acceptance, containment, control and timely A-P transfer across consistencies     Pharyngeal Phase   Thin liquid: deep penetration w/ aspiration x3  Nectar thick liquid: deep penetration w/ aspiration x2,   Puree: deep penetration during swallow, deep penetration w/ aspiration of residuals post swallow, aspiration x2, residue in valleculae post swallow and improves minimally w/ repeat swallows  Soft solids: trace penetration w/ aspiration of residuals during mastication of soft solids, aspiration during swallow  Tablet: lodges near valleculae clears w/ multiple swallows  Absent epiglottic inversion   Reduced hyoid excursion and laryngeal elevation   Increased deviation of bolus to patient's left side during A-P view   Anatomy/physiology appears to have some abnormality; consider further evaluation     Rosenbeck's Penetration/Aspiration Scale (PAS)   Best: (4) Material enters the airway, contacts the vocal folds, and is ejected from the airway    Worst: (7) Material enters the airway, passes below the vocal folds, and is not ejected from the trachea despite effort       Esophageal screen:   Possible delayed emptying/retention of bolus  Limited visualization during A-P view     Functional Oral Intake Scale (FOIS)  The Functional Oral Intake Scale (FOIS) is an ordinal scale that is used to assess the current status and meaningful change in the oral intake. FOIS levels include:    TUBE DEPENDENT (levels 1-3) 1. No oral intake  2. Tube dependent with minimal/inconsistent oral intake  3. Tube supplements with consistent oral intake      TOTAL ORAL INTAKE (levels 4-7) 4. Total oral intake of a single consistency  5. Total oral intake of multiple consistencies requiring special preparation  6. Total oral intake with no special preparation, but must avoid specific foods or liquid items  7. Total oral intake with no  restrictions     Patient is currently judged to be at FOIS level 2.       Assessment:     Impressions    Patient demonstrates severe pharyngeal dysphagia characterized by deep penetration w/ aspiration across multiple consistencies. The pt demonstrates reduced hyoid excursion, laryngeal elevation, and absent epiglottic inversion. Residue found in valleculae post swallow and clears minimally w/ repeat swallows. Trace aspiration of residuals observed. Possible anatomical/physiological abnormality observed also demonstrating deviation of bolus to patient's L-side during A-P view. Further diagnostic evaluation is recommended.     Prognosis: Fair    Barriers:  Respiratory compromise    Plan  Recommendation to follow up w/ ENT and/or general surgery for further evaluation of anatomy/physiology regarding swallow function and vocal quality.     Education  Results were discussed with patient. Results were discussed with Medical Team who was in agreement with plan.       Recommendations:     Consistency Recommendations: NPO status with use of the PEG tube for primary hydration, nutrition, and administration of medications that would typically be taken orally. Consult with primary SLP regarding appropriateness for [place] Free Water Protocol for added hydration. Pt should be educated regarding the risks and benefits.  Risk Management: use good oral hygiene , increase physical mobility as tolerated, and free water protocol  Specialist Referrals: ENT  Ancillary Tests: Consider laryngoscopy.  Therapy: Dysphagia therapy is not recommended at this time. Please follow up w/ ST following ENT evaluation     Goals:   Multidisciplinary Problems       SLP Goals          Problem: SLP    Goal Priority Disciplines Outcome   SLP Goal     SLP Ongoing, Progressing   Description:   1. Pt will tolerate least restrictive oral diet to maintain adequate nutrition/hydration w/o acute dysphagia-related pulmonary complication.  2. Pt will participate  in modified barium swallow study to define swallow physiology and to determine therapy need.                         Plan:   Patient to be seen:  Therapy Frequency: 4 x/week   Plan of Care expires:  10/09/23  Plan of Care reviewed with:  patient, spouse        Discharge recommendations:  rehabilitation facility   Barriers to Discharge:  None    Time Tracking:   SLP Treatment Date:   09/25/23  Speech Start Time:  1100  Speech Stop Time:  1130     Speech Total Time (min):  30 min    09/26/2023    Therapist's Name:   Yoshi Huang MS, CCC-SLP  Speech-Language Pathologist

## 2023-09-26 NOTE — PLAN OF CARE
Spoke with Nida at [facility], informed her of faxed referral and patient likely discharging this afternoon.

## 2023-09-26 NOTE — ASSESSMENT & PLAN NOTE
We will replete intravenously today due to dysphagia.  Follow with daily labs.      Patient has hypokalemia.  Last electrolytes reviewed- Recent Labs   Lab 09/25/23  0509 09/26/23  0452   K 4.1 3.8   Will replace potassium as per sliding scale as needed and monitor electrolytes closely.

## 2023-09-26 NOTE — PROGRESS NOTES
[facility] Medicine  Progress Note    Patient Name: Richard Farr  MRN: [id number]  Patient Class: IP- Inpatient   Admission Date: 9/22/2023  Length of Stay: 3 days  Attending Physician: Drew Moreira Jr., MD  Primary Care Provider: Drew Moreira Jr., MD        Subjective:     Principal Problem:Acute cystitis without hematuria        HPI:  ED HPI:  Richard Farr is a 78 y.o. male with PMHX of hypertension, hyperlipidemia, CHF, mitral regurg, tricuspid regurg, pulmonary hypertension, gastritis who presents to the emergency department C/O shortness of breath.     Patient presents after developing shortness of breath 1 hour prior to arrival.  Was recently admitted for anemia and received 4 units PRBCs in hospital as well as iron transfusion 2 days ago.  No fever but patient states he felt hot in emergency department.  Patient was diagnosed with urinary tract infection during previous hospitalization is on antibiotics.     PCP: Drew Moreira Jr., MD     IM HPI:  Patient was recently admitted by primary care and transfuse 4 units of packed red blood cells.  Patient felt much improved return home felt well for about 24 hours then began having increasing dyspnea.  Patient was readmitted found to have a urinary tract infection and a pneumonia.  Patient was started on antibiotics.  He was also given IV diuretics.  Patient has some peripheral edema but overall he states he feels dry and dehydrated.  Patient is having some oropharyngeal dysphagia and on exam I am unable to find the cause.  Patient has a history of CVA as well as a history of arthritis with several upper extremity joint deformities and contractures.      Overview/Hospital Course:  9/24 GT:  Patient is lying in bed this morning with spouse at bedside.  Patient is frail and chronically ill-appearing, but does not appear to be in any acute distress.  Patient reports his shortness a breath is at baseline, and he is tolerating supplemental  oxygen via nasal cannula.  Patient had some swallowing difficulties yesterday, SLP has been consulted for evaluation, treatment, dietary modifications have been made.  Patient's H&H is stable, and although anemic he is not at the point that he requires further transfusion.  Potassium mildly decreased, we will replete intravenously today due to swallowing difficulties and monitor with daily labs.  Blood cultures at this time are negative to date.  Patient and spouse deny any issues, concerns, questions.    9/25:  Patient eating breakfast this morning in no acute distress.  Speech therapy to evaluate the patient.  Thus far blood cultures are negative.  Patient feels back to baseline.  Urine culture without significant growth.  Consider discharge in the near future.  Patient having significant gas production check GI panel.    9/26:  No acute events overnight.  GI panel pending.  Provide iron infusion prior to potential discharge.  Patient to undergo modified barium swallow prior to discharge for completeness.  Home health will be coordinated ongoing outpatient needs.    Patient with significant aspiration by MBS.  Discuss with family potential needs for PEG placement vs ongoing ST.       No new subjective & objective note has been filed under this hospital service since the last note was generated.      Assessment/Plan:      * Acute cystitis without hematuria  Continue antibiotics awaiting cultures    Blood cultures are negative to date.  Urine culture negative    Laceration of forehead  Treatment per GS.    Hypokalemia  We will replete intravenously today due to dysphagia.  Follow with daily labs.      Patient has hypokalemia.  Last electrolytes reviewed- Recent Labs   Lab 09/25/23  0509 09/26/23  0452   K 4.1 3.8   Will replace potassium as per sliding scale as needed and monitor electrolytes closely.       Other dysphagia  Dietary modifications have been made.  SLP consulted for evaluation and treatment.   Significant aspiration.  NPO for now.  GS to eval for PEG.    Acute on chronic combined systolic and diastolic heart failure  Patient seems intravascularly dry.  Hold IV Lasix for now.      Pneumonia due to infectious organism  Continue current antibiotic regimen, supplemental oxygen.  Blood cultures are negative to date at this time.    Blood Culture, Routine   Date Value Ref Range Status   09/22/2023 No Growth to date  Preliminary   09/22/2023 No Growth to date  Preliminary   09/22/2023 No Growth to date  Preliminary     Urine Culture, Routine   Date Value Ref Range Status   09/22/2023 No significant growth  Final              Pleural effusion, right  Unsure if this has been investigated will defer to primary.    Stable by hx.       Essential hypertension  Vital signs noted continue antihypertensives      BP Readings from Last 3 Encounters:   09/26/23 137/70   09/21/23 (!) 113/57   07/11/23 (!) 142/58         Mixed hyperlipidemia  Continue statin.      Acute superficial gastritis without hemorrhage  Continue home treatment      Iron deficiency anemia  Patient states he had upper and lower endoscopy in 2021.  Iron infusion prior to discharge.    Recent Labs   Lab 09/24/23  0541 09/25/23  0509 09/26/23  0452   Hemoglobin 9.5 L 9.3 L 8.8 L               VTE Risk Mitigation (From admission, onward)         Ordered     IP VTE HIGH RISK PATIENT  Once         09/23/23 0215     Place sequential compression device  Until discontinued         09/23/23 0215                Discharge Planning   JOSÉ:      Code Status: Full Code   Is the patient medically ready for discharge?:     Reason for patient still in hospital (select all that apply): Treatment  Discharge Plan A: Home with family                  Drew Moreira Jr, MD  Department of Hospital Medicine   [facility]

## 2023-09-26 NOTE — CONSULTS
[facility] Surg  General Surgery  Consult Note    Patient Name: Richard Farr  MRN: [id number]  Code Status: Full Code  Admission Date: 9/22/2023  Hospital Length of Stay: 3 days  Attending Physician: Drew Moreira Jr., MD  Primary Care Provider: Drew Moreira Jr., MD    Patient information was obtained from primary team.     Consults  Subjective:     Principal Problem: Acute cystitis without hematuria    History of Present Illness: 79yo male admitted to medicine for symptomatic anemia who presents with forehead laceration after accidental fall this AM.  GCS 15; HD stable.  No LOS.  General surgery consulted for lac repair.       Current Facility-Administered Medications on File Prior to Encounter   Medication    benzocaine 20 % mouth spray     Current Outpatient Medications on File Prior to Encounter   Medication Sig    aspirin 81 MG Chew Take 81 mg by mouth once daily.    atorvastatin (LIPITOR) 80 MG tablet TAKE 1 TABLET BY MOUTH EVERY EVENING    cefUROXime (CEFTIN) 500 MG tablet Take 1 tablet (500 mg total) by mouth 2 (two) times daily.    ezetimibe (ZETIA) 10 mg tablet Take 10 mg by mouth once daily.     fenofibrate (TRICOR) 145 MG tablet Take 145 mg by mouth once daily.    folic acid (FOLVITE) 1 MG tablet Take 1 tablet (1 mg total) by mouth once daily.    furosemide (LASIX) 40 MG tablet Take 1 tablet (40 mg total) by mouth once daily. (Patient taking differently: Take 20 mg by mouth once daily.)    levothyroxine (SYNTHROID) 50 MCG tablet TAKE 1 TABLET BY MOUTH EVERY DAY BEFORE BREAKFAST.    metoprolol succinate (TOPROL-XL) 25 MG 24 hr tablet TAKE 1/2 TABLET BY MOUTH EVERY DAY    pantoprazole (PROTONIX) 40 MG tablet TAKE 1 TABLET BY MOUTH EVERY DAY    potassium chloride (MICRO-K) 10 MEQ CpSR Take 20 mEq by mouth once daily.     spironolactone (ALDACTONE) 25 MG tablet Take 0.5 tablets (12.5 mg total) by mouth once daily.       Review of patient's allergies indicates:  No Known  Allergies    Past Medical History:   Diagnosis Date    Anemia     Arthritis     Bilateral lower extremity edema     Carpal tunnel syndrome, bilateral     Chronic diastolic congestive heart failure     Colon polyp     Coronary artery disease     Depression due to physical illness     Encounter for blood transfusion     Gastric ulcer     History of herpes zoster     Hyperlipemia     Hypertension     Moderate tricuspid insufficiency     NSVT (nonsustained ventricular tachycardia)     PAC (premature atrial contraction)     Patent foramen ovale with right to left shunt     Pneumonia     Pulmonary hypertension     PVC's (premature ventricular contractions)     Severe mitral regurgitation     Shingles     Stroke     MINI TRAUMA; RIGHT SIDED WEAKNESS     Past Surgical History:   Procedure Laterality Date    CARDIAC CATHETERIZATION      COLONOSCOPY N/A 2020    Procedure: COLONOSCOPY;  Surgeon: Abelino Garcia MD;  Location: [facility] ENDO;  Service: General;  Laterality: N/A;    ESOPHAGOGASTRODUODENOSCOPY      ESOPHAGOGASTRODUODENOSCOPY N/A 2021    Procedure: EGD (ESOPHAGOGASTRODUODENOSCOPY);  Surgeon: Sunny Rea MD;  Location: [facility] ENDO;  Service: Endoscopy;  Laterality: N/A;  COVID-VACCINATED    IMPLANTATION OF MITRAL VALVE LEAFLET CLIP Right 2021    Procedure: INSERTION, LEAFLET CLIP, MITRAL VALVE;  Surgeon: Zen Reina MD;  Location: [facility] CATH;  Service: Cardiovascular;  Laterality: Right;    NECK SURGERY      anterior cervical fusion x 2    TONSILLECTOMY      TRANSESOPHAGEAL ECHOCARDIOGRAPHY       Family History       Problem Relation (Age of Onset)    Cancer Brother    Heart attack Father    Stroke Mother          Tobacco Use    Smoking status: Former     Types: Cigars     Start date:      Quit date:      Years since quittin.7    Smokeless tobacco: Never   Substance and Sexual Activity    Alcohol use: Not Currently    Drug use: Never    Sexual  activity: Not on file     Review of Systems   Constitutional:  Negative for activity change, appetite change, chills and fever.   Respiratory:  Negative for chest tightness and shortness of breath.    Cardiovascular:  Negative for chest pain.   Gastrointestinal:  Negative for abdominal distention, abdominal pain, anal bleeding, blood in stool, constipation, diarrhea, nausea, rectal pain and vomiting.     Objective:     Vital Signs (Most Recent):  Temp: 98.2 °F (36.8 °C) (09/26/23 0833)  Pulse: 89 (09/26/23 1105)  Resp: (!) 22 (09/26/23 0843)  BP: 137/70 (09/26/23 0833)  SpO2: 98 % (09/26/23 1105) Vital Signs (24h Range):  Temp:  [97.4 °F (36.3 °C)-98.2 °F (36.8 °C)] 98.2 °F (36.8 °C)  Pulse:  [] 89  Resp:  [20-22] 22  SpO2:  [93 %-100 %] 98 %  BP: (129-140)/(63-72) 137/70     Weight: 71.7 kg (158 lb)  Body mass index is 24.74 kg/m².     Physical Exam  Vitals reviewed.   Constitutional:       General: He is not in acute distress.     Appearance: He is not ill-appearing or toxic-appearing.   HENT:      Head:      Comments: 3.5 cm laceration to the midline forehead with no active bleeding  Two shallow, vertical 1.5cm lacerations to the nose bridge with no bleeding or drainage.  Cardiovascular:      Rate and Rhythm: Normal rate and regular rhythm.   Pulmonary:      Effort: Pulmonary effort is normal. No respiratory distress.   Abdominal:      General: There is no distension.      Palpations: Abdomen is soft.      Tenderness: There is no abdominal tenderness. There is no guarding or rebound.   Musculoskeletal:      Right lower leg: No edema.      Left lower leg: No edema.   Skin:     General: Skin is warm and dry.      Capillary Refill: Capillary refill takes less than 2 seconds.   Neurological:      Mental Status: He is alert. Mental status is at baseline.            I have reviewed all pertinent lab results within the past 24 hours.  CBC:   Recent Labs   Lab 09/26/23  0452   WBC 7.88   RBC 3.96*   HGB 8.8*   HCT  30.0*      MCV 76*   MCH 22.2*   MCHC 29.3*     CMP:   Recent Labs   Lab 09/26/23  0452      CALCIUM 8.8   ALBUMIN 2.8*   PROT 6.3      K 3.8   CO2 33*      BUN 15   CREATININE 0.5   ALKPHOS 45*   ALT 30   AST 40   BILITOT 0.7       Significant Diagnostics:  I have reviewed all pertinent imaging results/findings within the past 24 hours.      Assessment/Plan:     Laceration of forehead  Lac irrigated with primary suture repair  Steri strips to nasal bridge lacerations   Keep clean and dry  Follow up in general surgery clinic in 2 weeks for suture removal       VTE Risk Mitigation (From admission, onward)         Ordered     IP VTE HIGH RISK PATIENT  Once         09/23/23 0215     Place sequential compression device  Until discontinued         09/23/23 0215                Thank you for your consult. I will sign off. Please contact us if you have any additional questions.    Queenie Berman MD  General Surgery  [facility]

## 2023-09-26 NOTE — PROCEDURES
[facility]   General Surgery Department  Procedure Note     SUMMARY     Date of Procedure: 9/24/2023    Procedure: Procedure(s) (LRB):  Repair of laceration of forehead    Surgeon(s) and Role:  Queenie Berman MD     Pre-Operative Diagnosis: Laceration of forehead     Post-Operative Diagnosis: Same         Anesthesia: Local    Findings:  3.5 cm forehead laceration irrigated and repaired with interrupted 3-0 nylon sutures   Thin nasal bridge lacerations approximated with steri strips    Indications for the Procedure:  79yo admitted male s/p accidental fall who presents with laceration to the nasal bridge and forehead     Procedure in Detail:   Patient was placed in the semi-recumbent positioning and the forehead and nasal bridge were prepped and draped in sterile fashion.  A time-out was performed in the room identifying the patient using name, date of birth, and MRN.      Lidocaine was intradermally infiltrated around the forehead laceration.  Jagged and nonviable edges of the were sharply debrided.  The wound was copiously irrigated with Betadine and approximated with 3-0 nylon interrupted sutures.  Shallow 1.5 cm lacerations to the nasal bridge were approximated with Steri-Strips.  Sterile dressing was applied.  Patient tolerated procedure well.    Complications: No    Estimated Blood Loss (EBL): 3cc             Specimens:   None           Condition: Good        Queenie Berman MD  General Surgery   [phone]

## 2023-09-26 NOTE — ASSESSMENT & PLAN NOTE
Patient states he had upper and lower endoscopy in 2021.  Iron infusion prior to discharge.    Recent Labs   Lab 09/24/23  0541 09/25/23  0509 09/26/23  0452   Hemoglobin 9.5 L 9.3 L 8.8 L

## 2023-09-26 NOTE — ASSESSMENT & PLAN NOTE
Vital signs noted continue antihypertensives      BP Readings from Last 3 Encounters:   09/26/23 137/70   09/21/23 (!) 113/57   07/11/23 (!) 142/58

## 2023-09-26 NOTE — ASSESSMENT & PLAN NOTE
Continue current antibiotic regimen, supplemental oxygen.  Blood cultures are negative to date at this time.    Blood Culture, Routine   Date Value Ref Range Status   09/22/2023 No Growth to date  Preliminary   09/22/2023 No Growth to date  Preliminary   09/22/2023 No Growth to date  Preliminary     Urine Culture, Routine   Date Value Ref Range Status   09/22/2023 No significant growth  Final

## 2023-09-26 NOTE — PHYSICIAN QUERY
PT Name: Richard Farr  MR #: [id number]     DOCUMENTATION CLARIFICATION   Lizette Jeronimo RN, CCDS    [email or web address]    Documentation Excellence  This form is a permanent document in the medical record.    Query Date: September 26, 2023    By submitting this query, we are merely seeking further clarification of documentation.  Please utilize your independent clinical judgment when addressing the question(s) below.  The Medical Record contains the following:   Indicators   Supporting Clinical Findings Location in Medical Record   x Pneumonia documented Pneumonia     Pneumonia due to infectious organism   Ed md, H&P -Hosp PN 9/25   x Chest X-Ray/CT Scan CXR:  Cardiomegaly, no focal infiltrate, no pleural effusion, similar to prior study,              hypoinflated lung fields      CT = no PE, small right pleural effusion and subsegmental airspace disease of             lower lobes which is likely an at length this is but may represent a pneumonia.   ED MD   x PaO2    PaCO2     O2 sat   9/22 9/23 9/24 9/25. 9/26   T  P  R  BP >/=  Sat %   O2  Device 100.2  109 - 75  24 - 18  108/53  95-99  3 L NC    97.5-100.2  75-96  20-22  112/55  95-99  2 L NC 97.6-97.9    18-22  119/56    2 L NC 97.4-97.9    14-21  118/58  93-98  1 L NC 97.6-98.2  84-90  22  129/63  96-98  1 L NC     Vs flow sheet & provider notes   x WBC 9/22   13.97  9/24     8.34  9/25     7.91  9/26     7.88 Lab 9/22-26    Vital Signs     x Cultures  Blood cultures are negative to date at this time. 9/24 - 25 [facility] PN     x Treatment  Continue current antibiotic regimen, supplemental oxygen.     albuterol-ipratropium  neb   9/22   Azithromycin IVPB 9/23-26  Ceftriaxone IVPB  9/23-26  Furosemide IV 40 mg every 12 hours start 9/23  Furosemide IV 40 mg in ED     NS 1,983 ml bolus on 9/22 9/24 - 25 [facility] PN      Mar            mar   x Supplemental O2 98% on NC, hypoxia requiring supplemental oxygen Ed md     x Dysphagia/  Swallow study  "oropharyngeal dysphagia  H&P   x Other Tachycardia     Tachypnea       shortness breath  &  increased work of breathing  Rales                Wheezing        Decreased breath sounds      R/L Lower Leg edema  Acute on chronic congestive heart failure   Transfusion associated circulatory overload  Low suspicion for TRALI given lack of significant airspace disease.    Respiratory distress       rhonchi                         Acute cystitis without hematuria  Severe group 5 pulmonary hypertension         Pleural effusion, right  Acute on chronic combined systolic and diastolic heart failure Ed md                    H&P           Provider, please specify "Pneumonia" type -                       - jeannie all that apply -     [ x  ] Bacterial pneumonia, organism unknown     [   ] Aspiration Pneumonia/Pneumonitis     [   ] Other type of pneumonia (please specify): ________     [   ] Other - specify:  _________     [  ]   Clinically undetermined     Please document in your progress notes daily for the duration of treatment, until resolved, and include in your discharge summary.     Form No. [id number]                                                                                                                                                                    "

## 2023-09-26 NOTE — PT/OT/SLP PROGRESS
Occupational Therapy   Treatment    Name: Richard Farr  MRN: [id number]  Admitting Diagnosis:  Acute cystitis without hematuria       Recommendations:     Discharge Recommendations: home, home with home health, home health OT  Discharge Equipment Recommendations:  none  Barriers to discharge:  None    Assessment:     Richard Farr is a 78 y.o. male with a medical diagnosis of Acute cystitis without hematuria.  He presents with fair participation. Performance deficits affecting function are weakness, impaired endurance, impaired self care skills, impaired functional mobility, gait instability, impaired balance, impaired cognition, decreased coordination, decreased upper extremity function, decreased lower extremity function, decreased safety awareness, decreased ROM, impaired coordination, impaired fine motor, impaired cardiopulmonary response to activity. Patient needed min encouragement to complete reporting he is just ready to go home. Patient in agreement with participating in therapeutic exercise, however he did not want to complete functional mobility tasks at this time.     Rehab Prognosis:  Fair; patient would benefit from acute skilled OT services to address these deficits and reach maximum level of function.       Plan:     Patient to be seen 5 x/week to address the above listed problems via self-care/home management, therapeutic activities, therapeutic exercises, cognitive retraining  Plan of Care Expires: 10/02/23  Plan of Care Reviewed with: patient    Subjective     Chief Complaint: I want to go home  Patient/Family Comments/goals: Going home  Pain/Comfort:  Pain Rating 1: 0/10  Pain Rating Post-Intervention 1: 0/10    Objective:     Communicated with: occupational therapist and RN prior to session.  Patient found HOB elevated with telemetry, peripheral IV, oxygen, pulse ox (continuous) upon OT entry to room.    General Precautions: Standard, fall    Orthopedic Precautions:N/A  Braces: N/A  Respiratory  Status: Nasal cannula, flow 2 L/min     Occupational Performance:     Functional Mobility/Transfers:  Functional Mobility: patient refused      AMPAC 6 Click ADL: 18    Treatment & Education:  Patient engaged in active range of motion therapeutic exercise for 2 x 10 sitting up in chair in the following planes: shoulder flexion/extension, shoulder abduction/adduction, elbow flexion/extension, scapular retractions/protraction, scapular elevation/depression, shoulder rotations (forward and reverse), wrist flexion/extension, wrist radial/ulnar deviation, forearm supination/pronation, and composite fist. Patient needed rest breaks between each set due to impaired muscle endurance and activity tolerance. Patient refused to complete functional mobility reporting he is just ready to go home. Patient educated to continue to complete exercise throughout the day to increase strength and endurance to facilitate an increase in ADL/IADLs. Patient verbally understood.       Patient left HOB elevated with all lines intact, call button in reach, and nurse notified of patient wishes     GOALS:   Multidisciplinary Problems       Occupational Therapy Goals          Problem: Occupational Therapy    Goal Priority Disciplines Outcome Interventions   Occupational Therapy Goal     OT, PT/OT Ongoing, Progressing    Description: Goals to be met by: 10/02/23     Patient will increase functional independence with ADLs by performing:    Feeding with Set-up Assistance.  UE Dressing with Minimal Assistance.  LE Dressing with Minimal Assistance.  Grooming while seated with Set-up Assistance.  Toileting from toilet with Minimal Assistance for hygiene and clothing management.   Bathing from  shower chair/bench with Minimal Assistance.  Toilet transfer to toilet with Stand-by Assistance.                         Time Tracking:     OT Date of Treatment: 09/26/23  OT Start Time: 1406  OT Stop Time: 1422  OT Total Time (min): 16 min    Billable  Minutes:Therapeutic Exercise 16 min    OT/SIRI: SIRI     Number of SIRI visits since last OT visit: 1    9/26/2023  Doreen ALMEIDA

## 2023-09-26 NOTE — PLAN OF CARE
Spoke with wife at bedside.  She is anticipating patient will be discharged with home health services.  States has used [facility] in the past.  Signs choice form for [facility]. Case management consult to arrange for home health.

## 2023-09-26 NOTE — PLAN OF CARE
09/26/23 1124   Medicare Message   Important Message from Medicare regarding Discharge Appeal Rights Given to patient/caregiver;Explained to patient/caregiver;Signed/date by patient/caregiver   Date IMM was signed 09/26/23   Time IMM was signed 1124     Wife at bedside.  Patient is sitting up in papo chair at bedside.  Explained Medicare IM appeal rights to patient.  Patient is sleepy, but wife verbalizes understanding of Medicare IM.  Signs IM appeal rights.  Copy given to wife.

## 2023-09-26 NOTE — PLAN OF CARE
Problem: Occupational Therapy  Goal: Occupational Therapy Goal  Description: Goals to be met by: 10/02/23     Patient will increase functional independence with ADLs by performing:    Feeding with Set-up Assistance.  UE Dressing with Minimal Assistance.  LE Dressing with Minimal Assistance.  Grooming while seated with Set-up Assistance.  Toileting from toilet with Minimal Assistance for hygiene and clothing management.   Bathing from  shower chair/bench with Minimal Assistance.  Toilet transfer to toilet with Stand-by Assistance.    Outcome: Ongoing, Progressing

## 2023-09-26 NOTE — SUBJECTIVE & OBJECTIVE
Current Facility-Administered Medications on File Prior to Encounter   Medication    benzocaine 20 % mouth spray     Current Outpatient Medications on File Prior to Encounter   Medication Sig    aspirin 81 MG Chew Take 81 mg by mouth once daily.    atorvastatin (LIPITOR) 80 MG tablet TAKE 1 TABLET BY MOUTH EVERY EVENING    cefUROXime (CEFTIN) 500 MG tablet Take 1 tablet (500 mg total) by mouth 2 (two) times daily.    ezetimibe (ZETIA) 10 mg tablet Take 10 mg by mouth once daily.     fenofibrate (TRICOR) 145 MG tablet Take 145 mg by mouth once daily.    folic acid (FOLVITE) 1 MG tablet Take 1 tablet (1 mg total) by mouth once daily.    furosemide (LASIX) 40 MG tablet Take 1 tablet (40 mg total) by mouth once daily. (Patient taking differently: Take 20 mg by mouth once daily.)    levothyroxine (SYNTHROID) 50 MCG tablet TAKE 1 TABLET BY MOUTH EVERY DAY BEFORE BREAKFAST.    metoprolol succinate (TOPROL-XL) 25 MG 24 hr tablet TAKE 1/2 TABLET BY MOUTH EVERY DAY    pantoprazole (PROTONIX) 40 MG tablet TAKE 1 TABLET BY MOUTH EVERY DAY    potassium chloride (MICRO-K) 10 MEQ CpSR Take 20 mEq by mouth once daily.     spironolactone (ALDACTONE) 25 MG tablet Take 0.5 tablets (12.5 mg total) by mouth once daily.       Review of patient's allergies indicates:  No Known Allergies    Past Medical History:   Diagnosis Date    Anemia     Arthritis     Bilateral lower extremity edema     Carpal tunnel syndrome, bilateral     Chronic diastolic congestive heart failure     Colon polyp     Coronary artery disease     Depression due to physical illness     Encounter for blood transfusion     Gastric ulcer     History of herpes zoster     Hyperlipemia     Hypertension     Moderate tricuspid insufficiency     NSVT (nonsustained ventricular tachycardia)     PAC (premature atrial contraction)     Patent foramen ovale with right to left shunt     Pneumonia     Pulmonary hypertension     PVC's (premature ventricular contractions)     Severe  mitral regurgitation     Shingles     Stroke     MINI TRAUMA; RIGHT SIDED WEAKNESS     Past Surgical History:   Procedure Laterality Date    CARDIAC CATHETERIZATION      COLONOSCOPY N/A 2020    Procedure: COLONOSCOPY;  Surgeon: Abelino Garcia MD;  Location: [facility];  Service: General;  Laterality: N/A;    ESOPHAGOGASTRODUODENOSCOPY      ESOPHAGOGASTRODUODENOSCOPY N/A 2021    Procedure: EGD (ESOPHAGOGASTRODUODENOSCOPY);  Surgeon: Sunny Rea MD;  Location: [facility] ENDO;  Service: Endoscopy;  Laterality: N/A;  COVID-VACCINATED    IMPLANTATION OF MITRAL VALVE LEAFLET CLIP Right 2021    Procedure: INSERTION, LEAFLET CLIP, MITRAL VALVE;  Surgeon: Zen Reina MD;  Location: [facility] CATH;  Service: Cardiovascular;  Laterality: Right;    NECK SURGERY      anterior cervical fusion x 2    TONSILLECTOMY      TRANSESOPHAGEAL ECHOCARDIOGRAPHY       Family History       Problem Relation (Age of Onset)    Cancer Brother    Heart attack Father    Stroke Mother          Tobacco Use    Smoking status: Former     Types: Cigars     Start date:      Quit date:      Years since quittin.7    Smokeless tobacco: Never   Substance and Sexual Activity    Alcohol use: Not Currently    Drug use: Never    Sexual activity: Not on file     Review of Systems   Constitutional:  Negative for activity change, appetite change, chills and fever.   Respiratory:  Negative for chest tightness and shortness of breath.    Cardiovascular:  Negative for chest pain.   Gastrointestinal:  Negative for abdominal distention, abdominal pain, anal bleeding, blood in stool, constipation, diarrhea, nausea, rectal pain and vomiting.     Objective:     Vital Signs (Most Recent):  Temp: 98.2 °F (36.8 °C) (23 0833)  Pulse: 89 (23 1105)  Resp: (!) 22 (23 0843)  BP: 137/70 (23 0833)  SpO2: 98 % (23 1105) Vital Signs (24h Range):  Temp:  [97.4 °F (36.3 °C)-98.2 °F (36.8 °C)] 98.2 °F (36.8 °C)  Pulse:   [] 89  Resp:  [20-22] 22  SpO2:  [93 %-100 %] 98 %  BP: (129-140)/(63-72) 137/70     Weight: 71.7 kg (158 lb)  Body mass index is 24.74 kg/m².     Physical Exam  Vitals reviewed.   Constitutional:       General: He is not in acute distress.     Appearance: He is not ill-appearing or toxic-appearing.   HENT:      Head:      Comments: 3.5 cm laceration to the midline forehead with no active bleeding  Two shallow, vertical 1.5cm lacerations to the nose bridge with no bleeding or drainage.  Cardiovascular:      Rate and Rhythm: Normal rate and regular rhythm.   Pulmonary:      Effort: Pulmonary effort is normal. No respiratory distress.   Abdominal:      General: There is no distension.      Palpations: Abdomen is soft.      Tenderness: There is no abdominal tenderness. There is no guarding or rebound.   Musculoskeletal:      Right lower leg: No edema.      Left lower leg: No edema.   Skin:     General: Skin is warm and dry.      Capillary Refill: Capillary refill takes less than 2 seconds.   Neurological:      Mental Status: He is alert. Mental status is at baseline.            I have reviewed all pertinent lab results within the past 24 hours.  CBC:   Recent Labs   Lab 09/26/23  0452   WBC 7.88   RBC 3.96*   HGB 8.8*   HCT 30.0*      MCV 76*   MCH 22.2*   MCHC 29.3*     CMP:   Recent Labs   Lab 09/26/23 0452      CALCIUM 8.8   ALBUMIN 2.8*   PROT 6.3      K 3.8   CO2 33*      BUN 15   CREATININE 0.5   ALKPHOS 45*   ALT 30   AST 40   BILITOT 0.7       Significant Diagnostics:  I have reviewed all pertinent imaging results/findings within the past 24 hours.

## 2023-09-27 PROBLEM — R27.0 ATAXIA: Status: ACTIVE | Noted: 2023-09-27

## 2023-09-27 LAB
ALBUMIN SERPL BCP-MCNC: 2.8 G/DL (ref 3.5–5.2)
ALP SERPL-CCNC: 44 U/L (ref 55–135)
ALT SERPL W/O P-5'-P-CCNC: 28 U/L (ref 10–44)
ANION GAP SERPL CALC-SCNC: 2 MMOL/L (ref 3–11)
ANISOCYTOSIS BLD QL SMEAR: ABNORMAL
AST SERPL-CCNC: 41 U/L (ref 10–40)
BASOPHILS # BLD AUTO: 0.09 K/UL (ref 0–0.2)
BASOPHILS NFR BLD: 1.2 % (ref 0–1.9)
BILIRUB SERPL-MCNC: 0.6 MG/DL (ref 0.1–1)
BUN SERPL-MCNC: 12 MG/DL (ref 8–23)
CALCIUM SERPL-MCNC: 8.8 MG/DL (ref 8.7–10.5)
CHLORIDE SERPL-SCNC: 100 MMOL/L (ref 95–110)
CO2 SERPL-SCNC: 34 MMOL/L (ref 23–29)
CREAT SERPL-MCNC: 0.4 MG/DL (ref 0.5–1.4)
DIFFERENTIAL METHOD: ABNORMAL
EOSINOPHIL # BLD AUTO: 0.2 K/UL (ref 0–0.5)
EOSINOPHIL NFR BLD: 2.4 % (ref 0–8)
ERYTHROCYTE [DISTWIDTH] IN BLOOD BY AUTOMATED COUNT: 33.2 % (ref 11.5–14.5)
EST. GFR  (NO RACE VARIABLE): >60 ML/MIN/1.73 M^2
GLUCOSE SERPL-MCNC: 91 MG/DL (ref 70–110)
HCT VFR BLD AUTO: 31.2 % (ref 40–54)
HGB BLD-MCNC: 9 G/DL (ref 14–18)
HYPOCHROMIA BLD QL SMEAR: ABNORMAL
IMM GRANULOCYTES # BLD AUTO: 0.03 K/UL (ref 0–0.04)
IMM GRANULOCYTES NFR BLD AUTO: 0.4 % (ref 0–0.5)
LYMPHOCYTES # BLD AUTO: 1.3 K/UL (ref 1–4.8)
LYMPHOCYTES NFR BLD: 16.5 % (ref 18–48)
MAGNESIUM SERPL-MCNC: 1.9 MG/DL (ref 1.6–2.6)
MCH RBC QN AUTO: 22.3 PG (ref 27–31)
MCHC RBC AUTO-ENTMCNC: 28.8 G/DL (ref 32–36)
MCV RBC AUTO: 77 FL (ref 82–98)
MONOCYTES # BLD AUTO: 0.8 K/UL (ref 0.3–1)
MONOCYTES NFR BLD: 9.7 % (ref 4–15)
NEUTROPHILS # BLD AUTO: 5.4 K/UL (ref 1.8–7.7)
NEUTROPHILS NFR BLD: 69.8 % (ref 38–73)
NRBC BLD-RTO: 0 /100 WBC
PLATELET # BLD AUTO: 334 K/UL (ref 150–450)
PMV BLD AUTO: ABNORMAL FL (ref 9.2–12.9)
POCT GLUCOSE: 79 MG/DL (ref 70–110)
POTASSIUM SERPL-SCNC: 3.6 MMOL/L (ref 3.5–5.1)
PROT SERPL-MCNC: 6.2 G/DL (ref 6–8.4)
RBC # BLD AUTO: 4.04 M/UL (ref 4.6–6.2)
SODIUM SERPL-SCNC: 136 MMOL/L (ref 136–145)
WBC # BLD AUTO: 7.8 K/UL (ref 3.9–12.7)

## 2023-09-27 PROCEDURE — 63600175 PHARM REV CODE 636 W HCPCS: Performed by: STUDENT IN AN ORGANIZED HEALTH CARE EDUCATION/TRAINING PROGRAM

## 2023-09-27 PROCEDURE — 27000221 HC OXYGEN, UP TO 24 HOURS

## 2023-09-27 PROCEDURE — 97110 THERAPEUTIC EXERCISES: CPT | Mod: CO

## 2023-09-27 PROCEDURE — 25000003 PHARM REV CODE 250: Performed by: EMERGENCY MEDICINE

## 2023-09-27 PROCEDURE — 99900035 HC TECH TIME PER 15 MIN (STAT)

## 2023-09-27 PROCEDURE — 94761 N-INVAS EAR/PLS OXIMETRY MLT: CPT

## 2023-09-27 PROCEDURE — 83735 ASSAY OF MAGNESIUM: CPT | Performed by: INTERNAL MEDICINE

## 2023-09-27 PROCEDURE — 36415 COLL VENOUS BLD VENIPUNCTURE: CPT | Performed by: INTERNAL MEDICINE

## 2023-09-27 PROCEDURE — 97116 GAIT TRAINING THERAPY: CPT

## 2023-09-27 PROCEDURE — 11000001 HC ACUTE MED/SURG PRIVATE ROOM

## 2023-09-27 PROCEDURE — 97530 THERAPEUTIC ACTIVITIES: CPT

## 2023-09-27 PROCEDURE — 85025 COMPLETE CBC W/AUTO DIFF WBC: CPT | Performed by: INTERNAL MEDICINE

## 2023-09-27 PROCEDURE — 63600175 PHARM REV CODE 636 W HCPCS: Performed by: EMERGENCY MEDICINE

## 2023-09-27 PROCEDURE — 99900031 HC PATIENT EDUCATION (STAT)

## 2023-09-27 PROCEDURE — 25000003 PHARM REV CODE 250: Performed by: INTERNAL MEDICINE

## 2023-09-27 PROCEDURE — 21400001 HC TELEMETRY ROOM

## 2023-09-27 PROCEDURE — 80053 COMPREHEN METABOLIC PANEL: CPT | Performed by: INTERNAL MEDICINE

## 2023-09-27 RX ORDER — DEXTROSE MONOHYDRATE, SODIUM CHLORIDE, AND POTASSIUM CHLORIDE 50; 1.49; 4.5 G/1000ML; G/1000ML; G/1000ML
INJECTION, SOLUTION INTRAVENOUS CONTINUOUS
Status: DISCONTINUED | OUTPATIENT
Start: 2023-09-27 | End: 2023-10-06 | Stop reason: HOSPADM

## 2023-09-27 RX ADMIN — AZITHROMYCIN MONOHYDRATE 500 MG: 500 INJECTION, POWDER, LYOPHILIZED, FOR SOLUTION INTRAVENOUS at 12:09

## 2023-09-27 RX ADMIN — DEXTROSE, SODIUM CHLORIDE, AND POTASSIUM CHLORIDE: 5; .45; .15 INJECTION INTRAVENOUS at 07:09

## 2023-09-27 RX ADMIN — CEFTRIAXONE SODIUM 1 G: 1 INJECTION, POWDER, FOR SOLUTION INTRAMUSCULAR; INTRAVENOUS at 01:09

## 2023-09-27 RX ADMIN — LEVOTHYROXINE SODIUM 50 MCG: 50 TABLET ORAL at 05:09

## 2023-09-27 NOTE — ASSESSMENT & PLAN NOTE
Vital signs noted continue antihypertensives      BP Readings from Last 3 Encounters:   09/27/23 120/62   09/21/23 (!) 113/57   07/11/23 (!) 142/58

## 2023-09-27 NOTE — PROGRESS NOTES
[facility]  Progress Note    Patient Name: Richard Farr  MRN: [id number]  Patient Class: IP- Inpatient   Admission Date: 9/22/2023  Length of Stay: 4 days  Attending Physician: Drew Moreira Jr., MD  Primary Care Provider: Drew Moreira Jr., MD        Subjective:     Principal Problem:Acute cystitis without hematuria        HPI:  ED HPI:  Richard Farr is a 78 y.o. male with PMHX of hypertension, hyperlipidemia, CHF, mitral regurg, tricuspid regurg, pulmonary hypertension, gastritis who presents to the emergency department C/O shortness of breath.     Patient presents after developing shortness of breath 1 hour prior to arrival.  Was recently admitted for anemia and received 4 units PRBCs in hospital as well as iron transfusion 2 days ago.  No fever but patient states he felt hot in emergency department.  Patient was diagnosed with urinary tract infection during previous hospitalization is on antibiotics.     PCP: Drew Moreira Jr., MD     IM HPI:  Patient was recently admitted by primary care and transfuse 4 units of packed red blood cells.  Patient felt much improved return home felt well for about 24 hours then began having increasing dyspnea.  Patient was readmitted found to have a urinary tract infection and a pneumonia.  Patient was started on antibiotics.  He was also given IV diuretics.  Patient has some peripheral edema but overall he states he feels dry and dehydrated.  Patient is having some oropharyngeal dysphagia and on exam I am unable to find the cause.  Patient has a history of CVA as well as a history of arthritis with several upper extremity joint deformities and contractures.      Overview/Hospital Course:  9/24 GT:  Patient is lying in bed this morning with spouse at bedside.  Patient is frail and chronically ill-appearing, but does not appear to be in any acute distress.  Patient reports his shortness a breath is at baseline, and he is tolerating supplemental  oxygen via nasal cannula.  Patient had some swallowing difficulties yesterday, SLP has been consulted for evaluation, treatment, dietary modifications have been made.  Patient's H&H is stable, and although anemic he is not at the point that he requires further transfusion.  Potassium mildly decreased, we will replete intravenously today due to swallowing difficulties and monitor with daily labs.  Blood cultures at this time are negative to date.  Patient and spouse deny any issues, concerns, questions.    9/25:  Patient eating breakfast this morning in no acute distress.  Speech therapy to evaluate the patient.  Thus far blood cultures are negative.  Patient feels back to baseline.  Urine culture without significant growth.  Consider discharge in the near future.  Patient having significant gas production check GI panel.    9/26:  No acute events overnight.  GI panel pending.  Provide iron infusion prior to potential discharge.  Patient to undergo modified barium swallow prior to discharge for completeness.  Home health will be coordinated ongoing outpatient needs.    Patient with significant aspiration by MBS.  Discuss with family potential needs for PEG placement vs ongoing ST.     9/27:  patient ct head negative.  Done for worsening dizziness/dysequilibrium after head trauma.  Awaiting  recs regarding PEG tube placement.       Past Medical History:   Diagnosis Date    Anemia     Arthritis     Bilateral lower extremity edema     Carpal tunnel syndrome, bilateral     Chronic diastolic congestive heart failure     Colon polyp     Coronary artery disease     Depression due to physical illness     Encounter for blood transfusion     Gastric ulcer     History of herpes zoster     Hyperlipemia     Hypertension     Moderate tricuspid insufficiency     NSVT (nonsustained ventricular tachycardia)     PAC (premature atrial contraction)     Patent foramen ovale with right to left shunt     Pneumonia      Pulmonary hypertension     PVC's (premature ventricular contractions)     Severe mitral regurgitation     Shingles     Stroke     MINI TRAUMA; RIGHT SIDED WEAKNESS       Past Surgical History:   Procedure Laterality Date    CARDIAC CATHETERIZATION      COLONOSCOPY N/A 11/23/2020    Procedure: COLONOSCOPY;  Surgeon: Abelino Garcia MD;  Location: [facility];  Service: General;  Laterality: N/A;    ESOPHAGOGASTRODUODENOSCOPY      ESOPHAGOGASTRODUODENOSCOPY N/A 5/21/2021    Procedure: EGD (ESOPHAGOGASTRODUODENOSCOPY);  Surgeon: Sunny Rea MD;  Location: [facility];  Service: Endoscopy;  Laterality: N/A;  COVID-VACCINATED    IMPLANTATION OF MITRAL VALVE LEAFLET CLIP Right 5/18/2021    Procedure: INSERTION, LEAFLET CLIP, MITRAL VALVE;  Surgeon: Zen Reina MD;  Location: [facility] CATH;  Service: Cardiovascular;  Laterality: Right;    NECK SURGERY      anterior cervical fusion x 2    TONSILLECTOMY      TRANSESOPHAGEAL ECHOCARDIOGRAPHY         Review of patient's allergies indicates:  No Known Allergies    Current Facility-Administered Medications on File Prior to Encounter   Medication    benzocaine 20 % mouth spray     Current Outpatient Medications on File Prior to Encounter   Medication Sig    aspirin 81 MG Chew Take 81 mg by mouth once daily.    atorvastatin (LIPITOR) 80 MG tablet TAKE 1 TABLET BY MOUTH EVERY EVENING    cefUROXime (CEFTIN) 500 MG tablet Take 1 tablet (500 mg total) by mouth 2 (two) times daily.    ezetimibe (ZETIA) 10 mg tablet Take 10 mg by mouth once daily.     fenofibrate (TRICOR) 145 MG tablet Take 145 mg by mouth once daily.    folic acid (FOLVITE) 1 MG tablet Take 1 tablet (1 mg total) by mouth once daily.    furosemide (LASIX) 40 MG tablet Take 1 tablet (40 mg total) by mouth once daily. (Patient taking differently: Take 20 mg by mouth once daily.)    levothyroxine (SYNTHROID) 50 MCG tablet TAKE 1 TABLET BY MOUTH EVERY DAY BEFORE BREAKFAST.    metoprolol  succinate (TOPROL-XL) 25 MG 24 hr tablet TAKE 1/2 TABLET BY MOUTH EVERY DAY    pantoprazole (PROTONIX) 40 MG tablet TAKE 1 TABLET BY MOUTH EVERY DAY    potassium chloride (MICRO-K) 10 MEQ CpSR Take 20 mEq by mouth once daily.     spironolactone (ALDACTONE) 25 MG tablet Take 0.5 tablets (12.5 mg total) by mouth once daily.     Family History       Problem Relation (Age of Onset)    Cancer Brother    Heart attack Father    Stroke Mother          Tobacco Use    Smoking status: Former     Types: Cigars     Start date:      Quit date:      Years since quittin.7    Smokeless tobacco: Never   Substance and Sexual Activity    Alcohol use: Not Currently    Drug use: Never    Sexual activity: Not on file     Review of Systems   Constitutional:  Positive for activity change and appetite change. Negative for chills and fever.   HENT:  Positive for trouble swallowing. Negative for ear pain, mouth sores, nosebleeds and sore throat.    Eyes:  Negative for visual disturbance.   Respiratory:  Positive for cough and shortness of breath. Negative for wheezing.    Cardiovascular:  Positive for leg swelling. Negative for chest pain and palpitations.   Gastrointestinal:  Negative for abdominal distention, abdominal pain, blood in stool, diarrhea, nausea and vomiting.   Endocrine: Negative for polyphagia.   Genitourinary:  Positive for frequency. Negative for difficulty urinating, dysuria and flank pain.   Musculoskeletal:  Positive for arthralgias, gait problem and joint swelling.   Skin:  Negative for rash.   Neurological:  Positive for weakness. Negative for dizziness, tremors, seizures, syncope and headaches.   Hematological:  Negative for adenopathy.   Psychiatric/Behavioral:  Negative for agitation, confusion and hallucinations. The patient is not nervous/anxious.      Objective:     Vital Signs (Most Recent):  Temp: 98 °F (36.7 °C) (23)  Pulse: 74 (23)  Resp: (!) 26 (23)  BP:  120/62 (09/27/23 1126)  SpO2: 99 % (09/27/23 1126) Vital Signs (24h Range):  Temp:  [97.6 °F (36.4 °C)-98 °F (36.7 °C)] 98 °F (36.7 °C)  Pulse:  [62-91] 74  Resp:  [18-30] 26  SpO2:  [92 %-99 %] 99 %  BP: (120-146)/(62-77) 120/62     Weight: 71.7 kg (158 lb)  Body mass index is 24.74 kg/m².     Physical Exam  Vitals and nursing note reviewed.   Constitutional:       General: He is not in acute distress.     Appearance: He is ill-appearing. He is not toxic-appearing or diaphoretic.      Comments: Frail, chronically ill-appearing   HENT:      Head: Normocephalic and atraumatic.      Nose: Nose normal. No congestion or rhinorrhea.      Mouth/Throat:      Mouth: Mucous membranes are dry.      Pharynx: No oropharyngeal exudate or posterior oropharyngeal erythema.   Eyes:      General: No scleral icterus.  Neck:      Vascular: No carotid bruit.   Cardiovascular:      Rate and Rhythm: Normal rate and regular rhythm.      Heart sounds: Murmur heard.      No friction rub. No gallop.   Pulmonary:      Effort: No respiratory distress.      Breath sounds: No stridor. Wheezing, rhonchi and rales present.      Comments: Supplemental oxygen via nasal cannula  Chest:      Chest wall: No tenderness.   Abdominal:      General: There is no distension.      Palpations: There is no mass.      Tenderness: There is no abdominal tenderness. There is no right CVA tenderness, left CVA tenderness, guarding or rebound.      Hernia: No hernia is present.   Musculoskeletal:         General: Deformity present. No tenderness.      Cervical back: Neck supple. No rigidity.      Right lower leg: Edema present.      Left lower leg: Edema present.   Lymphadenopathy:      Cervical: No cervical adenopathy.   Skin:     General: Skin is warm and dry.      Capillary Refill: Capillary refill takes less than 2 seconds.      Coloration: Skin is not jaundiced or pale.      Findings: No rash.   Neurological:      Cranial Nerves: No cranial nerve deficit.       Sensory: No sensory deficit.      Motor: Weakness present.      Coordination: Coordination normal.      Gait: Gait abnormal.   Psychiatric:         Mood and Affect: Mood normal.         Behavior: Behavior normal.         Thought Content: Thought content normal.         Judgment: Judgment normal.                Significant Labs: All pertinent labs within the past 24 hours have been reviewed.    Significant Imaging: I have reviewed all pertinent imaging results/findings within the past 24 hours.      Assessment/Plan:      * Acute cystitis without hematuria  Continue antibiotics awaiting cultures    Blood cultures are negative to date.  Urine culture negative    Ataxia  Worsening disequilibrium since fall and head trauma.  CT to rule out intracranial bleeding injury.  CT scan negative.  Family reassured.      Laceration of forehead  Treatment per GS.    Hypokalemia  We will replete intravenously today due to dysphagia.  Follow with daily labs.      Patient has hypokalemia.  Last electrolytes reviewed-   Recent Labs   Lab 09/26/23  0452 09/27/23  0500   K 3.8 3.6   Will replace potassium as per sliding scale as needed and monitor electrolytes closely.       Other dysphagia  Dietary modifications have been made.  SLP consulted for evaluation and treatment.  Significant aspiration.  NPO for now.  GS to eval for PEG.    Acute on chronic combined systolic and diastolic heart failure  Patient seems intravascularly dry.  Hold IV Lasix for now.      Pneumonia due to infectious organism  Continue current antibiotic regimen, supplemental oxygen.  Blood cultures are negative to date at this time.    Blood Culture, Routine   Date Value Ref Range Status   09/22/2023 No Growth to date  Preliminary   09/22/2023 No Growth to date  Preliminary   09/22/2023 No Growth to date  Preliminary   09/22/2023 No Growth to date  Preliminary     Urine Culture, Routine   Date Value Ref Range Status   09/22/2023 No significant growth  Final       9/27/23:  abx completed.       Pleural effusion, right  Unsure if this has been investigated will defer to primary.    Stable by hx.       Essential hypertension  Vital signs noted continue antihypertensives      BP Readings from Last 3 Encounters:   09/27/23 120/62   09/21/23 (!) 113/57   07/11/23 (!) 142/58         Mixed hyperlipidemia  Continue statin.      Acute superficial gastritis without hemorrhage  Continue home treatment      Iron deficiency anemia  Patient states he had upper and lower endoscopy in 2021.  Iron infusion prior to discharge.    Recent Labs   Lab 09/25/23  0509 09/26/23  0452 09/27/23  0500   Hemoglobin 9.3 L 8.8 L 9.0 L               VTE Risk Mitigation (From admission, onward)         Ordered     IP VTE HIGH RISK PATIENT  Once         09/23/23 0215     Place sequential compression device  Until discontinued         09/23/23 0215                Discharge Planning   JOSÉ:      Code Status: Full Code   Is the patient medically ready for discharge?:     Reason for patient still in hospital (select all that apply): Treatment  Discharge Plan A: Home with family                  Drew Moreira Jr, MD  Department of Hospital Medicine   [facility]

## 2023-09-27 NOTE — SUBJECTIVE & OBJECTIVE
Past Medical History:   Diagnosis Date    Anemia     Arthritis     Bilateral lower extremity edema     Carpal tunnel syndrome, bilateral     Chronic diastolic congestive heart failure     Colon polyp     Coronary artery disease     Depression due to physical illness     Encounter for blood transfusion     Gastric ulcer     History of herpes zoster     Hyperlipemia     Hypertension     Moderate tricuspid insufficiency     NSVT (nonsustained ventricular tachycardia)     PAC (premature atrial contraction)     Patent foramen ovale with right to left shunt     Pneumonia     Pulmonary hypertension     PVC's (premature ventricular contractions)     Severe mitral regurgitation     Shingles     Stroke     MINI TRAUMA; RIGHT SIDED WEAKNESS       Past Surgical History:   Procedure Laterality Date    CARDIAC CATHETERIZATION      COLONOSCOPY N/A 11/23/2020    Procedure: COLONOSCOPY;  Surgeon: Abelino Garcia MD;  Location: [facility] ENDO;  Service: General;  Laterality: N/A;    ESOPHAGOGASTRODUODENOSCOPY      ESOPHAGOGASTRODUODENOSCOPY N/A 5/21/2021    Procedure: EGD (ESOPHAGOGASTRODUODENOSCOPY);  Surgeon: Sunny Rea MD;  Location: [facility] ENDO;  Service: Endoscopy;  Laterality: N/A;  COVID-VACCINATED    IMPLANTATION OF MITRAL VALVE LEAFLET CLIP Right 5/18/2021    Procedure: INSERTION, LEAFLET CLIP, MITRAL VALVE;  Surgeon: Zen Reina MD;  Location: [facility] CATH;  Service: Cardiovascular;  Laterality: Right;    NECK SURGERY      anterior cervical fusion x 2    TONSILLECTOMY      TRANSESOPHAGEAL ECHOCARDIOGRAPHY         Review of patient's allergies indicates:  No Known Allergies    Current Facility-Administered Medications on File Prior to Encounter   Medication    benzocaine 20 % mouth spray     Current Outpatient Medications on File Prior to Encounter   Medication Sig    aspirin 81 MG Chew Take 81 mg by mouth once daily.    atorvastatin (LIPITOR) 80 MG tablet TAKE 1 TABLET BY MOUTH EVERY EVENING    cefUROXime (CEFTIN) 500  MG tablet Take 1 tablet (500 mg total) by mouth 2 (two) times daily.    ezetimibe (ZETIA) 10 mg tablet Take 10 mg by mouth once daily.     fenofibrate (TRICOR) 145 MG tablet Take 145 mg by mouth once daily.    folic acid (FOLVITE) 1 MG tablet Take 1 tablet (1 mg total) by mouth once daily.    furosemide (LASIX) 40 MG tablet Take 1 tablet (40 mg total) by mouth once daily. (Patient taking differently: Take 20 mg by mouth once daily.)    levothyroxine (SYNTHROID) 50 MCG tablet TAKE 1 TABLET BY MOUTH EVERY DAY BEFORE BREAKFAST.    metoprolol succinate (TOPROL-XL) 25 MG 24 hr tablet TAKE 1/2 TABLET BY MOUTH EVERY DAY    pantoprazole (PROTONIX) 40 MG tablet TAKE 1 TABLET BY MOUTH EVERY DAY    potassium chloride (MICRO-K) 10 MEQ CpSR Take 20 mEq by mouth once daily.     spironolactone (ALDACTONE) 25 MG tablet Take 0.5 tablets (12.5 mg total) by mouth once daily.     Family History       Problem Relation (Age of Onset)    Cancer Brother    Heart attack Father    Stroke Mother          Tobacco Use    Smoking status: Former     Types: Cigars     Start date:      Quit date:      Years since quittin.7    Smokeless tobacco: Never   Substance and Sexual Activity    Alcohol use: Not Currently    Drug use: Never    Sexual activity: Not on file     Review of Systems   Constitutional:  Positive for activity change and appetite change. Negative for chills and fever.   HENT:  Positive for trouble swallowing. Negative for ear pain, mouth sores, nosebleeds and sore throat.    Eyes:  Negative for visual disturbance.   Respiratory:  Positive for cough and shortness of breath. Negative for wheezing.    Cardiovascular:  Positive for leg swelling. Negative for chest pain and palpitations.   Gastrointestinal:  Negative for abdominal distention, abdominal pain, blood in stool, diarrhea, nausea and vomiting.   Endocrine: Negative for polyphagia.   Genitourinary:  Positive for frequency. Negative for difficulty urinating, dysuria  and flank pain.   Musculoskeletal:  Positive for arthralgias, gait problem and joint swelling.   Skin:  Negative for rash.   Neurological:  Positive for weakness. Negative for dizziness, tremors, seizures, syncope and headaches.   Hematological:  Negative for adenopathy.   Psychiatric/Behavioral:  Negative for agitation, confusion and hallucinations. The patient is not nervous/anxious.      Objective:     Vital Signs (Most Recent):  Temp: 98 °F (36.7 °C) (09/27/23 1126)  Pulse: 74 (09/27/23 1126)  Resp: (!) 26 (09/27/23 1126)  BP: 120/62 (09/27/23 1126)  SpO2: 99 % (09/27/23 1126) Vital Signs (24h Range):  Temp:  [97.6 °F (36.4 °C)-98 °F (36.7 °C)] 98 °F (36.7 °C)  Pulse:  [62-91] 74  Resp:  [18-30] 26  SpO2:  [92 %-99 %] 99 %  BP: (120-146)/(62-77) 120/62     Weight: 71.7 kg (158 lb)  Body mass index is 24.74 kg/m².     Physical Exam  Vitals and nursing note reviewed.   Constitutional:       General: He is not in acute distress.     Appearance: He is ill-appearing. He is not toxic-appearing or diaphoretic.      Comments: Frail, chronically ill-appearing   HENT:      Head: Normocephalic and atraumatic.      Nose: Nose normal. No congestion or rhinorrhea.      Mouth/Throat:      Mouth: Mucous membranes are dry.      Pharynx: No oropharyngeal exudate or posterior oropharyngeal erythema.   Eyes:      General: No scleral icterus.  Neck:      Vascular: No carotid bruit.   Cardiovascular:      Rate and Rhythm: Normal rate and regular rhythm.      Heart sounds: Murmur heard.      No friction rub. No gallop.   Pulmonary:      Effort: No respiratory distress.      Breath sounds: No stridor. Wheezing, rhonchi and rales present.      Comments: Supplemental oxygen via nasal cannula  Chest:      Chest wall: No tenderness.   Abdominal:      General: There is no distension.      Palpations: There is no mass.      Tenderness: There is no abdominal tenderness. There is no right CVA tenderness, left CVA tenderness, guarding or  rebound.      Hernia: No hernia is present.   Musculoskeletal:         General: Deformity present. No tenderness.      Cervical back: Neck supple. No rigidity.      Right lower leg: Edema present.      Left lower leg: Edema present.   Lymphadenopathy:      Cervical: No cervical adenopathy.   Skin:     General: Skin is warm and dry.      Capillary Refill: Capillary refill takes less than 2 seconds.      Coloration: Skin is not jaundiced or pale.      Findings: No rash.   Neurological:      Cranial Nerves: No cranial nerve deficit.      Sensory: No sensory deficit.      Motor: Weakness present.      Coordination: Coordination normal.      Gait: Gait abnormal.   Psychiatric:         Mood and Affect: Mood normal.         Behavior: Behavior normal.         Thought Content: Thought content normal.         Judgment: Judgment normal.                Significant Labs: All pertinent labs within the past 24 hours have been reviewed.    Significant Imaging: I have reviewed all pertinent imaging results/findings within the past 24 hours.

## 2023-09-27 NOTE — PT/OT/SLP PROGRESS
Occupational Therapy   Treatment    Name: Richard Farr  MRN: [id number]  Admitting Diagnosis:  Acute cystitis without hematuria       Recommendations:     Discharge Recommendations: home, home with home health, home health OT  Discharge Equipment Recommendations:  none  Barriers to discharge:  None    Assessment:     Richard Farr is a 78 y.o. male with a medical diagnosis of Acute cystitis without hematuria.  He presents with slight confusion this day but agreeable to participate in therapy. Performance deficits affecting function are weakness, impaired endurance, impaired self care skills, impaired functional mobility, gait instability, impaired balance, impaired cognition, decreased coordination, decreased upper extremity function, decreased lower extremity function, decreased safety awareness, abnormal tone, decreased ROM, impaired coordination, impaired fine motor, impaired skin, impaired cardiopulmonary response to activity, impaired joint extensibility, impaired muscle length. Patient required min assistance for left side active range of motion due to impaired range of motion. Patient needed mod verbal and tactile cues t/o session to not fall asleep due to wide at bedside reporting he doesn't not sleep good at night.     Rehab Prognosis:  Good; patient would benefit from acute skilled OT services to address these deficits and reach maximum level of function.       Plan:     Patient to be seen 5 x/week to address the above listed problems via self-care/home management, therapeutic activities, therapeutic exercises, cognitive retraining  Plan of Care Expires: 10/02/23  Plan of Care Reviewed with: patient, spouse    Subjective     Chief Complaint: Reported none  Patient/Family Comments/goals: Reported none  Pain/Comfort:  Pain Rating 1: 0/10  Pain Rating Post-Intervention 1: 0/10    Objective:     Communicated with: occupational therapist and RN prior to session.  Patient found up in chair with telemetry,  peripheral IV, oxygen, pulse ox (continuous) upon OT entry to room.    General Precautions: Standard, fall, aspiration    Orthopedic Precautions:N/A  Braces: N/A  Respiratory Status: Nasal cannula, flow 2 L/min     Occupational Performance:     Functional Mobility/Transfers:  Patient completed Sit <> Stand Transfer with contact guard assistance and minimum assistance  with  no assistive device   Functional Mobility: patient completed trials of sit to stand with CGA/min assistance w/o AD. Last 2 trials patient needed min assistance due to impaired endurance and fatigue.     AMPAC 6 Click ADL: 19    Treatment & Education:  Patient engaged in active range of motion therapeutic exercise for 2 x 15 sitting up in chair in the following planes: shoulder flexion/extension, shoulder abduction/adduction, elbow flexion/extension, scapular retractions/protraction, scapular elevation/depression, shoulder rotations (forward and reverse), wrist flexion/extension, wrist radial/ulnar deviation, forearm supination/pronation, and composite fist. Patient needed rest breaks between each set due to impaired muscle endurance and activity tolerance. Patient educated to continue to complete exercise throughout the day to increase strength and endurance to facilitate an increase in ADL/IADLs. Patient verbally understood.  Patient educated on energy conservation and task simplification techniques while completed sit to stand t/fs.     Patient left up in chair with all lines intact, call button in reach, and wife present    GOALS:   Multidisciplinary Problems       Occupational Therapy Goals          Problem: Occupational Therapy    Goal Priority Disciplines Outcome Interventions   Occupational Therapy Goal     OT, PT/OT Ongoing, Progressing    Description: Goals to be met by: 10/02/23     Patient will increase functional independence with ADLs by performing:    Feeding with Set-up Assistance.  UE Dressing with Minimal Assistance.  LE Dressing  with Minimal Assistance.  Grooming while seated with Set-up Assistance.  Toileting from toilet with Minimal Assistance for hygiene and clothing management.   Bathing from  shower chair/bench with Minimal Assistance.  Toilet transfer to toilet with Stand-by Assistance.                         Time Tracking:     OT Date of Treatment: 09/27/23  OT Start Time: 1015  OT Stop Time: 1031  OT Total Time (min): 16 min    Billable Minutes:Therapeutic Exercise 16 min    OT/SIRI: SIRI     Number of SIRI visits since last OT visit: 2    9/27/2023  Doreen ALMEIDA

## 2023-09-27 NOTE — ASSESSMENT & PLAN NOTE
Worsening disequilibrium since fall and head trauma.  CT to rule out intracranial bleeding injury.  CT scan negative.  Family reassured.

## 2023-09-27 NOTE — NURSING
PT had restless night. New order for Ambien 5 mg ordered per Dr. Moreira. Pt was still restless unable to fall asleep and pulling tele wires off. Medicine was not effective.   [email or web address]

## 2023-09-27 NOTE — PT/OT/SLP PROGRESS
"Physical Therapy Treatment    Patient Name:  Richard Farr   MRN:  [id number]    Recommendations:     Discharge Recommendations: home with home health, home health PT, home  Discharge Equipment Recommendations: none  Barriers to discharge: None    Assessment:     Richard Farr is a 78 y.o. male admitted with a medical diagnosis of Acute cystitis without hematuria.  He presents with the following impairments/functional limitations: weakness, gait instability, decreased upper extremity function, decreased ROM, impaired cardiopulmonary response to activity, impaired endurance, impaired balance, decreased lower extremity function, impaired joint extensibility, impaired coordination, impaired muscle length, impaired self care skills, impaired functional mobility Patient's wife was present in the room and patient reports that he had a rough night.  Patient is still with 1 liter of O2 via nasal cannula. He required contact guard assistance with supine to sit, ambulated ~898 feet with a rollator  with a dizem on the left hand to help with the , right hand keeps falling off the walker handle.  Increasing tolerance to ambulation. .    Rehab Prognosis: Good and Fair; patient would benefit from acute skilled PT services to address these deficits and reach maximum level of function.    Recent Surgery: * No surgery found *      Plan:     During this hospitalization, patient to be seen 5 x/week to address the identified rehab impairments via gait training, therapeutic activities, therapeutic exercises, neuromuscular re-education and progress toward the following goals:    Plan of Care Expires:  09/28/23    Subjective     Chief Complaint: "I had a rough night."   Patient/Family Comments/goals: Go home.   Pain/Comfort:  Pain Rating 1: 0/10  Pain Rating Post-Intervention 1: 0/10      Objective:     Communicated with nurse, patient and wife  prior to session.  Patient found supine with telemetry, peripheral IV, oxygen, pulse ox " (continuous) upon PT entry to room.     General Precautions: Standard, fall, aspiration  Orthopedic Precautions: N/A  Braces: N/A  Respiratory Status: Nasal cannula, flow 1 L/min     Functional Mobility:  Bed Mobility:     Rolling Left:  contact guard assistance  Scooting: contact guard assistance  Supine to Sit: contact guard assistance  Transfers:     Sit to Stand:  stand by assistance with rollator   Gait: ~898 feet with a rollator, dizem on the left hand , right hand keeps falling off the walker , no LOB noted, c/o fatigue   Balance: SBA with static sitting, CGA with static standing using a rollator       AM-PAC 6 CLICK MOBILITY  Turning over in bed (including adjusting bedclothes, sheets and blankets)?: 3  Sitting down on and standing up from a chair with arms (e.g., wheelchair, bedside commode, etc.): 3  Moving from lying on back to sitting on the side of the bed?: 3  Moving to and from a bed to a chair (including a wheelchair)?: 3  Need to walk in hospital room?: 3  Climbing 3-5 steps with a railing?: 3 (based on clinical judgement)  Basic Mobility Total Score: 18       Treatment & Education:  Rolling to left side  Supine to  sit  Scooting to the edge  of the bed   Sitting balance/tolerance  Sit <> stand with rollator   Standing tolerance   Gait with a rollator and O2 supplement   Chair positioning     Patient left up in chair with all lines intact, call button in reach, nurse  notified, and wife  present..    GOALS:   Multidisciplinary Problems       Physical Therapy Goals          Problem: Physical Therapy    Goal Priority Disciplines Outcome Goal Variances Interventions   Physical Therapy Goal     PT, PT/OT Ongoing, Progressing     Description: Goals to be met by: 10/2/2023     Patient will increase functional independence with mobility by performin. Supine to sit with Supervision or Set-up Assistance.  2. Sit to supine with Supervision or Set-up Assistance.  3. Bed to chair transfer with  Supervision or Set-up Assistance with rolling walker using Step Transfer technique.  4. Sit to Stand with Supervision or Set-up Assistance with rolling walker.  5. Gait  x 700  feet with Supervision or Set-up Assistance with rolling walker.  6. Lower extremity exercise program x10 reps.                          Time Tracking:     PT Received On: 09/27/23  PT Start Time: 0800     PT Stop Time: 0828  PT Total Time (min): 28 min     Billable Minutes: Gait Training 15 and Therapeutic Activity 13    Treatment Type: Treatment  PT/PTA: PT     Number of PTA visits since last PT visit: 0     09/27/2023

## 2023-09-27 NOTE — ASSESSMENT & PLAN NOTE
Patient states he had upper and lower endoscopy in 2021.  Iron infusion prior to discharge.    Recent Labs   Lab 09/25/23  0509 09/26/23  0452 09/27/23  0500   Hemoglobin 9.3 L 8.8 L 9.0 L

## 2023-09-27 NOTE — ASSESSMENT & PLAN NOTE
Continue current antibiotic regimen, supplemental oxygen.  Blood cultures are negative to date at this time.    Blood Culture, Routine   Date Value Ref Range Status   09/22/2023 No Growth to date  Preliminary   09/22/2023 No Growth to date  Preliminary   09/22/2023 No Growth to date  Preliminary   09/22/2023 No Growth to date  Preliminary     Urine Culture, Routine   Date Value Ref Range Status   09/22/2023 No significant growth  Final      9/27/23:  abx completed.

## 2023-09-27 NOTE — ASSESSMENT & PLAN NOTE
We will replete intravenously today due to dysphagia.  Follow with daily labs.      Patient has hypokalemia.  Last electrolytes reviewed-   Recent Labs   Lab 09/26/23  0452 09/27/23  0500   K 3.8 3.6   Will replace potassium as per sliding scale as needed and monitor electrolytes closely.

## 2023-09-27 NOTE — PLAN OF CARE
Problem: Infection  Goal: Absence of Infection Signs and Symptoms  Outcome: Ongoing, Progressing     Problem: Adult Inpatient Plan of Care  Goal: Plan of Care Review  Outcome: Ongoing, Progressing  Goal: Patient-Specific Goal (Individualized)  Outcome: Ongoing, Progressing  Goal: Absence of Hospital-Acquired Illness or Injury  Outcome: Ongoing, Progressing  Goal: Optimal Comfort and Wellbeing  Outcome: Ongoing, Progressing  Goal: Readiness for Transition of Care  Outcome: Ongoing, Progressing     Problem: Fluid and Electrolyte Imbalance (Acute Kidney Injury/Impairment)  Goal: Fluid and Electrolyte Balance  Outcome: Ongoing, Progressing     Problem: Oral Intake Inadequate (Acute Kidney Injury/Impairment)  Goal: Optimal Nutrition Intake  Outcome: Ongoing, Progressing     Problem: Renal Function Impairment (Acute Kidney Injury/Impairment)  Goal: Effective Renal Function  Outcome: Ongoing, Progressing     Problem: Skin Injury Risk Increased  Goal: Skin Health and Integrity  Outcome: Ongoing, Progressing     Problem: Fluid Imbalance (Pneumonia)  Goal: Fluid Balance  Outcome: Ongoing, Progressing     Problem: Infection (Pneumonia)  Goal: Resolution of Infection Signs and Symptoms  Outcome: Ongoing, Progressing     Problem: Respiratory Compromise (Pneumonia)  Goal: Effective Oxygenation and Ventilation  Outcome: Ongoing, Progressing     Problem: Impaired Wound Healing  Goal: Optimal Wound Healing  Outcome: Ongoing, Progressing     Problem: Fall Injury Risk  Goal: Absence of Fall and Fall-Related Injury  Outcome: Ongoing, Progressing

## 2023-09-28 LAB
ALBUMIN SERPL BCP-MCNC: 2.8 G/DL (ref 3.5–5.2)
ALP SERPL-CCNC: 46 U/L (ref 55–135)
ALT SERPL W/O P-5'-P-CCNC: 28 U/L (ref 10–44)
ANION GAP SERPL CALC-SCNC: -2 MMOL/L (ref 3–11)
ANISOCYTOSIS BLD QL SMEAR: ABNORMAL
AST SERPL-CCNC: 42 U/L (ref 10–40)
BACTERIA BLD CULT: NORMAL
BACTERIA BLD CULT: NORMAL
BASOPHILS # BLD AUTO: 0.09 K/UL (ref 0–0.2)
BASOPHILS NFR BLD: 1.1 % (ref 0–1.9)
BILIRUB SERPL-MCNC: 0.7 MG/DL (ref 0.1–1)
BUN SERPL-MCNC: 8 MG/DL (ref 8–23)
BURR CELLS BLD QL SMEAR: ABNORMAL
CALCIUM SERPL-MCNC: 8.9 MG/DL (ref 8.7–10.5)
CHLORIDE SERPL-SCNC: 105 MMOL/L (ref 95–110)
CO2 SERPL-SCNC: 37 MMOL/L (ref 23–29)
CREAT SERPL-MCNC: 0.4 MG/DL (ref 0.5–1.4)
DIFFERENTIAL METHOD: ABNORMAL
EOSINOPHIL # BLD AUTO: 0.2 K/UL (ref 0–0.5)
EOSINOPHIL NFR BLD: 2.2 % (ref 0–8)
ERYTHROCYTE [DISTWIDTH] IN BLOOD BY AUTOMATED COUNT: 34 % (ref 11.5–14.5)
EST. GFR  (NO RACE VARIABLE): >60 ML/MIN/1.73 M^2
GLUCOSE SERPL-MCNC: 95 MG/DL (ref 70–110)
HCT VFR BLD AUTO: 31.2 % (ref 40–54)
HGB BLD-MCNC: 9.1 G/DL (ref 14–18)
HYPOCHROMIA BLD QL SMEAR: ABNORMAL
IMM GRANULOCYTES # BLD AUTO: 0.04 K/UL (ref 0–0.04)
IMM GRANULOCYTES NFR BLD AUTO: 0.5 % (ref 0–0.5)
LYMPHOCYTES # BLD AUTO: 1.4 K/UL (ref 1–4.8)
LYMPHOCYTES NFR BLD: 16.7 % (ref 18–48)
MAGNESIUM SERPL-MCNC: 1.8 MG/DL (ref 1.6–2.6)
MCH RBC QN AUTO: 22.6 PG (ref 27–31)
MCHC RBC AUTO-ENTMCNC: 29.2 G/DL (ref 32–36)
MCV RBC AUTO: 78 FL (ref 82–98)
MONOCYTES # BLD AUTO: 0.7 K/UL (ref 0.3–1)
MONOCYTES NFR BLD: 8.6 % (ref 4–15)
NEUTROPHILS # BLD AUTO: 5.8 K/UL (ref 1.8–7.7)
NEUTROPHILS NFR BLD: 70.9 % (ref 38–73)
NRBC BLD-RTO: 0 /100 WBC
PLATELET # BLD AUTO: 323 K/UL (ref 150–450)
PMV BLD AUTO: ABNORMAL FL (ref 9.2–12.9)
POTASSIUM SERPL-SCNC: 3.4 MMOL/L (ref 3.5–5.1)
PROT SERPL-MCNC: 6.5 G/DL (ref 6–8.4)
RBC # BLD AUTO: 4.02 M/UL (ref 4.6–6.2)
SODIUM SERPL-SCNC: 140 MMOL/L (ref 136–145)
TARGETS BLD QL SMEAR: ABNORMAL
WBC # BLD AUTO: 8.12 K/UL (ref 3.9–12.7)

## 2023-09-28 PROCEDURE — 63600175 PHARM REV CODE 636 W HCPCS: Performed by: INTERNAL MEDICINE

## 2023-09-28 PROCEDURE — 85025 COMPLETE CBC W/AUTO DIFF WBC: CPT | Performed by: INTERNAL MEDICINE

## 2023-09-28 PROCEDURE — 83735 ASSAY OF MAGNESIUM: CPT | Performed by: INTERNAL MEDICINE

## 2023-09-28 PROCEDURE — 97535 SELF CARE MNGMENT TRAINING: CPT

## 2023-09-28 PROCEDURE — 25500020 PHARM REV CODE 255: Performed by: INTERNAL MEDICINE

## 2023-09-28 PROCEDURE — 63600175 PHARM REV CODE 636 W HCPCS: Performed by: STUDENT IN AN ORGANIZED HEALTH CARE EDUCATION/TRAINING PROGRAM

## 2023-09-28 PROCEDURE — 97116 GAIT TRAINING THERAPY: CPT | Mod: CQ

## 2023-09-28 PROCEDURE — 11000001 HC ACUTE MED/SURG PRIVATE ROOM

## 2023-09-28 PROCEDURE — 97110 THERAPEUTIC EXERCISES: CPT | Mod: CO

## 2023-09-28 PROCEDURE — A4216 STERILE WATER/SALINE, 10 ML: HCPCS | Performed by: INTERNAL MEDICINE

## 2023-09-28 PROCEDURE — 25000003 PHARM REV CODE 250: Performed by: INTERNAL MEDICINE

## 2023-09-28 PROCEDURE — 94761 N-INVAS EAR/PLS OXIMETRY MLT: CPT

## 2023-09-28 PROCEDURE — 99900031 HC PATIENT EDUCATION (STAT)

## 2023-09-28 PROCEDURE — 27000221 HC OXYGEN, UP TO 24 HOURS

## 2023-09-28 PROCEDURE — C1751 CATH, INF, PER/CENT/MIDLINE: HCPCS

## 2023-09-28 PROCEDURE — 80053 COMPREHEN METABOLIC PANEL: CPT | Performed by: INTERNAL MEDICINE

## 2023-09-28 PROCEDURE — 36410 VNPNXR 3YR/> PHY/QHP DX/THER: CPT

## 2023-09-28 PROCEDURE — 21400001 HC TELEMETRY ROOM

## 2023-09-28 PROCEDURE — 97110 THERAPEUTIC EXERCISES: CPT | Mod: CQ

## 2023-09-28 PROCEDURE — 36415 COLL VENOUS BLD VENIPUNCTURE: CPT | Performed by: INTERNAL MEDICINE

## 2023-09-28 PROCEDURE — 97535 SELF CARE MNGMENT TRAINING: CPT | Mod: CO

## 2023-09-28 PROCEDURE — 99900035 HC TECH TIME PER 15 MIN (STAT)

## 2023-09-28 RX ORDER — SODIUM CHLORIDE 0.9 % (FLUSH) 0.9 %
10 SYRINGE (ML) INJECTION
Status: DISCONTINUED | OUTPATIENT
Start: 2023-09-28 | End: 2023-10-06 | Stop reason: HOSPADM

## 2023-09-28 RX ORDER — SODIUM CHLORIDE 0.9 % (FLUSH) 0.9 %
10 SYRINGE (ML) INJECTION EVERY 6 HOURS
Status: DISCONTINUED | OUTPATIENT
Start: 2023-09-28 | End: 2023-10-06 | Stop reason: HOSPADM

## 2023-09-28 RX ADMIN — CEFTRIAXONE SODIUM 1 G: 1 INJECTION, POWDER, FOR SOLUTION INTRAMUSCULAR; INTRAVENOUS at 12:09

## 2023-09-28 RX ADMIN — DEXTROSE, SODIUM CHLORIDE, AND POTASSIUM CHLORIDE: 5; .45; .15 INJECTION INTRAVENOUS at 07:09

## 2023-09-28 RX ADMIN — AZITHROMYCIN MONOHYDRATE 500 MG: 500 INJECTION, POWDER, LYOPHILIZED, FOR SOLUTION INTRAVENOUS at 01:09

## 2023-09-28 RX ADMIN — Medication 10 ML: at 06:09

## 2023-09-28 RX ADMIN — IOHEXOL 100 ML: 350 INJECTION, SOLUTION INTRAVENOUS at 05:09

## 2023-09-28 NOTE — PLAN OF CARE
Problem: Physical Therapy  Goal: Physical Therapy Goal  Description: Goals to be met by: 10/2/2023     Patient will increase functional independence with mobility by performin. Supine to sit with Supervision or Set-up Assistance.  2. Sit to supine with Supervision or Set-up Assistance.  3. Bed to chair transfer with Supervision or Set-up Assistance with rolling walker using Step Transfer technique.  4. Sit to Stand with Supervision or Set-up Assistance with rolling walker.  5. Gait  x 700  feet with Supervision or Set-up Assistance with rolling walker.  6. Lower extremity exercise program x10 reps.     Outcome: Ongoing, Progressing   Yuki Castaneda, PTA  2023

## 2023-09-28 NOTE — NURSING
Called  re:  6-beat run of V-tach.  Pt normally gets Metoprolol 24-hr split tab 12.5 mg PO.  Pt did not receive dose today because he was NPO for swallow study, then became NPO because he failed his swallow study.  Possible PEG placement in AM.  Pt vials 141/63, P 82, O2 sats 98%.  MD said to monitor for now.  RB&V'd.

## 2023-09-28 NOTE — PT/OT/SLP PROGRESS
Physical Therapy Treatment    Patient Name:  Richard Farr   MRN:  [id number]    Recommendations:     Discharge Recommendations: home with home health, home health PT  Discharge Equipment Recommendations: none  Barriers to discharge:  ongoing medical needs    Assessment:     Richard Farr is a 78 y.o. male admitted with a medical diagnosis of Acute cystitis without hematuria.  He presents with the following impairments/functional limitations: weakness, impaired endurance, impaired self care skills, impaired functional mobility, gait instability, impaired balance, decreased lower extremity function, decreased upper extremity function, decreased safety awareness, decreased ROM. Patient required cues for hand placement during sit to stand. Patient ambualted 440ft SBA with rollator with dizem on L handle of rollator to assist with . Patient able to keep both hands on rollator throughout entirety of ambulation trial. Patient distance ambulated limited due to patient reporting he needed to go to restroom. Patient instructed to performed exercises 1x10 3xdaily and verbalized understanding.    Rehab Prognosis: Good; patient would benefit from acute skilled PT services to address these deficits and reach maximum level of function.    Recent Surgery: * No surgery found *      Plan:     During this hospitalization, patient to be seen 5 x/week to address the identified rehab impairments via therapeutic exercises, neuromuscular re-education, therapeutic activities, gait training and progress toward the following goals:    Plan of Care Expires:  09/28/23    Subjective     Chief Complaint: need to go to restroom  Patient/Family Comments/goals: to get better  Pain/Comfort:  Pain Rating 1: 0/10  Pain Rating 2: 0/10      Objective:     Communicated with patient prior to session.  Patient found up in chair with telemetry, peripheral IV, oxygen, pulse ox (continuous) upon PT entry to room.     General Precautions: Standard, aspiration,  fall  Orthopedic Precautions: N/A  Braces: N/A  Respiratory Status: Nasal cannula, flow 2 L/min     Functional Mobility:  Transfers:     Sit to Stand:  contact guard assistance with rollator  Gait: Patient ambualted 440ft SBA with rollator and dizem on L handle of rollator to assist with . Patient able to  rollator throughout entire ambulation trial.       AM-PAC 6 CLICK MOBILITY  Turning over in bed (including adjusting bedclothes, sheets and blankets)?: 3  Sitting down on and standing up from a chair with arms (e.g., wheelchair, bedside commode, etc.): 3  Moving from lying on back to sitting on the side of the bed?: 3  Moving to and from a bed to a chair (including a wheelchair)?: 3  Need to walk in hospital room?: 3  Climbing 3-5 steps with a railing?: 3 (clinical judgment)  Basic Mobility Total Score: 18       Treatment & Education:  Patient performed seated hip flexion, adduction, abduction, LAQs, ankle pumps 1x10.   Patient instructed to perform exercises 1x10 3xdaily.    Patient left up in chair with all lines intact and call button in reach..    GOALS:   Multidisciplinary Problems       Physical Therapy Goals          Problem: Physical Therapy    Goal Priority Disciplines Outcome Goal Variances Interventions   Physical Therapy Goal     PT, PT/OT Ongoing, Progressing     Description: Goals to be met by: 10/2/2023     Patient will increase functional independence with mobility by performin. Supine to sit with Supervision or Set-up Assistance.  2. Sit to supine with Supervision or Set-up Assistance.  3. Bed to chair transfer with Supervision or Set-up Assistance with rolling walker using Step Transfer technique.  4. Sit to Stand with Supervision or Set-up Assistance with rolling walker.  5. Gait  x 700  feet with Supervision or Set-up Assistance with rolling walker.  6. Lower extremity exercise program x10 reps.                          Time Tracking:     PT Received On: 23  PT Start Time:  0830     PT Stop Time: 0854  PT Total Time (min): 24 min     Billable Minutes: Gait Training 15 and Therapeutic Exercise 9    Treatment Type: Treatment  PT/PTA: PTA     Number of PTA visits since last PT visit: 1     Yuki Castaneda PTA  09/28/2023

## 2023-09-28 NOTE — SUBJECTIVE & OBJECTIVE
Past Medical History:   Diagnosis Date    Anemia     Arthritis     Bilateral lower extremity edema     Carpal tunnel syndrome, bilateral     Chronic diastolic congestive heart failure     Colon polyp     Coronary artery disease     Depression due to physical illness     Encounter for blood transfusion     Gastric ulcer     History of herpes zoster     Hyperlipemia     Hypertension     Moderate tricuspid insufficiency     NSVT (nonsustained ventricular tachycardia)     PAC (premature atrial contraction)     Patent foramen ovale with right to left shunt     Pneumonia     Pulmonary hypertension     PVC's (premature ventricular contractions)     Severe mitral regurgitation     Shingles     Stroke     MINI TRAUMA; RIGHT SIDED WEAKNESS       Past Surgical History:   Procedure Laterality Date    CARDIAC CATHETERIZATION      COLONOSCOPY N/A 11/23/2020    Procedure: COLONOSCOPY;  Surgeon: Abelino Garcia MD;  Location: [facility] ENDO;  Service: General;  Laterality: N/A;    ESOPHAGOGASTRODUODENOSCOPY      ESOPHAGOGASTRODUODENOSCOPY N/A 5/21/2021    Procedure: EGD (ESOPHAGOGASTRODUODENOSCOPY);  Surgeon: Sunny Rea MD;  Location: [facility] ENDO;  Service: Endoscopy;  Laterality: N/A;  COVID-VACCINATED    IMPLANTATION OF MITRAL VALVE LEAFLET CLIP Right 5/18/2021    Procedure: INSERTION, LEAFLET CLIP, MITRAL VALVE;  Surgeon: Zen Reina MD;  Location: [facility] CATH;  Service: Cardiovascular;  Laterality: Right;    NECK SURGERY      anterior cervical fusion x 2    TONSILLECTOMY      TRANSESOPHAGEAL ECHOCARDIOGRAPHY         Review of patient's allergies indicates:  No Known Allergies    Current Facility-Administered Medications on File Prior to Encounter   Medication    benzocaine 20 % mouth spray     Current Outpatient Medications on File Prior to Encounter   Medication Sig    aspirin 81 MG Chew Take 81 mg by mouth once daily.    atorvastatin (LIPITOR) 80 MG tablet TAKE 1 TABLET BY MOUTH EVERY EVENING    cefUROXime (CEFTIN) 500  MG tablet Take 1 tablet (500 mg total) by mouth 2 (two) times daily.    ezetimibe (ZETIA) 10 mg tablet Take 10 mg by mouth once daily.     fenofibrate (TRICOR) 145 MG tablet Take 145 mg by mouth once daily.    folic acid (FOLVITE) 1 MG tablet Take 1 tablet (1 mg total) by mouth once daily.    furosemide (LASIX) 40 MG tablet Take 1 tablet (40 mg total) by mouth once daily. (Patient taking differently: Take 20 mg by mouth once daily.)    levothyroxine (SYNTHROID) 50 MCG tablet TAKE 1 TABLET BY MOUTH EVERY DAY BEFORE BREAKFAST.    metoprolol succinate (TOPROL-XL) 25 MG 24 hr tablet TAKE 1/2 TABLET BY MOUTH EVERY DAY    pantoprazole (PROTONIX) 40 MG tablet TAKE 1 TABLET BY MOUTH EVERY DAY    potassium chloride (MICRO-K) 10 MEQ CpSR Take 20 mEq by mouth once daily.     spironolactone (ALDACTONE) 25 MG tablet Take 0.5 tablets (12.5 mg total) by mouth once daily.     Family History       Problem Relation (Age of Onset)    Cancer Brother    Heart attack Father    Stroke Mother          Tobacco Use    Smoking status: Former     Types: Cigars     Start date:      Quit date:      Years since quittin.7    Smokeless tobacco: Never   Substance and Sexual Activity    Alcohol use: Not Currently    Drug use: Never    Sexual activity: Not on file     Review of Systems   Constitutional:  Positive for activity change and appetite change. Negative for chills and fever.   HENT:  Positive for trouble swallowing. Negative for ear pain, mouth sores, nosebleeds and sore throat.    Eyes:  Negative for visual disturbance.   Respiratory:  Positive for cough and shortness of breath. Negative for wheezing.    Cardiovascular:  Positive for leg swelling. Negative for chest pain and palpitations.   Gastrointestinal:  Negative for abdominal distention, abdominal pain, blood in stool, diarrhea, nausea and vomiting.   Endocrine: Negative for polyphagia.   Genitourinary:  Positive for frequency. Negative for difficulty urinating, dysuria  and flank pain.   Musculoskeletal:  Positive for arthralgias, gait problem and joint swelling.   Skin:  Negative for rash.   Neurological:  Positive for weakness. Negative for dizziness, tremors, seizures, syncope and headaches.   Hematological:  Negative for adenopathy.   Psychiatric/Behavioral:  Negative for agitation, confusion and hallucinations. The patient is not nervous/anxious.      Objective:     Vital Signs (Most Recent):  Temp: 97.7 °F (36.5 °C) (09/28/23 0758)  Pulse: 76 (09/28/23 0758)  Resp: 18 (09/28/23 0758)  BP: (!) 140/68 (09/28/23 0758)  SpO2: 97 % (09/28/23 0758) Vital Signs (24h Range):  Temp:  [97.3 °F (36.3 °C)-98.6 °F (37 °C)] 97.7 °F (36.5 °C)  Pulse:  [62-89] 76  Resp:  [18-26] 18  SpO2:  [91 %-100 %] 97 %  BP: (120-148)/(57-69) 140/68     Weight: 71.7 kg (158 lb)  Body mass index is 24.74 kg/m².     Physical Exam  Vitals and nursing note reviewed.   Constitutional:       General: He is not in acute distress.     Appearance: He is ill-appearing. He is not toxic-appearing or diaphoretic.      Comments: Frail, chronically ill-appearing   HENT:      Head: Normocephalic and atraumatic.      Nose: Nose normal. No congestion or rhinorrhea.      Mouth/Throat:      Mouth: Mucous membranes are dry.      Pharynx: No oropharyngeal exudate or posterior oropharyngeal erythema.   Eyes:      General: No scleral icterus.  Neck:      Vascular: No carotid bruit.   Cardiovascular:      Rate and Rhythm: Normal rate and regular rhythm.      Heart sounds: Murmur heard.      No friction rub. No gallop.   Pulmonary:      Effort: No respiratory distress.      Breath sounds: No stridor. Wheezing, rhonchi and rales present.      Comments: Supplemental oxygen via nasal cannula  Chest:      Chest wall: No tenderness.   Abdominal:      General: There is no distension.      Palpations: There is no mass.      Tenderness: There is no abdominal tenderness. There is no right CVA tenderness, left CVA tenderness, guarding or  rebound.      Hernia: No hernia is present.   Musculoskeletal:         General: Deformity present. No tenderness.      Cervical back: Neck supple. No rigidity.      Right lower leg: Edema present.      Left lower leg: Edema present.   Lymphadenopathy:      Cervical: No cervical adenopathy.   Skin:     General: Skin is warm and dry.      Capillary Refill: Capillary refill takes less than 2 seconds.      Coloration: Skin is not jaundiced or pale.      Findings: No rash.   Neurological:      Cranial Nerves: No cranial nerve deficit.      Sensory: No sensory deficit.      Motor: Weakness present.      Coordination: Coordination normal.      Gait: Gait abnormal.   Psychiatric:         Mood and Affect: Mood normal.         Behavior: Behavior normal.         Thought Content: Thought content normal.         Judgment: Judgment normal.                Significant Labs: All pertinent labs within the past 24 hours have been reviewed.    Significant Imaging: I have reviewed all pertinent imaging results/findings within the past 24 hours.

## 2023-09-28 NOTE — PROCEDURES
"Richard Farr is a 78 y.o. male patient.    Temp: 97.7 °F (36.5 °C) (09/28/23 1127)  Pulse: 74 (09/28/23 1519)  Resp: 20 (09/28/23 1209)  BP: 137/68 (09/28/23 1127)  SpO2: 98 % (09/28/23 1519)  Weight: 71.7 kg (158 lb) (09/23/23 0212)  Height: 5' 7.01" (170.2 cm) (09/23/23 0212)    PICC  Date/Time: 9/28/2023 4:23 PM  Performed by: Padmaja Whitmore RN  Consent Done: Yes  Time out: Immediately prior to procedure a time out was called to verify the correct patient, procedure, equipment, support staff and site/side marked as required  Indications: med administration and vascular access  Anesthesia: local infiltration  Local anesthetic: lidocaine 1% without epinephrine  Anesthetic Total (mL): 3  Preparation: skin prepped with ChloraPrep  Skin prep agent dried: skin prep agent completely dried prior to procedure  Sterile barriers: all five maximum sterile barriers used - cap, mask, sterile gown, sterile gloves, and large sterile sheet  Hand hygiene: hand hygiene performed prior to central venous catheter insertion  Location details: right basilic  Catheter type: single lumen  Catheter size: 4 Fr  Catheter Length: 14cm    Ultrasound guidance: yes  Vessel Caliber: medium and patent, compressibility normal  Needle advanced into vessel with real time Ultrasound guidance.  Guidewire confirmed in vessel.  Sterile sheath used.  Number of attempts: 1  Post-procedure: blood return through all ports, sterile dressing applied and chlorhexidine patch    Complications: none  Comments: Arm circ- 26cm          Padmaja Whitmore RN  9/28/2023    "

## 2023-09-28 NOTE — ASSESSMENT & PLAN NOTE
Vital signs noted continue antihypertensives      BP Readings from Last 3 Encounters:   09/28/23 (!) 140/68   09/21/23 (!) 113/57   07/11/23 (!) 142/58

## 2023-09-28 NOTE — PT/OT/SLP PROGRESS
Occupational Therapy   Treatment    Name: Richard Farr  MRN: [id number]  Admitting Diagnosis:  Acute cystitis without hematuria       Recommendations:     Discharge Recommendations: home with home health, home health OT  Discharge Equipment Recommendations:  none  Barriers to discharge:  None    Assessment:     Richard Farr is a 78 y.o. male with a medical diagnosis of Acute cystitis without hematuria.  He presents with good participation and motivation. Performance deficits affecting function are weakness, impaired endurance, impaired self care skills, impaired functional mobility, gait instability, impaired balance, impaired cognition, decreased coordination, decreased upper extremity function, decreased lower extremity function, decreased safety awareness, abnormal tone, decreased ROM, impaired cardiopulmonary response to activity, impaired joint extensibility, impaired muscle length. Patient needed min verbal cues on fall prevention during toileting tasks.     Rehab Prognosis:  Good; patient would benefit from acute skilled OT services to address these deficits and reach maximum level of function.       Plan:     Patient to be seen 5 x/week to address the above listed problems via self-care/home management, therapeutic activities, therapeutic exercises, cognitive retraining  Plan of Care Expires: 10/02/23  Plan of Care Reviewed with: patient    Subjective     Chief Complaint: Reported none  Patient/Family Comments/goals: Reported none  Pain/Comfort:  Pain Rating 1: 0/10  Pain Rating Post-Intervention 1: 0/10    Objective:     Communicated with: occupational therapist, registered nurse, and PTA prior to session.  Patient found HOB elevated with telemetry, peripheral IV, oxygen upon OT entry to room.    General Precautions: Standard, aspiration, fall    Orthopedic Precautions:N/A  Braces: N/A  Respiratory Status: Nasal cannula, flow 2 L/min     Occupational Performance:     Functional Mobility/Transfers:  Patient  completed Sit <> Stand Transfer with supervision  with  no assistive device   Functional Mobility: patient completed 3 x 5    Activities of Daily Living:  Toileting: minimum assistance for pericare      [facility] 6 Click ADL: 19    Treatment & Education:  Patient engaged in active range of motion therapeutic exercise for 2 x 15 sitting up in chair in the following planes: shoulder flexion/extension, shoulder abduction/adduction, elbow flexion/extension, scapular retractions/protraction, scapular elevation/depression, shoulder rotations (forward and reverse), wrist flexion/extension, wrist radial/ulnar deviation, forearm supination/pronation, and composite fist. Patient needed rest breaks between each set due to impaired muscle endurance and activity tolerance.  Patient educated to continue to complete exercise throughout the day to increase strength and endurance to facilitate an increase in ADL/IADLs. Patient verbally understood.      Patient left up in chair with all lines intact and call button in reach    GOALS:   Multidisciplinary Problems       Occupational Therapy Goals          Problem: Occupational Therapy    Goal Priority Disciplines Outcome Interventions   Occupational Therapy Goal     OT, PT/OT Ongoing, Progressing    Description: Goals to be met by: 10/02/23     Patient will increase functional independence with ADLs by performing:    Feeding with Set-up Assistance.  UE Dressing with Minimal Assistance.  LE Dressing with Minimal Assistance.  Grooming while seated with Set-up Assistance.  Toileting from toilet with Minimal Assistance for hygiene and clothing management.   Bathing from  shower chair/bench with Minimal Assistance.  Toilet transfer to toilet with Stand-by Assistance.                         Time Tracking:     OT Date of Treatment: 09/28/23  OT Start Time: 1419  OT Stop Time: 1450  OT Total Time (min): 31 min    Billable Minutes:Self Care/Home Management 11 min  Therapeutic Exercise 20  min    OT/SIRI: SIRI     Number of SIRI visits since last OT visit: 3    9/28/2023  Doreen ALMEIDA

## 2023-09-28 NOTE — ASSESSMENT & PLAN NOTE
Patient states he had upper and lower endoscopy in 2021.  Iron infusion prior to discharge.    Recent Labs   Lab 09/26/23  0452 09/27/23  0500 09/28/23  0545   Hemoglobin 8.8 L 9.0 L 9.1 L

## 2023-09-28 NOTE — PLAN OF CARE
Problem: Adult Inpatient Plan of Care  Goal: Readiness for Transition of Care  Outcome: Ongoing, Not Progressing     Problem: Oral Intake Inadequate (Acute Kidney Injury/Impairment)  Goal: Optimal Nutrition Intake  Outcome: Ongoing, Not Progressing     Problem: Respiratory Compromise (Pneumonia)  Goal: Effective Oxygenation and Ventilation  Outcome: Ongoing, Not Progressing         Problem: Infection  Goal: Absence of Infection Signs and Symptoms  Outcome: Ongoing, Progressing     Problem: Adult Inpatient Plan of Care  Goal: Plan of Care Review  Outcome: Ongoing, Progressing  Goal: Patient-Specific Goal (Individualized)  Outcome: Ongoing, Progressing  Goal: Absence of Hospital-Acquired Illness or Injury  Outcome: Ongoing, Progressing  Goal: Optimal Comfort and Wellbeing  Outcome: Ongoing, Progressing     Problem: Fluid and Electrolyte Imbalance (Acute Kidney Injury/Impairment)  Goal: Fluid and Electrolyte Balance  Outcome: Ongoing, Progressing     Problem: Renal Function Impairment (Acute Kidney Injury/Impairment)  Goal: Effective Renal Function  Outcome: Ongoing, Progressing     Problem: Skin Injury Risk Increased  Goal: Skin Health and Integrity  Outcome: Ongoing, Progressing     Problem: Fluid Imbalance (Pneumonia)  Goal: Fluid Balance  Outcome: Ongoing, Progressing     Problem: Infection (Pneumonia)  Goal: Resolution of Infection Signs and Symptoms  Outcome: Ongoing, Progressing     Problem: Impaired Wound Healing  Goal: Optimal Wound Healing  Outcome: Ongoing, Progressing     Problem: Fall Injury Risk  Goal: Absence of Fall and Fall-Related Injury  Outcome: Ongoing, Progressing

## 2023-09-28 NOTE — PT/OT/SLP PROGRESS
Speech Language Pathology Treatment    Patient Name:  Richard Farr   MRN:  [id number]  Admitting Diagnosis: Acute cystitis without hematuria    Recommendations:          Recommendations:                General Recommendations:  ENT evaluation consider scope   Diet recommendations:  NPO, NPO    Aspiration Precautions: Consider Alternate means of nutrition/hydration and Strict aspiration precautions; FREE WATER PROTOCOL   General Precautions: Standard, fall, aspiration  Communication strategies:  none    Assessment:     Richard Farr is a 78 y.o. male with an SLP diagnosis of pharyngeal dysphagia.  He presents with the following impressions from recent MBSS: deep penetration w/ aspiration across multiple consistencies. The pt demonstrates reduced hyoid excursion, laryngeal elevation, and absent epiglottic inversion. Residue found in valleculae post swallow and clears minimally w/ repeat swallows. Trace aspiration of residuals observed. Possible physiological abnormality observed also demonstrating patient L-side deviation of bolus during A-P view. Further diagnostic evaluation is recommended. Pt swallow safety and efficiency are both compromised. Pt is at risk for aspiration pneumonia. NPO status w/ free water protocol is recommended at this time. Pt and spouse v/u of risks, recommendations, and precautions. ST is no longer warranted at this time until further assessment and MD recommendation.     Subjective     Pt found sitting upright in chair w/ wife at bedside; Dr. Berman joins session to also provide education and recommendations to patient and spouse.   Patient goals: none indicated at this time     Pain/Comfort:  Pain Rating 1: 0/10    Respiratory Status: Nasal cannula, flow 3 L/min    Objective:     Has the patient been evaluated by SLP for swallowing?   Yes  Keep patient NPO? Yes     Findings and impressions of recent MBSS reviewed w/ patient and spouse. Pt provided education and risks of aspiration including  aspiration pneumonia and associated precautions. Pt and spouse v/u. Free water protocol provided w/ emphasis on importance of frequent oral care. All questions answered w/n SLP scope; Dr. Berman also present w/ recommendations and answers any questions. POC reviewed and ST no longer warranted. Pt and spouse in agreement w/ dismissal at this time; re-consult following further assessment and MD recommendation.    Laryngeal palpitation completed w/o pain. Minimal laryngeal movement noted. Swelling also noted to area of pt left parotid gland; Dr. Berman present and SLP consult w/ Dr. Moreira regarding this finding.     Goals:   Multidisciplinary Problems       SLP Goals          Problem: SLP    Goal Priority Disciplines Outcome   SLP Goal     SLP Ongoing, Progressing   Description:   1. Pt will tolerate least restrictive oral diet to maintain adequate nutrition/hydration w/o acute dysphagia-related pulmonary complication. NOT MET  2. Pt will participate in modified barium swallow study to define swallow physiology and to determine therapy need. MET                         Plan:     Patient to be seen:  4 x/week   Plan of Care expires:  10/09/23  Plan of Care reviewed with:  patient, spouse   SLP Follow-Up:  No       Discharge recommendations:  rehabilitation facility   Barriers to Discharge:  None    Time Tracking:     SLP Treatment Date:   09/28/23  Speech Start Time:  1030  Speech Stop Time:  1043     Speech Total Time (min):  13 min    Billable Minutes: Speech Therapy Individual x13 min    09/28/2023

## 2023-09-28 NOTE — PROGRESS NOTES
[facility] Medicine  Progress Note    Patient Name: Richard Farr  MRN: [id number]  Patient Class: IP- Inpatient   Admission Date: 9/22/2023  Length of Stay: 5 days  Attending Physician: Drew Moreira Jr., MD  Primary Care Provider: Drew Moreira Jr., MD        Subjective:     Principal Problem:Acute cystitis without hematuria        HPI:  ED HPI:  Richard Farr is a 78 y.o. male with PMHX of hypertension, hyperlipidemia, CHF, mitral regurg, tricuspid regurg, pulmonary hypertension, gastritis who presents to the emergency department C/O shortness of breath.     Patient presents after developing shortness of breath 1 hour prior to arrival.  Was recently admitted for anemia and received 4 units PRBCs in hospital as well as iron transfusion 2 days ago.  No fever but patient states he felt hot in emergency department.  Patient was diagnosed with urinary tract infection during previous hospitalization is on antibiotics.     PCP: Drew Moreira Jr., MD     IM HPI:  Patient was recently admitted by primary care and transfuse 4 units of packed red blood cells.  Patient felt much improved return home felt well for about 24 hours then began having increasing dyspnea.  Patient was readmitted found to have a urinary tract infection and a pneumonia.  Patient was started on antibiotics.  He was also given IV diuretics.  Patient has some peripheral edema but overall he states he feels dry and dehydrated.  Patient is having some oropharyngeal dysphagia and on exam I am unable to find the cause.  Patient has a history of CVA as well as a history of arthritis with several upper extremity joint deformities and contractures.      Overview/Hospital Course:  9/24 GT:  Patient is lying in bed this morning with spouse at bedside.  Patient is frail and chronically ill-appearing, but does not appear to be in any acute distress.  Patient reports his shortness a breath is at baseline, and he is tolerating supplemental  oxygen via nasal cannula.  Patient had some swallowing difficulties yesterday, SLP has been consulted for evaluation, treatment, dietary modifications have been made.  Patient's H&H is stable, and although anemic he is not at the point that he requires further transfusion.  Potassium mildly decreased, we will replete intravenously today due to swallowing difficulties and monitor with daily labs.  Blood cultures at this time are negative to date.  Patient and spouse deny any issues, concerns, questions.    9/25:  Patient eating breakfast this morning in no acute distress.  Speech therapy to evaluate the patient.  Thus far blood cultures are negative.  Patient feels back to baseline.  Urine culture without significant growth.  Consider discharge in the near future.  Patient having significant gas production check GI panel.    9/26:  No acute events overnight.  GI panel pending.  Provide iron infusion prior to potential discharge.  Patient to undergo modified barium swallow prior to discharge for completeness.  Home health will be coordinated ongoing outpatient needs.    Patient with significant aspiration by MBS.  Discuss with family potential needs for PEG placement vs ongoing ST.     9/27:  patient ct head negative.  Done for worsening dizziness/dysequilibrium after head trauma.  Awaiting  recs regarding PEG tube placement.     9/28/23:  patient resting this morning.  In favor or PEG placement tomorrow.       Past Medical History:   Diagnosis Date    Anemia     Arthritis     Bilateral lower extremity edema     Carpal tunnel syndrome, bilateral     Chronic diastolic congestive heart failure     Colon polyp     Coronary artery disease     Depression due to physical illness     Encounter for blood transfusion     Gastric ulcer     History of herpes zoster     Hyperlipemia     Hypertension     Moderate tricuspid insufficiency     NSVT (nonsustained ventricular tachycardia)     PAC (premature atrial  contraction)     Patent foramen ovale with right to left shunt     Pneumonia     Pulmonary hypertension     PVC's (premature ventricular contractions)     Severe mitral regurgitation     Shingles     Stroke     MINI TRAUMA; RIGHT SIDED WEAKNESS       Past Surgical History:   Procedure Laterality Date    CARDIAC CATHETERIZATION      COLONOSCOPY N/A 11/23/2020    Procedure: COLONOSCOPY;  Surgeon: Abelino Garcia MD;  Location: [facility] ENDO;  Service: General;  Laterality: N/A;    ESOPHAGOGASTRODUODENOSCOPY      ESOPHAGOGASTRODUODENOSCOPY N/A 5/21/2021    Procedure: EGD (ESOPHAGOGASTRODUODENOSCOPY);  Surgeon: Sunny Rea MD;  Location: [facility] ENDO;  Service: Endoscopy;  Laterality: N/A;  COVID-VACCINATED    IMPLANTATION OF MITRAL VALVE LEAFLET CLIP Right 5/18/2021    Procedure: INSERTION, LEAFLET CLIP, MITRAL VALVE;  Surgeon: Zen Reina MD;  Location: [facility] CATH;  Service: Cardiovascular;  Laterality: Right;    NECK SURGERY      anterior cervical fusion x 2    TONSILLECTOMY      TRANSESOPHAGEAL ECHOCARDIOGRAPHY         Review of patient's allergies indicates:  No Known Allergies    Current Facility-Administered Medications on File Prior to Encounter   Medication    benzocaine 20 % mouth spray     Current Outpatient Medications on File Prior to Encounter   Medication Sig    aspirin 81 MG Chew Take 81 mg by mouth once daily.    atorvastatin (LIPITOR) 80 MG tablet TAKE 1 TABLET BY MOUTH EVERY EVENING    cefUROXime (CEFTIN) 500 MG tablet Take 1 tablet (500 mg total) by mouth 2 (two) times daily.    ezetimibe (ZETIA) 10 mg tablet Take 10 mg by mouth once daily.     fenofibrate (TRICOR) 145 MG tablet Take 145 mg by mouth once daily.    folic acid (FOLVITE) 1 MG tablet Take 1 tablet (1 mg total) by mouth once daily.    furosemide (LASIX) 40 MG tablet Take 1 tablet (40 mg total) by mouth once daily. (Patient taking differently: Take 20 mg by mouth once daily.)    levothyroxine (SYNTHROID) 50  MCG tablet TAKE 1 TABLET BY MOUTH EVERY DAY BEFORE BREAKFAST.    metoprolol succinate (TOPROL-XL) 25 MG 24 hr tablet TAKE 1/2 TABLET BY MOUTH EVERY DAY    pantoprazole (PROTONIX) 40 MG tablet TAKE 1 TABLET BY MOUTH EVERY DAY    potassium chloride (MICRO-K) 10 MEQ CpSR Take 20 mEq by mouth once daily.     spironolactone (ALDACTONE) 25 MG tablet Take 0.5 tablets (12.5 mg total) by mouth once daily.     Family History       Problem Relation (Age of Onset)    Cancer Brother    Heart attack Father    Stroke Mother          Tobacco Use    Smoking status: Former     Types: Cigars     Start date:      Quit date:      Years since quittin.7    Smokeless tobacco: Never   Substance and Sexual Activity    Alcohol use: Not Currently    Drug use: Never    Sexual activity: Not on file     Review of Systems   Constitutional:  Positive for activity change and appetite change. Negative for chills and fever.   HENT:  Positive for trouble swallowing. Negative for ear pain, mouth sores, nosebleeds and sore throat.    Eyes:  Negative for visual disturbance.   Respiratory:  Positive for cough and shortness of breath. Negative for wheezing.    Cardiovascular:  Positive for leg swelling. Negative for chest pain and palpitations.   Gastrointestinal:  Negative for abdominal distention, abdominal pain, blood in stool, diarrhea, nausea and vomiting.   Endocrine: Negative for polyphagia.   Genitourinary:  Positive for frequency. Negative for difficulty urinating, dysuria and flank pain.   Musculoskeletal:  Positive for arthralgias, gait problem and joint swelling.   Skin:  Negative for rash.   Neurological:  Positive for weakness. Negative for dizziness, tremors, seizures, syncope and headaches.   Hematological:  Negative for adenopathy.   Psychiatric/Behavioral:  Negative for agitation, confusion and hallucinations. The patient is not nervous/anxious.      Objective:     Vital Signs (Most Recent):  Temp: 97.7 °F (36.5 °C)  (09/28/23 0758)  Pulse: 76 (09/28/23 0758)  Resp: 18 (09/28/23 0758)  BP: (!) 140/68 (09/28/23 0758)  SpO2: 97 % (09/28/23 0758) Vital Signs (24h Range):  Temp:  [97.3 °F (36.3 °C)-98.6 °F (37 °C)] 97.7 °F (36.5 °C)  Pulse:  [62-89] 76  Resp:  [18-26] 18  SpO2:  [91 %-100 %] 97 %  BP: (120-148)/(57-69) 140/68     Weight: 71.7 kg (158 lb)  Body mass index is 24.74 kg/m².     Physical Exam  Vitals and nursing note reviewed.   Constitutional:       General: He is not in acute distress.     Appearance: He is ill-appearing. He is not toxic-appearing or diaphoretic.      Comments: Frail, chronically ill-appearing   HENT:      Head: Normocephalic and atraumatic.      Nose: Nose normal. No congestion or rhinorrhea.      Mouth/Throat:      Mouth: Mucous membranes are dry.      Pharynx: No oropharyngeal exudate or posterior oropharyngeal erythema.   Eyes:      General: No scleral icterus.  Neck:      Vascular: No carotid bruit.   Cardiovascular:      Rate and Rhythm: Normal rate and regular rhythm.      Heart sounds: Murmur heard.      No friction rub. No gallop.   Pulmonary:      Effort: No respiratory distress.      Breath sounds: No stridor. Wheezing, rhonchi and rales present.      Comments: Supplemental oxygen via nasal cannula  Chest:      Chest wall: No tenderness.   Abdominal:      General: There is no distension.      Palpations: There is no mass.      Tenderness: There is no abdominal tenderness. There is no right CVA tenderness, left CVA tenderness, guarding or rebound.      Hernia: No hernia is present.   Musculoskeletal:         General: Deformity present. No tenderness.      Cervical back: Neck supple. No rigidity.      Right lower leg: Edema present.      Left lower leg: Edema present.   Lymphadenopathy:      Cervical: No cervical adenopathy.   Skin:     General: Skin is warm and dry.      Capillary Refill: Capillary refill takes less than 2 seconds.      Coloration: Skin is not jaundiced or pale.       Findings: No rash.   Neurological:      Cranial Nerves: No cranial nerve deficit.      Sensory: No sensory deficit.      Motor: Weakness present.      Coordination: Coordination normal.      Gait: Gait abnormal.   Psychiatric:         Mood and Affect: Mood normal.         Behavior: Behavior normal.         Thought Content: Thought content normal.         Judgment: Judgment normal.                Significant Labs: All pertinent labs within the past 24 hours have been reviewed.    Significant Imaging: I have reviewed all pertinent imaging results/findings within the past 24 hours.      Assessment/Plan:      * Acute cystitis without hematuria  Continue antibiotics awaiting cultures    Blood cultures are negative to date.  Urine culture negative    Ataxia  Worsening disequilibrium since fall and head trauma.  CT to rule out intracranial bleeding injury.  CT scan negative.  Family reassured.      Laceration of forehead  Treatment per GS.    Hypokalemia  We will replete intravenously today due to dysphagia.  Follow with daily labs.      Patient has hypokalemia.  Last electrolytes reviewed-   Recent Labs   Lab 09/26/23  0452 09/27/23  0500   K 3.8 3.6   Will replace potassium as per sliding scale as needed and monitor electrolytes closely.       Other dysphagia  Dietary modifications have been made.  SLP consulted for evaluation and treatment.  Significant aspiration.  NPO for now.  GS to eval for PEG.    Acute on chronic combined systolic and diastolic heart failure  Patient seems intravascularly dry.  Hold IV Lasix for now.      Pneumonia due to infectious organism  Continue current antibiotic regimen, supplemental oxygen.  Blood cultures are negative to date at this time.    Blood Culture, Routine   Date Value Ref Range Status   09/22/2023 No Growth to date  Preliminary   09/22/2023 No Growth to date  Preliminary   09/22/2023 No Growth to date  Preliminary   09/22/2023 No Growth to date  Preliminary   09/22/2023 No  Growth to date  Preliminary     Urine Culture, Routine   Date Value Ref Range Status   09/22/2023 No significant growth  Final      9/27/23:  abx completed.       Pleural effusion, right  Unsure if this has been investigated will defer to primary.    Stable by hx.       Essential hypertension  Vital signs noted continue antihypertensives      BP Readings from Last 3 Encounters:   09/28/23 (!) 140/68   09/21/23 (!) 113/57   07/11/23 (!) 142/58         Mixed hyperlipidemia  Continue statin.      Acute superficial gastritis without hemorrhage  Continue home treatment      Iron deficiency anemia  Patient states he had upper and lower endoscopy in 2021.  Iron infusion prior to discharge.    Recent Labs   Lab 09/26/23  0452 09/27/23  0500 09/28/23  0545   Hemoglobin 8.8 L 9.0 L 9.1 L               VTE Risk Mitigation (From admission, onward)         Ordered     IP VTE HIGH RISK PATIENT  Once         09/23/23 0215     Place sequential compression device  Until discontinued         09/23/23 0215                Discharge Planning   JOSÉ:      Code Status: Full Code   Is the patient medically ready for discharge?:     Reason for patient still in hospital (select all that apply): Treatment  Discharge Plan A: Home with family                  Drew Moreira Jr, MD  Department of Hospital Medicine   [facility] Surg

## 2023-09-28 NOTE — ASSESSMENT & PLAN NOTE
Continue current antibiotic regimen, supplemental oxygen.  Blood cultures are negative to date at this time.    Blood Culture, Routine   Date Value Ref Range Status   09/22/2023 No Growth to date  Preliminary   09/22/2023 No Growth to date  Preliminary   09/22/2023 No Growth to date  Preliminary   09/22/2023 No Growth to date  Preliminary   09/22/2023 No Growth to date  Preliminary     Urine Culture, Routine   Date Value Ref Range Status   09/22/2023 No significant growth  Final      9/27/23:  abx completed.

## 2023-09-29 ENCOUNTER — ANESTHESIA EVENT (OUTPATIENT)
Dept: SURGERY | Facility: HOSPITAL | Age: 78
DRG: 291 | End: 2023-09-29
Payer: MEDICARE

## 2023-09-29 ENCOUNTER — ANESTHESIA (OUTPATIENT)
Dept: SURGERY | Facility: HOSPITAL | Age: 78
DRG: 291 | End: 2023-09-29
Payer: MEDICARE

## 2023-09-29 LAB
ALBUMIN SERPL BCP-MCNC: 2.7 G/DL (ref 3.5–5.2)
ALP SERPL-CCNC: 50 U/L (ref 55–135)
ALT SERPL W/O P-5'-P-CCNC: 28 U/L (ref 10–44)
ANION GAP SERPL CALC-SCNC: 2 MMOL/L (ref 3–11)
ANISOCYTOSIS BLD QL SMEAR: ABNORMAL
AST SERPL-CCNC: 40 U/L (ref 10–40)
BASOPHILS # BLD AUTO: 0.08 K/UL (ref 0–0.2)
BASOPHILS NFR BLD: 0.9 % (ref 0–1.9)
BILIRUB SERPL-MCNC: 0.9 MG/DL (ref 0.1–1)
BUN SERPL-MCNC: 7 MG/DL (ref 8–23)
BURR CELLS BLD QL SMEAR: ABNORMAL
CALCIUM SERPL-MCNC: 8.5 MG/DL (ref 8.7–10.5)
CHLORIDE SERPL-SCNC: 108 MMOL/L (ref 95–110)
CO2 SERPL-SCNC: 31 MMOL/L (ref 23–29)
CREAT SERPL-MCNC: 0.4 MG/DL (ref 0.5–1.4)
DIFFERENTIAL METHOD: ABNORMAL
EOSINOPHIL # BLD AUTO: 0.2 K/UL (ref 0–0.5)
EOSINOPHIL NFR BLD: 2.7 % (ref 0–8)
ERYTHROCYTE [DISTWIDTH] IN BLOOD BY AUTOMATED COUNT: 34.5 % (ref 11.5–14.5)
EST. GFR  (NO RACE VARIABLE): >60 ML/MIN/1.73 M^2
GLUCOSE SERPL-MCNC: 96 MG/DL (ref 70–110)
HCT VFR BLD AUTO: 31.6 % (ref 40–54)
HGB BLD-MCNC: 9.2 G/DL (ref 14–18)
HYPOCHROMIA BLD QL SMEAR: ABNORMAL
IMM GRANULOCYTES # BLD AUTO: 0.03 K/UL (ref 0–0.04)
IMM GRANULOCYTES NFR BLD AUTO: 0.3 % (ref 0–0.5)
LYMPHOCYTES # BLD AUTO: 1.1 K/UL (ref 1–4.8)
LYMPHOCYTES NFR BLD: 12.3 % (ref 18–48)
MAGNESIUM SERPL-MCNC: 1.7 MG/DL (ref 1.6–2.6)
MCH RBC QN AUTO: 22.9 PG (ref 27–31)
MCHC RBC AUTO-ENTMCNC: 29.1 G/DL (ref 32–36)
MCV RBC AUTO: 79 FL (ref 82–98)
MONOCYTES # BLD AUTO: 0.8 K/UL (ref 0.3–1)
MONOCYTES NFR BLD: 8.6 % (ref 4–15)
NEUTROPHILS # BLD AUTO: 6.8 K/UL (ref 1.8–7.7)
NEUTROPHILS NFR BLD: 75.2 % (ref 38–73)
NRBC BLD-RTO: 0 /100 WBC
PLATELET # BLD AUTO: 308 K/UL (ref 150–450)
PMV BLD AUTO: ABNORMAL FL (ref 9.2–12.9)
POTASSIUM SERPL-SCNC: 3.6 MMOL/L (ref 3.5–5.1)
PROT SERPL-MCNC: 6.2 G/DL (ref 6–8.4)
RBC # BLD AUTO: 4.02 M/UL (ref 4.6–6.2)
SODIUM SERPL-SCNC: 141 MMOL/L (ref 136–145)
T4 FREE SERPL-MCNC: 1.46 NG/DL (ref 0.71–1.51)
TARGETS BLD QL SMEAR: ABNORMAL
TSH SERPL DL<=0.005 MIU/L-ACNC: 2.66 UIU/ML (ref 0.4–4)
WBC # BLD AUTO: 9 K/UL (ref 3.9–12.7)

## 2023-09-29 PROCEDURE — A4216 STERILE WATER/SALINE, 10 ML: HCPCS | Performed by: INTERNAL MEDICINE

## 2023-09-29 PROCEDURE — 80053 COMPREHEN METABOLIC PANEL: CPT | Performed by: INTERNAL MEDICINE

## 2023-09-29 PROCEDURE — 21400001 HC TELEMETRY ROOM

## 2023-09-29 PROCEDURE — 11000001 HC ACUTE MED/SURG PRIVATE ROOM

## 2023-09-29 PROCEDURE — 36415 COLL VENOUS BLD VENIPUNCTURE: CPT | Performed by: INTERNAL MEDICINE

## 2023-09-29 PROCEDURE — 99900031 HC PATIENT EDUCATION (STAT)

## 2023-09-29 PROCEDURE — 99900035 HC TECH TIME PER 15 MIN (STAT)

## 2023-09-29 PROCEDURE — 94761 N-INVAS EAR/PLS OXIMETRY MLT: CPT

## 2023-09-29 PROCEDURE — 83735 ASSAY OF MAGNESIUM: CPT | Performed by: INTERNAL MEDICINE

## 2023-09-29 PROCEDURE — 97535 SELF CARE MNGMENT TRAINING: CPT | Mod: CO

## 2023-09-29 PROCEDURE — 97110 THERAPEUTIC EXERCISES: CPT | Mod: CO

## 2023-09-29 PROCEDURE — 63600175 PHARM REV CODE 636 W HCPCS: Performed by: INTERNAL MEDICINE

## 2023-09-29 PROCEDURE — 99233 PR SUBSEQUENT HOSPITAL CARE,LEVL III: ICD-10-PCS | Mod: ,,, | Performed by: STUDENT IN AN ORGANIZED HEALTH CARE EDUCATION/TRAINING PROGRAM

## 2023-09-29 PROCEDURE — 99233 SBSQ HOSP IP/OBS HIGH 50: CPT | Mod: ,,, | Performed by: STUDENT IN AN ORGANIZED HEALTH CARE EDUCATION/TRAINING PROGRAM

## 2023-09-29 PROCEDURE — 84443 ASSAY THYROID STIM HORMONE: CPT | Performed by: INTERNAL MEDICINE

## 2023-09-29 PROCEDURE — 85025 COMPLETE CBC W/AUTO DIFF WBC: CPT | Performed by: INTERNAL MEDICINE

## 2023-09-29 PROCEDURE — 84439 ASSAY OF FREE THYROXINE: CPT | Performed by: INTERNAL MEDICINE

## 2023-09-29 PROCEDURE — 25000003 PHARM REV CODE 250: Performed by: INTERNAL MEDICINE

## 2023-09-29 PROCEDURE — 27000221 HC OXYGEN, UP TO 24 HOURS

## 2023-09-29 RX ADMIN — DEXTROSE, SODIUM CHLORIDE, AND POTASSIUM CHLORIDE: 5; .45; .15 INJECTION INTRAVENOUS at 08:09

## 2023-09-29 RX ADMIN — DEXTROSE, SODIUM CHLORIDE, AND POTASSIUM CHLORIDE: 5; .45; .15 INJECTION INTRAVENOUS at 10:09

## 2023-09-29 RX ADMIN — Medication 10 ML: at 06:09

## 2023-09-29 RX ADMIN — Medication 10 ML: at 11:09

## 2023-09-29 RX ADMIN — Medication 10 ML: at 12:09

## 2023-09-29 NOTE — CONSULTS
[facility] Surg  Adult Nutrition  Consult Note    SUMMARY     Recommendations  1. Rec'd EN: Jevity 1.2 @ rate of 10mL/hr increasing by 10mL every 4-6 hrs to goal rate of 60mL/hr to provide 1682kcal (102% EEN), 78g of protein (91% EPN), and 1162mL FW with a continuous 20mL/hr FWF.  2. RD to follow and make rec's accordingly.  Goals:   1. Pt will be started on EN by next RD follow up.   2. Pt will reach TF goal rate within 48hrs of initiation.  Nutrition Goal Status: new  Communication of RD Recs: reviewed with RN    Assessment and Plan    Nutrition Problem  Inadequate oral intake    Related to (etiology):   NPO status    Signs and Symptoms (as evidenced by):   0% PO intake    Interventions/Recommendations (treatment strategy):  1. Rec'd EN: Jevity 1.2 @ rate of 10mL/hr increasing by 10mL every 4-6 hrs to goal rate of 60mL/hr to provide 1682kcal (102% EEN), 78g of protein (91% EPN), and 1162mL FW with a continuous 20mL/hr FWF.  2. RD to follow and make rec's accordingly.    Nutrition Diagnosis Status:   New       Reason for Assessment    Reason For Assessment: consult (Tube Feeding Rec's)  Diagnosis: other (see comments) (Acute cystitis without hematuria)  Relevant Medical History: Anemia, Arthritis, Bilateral lower extremity edema, Carpal tunnel syndrome:bilateral, Chronic diastolic congestive heart failure, Colon polyp, Coronary artery disease, Depression due to physical illness, Encounter for blood transfusion, Gastric ulcer, History of herpes zoster, Hyperlipemia, Hypertension, Moderate tricuspid insufficiency, NSVT, PAC, Patent foramen ovale with right to left shunt, Pneumonia, Pulmonary hypertension, PVC's, Severe mitral regurgitation, Shingles, Stroke  Interdisciplinary Rounds: did not attend  General Information Comments: RD consulted for TF rec's. Will provide. RD to follow and make rec's accordingly.  Nutrition Discharge Planning: TBD as care progresses    Nutrition Risk Screen    Nutrition Risk  "Screen: difficulty chewing/swallowing    Nutrition/Diet History    Spiritual, Cultural Beliefs, Sabianist Practices, Values that Affect Care: no  Food Allergies: NKFA    Anthropometrics    Temp: 98.3 °F (36.8 °C)  Height: 5' 7.01" (170.2 cm)  Height (inches): 67.01 in  Weight Method: Bed Scale  Weight: 71.7 kg (158 lb 1.1 oz)  Weight (lb): 158.07 lb  Ideal Body Weight (IBW), Male: 148.06 lb  % Ideal Body Weight, Male (lb): 106.76 %  BMI (Calculated): 24.8  BMI Grade: 18.5-24.9 - normal    Lab/Procedures/Meds    Pertinent Labs Reviewed: reviewed  Pertinent Medications Reviewed: reviewed    Estimated/Assessed Needs    Weight Used For Calorie Calculations: 71.7 kg (158 lb 1.1 oz)  Energy Calorie Requirements (kcal): 1682 (MSJ x 1.2 SF)  Energy Need Method: Solano-St Jeor  Protein Requirements: 86 (1.2g/kg)  Weight Used For Protein Calculations: 71.7 kg (158 lb 1.1 oz)  Fluid Requirements (mL): 1682 (1mL/kcal)  Estimated Fluid Requirement Method: RDA Method  RDA Method (mL): 1682    Nutrition Prescription Ordered    Current Diet Order: NPO  Oral Nutrition Supplement: None    Evaluation of Received Nutrient/Fluid Intake    % Kcal Needs: 0%  % Protein Needs: 0%  I/O: +900  Energy Calories Required: not meeting needs  Protein Required: not meeting needs  Tolerance: other (see comments) (NPO/TF not started yet)  % Intake of Estimated Energy Needs: 0 - 25 %  % Meal Intake: NPO    Nutrition Risk    Level of Risk/Frequency of Follow-up: moderate     Monitor and Evaluation    Food and Nutrient Intake: energy intake, food and beverage intake  Food and Nutrient Adminstration: enteral and parenteral nutrition administration, diet order  Knowledge/Beliefs/Attitudes: food and nutrition knowledge/skill, beliefs and attitudes  Physical Activity and Function: nutrition-related ADLs and IADLs  Anthropometric Measurements: height/length, weight, weight change, body mass index  Biochemical Data, Medical Tests and Procedures: electrolyte " and renal panel, gastrointestinal profile, glucose/endocrine profile, inflammatory profile, lipid profile  Nutrition-Focused Physical Findings: overall appearance     Nutrition Follow-Up    RD Follow-up?: Yes

## 2023-09-29 NOTE — PLAN OF CARE
Recommendations  1. Rec'd EN: Jevity 1.2 @ rate of 10mL/hr increasing by 10mL every 4-6 hrs to goal rate of 60mL/hr to provide 1682kcal (102% EEN), 78g of protein (91% EPN), and 1162mL FW with a continuous 20mL/hr FWF.  2. RD to follow and make rec's accordingly.  Goals:   1. Pt will be started on EN by next RD follow up.   2. Pt will reach TF goal rate within 48hrs of initiation.  Nutrition Goal Status: new

## 2023-09-29 NOTE — PHYSICIAN QUERY
"PT Name: Richard Farr  MR #: [id number]    DOCUMENTATION CLARIFICATION     Lizette Jeronimo RN, CCDS    [email or web address]    Documentation Excellence  This form is a permanent document in the medical record.  Query Date: September 29, 2023  By submitting this query, we are merely seeking further clarification of documentation. Please utilize your independent clinical judgment when addressing the question(s) below.    The Medical Record contains the following:   Indicator Supporting Clinical Findings Location in Medical Record   x Documentation of "Debridement" Procedure:  Repair of laceration of forehead    Procedure in Detail:   Lidocaine was intradermally infiltrated around the forehead laceration.      Jagged and nonviable edges of the were sharply debrided.      The wound was copiously irrigated with Betadine and approximated with 3-0 nylon interrupted sutures Antonella GANNON Procedure Note 9/26    Documentation of "I&D"      Other       Excisional debridement is the surgical removal or cutting away of such tissue, necrosis, or slough and is classified to the root operation "Excision." Use of a sharp instrument does not always indicate that an excisional debridement was performed. Minor removal of loose fragments with scissors or using a sharp instrument to scrape away tissue is not an excisional debridement. Excisional debridement involves the use of a scalpel to remove devitalized tissue.  Nonexcisional debridement is the nonoperative brushing, irrigating, scrubbing, or washing of devitalized tissue, necrosis, slough, or foreign material. Most nonexcisional debridement procedures are classified to the root operation "Extraction" (pulling or stripping out or off all or a portion of a body part by the use of force).     Provider,  Please specify type of debridement performed on "Forehead" on 9/26/23:    [ x  ] Excisional Debridement       [   ] Non-excisionalDebridement       [   ] Other Procedure (please specify): " _____________          Reference:    ICD-10-CM/PCS Coding Clinic Third Quarter ICD-10, Effective with discharges: October 7, 2015 [facility] § Excisional and nonexcisional debridement (2015).    Form No. 17574

## 2023-09-29 NOTE — ASSESSMENT & PLAN NOTE
Pt with left shift of ingested contents on coronal view of barium swallow   CT neck with suspected non specific ST irregularity near C4/C5 near area of previous surgery - no distinct mass detected  To OR tomorrow for EGD with possible PEG vs. Endoscopic guided NGT placement   If unable, will required lap gtube placement, which will require cards eval   Continue NPO   Informed consent obtained

## 2023-09-29 NOTE — ASSESSMENT & PLAN NOTE
Continue current antibiotic regimen, supplemental oxygen.  Blood cultures are negative to date at this time.    Blood Culture, Routine   Date Value Ref Range Status   09/22/2023 No growth after 5 days.  Final     Urine Culture, Routine   Date Value Ref Range Status   09/22/2023 No significant growth  Final      9/27/23:  abx completed.

## 2023-09-29 NOTE — SUBJECTIVE & OBJECTIVE
Past Medical History:   Diagnosis Date    Anemia     Arthritis     Bilateral lower extremity edema     Carpal tunnel syndrome, bilateral     Chronic diastolic congestive heart failure     Colon polyp     Coronary artery disease     Depression due to physical illness     Encounter for blood transfusion     Gastric ulcer     History of herpes zoster     Hyperlipemia     Hypertension     Moderate tricuspid insufficiency     NSVT (nonsustained ventricular tachycardia)     PAC (premature atrial contraction)     Patent foramen ovale with right to left shunt     Pneumonia     Pulmonary hypertension     PVC's (premature ventricular contractions)     Severe mitral regurgitation     Shingles     Stroke     MINI TRAUMA; RIGHT SIDED WEAKNESS       Past Surgical History:   Procedure Laterality Date    CARDIAC CATHETERIZATION      COLONOSCOPY N/A 11/23/2020    Procedure: COLONOSCOPY;  Surgeon: Abelino Garcia MD;  Location: [facility] ENDO;  Service: General;  Laterality: N/A;    ESOPHAGOGASTRODUODENOSCOPY      ESOPHAGOGASTRODUODENOSCOPY N/A 5/21/2021    Procedure: EGD (ESOPHAGOGASTRODUODENOSCOPY);  Surgeon: Sunny Rea MD;  Location: [facility] ENDO;  Service: Endoscopy;  Laterality: N/A;  COVID-VACCINATED    IMPLANTATION OF MITRAL VALVE LEAFLET CLIP Right 5/18/2021    Procedure: INSERTION, LEAFLET CLIP, MITRAL VALVE;  Surgeon: Zen Reina MD;  Location: [facility] CATH;  Service: Cardiovascular;  Laterality: Right;    NECK SURGERY      anterior cervical fusion x 2    TONSILLECTOMY      TRANSESOPHAGEAL ECHOCARDIOGRAPHY         Review of patient's allergies indicates:  No Known Allergies    Current Facility-Administered Medications on File Prior to Encounter   Medication    benzocaine 20 % mouth spray     Current Outpatient Medications on File Prior to Encounter   Medication Sig    aspirin 81 MG Chew Take 81 mg by mouth once daily.    atorvastatin (LIPITOR) 80 MG tablet TAKE 1 TABLET BY MOUTH EVERY EVENING    cefUROXime (CEFTIN) 500  MG tablet Take 1 tablet (500 mg total) by mouth 2 (two) times daily.    ezetimibe (ZETIA) 10 mg tablet Take 10 mg by mouth once daily.     fenofibrate (TRICOR) 145 MG tablet Take 145 mg by mouth once daily.    folic acid (FOLVITE) 1 MG tablet Take 1 tablet (1 mg total) by mouth once daily.    furosemide (LASIX) 40 MG tablet Take 1 tablet (40 mg total) by mouth once daily. (Patient taking differently: Take 20 mg by mouth once daily.)    levothyroxine (SYNTHROID) 50 MCG tablet TAKE 1 TABLET BY MOUTH EVERY DAY BEFORE BREAKFAST.    metoprolol succinate (TOPROL-XL) 25 MG 24 hr tablet TAKE 1/2 TABLET BY MOUTH EVERY DAY    pantoprazole (PROTONIX) 40 MG tablet TAKE 1 TABLET BY MOUTH EVERY DAY    potassium chloride (MICRO-K) 10 MEQ CpSR Take 20 mEq by mouth once daily.     spironolactone (ALDACTONE) 25 MG tablet Take 0.5 tablets (12.5 mg total) by mouth once daily.     Family History       Problem Relation (Age of Onset)    Cancer Brother    Heart attack Father    Stroke Mother          Tobacco Use    Smoking status: Former     Types: Cigars     Start date:      Quit date:      Years since quittin.7    Smokeless tobacco: Never   Substance and Sexual Activity    Alcohol use: Not Currently    Drug use: Never    Sexual activity: Not on file     Review of Systems   Constitutional:  Positive for activity change and appetite change. Negative for chills and fever.   HENT:  Positive for trouble swallowing. Negative for ear pain, mouth sores, nosebleeds and sore throat.    Eyes:  Negative for visual disturbance.   Respiratory:  Positive for cough and shortness of breath. Negative for wheezing.    Cardiovascular:  Positive for leg swelling. Negative for chest pain and palpitations.   Gastrointestinal:  Negative for abdominal distention, abdominal pain, blood in stool, diarrhea, nausea and vomiting.   Endocrine: Negative for polyphagia.   Genitourinary:  Positive for frequency. Negative for difficulty urinating, dysuria  and flank pain.   Musculoskeletal:  Positive for arthralgias, gait problem and joint swelling.   Skin:  Negative for rash.   Neurological:  Positive for weakness. Negative for dizziness, tremors, seizures, syncope and headaches.   Hematological:  Negative for adenopathy.   Psychiatric/Behavioral:  Negative for agitation, confusion and hallucinations. The patient is not nervous/anxious.      Objective:     Vital Signs (Most Recent):  Temp: 97.6 °F (36.4 °C) (09/29/23 0804)  Pulse: 91 (09/29/23 0804)  Resp: 20 (09/29/23 0804)  BP: 138/78 (09/29/23 0804)  SpO2: (!) 93 % (09/29/23 0804) Vital Signs (24h Range):  Temp:  [97.6 °F (36.4 °C)-98 °F (36.7 °C)] 97.6 °F (36.4 °C)  Pulse:  [74-93] 91  Resp:  [19-22] 20  SpO2:  [90 %-99 %] 93 %  BP: (127-144)/(58-78) 138/78     Weight: 71.7 kg (158 lb)  Body mass index is 24.74 kg/m².     Physical Exam  Vitals and nursing note reviewed.   Constitutional:       General: He is not in acute distress.     Appearance: He is ill-appearing. He is not toxic-appearing or diaphoretic.      Comments: Frail, chronically ill-appearing   HENT:      Head: Normocephalic and atraumatic.      Nose: Nose normal. No congestion or rhinorrhea.      Mouth/Throat:      Mouth: Mucous membranes are dry.      Pharynx: No oropharyngeal exudate or posterior oropharyngeal erythema.   Eyes:      General: No scleral icterus.  Neck:      Vascular: No carotid bruit.   Cardiovascular:      Rate and Rhythm: Normal rate and regular rhythm.      Heart sounds: Murmur heard.      No friction rub. No gallop.   Pulmonary:      Effort: No respiratory distress.      Breath sounds: No stridor. Wheezing, rhonchi and rales present.      Comments: Supplemental oxygen via nasal cannula  Chest:      Chest wall: No tenderness.   Abdominal:      General: There is no distension.      Palpations: There is no mass.      Tenderness: There is no abdominal tenderness. There is no right CVA tenderness, left CVA tenderness, guarding or  rebound.      Hernia: No hernia is present.   Musculoskeletal:         General: Deformity present. No tenderness.      Cervical back: Neck supple. No rigidity.      Right lower leg: Edema present.      Left lower leg: Edema present.   Lymphadenopathy:      Cervical: No cervical adenopathy.   Skin:     General: Skin is warm and dry.      Capillary Refill: Capillary refill takes less than 2 seconds.      Coloration: Skin is not jaundiced or pale.      Findings: No rash.   Neurological:      Cranial Nerves: No cranial nerve deficit.      Sensory: No sensory deficit.      Motor: Weakness present.      Coordination: Coordination normal.      Gait: Gait abnormal.   Psychiatric:         Mood and Affect: Mood normal.         Behavior: Behavior normal.         Thought Content: Thought content normal.         Judgment: Judgment normal.                Significant Labs: All pertinent labs within the past 24 hours have been reviewed.    Significant Imaging: I have reviewed all pertinent imaging results/findings within the past 24 hours.

## 2023-09-29 NOTE — ASSESSMENT & PLAN NOTE
We will replete intravenously today due to dysphagia.  Follow with daily labs.      Patient has hypokalemia.  Last electrolytes reviewed-   Recent Labs   Lab 09/28/23  0545 09/29/23  0566   K 3.4* 3.6   Will replace potassium as per sliding scale as needed and monitor electrolytes closely.

## 2023-09-29 NOTE — ANESTHESIA PREPROCEDURE EVALUATION
09/29/2023  Richard Farr is a 78 y.o., male.      Pre-op Assessment    I have reviewed the Patient Summary Reports.    I have reviewed the NPO Status.   I have reviewed the Medications.     Review of Systems  Anesthesia Hx:  No problems with previous Anesthesia  Denies Family Hx of Anesthesia complications.   Denies Personal Hx of Anesthesia complications.   Social:  Former Smoker    EENT/Dental:   HX CERVICAL FUSION   Cardiovascular:   Hypertension, well controlled CAD asymptomatic Dysrhythmias  CHF ECG has been reviewed. PAC,PVC   Pulmonary:   Pneumonia Shortness of breath    Renal/:  Renal/ Normal     Hepatic/GI:   PUD, GERD, well controlled    Neurological:   CVA, no residual symptoms    Endocrine:  Endocrine Normal    Psych:   depression        Lab Results   Component Value Date    WBC 9.00 09/29/2023    HGB 9.2 (L) 09/29/2023    HCT 31.6 (L) 09/29/2023    MCV 79 (L) 09/29/2023     09/29/2023     CMP  Sodium   Date Value Ref Range Status   09/28/2023 140 136 - 145 mmol/L Final     Potassium   Date Value Ref Range Status   09/28/2023 3.4 (L) 3.5 - 5.1 mmol/L Final     Chloride   Date Value Ref Range Status   09/28/2023 105 95 - 110 mmol/L Final     CO2   Date Value Ref Range Status   09/28/2023 37 (H) 23 - 29 mmol/L Final     Glucose   Date Value Ref Range Status   09/28/2023 95 70 - 110 mg/dL Final     BUN   Date Value Ref Range Status   09/28/2023 8 8 - 23 mg/dL Final     Creatinine   Date Value Ref Range Status   09/28/2023 0.4 (L) 0.5 - 1.4 mg/dL Final     Calcium   Date Value Ref Range Status   09/28/2023 8.9 8.7 - 10.5 mg/dL Final     Total Protein   Date Value Ref Range Status   09/28/2023 6.5 6.0 - 8.4 g/dL Final     Albumin   Date Value Ref Range Status   09/28/2023 2.8 (L) 3.5 - 5.2 g/dL Final     Total Bilirubin   Date Value Ref Range Status   09/28/2023 0.7 0.1 - 1.0 mg/dL Final      Comment:     For infants and newborns, interpretation of results should be based  on gestational age, weight and in agreement with clinical  observations.    Premature Infant recommended reference ranges:  Up to 24 hours.............<8.0 mg/dL  Up to 48 hours............<12.0 mg/dL  3-5 days..................<15.0 mg/dL  6-29 days.................<15.0 mg/dL    For patients on Eltrombopag therapy, use of [place] TBIL is   not   recommended.       Alkaline Phosphatase   Date Value Ref Range Status   09/28/2023 46 (L) 55 - 135 U/L Final     AST   Date Value Ref Range Status   09/28/2023 42 (H) 10 - 40 U/L Final     ALT   Date Value Ref Range Status   09/28/2023 28 10 - 44 U/L Final     Anion Gap   Date Value Ref Range Status   09/28/2023 -2 (L) 3 - 11 mmol/L Final     eGFR   Date Value Ref Range Status   09/28/2023 >60.0 >60 mL/min/1.73 m^2 Final       Physical Exam  General: Well nourished    Airway:  Mallampati: III / II  Mouth Opening: Normal  TM Distance: Normal  Tongue: Normal  Neck ROM: Normal ROM    Dental:  Intact    Chest/Lungs:  Clear to auscultation    Heart:  Rate: Normal  Rhythm: Regular Rhythm  Sounds: Normal        Anesthesia Plan  Type of Anesthesia, risks & benefits discussed:    Anesthesia Type: MAC, Gen Supraglottic Airway  Intra-op Monitoring Plan: Standard ASA Monitors  Post Op Pain Control Plan: multimodal analgesia  Induction:  IV  Airway Plan: Direct  Informed Consent: Informed consent signed with the Patient and all parties understand the risks and agree with anesthesia plan.  All questions answered.   ASA Score: 4  Day of Surgery Review of History & Physical: I have interviewed and examined the patient. I have reviewed the patient's H&P dated: There are no significant changes.     Ready For Surgery From Anesthesia Perspective.     .

## 2023-09-29 NOTE — CONSULTS
[facility] Surg  General Surgery  Consult Note    Patient Name: Richard Farr  MRN: [id number]  Code Status: Full Code  Admission Date: 9/22/2023  Hospital Length of Stay: 6 days  Attending Physician: Drew Moreira Jr., MD  Primary Care Provider: Drew Moreira Jr., MD    Patient information was obtained from spouse/SO, past medical records and ER records.     Inpatient consult to General Surgery  Consult performed by: Queenie Berman MD  Consult ordered by: Drew Moreira Jr., MD        Subjective:     Principal Problem: Dysphagia with aspiration    History of Present Illness: 79yo male admitted to medicine for symptomatic anemia who presents with forehead laceration after accidental fall this AM.  GCS 15; HD stable.  No LOS.  General surgery consulted for lac repair.  Pt also presents with progressive dysphagia with severe aspiration as demonstrated on swallow study.  General surery consulted for g-tube placement.        Current Facility-Administered Medications on File Prior to Encounter   Medication    benzocaine 20 % mouth spray     Current Outpatient Medications on File Prior to Encounter   Medication Sig    aspirin 81 MG Chew Take 81 mg by mouth once daily.    atorvastatin (LIPITOR) 80 MG tablet TAKE 1 TABLET BY MOUTH EVERY EVENING    cefUROXime (CEFTIN) 500 MG tablet Take 1 tablet (500 mg total) by mouth 2 (two) times daily.    ezetimibe (ZETIA) 10 mg tablet Take 10 mg by mouth once daily.     fenofibrate (TRICOR) 145 MG tablet Take 145 mg by mouth once daily.    folic acid (FOLVITE) 1 MG tablet Take 1 tablet (1 mg total) by mouth once daily.    furosemide (LASIX) 40 MG tablet Take 1 tablet (40 mg total) by mouth once daily. (Patient taking differently: Take 20 mg by mouth once daily.)    levothyroxine (SYNTHROID) 50 MCG tablet TAKE 1 TABLET BY MOUTH EVERY DAY BEFORE BREAKFAST.    metoprolol succinate (TOPROL-XL) 25 MG 24 hr tablet TAKE 1/2 TABLET BY MOUTH EVERY DAY    pantoprazole  (PROTONIX) 40 MG tablet TAKE 1 TABLET BY MOUTH EVERY DAY    potassium chloride (MICRO-K) 10 MEQ CpSR Take 20 mEq by mouth once daily.     spironolactone (ALDACTONE) 25 MG tablet Take 0.5 tablets (12.5 mg total) by mouth once daily.       Review of patient's allergies indicates:  No Known Allergies    Past Medical History:   Diagnosis Date    Anemia     Arthritis     Bilateral lower extremity edema     Carpal tunnel syndrome, bilateral     Chronic diastolic congestive heart failure     Colon polyp     Coronary artery disease     Depression due to physical illness     Encounter for blood transfusion     Gastric ulcer     History of herpes zoster     Hyperlipemia     Hypertension     Moderate tricuspid insufficiency     NSVT (nonsustained ventricular tachycardia)     PAC (premature atrial contraction)     Patent foramen ovale with right to left shunt     Pneumonia     Pulmonary hypertension     PVC's (premature ventricular contractions)     Severe mitral regurgitation     Shingles     Stroke     MINI TRAUMA; RIGHT SIDED WEAKNESS     Past Surgical History:   Procedure Laterality Date    CARDIAC CATHETERIZATION      COLONOSCOPY N/A 11/23/2020    Procedure: COLONOSCOPY;  Surgeon: Abelino Garcia MD;  Location: [facility];  Service: General;  Laterality: N/A;    ESOPHAGOGASTRODUODENOSCOPY      ESOPHAGOGASTRODUODENOSCOPY N/A 5/21/2021    Procedure: EGD (ESOPHAGOGASTRODUODENOSCOPY);  Surgeon: Sunny Rea MD;  Location: [facility] ENDO;  Service: Endoscopy;  Laterality: N/A;  COVID-VACCINATED    IMPLANTATION OF MITRAL VALVE LEAFLET CLIP Right 5/18/2021    Procedure: INSERTION, LEAFLET CLIP, MITRAL VALVE;  Surgeon: Zen Reina MD;  Location: [facility] CATH;  Service: Cardiovascular;  Laterality: Right;    NECK SURGERY      anterior cervical fusion x 2    TONSILLECTOMY      TRANSESOPHAGEAL ECHOCARDIOGRAPHY       Family History       Problem Relation (Age of Onset)    Cancer Brother    Heart  attack Father    Stroke Mother          Tobacco Use    Smoking status: Former     Types: Cigars     Start date:      Quit date:      Years since quittin.7    Smokeless tobacco: Never   Substance and Sexual Activity    Alcohol use: Not Currently    Drug use: Never    Sexual activity: Not on file     Review of Systems   Constitutional:  Negative for activity change, appetite change, chills and fever.   Respiratory:  Negative for chest tightness and shortness of breath.    Cardiovascular:  Negative for chest pain.   Gastrointestinal:  Negative for abdominal distention, abdominal pain, anal bleeding, blood in stool, constipation, diarrhea, nausea, rectal pain and vomiting.     Objective:     Vital Signs (Most Recent):  Temp: 97.9 °F (36.6 °C) (23)  Pulse: 83 (23)  Resp: (!) 22 (23)  BP: (!) 127/58 (23)  SpO2: 98 % (23) Vital Signs (24h Range):  Temp:  [97.7 °F (36.5 °C)-98 °F (36.7 °C)] 97.9 °F (36.6 °C)  Pulse:  [68-87] 83  Resp:  [18-22] 22  SpO2:  [90 %-100 %] 98 %  BP: (121-148)/(57-76) 127/58     Weight: 71.7 kg (158 lb)  Body mass index is 24.74 kg/m².     Physical Exam  Vitals reviewed.   Constitutional:       General: He is not in acute distress.     Appearance: He is not ill-appearing or toxic-appearing.   HENT:      Head:      Comments: Left periorbital ecchymosis   Forehead incision intact with sutures in place   Nasal bridge lacerations intact with steri strips in place   Cardiovascular:      Rate and Rhythm: Normal rate and regular rhythm.   Pulmonary:      Effort: Pulmonary effort is normal. No respiratory distress.   Abdominal:      General: There is no distension.      Palpations: Abdomen is soft.      Tenderness: There is no abdominal tenderness. There is no guarding or rebound.   Musculoskeletal:      Right lower leg: No edema.      Left lower leg: No edema.   Skin:     General: Skin is warm and dry.      Capillary Refill:  Capillary refill takes less than 2 seconds.   Neurological:      Mental Status: He is alert. Mental status is at baseline.            I have reviewed all pertinent lab results within the past 24 hours.  CBC:   Recent Labs   Lab 09/28/23  0545   WBC 8.12   RBC 4.02*   HGB 9.1*   HCT 31.2*      MCV 78*   MCH 22.6*   MCHC 29.2*     CMP:   Recent Labs   Lab 09/28/23  0545   GLU 95   CALCIUM 8.9   ALBUMIN 2.8*   PROT 6.5      K 3.4*   CO2 37*      BUN 8   CREATININE 0.4*   ALKPHOS 46*   ALT 28   AST 42*   BILITOT 0.7       Significant Diagnostics:  I have reviewed all pertinent imaging results/findings within the past 24 hours.      Assessment/Plan:     Laceration of forehead  Lac irrigated with primary suture repair  Steri strips to nasal bridge lacerations   Keep clean and dry  Follow up in general surgery clinic in 2 weeks for suture removal     Other dysphagia  Pt with left shift of ingested contents on coronal view of barium swallow   CT neck with suspected non specific ST irregularity near C4/C5 near area of previous surgery - no distinct mass detected  To OR tomorrow for EGD with possible PEG vs. Endoscopic guided NGT placement   If unable, will required lap gtube placement, which will require cards eval   Continue NPO   Informed consent obtained       VTE Risk Mitigation (From admission, onward)         Ordered     IP VTE HIGH RISK PATIENT  Once         09/23/23 0215     Place sequential compression device  Until discontinued         09/23/23 0215                Thank you for your consult. I will follow-up with patient. Please contact us if you have any additional questions.    Queenie Berman MD  General Surgery  [facility] Surg

## 2023-09-29 NOTE — SUBJECTIVE & OBJECTIVE
Current Facility-Administered Medications on File Prior to Encounter   Medication    benzocaine 20 % mouth spray     Current Outpatient Medications on File Prior to Encounter   Medication Sig    aspirin 81 MG Chew Take 81 mg by mouth once daily.    atorvastatin (LIPITOR) 80 MG tablet TAKE 1 TABLET BY MOUTH EVERY EVENING    cefUROXime (CEFTIN) 500 MG tablet Take 1 tablet (500 mg total) by mouth 2 (two) times daily.    ezetimibe (ZETIA) 10 mg tablet Take 10 mg by mouth once daily.     fenofibrate (TRICOR) 145 MG tablet Take 145 mg by mouth once daily.    folic acid (FOLVITE) 1 MG tablet Take 1 tablet (1 mg total) by mouth once daily.    furosemide (LASIX) 40 MG tablet Take 1 tablet (40 mg total) by mouth once daily. (Patient taking differently: Take 20 mg by mouth once daily.)    levothyroxine (SYNTHROID) 50 MCG tablet TAKE 1 TABLET BY MOUTH EVERY DAY BEFORE BREAKFAST.    metoprolol succinate (TOPROL-XL) 25 MG 24 hr tablet TAKE 1/2 TABLET BY MOUTH EVERY DAY    pantoprazole (PROTONIX) 40 MG tablet TAKE 1 TABLET BY MOUTH EVERY DAY    potassium chloride (MICRO-K) 10 MEQ CpSR Take 20 mEq by mouth once daily.     spironolactone (ALDACTONE) 25 MG tablet Take 0.5 tablets (12.5 mg total) by mouth once daily.       Review of patient's allergies indicates:  No Known Allergies    Past Medical History:   Diagnosis Date    Anemia     Arthritis     Bilateral lower extremity edema     Carpal tunnel syndrome, bilateral     Chronic diastolic congestive heart failure     Colon polyp     Coronary artery disease     Depression due to physical illness     Encounter for blood transfusion     Gastric ulcer     History of herpes zoster     Hyperlipemia     Hypertension     Moderate tricuspid insufficiency     NSVT (nonsustained ventricular tachycardia)     PAC (premature atrial contraction)     Patent foramen ovale with right to left shunt     Pneumonia     Pulmonary hypertension     PVC's (premature ventricular contractions)     Severe  mitral regurgitation     Shingles     Stroke     MINI TRAUMA; RIGHT SIDED WEAKNESS     Past Surgical History:   Procedure Laterality Date    CARDIAC CATHETERIZATION      COLONOSCOPY N/A 2020    Procedure: COLONOSCOPY;  Surgeon: Abelino Garcia MD;  Location: [facility];  Service: General;  Laterality: N/A;    ESOPHAGOGASTRODUODENOSCOPY      ESOPHAGOGASTRODUODENOSCOPY N/A 2021    Procedure: EGD (ESOPHAGOGASTRODUODENOSCOPY);  Surgeon: Sunny Rea MD;  Location: [facility] ENDO;  Service: Endoscopy;  Laterality: N/A;  COVID-VACCINATED    IMPLANTATION OF MITRAL VALVE LEAFLET CLIP Right 2021    Procedure: INSERTION, LEAFLET CLIP, MITRAL VALVE;  Surgeon: Zen Reina MD;  Location: [facility] CATH;  Service: Cardiovascular;  Laterality: Right;    NECK SURGERY      anterior cervical fusion x 2    TONSILLECTOMY      TRANSESOPHAGEAL ECHOCARDIOGRAPHY       Family History       Problem Relation (Age of Onset)    Cancer Brother    Heart attack Father    Stroke Mother          Tobacco Use    Smoking status: Former     Types: Cigars     Start date:      Quit date:      Years since quittin.7    Smokeless tobacco: Never   Substance and Sexual Activity    Alcohol use: Not Currently    Drug use: Never    Sexual activity: Not on file     Review of Systems   Constitutional:  Negative for activity change, appetite change, chills and fever.   Respiratory:  Negative for chest tightness and shortness of breath.    Cardiovascular:  Negative for chest pain.   Gastrointestinal:  Negative for abdominal distention, abdominal pain, anal bleeding, blood in stool, constipation, diarrhea, nausea, rectal pain and vomiting.     Objective:     Vital Signs (Most Recent):  Temp: 97.9 °F (36.6 °C) (23)  Pulse: 83 (23)  Resp: (!) 22 (23)  BP: (!) 127/58 (23)  SpO2: 98 % (23) Vital Signs (24h Range):  Temp:  [97.7 °F (36.5 °C)-98 °F (36.7 °C)] 97.9 °F (36.6 °C)  Pulse:   [68-87] 83  Resp:  [18-22] 22  SpO2:  [90 %-100 %] 98 %  BP: (121-148)/(57-76) 127/58     Weight: 71.7 kg (158 lb)  Body mass index is 24.74 kg/m².     Physical Exam  Vitals reviewed.   Constitutional:       General: He is not in acute distress.     Appearance: He is not ill-appearing or toxic-appearing.   HENT:      Head:      Comments: Left periorbital ecchymosis   Forehead incision intact with sutures in place   Nasal bridge lacerations intact with steri strips in place   Cardiovascular:      Rate and Rhythm: Normal rate and regular rhythm.   Pulmonary:      Effort: Pulmonary effort is normal. No respiratory distress.   Abdominal:      General: There is no distension.      Palpations: Abdomen is soft.      Tenderness: There is no abdominal tenderness. There is no guarding or rebound.   Musculoskeletal:      Right lower leg: No edema.      Left lower leg: No edema.   Skin:     General: Skin is warm and dry.      Capillary Refill: Capillary refill takes less than 2 seconds.   Neurological:      Mental Status: He is alert. Mental status is at baseline.            I have reviewed all pertinent lab results within the past 24 hours.  CBC:   Recent Labs   Lab 09/28/23  0545   WBC 8.12   RBC 4.02*   HGB 9.1*   HCT 31.2*      MCV 78*   MCH 22.6*   MCHC 29.2*     CMP:   Recent Labs   Lab 09/28/23  0545   GLU 95   CALCIUM 8.9   ALBUMIN 2.8*   PROT 6.5      K 3.4*   CO2 37*      BUN 8   CREATININE 0.4*   ALKPHOS 46*   ALT 28   AST 42*   BILITOT 0.7       Significant Diagnostics:  I have reviewed all pertinent imaging results/findings within the past 24 hours.

## 2023-09-29 NOTE — PLAN OF CARE
POC reviewed w/ pt and purposeful rounding ongoing. VSS on 2L NC. IVF maintained per order. PO meds held per NPO order and inability to swallow. Pt up to BSC w 1 assist. Pt denies needs at this time.     Problem: Infection  Goal: Absence of Infection Signs and Symptoms  Outcome: Ongoing, Progressing     Problem: Adult Inpatient Plan of Care  Goal: Plan of Care Review  Outcome: Ongoing, Progressing  Goal: Patient-Specific Goal (Individualized)  Outcome: Ongoing, Progressing  Goal: Absence of Hospital-Acquired Illness or Injury  Outcome: Ongoing, Progressing  Goal: Optimal Comfort and Wellbeing  Outcome: Ongoing, Progressing  Goal: Readiness for Transition of Care  Outcome: Ongoing, Progressing     Problem: Fluid and Electrolyte Imbalance (Acute Kidney Injury/Impairment)  Goal: Fluid and Electrolyte Balance  Outcome: Ongoing, Progressing     Problem: Oral Intake Inadequate (Acute Kidney Injury/Impairment)  Goal: Optimal Nutrition Intake  Outcome: Ongoing, Progressing     Problem: Renal Function Impairment (Acute Kidney Injury/Impairment)  Goal: Effective Renal Function  Outcome: Ongoing, Progressing     Problem: Skin Injury Risk Increased  Goal: Skin Health and Integrity  Outcome: Ongoing, Progressing     Problem: Fluid Imbalance (Pneumonia)  Goal: Fluid Balance  Outcome: Ongoing, Progressing     Problem: Infection (Pneumonia)  Goal: Resolution of Infection Signs and Symptoms  Outcome: Ongoing, Progressing     Problem: Respiratory Compromise (Pneumonia)  Goal: Effective Oxygenation and Ventilation  Outcome: Ongoing, Progressing     Problem: Impaired Wound Healing  Goal: Optimal Wound Healing  Outcome: Ongoing, Progressing     Problem: Fall Injury Risk  Goal: Absence of Fall and Fall-Related Injury  Outcome: Ongoing, Progressing

## 2023-09-29 NOTE — PT/OT/SLP PROGRESS
Occupational Therapy   Treatment    Name: Richard Farr  MRN: [id number]  Admitting Diagnosis:  Acute cystitis without hematuria       Recommendations:     Discharge Recommendations: home with home health, home health OT  Discharge Equipment Recommendations:  none  Barriers to discharge:  None    Assessment:     Richard Farr is a 78 y.o. male with a medical diagnosis of Acute cystitis without hematuria.  He presents with good motivation and participation. Performance deficits affecting function are weakness, impaired endurance, impaired self care skills, impaired functional mobility, gait instability, impaired balance, impaired cognition, decreased coordination, decreased upper extremity function, decreased lower extremity function, decreased safety awareness, abnormal tone, decreased ROM, impaired coordination, impaired fine motor, impaired cardiopulmonary response to activity, impaired joint extensibility, impaired muscle length. Patient with severe ulnar deviation in right hand de to arthritis. Patient also with severe limitations in range of motion in right upper extrimity due to arthritis.     Rehab Prognosis:  Fair; patient would benefit from acute skilled OT services to address these deficits and reach maximum level of function.       Plan:     Patient to be seen 5 x/week to address the above listed problems via self-care/home management, therapeutic activities, therapeutic exercises, cognitive retraining  Plan of Care Expires: 10/02/23  Plan of Care Reviewed with: patient, spouse    Subjective     Chief Complaint: Reported none  Patient/Family Comments/goals: Going back home  Pain/Comfort:  Pain Rating 1: 0/10  Pain Rating Post-Intervention 1: 0/10    Objective:     Communicated with: occupational therapist and RN prior to session.  Patient found HOB elevated with telemetry, peripheral IV, oxygen upon OT entry to room.    General Precautions: Standard, aspiration, fall    Orthopedic Precautions:N/A  Braces:  N/A  Respiratory Status: Nasal cannula, flow 2 L/min     Occupational Performance:     Bed Mobility:    Patient completed Rolling/Turning to Left with  contact guard assistance  Patient completed Scooting/Bridging with contact guard assistance  Patient completed Supine to Sit with contact guard assistance and minimum assistance     Functional Mobility/Transfers:  Patient completed Sit <> Stand Transfer with stand by assistance  with  chair arms   Patient completed Bed <> Chair Transfer using Step Transfer technique with contact guard assistance with hand-held assist  Patient completed Toilet Transfer Step Transfer technique with contact guard assistance with  hand-held assist  Functional Mobility: patient completed 2 x 10 sit to stand stand t/fs with SBA and chair arms to assist patient when needed.     Activities of Daily Living:  Toileting: maximal assistance to patrick/doff briefs and myrna-care this day ; patient able to stand while therapist assisted patient with briefs and myrna-care. Patient with AD while standing and w/o any LOB.       [facility] 6 Click ADL: 19    Treatment & Education:  Patient engaged in active range of motion therapeutic exercise for 2 x 15 sitting up in chair in the following planes: shoulder flexion/extension, shoulder abduction/adduction, elbow flexion/extension, scapular retractions/protraction, scapular elevation/depression, shoulder rotations (forward and reverse), wrist flexion/extension, wrist radial/ulnar deviation, forearm supination/pronation, and composite fist. Patient needed rest breaks between each set due to impaired muscle endurance and activity tolerance.      Patient left up in chair with all lines intact, call button in reach, and spouse present    GOALS:   Multidisciplinary Problems       Occupational Therapy Goals          Problem: Occupational Therapy    Goal Priority Disciplines Outcome Interventions   Occupational Therapy Goal     OT, PT/OT Ongoing, Progressing     Description: Goals to be met by: 10/02/23     Patient will increase functional independence with ADLs by performing:    Feeding with Set-up Assistance.  UE Dressing with Minimal Assistance.  LE Dressing with Minimal Assistance.  Grooming while seated with Set-up Assistance.  Toileting from toilet with Minimal Assistance for hygiene and clothing management.   Bathing from  shower chair/bench with Minimal Assistance.  Toilet transfer to toilet with Stand-by Assistance.                         Time Tracking:     OT Date of Treatment: 09/29/23  OT Start Time: 0943  OT Stop Time: 1010  OT Total Time (min): 27 min    Billable Minutes:Self Care/Home Management 10 min  Therapeutic Exercise 17 min    OT/SIRI: SIRI     Number of SIRI visits since last OT visit: 4    9/29/2023  Doreen ALMEIDA

## 2023-09-29 NOTE — ASSESSMENT & PLAN NOTE
Patient states he had upper and lower endoscopy in 2021.  Iron infusion prior to discharge.    Recent Labs   Lab 09/27/23  0500 09/28/23  0545 09/29/23  0525   Hemoglobin 9.0 L 9.1 L 9.2 L

## 2023-09-29 NOTE — PLAN OF CARE
09/29/23 1235   Medicare Message   Important Message from Medicare regarding Discharge Appeal Rights Given to patient/caregiver;Explained to patient/caregiver;Signed/date by patient/caregiver;Other (comments)  (Signed by patient's spouse at the bedside.)   Date IMM was signed 09/29/23   Time IMM was signed 1034

## 2023-09-29 NOTE — PT/OT/SLP PROGRESS
Physical Therapy      Patient Name:  Richard Farr   MRN:  [id number]    Patient not seen today secondary to patient is unwilling to participate in therapy at this time, he is waiting for his PEG tube placement that will happen today.  Family at bedside, nurse Chiara talamantes aware. . Will follow-up on Monday 10/2/2023 .

## 2023-09-29 NOTE — ASSESSMENT & PLAN NOTE
Followed by Cardiology in the outpatient setting.  Euvolemic and well compensated currently.  Continue home medical therapy

## 2023-09-29 NOTE — ASSESSMENT & PLAN NOTE
Patient seems intravascularly dry.  Hold IV Lasix for now.    Appears euvolemic.     Telephone Encounter by Allyson Goodson RN at 08/31/18 09:51 AM     Author:  Allyson Goodson RN Service:  (none) Author Type:  Registered Nurse     Filed:  08/31/18 09:51 AM Encounter Date:  8/31/2018 Status:  Signed     :  Allyson Goodson RN (Registered Nurse)            Msg to KJ.[JV1.1M]  Electronically Signed by:    Allyson Goodson RN , 8/31/2018[JV1.1T]        Revision History        User Key Date/Time User Provider Type Action    > JV1.1 08/31/18 09:51 AM Allyson Goodson RN Registered Nurse Sign    M - Manual, T - Template

## 2023-09-29 NOTE — ASSESSMENT & PLAN NOTE
Vital signs noted continue antihypertensives      BP Readings from Last 3 Encounters:   09/29/23 138/78   09/21/23 (!) 113/57   07/11/23 (!) 142/58

## 2023-09-29 NOTE — PROGRESS NOTES
[facility] Medicine  Progress Note    Patient Name: Richard Farr  MRN: [id number]  Patient Class: IP- Inpatient   Admission Date: 9/22/2023  Length of Stay: 6 days  Attending Physician: Drew Moreira Jr., MD  Primary Care Provider: Drew Moreira Jr., MD        Subjective:     Principal Problem:Acute cystitis without hematuria        HPI:  ED HPI:  Richard Farr is a 78 y.o. male with PMHX of hypertension, hyperlipidemia, CHF, mitral regurg, tricuspid regurg, pulmonary hypertension, gastritis who presents to the emergency department C/O shortness of breath.     Patient presents after developing shortness of breath 1 hour prior to arrival.  Was recently admitted for anemia and received 4 units PRBCs in hospital as well as iron transfusion 2 days ago.  No fever but patient states he felt hot in emergency department.  Patient was diagnosed with urinary tract infection during previous hospitalization is on antibiotics.     PCP: Drew Moreira Jr., MD     IM HPI:  Patient was recently admitted by primary care and transfuse 4 units of packed red blood cells.  Patient felt much improved return home felt well for about 24 hours then began having increasing dyspnea.  Patient was readmitted found to have a urinary tract infection and a pneumonia.  Patient was started on antibiotics.  He was also given IV diuretics.  Patient has some peripheral edema but overall he states he feels dry and dehydrated.  Patient is having some oropharyngeal dysphagia and on exam I am unable to find the cause.  Patient has a history of CVA as well as a history of arthritis with several upper extremity joint deformities and contractures.      Overview/Hospital Course:  9/24 GT:  Patient is lying in bed this morning with spouse at bedside.  Patient is frail and chronically ill-appearing, but does not appear to be in any acute distress.  Patient reports his shortness a breath is at baseline, and he is tolerating supplemental  oxygen via nasal cannula.  Patient had some swallowing difficulties yesterday, SLP has been consulted for evaluation, treatment, dietary modifications have been made.  Patient's H&H is stable, and although anemic he is not at the point that he requires further transfusion.  Potassium mildly decreased, we will replete intravenously today due to swallowing difficulties and monitor with daily labs.  Blood cultures at this time are negative to date.  Patient and spouse deny any issues, concerns, questions.    9/25:  Patient eating breakfast this morning in no acute distress.  Speech therapy to evaluate the patient.  Thus far blood cultures are negative.  Patient feels back to baseline.  Urine culture without significant growth.  Consider discharge in the near future.  Patient having significant gas production check GI panel.    9/26:  No acute events overnight.  GI panel pending.  Provide iron infusion prior to potential discharge.  Patient to undergo modified barium swallow prior to discharge for completeness.  Home health will be coordinated ongoing outpatient needs.    Patient with significant aspiration by MBS.  Discuss with family potential needs for PEG placement vs ongoing ST.     9/27:  patient ct head negative.  Done for worsening dizziness/dysequilibrium after head trauma.  Awaiting  recs regarding PEG tube placement.     9/28/23:  patient resting this morning.  In favor or PEG placement tomorrow.   09/29/2023: No acute events overnight.  Planning potential endoscopy versus laparoscopy for PEG tube placement.  May need cardiac clearance.  Currently hemodynamically stable.      Past Medical History:   Diagnosis Date    Anemia     Arthritis     Bilateral lower extremity edema     Carpal tunnel syndrome, bilateral     Chronic diastolic congestive heart failure     Colon polyp     Coronary artery disease     Depression due to physical illness     Encounter for blood transfusion     Gastric ulcer      History of herpes zoster     Hyperlipemia     Hypertension     Moderate tricuspid insufficiency     NSVT (nonsustained ventricular tachycardia)     PAC (premature atrial contraction)     Patent foramen ovale with right to left shunt     Pneumonia     Pulmonary hypertension     PVC's (premature ventricular contractions)     Severe mitral regurgitation     Shingles     Stroke     MINI TRAUMA; RIGHT SIDED WEAKNESS       Past Surgical History:   Procedure Laterality Date    CARDIAC CATHETERIZATION      COLONOSCOPY N/A 11/23/2020    Procedure: COLONOSCOPY;  Surgeon: Abelino Garcia MD;  Location: [facility] ENDO;  Service: General;  Laterality: N/A;    ESOPHAGOGASTRODUODENOSCOPY      ESOPHAGOGASTRODUODENOSCOPY N/A 5/21/2021    Procedure: EGD (ESOPHAGOGASTRODUODENOSCOPY);  Surgeon: Sunny Rea MD;  Location: [facility] ENDO;  Service: Endoscopy;  Laterality: N/A;  COVID-VACCINATED    IMPLANTATION OF MITRAL VALVE LEAFLET CLIP Right 5/18/2021    Procedure: INSERTION, LEAFLET CLIP, MITRAL VALVE;  Surgeon: Zen Reina MD;  Location: [facility] CATH;  Service: Cardiovascular;  Laterality: Right;    NECK SURGERY      anterior cervical fusion x 2    TONSILLECTOMY      TRANSESOPHAGEAL ECHOCARDIOGRAPHY         Review of patient's allergies indicates:  No Known Allergies    Current Facility-Administered Medications on File Prior to Encounter   Medication    benzocaine 20 % mouth spray     Current Outpatient Medications on File Prior to Encounter   Medication Sig    aspirin 81 MG Chew Take 81 mg by mouth once daily.    atorvastatin (LIPITOR) 80 MG tablet TAKE 1 TABLET BY MOUTH EVERY EVENING    cefUROXime (CEFTIN) 500 MG tablet Take 1 tablet (500 mg total) by mouth 2 (two) times daily.    ezetimibe (ZETIA) 10 mg tablet Take 10 mg by mouth once daily.     fenofibrate (TRICOR) 145 MG tablet Take 145 mg by mouth once daily.    folic acid (FOLVITE) 1 MG tablet Take 1 tablet (1 mg total) by mouth once daily.     furosemide (LASIX) 40 MG tablet Take 1 tablet (40 mg total) by mouth once daily. (Patient taking differently: Take 20 mg by mouth once daily.)    levothyroxine (SYNTHROID) 50 MCG tablet TAKE 1 TABLET BY MOUTH EVERY DAY BEFORE BREAKFAST.    metoprolol succinate (TOPROL-XL) 25 MG 24 hr tablet TAKE 1/2 TABLET BY MOUTH EVERY DAY    pantoprazole (PROTONIX) 40 MG tablet TAKE 1 TABLET BY MOUTH EVERY DAY    potassium chloride (MICRO-K) 10 MEQ CpSR Take 20 mEq by mouth once daily.     spironolactone (ALDACTONE) 25 MG tablet Take 0.5 tablets (12.5 mg total) by mouth once daily.     Family History       Problem Relation (Age of Onset)    Cancer Brother    Heart attack Father    Stroke Mother          Tobacco Use    Smoking status: Former     Types: Cigars     Start date:      Quit date:      Years since quittin.7    Smokeless tobacco: Never   Substance and Sexual Activity    Alcohol use: Not Currently    Drug use: Never    Sexual activity: Not on file     Review of Systems   Constitutional:  Positive for activity change and appetite change. Negative for chills and fever.   HENT:  Positive for trouble swallowing. Negative for ear pain, mouth sores, nosebleeds and sore throat.    Eyes:  Negative for visual disturbance.   Respiratory:  Positive for cough and shortness of breath. Negative for wheezing.    Cardiovascular:  Positive for leg swelling. Negative for chest pain and palpitations.   Gastrointestinal:  Negative for abdominal distention, abdominal pain, blood in stool, diarrhea, nausea and vomiting.   Endocrine: Negative for polyphagia.   Genitourinary:  Positive for frequency. Negative for difficulty urinating, dysuria and flank pain.   Musculoskeletal:  Positive for arthralgias, gait problem and joint swelling.   Skin:  Negative for rash.   Neurological:  Positive for weakness. Negative for dizziness, tremors, seizures, syncope and headaches.   Hematological:  Negative for adenopathy.    Psychiatric/Behavioral:  Negative for agitation, confusion and hallucinations. The patient is not nervous/anxious.      Objective:     Vital Signs (Most Recent):  Temp: 97.6 °F (36.4 °C) (09/29/23 0804)  Pulse: 91 (09/29/23 0804)  Resp: 20 (09/29/23 0804)  BP: 138/78 (09/29/23 0804)  SpO2: (!) 93 % (09/29/23 0804) Vital Signs (24h Range):  Temp:  [97.6 °F (36.4 °C)-98 °F (36.7 °C)] 97.6 °F (36.4 °C)  Pulse:  [74-93] 91  Resp:  [19-22] 20  SpO2:  [90 %-99 %] 93 %  BP: (127-144)/(58-78) 138/78     Weight: 71.7 kg (158 lb)  Body mass index is 24.74 kg/m².     Physical Exam  Vitals and nursing note reviewed.   Constitutional:       General: He is not in acute distress.     Appearance: He is ill-appearing. He is not toxic-appearing or diaphoretic.      Comments: Frail, chronically ill-appearing   HENT:      Head: Normocephalic and atraumatic.      Nose: Nose normal. No congestion or rhinorrhea.      Mouth/Throat:      Mouth: Mucous membranes are dry.      Pharynx: No oropharyngeal exudate or posterior oropharyngeal erythema.   Eyes:      General: No scleral icterus.  Neck:      Vascular: No carotid bruit.   Cardiovascular:      Rate and Rhythm: Normal rate and regular rhythm.      Heart sounds: Murmur heard.      No friction rub. No gallop.   Pulmonary:      Effort: No respiratory distress.      Breath sounds: No stridor. Wheezing, rhonchi and rales present.      Comments: Supplemental oxygen via nasal cannula  Chest:      Chest wall: No tenderness.   Abdominal:      General: There is no distension.      Palpations: There is no mass.      Tenderness: There is no abdominal tenderness. There is no right CVA tenderness, left CVA tenderness, guarding or rebound.      Hernia: No hernia is present.   Musculoskeletal:         General: Deformity present. No tenderness.      Cervical back: Neck supple. No rigidity.      Right lower leg: Edema present.      Left lower leg: Edema present.   Lymphadenopathy:      Cervical: No  cervical adenopathy.   Skin:     General: Skin is warm and dry.      Capillary Refill: Capillary refill takes less than 2 seconds.      Coloration: Skin is not jaundiced or pale.      Findings: No rash.   Neurological:      Cranial Nerves: No cranial nerve deficit.      Sensory: No sensory deficit.      Motor: Weakness present.      Coordination: Coordination normal.      Gait: Gait abnormal.   Psychiatric:         Mood and Affect: Mood normal.         Behavior: Behavior normal.         Thought Content: Thought content normal.         Judgment: Judgment normal.                Significant Labs: All pertinent labs within the past 24 hours have been reviewed.    Significant Imaging: I have reviewed all pertinent imaging results/findings within the past 24 hours.      Assessment/Plan:      * Acute cystitis without hematuria  Continue antibiotics awaiting cultures    Blood cultures are negative to date.  Urine culture negative    Ataxia  Worsening disequilibrium since fall and head trauma.  CT to rule out intracranial bleeding injury.  CT scan negative.  Family reassured.      Laceration of forehead  Treatment per GS.    Hypokalemia  We will replete intravenously today due to dysphagia.  Follow with daily labs.      Patient has hypokalemia.  Last electrolytes reviewed-   Recent Labs   Lab 09/28/23  0545 09/29/23  0525   K 3.4* 3.6   Will replace potassium as per sliding scale as needed and monitor electrolytes closely.       Other dysphagia  Dietary modifications have been made.  SLP consulted for evaluation and treatment.  Significant aspiration.  NPO for now.  GS to eval for PEG.    Chronic heart failure with preserved ejection fraction (HFpEF) NICM NYHA3  Followed by Cardiology in the outpatient setting.  Euvolemic and well compensated currently.  Continue home medical therapy      Acute on chronic combined systolic and diastolic heart failure  Patient seems intravascularly dry.  Hold IV Lasix for now.    Appears  euvolemic.      Pneumonia due to infectious organism  Continue current antibiotic regimen, supplemental oxygen.  Blood cultures are negative to date at this time.    Blood Culture, Routine   Date Value Ref Range Status   09/22/2023 No growth after 5 days.  Final     Urine Culture, Routine   Date Value Ref Range Status   09/22/2023 No significant growth  Final      9/27/23:  abx completed.       Pleural effusion, right  Unsure if this has been investigated will defer to primary.    Stable by hx.       Severe mitral regurgitation  At baseline.  Continue home medical therapy.      Essential hypertension  Vital signs noted continue antihypertensives      BP Readings from Last 3 Encounters:   09/29/23 138/78   09/21/23 (!) 113/57   07/11/23 (!) 142/58         Mixed hyperlipidemia  Continue statin.      Acute superficial gastritis without hemorrhage  Continue home treatment      Iron deficiency anemia  Patient states he had upper and lower endoscopy in 2021.  Iron infusion prior to discharge.    Recent Labs   Lab 09/27/23  0500 09/28/23  0545 09/29/23  0525   Hemoglobin 9.0 L 9.1 L 9.2 L               VTE Risk Mitigation (From admission, onward)         Ordered     IP VTE HIGH RISK PATIENT  Once         09/23/23 0215     Place sequential compression device  Until discontinued         09/23/23 0215                Discharge Planning   JOSÉ:      Code Status: Full Code   Is the patient medically ready for discharge?:     Reason for patient still in hospital (select all that apply): Treatment  Discharge Plan A: Home with family                  Drew Moreira Jr, MD  Department of Hospital Medicine   [facility] S[facility]

## 2023-09-30 ENCOUNTER — CLINICAL SUPPORT (OUTPATIENT)
Dept: CARDIOLOGY | Facility: HOSPITAL | Age: 78
DRG: 291 | End: 2023-09-30
Attending: INTERNAL MEDICINE
Payer: MEDICARE

## 2023-09-30 LAB
ALBUMIN SERPL BCP-MCNC: 2.7 G/DL (ref 3.5–5.2)
ALP SERPL-CCNC: 52 U/L (ref 55–135)
ALT SERPL W/O P-5'-P-CCNC: 27 U/L (ref 10–44)
ANION GAP SERPL CALC-SCNC: 2 MMOL/L (ref 3–11)
ANISOCYTOSIS BLD QL SMEAR: ABNORMAL
AST SERPL-CCNC: 40 U/L (ref 10–40)
BASOPHILS # BLD AUTO: 0.1 K/UL (ref 0–0.2)
BASOPHILS NFR BLD: 1.2 % (ref 0–1.9)
BILIRUB SERPL-MCNC: 0.9 MG/DL (ref 0.1–1)
BUN SERPL-MCNC: 5 MG/DL (ref 8–23)
BURR CELLS BLD QL SMEAR: ABNORMAL
CALCIUM SERPL-MCNC: 8.7 MG/DL (ref 8.7–10.5)
CHLORIDE SERPL-SCNC: 107 MMOL/L (ref 95–110)
CO2 SERPL-SCNC: 32 MMOL/L (ref 23–29)
CREAT SERPL-MCNC: 0.4 MG/DL (ref 0.5–1.4)
DIFFERENTIAL METHOD: ABNORMAL
EOSINOPHIL # BLD AUTO: 0.3 K/UL (ref 0–0.5)
EOSINOPHIL NFR BLD: 3.4 % (ref 0–8)
ERYTHROCYTE [DISTWIDTH] IN BLOOD BY AUTOMATED COUNT: ABNORMAL % (ref 11.5–14.5)
EST. GFR  (NO RACE VARIABLE): >60 ML/MIN/1.73 M^2
GLUCOSE SERPL-MCNC: 107 MG/DL (ref 70–110)
HCT VFR BLD AUTO: 31.7 % (ref 40–54)
HGB BLD-MCNC: 9.2 G/DL (ref 14–18)
HYPOCHROMIA BLD QL SMEAR: ABNORMAL
IMM GRANULOCYTES # BLD AUTO: 0.04 K/UL (ref 0–0.04)
IMM GRANULOCYTES NFR BLD AUTO: 0.5 % (ref 0–0.5)
LYMPHOCYTES # BLD AUTO: 1.3 K/UL (ref 1–4.8)
LYMPHOCYTES NFR BLD: 16.3 % (ref 18–48)
MAGNESIUM SERPL-MCNC: 1.7 MG/DL (ref 1.6–2.6)
MCH RBC QN AUTO: 22.9 PG (ref 27–31)
MCHC RBC AUTO-ENTMCNC: 29 G/DL (ref 32–36)
MCV RBC AUTO: 79 FL (ref 82–98)
MONOCYTES # BLD AUTO: 0.7 K/UL (ref 0.3–1)
MONOCYTES NFR BLD: 8.3 % (ref 4–15)
NEUTROPHILS # BLD AUTO: 5.8 K/UL (ref 1.8–7.7)
NEUTROPHILS NFR BLD: 70.3 % (ref 38–73)
NRBC BLD-RTO: 0 /100 WBC
PLATELET # BLD AUTO: 304 K/UL (ref 150–450)
PMV BLD AUTO: ABNORMAL FL (ref 9.2–12.9)
POIKILOCYTOSIS BLD QL SMEAR: ABNORMAL
POTASSIUM SERPL-SCNC: 3.5 MMOL/L (ref 3.5–5.1)
PROT SERPL-MCNC: 6.1 G/DL (ref 6–8.4)
RBC # BLD AUTO: 4.01 M/UL (ref 4.6–6.2)
SCHISTOCYTES BLD QL SMEAR: ABNORMAL
SODIUM SERPL-SCNC: 141 MMOL/L (ref 136–145)
SPHEROCYTES BLD QL SMEAR: ABNORMAL
TARGETS BLD QL SMEAR: ABNORMAL
WBC # BLD AUTO: 8.23 K/UL (ref 3.9–12.7)

## 2023-09-30 PROCEDURE — 11000001 HC ACUTE MED/SURG PRIVATE ROOM

## 2023-09-30 PROCEDURE — 80053 COMPREHEN METABOLIC PANEL: CPT | Performed by: INTERNAL MEDICINE

## 2023-09-30 PROCEDURE — 99900035 HC TECH TIME PER 15 MIN (STAT)

## 2023-09-30 PROCEDURE — 63600175 PHARM REV CODE 636 W HCPCS: Performed by: STUDENT IN AN ORGANIZED HEALTH CARE EDUCATION/TRAINING PROGRAM

## 2023-09-30 PROCEDURE — 21400001 HC TELEMETRY ROOM

## 2023-09-30 PROCEDURE — 99233 PR SUBSEQUENT HOSPITAL CARE,LEVL III: ICD-10-PCS | Mod: ,,, | Performed by: STUDENT IN AN ORGANIZED HEALTH CARE EDUCATION/TRAINING PROGRAM

## 2023-09-30 PROCEDURE — 99232 SBSQ HOSP IP/OBS MODERATE 35: CPT | Mod: ,,, | Performed by: STUDENT IN AN ORGANIZED HEALTH CARE EDUCATION/TRAINING PROGRAM

## 2023-09-30 PROCEDURE — 27000221 HC OXYGEN, UP TO 24 HOURS

## 2023-09-30 PROCEDURE — 25000003 PHARM REV CODE 250: Performed by: INTERNAL MEDICINE

## 2023-09-30 PROCEDURE — 99233 SBSQ HOSP IP/OBS HIGH 50: CPT | Mod: ,,, | Performed by: STUDENT IN AN ORGANIZED HEALTH CARE EDUCATION/TRAINING PROGRAM

## 2023-09-30 PROCEDURE — 94761 N-INVAS EAR/PLS OXIMETRY MLT: CPT

## 2023-09-30 PROCEDURE — A4216 STERILE WATER/SALINE, 10 ML: HCPCS | Performed by: INTERNAL MEDICINE

## 2023-09-30 PROCEDURE — 83735 ASSAY OF MAGNESIUM: CPT | Performed by: INTERNAL MEDICINE

## 2023-09-30 PROCEDURE — 85025 COMPLETE CBC W/AUTO DIFF WBC: CPT | Performed by: INTERNAL MEDICINE

## 2023-09-30 PROCEDURE — 93306 TTE W/DOPPLER COMPLETE: CPT

## 2023-09-30 PROCEDURE — 63600175 PHARM REV CODE 636 W HCPCS: Performed by: INTERNAL MEDICINE

## 2023-09-30 PROCEDURE — 36415 COLL VENOUS BLD VENIPUNCTURE: CPT | Performed by: INTERNAL MEDICINE

## 2023-09-30 PROCEDURE — 99232 PR SUBSEQUENT HOSPITAL CARE,LEVL II: ICD-10-PCS | Mod: ,,, | Performed by: STUDENT IN AN ORGANIZED HEALTH CARE EDUCATION/TRAINING PROGRAM

## 2023-09-30 PROCEDURE — 25000003 PHARM REV CODE 250: Performed by: STUDENT IN AN ORGANIZED HEALTH CARE EDUCATION/TRAINING PROGRAM

## 2023-09-30 RX ORDER — MAGNESIUM SULFATE HEPTAHYDRATE 40 MG/ML
2 INJECTION, SOLUTION INTRAVENOUS ONCE
Status: COMPLETED | OUTPATIENT
Start: 2023-09-30 | End: 2023-09-30

## 2023-09-30 RX ADMIN — Medication 10 ML: at 12:09

## 2023-09-30 RX ADMIN — Medication 10 ML: at 05:09

## 2023-09-30 RX ADMIN — Medication 10 ML: at 11:09

## 2023-09-30 RX ADMIN — LEUCINE, PHENYLALANINE, LYSINE, METHIONINE, ISOLEUCINE, VALINE, HISTIDINE, THREONINE, TRYPTOPHAN, ALANINE, GLYCINE, ARGININE, PROLINE, SERINE, TYROSINE, SODIUM ACETATE, DIBASIC POTASSIUM PHOSPHATE, MAGNESIUM CHLORIDE, SODIUM CHLORIDE, CALCIUM CHLORIDE, DEXTROSE
311; 238; 247; 170; 255; 247; 204; 179; 77; 880; 438; 489; 289; 213; 17; 297; 261; 51; 77; 33; 5 INJECTION INTRAVENOUS at 03:09

## 2023-09-30 RX ADMIN — MAGNESIUM SULFATE HEPTAHYDRATE 2 G: 40 INJECTION, SOLUTION INTRAVENOUS at 12:09

## 2023-09-30 RX ADMIN — DEXTROSE, SODIUM CHLORIDE, AND POTASSIUM CHLORIDE: 5; .45; .15 INJECTION INTRAVENOUS at 08:09

## 2023-09-30 NOTE — ASSESSMENT & PLAN NOTE
Vital signs noted continue antihypertensives    BP Readings from Last 3 Encounters:   09/30/23 135/71   09/21/23 (!) 113/57   07/11/23 (!) 142/58

## 2023-09-30 NOTE — ASSESSMENT & PLAN NOTE
Dietary modifications have been made.  SLP consulted for evaluation and treatment.  Significant aspiration.  NPO for now.  Pending Cardiology evaluation for GS to eval for PEG.  9/30 Remains NPO, will start PTPN feeds with continued IVF

## 2023-09-30 NOTE — PROGRESS NOTES
"[facility] S[facility]  General Surgery  Progress Note    Subjective:     History of Present Illness:  77yo male admitted to medicine for symptomatic anemia who presents with forehead laceration after accidental fall this AM.  GCS 15; HD stable.  No LOS.  General surgery consulted for lac repair.  Pt also presents with progressive dysphagia with severe aspiration as demonstrated on swallow study.  General surery consulted for g-tube placement.        Post-Op Info:  Procedure(s) (LRB):  INSERTION, PEG TUBE (N/A)         Interval History:   Patient s/e.  Unable to pass NGT overnight due to "blockage" per nursing team.  Tolerating secretions.  Voices no other complaints.     Medications:  Continuous Infusions:   Amino acid 4.25% - dextrose 5% (CLINIMIX-E) solution with additives (1L provides 42.5 gm AA, 50 gm CHO (170 kcal/L dextrose), Na 35, K 30, Mg 5, Ca 4.5, Acetate 70, Cl 39, Phos 15)      dextrose 5 % and 0.45 % NaCl with KCl 20 mEq 75 mL/hr at 09/30/23 0829     Scheduled Meds:   atorvastatin  80 mg Oral QHS    ezetimibe  10 mg Oral Daily    fenofibrate  145 mg Oral Daily    ferrous sulfate  1 tablet Oral Daily    folic acid  1 mg Oral Daily    levothyroxine  50 mcg Oral Before breakfast    magnesium sulfate IVPB  2 g Intravenous Once    metoprolol succinate  12.5 mg Oral Daily    pantoprazole  40 mg Oral Daily    sodium chloride 0.9%  10 mL Intravenous Q6H    Tdap  0.5 mL Intramuscular Once     PRN Meds:melatonin, ondansetron, sodium chloride 0.9%, Flushing PICC/Midline Protocol **AND** sodium chloride 0.9% **AND** sodium chloride 0.9%, zolpidem     Review of patient's allergies indicates:  No Known Allergies  Objective:     Vital Signs (Most Recent):  Temp: 97.9 °F (36.6 °C) (09/30/23 0400)  Pulse: 67 (09/30/23 1034)  Resp: 18 (09/30/23 0400)  BP: 130/79 (09/30/23 0400)  SpO2: 98 % (09/30/23 1034) Vital Signs (24h Range):  Temp:  [97.8 °F (36.6 °C)-98.1 °F (36.7 °C)] 97.9 °F (36.6 °C)  Pulse:  [67-87] " 67  Resp:  [18-19] 18  SpO2:  [95 %-100 %] 98 %  BP: (130-151)/(63-79) 130/79     Weight: 71.7 kg (158 lb 1.1 oz)  Body mass index is 24.75 kg/m².    Intake/Output - Last 3 Shifts         09/28 0700 09/29 0659 09/29 0700  09/30 0659 09/30 0700  10/01 0659    P.O. 0 0     I.V. (mL/kg) 900 (12.6) 1773.8 (24.7)     IV Piggyback       Total Intake(mL/kg) 900 (12.6) 1773.8 (24.7)     Urine (mL/kg/hr)  500 (0.3)     Stool  0     Total Output  500     Net +900 +1273.8            Urine Occurrence 4 x 3 x     Stool Occurrence 1 x 4 x 1 x             Physical Exam  Vitals reviewed.   Constitutional:       General: He is not in acute distress.     Appearance: He is not ill-appearing or toxic-appearing.   HENT:      Head:      Comments: Forehead and nasal bridge lacerations intact with dressing in place.  No SOI  Cardiovascular:      Rate and Rhythm: Normal rate and regular rhythm.   Pulmonary:      Effort: Pulmonary effort is normal. No respiratory distress.   Abdominal:      General: There is no distension.      Palpations: Abdomen is soft.      Tenderness: There is no abdominal tenderness. There is no guarding or rebound.   Musculoskeletal:      Right lower leg: No edema.      Left lower leg: No edema.   Skin:     General: Skin is warm and dry.      Capillary Refill: Capillary refill takes less than 2 seconds.   Neurological:      Mental Status: He is alert. Mental status is at baseline.          Significant Labs:  I have reviewed all pertinent lab results within the past 24 hours.  CBC:   Recent Labs   Lab 09/30/23  0519   WBC 8.23   RBC 4.01*   HGB 9.2*   HCT 31.7*      MCV 79*   MCH 22.9*   MCHC 29.0*     CMP:   Recent Labs   Lab 09/30/23  0519      CALCIUM 8.7   ALBUMIN 2.7*   PROT 6.1      K 3.5   CO2 32*      BUN 5*   CREATININE 0.4*   ALKPHOS 52*   ALT 27   AST 40   BILITOT 0.9       Significant Diagnostics:  I have reviewed all pertinent imaging results/findings within the past 24  hours.    Assessment/Plan:     Laceration of forehead  Lac irrigated with primary suture repair  Steri strips to nasal bridge lacerations   Keep clean and dry  Follow up in general surgery clinic in 2 weeks for suture removal     Other dysphagia  Pt with left shift of ingested contents on coronal view of barium swallow   CT neck with suspected non specific ST irregularity near C4/C5 near area of previous surgery - no distinct mass detected  Awaiting cardiac clearance - echo scheduled for Monday   Tentatively to endo Wednesday 10/4 for EGD with possible PEG tube placement.  Suspected external compression but espohageal mass still on differential  Recommend PPN vs TPN in benjie of enteral feeds        Queenie Berman MD  General Surgery  [facility] S[facility]

## 2023-09-30 NOTE — PLAN OF CARE
POC reviewed w/ pt and spouse. PO meds held d/t NPO order and inability to swallow. TPN initiated and maintained per orders. VSS on 2L NC. Pt AAOx4 and up to BSC w/ 1 assist. Safety precautions intact; no injuries or falls. Pt denies needs at this time.   Problem: Infection  Goal: Absence of Infection Signs and Symptoms  Outcome: Ongoing, Progressing     Problem: Adult Inpatient Plan of Care  Goal: Plan of Care Review  Outcome: Ongoing, Progressing  Goal: Patient-Specific Goal (Individualized)  Outcome: Ongoing, Progressing  Goal: Absence of Hospital-Acquired Illness or Injury  Outcome: Ongoing, Progressing  Goal: Optimal Comfort and Wellbeing  Outcome: Ongoing, Progressing  Goal: Readiness for Transition of Care  Outcome: Ongoing, Progressing     Problem: Fluid and Electrolyte Imbalance (Acute Kidney Injury/Impairment)  Goal: Fluid and Electrolyte Balance  Outcome: Ongoing, Progressing     Problem: Oral Intake Inadequate (Acute Kidney Injury/Impairment)  Goal: Optimal Nutrition Intake  Outcome: Ongoing, Progressing     Problem: Renal Function Impairment (Acute Kidney Injury/Impairment)  Goal: Effective Renal Function  Outcome: Ongoing, Progressing     Problem: Skin Injury Risk Increased  Goal: Skin Health and Integrity  Outcome: Ongoing, Progressing     Problem: Fluid Imbalance (Pneumonia)  Goal: Fluid Balance  Outcome: Ongoing, Progressing     Problem: Infection (Pneumonia)  Goal: Resolution of Infection Signs and Symptoms  Outcome: Ongoing, Progressing     Problem: Respiratory Compromise (Pneumonia)  Goal: Effective Oxygenation and Ventilation  Outcome: Ongoing, Progressing     Problem: Impaired Wound Healing  Goal: Optimal Wound Healing  Outcome: Ongoing, Progressing     Problem: Fall Injury Risk  Goal: Absence of Fall and Fall-Related Injury  Outcome: Ongoing, Progressing

## 2023-09-30 NOTE — ASSESSMENT & PLAN NOTE
We will replete intravenously today due to dysphagia.  Follow with daily labs.      Patient has hypokalemia.  Last electrolytes reviewed-   Recent Labs   Lab 09/29/23  0525 09/30/23  0519   K 3.6 3.5   Will replace potassium as per sliding scale as needed and monitor electrolytes closely.

## 2023-09-30 NOTE — ASSESSMENT & PLAN NOTE
Patient states he had upper and lower endoscopy in 2021.  Iron infusion prior to discharge.    Recent Labs   Lab 09/28/23  0545 09/29/23  0525 09/30/23  0519   Hemoglobin 9.1 L 9.2 L 9.2 L

## 2023-09-30 NOTE — SUBJECTIVE & OBJECTIVE
"Interval History: Patient seen and examined.     Review of Systems   Constitutional:  Positive for activity change and appetite change. Negative for chills and fever.   HENT:  Positive for trouble swallowing. Negative for ear pain, mouth sores, nosebleeds and sore throat.    Eyes:  Negative for visual disturbance.   Respiratory:  Positive for cough and shortness of breath. Negative for wheezing.    Cardiovascular:  Negative for chest pain, palpitations and leg swelling ("much improved").   Gastrointestinal:  Negative for abdominal distention, abdominal pain, blood in stool, diarrhea, nausea and vomiting.   Endocrine: Negative for polyphagia.   Genitourinary:  Positive for frequency. Negative for difficulty urinating, dysuria and flank pain.   Musculoskeletal:  Positive for arthralgias, gait problem and joint swelling.   Skin:  Negative for rash.   Neurological:  Positive for weakness. Negative for dizziness, tremors, seizures, syncope and headaches.   Hematological:  Negative for adenopathy.   Psychiatric/Behavioral:  Negative for agitation, confusion and hallucinations. The patient is not nervous/anxious.      Objective:     Vital Signs (Most Recent):  Temp: 97.6 °F (36.4 °C) (09/30/23 1607)  Pulse: 78 (09/30/23 1607)  Resp: 18 (09/30/23 1607)  BP: 135/71 (09/30/23 1607)  SpO2: 97 % (09/30/23 1607) Vital Signs (24h Range):  Temp:  [97.6 °F (36.4 °C)-97.9 °F (36.6 °C)] 97.6 °F (36.4 °C)  Pulse:  [64-87] 78  Resp:  [18] 18  SpO2:  [95 %-100 %] 97 %  BP: (130-135)/(63-79) 135/71     Weight: 71.7 kg (158 lb 1.1 oz)  Body mass index is 24.75 kg/m².    Intake/Output Summary (Last 24 hours) at 9/30/2023 1819  Last data filed at 9/30/2023 1802  Gross per 24 hour   Intake 1773.75 ml   Output 800 ml   Net 973.75 ml         Physical Exam  Vitals and nursing note reviewed.   Constitutional:       General: He is not in acute distress.     Appearance: He is ill-appearing. He is not toxic-appearing or diaphoretic.      Comments: " Frail, chronically ill-appearing   HENT:      Head: Normocephalic and atraumatic.      Nose: Nose normal. No congestion or rhinorrhea.      Mouth/Throat:      Mouth: Mucous membranes are dry.      Pharynx: No oropharyngeal exudate or posterior oropharyngeal erythema.   Eyes:      General: No scleral icterus.  Neck:      Vascular: No carotid bruit.   Cardiovascular:      Rate and Rhythm: Normal rate and regular rhythm.      Heart sounds: Murmur heard.      No friction rub. No gallop.   Pulmonary:      Effort: No respiratory distress.      Breath sounds: No stridor. Wheezing (faint distant) and rales present. No rhonchi.      Comments: Supplemental oxygen via nasal cannula  Chest:      Chest wall: No tenderness.   Abdominal:      General: There is no distension.      Palpations: There is no mass.      Tenderness: There is no abdominal tenderness. There is no right CVA tenderness, left CVA tenderness, guarding or rebound.      Hernia: No hernia is present.   Musculoskeletal:         General: Deformity present. No tenderness.      Cervical back: Neck supple. No rigidity.      Right lower leg: Edema present.      Left lower leg: Edema present.   Lymphadenopathy:      Cervical: No cervical adenopathy.   Skin:     General: Skin is warm and dry.      Capillary Refill: Capillary refill takes less than 2 seconds.      Coloration: Skin is not jaundiced or pale.      Findings: No rash.   Neurological:      Cranial Nerves: No cranial nerve deficit.      Sensory: No sensory deficit.      Motor: Weakness present.      Coordination: Coordination normal.      Gait: Gait abnormal.   Psychiatric:         Mood and Affect: Mood normal.         Behavior: Behavior normal.         Thought Content: Thought content normal.         Judgment: Judgment normal.             Significant Labs: All pertinent labs within the past 24 hours have been reviewed.  A1C:   Recent Labs   Lab 08/28/23  0845 09/20/23  1436   HGBA1C <3.8* <3.8*     ABGs: No  "results for input(s): "PH", "PCO2", "HCO3", "POCSATURATED", "BE", "TOTALHB", "COHB", "METHB", "O2HB", "POCFIO2", "PO2" in the last 48 hours.  Bilirubin:   Recent Labs   Lab 09/26/23  0452 09/27/23  0500 09/28/23  0545 09/29/23  0525 09/30/23  0519   BILITOT 0.7 0.6 0.7 0.9 0.9     Blood Culture: No results for input(s): "LABBLOO" in the last 48 hours.  BMP:   Recent Labs   Lab 09/30/23  0519         K 3.5      CO2 32*   BUN 5*   CREATININE 0.4*   CALCIUM 8.7   MG 1.7     CBC:   Recent Labs   Lab 09/29/23  0525 09/30/23  0519   WBC 9.00 8.23   HGB 9.2* 9.2*   HCT 31.6* 31.7*    304     CMP:   Recent Labs   Lab 09/29/23  0525 09/30/23  0519    141   K 3.6 3.5    107   CO2 31* 32*   GLU 96 107   BUN 7* 5*   CREATININE 0.4* 0.4*   CALCIUM 8.5* 8.7   PROT 6.2 6.1   ALBUMIN 2.7* 2.7*   BILITOT 0.9 0.9   ALKPHOS 50* 52*   AST 40 40   ALT 28 27   ANIONGAP 2* 2*     Magnesium:   Recent Labs   Lab 09/29/23  0525 09/30/23  0519   MG 1.7 1.7     TSH:   Recent Labs   Lab 09/29/23  0525   TSH 2.660     CT Soft Tissue Neck With Contrast  Narrative: EXAMINATION:  CT SOFT TISSUE NECK WITH CONTRAST    CLINICAL HISTORY:  Transfusion associated circulatory overloadSoft tissue swelling, infection suspected, neck xray done;Neck mass, nonpulsatile;Neck mass, pulsatile;    CT/Cardiac Nuclear exams in prior 12 months: 3    TECHNIQUE:  CT neck with IV contrast.  Iterative reconstruction utilized.    COMPARISON:  CT neck earlier same day without IV contrast    FINDINGS:  Soft tissue attenuation thickened supraglottic prevertebral soft tissues at C4 and C5 without peripherally enhancing fluid collection.  The finding is nonspecific and potentially postoperative.  The finding does not have the typical appearance of an abscess.    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    No pathologically enlarged lymph nodes.  No soft tissue emphysema.    Severe C1-2 arthropathy.  " Prior anterior-interbody C3-4 and C6-7 fusion.  Impression: Soft tissue attenuation thickened supraglottic prevertebral soft tissues, a nonspecific appearance, possibly postoperative scarring.  No peripherally enhancing fluid or soft tissue emphysema.    Severe C1-2 arthropathy, prior C3-4 and C6-7 fusion    If further imaging is clinically warranted, MRI cervical spine with and without IV contrast would be recommended    Electronically signed by: Hattie De Leon MD  Date:    09/28/2023  Time:    17:39    CT Soft Tissue Neck WO Contrast  Narrative: EXAMINATION:  CT SOFT TISSUE NECK WITHOUT CONTRAST    CLINICAL HISTORY:  Neck mass, nonpulsatile;    TECHNIQUE:  Iterative reconstruction technique was used.    CT/cardiac nuclear exam/s in prior 12 months:  2.    FINDINGS:  Mildly asymmetric parotid glands with the left being larger than the right.  No focal parotid lesion seen on this noncontrast exam.  Somewhat atrophic submandibular glands.  No enlarged cervical lymph nodes.  Thickened prevertebral soft tissues.  Prevertebral fluid collection possible but unable to be evaluated without contrast.  Somewhat atrophic thyroid.  Soft tissue density adjacent to the left sternoclavicular joint, possible joint effusion.  No abnormality seen within the visualized upper lungs.  Previous multilevel cervical fusion.  Impression: 1. Thickening of the prevertebral soft tissues.  Fluid collection possible.  If further clinical imaging warranted, consider postcontrast exam.  2. Somewhat asymmetric parotid glands with the left being larger than the right.  3. Soft tissue density adjacent to the left sternoclavicular joint, possible joint effusion.    Electronically signed by: Eliud Charles MD  Date:    09/28/2023  Time:    13:10     Significant Imaging: I have reviewed all pertinent imaging results/findings within the past 24 hours.

## 2023-09-30 NOTE — PROGRESS NOTES
[facility]  Progress Note    Patient Name: Richard Farr  MRN: [id number]  Patient Class: IP- Inpatient   Admission Date: 9/22/2023  Length of Stay: 7 days  Attending Physician: Drew Moreira Jr., MD  Primary Care Provider: Drew Moreira Jr., MD        Subjective:     Principal Problem:Acute cystitis without hematuria        HPI:  ED HPI:  Richard Farr is a 78 y.o. male with PMHX of hypertension, hyperlipidemia, CHF, mitral regurg, tricuspid regurg, pulmonary hypertension, gastritis who presents to the emergency department C/O shortness of breath.     Patient presents after developing shortness of breath 1 hour prior to arrival.  Was recently admitted for anemia and received 4 units PRBCs in hospital as well as iron transfusion 2 days ago.  No fever but patient states he felt hot in emergency department.  Patient was diagnosed with urinary tract infection during previous hospitalization is on antibiotics.     PCP: Drew Moreira Jr., MD     IM HPI:  Patient was recently admitted by primary care and transfuse 4 units of packed red blood cells.  Patient felt much improved return home felt well for about 24 hours then began having increasing dyspnea.  Patient was readmitted found to have a urinary tract infection and a pneumonia.  Patient was started on antibiotics.  He was also given IV diuretics.  Patient has some peripheral edema but overall he states he feels dry and dehydrated.  Patient is having some oropharyngeal dysphagia and on exam I am unable to find the cause.  Patient has a history of CVA as well as a history of arthritis with several upper extremity joint deformities and contractures.      Overview/Hospital Course:  9/24 GT:  Patient is lying in bed this morning with spouse at bedside.  Patient is frail and chronically ill-appearing, but does not appear to be in any acute distress.  Patient reports his shortness a breath is at baseline, and he is tolerating supplemental  oxygen via nasal cannula.  Patient had some swallowing difficulties yesterday, SLP has been consulted for evaluation, treatment, dietary modifications have been made.  Patient's H&H is stable, and although anemic he is not at the point that he requires further transfusion.  Potassium mildly decreased, we will replete intravenously today due to swallowing difficulties and monitor with daily labs.  Blood cultures at this time are negative to date.  Patient and spouse deny any issues, concerns, questions.    9/25:  Patient eating breakfast this morning in no acute distress.  Speech therapy to evaluate the patient.  Thus far blood cultures are negative.  Patient feels back to baseline.  Urine culture without significant growth.  Consider discharge in the near future.  Patient having significant gas production check GI panel.    9/26:  No acute events overnight.  GI panel pending.  Provide iron infusion prior to potential discharge.  Patient to undergo modified barium swallow prior to discharge for completeness.  Home health will be coordinated ongoing outpatient needs.    Patient with significant aspiration by MBS.  Discuss with family potential needs for PEG placement vs ongoing ST.     9/27:  patient ct head negative.  Done for worsening dizziness/dysequilibrium after head trauma.  Awaiting  recs regarding PEG tube placement.     9/28/23:  patient resting this morning.  In favor or PEG placement tomorrow.   09/29/2023: No acute events overnight.  Planning potential endoscopy versus laparoscopy for PEG tube placement.  May need cardiac clearance.  Currently hemodynamically stable.  9/30: [place] weekend coverage. Awake and alert sitting on bedside chair. No new complaints at this time. Patient requires cardiology evaluation for endoscopy followed by laparoscopy guided PEG tube placement. Patient has been NPO, will start peripheral TPN along with IVF.       Interval History: Patient seen and examined.     Review of Systems  "  Constitutional:  Positive for activity change and appetite change. Negative for chills and fever.   HENT:  Positive for trouble swallowing. Negative for ear pain, mouth sores, nosebleeds and sore throat.    Eyes:  Negative for visual disturbance.   Respiratory:  Positive for cough and shortness of breath. Negative for wheezing.    Cardiovascular:  Negative for chest pain, palpitations and leg swelling ("much improved").   Gastrointestinal:  Negative for abdominal distention, abdominal pain, blood in stool, diarrhea, nausea and vomiting.   Endocrine: Negative for polyphagia.   Genitourinary:  Positive for frequency. Negative for difficulty urinating, dysuria and flank pain.   Musculoskeletal:  Positive for arthralgias, gait problem and joint swelling.   Skin:  Negative for rash.   Neurological:  Positive for weakness. Negative for dizziness, tremors, seizures, syncope and headaches.   Hematological:  Negative for adenopathy.   Psychiatric/Behavioral:  Negative for agitation, confusion and hallucinations. The patient is not nervous/anxious.      Objective:     Vital Signs (Most Recent):  Temp: 97.6 °F (36.4 °C) (09/30/23 1607)  Pulse: 78 (09/30/23 1607)  Resp: 18 (09/30/23 1607)  BP: 135/71 (09/30/23 1607)  SpO2: 97 % (09/30/23 1607) Vital Signs (24h Range):  Temp:  [97.6 °F (36.4 °C)-97.9 °F (36.6 °C)] 97.6 °F (36.4 °C)  Pulse:  [64-87] 78  Resp:  [18] 18  SpO2:  [95 %-100 %] 97 %  BP: (130-135)/(63-79) 135/71     Weight: 71.7 kg (158 lb 1.1 oz)  Body mass index is 24.75 kg/m².    Intake/Output Summary (Last 24 hours) at 9/30/2023 1819  Last data filed at 9/30/2023 1802  Gross per 24 hour   Intake 1773.75 ml   Output 800 ml   Net 973.75 ml         Physical Exam  Vitals and nursing note reviewed.   Constitutional:       General: He is not in acute distress.     Appearance: He is ill-appearing. He is not toxic-appearing or diaphoretic.      Comments: Frail, chronically ill-appearing   HENT:      Head: Normocephalic " "and atraumatic.      Nose: Nose normal. No congestion or rhinorrhea.      Mouth/Throat:      Mouth: Mucous membranes are dry.      Pharynx: No oropharyngeal exudate or posterior oropharyngeal erythema.   Eyes:      General: No scleral icterus.  Neck:      Vascular: No carotid bruit.   Cardiovascular:      Rate and Rhythm: Normal rate and regular rhythm.      Heart sounds: Murmur heard.      No friction rub. No gallop.   Pulmonary:      Effort: No respiratory distress.      Breath sounds: No stridor. Wheezing (faint distant) and rales present. No rhonchi.      Comments: Supplemental oxygen via nasal cannula  Chest:      Chest wall: No tenderness.   Abdominal:      General: There is no distension.      Palpations: There is no mass.      Tenderness: There is no abdominal tenderness. There is no right CVA tenderness, left CVA tenderness, guarding or rebound.      Hernia: No hernia is present.   Musculoskeletal:         General: Deformity present. No tenderness.      Cervical back: Neck supple. No rigidity.      Right lower leg: Edema present.      Left lower leg: Edema present.   Lymphadenopathy:      Cervical: No cervical adenopathy.   Skin:     General: Skin is warm and dry.      Capillary Refill: Capillary refill takes less than 2 seconds.      Coloration: Skin is not jaundiced or pale.      Findings: No rash.   Neurological:      Cranial Nerves: No cranial nerve deficit.      Sensory: No sensory deficit.      Motor: Weakness present.      Coordination: Coordination normal.      Gait: Gait abnormal.   Psychiatric:         Mood and Affect: Mood normal.         Behavior: Behavior normal.         Thought Content: Thought content normal.         Judgment: Judgment normal.             Significant Labs: All pertinent labs within the past 24 hours have been reviewed.  A1C:   Recent Labs   Lab 08/28/23  0845 09/20/23  1436   HGBA1C <3.8* <3.8*     ABGs: No results for input(s): "PH", "PCO2", "HCO3", "POCSATURATED", "BE", " ""TOTALHB", "COHB", "METHB", "O2HB", "POCFIO2", "PO2" in the last 48 hours.  Bilirubin:   Recent Labs   Lab 09/26/23  0452 09/27/23  0500 09/28/23  0545 09/29/23  0525 09/30/23  0519   BILITOT 0.7 0.6 0.7 0.9 0.9     Blood Culture: No results for input(s): "LABBLOO" in the last 48 hours.  BMP:   Recent Labs   Lab 09/30/23  0519         K 3.5      CO2 32*   BUN 5*   CREATININE 0.4*   CALCIUM 8.7   MG 1.7     CBC:   Recent Labs   Lab 09/29/23  0525 09/30/23  0519   WBC 9.00 8.23   HGB 9.2* 9.2*   HCT 31.6* 31.7*    304     CMP:   Recent Labs   Lab 09/29/23  0525 09/30/23  0519    141   K 3.6 3.5    107   CO2 31* 32*   GLU 96 107   BUN 7* 5*   CREATININE 0.4* 0.4*   CALCIUM 8.5* 8.7   PROT 6.2 6.1   ALBUMIN 2.7* 2.7*   BILITOT 0.9 0.9   ALKPHOS 50* 52*   AST 40 40   ALT 28 27   ANIONGAP 2* 2*     Magnesium:   Recent Labs   Lab 09/29/23  0525 09/30/23  0519   MG 1.7 1.7     TSH:   Recent Labs   Lab 09/29/23  0525   TSH 2.660     CT Soft Tissue Neck With Contrast  Narrative: EXAMINATION:  CT SOFT TISSUE NECK WITH CONTRAST    CLINICAL HISTORY:  Transfusion associated circulatory overloadSoft tissue swelling, infection suspected, neck xray done;Neck mass, nonpulsatile;Neck mass, pulsatile;    CT/Cardiac Nuclear exams in prior 12 months: 3    TECHNIQUE:  CT neck with IV contrast.  Iterative reconstruction utilized.    COMPARISON:  CT neck earlier same day without IV contrast    FINDINGS:  Soft tissue attenuation thickened supraglottic prevertebral soft tissues at C4 and C5 without peripherally enhancing fluid collection.  The finding is nonspecific and potentially postoperative.  The finding does not have the typical appearance of an abscess.    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    No pathologically enlarged lymph nodes.  No soft tissue emphysema.    Severe C1-2 arthropathy.  Prior anterior-interbody C3-4 and C6-7 fusion.  Impression: Soft " tissue attenuation thickened supraglottic prevertebral soft tissues, a nonspecific appearance, possibly postoperative scarring.  No peripherally enhancing fluid or soft tissue emphysema.    Severe C1-2 arthropathy, prior C3-4 and C6-7 fusion    If further imaging is clinically warranted, MRI cervical spine with and without IV contrast would be recommended    Electronically signed by: Hattie De Leon MD  Date:    09/28/2023  Time:    17:39    CT Soft Tissue Neck WO Contrast  Narrative: EXAMINATION:  CT SOFT TISSUE NECK WITHOUT CONTRAST    CLINICAL HISTORY:  Neck mass, nonpulsatile;    TECHNIQUE:  Iterative reconstruction technique was used.    CT/cardiac nuclear exam/s in prior 12 months:  2.    FINDINGS:  Mildly asymmetric parotid glands with the left being larger than the right.  No focal parotid lesion seen on this noncontrast exam.  Somewhat atrophic submandibular glands.  No enlarged cervical lymph nodes.  Thickened prevertebral soft tissues.  Prevertebral fluid collection possible but unable to be evaluated without contrast.  Somewhat atrophic thyroid.  Soft tissue density adjacent to the left sternoclavicular joint, possible joint effusion.  No abnormality seen within the visualized upper lungs.  Previous multilevel cervical fusion.  Impression: 1. Thickening of the prevertebral soft tissues.  Fluid collection possible.  If further clinical imaging warranted, consider postcontrast exam.  2. Somewhat asymmetric parotid glands with the left being larger than the right.  3. Soft tissue density adjacent to the left sternoclavicular joint, possible joint effusion.    Electronically signed by: Eliud Charles MD  Date:    09/28/2023  Time:    13:10     Significant Imaging: I have reviewed all pertinent imaging results/findings within the past 24 hours.      Assessment/Plan:      * Acute cystitis without hematuria  Continue antibiotics awaiting cultures    Blood cultures are negative to date.  Urine culture  negative    Ataxia  Worsening disequilibrium since fall and head trauma.  CT to rule out intracranial bleeding injury.  CT scan negative.  Family reassured.      Laceration of forehead  Treatment per GS.    Hypokalemia  We will replete intravenously today due to dysphagia.  Follow with daily labs.      Patient has hypokalemia.  Last electrolytes reviewed-   Recent Labs   Lab 09/29/23  0525 09/30/23  0519   K 3.6 3.5   Will replace potassium as per sliding scale as needed and monitor electrolytes closely.       Other dysphagia  Dietary modifications have been made.  SLP consulted for evaluation and treatment.  Significant aspiration.  NPO for now.  Pending Cardiology evaluation for GS to eval for PEG.  9/30 Remains NPO, will start PTPN feeds with continued IVF    Chronic heart failure with preserved ejection fraction (HFpEF) NICM NYHA3  Followed by Cardiology in the outpatient setting.  Euvolemic and well compensated currently.  Continue home medical therapy      Acute on chronic combined systolic and diastolic heart failure  Patient seems intravascularly dry.  Hold IV Lasix for now.    Appears euvolemic.        Pneumonia due to infectious organism  Continue current antibiotic regimen, supplemental oxygen.  Blood cultures are negative to date at this time.    Blood Culture, Routine   Date Value Ref Range Status   09/22/2023 No growth after 5 days.  Final     Urine Culture, Routine   Date Value Ref Range Status   09/22/2023 No significant growth  Final      9/27/23:  abx completed.       Pleural effusion, right  Unsure if this has been investigated will defer to primary.    Stable by hx.       Severe mitral regurgitation  At baseline.  Continue home medical therapy.      Essential hypertension  Vital signs noted continue antihypertensives    BP Readings from Last 3 Encounters:   09/30/23 135/71   09/21/23 (!) 113/57   07/11/23 (!) 142/58         Mixed hyperlipidemia  Continue statin.      Acute superficial gastritis  without hemorrhage  Continue home treatment      Iron deficiency anemia  Patient states he had upper and lower endoscopy in 2021.  Iron infusion prior to discharge.    Recent Labs   Lab 09/28/23  0545 09/29/23  0525 09/30/23  0519   Hemoglobin 9.1 L 9.2 L 9.2 L             VTE Risk Mitigation (From admission, onward)         Ordered     IP VTE HIGH RISK PATIENT  Once         09/23/23 0215     Place sequential compression device  Until discontinued         09/23/23 0215                Discharge Planning   JOSÉ:      Code Status: Full Code   Is the patient medically ready for discharge?:     Reason for patient still in hospital (select all that apply): Patient new problem, Patient trending condition, Treatment, Consult recommendations and PT / OT recommendations  Discharge Plan A: Home with family                  Rasta Trent DO  Department of Hospital Medicine   [facility]

## 2023-09-30 NOTE — SUBJECTIVE & OBJECTIVE
"Interval History:   Patient s/e.  Unable to pass NGT overnight due to "blockage" per nursing team.  Tolerating secretions.  Voices no other complaints.     Medications:  Continuous Infusions:   Amino acid 4.25% - dextrose 5% (CLINIMIX-E) solution with additives (1L provides 42.5 gm AA, 50 gm CHO (170 kcal/L dextrose), Na 35, K 30, Mg 5, Ca 4.5, Acetate 70, Cl 39, Phos 15)      dextrose 5 % and 0.45 % NaCl with KCl 20 mEq 75 mL/hr at 09/30/23 0829     Scheduled Meds:   atorvastatin  80 mg Oral QHS    ezetimibe  10 mg Oral Daily    fenofibrate  145 mg Oral Daily    ferrous sulfate  1 tablet Oral Daily    folic acid  1 mg Oral Daily    levothyroxine  50 mcg Oral Before breakfast    magnesium sulfate IVPB  2 g Intravenous Once    metoprolol succinate  12.5 mg Oral Daily    pantoprazole  40 mg Oral Daily    sodium chloride 0.9%  10 mL Intravenous Q6H    Tdap  0.5 mL Intramuscular Once     PRN Meds:melatonin, ondansetron, sodium chloride 0.9%, Flushing PICC/Midline Protocol **AND** sodium chloride 0.9% **AND** sodium chloride 0.9%, zolpidem     Review of patient's allergies indicates:  No Known Allergies  Objective:     Vital Signs (Most Recent):  Temp: 97.9 °F (36.6 °C) (09/30/23 0400)  Pulse: 67 (09/30/23 1034)  Resp: 18 (09/30/23 0400)  BP: 130/79 (09/30/23 0400)  SpO2: 98 % (09/30/23 1034) Vital Signs (24h Range):  Temp:  [97.8 °F (36.6 °C)-98.1 °F (36.7 °C)] 97.9 °F (36.6 °C)  Pulse:  [67-87] 67  Resp:  [18-19] 18  SpO2:  [95 %-100 %] 98 %  BP: (130-151)/(63-79) 130/79     Weight: 71.7 kg (158 lb 1.1 oz)  Body mass index is 24.75 kg/m².    Intake/Output - Last 3 Shifts         09/28 0700 09/29 0659 09/29 0700 09/30 0659 09/30 0700  10/01 0659    P.O. 0 0     I.V. (mL/kg) 900 (12.6) 1773.8 (24.7)     IV Piggyback       Total Intake(mL/kg) 900 (12.6) 1773.8 (24.7)     Urine (mL/kg/hr)  500 (0.3)     Stool  0     Total Output  500     Net +900 +1273.8            Urine Occurrence 4 x 3 x     Stool Occurrence 1 x 4 x 1 " x             Physical Exam  Vitals reviewed.   Constitutional:       General: He is not in acute distress.     Appearance: He is not ill-appearing or toxic-appearing.   HENT:      Head:      Comments: Forehead and nasal bridge lacerations intact with dressing in place.  No SOI  Cardiovascular:      Rate and Rhythm: Normal rate and regular rhythm.   Pulmonary:      Effort: Pulmonary effort is normal. No respiratory distress.   Abdominal:      General: There is no distension.      Palpations: Abdomen is soft.      Tenderness: There is no abdominal tenderness. There is no guarding or rebound.   Musculoskeletal:      Right lower leg: No edema.      Left lower leg: No edema.   Skin:     General: Skin is warm and dry.      Capillary Refill: Capillary refill takes less than 2 seconds.   Neurological:      Mental Status: He is alert. Mental status is at baseline.          Significant Labs:  I have reviewed all pertinent lab results within the past 24 hours.  CBC:   Recent Labs   Lab 09/30/23  0519   WBC 8.23   RBC 4.01*   HGB 9.2*   HCT 31.7*      MCV 79*   MCH 22.9*   MCHC 29.0*     CMP:   Recent Labs   Lab 09/30/23  0519      CALCIUM 8.7   ALBUMIN 2.7*   PROT 6.1      K 3.5   CO2 32*      BUN 5*   CREATININE 0.4*   ALKPHOS 52*   ALT 27   AST 40   BILITOT 0.9       Significant Diagnostics:  I have reviewed all pertinent imaging results/findings within the past 24 hours.

## 2023-09-30 NOTE — NURSING
Multiple attempts in putting NG tube in was unsuccessful. Contacted Dr. Berman, [facility] while waiting for placement of peg tube.

## 2023-09-30 NOTE — ASSESSMENT & PLAN NOTE
Pt with left shift of ingested contents on coronal view of barium swallow   CT neck with suspected non specific ST irregularity near C4/C5 near area of previous surgery - no distinct mass detected  Awaiting cardiac clearance - echo scheduled for Monday   Tentatively to endo Wednesday 10/4 for EGD with possible PEG tube placement.  Suspected external compression but espohageal mass still on differential  Recommend PPN vs TPN in benjie of enteral feeds

## 2023-10-01 LAB
ALBUMIN SERPL BCP-MCNC: 2.7 G/DL (ref 3.5–5.2)
ALP SERPL-CCNC: 55 U/L (ref 55–135)
ALT SERPL W/O P-5'-P-CCNC: 29 U/L (ref 10–44)
ANION GAP SERPL CALC-SCNC: 0 MMOL/L (ref 3–11)
ANISOCYTOSIS BLD QL SMEAR: ABNORMAL
AORTIC ROOT ANNULUS: 2.7 CM
AST SERPL-CCNC: 39 U/L (ref 10–40)
AV INDEX (PROSTH): 0.83
AV MEAN GRADIENT: 3 MMHG
AV PEAK GRADIENT: 7 MMHG
AV VALVE AREA BY VELOCITY RATIO: 3.08 CM²
AV VALVE AREA: 2.68 CM²
AV VELOCITY RATIO: 0.95
BASOPHILS # BLD AUTO: 0.09 K/UL (ref 0–0.2)
BASOPHILS NFR BLD: 1 % (ref 0–1.9)
BILIRUB SERPL-MCNC: 0.9 MG/DL (ref 0.1–1)
BSA FOR ECHO PROCEDURE: 1.84 M2
BUN SERPL-MCNC: 9 MG/DL (ref 8–23)
BURR CELLS BLD QL SMEAR: ABNORMAL
CALCIUM SERPL-MCNC: 8.5 MG/DL (ref 8.7–10.5)
CHLORIDE SERPL-SCNC: 106 MMOL/L (ref 95–110)
CO2 SERPL-SCNC: 34 MMOL/L (ref 23–29)
CREAT SERPL-MCNC: 0.4 MG/DL (ref 0.5–1.4)
CV ECHO LV RWT: 0.31 CM
DIFFERENTIAL METHOD: ABNORMAL
DOP CALC AO PEAK VEL: 1.28 M/S
DOP CALC AO VTI: 25.8 CM
DOP CALC LVOT AREA: 3.2 CM2
DOP CALC LVOT DIAMETER: 2.03 CM
DOP CALC LVOT PEAK VEL: 1.22 M/S
DOP CALC LVOT STROKE VOLUME: 69.23 CM3
DOP CALCLVOT PEAK VEL VTI: 21.4 CM
E WAVE DECELERATION TIME: 399.12 MSEC
E/A RATIO: 1.54
E/E' RATIO: 13.5 M/S
ECHO LV POSTERIOR WALL: 0.91 CM (ref 0.6–1.1)
EOSINOPHIL # BLD AUTO: 0.3 K/UL (ref 0–0.5)
EOSINOPHIL NFR BLD: 2.9 % (ref 0–8)
ERYTHROCYTE [DISTWIDTH] IN BLOOD BY AUTOMATED COUNT: ABNORMAL % (ref 11.5–14.5)
EST. GFR  (NO RACE VARIABLE): >60 ML/MIN/1.73 M^2
FRACTIONAL SHORTENING: 35 % (ref 28–44)
GLUCOSE SERPL-MCNC: 115 MG/DL (ref 70–110)
HCT VFR BLD AUTO: 32.8 % (ref 40–54)
HGB BLD-MCNC: 9.5 G/DL (ref 14–18)
HYPOCHROMIA BLD QL SMEAR: ABNORMAL
IMM GRANULOCYTES # BLD AUTO: 0.17 K/UL (ref 0–0.04)
IMM GRANULOCYTES NFR BLD AUTO: 1.8 % (ref 0–0.5)
INTERVENTRICULAR SEPTUM: 0.95 CM (ref 0.6–1.1)
IVC DIAMETER: 1.22 CM
LA MAJOR: 5.74 CM
LEFT ATRIUM SIZE: 5.24 CM
LEFT ATRIUM VOLUME INDEX MOD: 103.8 ML/M2
LEFT ATRIUM VOLUME MOD: 189.94 CM3
LEFT INTERNAL DIMENSION IN SYSTOLE: 3.85 CM (ref 2.1–4)
LEFT VENTRICLE DIASTOLIC VOLUME INDEX: 94.27 ML/M2
LEFT VENTRICLE DIASTOLIC VOLUME: 172.51 ML
LEFT VENTRICLE MASS INDEX: 119 G/M2
LEFT VENTRICLE SYSTOLIC VOLUME INDEX: 34.9 ML/M2
LEFT VENTRICLE SYSTOLIC VOLUME: 63.81 ML
LEFT VENTRICULAR INTERNAL DIMENSION IN DIASTOLE: 5.89 CM (ref 3.5–6)
LEFT VENTRICULAR MASS: 217.88 G
LV LATERAL E/E' RATIO: 10.13 M/S
LV SEPTAL E/E' RATIO: 20.25 M/S
LVOT MG: 2.49 MMHG
LVOT MV: 0.72 CM/S
LYMPHOCYTES # BLD AUTO: 1.5 K/UL (ref 1–4.8)
LYMPHOCYTES NFR BLD: 16.5 % (ref 18–48)
MAGNESIUM SERPL-MCNC: 2 MG/DL (ref 1.6–2.6)
MCH RBC QN AUTO: 23.2 PG (ref 27–31)
MCHC RBC AUTO-ENTMCNC: 29 G/DL (ref 32–36)
MCV RBC AUTO: 80 FL (ref 82–98)
MONOCYTES # BLD AUTO: 0.7 K/UL (ref 0.3–1)
MONOCYTES NFR BLD: 7.9 % (ref 4–15)
MV PEAK A VEL: 1.05 M/S
MV PEAK E VEL: 1.62 M/S
MV STENOSIS PRESSURE HALF TIME: 115.74 MS
MV VALVE AREA P 1/2 METHOD: 1.9 CM2
NEUTROPHILS # BLD AUTO: 6.4 K/UL (ref 1.8–7.7)
NEUTROPHILS NFR BLD: 69.9 % (ref 38–73)
NRBC BLD-RTO: 0 /100 WBC
PISA MRMAX VEL: 4.51 M/S
PISA TR MAX VEL: 2.99 M/S
PLATELET # BLD AUTO: 292 K/UL (ref 150–450)
PMV BLD AUTO: ABNORMAL FL (ref 9.2–12.9)
POIKILOCYTOSIS BLD QL SMEAR: ABNORMAL
POTASSIUM SERPL-SCNC: 3.8 MMOL/L (ref 3.5–5.1)
PROT SERPL-MCNC: 6.3 G/DL (ref 6–8.4)
PULM VEIN S/D RATIO: 0.65
PV PEAK D VEL: 0.68 M/S
PV PEAK GRADIENT: 4 MMHG
PV PEAK S VEL: 0.44 M/S
PV PEAK VELOCITY: 1.06 M/S
RA MAJOR: 4.78 CM
RBC # BLD AUTO: 4.09 M/UL (ref 4.6–6.2)
RIGHT VENTRICULAR END-DIASTOLIC DIMENSION: 1.86 CM
RV TISSUE DOPPLER FREE WALL SYSTOLIC VELOCITY 1 (APICAL 4 CHAMBER VIEW): 17.26 CM/S
SCHISTOCYTES BLD QL SMEAR: ABNORMAL
SODIUM SERPL-SCNC: 140 MMOL/L (ref 136–145)
SPHEROCYTES BLD QL SMEAR: ABNORMAL
TARGETS BLD QL SMEAR: ABNORMAL
TDI LATERAL: 0.16 M/S
TDI SEPTAL: 0.08 M/S
TDI: 0.12 M/S
TR MAX PG: 36 MMHG
WBC # BLD AUTO: 9.19 K/UL (ref 3.9–12.7)
Z-SCORE OF LEFT VENTRICULAR DIMENSION IN END DIASTOLE: 1.5
Z-SCORE OF LEFT VENTRICULAR DIMENSION IN END SYSTOLE: 1.63

## 2023-10-01 PROCEDURE — 80053 COMPREHEN METABOLIC PANEL: CPT | Performed by: INTERNAL MEDICINE

## 2023-10-01 PROCEDURE — 99900035 HC TECH TIME PER 15 MIN (STAT)

## 2023-10-01 PROCEDURE — C9113 INJ PANTOPRAZOLE SODIUM, VIA: HCPCS | Performed by: INTERNAL MEDICINE

## 2023-10-01 PROCEDURE — 99233 PR SUBSEQUENT HOSPITAL CARE,LEVL III: ICD-10-PCS | Mod: ,,, | Performed by: STUDENT IN AN ORGANIZED HEALTH CARE EDUCATION/TRAINING PROGRAM

## 2023-10-01 PROCEDURE — 99900031 HC PATIENT EDUCATION (STAT)

## 2023-10-01 PROCEDURE — 21400001 HC TELEMETRY ROOM

## 2023-10-01 PROCEDURE — 36415 COLL VENOUS BLD VENIPUNCTURE: CPT | Performed by: INTERNAL MEDICINE

## 2023-10-01 PROCEDURE — 63600175 PHARM REV CODE 636 W HCPCS: Performed by: STUDENT IN AN ORGANIZED HEALTH CARE EDUCATION/TRAINING PROGRAM

## 2023-10-01 PROCEDURE — 25000003 PHARM REV CODE 250: Performed by: INTERNAL MEDICINE

## 2023-10-01 PROCEDURE — 99233 SBSQ HOSP IP/OBS HIGH 50: CPT | Mod: ,,, | Performed by: STUDENT IN AN ORGANIZED HEALTH CARE EDUCATION/TRAINING PROGRAM

## 2023-10-01 PROCEDURE — 27000221 HC OXYGEN, UP TO 24 HOURS

## 2023-10-01 PROCEDURE — 11000001 HC ACUTE MED/SURG PRIVATE ROOM

## 2023-10-01 PROCEDURE — 83735 ASSAY OF MAGNESIUM: CPT | Performed by: INTERNAL MEDICINE

## 2023-10-01 PROCEDURE — A4216 STERILE WATER/SALINE, 10 ML: HCPCS | Performed by: INTERNAL MEDICINE

## 2023-10-01 PROCEDURE — 85025 COMPLETE CBC W/AUTO DIFF WBC: CPT | Performed by: INTERNAL MEDICINE

## 2023-10-01 PROCEDURE — 25000003 PHARM REV CODE 250: Performed by: STUDENT IN AN ORGANIZED HEALTH CARE EDUCATION/TRAINING PROGRAM

## 2023-10-01 PROCEDURE — 63600175 PHARM REV CODE 636 W HCPCS: Performed by: INTERNAL MEDICINE

## 2023-10-01 PROCEDURE — 94761 N-INVAS EAR/PLS OXIMETRY MLT: CPT

## 2023-10-01 RX ORDER — LEVOTHYROXINE SODIUM 20 UG/ML
25 INJECTION, SOLUTION INTRAVENOUS DAILY
Status: DISCONTINUED | OUTPATIENT
Start: 2023-10-01 | End: 2023-10-05

## 2023-10-01 RX ORDER — POTASSIUM CHLORIDE 7.45 MG/ML
10 INJECTION INTRAVENOUS
Status: COMPLETED | OUTPATIENT
Start: 2023-10-01 | End: 2023-10-01

## 2023-10-01 RX ORDER — PANTOPRAZOLE SODIUM 40 MG/10ML
40 INJECTION, POWDER, LYOPHILIZED, FOR SOLUTION INTRAVENOUS DAILY
Status: DISCONTINUED | OUTPATIENT
Start: 2023-10-01 | End: 2023-10-05

## 2023-10-01 RX ORDER — METOPROLOL TARTRATE 1 MG/ML
2.5 INJECTION, SOLUTION INTRAVENOUS EVERY 12 HOURS
Status: DISCONTINUED | OUTPATIENT
Start: 2023-10-01 | End: 2023-10-06 | Stop reason: HOSPADM

## 2023-10-01 RX ADMIN — LORAZEPAM 2 MG: 2 INJECTION INTRAMUSCULAR; INTRAVENOUS at 09:10

## 2023-10-01 RX ADMIN — POTASSIUM CHLORIDE 10 MEQ: 7.46 INJECTION, SOLUTION INTRAVENOUS at 06:10

## 2023-10-01 RX ADMIN — Medication 10 ML: at 12:10

## 2023-10-01 RX ADMIN — POTASSIUM CHLORIDE 10 MEQ: 7.46 INJECTION, SOLUTION INTRAVENOUS at 05:10

## 2023-10-01 RX ADMIN — Medication 10 ML: at 05:10

## 2023-10-01 RX ADMIN — LEUCINE, PHENYLALANINE, LYSINE, METHIONINE, ISOLEUCINE, VALINE, HISTIDINE, THREONINE, TRYPTOPHAN, ALANINE, GLYCINE, ARGININE, PROLINE, SERINE, TYROSINE, SODIUM ACETATE, DIBASIC POTASSIUM PHOSPHATE, MAGNESIUM CHLORIDE, SODIUM CHLORIDE, CALCIUM CHLORIDE, DEXTROSE
311; 238; 247; 170; 255; 247; 204; 179; 77; 880; 438; 489; 289; 213; 17; 297; 261; 51; 77; 33; 5 INJECTION INTRAVENOUS at 04:10

## 2023-10-01 RX ADMIN — PANTOPRAZOLE SODIUM 40 MG: 40 INJECTION, POWDER, FOR SOLUTION INTRAVENOUS at 08:10

## 2023-10-01 RX ADMIN — Medication 10 ML: at 11:10

## 2023-10-01 RX ADMIN — LEVOTHYROXINE SODIUM 25 MCG: 20 INJECTION, SOLUTION INTRAVENOUS at 04:10

## 2023-10-01 NOTE — ASSESSMENT & PLAN NOTE
Patient states he had upper and lower endoscopy in 2021.  Iron infusion prior to discharge.    Recent Labs   Lab 09/29/23  0525 09/30/23  0519 10/01/23  0458   Hemoglobin 9.2 L 9.2 L 9.5 L

## 2023-10-01 NOTE — ASSESSMENT & PLAN NOTE
Vital signs noted continue antihypertensives    BP Readings from Last 3 Encounters:   10/01/23 139/62   09/21/23 (!) 113/57   07/11/23 (!) 142/58

## 2023-10-01 NOTE — SUBJECTIVE & OBJECTIVE
"Interval History: Patient seen and examined.     Review of Systems   Constitutional:  Positive for activity change and appetite change. Negative for chills and fever.   HENT:  Positive for trouble swallowing. Negative for ear pain, mouth sores, nosebleeds and sore throat.    Eyes:  Negative for visual disturbance.   Respiratory:  Positive for shortness of breath. Negative for wheezing.    Cardiovascular:  Negative for chest pain, palpitations and leg swelling ("much improved").   Gastrointestinal:  Negative for abdominal distention, abdominal pain, blood in stool, diarrhea, nausea and vomiting.   Endocrine: Negative for polyphagia.   Genitourinary:  Positive for frequency. Negative for difficulty urinating, dysuria and flank pain.   Musculoskeletal:  Positive for arthralgias, gait problem and joint swelling.   Skin:  Negative for rash.   Neurological:  Positive for weakness. Negative for dizziness, tremors, seizures, syncope and headaches.   Hematological:  Negative for adenopathy.   Psychiatric/Behavioral:  Negative for agitation, confusion and hallucinations. The patient is not nervous/anxious.      Objective:     Vital Signs (Most Recent):  Temp: 98.8 °F (37.1 °C) (10/01/23 1107)  Pulse: 66 (10/01/23 1453)  Resp: 19 (10/01/23 1107)  BP: 139/62 (10/01/23 1107)  SpO2: 97 % (10/01/23 1453) Vital Signs (24h Range):  Temp:  [97.6 °F (36.4 °C)-98.8 °F (37.1 °C)] 98.8 °F (37.1 °C)  Pulse:  [64-82] 66  Resp:  [18-21] 19  SpO2:  [93 %-100 %] 97 %  BP: (133-189)/(62-76) 139/62     Weight: 71.7 kg (158 lb 1.1 oz)  Body mass index is 24.75 kg/m².    Intake/Output Summary (Last 24 hours) at 10/1/2023 5453  Last data filed at 10/1/2023 0536  Gross per 24 hour   Intake 750 ml   Output 300 ml   Net 450 ml         Physical Exam  Vitals and nursing note reviewed.   Constitutional:       General: He is not in acute distress.     Appearance: He is not ill-appearing, toxic-appearing or diaphoretic.      Comments: Frail, chronically " ill-appearing   HENT:      Head: Normocephalic and atraumatic.      Comments: Forehead and nasal bridge lacerations intact with dressing in place.  No SOI     Nose: Nose normal. No congestion or rhinorrhea.      Mouth/Throat:      Mouth: Mucous membranes are dry.      Pharynx: No oropharyngeal exudate or posterior oropharyngeal erythema.   Eyes:      General: No scleral icterus.  Neck:      Vascular: No carotid bruit.   Cardiovascular:      Rate and Rhythm: Normal rate and regular rhythm.      Heart sounds: Murmur heard.      No friction rub. No gallop.   Pulmonary:      Effort: Pulmonary effort is normal. No respiratory distress.      Breath sounds: No stridor. Wheezing (faint distant) and rales present. No rhonchi.      Comments: Supplemental oxygen via nasal cannula  Chest:      Chest wall: No tenderness.   Abdominal:      General: There is no distension.      Palpations: Abdomen is soft. There is no mass.      Tenderness: There is no abdominal tenderness. There is no right CVA tenderness, left CVA tenderness, guarding or rebound.      Hernia: No hernia is present.   Musculoskeletal:         General: Deformity present. No tenderness.      Cervical back: Neck supple. No rigidity.      Right lower leg: No edema.      Left lower leg: No edema.   Lymphadenopathy:      Cervical: No cervical adenopathy.   Skin:     General: Skin is warm and dry.      Capillary Refill: Capillary refill takes less than 2 seconds.      Coloration: Skin is not jaundiced or pale.      Findings: No rash.   Neurological:      Mental Status: He is alert. Mental status is at baseline.      Cranial Nerves: No cranial nerve deficit.      Sensory: No sensory deficit.      Motor: Weakness present.      Coordination: Coordination normal.      Gait: Gait abnormal.   Psychiatric:         Mood and Affect: Mood normal.         Behavior: Behavior normal.         Thought Content: Thought content normal.         Judgment: Judgment normal.              Significant Labs: All pertinent labs within the past 24 hours have been reviewed.  BMP:   Recent Labs   Lab 10/01/23  0458   *      K 3.8      CO2 34*   BUN 9   CREATININE 0.4*   CALCIUM 8.5*   MG 2.0     CBC:   Recent Labs   Lab 09/30/23  0519 10/01/23  0458   WBC 8.23 9.19   HGB 9.2* 9.5*   HCT 31.7* 32.8*    292     CMP:   Recent Labs   Lab 09/30/23  0519 10/01/23  0458    140   K 3.5 3.8    106   CO2 32* 34*    115*   BUN 5* 9   CREATININE 0.4* 0.4*   CALCIUM 8.7 8.5*   PROT 6.1 6.3   ALBUMIN 2.7* 2.7*   BILITOT 0.9 0.9   ALKPHOS 52* 55   AST 40 39   ALT 27 29   ANIONGAP 2* 0*     Magnesium:   Recent Labs   Lab 09/30/23  0519 10/01/23  0458   MG 1.7 2.0       Significant Imaging: I have reviewed all pertinent imaging results/findings within the past 24 hours.

## 2023-10-01 NOTE — PLAN OF CARE
POC reviewed w/ pt and purposeful rounding ongoing. VSS on 2L NC. PPN infusing per orders. No PO meds given d/t to NPO status and inability to swallow. Safety precautions intact; no injuries or falls. Pt denies needs at this time.     Problem: Infection  Goal: Absence of Infection Signs and Symptoms  Outcome: Ongoing, Progressing     Problem: Adult Inpatient Plan of Care  Goal: Plan of Care Review  Outcome: Ongoing, Progressing  Goal: Patient-Specific Goal (Individualized)  Outcome: Ongoing, Progressing  Goal: Absence of Hospital-Acquired Illness or Injury  Outcome: Ongoing, Progressing  Goal: Optimal Comfort and Wellbeing  Outcome: Ongoing, Progressing  Goal: Readiness for Transition of Care  Outcome: Ongoing, Progressing     Problem: Fluid and Electrolyte Imbalance (Acute Kidney Injury/Impairment)  Goal: Fluid and Electrolyte Balance  Outcome: Ongoing, Progressing     Problem: Oral Intake Inadequate (Acute Kidney Injury/Impairment)  Goal: Optimal Nutrition Intake  Outcome: Ongoing, Progressing     Problem: Renal Function Impairment (Acute Kidney Injury/Impairment)  Goal: Effective Renal Function  Outcome: Ongoing, Progressing     Problem: Skin Injury Risk Increased  Goal: Skin Health and Integrity  Outcome: Ongoing, Progressing     Problem: Fluid Imbalance (Pneumonia)  Goal: Fluid Balance  Outcome: Ongoing, Progressing     Problem: Infection (Pneumonia)  Goal: Resolution of Infection Signs and Symptoms  Outcome: Ongoing, Progressing     Problem: Respiratory Compromise (Pneumonia)  Goal: Effective Oxygenation and Ventilation  Outcome: Ongoing, Progressing     Problem: Impaired Wound Healing  Goal: Optimal Wound Healing  Outcome: Ongoing, Progressing     Problem: Fall Injury Risk  Goal: Absence of Fall and Fall-Related Injury  Outcome: Ongoing, Progressing

## 2023-10-01 NOTE — PROGRESS NOTES
[facility] Medicine  Progress Note    Patient Name: Richard Farr  MRN: [id number]  Patient Class: IP- Inpatient   Admission Date: 9/22/2023  Length of Stay: 8 days  Attending Physician: Drew Moreira Jr., MD  Primary Care Provider: Drew Moreira Jr., MD        Subjective:     Principal Problem:Acute cystitis without hematuria        HPI:  ED HPI:  Richard Farr is a 78 y.o. male with PMHX of hypertension, hyperlipidemia, CHF, mitral regurg, tricuspid regurg, pulmonary hypertension, gastritis who presents to the emergency department C/O shortness of breath.     Patient presents after developing shortness of breath 1 hour prior to arrival.  Was recently admitted for anemia and received 4 units PRBCs in hospital as well as iron transfusion 2 days ago.  No fever but patient states he felt hot in emergency department.  Patient was diagnosed with urinary tract infection during previous hospitalization is on antibiotics.     PCP: Drew Moreira Jr., MD     IM HPI:  Patient was recently admitted by primary care and transfuse 4 units of packed red blood cells.  Patient felt much improved return home felt well for about 24 hours then began having increasing dyspnea.  Patient was readmitted found to have a urinary tract infection and a pneumonia.  Patient was started on antibiotics.  He was also given IV diuretics.  Patient has some peripheral edema but overall he states he feels dry and dehydrated.  Patient is having some oropharyngeal dysphagia and on exam I am unable to find the cause.  Patient has a history of CVA as well as a history of arthritis with several upper extremity joint deformities and contractures.      Overview/Hospital Course:  9/24 GT:  Patient is lying in bed this morning with spouse at bedside.  Patient is frail and chronically ill-appearing, but does not appear to be in any acute distress.  Patient reports his shortness a breath is at baseline, and he is tolerating supplemental  oxygen via nasal cannula.  Patient had some swallowing difficulties yesterday, SLP has been consulted for evaluation, treatment, dietary modifications have been made.  Patient's H&H is stable, and although anemic he is not at the point that he requires further transfusion.  Potassium mildly decreased, we will replete intravenously today due to swallowing difficulties and monitor with daily labs.  Blood cultures at this time are negative to date.  Patient and spouse deny any issues, concerns, questions.    9/25:  Patient eating breakfast this morning in no acute distress.  Speech therapy to evaluate the patient.  Thus far blood cultures are negative.  Patient feels back to baseline.  Urine culture without significant growth.  Consider discharge in the near future.  Patient having significant gas production check GI panel.    9/26:  No acute events overnight.  GI panel pending.  Provide iron infusion prior to potential discharge.  Patient to undergo modified barium swallow prior to discharge for completeness.  Home health will be coordinated ongoing outpatient needs.    Patient with significant aspiration by MBS.  Discuss with family potential needs for PEG placement vs ongoing ST.     9/27:  patient ct head negative.  Done for worsening dizziness/dysequilibrium after head trauma.  Awaiting  recs regarding PEG tube placement.     9/28/23:  patient resting this morning.  In favor or PEG placement tomorrow.   09/29/2023: No acute events overnight.  Planning potential endoscopy versus laparoscopy for PEG tube placement.  May need cardiac clearance.  Currently hemodynamically stable.  9/30: [place] weekend coverage. Awake and alert sitting on bedside chair. No new complaints at this time. Patient requires cardiology evaluation for endoscopy followed by laparoscopy guided PEG tube placement. Patient has been NPO, will start peripheral TPN along with IVF.   10/1 [place] weekend coverage. No acute events overnight. Patient not able  "to swallow PO meds. Convert PO meds to IV or IM. Follow up echocardiogram (10/2). Follow up cardiology ([facility]) pre-operative evaluation for endoscopy study on 10/4.       Interval History: Patient seen and examined.     Review of Systems   Constitutional:  Positive for activity change and appetite change. Negative for chills and fever.   HENT:  Positive for trouble swallowing. Negative for ear pain, mouth sores, nosebleeds and sore throat.    Eyes:  Negative for visual disturbance.   Respiratory:  Positive for shortness of breath. Negative for wheezing.    Cardiovascular:  Negative for chest pain, palpitations and leg swelling ("much improved").   Gastrointestinal:  Negative for abdominal distention, abdominal pain, blood in stool, diarrhea, nausea and vomiting.   Endocrine: Negative for polyphagia.   Genitourinary:  Positive for frequency. Negative for difficulty urinating, dysuria and flank pain.   Musculoskeletal:  Positive for arthralgias, gait problem and joint swelling.   Skin:  Negative for rash.   Neurological:  Positive for weakness. Negative for dizziness, tremors, seizures, syncope and headaches.   Hematological:  Negative for adenopathy.   Psychiatric/Behavioral:  Negative for agitation, confusion and hallucinations. The patient is not nervous/anxious.      Objective:     Vital Signs (Most Recent):  Temp: 98.8 °F (37.1 °C) (10/01/23 1107)  Pulse: 66 (10/01/23 1453)  Resp: 19 (10/01/23 1107)  BP: 139/62 (10/01/23 1107)  SpO2: 97 % (10/01/23 1453) Vital Signs (24h Range):  Temp:  [97.6 °F (36.4 °C)-98.8 °F (37.1 °C)] 98.8 °F (37.1 °C)  Pulse:  [64-82] 66  Resp:  [18-21] 19  SpO2:  [93 %-100 %] 97 %  BP: (133-189)/(62-76) 139/62     Weight: 71.7 kg (158 lb 1.1 oz)  Body mass index is 24.75 kg/m².    Intake/Output Summary (Last 24 hours) at 10/1/2023 5166  Last data filed at 10/1/2023 0536  Gross per 24 hour   Intake 750 ml   Output 300 ml   Net 450 ml         Physical Exam  Vitals and nursing note " reviewed.   Constitutional:       General: He is not in acute distress.     Appearance: He is not ill-appearing, toxic-appearing or diaphoretic.      Comments: Frail, chronically ill-appearing   HENT:      Head: Normocephalic and atraumatic.      Comments: Forehead and nasal bridge lacerations intact with dressing in place.  No SOI     Nose: Nose normal. No congestion or rhinorrhea.      Mouth/Throat:      Mouth: Mucous membranes are dry.      Pharynx: No oropharyngeal exudate or posterior oropharyngeal erythema.   Eyes:      General: No scleral icterus.  Neck:      Vascular: No carotid bruit.   Cardiovascular:      Rate and Rhythm: Normal rate and regular rhythm.      Heart sounds: Murmur heard.      No friction rub. No gallop.   Pulmonary:      Effort: Pulmonary effort is normal. No respiratory distress.      Breath sounds: No stridor. Wheezing (faint distant) and rales present. No rhonchi.      Comments: Supplemental oxygen via nasal cannula  Chest:      Chest wall: No tenderness.   Abdominal:      General: There is no distension.      Palpations: Abdomen is soft. There is no mass.      Tenderness: There is no abdominal tenderness. There is no right CVA tenderness, left CVA tenderness, guarding or rebound.      Hernia: No hernia is present.   Musculoskeletal:         General: Deformity present. No tenderness.      Cervical back: Neck supple. No rigidity.      Right lower leg: No edema.      Left lower leg: No edema.   Lymphadenopathy:      Cervical: No cervical adenopathy.   Skin:     General: Skin is warm and dry.      Capillary Refill: Capillary refill takes less than 2 seconds.      Coloration: Skin is not jaundiced or pale.      Findings: No rash.   Neurological:      Mental Status: He is alert. Mental status is at baseline.      Cranial Nerves: No cranial nerve deficit.      Sensory: No sensory deficit.      Motor: Weakness present.      Coordination: Coordination normal.      Gait: Gait abnormal.    Psychiatric:         Mood and Affect: Mood normal.         Behavior: Behavior normal.         Thought Content: Thought content normal.         Judgment: Judgment normal.             Significant Labs: All pertinent labs within the past 24 hours have been reviewed.  BMP:   Recent Labs   Lab 10/01/23  0458   *      K 3.8      CO2 34*   BUN 9   CREATININE 0.4*   CALCIUM 8.5*   MG 2.0     CBC:   Recent Labs   Lab 09/30/23  0519 10/01/23  0458   WBC 8.23 9.19   HGB 9.2* 9.5*   HCT 31.7* 32.8*    292     CMP:   Recent Labs   Lab 09/30/23  0519 10/01/23  0458    140   K 3.5 3.8    106   CO2 32* 34*    115*   BUN 5* 9   CREATININE 0.4* 0.4*   CALCIUM 8.7 8.5*   PROT 6.1 6.3   ALBUMIN 2.7* 2.7*   BILITOT 0.9 0.9   ALKPHOS 52* 55   AST 40 39   ALT 27 29   ANIONGAP 2* 0*     Magnesium:   Recent Labs   Lab 09/30/23  0519 10/01/23  0458   MG 1.7 2.0       Significant Imaging: I have reviewed all pertinent imaging results/findings within the past 24 hours.      Assessment/Plan:      * Acute cystitis without hematuria  Continue antibiotics awaiting cultures    Blood cultures are negative to date.  Urine culture negative    Ataxia  Worsening disequilibrium since fall and head trauma.  CT to rule out intracranial bleeding injury.  CT scan negative.  Family reassured.      Laceration of forehead  Healing. Treatment per GS.      Hypokalemia  We will replete intravenously today due to dysphagia.  Follow with daily labs.      Patient has hypokalemia.  Last electrolytes reviewed-   Recent Labs   Lab 09/30/23  0519 10/01/23  0458   K 3.5 3.8   Will replace potassium as per sliding scale as needed and monitor electrolytes closely.       Other dysphagia  Dietary modifications have been made.  SLP consulted for evaluation and treatment.  Significant aspiration.  NPO for now.  Pending Cardiology evaluation for GS to eval for PEG.  9/30 Remains NPO, will start PTPN feeds with continued IVF  10/1 PO  meds changed to IV/IM. Continue with PTPN. PICC Line placement ordered. General Surgery awaiting cardiology evaluation for procedure    Chronic heart failure with preserved ejection fraction (HFpEF) NICM NYHA3  Followed by Cardiology in the outpatient setting.  Euvolemic and well compensated currently.  Continue home medical therapy      Acute on chronic combined systolic and diastolic heart failure  Patient seems intravascularly dry.  Hold IV Lasix for now.    Appears euvolemic.        Pneumonia due to infectious organism  Continue current antibiotic regimen, supplemental oxygen.  Blood cultures are negative to date at this time.    Blood Culture, Routine   Date Value Ref Range Status   09/22/2023 No growth after 5 days.  Final     Urine Culture, Routine   Date Value Ref Range Status   09/22/2023 No significant growth  Final      9/27/23:  abx completed.       Pleural effusion, right  Unsure if this has been investigated will defer to primary.    Stable by hx.       Severe mitral regurgitation  At baseline.  Continue home medical therapy.      Essential hypertension  Vital signs noted continue antihypertensives    BP Readings from Last 3 Encounters:   10/01/23 139/62   09/21/23 (!) 113/57   07/11/23 (!) 142/58         Mixed hyperlipidemia  PO meds held as patient's current status NPO with no enteral access at this time.       Acute superficial gastritis without hemorrhage  Continue home treatment      Iron deficiency anemia  Patient states he had upper and lower endoscopy in 2021.  Iron infusion prior to discharge.    Recent Labs   Lab 09/29/23  0525 09/30/23  0519 10/01/23  0458   Hemoglobin 9.2 L 9.2 L 9.5 L               VTE Risk Mitigation (From admission, onward)         Ordered     IP VTE HIGH RISK PATIENT  Once         09/23/23 0215     Place sequential compression device  Until discontinued         09/23/23 0215                Discharge Planning   JOSÉ:      Code Status: Full Code   Is the patient medically  ready for discharge?:     Reason for patient still in hospital (select all that apply): Patient trending condition, Consult recommendations, PT / OT recommendations and Pending disposition  Discharge Plan A: Home with family                  Rasta Trent DO  Department of Hospital Medicine   [facility]

## 2023-10-01 NOTE — PLAN OF CARE
10/01/23 0959   Medicare Message   Important Message from Medicare regarding Discharge Appeal Rights Given to patient/caregiver;Explained to patient/caregiver;Signed/date by patient/caregiver   Date IMM was signed 10/01/23   Time IMM was signed 0911

## 2023-10-01 NOTE — PLAN OF CARE
POC reviewed w/ pt. PO meds held d/t NPO order and inability to swallow. TPN initiated and maintained per orders. VSS on 2L NC. Pt AAOx4 and up to BSC w/ 1 assist. Safety precautions intact; no injuries or falls. Pt denies needs at this time.     Problem: Infection  Goal: Absence of Infection Signs and Symptoms  Outcome: Ongoing, Progressing     Problem: Adult Inpatient Plan of Care  Goal: Plan of Care Review  Outcome: Ongoing, Progressing  Goal: Patient-Specific Goal (Individualized)  Outcome: Ongoing, Progressing  Goal: Absence of Hospital-Acquired Illness or Injury  Outcome: Ongoing, Progressing  Goal: Optimal Comfort and Wellbeing  Outcome: Ongoing, Progressing  Goal: Readiness for Transition of Care  Outcome: Ongoing, Progressing     Problem: Fluid and Electrolyte Imbalance (Acute Kidney Injury/Impairment)  Goal: Fluid and Electrolyte Balance  Outcome: Ongoing, Progressing     Problem: Oral Intake Inadequate (Acute Kidney Injury/Impairment)  Goal: Optimal Nutrition Intake  Outcome: Ongoing, Progressing     Problem: Renal Function Impairment (Acute Kidney Injury/Impairment)  Goal: Effective Renal Function  Outcome: Ongoing, Progressing     Problem: Skin Injury Risk Increased  Goal: Skin Health and Integrity  Outcome: Ongoing, Progressing     Problem: Fluid Imbalance (Pneumonia)  Goal: Fluid Balance  Outcome: Ongoing, Progressing     Problem: Infection (Pneumonia)  Goal: Resolution of Infection Signs and Symptoms  Outcome: Ongoing, Progressing     Problem: Respiratory Compromise (Pneumonia)  Goal: Effective Oxygenation and Ventilation  Outcome: Ongoing, Progressing     Problem: Impaired Wound Healing  Goal: Optimal Wound Healing  Outcome: Ongoing, Progressing     Problem: Fall Injury Risk  Goal: Absence of Fall and Fall-Related Injury  Outcome: Ongoing, Progressing

## 2023-10-01 NOTE — ASSESSMENT & PLAN NOTE
We will replete intravenously today due to dysphagia.  Follow with daily labs.      Patient has hypokalemia.  Last electrolytes reviewed-   Recent Labs   Lab 09/30/23  0519 10/01/23  0458   K 3.5 3.8   Will replace potassium as per sliding scale as needed and monitor electrolytes closely.

## 2023-10-01 NOTE — ASSESSMENT & PLAN NOTE
Dietary modifications have been made.  SLP consulted for evaluation and treatment.  Significant aspiration.  NPO for now.  Pending Cardiology evaluation for GS to eval for PEG.  9/30 Remains NPO, will start PTPN feeds with continued IVF  10/1 PO meds changed to IV/IM. Continue with PTPN. PICC Line placement ordered. General Surgery awaiting cardiology evaluation for procedure

## 2023-10-02 LAB
ALBUMIN SERPL BCP-MCNC: 2.6 G/DL (ref 3.5–5.2)
ALP SERPL-CCNC: 54 U/L (ref 55–135)
ALT SERPL W/O P-5'-P-CCNC: 25 U/L (ref 10–44)
ANION GAP SERPL CALC-SCNC: -1 MMOL/L (ref 3–11)
ANISOCYTOSIS BLD QL SMEAR: ABNORMAL
AST SERPL-CCNC: 34 U/L (ref 10–40)
BASOPHILS # BLD AUTO: 0.09 K/UL (ref 0–0.2)
BASOPHILS NFR BLD: 1.1 % (ref 0–1.9)
BILIRUB SERPL-MCNC: 0.7 MG/DL (ref 0.1–1)
BUN SERPL-MCNC: 13 MG/DL (ref 8–23)
BURR CELLS BLD QL SMEAR: ABNORMAL
CALCIUM SERPL-MCNC: 8.7 MG/DL (ref 8.7–10.5)
CHLORIDE SERPL-SCNC: 106 MMOL/L (ref 95–110)
CO2 SERPL-SCNC: 34 MMOL/L (ref 23–29)
CREAT SERPL-MCNC: 0.4 MG/DL (ref 0.5–1.4)
DIFFERENTIAL METHOD: ABNORMAL
EOSINOPHIL # BLD AUTO: 0.2 K/UL (ref 0–0.5)
EOSINOPHIL NFR BLD: 3 % (ref 0–8)
ERYTHROCYTE [DISTWIDTH] IN BLOOD BY AUTOMATED COUNT: ABNORMAL % (ref 11.5–14.5)
EST. GFR  (NO RACE VARIABLE): >60 ML/MIN/1.73 M^2
GLUCOSE SERPL-MCNC: 110 MG/DL (ref 70–110)
HCT VFR BLD AUTO: 32.4 % (ref 40–54)
HGB BLD-MCNC: 9.5 G/DL (ref 14–18)
HYPOCHROMIA BLD QL SMEAR: ABNORMAL
IMM GRANULOCYTES # BLD AUTO: 0.03 K/UL (ref 0–0.04)
IMM GRANULOCYTES NFR BLD AUTO: 0.4 % (ref 0–0.5)
LYMPHOCYTES # BLD AUTO: 1.6 K/UL (ref 1–4.8)
LYMPHOCYTES NFR BLD: 20.6 % (ref 18–48)
MAGNESIUM SERPL-MCNC: 1.8 MG/DL (ref 1.6–2.6)
MCH RBC QN AUTO: 23.6 PG (ref 27–31)
MCHC RBC AUTO-ENTMCNC: 29.3 G/DL (ref 32–36)
MCV RBC AUTO: 81 FL (ref 82–98)
MONOCYTES # BLD AUTO: 0.8 K/UL (ref 0.3–1)
MONOCYTES NFR BLD: 9.5 % (ref 4–15)
NEUTROPHILS # BLD AUTO: 5.2 K/UL (ref 1.8–7.7)
NEUTROPHILS NFR BLD: 65.4 % (ref 38–73)
NRBC BLD-RTO: 0 /100 WBC
PLATELET # BLD AUTO: 275 K/UL (ref 150–450)
PMV BLD AUTO: ABNORMAL FL (ref 9.2–12.9)
POIKILOCYTOSIS BLD QL SMEAR: ABNORMAL
POTASSIUM SERPL-SCNC: 4.2 MMOL/L (ref 3.5–5.1)
PROT SERPL-MCNC: 6.4 G/DL (ref 6–8.4)
RBC # BLD AUTO: 4.02 M/UL (ref 4.6–6.2)
SCHISTOCYTES BLD QL SMEAR: ABNORMAL
SCHISTOCYTES BLD QL SMEAR: ABNORMAL
SODIUM SERPL-SCNC: 139 MMOL/L (ref 136–145)
SPHEROCYTES BLD QL SMEAR: ABNORMAL
TARGETS BLD QL SMEAR: ABNORMAL
WBC # BLD AUTO: 7.97 K/UL (ref 3.9–12.7)

## 2023-10-02 PROCEDURE — 85025 COMPLETE CBC W/AUTO DIFF WBC: CPT | Performed by: INTERNAL MEDICINE

## 2023-10-02 PROCEDURE — 80053 COMPREHEN METABOLIC PANEL: CPT | Performed by: INTERNAL MEDICINE

## 2023-10-02 PROCEDURE — 11000001 HC ACUTE MED/SURG PRIVATE ROOM

## 2023-10-02 PROCEDURE — 97535 SELF CARE MNGMENT TRAINING: CPT | Mod: CO

## 2023-10-02 PROCEDURE — 36415 COLL VENOUS BLD VENIPUNCTURE: CPT | Performed by: INTERNAL MEDICINE

## 2023-10-02 PROCEDURE — 97110 THERAPEUTIC EXERCISES: CPT

## 2023-10-02 PROCEDURE — 97530 THERAPEUTIC ACTIVITIES: CPT

## 2023-10-02 PROCEDURE — 63600175 PHARM REV CODE 636 W HCPCS: Performed by: INTERNAL MEDICINE

## 2023-10-02 PROCEDURE — 94761 N-INVAS EAR/PLS OXIMETRY MLT: CPT

## 2023-10-02 PROCEDURE — 27000221 HC OXYGEN, UP TO 24 HOURS

## 2023-10-02 PROCEDURE — 83735 ASSAY OF MAGNESIUM: CPT | Performed by: INTERNAL MEDICINE

## 2023-10-02 PROCEDURE — 25000003 PHARM REV CODE 250: Performed by: INTERNAL MEDICINE

## 2023-10-02 PROCEDURE — C9113 INJ PANTOPRAZOLE SODIUM, VIA: HCPCS | Performed by: INTERNAL MEDICINE

## 2023-10-02 PROCEDURE — 99900035 HC TECH TIME PER 15 MIN (STAT)

## 2023-10-02 PROCEDURE — 25000003 PHARM REV CODE 250: Performed by: STUDENT IN AN ORGANIZED HEALTH CARE EDUCATION/TRAINING PROGRAM

## 2023-10-02 PROCEDURE — 99900031 HC PATIENT EDUCATION (STAT)

## 2023-10-02 PROCEDURE — 21400001 HC TELEMETRY ROOM

## 2023-10-02 PROCEDURE — A4216 STERILE WATER/SALINE, 10 ML: HCPCS | Performed by: INTERNAL MEDICINE

## 2023-10-02 RX ORDER — FUROSEMIDE 10 MG/ML
40 INJECTION INTRAMUSCULAR; INTRAVENOUS DAILY
Status: DISCONTINUED | OUTPATIENT
Start: 2023-10-02 | End: 2023-10-06 | Stop reason: HOSPADM

## 2023-10-02 RX ADMIN — Medication 10 ML: at 12:10

## 2023-10-02 RX ADMIN — PANTOPRAZOLE SODIUM 40 MG: 40 INJECTION, POWDER, FOR SOLUTION INTRAVENOUS at 10:10

## 2023-10-02 RX ADMIN — METOROPROLOL TARTRATE 2.5 MG: 5 INJECTION, SOLUTION INTRAVENOUS at 09:10

## 2023-10-02 RX ADMIN — LEVOTHYROXINE SODIUM 25 MCG: 20 INJECTION, SOLUTION INTRAVENOUS at 10:10

## 2023-10-02 RX ADMIN — Medication 10 ML: at 06:10

## 2023-10-02 RX ADMIN — LEUCINE, PHENYLALANINE, LYSINE, METHIONINE, ISOLEUCINE, VALINE, HISTIDINE, THREONINE, TRYPTOPHAN, ALANINE, GLYCINE, ARGININE, PROLINE, SERINE, TYROSINE, SODIUM ACETATE, DIBASIC POTASSIUM PHOSPHATE, MAGNESIUM CHLORIDE, SODIUM CHLORIDE, CALCIUM CHLORIDE, DEXTROSE
311; 238; 247; 170; 255; 247; 204; 179; 77; 880; 438; 489; 289; 213; 17; 297; 261; 51; 77; 33; 5 INJECTION INTRAVENOUS at 06:10

## 2023-10-02 RX ADMIN — FUROSEMIDE 40 MG: 10 INJECTION, SOLUTION INTRAVENOUS at 10:10

## 2023-10-02 NOTE — PT/OT/SLP PROGRESS
Occupational Therapy   Treatment    Name: Richard Farr  MRN: [id number]  Admitting Diagnosis:  Acute cystitis without hematuria       Recommendations:     Discharge Recommendations: home with home health, home health OT  Discharge Equipment Recommendations:  none  Barriers to discharge:  None    Assessment:     Richard Farr is a 78 y.o. male with a medical diagnosis of Acute cystitis without hematuria.  He presents with fatigue. Performance deficits affecting function are weakness, impaired endurance, impaired self care skills, impaired functional mobility, gait instability, impaired balance, impaired cognition, decreased coordination, decreased upper extremity function, decreased lower extremity function, decreased safety awareness, abnormal tone, decreased ROM, impaired coordination, impaired fine motor, impaired cardiopulmonary response to activity, impaired joint extensibility, impaired muscle length. Arrived to room with wife at bed side and patient still asleep. Patient easy to wake up but continued to fall asleep t/o session needing max verbal and tactile cues. Patient and wife educated that it could be to medication administered last night. Patient agreeable to participate in therapy despite fatigue. Patient needed mod assistance from supine to sitting edge of bed. Patient completed upper body dressing needing mod assistance and set up assistance with buttons and correct positioning. Patient completed while sitting edge of bed needing mod verbal and tactile cues. Patient then completed sit to stand t/f with min assistance needed to stand and mod assistance for side stepping for steady assistance due to patient decrease balance this day and fatigue. Patient side steps x 7 attempts for better positing in bed. Patient then returned to supine due to fall risk since patient is very fatigue and unbalanced. Patient and wide educated on safety and fall prevention post session. Wife requesting information on meds  administered last night due to patient significant decline in functional status. Upon returning back to supine, wife was asking patient questions and patient was unable to answer due to fall asleep. Patient speech was impaired and not understandable. Bed alarm placed back on and camera on in room due to fall risk.     Rehab Prognosis:  Good; patient would benefit from acute skilled OT services to address these deficits and reach maximum level of function.       Plan:     Patient to be seen 5 x/week to address the above listed problems via self-care/home management, therapeutic activities, therapeutic exercises, cognitive retraining  Plan of Care Expires: 10/02/23  Plan of Care Reviewed with: patient    Subjective     Chief Complaint: Unable to answer  Patient/Family Comments/goals: Unable to answer  Pain/Comfort:  Pain Rating 1: 0/10  Pain Rating Post-Intervention 1: 0/10    Objective:     Communicated with: OT prior to session.  Patient found HOB elevated with telemetry, peripheral IV, PureWick, oxygen upon OT entry to room.    General Precautions: Standard, aspiration, fall    Orthopedic Precautions:N/A  Braces: N/A  Respiratory Status: Nasal cannula, flow 2 L/min     Occupational Performance:     Bed Mobility:    Patient completed Rolling/Turning to Left with  contact guard assistance and with side rail  Patient completed Scooting/Bridging with contact guard assistance and with side rail  Patient completed Supine to Sit with moderate assistance  Patient completed Sit to Supine with minimum assistance     Functional Mobility/Transfers:  Patient completed Sit <> Stand Transfer with moderate assistance  with  hand-held assist   Functional Mobility: patient completed side stepping x 7 trails with mod assistance due to impaired balance    Activities of Daily Living:  Upper Body Dressing: moderate assistance to patrick gown      [facility] 6 Click ADL: 19    Treatment & Education:  Patient and wife educated on side effects of  med can cause fatigue. Patient and wife also educated on fall preventions and to importance of bed alarm and camera. Patient and wife educated on completing exercise if patient is more alert this afternoon.     Patient left HOB elevated with all lines intact, call button in reach, bed alarm on, rn notified, and wife present    GOALS:   Multidisciplinary Problems       Occupational Therapy Goals          Problem: Occupational Therapy    Goal Priority Disciplines Outcome Interventions   Occupational Therapy Goal     OT, PT/OT Ongoing, Progressing    Description: Goals to be met by: 10/02/23     Patient will increase functional independence with ADLs by performing:    Feeding with Set-up Assistance.  UE Dressing with Minimal Assistance.  LE Dressing with Minimal Assistance.  Grooming while seated with Set-up Assistance.  Toileting from toilet with Minimal Assistance for hygiene and clothing management.   Bathing from  shower chair/bench with Minimal Assistance.  Toilet transfer to toilet with Stand-by Assistance.                         Time Tracking:     OT Date of Treatment: 10/02/23  OT Start Time: 0947  OT Stop Time: 1005  OT Total Time (min): 18 min    Billable Minutes:Self Care/Home Management 18 min    OT/SIRI: SIRI     Number of SIRI visits since last OT visit: 5    10/2/2023  Doreen ALMEIDA

## 2023-10-02 NOTE — PLAN OF CARE
POC reviewed w/ pt and purposeful rounding ongoing. VSS on 2L NC. PPN infusing per orders. No PO meds given d/t to NPO status and inability to swallow.  Ativan giving for restlessness, relief obtained. Safety precautions intact; no injuries or falls. Pt denies needs at this time.      Problem: Infection  Goal: Absence of Infection Signs and Symptoms  Outcome: Ongoing, Progressing     Problem: Adult Inpatient Plan of Care  Goal: Plan of Care Review  Outcome: Ongoing, Progressing  Goal: Patient-Specific Goal (Individualized)  Outcome: Ongoing, Progressing  Goal: Absence of Hospital-Acquired Illness or Injury  Outcome: Ongoing, Progressing  Goal: Optimal Comfort and Wellbeing  Outcome: Ongoing, Progressing  Goal: Readiness for Transition of Care  Outcome: Ongoing, Progressing     Problem: Fluid and Electrolyte Imbalance (Acute Kidney Injury/Impairment)  Goal: Fluid and Electrolyte Balance  Outcome: Ongoing, Progressing     Problem: Oral Intake Inadequate (Acute Kidney Injury/Impairment)  Goal: Optimal Nutrition Intake  Outcome: Ongoing, Progressing     Problem: Renal Function Impairment (Acute Kidney Injury/Impairment)  Goal: Effective Renal Function  Outcome: Ongoing, Progressing     Problem: Skin Injury Risk Increased  Goal: Skin Health and Integrity  Outcome: Ongoing, Progressing     Problem: Fluid Imbalance (Pneumonia)  Goal: Fluid Balance  Outcome: Ongoing, Progressing     Problem: Infection (Pneumonia)  Goal: Resolution of Infection Signs and Symptoms  Outcome: Ongoing, Progressing     Problem: Respiratory Compromise (Pneumonia)  Goal: Effective Oxygenation and Ventilation  Outcome: Ongoing, Progressing     Problem: Impaired Wound Healing  Goal: Optimal Wound Healing  Outcome: Ongoing, Progressing     Problem: Fall Injury Risk  Goal: Absence of Fall and Fall-Related Injury  Outcome: Ongoing, Progressing

## 2023-10-02 NOTE — PROGRESS NOTES
[facility] Medicine  Progress Note    Patient Name: Richard Farr  MRN: [id number]  Patient Class: IP- Inpatient   Admission Date: 9/22/2023  Length of Stay: 9 days  Attending Physician: Drew Moreira Jr., MD  Primary Care Provider: Drew Moreira Jr., MD        Subjective:     Principal Problem:Acute cystitis without hematuria        HPI:  ED HPI:  Richard Farr is a 78 y.o. male with PMHX of hypertension, hyperlipidemia, CHF, mitral regurg, tricuspid regurg, pulmonary hypertension, gastritis who presents to the emergency department C/O shortness of breath.     Patient presents after developing shortness of breath 1 hour prior to arrival.  Was recently admitted for anemia and received 4 units PRBCs in hospital as well as iron transfusion 2 days ago.  No fever but patient states he felt hot in emergency department.  Patient was diagnosed with urinary tract infection during previous hospitalization is on antibiotics.     PCP: Drew Moreira Jr., MD     IM HPI:  Patient was recently admitted by primary care and transfuse 4 units of packed red blood cells.  Patient felt much improved return home felt well for about 24 hours then began having increasing dyspnea.  Patient was readmitted found to have a urinary tract infection and a pneumonia.  Patient was started on antibiotics.  He was also given IV diuretics.  Patient has some peripheral edema but overall he states he feels dry and dehydrated.  Patient is having some oropharyngeal dysphagia and on exam I am unable to find the cause.  Patient has a history of CVA as well as a history of arthritis with several upper extremity joint deformities and contractures.      Overview/Hospital Course:  9/24 GT:  Patient is lying in bed this morning with spouse at bedside.  Patient is frail and chronically ill-appearing, but does not appear to be in any acute distress.  Patient reports his shortness a breath is at baseline, and he is tolerating supplemental  oxygen via nasal cannula.  Patient had some swallowing difficulties yesterday, SLP has been consulted for evaluation, treatment, dietary modifications have been made.  Patient's H&H is stable, and although anemic he is not at the point that he requires further transfusion.  Potassium mildly decreased, we will replete intravenously today due to swallowing difficulties and monitor with daily labs.  Blood cultures at this time are negative to date.  Patient and spouse deny any issues, concerns, questions.    9/25:  Patient eating breakfast this morning in no acute distress.  Speech therapy to evaluate the patient.  Thus far blood cultures are negative.  Patient feels back to baseline.  Urine culture without significant growth.  Consider discharge in the near future.  Patient having significant gas production check GI panel.    9/26:  No acute events overnight.  GI panel pending.  Provide iron infusion prior to potential discharge.  Patient to undergo modified barium swallow prior to discharge for completeness.  Home health will be coordinated ongoing outpatient needs.    Patient with significant aspiration by MBS.  Discuss with family potential needs for PEG placement vs ongoing ST.     9/27:  patient ct head negative.  Done for worsening dizziness/dysequilibrium after head trauma.  Awaiting  recs regarding PEG tube placement.     9/28/23:  patient resting this morning.  In favor or PEG placement tomorrow.   09/29/2023: No acute events overnight.  Planning potential endoscopy versus laparoscopy for PEG tube placement.  May need cardiac clearance.  Currently hemodynamically stable.  9/30: [place] weekend coverage. Awake and alert sitting on bedside chair. No new complaints at this time. Patient requires cardiology evaluation for endoscopy followed by laparoscopy guided PEG tube placement. Patient has been NPO, will start peripheral TPN along with IVF.   10/1 [place] weekend coverage. No acute events overnight. Patient not able  "to swallow PO meds. Convert PO meds to IV or IM. Follow up echocardiogram (10/2). Follow up cardiology ([facility]) pre-operative evaluation for endoscopy study on 10/4.     10/02/2023: Patient remains NPO.  Overall stable awaiting cardiac clearance prior to potential endoscopy 10/4.      Interval History: Patient seen and examined.     Review of Systems   Constitutional:  Positive for activity change and appetite change. Negative for chills and fever.   HENT:  Positive for trouble swallowing. Negative for ear pain, mouth sores, nosebleeds and sore throat.    Eyes:  Negative for visual disturbance.   Respiratory:  Positive for shortness of breath. Negative for wheezing.    Cardiovascular:  Negative for chest pain, palpitations and leg swelling ("much improved").   Gastrointestinal:  Negative for abdominal distention, abdominal pain, blood in stool, diarrhea, nausea and vomiting.   Endocrine: Negative for polyphagia.   Genitourinary:  Positive for frequency. Negative for difficulty urinating, dysuria and flank pain.   Musculoskeletal:  Positive for arthralgias, gait problem and joint swelling.   Skin:  Negative for rash.   Neurological:  Positive for weakness. Negative for dizziness, tremors, seizures, syncope and headaches.   Hematological:  Negative for adenopathy.   Psychiatric/Behavioral:  Negative for agitation, confusion and hallucinations. The patient is not nervous/anxious.      Objective:     Vital Signs (Most Recent):  Temp: 97.8 °F (36.6 °C) (10/02/23 0749)  Pulse: 84 (10/02/23 0822)  Resp: 18 (10/02/23 0822)  BP: (!) 152/76 (10/02/23 0749)  SpO2: 96 % (10/02/23 0822) Vital Signs (24h Range):  Temp:  [97 °F (36.1 °C)-98.8 °F (37.1 °C)] 97.8 °F (36.6 °C)  Pulse:  [66-99] 84  Resp:  [18-22] 18  SpO2:  [87 %-100 %] 96 %  BP: (131-152)/(62-76) 152/76     Weight: 71.7 kg (158 lb 1.1 oz)  Body mass index is 24.75 kg/m².    Intake/Output Summary (Last 24 hours) at 10/2/2023 0843  Last data filed at 10/2/2023 " 0600  Gross per 24 hour   Intake 912.5 ml   Output 201 ml   Net 711.5 ml           Physical Exam  Vitals and nursing note reviewed.   Constitutional:       General: He is not in acute distress.     Appearance: He is not ill-appearing, toxic-appearing or diaphoretic.      Comments: Frail, chronically ill-appearing   HENT:      Head: Normocephalic and atraumatic.      Comments: Forehead and nasal bridge lacerations intact with dressing in place.  No SOI     Nose: Nose normal. No congestion or rhinorrhea.      Mouth/Throat:      Mouth: Mucous membranes are dry.      Pharynx: No oropharyngeal exudate or posterior oropharyngeal erythema.   Eyes:      General: No scleral icterus.  Neck:      Vascular: No carotid bruit.   Cardiovascular:      Rate and Rhythm: Normal rate and regular rhythm.      Heart sounds: Murmur heard.      No friction rub. No gallop.   Pulmonary:      Effort: Pulmonary effort is normal. No respiratory distress.      Breath sounds: No stridor. Wheezing (faint distant) and rales present. No rhonchi.      Comments: Supplemental oxygen via nasal cannula  Chest:      Chest wall: No tenderness.   Abdominal:      General: There is no distension.      Palpations: Abdomen is soft. There is no mass.      Tenderness: There is no abdominal tenderness. There is no right CVA tenderness, left CVA tenderness, guarding or rebound.      Hernia: No hernia is present.   Musculoskeletal:         General: Deformity present. No tenderness.      Cervical back: Neck supple. No rigidity.      Right lower leg: No edema.      Left lower leg: No edema.   Lymphadenopathy:      Cervical: No cervical adenopathy.   Skin:     General: Skin is warm and dry.      Capillary Refill: Capillary refill takes less than 2 seconds.      Coloration: Skin is not jaundiced or pale.      Findings: No rash.   Neurological:      Mental Status: He is alert. Mental status is at baseline.      Cranial Nerves: No cranial nerve deficit.      Sensory: No  sensory deficit.      Motor: Weakness present.      Coordination: Coordination normal.      Gait: Gait abnormal.   Psychiatric:         Mood and Affect: Mood normal.         Behavior: Behavior normal.         Thought Content: Thought content normal.         Judgment: Judgment normal.             Significant Labs: All pertinent labs within the past 24 hours have been reviewed.  BMP:   Recent Labs   Lab 10/02/23  0428         K 4.2      CO2 34*   BUN 13   CREATININE 0.4*   CALCIUM 8.7   MG 1.8       CBC:   Recent Labs   Lab 10/01/23  0458 10/02/23  0428   WBC 9.19 7.97   HGB 9.5* 9.5*   HCT 32.8* 32.4*    275       CMP:   Recent Labs   Lab 10/01/23  0458 10/02/23  0428    139   K 3.8 4.2    106   CO2 34* 34*   * 110   BUN 9 13   CREATININE 0.4* 0.4*   CALCIUM 8.5* 8.7   PROT 6.3 6.4   ALBUMIN 2.7* 2.6*   BILITOT 0.9 0.7   ALKPHOS 55 54*   AST 39 34   ALT 29 25   ANIONGAP 0* -1*       Magnesium:   Recent Labs   Lab 10/01/23  0458 10/02/23  0428   MG 2.0 1.8         Significant Imaging: I have reviewed all pertinent imaging results/findings within the past 24 hours.      Assessment/Plan:      * Acute cystitis without hematuria  Continue antibiotics awaiting cultures    Blood cultures are negative to date.  Urine culture negative    Ataxia  Worsening disequilibrium since fall and head trauma.  CT to rule out intracranial bleeding injury.  CT scan negative.  Family reassured.      Laceration of forehead  Healing. Treatment per GS.      Hypokalemia  We will replete intravenously today due to dysphagia.  Follow with daily labs.      Patient has hypokalemia.  Last electrolytes reviewed-   Recent Labs   Lab 10/01/23  0458 10/02/23  0428   K 3.8 4.2   Will replace potassium as per sliding scale as needed and monitor electrolytes closely.       Other dysphagia  Dietary modifications have been made.  SLP consulted for evaluation and treatment.  Significant aspiration.  NPO for now.   Pending Cardiology evaluation for GS to eval for PEG.  9/30 Remains NPO, will start PTPN feeds with continued IVF  10/1 PO meds changed to IV/IM. Continue with PTPN. PICC Line placement ordered. General Surgery awaiting cardiology evaluation for procedure  10/2:  Awaiting cardiac clearance prior to PEG placement. Continue TPN    Chronic heart failure with preserved ejection fraction (HFpEF) NICM NYHA3  Followed by Cardiology in the outpatient setting.  Euvolemic and well compensated currently.  Continue home medical therapy      Acute on chronic combined systolic and diastolic heart failure  Patient seems intravascularly dry.  Hold IV Lasix for now.    Appears euvolemic.        Pneumonia due to infectious organism  Continue current antibiotic regimen, supplemental oxygen.  Blood cultures are negative to date at this time.    Blood Culture, Routine   Date Value Ref Range Status   09/22/2023 No growth after 5 days.  Final     Urine Culture, Routine   Date Value Ref Range Status   09/22/2023 No significant growth  Final      9/27/23:  abx completed.       Pleural effusion, right  Unsure if this has been investigated will defer to primary.    Stable by hx.       Severe mitral regurgitation  At baseline.  Continue home medical therapy.      Essential hypertension  Vital signs noted continue antihypertensives    BP Readings from Last 3 Encounters:   10/02/23 (!) 152/76   09/21/23 (!) 113/57   07/11/23 (!) 142/58         Mixed hyperlipidemia  PO meds held as patient's current status NPO with no enteral access at this time.       Acute superficial gastritis without hemorrhage  Continue home treatment      Iron deficiency anemia  Patient states he had upper and lower endoscopy in 2021.  Iron infusion prior to discharge.    Recent Labs   Lab 09/30/23  0519 10/01/23  0458 10/02/23  0428   Hemoglobin 9.2 L 9.5 L 9.5 L               VTE Risk Mitigation (From admission, onward)         Ordered     IP VTE HIGH RISK PATIENT  Once          09/23/23 0215     Place sequential compression device  Until discontinued         09/23/23 0215                Discharge Planning   JOSÉ:      Code Status: Full Code   Is the patient medically ready for discharge?:     Reason for patient still in hospital (select all that apply): Treatment  Discharge Plan A: Home with family                  Drew Moreira Jr, MD  Department of Hospital Medicine   [facility]

## 2023-10-02 NOTE — ASSESSMENT & PLAN NOTE
Dietary modifications have been made.  SLP consulted for evaluation and treatment.  Significant aspiration.  NPO for now.  Pending Cardiology evaluation for GS to eval for PEG.  9/30 Remains NPO, will start PTPN feeds with continued IVF  10/1 PO meds changed to IV/IM. Continue with PTPN. PICC Line placement ordered. General Surgery awaiting cardiology evaluation for procedure  10/2:  Awaiting cardiac clearance prior to PEG placement. Continue TPN

## 2023-10-02 NOTE — SUBJECTIVE & OBJECTIVE
"Interval History: Patient seen and examined.     Review of Systems   Constitutional:  Positive for activity change and appetite change. Negative for chills and fever.   HENT:  Positive for trouble swallowing. Negative for ear pain, mouth sores, nosebleeds and sore throat.    Eyes:  Negative for visual disturbance.   Respiratory:  Positive for shortness of breath. Negative for wheezing.    Cardiovascular:  Negative for chest pain, palpitations and leg swelling ("much improved").   Gastrointestinal:  Negative for abdominal distention, abdominal pain, blood in stool, diarrhea, nausea and vomiting.   Endocrine: Negative for polyphagia.   Genitourinary:  Positive for frequency. Negative for difficulty urinating, dysuria and flank pain.   Musculoskeletal:  Positive for arthralgias, gait problem and joint swelling.   Skin:  Negative for rash.   Neurological:  Positive for weakness. Negative for dizziness, tremors, seizures, syncope and headaches.   Hematological:  Negative for adenopathy.   Psychiatric/Behavioral:  Negative for agitation, confusion and hallucinations. The patient is not nervous/anxious.      Objective:     Vital Signs (Most Recent):  Temp: 97.8 °F (36.6 °C) (10/02/23 0749)  Pulse: 84 (10/02/23 0822)  Resp: 18 (10/02/23 0822)  BP: (!) 152/76 (10/02/23 0749)  SpO2: 96 % (10/02/23 0822) Vital Signs (24h Range):  Temp:  [97 °F (36.1 °C)-98.8 °F (37.1 °C)] 97.8 °F (36.6 °C)  Pulse:  [66-99] 84  Resp:  [18-22] 18  SpO2:  [87 %-100 %] 96 %  BP: (131-152)/(62-76) 152/76     Weight: 71.7 kg (158 lb 1.1 oz)  Body mass index is 24.75 kg/m².    Intake/Output Summary (Last 24 hours) at 10/2/2023 0843  Last data filed at 10/2/2023 0600  Gross per 24 hour   Intake 912.5 ml   Output 201 ml   Net 711.5 ml           Physical Exam  Vitals and nursing note reviewed.   Constitutional:       General: He is not in acute distress.     Appearance: He is not ill-appearing, toxic-appearing or diaphoretic.      Comments: Frail, " chronically ill-appearing   HENT:      Head: Normocephalic and atraumatic.      Comments: Forehead and nasal bridge lacerations intact with dressing in place.  No SOI     Nose: Nose normal. No congestion or rhinorrhea.      Mouth/Throat:      Mouth: Mucous membranes are dry.      Pharynx: No oropharyngeal exudate or posterior oropharyngeal erythema.   Eyes:      General: No scleral icterus.  Neck:      Vascular: No carotid bruit.   Cardiovascular:      Rate and Rhythm: Normal rate and regular rhythm.      Heart sounds: Murmur heard.      No friction rub. No gallop.   Pulmonary:      Effort: Pulmonary effort is normal. No respiratory distress.      Breath sounds: No stridor. Wheezing (faint distant) and rales present. No rhonchi.      Comments: Supplemental oxygen via nasal cannula  Chest:      Chest wall: No tenderness.   Abdominal:      General: There is no distension.      Palpations: Abdomen is soft. There is no mass.      Tenderness: There is no abdominal tenderness. There is no right CVA tenderness, left CVA tenderness, guarding or rebound.      Hernia: No hernia is present.   Musculoskeletal:         General: Deformity present. No tenderness.      Cervical back: Neck supple. No rigidity.      Right lower leg: No edema.      Left lower leg: No edema.   Lymphadenopathy:      Cervical: No cervical adenopathy.   Skin:     General: Skin is warm and dry.      Capillary Refill: Capillary refill takes less than 2 seconds.      Coloration: Skin is not jaundiced or pale.      Findings: No rash.   Neurological:      Mental Status: He is alert. Mental status is at baseline.      Cranial Nerves: No cranial nerve deficit.      Sensory: No sensory deficit.      Motor: Weakness present.      Coordination: Coordination normal.      Gait: Gait abnormal.   Psychiatric:         Mood and Affect: Mood normal.         Behavior: Behavior normal.         Thought Content: Thought content normal.         Judgment: Judgment normal.              Significant Labs: All pertinent labs within the past 24 hours have been reviewed.  BMP:   Recent Labs   Lab 10/02/23  0428         K 4.2      CO2 34*   BUN 13   CREATININE 0.4*   CALCIUM 8.7   MG 1.8       CBC:   Recent Labs   Lab 10/01/23  0458 10/02/23  0428   WBC 9.19 7.97   HGB 9.5* 9.5*   HCT 32.8* 32.4*    275       CMP:   Recent Labs   Lab 10/01/23  0458 10/02/23  0428    139   K 3.8 4.2    106   CO2 34* 34*   * 110   BUN 9 13   CREATININE 0.4* 0.4*   CALCIUM 8.5* 8.7   PROT 6.3 6.4   ALBUMIN 2.7* 2.6*   BILITOT 0.9 0.7   ALKPHOS 55 54*   AST 39 34   ALT 29 25   ANIONGAP 0* -1*       Magnesium:   Recent Labs   Lab 10/01/23  0458 10/02/23  0428   MG 2.0 1.8         Significant Imaging: I have reviewed all pertinent imaging results/findings within the past 24 hours.

## 2023-10-02 NOTE — ASSESSMENT & PLAN NOTE
We will replete intravenously today due to dysphagia.  Follow with daily labs.      Patient has hypokalemia.  Last electrolytes reviewed-   Recent Labs   Lab 10/01/23  9082 10/02/23  0429   K 3.8 4.2   Will replace potassium as per sliding scale as needed and monitor electrolytes closely.

## 2023-10-02 NOTE — ASSESSMENT & PLAN NOTE
Vital signs noted continue antihypertensives    BP Readings from Last 3 Encounters:   10/02/23 (!) 152/76   09/21/23 (!) 113/57   07/11/23 (!) 142/58

## 2023-10-02 NOTE — PT/OT/SLP PROGRESS
Physical Therapy Treatment    Patient Name:  Richard Farr   MRN:  [id number]    Recommendations:     Discharge Recommendations: home with home health, home health PT, home health OT  Discharge Equipment Recommendations: none  Barriers to discharge: Inaccessible home and Decreased caregiver support    Assessment:     Richard Farr is a 78 y.o. male admitted with a medical diagnosis of Acute cystitis without hematuria.  He presents with the following impairments/functional limitations: weakness, impaired endurance, impaired self care skills, impaired functional mobility, gait instability, impaired balance, decreased lower extremity function, decreased upper extremity function, impaired coordination, impaired fine motor, decreased safety awareness, abnormal tone, decreased ROM, impaired cardiopulmonary response to activity. Pt tolerated treatment session well and displayed increased independence with gait tasks.    Rehab Prognosis: Fair; patient would benefit from acute skilled PT services to address these deficits and reach maximum level of function.    Recent Surgery: Procedure(s) (LRB):  INSERTION, PEG TUBE (N/A)      Plan:     During this hospitalization, patient to be seen 5 x/week to address the identified rehab impairments via gait training, therapeutic activities, therapeutic exercises, neuromuscular re-education and progress toward the following goals:    Plan of Care Expires:  10/02/23    Subjective     Chief Complaint: none stated  Patient/Family Comments/goals: none stated  Pain/Comfort:  Pain Rating 1: 0/10      Objective:     Communicated with pt prior to session.  Patient found supine with telemetry, PureWick, oxygen, PICC line upon PT entry to room.     General Precautions: Standard, aspiration, fall  Orthopedic Precautions: N/A  Braces: N/A  Respiratory Status: Nasal cannula, flow 2 L/min     Functional Mobility:  Bed Mobility:     Rolling Left:  stand by assistance  Rolling Right: contact guard  assistance  Scooting: contact guard assistance  Supine to Sit: minimum assistance  Sit to Supine: minimum assistance  Transfers:     Sit to Stand:  contact guard assistance with rolling walker  Bed to Chair: contact guard assistance with  rolling walker  using  Step Transfer  Gait: CGA to Min A 2 x 10' w/ RW      AM-PAC 6 CLICK MOBILITY  Turning over in bed (including adjusting bedclothes, sheets and blankets)?: 3  Sitting down on and standing up from a chair with arms (e.g., wheelchair, bedside commode, etc.): 3  Moving from lying on back to sitting on the side of the bed?: 3  Moving to and from a bed to a chair (including a wheelchair)?: 3  Need to walk in hospital room?: 3  Climbing 3-5 steps with a railing?: 3 (Clinical judgment)  Basic Mobility Total Score: 18       Treatment & Education:  Supine to sit x 2  Sit to stand x 2  Gait 2 x 10' w/ RW  B ankle pumps x 20  B quad sets x 20  B heel slides x 20  B SLRs x 20    Patient left supine with all lines intact and call button in reach..    GOALS:   Multidisciplinary Problems       Physical Therapy Goals          Problem: Physical Therapy    Goal Priority Disciplines Outcome Goal Variances Interventions   Physical Therapy Goal     PT, PT/OT Ongoing, Progressing     Description: Goals to be met by: 10/2/2023     Patient will increase functional independence with mobility by performin. Supine to sit with Supervision or Set-up Assistance.  2. Sit to supine with Supervision or Set-up Assistance.  3. Bed to chair transfer with Supervision or Set-up Assistance with rolling walker using Step Transfer technique.  4. Sit to Stand with Supervision or Set-up Assistance with rolling walker.  5. Gait  x 700  feet with Supervision or Set-up Assistance with rolling walker.  6. Lower extremity exercise program x10 reps.                          Time Tracking:     PT Received On: 10/02/23  PT Start Time: 1346     PT Stop Time: 1409  PT Total Time (min): 23 min     Billable  Minutes: Therapeutic Activity 15 and Therapeutic Exercise 8    Treatment Type: Treatment  PT/PTA: PT     Number of PTA visits since last PT visit: 1     10/02/2023

## 2023-10-02 NOTE — PT/OT/SLP PROGRESS
Physical Therapy      Patient Name:  Richard Farr   MRN:  [id number]    Patient not seen today secondary to patient unwilling to participate in therapy this morning, wife reports that patient had a rough night and was given medications to calm him down this morning.. Will follow-up later this afternoon. .

## 2023-10-02 NOTE — ASSESSMENT & PLAN NOTE
Patient states he had upper and lower endoscopy in 2021.  Iron infusion prior to discharge.    Recent Labs   Lab 09/30/23  0519 10/01/23  0458 10/02/23  0428   Hemoglobin 9.2 L 9.5 L 9.5 L

## 2023-10-03 LAB
ALBUMIN SERPL BCP-MCNC: 3 G/DL (ref 3.5–5.2)
ALP SERPL-CCNC: 63 U/L (ref 55–135)
ALT SERPL W/O P-5'-P-CCNC: 24 U/L (ref 10–44)
ANION GAP SERPL CALC-SCNC: -1 MMOL/L (ref 3–11)
ANISOCYTOSIS BLD QL SMEAR: ABNORMAL
AST SERPL-CCNC: 35 U/L (ref 10–40)
BASOPHILS # BLD AUTO: 0.13 K/UL (ref 0–0.2)
BASOPHILS NFR BLD: 1.5 % (ref 0–1.9)
BILIRUB SERPL-MCNC: 0.8 MG/DL (ref 0.1–1)
BUN SERPL-MCNC: 15 MG/DL (ref 8–23)
BURR CELLS BLD QL SMEAR: ABNORMAL
CALCIUM SERPL-MCNC: 9.3 MG/DL (ref 8.7–10.5)
CHLORIDE SERPL-SCNC: 105 MMOL/L (ref 95–110)
CO2 SERPL-SCNC: 34 MMOL/L (ref 23–29)
CREAT SERPL-MCNC: 0.4 MG/DL (ref 0.5–1.4)
DACRYOCYTES BLD QL SMEAR: ABNORMAL
DIFFERENTIAL METHOD: ABNORMAL
EOSINOPHIL # BLD AUTO: 0.4 K/UL (ref 0–0.5)
EOSINOPHIL NFR BLD: 4.5 % (ref 0–8)
ERYTHROCYTE [DISTWIDTH] IN BLOOD BY AUTOMATED COUNT: ABNORMAL % (ref 11.5–14.5)
EST. GFR  (NO RACE VARIABLE): >60 ML/MIN/1.73 M^2
GLUCOSE SERPL-MCNC: 109 MG/DL (ref 70–110)
HCT VFR BLD AUTO: 36.3 % (ref 40–54)
HGB BLD-MCNC: 10.6 G/DL (ref 14–18)
HYPOCHROMIA BLD QL SMEAR: ABNORMAL
IMM GRANULOCYTES # BLD AUTO: 0.04 K/UL (ref 0–0.04)
IMM GRANULOCYTES NFR BLD AUTO: 0.5 % (ref 0–0.5)
LYMPHOCYTES # BLD AUTO: 1.9 K/UL (ref 1–4.8)
LYMPHOCYTES NFR BLD: 21.5 % (ref 18–48)
MAGNESIUM SERPL-MCNC: 1.8 MG/DL (ref 1.6–2.6)
MCH RBC QN AUTO: 23.4 PG (ref 27–31)
MCHC RBC AUTO-ENTMCNC: 29.2 G/DL (ref 32–36)
MCV RBC AUTO: 80 FL (ref 82–98)
MONOCYTES # BLD AUTO: 0.7 K/UL (ref 0.3–1)
MONOCYTES NFR BLD: 8.4 % (ref 4–15)
NEUTROPHILS # BLD AUTO: 5.6 K/UL (ref 1.8–7.7)
NEUTROPHILS NFR BLD: 63.6 % (ref 38–73)
NRBC BLD-RTO: 0 /100 WBC
PLATELET # BLD AUTO: 307 K/UL (ref 150–450)
PMV BLD AUTO: ABNORMAL FL (ref 9.2–12.9)
POIKILOCYTOSIS BLD QL SMEAR: ABNORMAL
POTASSIUM SERPL-SCNC: 4 MMOL/L (ref 3.5–5.1)
PROT SERPL-MCNC: 6.9 G/DL (ref 6–8.4)
RBC # BLD AUTO: 4.53 M/UL (ref 4.6–6.2)
SCHISTOCYTES BLD QL SMEAR: ABNORMAL
SODIUM SERPL-SCNC: 138 MMOL/L (ref 136–145)
SPHEROCYTES BLD QL SMEAR: ABNORMAL
TARGETS BLD QL SMEAR: ABNORMAL
WBC # BLD AUTO: 8.84 K/UL (ref 3.9–12.7)

## 2023-10-03 PROCEDURE — 99232 PR SUBSEQUENT HOSPITAL CARE,LEVL II: ICD-10-PCS | Mod: ,,, | Performed by: STUDENT IN AN ORGANIZED HEALTH CARE EDUCATION/TRAINING PROGRAM

## 2023-10-03 PROCEDURE — A4216 STERILE WATER/SALINE, 10 ML: HCPCS | Performed by: INTERNAL MEDICINE

## 2023-10-03 PROCEDURE — 85025 COMPLETE CBC W/AUTO DIFF WBC: CPT | Performed by: INTERNAL MEDICINE

## 2023-10-03 PROCEDURE — 97530 THERAPEUTIC ACTIVITIES: CPT

## 2023-10-03 PROCEDURE — 99900035 HC TECH TIME PER 15 MIN (STAT)

## 2023-10-03 PROCEDURE — 99232 SBSQ HOSP IP/OBS MODERATE 35: CPT | Mod: ,,, | Performed by: STUDENT IN AN ORGANIZED HEALTH CARE EDUCATION/TRAINING PROGRAM

## 2023-10-03 PROCEDURE — 80053 COMPREHEN METABOLIC PANEL: CPT | Performed by: INTERNAL MEDICINE

## 2023-10-03 PROCEDURE — 97535 SELF CARE MNGMENT TRAINING: CPT

## 2023-10-03 PROCEDURE — 27000221 HC OXYGEN, UP TO 24 HOURS

## 2023-10-03 PROCEDURE — 11000001 HC ACUTE MED/SURG PRIVATE ROOM

## 2023-10-03 PROCEDURE — 94761 N-INVAS EAR/PLS OXIMETRY MLT: CPT

## 2023-10-03 PROCEDURE — 83735 ASSAY OF MAGNESIUM: CPT | Performed by: INTERNAL MEDICINE

## 2023-10-03 PROCEDURE — 25000003 PHARM REV CODE 250: Performed by: INTERNAL MEDICINE

## 2023-10-03 PROCEDURE — 99900031 HC PATIENT EDUCATION (STAT)

## 2023-10-03 PROCEDURE — 36415 COLL VENOUS BLD VENIPUNCTURE: CPT | Performed by: INTERNAL MEDICINE

## 2023-10-03 PROCEDURE — 97110 THERAPEUTIC EXERCISES: CPT

## 2023-10-03 PROCEDURE — 63600175 PHARM REV CODE 636 W HCPCS: Performed by: INTERNAL MEDICINE

## 2023-10-03 PROCEDURE — 21400001 HC TELEMETRY ROOM

## 2023-10-03 PROCEDURE — 25000003 PHARM REV CODE 250: Performed by: STUDENT IN AN ORGANIZED HEALTH CARE EDUCATION/TRAINING PROGRAM

## 2023-10-03 PROCEDURE — C9113 INJ PANTOPRAZOLE SODIUM, VIA: HCPCS | Performed by: INTERNAL MEDICINE

## 2023-10-03 RX ADMIN — PANTOPRAZOLE SODIUM 40 MG: 40 INJECTION, POWDER, FOR SOLUTION INTRAVENOUS at 08:10

## 2023-10-03 RX ADMIN — FUROSEMIDE 40 MG: 10 INJECTION, SOLUTION INTRAVENOUS at 08:10

## 2023-10-03 RX ADMIN — Medication 10 ML: at 05:10

## 2023-10-03 RX ADMIN — LEUCINE, PHENYLALANINE, LYSINE, METHIONINE, ISOLEUCINE, VALINE, HISTIDINE, THREONINE, TRYPTOPHAN, ALANINE, GLYCINE, ARGININE, PROLINE, SERINE, TYROSINE, SODIUM ACETATE, DIBASIC POTASSIUM PHOSPHATE, MAGNESIUM CHLORIDE, SODIUM CHLORIDE, CALCIUM CHLORIDE, DEXTROSE
311; 238; 247; 170; 255; 247; 204; 179; 77; 880; 438; 489; 289; 213; 17; 297; 261; 51; 77; 33; 5 INJECTION INTRAVENOUS at 05:10

## 2023-10-03 RX ADMIN — Medication 10 ML: at 08:10

## 2023-10-03 RX ADMIN — Medication 10 ML: at 11:10

## 2023-10-03 RX ADMIN — METOROPROLOL TARTRATE 2.5 MG: 5 INJECTION, SOLUTION INTRAVENOUS at 09:10

## 2023-10-03 RX ADMIN — METOROPROLOL TARTRATE 2.5 MG: 5 INJECTION, SOLUTION INTRAVENOUS at 08:10

## 2023-10-03 RX ADMIN — LEVOTHYROXINE SODIUM 25 MCG: 20 INJECTION, SOLUTION INTRAVENOUS at 08:10

## 2023-10-03 NOTE — ASSESSMENT & PLAN NOTE
Dietary modifications have been made.  SLP consulted for evaluation and treatment.  Significant aspiration.  NPO for now.  Pending Cardiology evaluation for GS to eval for PEG.  9/30 Remains NPO, will start PTPN feeds with continued IVF  10/1 PO meds changed to IV/IM. Continue with PTPN. PICC Line placement ordered. General Surgery awaiting cardiology evaluation for procedure  10/2:  Awaiting cardiac clearance prior to PEG placement. Continue TPN.  10/3:  PEG planned for tomorrow.

## 2023-10-03 NOTE — PHYSICIAN QUERY
PT Name: Richard Farr  MR #: [id number]    DOCUMENTATION CLARIFICATION   Lizette Jeronimo RN, CCDS    [email or web address]    Documentation Excellence  This form is a permanent document in the medical record.     Query Date: October 3, 2023    By submitting this query, we are merely seeking further clarification of documentation.    Please utilize your independent clinical judgment when addressing the question(s) below.    The Medical Record reflects the following:  Clinical Information Location in Medical Record   Problems Addressed:  CHF,  acute hypoxic respiratory failure   Ed md   Acute on chronic congestive heart failure      Acute on chronic combined systolic and diastolic heart failure     Chronic heart failure with preserved ejection fraction (HFpEF) NICM NYHA3  -Followed by Cardiology in the outpatient setting.   -Euvolemic and well compensated currently. Continue home medical therapy   Acute on chronic combined systolic and diastolic heart failure  Patient seems intravascularly dry. Hold IV Lasix for now.    Ed md     H&P thru Hosp PN 9/28        Hosp PN 9/29 thru 10/1         NT-proBNP(!): 3287     ProBNP mildly elevated but decreased from prior values.    Ed md      Cardiomegaly, no focal infiltrate, no pleural effusion, similar to prior study, hypoinflated lung fields      CT = no PE, small right pleural effusion and subsegmental airspace disease of lower             lobes which is likely an at length this is but may represent a pneumonia.      Ed md           Ed md   being discharged from med surg after receiving 4 units of packed RBCs to treat anemia. Pt reports experiencing SOB 1 hour PTA.     Tachycardia       Tachypnea       shortness of breath &  increased work of breathing  Rales                  Wheezing        Decreased breath sounds      Hypoxia requiring supplemental oxygen     Respiratory distress       rhonchi          reports his shortness a breath is at baseline, and he is tolerating  "supplemental   oxygen via nasal cannula.   Wheezing, rhonchi and rales Ed md           Ed md                 H&P        9/24 - 25 HOsp PN   R/L Lower Leg edema  some peripheral edema     Ed md  H&P   Furosemide IV 40 mg in ED  Furosemide IV 40 mg every 12 hours -start 9/23  Furosemide IV 40 mg Daily -start 10/2     albuterol-ipratropium  neb   9/22     In pt mar   Patient is hypertensive and will diurese  Will plan on admitting patient for diuresis     Patient seems intravascularly dry. Hold IV Lasix for now.    Ed md        H&P   Likely volume overload in setting of CHF & valvular disease      Low suspicion for TRALI given lack of significant airspace disease.   Transfusion associated circulatory overload     Severe group 5 pulmonary hypertension           Pleural effusion, right - stable by history        9/22 9/23 9/24 9/25. 9/26-27 9/28 9/29   T max  P  R  BP >/=  Sat %      O2  Device 100.2  109 - 75  26 - 18  108/53  95      3.5 L   NC  97.5    33 - 20  91/47       3 - 2 L   NC 97.6-97.9    18-22  119/56       2 L NC 97.4-97.9    14-21  118/58  93-98     2 - 1 L  97.6-98.6  62-90  20-26  120/62  92 on 1 L     Changed  To 3L  98.0  76-93  18-22  121/57  91-99     3 L 98.0  85  19  134/60  94     Ed md Yves rodriguez        H&P     9/24 - 25 HOsp PN           Vs flow sheet & provider notes            Please clarify/confirm the Emergency Medicine diagnosis of  "acute hypoxic respiratory failure" ?     [   ] Diagnosis ruled in   [  x ] Diagnosis ruled in, but it resolved prior to my assessment of the patient   [   ] Diagnosis ruled out   [   ] Other diagnosis (please specify): _____________   [  ] Clinically undetermined       "

## 2023-10-03 NOTE — PROGRESS NOTES
[facility] Medicine  Progress Note    Patient Name: Richard Farr  MRN: [id number]  Patient Class: IP- Inpatient   Admission Date: 9/22/2023  Length of Stay: 10 days  Attending Physician: Drew Moreira Jr., MD  Primary Care Provider: Drew Moreira Jr., MD        Subjective:     Principal Problem:Acute cystitis without hematuria        HPI:  ED HPI:  Richard Farr is a 78 y.o. male with PMHX of hypertension, hyperlipidemia, CHF, mitral regurg, tricuspid regurg, pulmonary hypertension, gastritis who presents to the emergency department C/O shortness of breath.     Patient presents after developing shortness of breath 1 hour prior to arrival.  Was recently admitted for anemia and received 4 units PRBCs in hospital as well as iron transfusion 2 days ago.  No fever but patient states he felt hot in emergency department.  Patient was diagnosed with urinary tract infection during previous hospitalization is on antibiotics.     PCP: Drew Moreira Jr., MD     IM HPI:  Patient was recently admitted by primary care and transfuse 4 units of packed red blood cells.  Patient felt much improved return home felt well for about 24 hours then began having increasing dyspnea.  Patient was readmitted found to have a urinary tract infection and a pneumonia.  Patient was started on antibiotics.  He was also given IV diuretics.  Patient has some peripheral edema but overall he states he feels dry and dehydrated.  Patient is having some oropharyngeal dysphagia and on exam I am unable to find the cause.  Patient has a history of CVA as well as a history of arthritis with several upper extremity joint deformities and contractures.      Overview/Hospital Course:  9/24 GT:  Patient is lying in bed this morning with spouse at bedside.  Patient is frail and chronically ill-appearing, but does not appear to be in any acute distress.  Patient reports his shortness a breath is at baseline, and he is tolerating  supplemental oxygen via nasal cannula.  Patient had some swallowing difficulties yesterday, SLP has been consulted for evaluation, treatment, dietary modifications have been made.  Patient's H&H is stable, and although anemic he is not at the point that he requires further transfusion.  Potassium mildly decreased, we will replete intravenously today due to swallowing difficulties and monitor with daily labs.  Blood cultures at this time are negative to date.  Patient and spouse deny any issues, concerns, questions.    9/25:  Patient eating breakfast this morning in no acute distress.  Speech therapy to evaluate the patient.  Thus far blood cultures are negative.  Patient feels back to baseline.  Urine culture without significant growth.  Consider discharge in the near future.  Patient having significant gas production check GI panel.    9/26:  No acute events overnight.  GI panel pending.  Provide iron infusion prior to potential discharge.  Patient to undergo modified barium swallow prior to discharge for completeness.  Home health will be coordinated ongoing outpatient needs.    Patient with significant aspiration by MBS.  Discuss with family potential needs for PEG placement vs ongoing ST.     9/27:  patient ct head negative.  Done for worsening dizziness/dysequilibrium after head trauma.  Awaiting  recs regarding PEG tube placement.     9/28/23:  patient resting this morning.  In favor or PEG placement tomorrow.   09/29/2023: No acute events overnight.  Planning potential endoscopy versus laparoscopy for PEG tube placement.  May need cardiac clearance.  Currently hemodynamically stable.  9/30: [place] weekend coverage. Awake and alert sitting on bedside chair. No new complaints at this time. Patient requires cardiology evaluation for endoscopy followed by laparoscopy guided PEG tube placement. Patient has been NPO, will start peripheral TPN along with IVF.   10/1 [place] weekend coverage. No acute events overnight.  "Patient not able to swallow PO meds. Convert PO meds to IV or IM. Follow up echocardiogram (10/2). Follow up cardiology ([facility]) pre-operative evaluation for endoscopy study on 10/4.     10/02/2023: Patient remains NPO.  Overall stable awaiting cardiac clearance prior to potential endoscopy 10/4.  10/3/23:  patient doing well this am awaiting PEG placement tomorrow.       Interval History: Patient seen and examined.     Review of Systems   Constitutional:  Positive for activity change and appetite change. Negative for chills and fever.   HENT:  Positive for trouble swallowing. Negative for ear pain, mouth sores, nosebleeds and sore throat.    Eyes:  Negative for visual disturbance.   Respiratory:  Positive for shortness of breath. Negative for wheezing.    Cardiovascular:  Negative for chest pain, palpitations and leg swelling ("much improved").   Gastrointestinal:  Negative for abdominal distention, abdominal pain, blood in stool, diarrhea, nausea and vomiting.   Endocrine: Negative for polyphagia.   Genitourinary:  Positive for frequency. Negative for difficulty urinating, dysuria and flank pain.   Musculoskeletal:  Positive for arthralgias, gait problem and joint swelling.   Skin:  Negative for rash.   Neurological:  Positive for weakness. Negative for dizziness, tremors, seizures, syncope and headaches.   Hematological:  Negative for adenopathy.   Psychiatric/Behavioral:  Negative for agitation, confusion and hallucinations. The patient is not nervous/anxious.      Objective:     Vital Signs (Most Recent):  Temp: 97.8 °F (36.6 °C) (10/03/23 0734)  Pulse: 85 (10/03/23 0734)  Resp: 20 (10/03/23 0734)  BP: 139/65 (10/03/23 0734)  SpO2: 97 % (10/03/23 0900) Vital Signs (24h Range):  Temp:  [97.8 °F (36.6 °C)-98.5 °F (36.9 °C)] 97.8 °F (36.6 °C)  Pulse:  [76-91] 85  Resp:  [18-22] 20  SpO2:  [92 %-97 %] 97 %  BP: (120-150)/(65-79) 139/65     Weight: 71.7 kg (158 lb 1.1 oz)  Body mass index is 24.75 " kg/m².    Intake/Output Summary (Last 24 hours) at 10/3/2023 1016  Last data filed at 10/3/2023 0846  Gross per 24 hour   Intake 1697.5 ml   Output --   Net 1697.5 ml           Physical Exam  Vitals and nursing note reviewed.   Constitutional:       General: He is not in acute distress.     Appearance: He is not ill-appearing, toxic-appearing or diaphoretic.      Comments: Frail, chronically ill-appearing   HENT:      Head: Normocephalic and atraumatic.      Comments: Forehead and nasal bridge lacerations intact with dressing in place.  No SOI     Nose: Nose normal. No congestion or rhinorrhea.      Mouth/Throat:      Mouth: Mucous membranes are dry.      Pharynx: No oropharyngeal exudate or posterior oropharyngeal erythema.   Eyes:      General: No scleral icterus.  Neck:      Vascular: No carotid bruit.   Cardiovascular:      Rate and Rhythm: Normal rate and regular rhythm.      Heart sounds: Murmur heard.      No friction rub. No gallop.   Pulmonary:      Effort: Pulmonary effort is normal. No respiratory distress.      Breath sounds: No stridor. Wheezing (faint distant) and rales present. No rhonchi.      Comments: Supplemental oxygen via nasal cannula  Chest:      Chest wall: No tenderness.   Abdominal:      General: There is no distension.      Palpations: Abdomen is soft. There is no mass.      Tenderness: There is no abdominal tenderness. There is no right CVA tenderness, left CVA tenderness, guarding or rebound.      Hernia: No hernia is present.   Musculoskeletal:         General: Deformity present. No tenderness.      Cervical back: Neck supple. No rigidity.      Right lower leg: No edema.      Left lower leg: No edema.   Lymphadenopathy:      Cervical: No cervical adenopathy.   Skin:     General: Skin is warm and dry.      Capillary Refill: Capillary refill takes less than 2 seconds.      Coloration: Skin is not jaundiced or pale.      Findings: No rash.   Neurological:      Mental Status: He is alert.  Mental status is at baseline.      Cranial Nerves: No cranial nerve deficit.      Sensory: No sensory deficit.      Motor: Weakness present.      Coordination: Coordination normal.      Gait: Gait abnormal.   Psychiatric:         Mood and Affect: Mood normal.         Behavior: Behavior normal.         Thought Content: Thought content normal.         Judgment: Judgment normal.             Significant Labs: All pertinent labs within the past 24 hours have been reviewed.  BMP:   Recent Labs   Lab 10/03/23  0517         K 4.0      CO2 34*   BUN 15   CREATININE 0.4*   CALCIUM 9.3   MG 1.8       CBC:   Recent Labs   Lab 10/02/23  0428 10/03/23  0517   WBC 7.97 8.84   HGB 9.5* 10.6*   HCT 32.4* 36.3*    307       CMP:   Recent Labs   Lab 10/02/23  0428 10/03/23  0517    138   K 4.2 4.0    105   CO2 34* 34*    109   BUN 13 15   CREATININE 0.4* 0.4*   CALCIUM 8.7 9.3   PROT 6.4 6.9   ALBUMIN 2.6* 3.0*   BILITOT 0.7 0.8   ALKPHOS 54* 63   AST 34 35   ALT 25 24   ANIONGAP -1* -1*       Magnesium:   Recent Labs   Lab 10/02/23  0428 10/03/23  0517   MG 1.8 1.8         Significant Imaging: I have reviewed all pertinent imaging results/findings within the past 24 hours.      Assessment/Plan:      * Acute cystitis without hematuria  Continue antibiotics awaiting cultures    Blood cultures are negative to date.  Urine culture negative    Ataxia  Worsening disequilibrium since fall and head trauma.  CT to rule out intracranial bleeding injury.  CT scan negative.  Family reassured.      Laceration of forehead  Healing. Treatment per GS.      Hypokalemia  We will replete intravenously today due to dysphagia.  Follow with daily labs.      Patient has hypokalemia.  Last electrolytes reviewed-   Recent Labs   Lab 10/02/23  0428 10/03/23  0517   K 4.2 4.0   Will replace potassium as per sliding scale as needed and monitor electrolytes closely.       Other dysphagia  Dietary modifications have  been made.  SLP consulted for evaluation and treatment.  Significant aspiration.  NPO for now.  Pending Cardiology evaluation for GS to eval for PEG.  9/30 Remains NPO, will start PTPN feeds with continued IVF  10/1 PO meds changed to IV/IM. Continue with PTPN. PICC Line placement ordered. General Surgery awaiting cardiology evaluation for procedure  10/2:  Awaiting cardiac clearance prior to PEG placement. Continue TPN.  10/3:  PEG planned for tomorrow.    Chronic heart failure with preserved ejection fraction (HFpEF) NICM NYHA3  Followed by Cardiology in the outpatient setting.  Euvolemic and well compensated currently.  Continue home medical therapy      Acute on chronic combined systolic and diastolic heart failure  Patient seems intravascularly dry.  Hold IV Lasix for now.    Appears euvolemic.        Pneumonia due to infectious organism  Continue current antibiotic regimen, supplemental oxygen.  Blood cultures are negative to date at this time.    Blood Culture, Routine   Date Value Ref Range Status   09/22/2023 No growth after 5 days.  Final     Urine Culture, Routine   Date Value Ref Range Status   09/22/2023 No significant growth  Final      9/27/23:  abx completed.       Pleural effusion, right  Unsure if this has been investigated will defer to primary.    Stable by hx.       Severe mitral regurgitation  At baseline.  Continue home medical therapy.      Essential hypertension  Vital signs noted continue antihypertensives    BP Readings from Last 3 Encounters:   10/03/23 139/65   09/21/23 (!) 113/57   07/11/23 (!) 142/58         Mixed hyperlipidemia  PO meds held as patient's current status NPO with no enteral access at this time.       Acute superficial gastritis without hemorrhage  Continue home treatment      Iron deficiency anemia  Patient states he had upper and lower endoscopy in 2021.  Iron infusion prior to discharge.    Recent Labs   Lab 10/01/23  0458 10/02/23  0428 10/03/23  0517   Hemoglobin  9.5 L 9.5 L 10.6 L               VTE Risk Mitigation (From admission, onward)         Ordered     IP VTE HIGH RISK PATIENT  Once         09/23/23 0215     Place sequential compression device  Until discontinued         09/23/23 0215                Discharge Planning   JOSÉ:      Code Status: Full Code   Is the patient medically ready for discharge?:     Reason for patient still in hospital (select all that apply): Treatment  Discharge Plan A: Home with family                  Drew Moreira Jr, MD  Department of [facility] Medicine   [facility] Surg

## 2023-10-03 NOTE — PLAN OF CARE
10/03/23 1106   Medicare Message   Important Message from Medicare regarding Discharge Appeal Rights Explained to patient/caregiver;Signed/date by patient/caregiver   Date IMM was signed 10/03/23   Time IMM was signed 1106     Spoke with patient and his wife at bedside.  Explained Medicare IM message.  Wife verbalizes understanding.  Signs IM.

## 2023-10-03 NOTE — PHYSICIAN QUERY
PT Name: Richard Farr  MR #: [id number]     DOCUMENTATION CLARIFICATION   Lizette Jeronimo RN, CCDS    [email or web address]    Documentation Excellence  This form is a permanent document in the medical record.     Query Date: October 3, 2023    By submitting this query, we are merely seeking further clarification of documentation.    Please utilize your independent clinical judgment when addressing the question(s) below.  The Medical Record contains the following   Indicators Supporting Clinical Findings Location in Medical Record   x Heart Failure documented Acute on chronic congestive heart failure     Acute on chronic combined systolic and diastolic heart failure    Chronic heart failure with preserved ejection fraction (HFpEF) NICM NYHA3  -Followed by Cardiology in the outpatient setting.    -Euvolemic and well compensated currently.  Continue home medical therapy   Acute on chronic combined systolic and diastolic heart failure  Patient seems intravascularly dry.  Hold IV Lasix for now.   Ed md    H&P thru Hosp PN 9/28      Hosp PN 9/29 thru 10/1          x BNP    NT-proBNP(!):  3287    ProBNP mildly elevated but decreased from prior values.   Ed md     x EF/Echo   Left Ventricle: The left ventricle is mildly dilated. Normal wall motion. There is normal systolic function with a visually estimated ejection fraction of 55 - 60%. There is normal diastolic function.    Left Atrium: Left atrium is moderately dilated.    Right Ventricle: Normal right ventricular cavity size.    Right Atrium: Right atrium is mildly dilated.    Aortic Valve: The aortic valve is a trileaflet valve. Mildly calcified right cusp. There is mild annular calcification present. Mildly restricted motion.    Mitral Valve: The mitral valve is repaired by MitraClip   (XTw  MitraClip: 5/18/2021- Dr. Reina and Dr. Yanez..  Indication: Severe mitral valve prolapse.  Preop MR: 4+.  Postop MR: 2+) There is moderate regurgitation with an anteromedial  eccentrically directed jet.    Tricuspid Valve: There is mild regurgitation with a centrally directed jet.    Aorta: Calcified atherosclerosis of the ascending aorta.   9/30 echo   x Radiology findings Cardiomegaly, no focal infiltrate, no pleural effusion, similar to prior study, hypoinflated lung fields     CT = no PE, small right pleural effusion and subsegmental airspace disease of lower             lobes which is likely an at length this is but may represent a pneumonia.     Ed md        Ed md   x Subjective/Objective Respiratory Conditions being discharged from med surg after receiving 4 units of packed RBCs to treat anemia. Pt reports experiencing SOB 1 hour PTA.    Tachycardia       Tachypnea       shortness of breath  &  increased work of breathing  Rales                  Wheezing        Decreased breath sounds      Hypoxia requiring supplemental oxygen    Respiratory distress       rhonchi         reports his shortness a breath is at baseline, and he is tolerating supplemental   oxygen via nasal cannula.    Wheezing, rhonchi and rales Ed md        Ed md               H&P      9/24 - 25 [facility] PN    Recent/Current MI      Heart Transplant, LVAD     x Edema, JVD R/L Lower Leg edema  some peripheral edema     Ed md  H&P    Ascites     x Diuretics/Meds Furosemide IV 40 mg in ED  Furosemide IV 40 mg every 12 hours -start 9/23  Furosemide IV 40 mg Daily -start 10/2    albuterol-ipratropium  neb   9/22     In pt mar   x Other Treatment Patient is hypertensive and will diurese  Will plan on admitting patient for diuresis    Patient seems intravascularly dry.  Hold IV Lasix for now.   Ed md      H&P   x Other Likely volume overload  in setting of CHF & valvular disease       Low suspicion for TRALI given lack of significant airspace disease.   Transfusion associated circulatory overload    Severe group 5 pulmonary hypertension          Pleural effusion, right - stable by history      9/22 9/23 9/24 9/25. 9/26-27 9/28  9/29   T max  P  R  BP >/=  Sat %     O2  Device 100.2  109 - 75  26 - 18  108/53  95     3.5 L   NC  97.5    33 - 20  91/47      3 - 2 L   NC 97.6-97.9    18-22  119/56      2 L NC 97.4-97.9    14-21  118/58  93-98    2 - 1 L  97.6-98.6  62-90  20-26  120/62  92 on 1 L    Changed  To 3L  98.0  76-93  18-22  121/57  91-99    3 L 98.0  85  19  134/60  94     Ed md    Ed md      H&P    9/24 - 25 [facility] PN        Vs flow sheet & provider notes         Heart failure is a clinical diagnosis which includes symptomatic fluid retention, elevated intracardiac pressures, and/or the inability of the heart to deliver adequate blood flow.    Heart Failure with reduced Ejection Fraction (HFrEF) or Systolic Heart Failure (loses ability to contract normally, EF is <40%)    Heart Failure with preserved Ejection Fraction (HFpEF) or Diastolic Heart Failure (stiff ventricles, does not relax properly, EF is >50%)     Heart Failure with Combined Systolic and Diastolic Failure (stiff ventricles, does not relax properly and EF is <50%)    Mid-range or mildly reduced ejection fraction (HFmrEF) is classified as systolic heart failure.  Congestive heart failure with a recovered EF is classified as Diastolic Heart Failure.  Common clues to acute exacerbation:  Rapidly progressive symptoms (w/in 2 weeks of presentation), using IV diuretics, using supplemental O2, pulmonary edema on Xray, new or worsening pleural effusion, +JVD or other signs of volume overload, MI w/in 4 weeks, and/or BNP >500  The clinical guidelines noted are only system guidelines, and do not replace the providers clinical judgment.    Provider,  please  clarify  acuity  &  type  of  CHF  at  presentation.     [  x ]  Acute on Chronic Diastolic Heart Failure (HFpEF) - worsening of CHF signs/symptoms in preexisting CHF     [   ]  Acute on Chronic Combined Systolic and Diastolic Heart Failure - worsening of CHF signs/symptoms in preexisting CHF      [   ]  Other type & acuity:        - specify type: _______________    - specify acuity: _____________     [   ]  Other - specify: _________________     [  ]    Clinically Undetermined     Please document in your progress notes daily for the duration of treatment until resolved and include in your discharge summary.    References:  American Heart Association editorial staff. (2017, May). Ejection Fraction Heart Failure Measurement. American Heart Association. https://www.heart.org/en/health-topics/heart-failure/diagnosing-heart-failure/ejection-fraction-heart-failure-measurement#:~:text=Ejection%20fraction%20(EF)%20is%20a,pushed%20out%20with%20each%20heartbeat  HUONG Harrison (2020, December 15). Heart failure with preserved ejection fraction: Clinical manifestations and diagnosis. [place]. https://www.[employer].Wally/contents/heart-failure-with-preserved-ejection-fraction-clinical-manifestations-and-diagnosis.  ICD-10-CM/PCS Coding Clinic Third Quarter ICD-10, Effective with discharges: September 8, 2020 [facility] § Heart failure with mid-range or mildly reduced ejection fraction (2020).  ICD-10-CM/PCS Coding Clinic Third Quarter ICD-10, Effective with discharges: September 8, 2020 [facility] § Heart failure with recovered ejection fraction (2020).  Form No. [id number]

## 2023-10-03 NOTE — SUBJECTIVE & OBJECTIVE
"Interval History: Patient seen and examined.     Review of Systems   Constitutional:  Positive for activity change and appetite change. Negative for chills and fever.   HENT:  Positive for trouble swallowing. Negative for ear pain, mouth sores, nosebleeds and sore throat.    Eyes:  Negative for visual disturbance.   Respiratory:  Positive for shortness of breath. Negative for wheezing.    Cardiovascular:  Negative for chest pain, palpitations and leg swelling ("much improved").   Gastrointestinal:  Negative for abdominal distention, abdominal pain, blood in stool, diarrhea, nausea and vomiting.   Endocrine: Negative for polyphagia.   Genitourinary:  Positive for frequency. Negative for difficulty urinating, dysuria and flank pain.   Musculoskeletal:  Positive for arthralgias, gait problem and joint swelling.   Skin:  Negative for rash.   Neurological:  Positive for weakness. Negative for dizziness, tremors, seizures, syncope and headaches.   Hematological:  Negative for adenopathy.   Psychiatric/Behavioral:  Negative for agitation, confusion and hallucinations. The patient is not nervous/anxious.      Objective:     Vital Signs (Most Recent):  Temp: 97.8 °F (36.6 °C) (10/03/23 0734)  Pulse: 85 (10/03/23 0734)  Resp: 20 (10/03/23 0734)  BP: 139/65 (10/03/23 0734)  SpO2: 97 % (10/03/23 0900) Vital Signs (24h Range):  Temp:  [97.8 °F (36.6 °C)-98.5 °F (36.9 °C)] 97.8 °F (36.6 °C)  Pulse:  [76-91] 85  Resp:  [18-22] 20  SpO2:  [92 %-97 %] 97 %  BP: (120-150)/(65-79) 139/65     Weight: 71.7 kg (158 lb 1.1 oz)  Body mass index is 24.75 kg/m².    Intake/Output Summary (Last 24 hours) at 10/3/2023 1016  Last data filed at 10/3/2023 0846  Gross per 24 hour   Intake 1697.5 ml   Output --   Net 1697.5 ml           Physical Exam  Vitals and nursing note reviewed.   Constitutional:       General: He is not in acute distress.     Appearance: He is not ill-appearing, toxic-appearing or diaphoretic.      Comments: Frail, " chronically ill-appearing   HENT:      Head: Normocephalic and atraumatic.      Comments: Forehead and nasal bridge lacerations intact with dressing in place.  No SOI     Nose: Nose normal. No congestion or rhinorrhea.      Mouth/Throat:      Mouth: Mucous membranes are dry.      Pharynx: No oropharyngeal exudate or posterior oropharyngeal erythema.   Eyes:      General: No scleral icterus.  Neck:      Vascular: No carotid bruit.   Cardiovascular:      Rate and Rhythm: Normal rate and regular rhythm.      Heart sounds: Murmur heard.      No friction rub. No gallop.   Pulmonary:      Effort: Pulmonary effort is normal. No respiratory distress.      Breath sounds: No stridor. Wheezing (faint distant) and rales present. No rhonchi.      Comments: Supplemental oxygen via nasal cannula  Chest:      Chest wall: No tenderness.   Abdominal:      General: There is no distension.      Palpations: Abdomen is soft. There is no mass.      Tenderness: There is no abdominal tenderness. There is no right CVA tenderness, left CVA tenderness, guarding or rebound.      Hernia: No hernia is present.   Musculoskeletal:         General: Deformity present. No tenderness.      Cervical back: Neck supple. No rigidity.      Right lower leg: No edema.      Left lower leg: No edema.   Lymphadenopathy:      Cervical: No cervical adenopathy.   Skin:     General: Skin is warm and dry.      Capillary Refill: Capillary refill takes less than 2 seconds.      Coloration: Skin is not jaundiced or pale.      Findings: No rash.   Neurological:      Mental Status: He is alert. Mental status is at baseline.      Cranial Nerves: No cranial nerve deficit.      Sensory: No sensory deficit.      Motor: Weakness present.      Coordination: Coordination normal.      Gait: Gait abnormal.   Psychiatric:         Mood and Affect: Mood normal.         Behavior: Behavior normal.         Thought Content: Thought content normal.         Judgment: Judgment normal.              Significant Labs: All pertinent labs within the past 24 hours have been reviewed.  BMP:   Recent Labs   Lab 10/03/23  0517         K 4.0      CO2 34*   BUN 15   CREATININE 0.4*   CALCIUM 9.3   MG 1.8       CBC:   Recent Labs   Lab 10/02/23  0428 10/03/23  0517   WBC 7.97 8.84   HGB 9.5* 10.6*   HCT 32.4* 36.3*    307       CMP:   Recent Labs   Lab 10/02/23  0428 10/03/23  0517    138   K 4.2 4.0    105   CO2 34* 34*    109   BUN 13 15   CREATININE 0.4* 0.4*   CALCIUM 8.7 9.3   PROT 6.4 6.9   ALBUMIN 2.6* 3.0*   BILITOT 0.7 0.8   ALKPHOS 54* 63   AST 34 35   ALT 25 24   ANIONGAP -1* -1*       Magnesium:   Recent Labs   Lab 10/02/23  0428 10/03/23  0517   MG 1.8 1.8         Significant Imaging: I have reviewed all pertinent imaging results/findings within the past 24 hours.

## 2023-10-03 NOTE — PROGRESS NOTES
[facility]  Adult Nutrition  Progress Note    SUMMARY       Recommendations  1. Rec'd EN: Jevity 1.2 @ rate of 10mL/hr increasing by 10mL every 4-6 hrs to goal rate of 60mL/hr to provide 1682kcal (102% EEN), 78g of protein (91% EPN), and 1162mL FW with a continuous 20mL/hr FWF.    2. Rec'd ClinimixE 4.25/5 @ 75mL/hr to provide 614kcal (36% EEN), 77g of protein (89%% EPN), and 1800mL TFV.   -Add 250mL of 20% lipids 3x/week to provide additional calories.   3. RD to follow and make rec's accordingly.  Goals:   1. Pt will be started on EN by next RD follow up.     2. Pt will reach TF goal rate within 48hrs of initiation.  Nutrition Goal Status: goal not met  Communication of RD Recs: reviewed with physician    Assessment and Plan    Nutrition Problem  Inadequate oral intake     Related to (etiology):   NPO status     Signs and Symptoms (as evidenced by):   0% PO intake     Interventions/Recommendations (treatment strategy):  1. Rec'd EN: Jevity 1.2 @ rate of 10mL/hr increasing by 10mL every 4-6 hrs to goal rate of 60mL/hr to provide 1682kcal (102% EEN), 78g of protein (91% EPN), and 1162mL FW with a continuous 20mL/hr FWF.    2. Rec'd ClinimixE 4.25/5 @ 75mL/hr to provide 614kcal (36% EEN), 77g of protein (89%% EPN), and 1800mL TFV.   -Add 250mL of 20% lipids 3x/week to provide additional calories.   3. RD to follow and make rec's accordingly.     Nutrition Diagnosis Status:   Continues     Reason for Assessment    Reason For Assessment: RD follow-up, consult (Notifited by pharmacy for new PPN)  Diagnosis: other (see comments) (Acute cystitis without hematuria)  Relevant Medical History: Anemia, Arthritis, Bilateral lower extremity edema, Carpal tunnel syndrome:bilateral, Chronic diastolic congestive heart failure, Colon polyp, Coronary artery disease, Depression due to physical illness, Encounter for blood transfusion, Gastric ulcer, History of herpes zoster, Hyperlipemia, Hypertension, Moderate tricuspid  "insufficiency, NSVT, PAC, Patent foramen ovale with right to left shunt, Pneumonia, Pulmonary hypertension, PVC's, Severe mitral regurgitation, Shingles, Stroke  Interdisciplinary Rounds: did not attend  General Information Comments: Followed up on pt this morning. Pt does not have PEG yet. Notified by pharmacy that pt has PPN. Aleksandr provide PPN rec's and communicate with pharmacy/MD. BELLO to follow and make rec's accordingly.  Nutrition Discharge Planning: TBD as care progresses.    Nutrition Risk Screen    Nutrition Risk Screen: tube feeding or parenteral nutrition    Nutrition/Diet History    Spiritual, Cultural Beliefs, Buddhist Practices, Values that Affect Care: no  Food Allergies: NKFA    Anthropometrics    Temp: 97.8 °F (36.6 °C)  Height: 5' 7.01" (170.2 cm)  Height (inches): 67.01 in  Weight Method: Bed Scale  Weight: 71.7 kg (158 lb 1.1 oz)  Weight (lb): 158.07 lb  Ideal Body Weight (IBW), Male: 148.06 lb  % Ideal Body Weight, Male (lb): 106.76 %  BMI (Calculated): 24.8  BMI Grade: 18.5-24.9 - normal    Lab/Procedures/Meds    Pertinent Labs Reviewed: reviewed  Pertinent Medications Reviewed: reviewed    Estimated/Assessed Needs    Weight Used For Calorie Calculations: 71.7 kg (158 lb 1.1 oz)  Energy Calorie Requirements (kcal): 1682 (MSJ x 1.2 SF)  Energy Need Method: [facility]  Protein Requirements: 86 (1.2g/kg)  Weight Used For Protein Calculations: 71.7 kg (158 lb 1.1 oz)  Fluid Requirements (mL): 1682 (1mL/kcal)  Estimated Fluid Requirement Method: RDA Method  RDA Method (mL): 1682    Nutrition Prescription Ordered    Current Diet Order: NPO  Current Nutrition Support Formula Ordered: Clinimix 4.25/5  Current Nutrition Support Rate Ordered: 75 (ml)  Current Nutrition Support Frequency Ordered: Continuous  Oral Nutrition Supplement: None    Evaluation of Received Nutrient/Fluid Intake    Enteral Calories (kcal): 0  Enteral Protein (gm): 0  Enteral (Free Water) Fluid (mL): 0  % Kcal Needs: 0%  % " Protein Needs: 0%  I/O: +1687.5  Energy Calories Required: not meeting needs  Protein Required: not meeting needs  Tolerance: tolerating  % Intake of Estimated Energy Needs: 25 - 50 %  % Meal Intake: NPO    Nutrition Risk    Level of Risk/Frequency of Follow-up: moderate     Monitor and Evaluation    Food and Nutrient Intake: parenteral nutrition intake, enteral nutrition intake  Food and Nutrient Adminstration: enteral and parenteral nutrition administration  Knowledge/Beliefs/Attitudes: food and nutrition knowledge/skill, beliefs and attitudes  Physical Activity and Function: nutrition-related ADLs and IADLs  Anthropometric Measurements: height/length, weight, weight change, body mass index  Biochemical Data, Medical Tests and Procedures: electrolyte and renal panel, gastrointestinal profile, glucose/endocrine profile, inflammatory profile, lipid profile  Nutrition-Focused Physical Findings: overall appearance     Nutrition Follow-Up    RD Follow-up?: Yes

## 2023-10-03 NOTE — ASSESSMENT & PLAN NOTE
Patient states he had upper and lower endoscopy in 2021.  Iron infusion prior to discharge.    Recent Labs   Lab 10/01/23  0458 10/02/23  0428 10/03/23  0517   Hemoglobin 9.5 L 9.5 L 10.6 L

## 2023-10-03 NOTE — NURSING
POC reviewed with pt, educated pt on fall precautions, reiterated CB use, pt v/u. Tele-sitter in place, PPN infusing per MD orders to midline. Pt remains free from falls, up to BSC as needed. Pt is very demanding and enjoys cussing everyone out when calling out for help. Pt doesn't use CB. Pt denies pain/needs a this time will continue to monitor.

## 2023-10-03 NOTE — PT/OT/SLP PROGRESS
"Physical Therapy Treatment    Patient Name:  Richard Farr   MRN:  [id number]    Recommendations:     Discharge Recommendations: home, home with home health, home health PT  Discharge Equipment Recommendations: none  Barriers to discharge: None    Assessment:     Richard Farr is a 78 y.o. male admitted with a medical diagnosis of Acute cystitis without hematuria.  He presents with the following impairments/functional limitations: weakness, gait instability, decreased upper extremity function, impaired cardiopulmonary response to activity, impaired endurance, impaired balance, decreased lower extremity function, impaired coordination, impaired joint extensibility, impaired sensation, decreased safety awareness, impaired muscle length, impaired self care skills, impaired functional mobility, decreased coordination Patient is awaiting cardiac clearance for PEG tube placement. .Patient is more awake and alert this session.  Wife reports that they stop the medication that they give  yesterday.  Patient had a continent bladder episode  on the bedside commode during this session.  Patient required stand by assistance with supine to sit, contact guard assistance with sit <>stand with a rollator, ambulated ~584 feet with rollator and dizem on the left hand to prevent it from slipping off the walker handle.  Progress patient as tolerated..     Rehab Prognosis: Good and Fair; patient would benefit from acute skilled PT services to address these deficits and reach maximum level of function.    Recent Surgery: Procedure(s) (LRB):  INSERTION, PEG TUBE (N/A)      Plan:     During this hospitalization, patient to be seen 5 x/week to address the identified rehab impairments via gait training, therapeutic activities, therapeutic exercises, neuromuscular re-education and progress toward the following goals:    Plan of Care Expires:  10/09/23    Subjective     Chief Complaint: "He is back to himself." per wife.   Patient/Family " Comments/goals: Get the procedure done.   Pain/Comfort:  Pain Rating 1: 0/10  Pain Rating Post-Intervention 1: 0/10      Objective:     Communicated with nurse, patient and wife  prior to session.  Patient found supine with telemetry, PureWick, oxygen upon PT entry to room.     General Precautions: Standard, fall, aspiration  Orthopedic Precautions: N/A  Braces: N/A  Respiratory Status: Nasal cannula, flow 1 L/min     Functional Mobility:  Bed Mobility:     Rolling Left:  stand by assistance  Rolling Right: stand by assistance  Scooting: stand by assistance  Bridging: stand by assistance  Supine to Sit: stand by assistance  Transfers:     Sit to Stand:  contact guard assistance with rollator   Toilet Transfer: contact guard assistance with  rollator   using  Step Transfer  Gait: ~584 feet with rollator and dizem on the left hand to prevent the hand from falling off the walker handle   Balance: Set up with static sitting, CGA with static standing using a        AM-PAC 6 CLICK MOBILITY  Turning over in bed (including adjusting bedclothes, sheets and blankets)?: 3  Sitting down on and standing up from a chair with arms (e.g., wheelchair, bedside commode, etc.): 3  Moving from lying on back to sitting on the side of the bed?: 3  Moving to and from a bed to a chair (including a wheelchair)?: 3  Need to walk in hospital room?: 3  Climbing 3-5 steps with a railing?: 3 (based on clinical judgement)  Basic Mobility Total Score: 18       Treatment & Education:  Rolling side <> side  Supine to  sit  Scooting to the edge  of the bed   Sitting balance/tolerance  Sit <> stand with rollator   Standing tolerance   Gait with rollator   Out of bed  Chair positioning     Patient left up in chair with all lines intact, call button in reach, nurse  notified, and wife  present..    GOALS:   Multidisciplinary Problems       Physical Therapy Goals          Problem: Physical Therapy    Goal Priority Disciplines Outcome Goal Variances  Interventions   Physical Therapy Goal     PT, PT/OT Ongoing, Progressing     Description: Goals to be met by: 10/9/2023     Patient will increase functional independence with mobility by performin. Supine to sit with Supervision or Set-up Assistance.  2. Sit to supine with Supervision or Set-up Assistance.  3. Bed to chair transfer with Supervision or Set-up Assistance with rollator  using Step Transfer technique.  4. Sit to Stand with Supervision or Set-up Assistance with rollator   5. Gait  x 700  feet with Supervision or Set-up Assistance with rollator   6. Lower extremity exercise program x10 reps.                          Time Tracking:     PT Received On: 10/03/23  PT Start Time: 0850     PT Stop Time: 0913  PT Total Time (min): 23 min     Billable Minutes: Therapeutic Activity 10 and Therapeutic Exercise 13    Treatment Type: Treatment  PT/PTA: PT     Number of PTA visits since last PT visit: 1     10/03/2023

## 2023-10-03 NOTE — ASSESSMENT & PLAN NOTE
Vital signs noted continue antihypertensives    BP Readings from Last 3 Encounters:   10/03/23 139/65   09/21/23 (!) 113/57   07/11/23 (!) 142/58

## 2023-10-03 NOTE — ASSESSMENT & PLAN NOTE
We will replete intravenously today due to dysphagia.  Follow with daily labs.      Patient has hypokalemia.  Last electrolytes reviewed-   Recent Labs   Lab 10/02/23  0428 10/03/23  0517   K 4.2 4.0   Will replace potassium as per sliding scale as needed and monitor electrolytes closely.

## 2023-10-03 NOTE — PLAN OF CARE
Problem: Occupational Therapy  Goal: Occupational Therapy Goal  Description: Goals to be met by: 10/11/23 (Updated)    Patient will increase functional independence with ADLs by performing:    Feeding with Set-up Assistance.  UE Dressing with Set-up Assistance..  LE Dressing with Supervision.  Grooming while seated with Set-up Assistance.  Toileting from toilet with Supervision for hygiene and clothing management.   Bathing from  shower chair/bench with Supervision.  Toilet transfer to toilet with Set-up Assistance.    Outcome: Ongoing, Progressing

## 2023-10-03 NOTE — PLAN OF CARE
Problem: Physical Therapy  Goal: Physical Therapy Goal  Description: Goals to be met by: 10/9/2023     Patient will increase functional independence with mobility by performin. Supine to sit with Supervision or Set-up Assistance.  2. Sit to supine with Supervision or Set-up Assistance.  3. Bed to chair transfer with Supervision or Set-up Assistance with rollator  using Step Transfer technique.  4. Sit to Stand with Supervision or Set-up Assistance with rollator   5. Gait  x 700  feet with Supervision or Set-up Assistance with rollator   6. Lower extremity exercise program x10 reps.     Outcome: Ongoing, Progressing, extended length of duration due to medical issues

## 2023-10-03 NOTE — PT/OT/SLP PROGRESS
Occupational Therapy   Treatment    Name: Richard Farr  MRN: [id number]  Admitting Diagnosis:  Acute cystitis without hematuria       Recommendations:     Discharge Recommendations: home with home health  Discharge Equipment Recommendations:  none  Barriers to discharge:  Other (Comment) (Medical status)    Assessment:     Richard Farr is a 78 y.o. male with a medical diagnosis of Acute cystitis without hematuria.  He presents with functional deficits impacting independence with ADL's including functional mobility. Performance deficits affecting function are weakness, impaired endurance, impaired self care skills, impaired functional mobility, gait instability, impaired balance, impaired cognition, decreased coordination, decreased upper extremity function, decreased lower extremity function, decreased safety awareness, abnormal tone, decreased ROM, impaired coordination, impaired fine motor, impaired cardiopulmonary response to activity, impaired joint extensibility, impaired muscle length.     Rehab Prognosis:  Good; patient would benefit from acute skilled OT services to address these deficits and reach maximum level of function.       Plan:     Patient to be seen 5 x/week to address the above listed problems via self-care/home management, therapeutic activities, therapeutic exercises, cognitive retraining  Plan of Care Expires: 10/11/23  Plan of Care Reviewed with: patient    Subjective     Chief Complaint: none  Patient/Family Comments/goals: Pt would like to return home with spouse as soon as possible.   Pain/Comfort:  Pain Rating 1: 0/10  Pain Rating Post-Intervention 1: 0/10    Objective:     Communicated with: nurse prior to session.  Patient found HOB elevated with telemetry, pulse ox (continuous), peripheral IV, oxygen, PureWick upon OT entry to room.    General Precautions: Standard, fall, aspiration    Orthopedic Precautions:N/A  Braces: N/A  Respiratory Status: Nasal cannula, flow 2 L/min      Occupational Performance:     Bed Mobility:    Pt did not perform on this date.    Functional Mobility/Transfers:  Patient completed Sit <> Stand Transfer with stand by assistance  with  4 wheeled walker   Patient completed Bed <> Chair Transfer using Step Transfer technique with stand by assistance with 4 wheeled walker  Patient completed Toilet Transfer Step Transfer technique with stand by assistance with  grab bars  Functional Mobility: Pt ambulated greater than 200' requiring SBA utilizing rollator.     Activities of Daily Living:  Toileting: contact guard assistance        [facility] 6 Click ADL:      Treatment & Education:  Pt was cooperative and motivated without verbal encouragement while exhibiting positive affect. Pt participated in functional transfer retraining to / from recliner and toilet emphasizing fall prevention providing extra time requiring SBA for safety with min verbal cueing for safety and technique. He then participated in ADL retraining regarding toileting providing extra time requiring CGA for safety secondary to intermittent steadying during clothing management with additional cueing. Pt ambulated greater than 200' between surfaces requiring SBA utilizing rollator.     Patient left up in chair with all lines intact, call button in reach, and nurse present    GOALS:   Multidisciplinary Problems       Occupational Therapy Goals          Problem: Occupational Therapy    Goal Priority Disciplines Outcome Interventions   Occupational Therapy Goal     OT, PT/OT Ongoing, Progressing    Description: Goals to be met by: 10/11/23 (Updated)    Patient will increase functional independence with ADLs by performing:    Feeding with Set-up Assistance.  UE Dressing with Set-up Assistance..  LE Dressing with Supervision.  Grooming while seated with Set-up Assistance.  Toileting from toilet with Supervision for hygiene and clothing management.   Bathing from  shower chair/bench with Supervision.  Toilet  transfer to toilet with Set-up Assistance.                         Time Tracking:     OT Date of Treatment: 10/03/23  OT Start Time: 1650  OT Stop Time: 1718  OT Total Time (min): 28 min    Billable Minutes:Self Care/Home Management 13  Therapeutic Activity 15    OT/SIRI: OT     Number of SIRI visits since last OT visit: 5    10/3/2023

## 2023-10-04 LAB
ALBUMIN SERPL BCP-MCNC: 2.8 G/DL (ref 3.5–5.2)
ALP SERPL-CCNC: 58 U/L (ref 55–135)
ALT SERPL W/O P-5'-P-CCNC: 23 U/L (ref 10–44)
ANION GAP SERPL CALC-SCNC: 2 MMOL/L (ref 3–11)
ANISOCYTOSIS BLD QL SMEAR: ABNORMAL
AST SERPL-CCNC: 33 U/L (ref 10–40)
BASOPHILS # BLD AUTO: 0.11 K/UL (ref 0–0.2)
BASOPHILS NFR BLD: 1.4 % (ref 0–1.9)
BILIRUB SERPL-MCNC: 0.7 MG/DL (ref 0.1–1)
BUN SERPL-MCNC: 25 MG/DL (ref 8–23)
BURR CELLS BLD QL SMEAR: ABNORMAL
CALCIUM SERPL-MCNC: 9.1 MG/DL (ref 8.7–10.5)
CHLORIDE SERPL-SCNC: 103 MMOL/L (ref 95–110)
CO2 SERPL-SCNC: 33 MMOL/L (ref 23–29)
CREAT SERPL-MCNC: 0.5 MG/DL (ref 0.5–1.4)
DIFFERENTIAL METHOD: ABNORMAL
EOSINOPHIL # BLD AUTO: 0.5 K/UL (ref 0–0.5)
EOSINOPHIL NFR BLD: 6.4 % (ref 0–8)
ERYTHROCYTE [DISTWIDTH] IN BLOOD BY AUTOMATED COUNT: ABNORMAL % (ref 11.5–14.5)
EST. GFR  (NO RACE VARIABLE): >60 ML/MIN/1.73 M^2
GLUCOSE SERPL-MCNC: 105 MG/DL (ref 70–110)
HCT VFR BLD AUTO: 34.3 % (ref 40–54)
HGB BLD-MCNC: 10 G/DL (ref 14–18)
HYPOCHROMIA BLD QL SMEAR: ABNORMAL
IMM GRANULOCYTES # BLD AUTO: 0.04 K/UL (ref 0–0.04)
IMM GRANULOCYTES NFR BLD AUTO: 0.5 % (ref 0–0.5)
LYMPHOCYTES # BLD AUTO: 2 K/UL (ref 1–4.8)
LYMPHOCYTES NFR BLD: 24.8 % (ref 18–48)
MAGNESIUM SERPL-MCNC: 1.8 MG/DL (ref 1.6–2.6)
MCH RBC QN AUTO: 23.6 PG (ref 27–31)
MCHC RBC AUTO-ENTMCNC: 29.2 G/DL (ref 32–36)
MCV RBC AUTO: 81 FL (ref 82–98)
MONOCYTES # BLD AUTO: 0.9 K/UL (ref 0.3–1)
MONOCYTES NFR BLD: 11 % (ref 4–15)
NEUTROPHILS # BLD AUTO: 4.6 K/UL (ref 1.8–7.7)
NEUTROPHILS NFR BLD: 55.9 % (ref 38–73)
NRBC BLD-RTO: 0 /100 WBC
PLATELET # BLD AUTO: 281 K/UL (ref 150–450)
PMV BLD AUTO: ABNORMAL FL (ref 9.2–12.9)
POIKILOCYTOSIS BLD QL SMEAR: ABNORMAL
POTASSIUM SERPL-SCNC: 4 MMOL/L (ref 3.5–5.1)
PROT SERPL-MCNC: 6.8 G/DL (ref 6–8.4)
RBC # BLD AUTO: 4.24 M/UL (ref 4.6–6.2)
SCHISTOCYTES BLD QL SMEAR: ABNORMAL
SODIUM SERPL-SCNC: 138 MMOL/L (ref 136–145)
SPHEROCYTES BLD QL SMEAR: ABNORMAL
WBC # BLD AUTO: 8.12 K/UL (ref 3.9–12.7)

## 2023-10-04 PROCEDURE — 25000003 PHARM REV CODE 250: Performed by: STUDENT IN AN ORGANIZED HEALTH CARE EDUCATION/TRAINING PROGRAM

## 2023-10-04 PROCEDURE — 97110 THERAPEUTIC EXERCISES: CPT

## 2023-10-04 PROCEDURE — 25000003 PHARM REV CODE 250: Performed by: INTERNAL MEDICINE

## 2023-10-04 PROCEDURE — 27201423 OPTIME MED/SURG SUP & DEVICES STERILE SUPPLY: Performed by: STUDENT IN AN ORGANIZED HEALTH CARE EDUCATION/TRAINING PROGRAM

## 2023-10-04 PROCEDURE — 21400001 HC TELEMETRY ROOM

## 2023-10-04 PROCEDURE — 97116 GAIT TRAINING THERAPY: CPT

## 2023-10-04 PROCEDURE — 37000009 HC ANESTHESIA EA ADD 15 MINS: Performed by: STUDENT IN AN ORGANIZED HEALTH CARE EDUCATION/TRAINING PROGRAM

## 2023-10-04 PROCEDURE — 27800903 OPTIME MED/SURG SUP & DEVICES OTHER IMPLANTS: Performed by: STUDENT IN AN ORGANIZED HEALTH CARE EDUCATION/TRAINING PROGRAM

## 2023-10-04 PROCEDURE — A4216 STERILE WATER/SALINE, 10 ML: HCPCS | Performed by: INTERNAL MEDICINE

## 2023-10-04 PROCEDURE — 37000008 HC ANESTHESIA 1ST 15 MINUTES: Performed by: STUDENT IN AN ORGANIZED HEALTH CARE EDUCATION/TRAINING PROGRAM

## 2023-10-04 PROCEDURE — 25000003 PHARM REV CODE 250: Performed by: NURSE ANESTHETIST, CERTIFIED REGISTERED

## 2023-10-04 PROCEDURE — 36000707: Performed by: STUDENT IN AN ORGANIZED HEALTH CARE EDUCATION/TRAINING PROGRAM

## 2023-10-04 PROCEDURE — 43246 EGD PLACE GASTROSTOMY TUBE: CPT | Mod: ,,, | Performed by: STUDENT IN AN ORGANIZED HEALTH CARE EDUCATION/TRAINING PROGRAM

## 2023-10-04 PROCEDURE — 85025 COMPLETE CBC W/AUTO DIFF WBC: CPT | Performed by: INTERNAL MEDICINE

## 2023-10-04 PROCEDURE — 63600175 PHARM REV CODE 636 W HCPCS: Performed by: INTERNAL MEDICINE

## 2023-10-04 PROCEDURE — 80053 COMPREHEN METABOLIC PANEL: CPT | Performed by: INTERNAL MEDICINE

## 2023-10-04 PROCEDURE — 36415 COLL VENOUS BLD VENIPUNCTURE: CPT | Performed by: INTERNAL MEDICINE

## 2023-10-04 PROCEDURE — 27000221 HC OXYGEN, UP TO 24 HOURS

## 2023-10-04 PROCEDURE — 43246 PR EGD, FLEX, W/PLCMT, GASTROSTOMY TUBE: ICD-10-PCS | Mod: ,,, | Performed by: STUDENT IN AN ORGANIZED HEALTH CARE EDUCATION/TRAINING PROGRAM

## 2023-10-04 PROCEDURE — 25000003 PHARM REV CODE 250: Performed by: EMERGENCY MEDICINE

## 2023-10-04 PROCEDURE — 99900031 HC PATIENT EDUCATION (STAT)

## 2023-10-04 PROCEDURE — C9113 INJ PANTOPRAZOLE SODIUM, VIA: HCPCS | Performed by: INTERNAL MEDICINE

## 2023-10-04 PROCEDURE — 94761 N-INVAS EAR/PLS OXIMETRY MLT: CPT

## 2023-10-04 PROCEDURE — 83735 ASSAY OF MAGNESIUM: CPT | Performed by: INTERNAL MEDICINE

## 2023-10-04 PROCEDURE — 11000001 HC ACUTE MED/SURG PRIVATE ROOM

## 2023-10-04 PROCEDURE — 99900035 HC TECH TIME PER 15 MIN (STAT)

## 2023-10-04 PROCEDURE — 36000706: Performed by: STUDENT IN AN ORGANIZED HEALTH CARE EDUCATION/TRAINING PROGRAM

## 2023-10-04 DEVICE — TUBE GASTRO PEG MIC 20F: Type: IMPLANTABLE DEVICE | Site: STOMACH | Status: FUNCTIONAL

## 2023-10-04 RX ORDER — LIDOCAINE HYDROCHLORIDE 10 MG/ML
INJECTION, SOLUTION INTRAVENOUS
Status: DISCONTINUED | OUTPATIENT
Start: 2023-10-04 | End: 2023-10-04

## 2023-10-04 RX ORDER — PROPOFOL 10 MG/ML
VIAL (ML) INTRAVENOUS
Status: DISCONTINUED | OUTPATIENT
Start: 2023-10-04 | End: 2023-10-04

## 2023-10-04 RX ORDER — SODIUM CHLORIDE 9 MG/ML
INJECTION, SOLUTION INTRAVENOUS CONTINUOUS PRN
Status: DISCONTINUED | OUTPATIENT
Start: 2023-10-04 | End: 2023-10-04

## 2023-10-04 RX ORDER — LIDOCAINE HYDROCHLORIDE 10 MG/ML
INJECTION, SOLUTION EPIDURAL; INFILTRATION; INTRACAUDAL; PERINEURAL
Status: DISCONTINUED | OUTPATIENT
Start: 2023-10-04 | End: 2023-10-04 | Stop reason: HOSPADM

## 2023-10-04 RX ADMIN — Medication 20 MG: at 01:10

## 2023-10-04 RX ADMIN — LEVOTHYROXINE SODIUM 25 MCG: 20 INJECTION, SOLUTION INTRAVENOUS at 08:10

## 2023-10-04 RX ADMIN — ATORVASTATIN CALCIUM 80 MG: 80 TABLET, FILM COATED ORAL at 08:10

## 2023-10-04 RX ADMIN — Medication 30 MG: at 01:10

## 2023-10-04 RX ADMIN — Medication 6 MG: at 08:10

## 2023-10-04 RX ADMIN — LIDOCAINE HYDROCHLORIDE 50 MG: 10 INJECTION, SOLUTION INTRAVENOUS at 01:10

## 2023-10-04 RX ADMIN — ASCORBIC ACID, VITAMIN A PALMITATE, CHOLECALCIFEROL, THIAMINE HYDROCHLORIDE, RIBOFLAVIN-5 PHOSPHATE SODIUM, PYRIDOXINE HYDROCHLORIDE, NIACINAMIDE, DEXPANTHENOL, ALPHA-TOCOPHEROL ACETATE, VITAMIN K1, FOLIC ACID, BIOTIN, CYANOCOBALAMIN: 200; 3300; 200; 6; 3.6; 6; 40; 15; 10; 150; 600; 60; 5 INJECTION, SOLUTION INTRAVENOUS at 05:10

## 2023-10-04 RX ADMIN — Medication 10 ML: at 06:10

## 2023-10-04 RX ADMIN — FUROSEMIDE 40 MG: 10 INJECTION, SOLUTION INTRAVENOUS at 08:10

## 2023-10-04 RX ADMIN — PANTOPRAZOLE SODIUM 40 MG: 40 INJECTION, POWDER, FOR SOLUTION INTRAVENOUS at 08:10

## 2023-10-04 RX ADMIN — METOROPROLOL TARTRATE 2.5 MG: 5 INJECTION, SOLUTION INTRAVENOUS at 09:10

## 2023-10-04 RX ADMIN — METOROPROLOL TARTRATE 2.5 MG: 5 INJECTION, SOLUTION INTRAVENOUS at 08:10

## 2023-10-04 RX ADMIN — SODIUM CHLORIDE: 0.9 INJECTION, SOLUTION INTRAVENOUS at 01:10

## 2023-10-04 RX ADMIN — Medication 80 MG: at 01:10

## 2023-10-04 NOTE — ASSESSMENT & PLAN NOTE
-to OR tomorrow for EGD with possible PEG tube versus open gastrostomy tube placement   -Informed consent obtained   -Echo and EKG reviewed with anesthesia and Medicine; patient intermediate to high risk for intervention.  Family voices understanding.  -Continue NPO

## 2023-10-04 NOTE — ASSESSMENT & PLAN NOTE
Patient states he had upper and lower endoscopy in 2021.  Iron infusion prior to discharge.    Recent Labs   Lab 10/02/23  0428 10/03/23  0517 10/04/23  0506   Hemoglobin 9.5 L 10.6 L 10.0 L

## 2023-10-04 NOTE — SUBJECTIVE & OBJECTIVE
"Interval History: Patient seen and examined.     Review of Systems   Constitutional:  Positive for activity change and appetite change. Negative for chills and fever.   HENT:  Positive for trouble swallowing. Negative for ear pain, mouth sores, nosebleeds and sore throat.    Eyes:  Negative for visual disturbance.   Respiratory:  Positive for shortness of breath. Negative for wheezing.    Cardiovascular:  Negative for chest pain, palpitations and leg swelling ("much improved").   Gastrointestinal:  Negative for abdominal distention, abdominal pain, blood in stool, diarrhea, nausea and vomiting.   Endocrine: Negative for polyphagia.   Genitourinary:  Positive for frequency. Negative for difficulty urinating, dysuria and flank pain.   Musculoskeletal:  Positive for arthralgias, gait problem and joint swelling.   Skin:  Negative for rash.   Neurological:  Positive for weakness. Negative for dizziness, tremors, seizures, syncope and headaches.   Hematological:  Negative for adenopathy.   Psychiatric/Behavioral:  Negative for agitation, confusion and hallucinations. The patient is not nervous/anxious.      Objective:     Vital Signs (Most Recent):  Temp: 97.7 °F (36.5 °C) (10/04/23 0733)  Pulse: 85 (10/04/23 0733)  Resp: 18 (10/04/23 0733)  BP: 127/66 (10/04/23 0733)  SpO2: 100 % (10/04/23 0733) Vital Signs (24h Range):  Temp:  [97.6 °F (36.4 °C)-98.2 °F (36.8 °C)] 97.7 °F (36.5 °C)  Pulse:  [69-88] 85  Resp:  [17-20] 18  SpO2:  [93 %-100 %] 100 %  BP: (120-127)/(58-68) 127/66     Weight: 71.7 kg (158 lb 1.1 oz)  Body mass index is 24.75 kg/m².    Intake/Output Summary (Last 24 hours) at 10/4/2023 1031  Last data filed at 10/4/2023 0647  Gross per 24 hour   Intake 1820 ml   Output 250 ml   Net 1570 ml           Physical Exam  Vitals and nursing note reviewed.   Constitutional:       General: He is not in acute distress.     Appearance: He is not ill-appearing, toxic-appearing or diaphoretic.      Comments: Frail, " chronically ill-appearing   HENT:      Head: Normocephalic and atraumatic.      Comments: Forehead and nasal bridge lacerations intact with dressing in place.  No SOI     Nose: Nose normal. No congestion or rhinorrhea.      Mouth/Throat:      Mouth: Mucous membranes are dry.      Pharynx: No oropharyngeal exudate or posterior oropharyngeal erythema.   Eyes:      General: No scleral icterus.  Neck:      Vascular: No carotid bruit.   Cardiovascular:      Rate and Rhythm: Normal rate and regular rhythm.      Heart sounds: Murmur heard.      No friction rub. No gallop.   Pulmonary:      Effort: Pulmonary effort is normal. No respiratory distress.      Breath sounds: No stridor. Wheezing (faint distant) and rales present. No rhonchi.      Comments: Supplemental oxygen via nasal cannula  Chest:      Chest wall: No tenderness.   Abdominal:      General: There is no distension.      Palpations: Abdomen is soft. There is no mass.      Tenderness: There is no abdominal tenderness. There is no right CVA tenderness, left CVA tenderness, guarding or rebound.      Hernia: No hernia is present.   Musculoskeletal:         General: Deformity present. No tenderness.      Cervical back: Neck supple. No rigidity.      Right lower leg: No edema.      Left lower leg: No edema.   Lymphadenopathy:      Cervical: No cervical adenopathy.   Skin:     General: Skin is warm and dry.      Capillary Refill: Capillary refill takes less than 2 seconds.      Coloration: Skin is not jaundiced or pale.      Findings: No rash.   Neurological:      Mental Status: He is alert. Mental status is at baseline.      Cranial Nerves: No cranial nerve deficit.      Sensory: No sensory deficit.      Motor: Weakness present.      Coordination: Coordination normal.      Gait: Gait abnormal.   Psychiatric:         Mood and Affect: Mood normal.         Behavior: Behavior normal.         Thought Content: Thought content normal.         Judgment: Judgment normal.              Significant Labs: All pertinent labs within the past 24 hours have been reviewed.  BMP:   Recent Labs   Lab 10/04/23  0506         K 4.0      CO2 33*   BUN 25*   CREATININE 0.5   CALCIUM 9.1   MG 1.8       CBC:   Recent Labs   Lab 10/03/23  0517 10/04/23  0506   WBC 8.84 8.12   HGB 10.6* 10.0*   HCT 36.3* 34.3*    281       CMP:   Recent Labs   Lab 10/03/23  0517 10/04/23  0506    138   K 4.0 4.0    103   CO2 34* 33*    105   BUN 15 25*   CREATININE 0.4* 0.5   CALCIUM 9.3 9.1   PROT 6.9 6.8   ALBUMIN 3.0* 2.8*   BILITOT 0.8 0.7   ALKPHOS 63 58   AST 35 33   ALT 24 23   ANIONGAP -1* 2*       Magnesium:   Recent Labs   Lab 10/03/23  0517 10/04/23  0506   MG 1.8 1.8         Significant Imaging: I have reviewed all pertinent imaging results/findings within the past 24 hours.

## 2023-10-04 NOTE — TRANSFER OF CARE
Anesthesia Transfer of Care Note    Patient: Richard Farr    Procedure(s) Performed: Procedure(s) (LRB):  INSERTION, PEG TUBE (N/A)  EGD, WITH CLOSED BIOPSY (N/A)    Patient location: Other: OR B    Anesthesia Type: MAC    Transport from OR: Transported from OR on room air with adequate spontaneous ventilation    Post pain: adequate analgesia    Post assessment: no apparent anesthetic complications    Post vital signs: stable    Level of consciousness: awake    Nausea/Vomiting: no nausea/vomiting    Complications: none    Transfer of care protocol was followed      Last vitals:   SPO2 95  T 36.8  RR 16  HR 74  /65

## 2023-10-04 NOTE — ASSESSMENT & PLAN NOTE
Dietary modifications have been made.  SLP consulted for evaluation and treatment.  Significant aspiration.  NPO for now.  Pending Cardiology evaluation for GS to eval for PEG.  9/30 Remains NPO, will start PTPN feeds with continued IVF  10/1 PO meds changed to IV/IM. Continue with PTPN. PICC Line placement ordered. General Surgery awaiting cardiology evaluation for procedure  10/2:  Awaiting cardiac clearance prior to PEG placement. Continue TPN.  10/3:  PEG planned for tomorrow.  10/4:  PEG tube scheduled for today.

## 2023-10-04 NOTE — PLAN OF CARE
Problem: Physical Therapy  Goal: Physical Therapy Goal  Description: Goals to be met by: 10/9/2023     Patient will increase functional independence with mobility by performin. Supine to sit with Supervision or Set-up Assistance.  2. Sit to supine with Supervision or Set-up Assistance.  3. Bed to chair transfer with Supervision or Set-up Assistance with rollator  using Step Transfer technique.  4. Sit to Stand with Supervision or Set-up Assistance with rollator   5. Gait  x 700  feet with Supervision or Set-up Assistance with rollator   6. Lower extremity exercise program x10 reps.     Outcome: Ongoing, Progressing

## 2023-10-04 NOTE — PROGRESS NOTES
Patient seen and examined.  No interval change since the below obtained H&P/progress note  Informed consent verified and surgical site marked  NPO since midnight  All questions and concerns addressed  To OR for EGD with possible PEG vs lap vs open gastrostomy vs jejunostomy tube placement    Queenie Berman MD  General Surgery   [phone]      [facility]  General Surgery  Progress Note    Subjective:     History of Present Illness:  79yo male admitted to medicine for symptomatic anemia who presents with forehead laceration after accidental fall this AM.  GCS 15; HD stable.  No LOS.  General surgery consulted for lac repair.  Pt also presents with progressive dysphagia with severe aspiration as demonstrated on swallow study.  General surery consulted for g-tube placement.        Post-Op Info:  Procedure(s) (LRB):  INSERTION, PEG TUBE (N/A)   Day of Surgery     No new subjective & objective note has been filed under this hospital service since the last note was generated.    Assessment/Plan:     Laceration of forehead  Lac irrigated with primary suture repair  Steri strips to nasal bridge lacerations   Keep clean and dry  Follow up in general surgery clinic in 2 weeks for suture removal     Other dysphagia  -to OR tomorrow for EGD with possible PEG tube versus open gastrostomy tube placement   -Informed consent obtained   -Echo and EKG reviewed with anesthesia and Medicine; patient intermediate to high risk for intervention.  Family voices understanding.  -Continue NPO        Queenie Berman MD  General Surgery  [facility]

## 2023-10-04 NOTE — PLAN OF CARE
Recommendations  1. Rec'd EN: Jevity 1.2 @ rate of 10mL/hr increasing by 10mL every 4-6 hrs to goal rate of 60mL/hr to provide 1682kcal (102% EEN), 78g of protein (91% EPN), and 1162mL FW with a continuous 20mL/hr FWF.    2. Rec'd ClinimixE 4.25/5 @ 75mL/hr to provide 614kcal (36% EEN), 77g of protein (89%% EPN), and 1800mL TFV.   -Add 250mL of 20% lipids 3x/week to provide additional calories.   3. RD to follow and make rec's accordingly.  Goals:   1. Pt will be started on EN by next RD follow up.     2. Pt will reach TF goal rate within 48hrs of initiation.  Nutrition Goal Status: progressing towards goal

## 2023-10-04 NOTE — SUBJECTIVE & OBJECTIVE
Interval History:   Patient seen and examined.  No acute events overnight.  Vital signs stable; afebrile.  Voices no complaints.    Medications:  Continuous Infusions:   Amino acid 4.25% - dextrose 5% (CLINIMIX-E) solution with additives (1L provides 42.5 gm AA, 50 gm CHO (170 kcal/L dextrose), Na 35, K 30, Mg 5, Ca 4.5, Acetate 70, Cl 39, Phos 15) 75 mL/hr at 10/03/23 1717    dextrose 5 % and 0.45 % NaCl with KCl 20 mEq Stopped (09/30/23 1515)     Scheduled Meds:   atorvastatin  80 mg Oral QHS    ezetimibe  10 mg Oral Daily    fenofibrate  145 mg Oral Daily    ferrous sulfate  1 tablet Oral Daily    folic acid  1 mg Oral Daily    furosemide (LASIX) injection  40 mg Intravenous Daily    levothyroxine  25 mcg Intravenous Daily    metoprolol  2.5 mg Intravenous Q12H    pantoprazole  40 mg Intravenous Daily    sodium chloride 0.9%  10 mL Intravenous Q6H    Tdap  0.5 mL Intramuscular Once     PRN Meds:melatonin, ondansetron, sodium chloride 0.9%, Flushing PICC/Midline Protocol **AND** sodium chloride 0.9% **AND** sodium chloride 0.9%     Review of patient's allergies indicates:   Allergen Reactions    Ativan [lorazepam]      Adverse reaction.     Objective:     Vital Signs (Most Recent):  Temp: 98.2 °F (36.8 °C) (10/03/23 1834)  Pulse: 85 (10/03/23 1847)  Resp: 20 (10/03/23 1834)  BP: 121/67 (10/03/23 1834)  SpO2: 97 % (10/03/23 1847) Vital Signs (24h Range):  Temp:  [97.8 °F (36.6 °C)-98.5 °F (36.9 °C)] 98.2 °F (36.8 °C)  Pulse:  [74-85] 85  Resp:  [20] 20  SpO2:  [93 %-100 %] 97 %  BP: (120-150)/(65-79) 121/67     Weight: 71.7 kg (158 lb 1.1 oz)  Body mass index is 24.75 kg/m².    Intake/Output - Last 3 Shifts         10/01 0700  10/02 0659 10/02 0700  10/03 0659 10/03 0700  10/04 0659    P.O. 0 0     I.V. (mL/kg)   10 (0.1)    .5 1687.5 900    Total Intake(mL/kg) 912.5 (12.7) 1687.5 (23.5) 910 (12.7)    Urine (mL/kg/hr) 201 (0.1)      Stool 0      Total Output 201      Net +711.5 +1687.5 +910           Urine  Occurrence 1500 x 2004 x 3 x    Stool Occurrence 1 x 0 x 2 x             Physical Exam  Vitals reviewed.   Constitutional:       General: He is not in acute distress.     Appearance: He is not ill-appearing or toxic-appearing.   Cardiovascular:      Rate and Rhythm: Normal rate and regular rhythm.   Pulmonary:      Effort: Pulmonary effort is normal. No respiratory distress.   Abdominal:      General: There is no distension.      Palpations: Abdomen is soft.      Tenderness: There is no abdominal tenderness. There is no guarding or rebound.   Musculoskeletal:      Right lower leg: No edema.      Left lower leg: No edema.   Skin:     General: Skin is warm and dry.      Capillary Refill: Capillary refill takes less than 2 seconds.   Neurological:      Mental Status: He is alert. Mental status is at baseline.          Significant Labs:  I have reviewed all pertinent lab results within the past 24 hours.  CBC:   Recent Labs   Lab 10/03/23  0517   WBC 8.84   RBC 4.53*   HGB 10.6*   HCT 36.3*      MCV 80*   MCH 23.4*   MCHC 29.2*     CMP:   Recent Labs   Lab 10/03/23  0517      CALCIUM 9.3   ALBUMIN 3.0*   PROT 6.9      K 4.0   CO2 34*      BUN 15   CREATININE 0.4*   ALKPHOS 63   ALT 24   AST 35   BILITOT 0.8       Significant Diagnostics:  I have reviewed all pertinent imaging results/findings within the past 24 hours.

## 2023-10-04 NOTE — PROGRESS NOTES
[facility]  Adult Nutrition  Progress Note    SUMMARY       Recommendations  1. Rec'd EN: Jevity 1.2 @ rate of 10mL/hr increasing by 10mL every 4-6 hrs to goal rate of 60mL/hr to provide 1682kcal (102% EEN), 78g of protein (91% EPN), and 1162mL FW with a continuous 20mL/hr FWF.    2. Rec'd ClinimixE 4.25/5 @ 75mL/hr to provide 614kcal (36% EEN), 77g of protein (89%% EPN), and 1800mL TFV.   -Add 250mL of 20% lipids 3x/week to provide additional calories.   3. RD to follow and make rec's accordingly.  Goals:   1. Pt will be started on EN by next RD follow up.     2. Pt will reach TF goal rate within 48hrs of initiation.  Nutrition Goal Status: progressing towards goal  Communication of RD Recs: reviewed with RN    Assessment and Plan    Nutrition Problem  Inadequate oral intake     Related to (etiology):   NPO status     Signs and Symptoms (as evidenced by):   0% PO intake     Interventions/Recommendations (treatment strategy):  1. Rec'd EN: Jevity 1.2 @ rate of 10mL/hr increasing by 10mL every 4-6 hrs to goal rate of 60mL/hr to provide 1682kcal (102% EEN), 78g of protein (91% EPN), and 1162mL FW with a continuous 20mL/hr FWF.    2. Rec'd ClinimixE 4.25/5 @ 75mL/hr to provide 614kcal (36% EEN), 77g of protein (89%% EPN), and 1800mL TFV.   -Add 250mL of 20% lipids 3x/week to provide additional calories.   3. RD to follow and make rec's accordingly.     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 Pt underwent PEG tube placement. Pt's PPN was continued today. Rec'd switch to EN when medically appropriate to do so. RD to follow and make rec's accordingly.  Nutrition Discharge Planning: TBD as care progresses    Nutrition Risk Screen    Nutrition Risk Screen: tube feeding or parenteral nutrition    Nutrition/Diet History    Spiritual, Cultural Beliefs, Gnosticist Practices, Values that Affect Care: no  Food Allergies: NKFA    Anthropometrics    Temp: 98.1 °F (36.7 °C)  Height: 5' 7.01" (170.2 cm)  Height (inches): 67.01 in  Weight Method: Bed Scale  Weight: 71.7 kg (158 lb 1.1 oz)  Weight (lb): 158.07 lb  Ideal Body Weight (IBW), Male: 148.06 lb  % Ideal Body Weight, Male (lb): 106.76 %  BMI (Calculated): 24.8  BMI Grade: 18.5-24.9 - normal    Lab/Procedures/Meds    Pertinent Labs Reviewed: reviewed  Pertinent Medications Reviewed: reviewed    Estimated/Assessed Needs    Weight Used For Calorie Calculations: 71.7 kg (158 lb 1.1 oz)  Energy Calorie Requirements (kcal): 1682 (MSJ x 1.2 SF)  Energy Need Method: Andrew Arce  Protein Requirements: 86 (1.2g/kg)  Weight Used For Protein Calculations: 71.7 kg (158 lb 1.1 oz)  Fluid Requirements (mL): 1682 (1mL/kcal)  Estimated Fluid Requirement Method: RDA Method  RDA Method (mL): 1682    Nutrition Prescription Ordered    Current Diet Order: NPO  Current Nutrition Support Formula Ordered: Clinimix 4.25/5  Current Nutrition Support Rate Ordered: 75 (ml)  Current Nutrition Support Frequency Ordered: Continuous  Oral Nutrition Supplement: None    Evaluation of Received Nutrient/Fluid Intake    Enteral Calories (kcal): 0  Enteral Protein (gm): 0  Enteral (Free Water) Fluid (mL): 0  % Kcal Needs: 0%  % Protein Needs: 0%  I/O: +670  Energy Calories " Required: not meeting needs  Protein Required: not meeting needs  Tolerance: tolerating  % Intake of Estimated Energy Needs: 50 - 75 %  % Meal Intake: NPO    Nutrition Risk    Level of Risk/Frequency of Follow-up: moderate     Monitor and Evaluation    Food and Nutrient Intake: enteral nutrition intake, parenteral nutrition intake  Food and Nutrient Adminstration: enteral and parenteral nutrition administration  Knowledge/Beliefs/Attitudes: food and nutrition knowledge/skill, beliefs and attitudes  Physical Activity and Function: nutrition-related ADLs and IADLs  Anthropometric Measurements: weight, height/length, weight change, body mass index  Biochemical Data, Medical Tests and Procedures: electrolyte and renal panel, gastrointestinal profile, glucose/endocrine profile, inflammatory profile, lipid profile  Nutrition-Focused Physical Findings: overall appearance     Nutrition Follow-Up    RD Follow-up?: Yes

## 2023-10-04 NOTE — ASSESSMENT & PLAN NOTE
Vital signs noted continue antihypertensives    BP Readings from Last 3 Encounters:   10/04/23 127/66   09/21/23 (!) 113/57   07/11/23 (!) 142/58

## 2023-10-04 NOTE — PROGRESS NOTES
[facility]  General Surgery  Progress Note    Subjective:     History of Present Illness:  79yo male admitted to medicine for symptomatic anemia who presents with forehead laceration after accidental fall this AM.  GCS 15; HD stable.  No LOS.  General surgery consulted for lac repair.  Pt also presents with progressive dysphagia with severe aspiration as demonstrated on swallow study.  General surery consulted for g-tube placement.        Post-Op Info:  Procedure(s) (LRB):  INSERTION, PEG TUBE (N/A)         Interval History:   Patient seen and examined.  No acute events overnight.  Vital signs stable; afebrile.  Voices no complaints.    Medications:  Continuous Infusions:   Amino acid 4.25% - dextrose 5% (CLINIMIX-E) solution with additives (1L provides 42.5 gm AA, 50 gm CHO (170 kcal/L dextrose), Na 35, K 30, Mg 5, Ca 4.5, Acetate 70, Cl 39, Phos 15) 75 mL/hr at 10/03/23 1717    dextrose 5 % and 0.45 % NaCl with KCl 20 mEq Stopped (09/30/23 1515)     Scheduled Meds:   atorvastatin  80 mg Oral QHS    ezetimibe  10 mg Oral Daily    fenofibrate  145 mg Oral Daily    ferrous sulfate  1 tablet Oral Daily    folic acid  1 mg Oral Daily    furosemide (LASIX) injection  40 mg Intravenous Daily    levothyroxine  25 mcg Intravenous Daily    metoprolol  2.5 mg Intravenous Q12H    pantoprazole  40 mg Intravenous Daily    sodium chloride 0.9%  10 mL Intravenous Q6H    Tdap  0.5 mL Intramuscular Once     PRN Meds:melatonin, ondansetron, sodium chloride 0.9%, Flushing PICC/Midline Protocol **AND** sodium chloride 0.9% **AND** sodium chloride 0.9%     Review of patient's allergies indicates:   Allergen Reactions    Ativan [lorazepam]      Adverse reaction.     Objective:     Vital Signs (Most Recent):  Temp: 98.2 °F (36.8 °C) (10/03/23 1834)  Pulse: 85 (10/03/23 1847)  Resp: 20 (10/03/23 1834)  BP: 121/67 (10/03/23 1834)  SpO2: 97 % (10/03/23 1847) Vital Signs (24h Range):  Temp:  [97.8 °F (36.6 °C)-98.5 °F  (36.9 °C)] 98.2 °F (36.8 °C)  Pulse:  [74-85] 85  Resp:  [20] 20  SpO2:  [93 %-100 %] 97 %  BP: (120-150)/(65-79) 121/67     Weight: 71.7 kg (158 lb 1.1 oz)  Body mass index is 24.75 kg/m².    Intake/Output - Last 3 Shifts         10/01 0700  10/02 0659 10/02 0700  10/03 0659 10/03 0700  10/04 0659    P.O. 0 0     I.V. (mL/kg)   10 (0.1)    .5 1687.5 900    Total Intake(mL/kg) 912.5 (12.7) 1687.5 (23.5) 910 (12.7)    Urine (mL/kg/hr) 201 (0.1)      Stool 0      Total Output 201      Net +711.5 +1687.5 +910           Urine Occurrence 1500 x 2004 x 3 x    Stool Occurrence 1 x 0 x 2 x             Physical Exam  Vitals reviewed.   Constitutional:       General: He is not in acute distress.     Appearance: He is not ill-appearing or toxic-appearing.   Cardiovascular:      Rate and Rhythm: Normal rate and regular rhythm.   Pulmonary:      Effort: Pulmonary effort is normal. No respiratory distress.   Abdominal:      General: There is no distension.      Palpations: Abdomen is soft.      Tenderness: There is no abdominal tenderness. There is no guarding or rebound.   Musculoskeletal:      Right lower leg: No edema.      Left lower leg: No edema.   Skin:     General: Skin is warm and dry.      Capillary Refill: Capillary refill takes less than 2 seconds.   Neurological:      Mental Status: He is alert. Mental status is at baseline.          Significant Labs:  I have reviewed all pertinent lab results within the past 24 hours.  CBC:   Recent Labs   Lab 10/03/23  0517   WBC 8.84   RBC 4.53*   HGB 10.6*   HCT 36.3*      MCV 80*   MCH 23.4*   MCHC 29.2*     CMP:   Recent Labs   Lab 10/03/23  0517      CALCIUM 9.3   ALBUMIN 3.0*   PROT 6.9      K 4.0   CO2 34*      BUN 15   CREATININE 0.4*   ALKPHOS 63   ALT 24   AST 35   BILITOT 0.8       Significant Diagnostics:  I have reviewed all pertinent imaging results/findings within the past 24 hours.    Assessment/Plan:     Laceration of forehead  Lac  irrigated with primary suture repair  Steri strips to nasal bridge lacerations   Keep clean and dry  Follow up in general surgery clinic in 2 weeks for suture removal     Other dysphagia  -to OR tomorrow for EGD with possible PEG tube versus open gastrostomy tube placement   -Informed consent obtained   -Echo and EKG reviewed with anesthesia and Medicine; patient intermediate to high risk for intervention.  Family voices understanding.  -Continue NPO        Queenie Berman MD  General Surgery  [facility]

## 2023-10-04 NOTE — ASSESSMENT & PLAN NOTE
We will replete intravenously today due to dysphagia.  Follow with daily labs.      Patient has hypokalemia.  Last electrolytes reviewed-   Recent Labs   Lab 10/03/23  0517 10/04/23  0506   K 4.0 4.0   Will replace potassium as per sliding scale as needed and monitor electrolytes closely.

## 2023-10-04 NOTE — PT/OT/SLP PROGRESS
Occupational Therapy      Patient Name:  Richard Farr   MRN:  [id number]    Patient not seen today secondary to Off the floor for procedure/surgery. Will follow-up if time allows today or 1/5/23.    10/4/2023  Doreen ALMEIDA

## 2023-10-04 NOTE — PT/OT/SLP PROGRESS
"Physical Therapy Treatment    Patient Name:  Richard Farr   MRN:  [id number]    Recommendations:     Discharge Recommendations: home, home with home health, home health PT  Discharge Equipment Recommendations: none  Barriers to discharge: None    Assessment:     Richard Farr is a 78 y.o. male admitted with a medical diagnosis of Acute cystitis without hematuria.  He presents with the following impairments/functional limitations: weakness, gait instability, decreased upper extremity function, impaired cardiopulmonary response to activity, impaired endurance, impaired balance, decreased lower extremity function, impaired coordination, impaired joint extensibility, impaired sensation, decreased safety awareness, impaired muscle length, impaired self care skills, impaired functional mobility, decreased coordination Patient is in good spirits this morning waiting for his medical procedure today.  Patient required stand by assistance with supine to sit, contact guard assistance with sit <> stand using a rollator and ambulated ~822 feet with rollator.  Patient was left sitting on the recliner chair with family present.     Rehab Prognosis: Good and Fair; patient would benefit from acute skilled PT services to address these deficits and reach maximum level of function.    Recent Surgery: Procedure(s) (LRB):  INSERTION, PEG TUBE (N/A)  EGD, WITH CLOSED BIOPSY Day of Surgery    Plan:     During this hospitalization, patient to be seen 5 x/week to address the identified rehab impairments via gait training, therapeutic activities, therapeutic exercises, neuromuscular re-education and progress toward the following goals:    Plan of Care Expires:  10/09/23    Subjective     Chief Complaint: "I do not know what time the procedure is today." Per wife.   Patient/Family Comments/goals: unstated   Pain/Comfort:  Pain Rating 1: 0/10  Pain Rating Post-Intervention 1: 0/10      Objective:     Communicated with nurse and patient  and wife  " prior to session.  Patient found supine with telemetry, PureWick, oxygen upon PT entry to room.     General Precautions: Standard, fall, aspiration  Orthopedic Precautions: N/A  Braces: N/A  Respiratory Status: Nasal cannula, flow 1 L/min     Functional Mobility:  Bed Mobility:     Rolling Left:  stand by assistance  Rolling Right: stand by assistance  Scooting: stand by assistance  Bridging: stand by assistance  Supine to Sit: stand by assistance  Transfers:     Sit to Stand:  stand by assistance with rolling walker  Gait: ~822 feet with rollator  with 1 liter of O2 via nasal cannula   Balance: Mod I with static sitting, SBA with static standing       AM-PAC 6 CLICK MOBILITY  Turning over in bed (including adjusting bedclothes, sheets and blankets)?: 3  Sitting down on and standing up from a chair with arms (e.g., wheelchair, bedside commode, etc.): 3  Moving from lying on back to sitting on the side of the bed?: 3  Moving to and from a bed to a chair (including a wheelchair)?: 3  Need to walk in hospital room?: 3  Climbing 3-5 steps with a railing?: 3 (based on clinical judgement)  Basic Mobility Total Score: 18       Treatment & Education:  Rolling side <> side  Supine to  sit  Scooting to the edge of the bed   Sitting balance/tolerance  Sit <> stand with rollator  Standing tolerance   Gait with rollator   Chair positioning   AROM on both LE     Patient left up in chair with all lines intact, call button in reach, nurse  notified, and wife  present..    GOALS:   Multidisciplinary Problems       Physical Therapy Goals          Problem: Physical Therapy    Goal Priority Disciplines Outcome Goal Variances Interventions   Physical Therapy Goal     PT, PT/OT Ongoing, Progressing     Description: Goals to be met by: 10/9/2023     Patient will increase functional independence with mobility by performin. Supine to sit with Supervision or Set-up Assistance.  2. Sit to supine with Supervision or Set-up  Assistance.  3. Bed to chair transfer with Supervision or Set-up Assistance with rollator  using Step Transfer technique.  4. Sit to Stand with Supervision or Set-up Assistance with rollator   5. Gait  x 700  feet with Supervision or Set-up Assistance with rollator   6. Lower extremity exercise program x10 reps.                          Time Tracking:     PT Received On: 10/04/23  PT Start Time: 0900     PT Stop Time: 0924  PT Total Time (min): 24 min     Billable Minutes: Gait Training 10 and Therapeutic Activity 14    Treatment Type: Treatment  PT/PTA: PT     Number of PTA visits since last PT visit: 1     10/04/2023

## 2023-10-04 NOTE — PROGRESS NOTES
[facility] Medicine  Progress Note    Patient Name: Richard Farr  MRN: [id number]  Patient Class: IP- Inpatient   Admission Date: 9/22/2023  Length of Stay: 11 days  Attending Physician: Drew Moreira Jr., MD  Primary Care Provider: Drew Moreira Jr., MD        Subjective:     Principal Problem:Acute cystitis without hematuria        HPI:  ED HPI:  Richard Farr is a 78 y.o. male with PMHX of hypertension, hyperlipidemia, CHF, mitral regurg, tricuspid regurg, pulmonary hypertension, gastritis who presents to the emergency department C/O shortness of breath.     Patient presents after developing shortness of breath 1 hour prior to arrival.  Was recently admitted for anemia and received 4 units PRBCs in hospital as well as iron transfusion 2 days ago.  No fever but patient states he felt hot in emergency department.  Patient was diagnosed with urinary tract infection during previous hospitalization is on antibiotics.     PCP: Drew Moreira Jr., MD     IM HPI:  Patient was recently admitted by primary care and transfuse 4 units of packed red blood cells.  Patient felt much improved return home felt well for about 24 hours then began having increasing dyspnea.  Patient was readmitted found to have a urinary tract infection and a pneumonia.  Patient was started on antibiotics.  He was also given IV diuretics.  Patient has some peripheral edema but overall he states he feels dry and dehydrated.  Patient is having some oropharyngeal dysphagia and on exam I am unable to find the cause.  Patient has a history of CVA as well as a history of arthritis with several upper extremity joint deformities and contractures.      Overview/Hospital Course:  9/24 GT:  Patient is lying in bed this morning with spouse at bedside.  Patient is frail and chronically ill-appearing, but does not appear to be in any acute distress.  Patient reports his shortness a breath is at baseline, and he is tolerating  supplemental oxygen via nasal cannula.  Patient had some swallowing difficulties yesterday, SLP has been consulted for evaluation, treatment, dietary modifications have been made.  Patient's H&H is stable, and although anemic he is not at the point that he requires further transfusion.  Potassium mildly decreased, we will replete intravenously today due to swallowing difficulties and monitor with daily labs.  Blood cultures at this time are negative to date.  Patient and spouse deny any issues, concerns, questions.    9/25:  Patient eating breakfast this morning in no acute distress.  Speech therapy to evaluate the patient.  Thus far blood cultures are negative.  Patient feels back to baseline.  Urine culture without significant growth.  Consider discharge in the near future.  Patient having significant gas production check GI panel.    9/26:  No acute events overnight.  GI panel pending.  Provide iron infusion prior to potential discharge.  Patient to undergo modified barium swallow prior to discharge for completeness.  Home health will be coordinated ongoing outpatient needs.    Patient with significant aspiration by MBS.  Discuss with family potential needs for PEG placement vs ongoing ST.     9/27:  patient ct head negative.  Done for worsening dizziness/dysequilibrium after head trauma.  Awaiting  recs regarding PEG tube placement.     9/28/23:  patient resting this morning.  In favor or PEG placement tomorrow.   09/29/2023: No acute events overnight.  Planning potential endoscopy versus laparoscopy for PEG tube placement.  May need cardiac clearance.  Currently hemodynamically stable.  9/30: [place] weekend coverage. Awake and alert sitting on bedside chair. No new complaints at this time. Patient requires cardiology evaluation for endoscopy followed by laparoscopy guided PEG tube placement. Patient has been NPO, will start peripheral TPN along with IVF.   10/1 [place] weekend coverage. No acute events overnight.  "Patient not able to swallow PO meds. Convert PO meds to IV or IM. Follow up echocardiogram (10/2). Follow up cardiology ([facility]) pre-operative evaluation for endoscopy study on 10/4.     10/02/2023: Patient remains NPO.  Overall stable awaiting cardiac clearance prior to potential endoscopy 10/4.  10/3/23:  patient doing well this am awaiting PEG placement tomorrow.   10/4/23:  Patient undergo PEG placement this morning.  Overall clinically stable.      Interval History: Patient seen and examined.     Review of Systems   Constitutional:  Positive for activity change and appetite change. Negative for chills and fever.   HENT:  Positive for trouble swallowing. Negative for ear pain, mouth sores, nosebleeds and sore throat.    Eyes:  Negative for visual disturbance.   Respiratory:  Positive for shortness of breath. Negative for wheezing.    Cardiovascular:  Negative for chest pain, palpitations and leg swelling ("much improved").   Gastrointestinal:  Negative for abdominal distention, abdominal pain, blood in stool, diarrhea, nausea and vomiting.   Endocrine: Negative for polyphagia.   Genitourinary:  Positive for frequency. Negative for difficulty urinating, dysuria and flank pain.   Musculoskeletal:  Positive for arthralgias, gait problem and joint swelling.   Skin:  Negative for rash.   Neurological:  Positive for weakness. Negative for dizziness, tremors, seizures, syncope and headaches.   Hematological:  Negative for adenopathy.   Psychiatric/Behavioral:  Negative for agitation, confusion and hallucinations. The patient is not nervous/anxious.      Objective:     Vital Signs (Most Recent):  Temp: 97.7 °F (36.5 °C) (10/04/23 0733)  Pulse: 85 (10/04/23 0733)  Resp: 18 (10/04/23 0733)  BP: 127/66 (10/04/23 0733)  SpO2: 100 % (10/04/23 0733) Vital Signs (24h Range):  Temp:  [97.6 °F (36.4 °C)-98.2 °F (36.8 °C)] 97.7 °F (36.5 °C)  Pulse:  [69-88] 85  Resp:  [17-20] 18  SpO2:  [93 %-100 %] 100 %  BP: " (120-127)/(58-68) 127/66     Weight: 71.7 kg (158 lb 1.1 oz)  Body mass index is 24.75 kg/m².    Intake/Output Summary (Last 24 hours) at 10/4/2023 1031  Last data filed at 10/4/2023 0647  Gross per 24 hour   Intake 1820 ml   Output 250 ml   Net 1570 ml           Physical Exam  Vitals and nursing note reviewed.   Constitutional:       General: He is not in acute distress.     Appearance: He is not ill-appearing, toxic-appearing or diaphoretic.      Comments: Frail, chronically ill-appearing   HENT:      Head: Normocephalic and atraumatic.      Comments: Forehead and nasal bridge lacerations intact with dressing in place.  No SOI     Nose: Nose normal. No congestion or rhinorrhea.      Mouth/Throat:      Mouth: Mucous membranes are dry.      Pharynx: No oropharyngeal exudate or posterior oropharyngeal erythema.   Eyes:      General: No scleral icterus.  Neck:      Vascular: No carotid bruit.   Cardiovascular:      Rate and Rhythm: Normal rate and regular rhythm.      Heart sounds: Murmur heard.      No friction rub. No gallop.   Pulmonary:      Effort: Pulmonary effort is normal. No respiratory distress.      Breath sounds: No stridor. Wheezing (faint distant) and rales present. No rhonchi.      Comments: Supplemental oxygen via nasal cannula  Chest:      Chest wall: No tenderness.   Abdominal:      General: There is no distension.      Palpations: Abdomen is soft. There is no mass.      Tenderness: There is no abdominal tenderness. There is no right CVA tenderness, left CVA tenderness, guarding or rebound.      Hernia: No hernia is present.   Musculoskeletal:         General: Deformity present. No tenderness.      Cervical back: Neck supple. No rigidity.      Right lower leg: No edema.      Left lower leg: No edema.   Lymphadenopathy:      Cervical: No cervical adenopathy.   Skin:     General: Skin is warm and dry.      Capillary Refill: Capillary refill takes less than 2 seconds.      Coloration: Skin is not  jaundiced or pale.      Findings: No rash.   Neurological:      Mental Status: He is alert. Mental status is at baseline.      Cranial Nerves: No cranial nerve deficit.      Sensory: No sensory deficit.      Motor: Weakness present.      Coordination: Coordination normal.      Gait: Gait abnormal.   Psychiatric:         Mood and Affect: Mood normal.         Behavior: Behavior normal.         Thought Content: Thought content normal.         Judgment: Judgment normal.             Significant Labs: All pertinent labs within the past 24 hours have been reviewed.  BMP:   Recent Labs   Lab 10/04/23  0506         K 4.0      CO2 33*   BUN 25*   CREATININE 0.5   CALCIUM 9.1   MG 1.8       CBC:   Recent Labs   Lab 10/03/23  0517 10/04/23  0506   WBC 8.84 8.12   HGB 10.6* 10.0*   HCT 36.3* 34.3*    281       CMP:   Recent Labs   Lab 10/03/23  0517 10/04/23  0506    138   K 4.0 4.0    103   CO2 34* 33*    105   BUN 15 25*   CREATININE 0.4* 0.5   CALCIUM 9.3 9.1   PROT 6.9 6.8   ALBUMIN 3.0* 2.8*   BILITOT 0.8 0.7   ALKPHOS 63 58   AST 35 33   ALT 24 23   ANIONGAP -1* 2*       Magnesium:   Recent Labs   Lab 10/03/23  0517 10/04/23  0506   MG 1.8 1.8         Significant Imaging: I have reviewed all pertinent imaging results/findings within the past 24 hours.      Assessment/Plan:      * Acute cystitis without hematuria  Continue antibiotics awaiting cultures    Blood cultures are negative to date.  Urine culture negative    Ataxia  Worsening disequilibrium since fall and head trauma.  CT to rule out intracranial bleeding injury.  CT scan negative.  Family reassured.      Laceration of forehead  Healing. Treatment per GS.      Hypokalemia  We will replete intravenously today due to dysphagia.  Follow with daily labs.      Patient has hypokalemia.  Last electrolytes reviewed-   Recent Labs   Lab 10/03/23  0517 10/04/23  0506   K 4.0 4.0   Will replace potassium as per sliding scale as  needed and monitor electrolytes closely.       Other dysphagia  Dietary modifications have been made.  SLP consulted for evaluation and treatment.  Significant aspiration.  NPO for now.  Pending Cardiology evaluation for GS to eval for PEG.  9/30 Remains NPO, will start PTPN feeds with continued IVF  10/1 PO meds changed to IV/IM. Continue with PTPN. PICC Line placement ordered. General Surgery awaiting cardiology evaluation for procedure  10/2:  Awaiting cardiac clearance prior to PEG placement. Continue TPN.  10/3:  PEG planned for tomorrow.  10/4:  PEG tube scheduled for today.    Chronic heart failure with preserved ejection fraction (HFpEF) NICM NYHA3  Followed by Cardiology in the outpatient setting.  Euvolemic and well compensated currently.  Continue home medical therapy      Acute on chronic combined systolic and diastolic heart failure  Patient seems intravascularly dry.  Hold IV Lasix for now.    Appears euvolemic.        Pneumonia due to infectious organism  Continue current antibiotic regimen, supplemental oxygen.  Blood cultures are negative to date at this time.    Blood Culture, Routine   Date Value Ref Range Status   09/22/2023 No growth after 5 days.  Final     Urine Culture, Routine   Date Value Ref Range Status   09/22/2023 No significant growth  Final      9/27/23:  abx completed.       Pleural effusion, right  Unsure if this has been investigated will defer to primary.    Stable by hx.       Severe mitral regurgitation  At baseline.  Continue home medical therapy.      Essential hypertension  Vital signs noted continue antihypertensives    BP Readings from Last 3 Encounters:   10/04/23 127/66   09/21/23 (!) 113/57   07/11/23 (!) 142/58         Mixed hyperlipidemia  PO meds held as patient's current status NPO with no enteral access at this time.       Acute superficial gastritis without hemorrhage  Continue home treatment      Iron deficiency anemia  Patient states he had upper and lower  endoscopy in 2021.  Iron infusion prior to discharge.    Recent Labs   Lab 10/02/23  0428 10/03/23  0517 10/04/23  0506   Hemoglobin 9.5 L 10.6 L 10.0 L               VTE Risk Mitigation (From admission, onward)         Ordered     IP VTE HIGH RISK PATIENT  Once         09/23/23 0215     Place sequential compression device  Until discontinued         09/23/23 0215                Discharge Planning   JOSÉ:      Code Status: Full Code   Is the patient medically ready for discharge?:     Reason for patient still in hospital (select all that apply): Treatment  Discharge Plan A: Home with family                  Drew Moreira Jr, MD  Department of Hospital Medicine   [facility]

## 2023-10-05 LAB
ALBUMIN SERPL BCP-MCNC: 2.5 G/DL (ref 3.5–5.2)
ALP SERPL-CCNC: 59 U/L (ref 55–135)
ALT SERPL W/O P-5'-P-CCNC: 20 U/L (ref 10–44)
ANION GAP SERPL CALC-SCNC: 4 MMOL/L (ref 3–11)
AST SERPL-CCNC: 31 U/L (ref 10–40)
BASOPHILS # BLD AUTO: 0.11 K/UL (ref 0–0.2)
BASOPHILS NFR BLD: 1.3 % (ref 0–1.9)
BILIRUB SERPL-MCNC: 0.7 MG/DL (ref 0.1–1)
BUN SERPL-MCNC: 21 MG/DL (ref 8–23)
CALCIUM SERPL-MCNC: 8.8 MG/DL (ref 8.7–10.5)
CHLORIDE SERPL-SCNC: 101 MMOL/L (ref 95–110)
CO2 SERPL-SCNC: 31 MMOL/L (ref 23–29)
CREAT SERPL-MCNC: 0.4 MG/DL (ref 0.5–1.4)
DIFFERENTIAL METHOD: ABNORMAL
EOSINOPHIL # BLD AUTO: 0.4 K/UL (ref 0–0.5)
EOSINOPHIL NFR BLD: 4.9 % (ref 0–8)
ERYTHROCYTE [DISTWIDTH] IN BLOOD BY AUTOMATED COUNT: ABNORMAL % (ref 11.5–14.5)
EST. GFR  (NO RACE VARIABLE): >60 ML/MIN/1.73 M^2
GLUCOSE SERPL-MCNC: 101 MG/DL (ref 70–110)
HCT VFR BLD AUTO: 31.2 % (ref 40–54)
HGB BLD-MCNC: 9.3 G/DL (ref 14–18)
IMM GRANULOCYTES # BLD AUTO: 0.05 K/UL (ref 0–0.04)
IMM GRANULOCYTES NFR BLD AUTO: 0.6 % (ref 0–0.5)
LYMPHOCYTES # BLD AUTO: 1.5 K/UL (ref 1–4.8)
LYMPHOCYTES NFR BLD: 18.7 % (ref 18–48)
MAGNESIUM SERPL-MCNC: 1.4 MG/DL (ref 1.6–2.6)
MCH RBC QN AUTO: 24 PG (ref 27–31)
MCHC RBC AUTO-ENTMCNC: 29.8 G/DL (ref 32–36)
MCV RBC AUTO: 81 FL (ref 82–98)
MONOCYTES # BLD AUTO: 0.8 K/UL (ref 0.3–1)
MONOCYTES NFR BLD: 9.7 % (ref 4–15)
NEUTROPHILS # BLD AUTO: 5.3 K/UL (ref 1.8–7.7)
NEUTROPHILS NFR BLD: 64.8 % (ref 38–73)
NRBC BLD-RTO: 0 /100 WBC
PLATELET # BLD AUTO: 269 K/UL (ref 150–450)
PMV BLD AUTO: ABNORMAL FL (ref 9.2–12.9)
POTASSIUM SERPL-SCNC: 3.7 MMOL/L (ref 3.5–5.1)
PROT SERPL-MCNC: 6.4 G/DL (ref 6–8.4)
RBC # BLD AUTO: 3.87 M/UL (ref 4.6–6.2)
SODIUM SERPL-SCNC: 136 MMOL/L (ref 136–145)
WBC # BLD AUTO: 8.15 K/UL (ref 3.9–12.7)

## 2023-10-05 PROCEDURE — 94761 N-INVAS EAR/PLS OXIMETRY MLT: CPT

## 2023-10-05 PROCEDURE — A4216 STERILE WATER/SALINE, 10 ML: HCPCS | Performed by: INTERNAL MEDICINE

## 2023-10-05 PROCEDURE — 11000001 HC ACUTE MED/SURG PRIVATE ROOM

## 2023-10-05 PROCEDURE — 27000221 HC OXYGEN, UP TO 24 HOURS

## 2023-10-05 PROCEDURE — 21400001 HC TELEMETRY ROOM

## 2023-10-05 PROCEDURE — 83735 ASSAY OF MAGNESIUM: CPT | Performed by: INTERNAL MEDICINE

## 2023-10-05 PROCEDURE — 85025 COMPLETE CBC W/AUTO DIFF WBC: CPT | Performed by: INTERNAL MEDICINE

## 2023-10-05 PROCEDURE — C9113 INJ PANTOPRAZOLE SODIUM, VIA: HCPCS | Performed by: INTERNAL MEDICINE

## 2023-10-05 PROCEDURE — 80053 COMPREHEN METABOLIC PANEL: CPT | Performed by: INTERNAL MEDICINE

## 2023-10-05 PROCEDURE — 25000003 PHARM REV CODE 250: Performed by: INTERNAL MEDICINE

## 2023-10-05 PROCEDURE — 97110 THERAPEUTIC EXERCISES: CPT

## 2023-10-05 PROCEDURE — 63600175 PHARM REV CODE 636 W HCPCS: Performed by: INTERNAL MEDICINE

## 2023-10-05 PROCEDURE — 36415 COLL VENOUS BLD VENIPUNCTURE: CPT | Performed by: INTERNAL MEDICINE

## 2023-10-05 PROCEDURE — 25000003 PHARM REV CODE 250: Performed by: STUDENT IN AN ORGANIZED HEALTH CARE EDUCATION/TRAINING PROGRAM

## 2023-10-05 PROCEDURE — 97110 THERAPEUTIC EXERCISES: CPT | Mod: CO

## 2023-10-05 PROCEDURE — 99900035 HC TECH TIME PER 15 MIN (STAT)

## 2023-10-05 PROCEDURE — 97530 THERAPEUTIC ACTIVITIES: CPT

## 2023-10-05 PROCEDURE — 99900031 HC PATIENT EDUCATION (STAT)

## 2023-10-05 RX ORDER — LEVOTHYROXINE SODIUM 50 UG/1
50 TABLET ORAL
Status: DISCONTINUED | OUTPATIENT
Start: 2023-10-06 | End: 2023-10-06 | Stop reason: HOSPADM

## 2023-10-05 RX ORDER — PANTOPRAZOLE SODIUM 40 MG/1
40 FOR SUSPENSION ORAL DAILY
Status: DISCONTINUED | OUTPATIENT
Start: 2023-10-06 | End: 2023-10-06 | Stop reason: HOSPADM

## 2023-10-05 RX ADMIN — FUROSEMIDE 40 MG: 10 INJECTION, SOLUTION INTRAVENOUS at 08:10

## 2023-10-05 RX ADMIN — ATORVASTATIN CALCIUM 80 MG: 80 TABLET, FILM COATED ORAL at 09:10

## 2023-10-05 RX ADMIN — Medication 10 ML: at 12:10

## 2023-10-05 RX ADMIN — LEUCINE, PHENYLALANINE, LYSINE, METHIONINE, ISOLEUCINE, VALINE, HISTIDINE, THREONINE, TRYPTOPHAN, ALANINE, GLYCINE, ARGININE, PROLINE, SERINE, TYROSINE, SODIUM ACETATE, DIBASIC POTASSIUM PHOSPHATE, MAGNESIUM CHLORIDE, SODIUM CHLORIDE, CALCIUM CHLORIDE, DEXTROSE
311; 238; 247; 170; 255; 247; 204; 179; 77; 880; 438; 489; 289; 213; 17; 297; 261; 51; 77; 33; 5 INJECTION INTRAVENOUS at 04:10

## 2023-10-05 RX ADMIN — METOROPROLOL TARTRATE 2.5 MG: 5 INJECTION, SOLUTION INTRAVENOUS at 09:10

## 2023-10-05 RX ADMIN — METOROPROLOL TARTRATE 2.5 MG: 5 INJECTION, SOLUTION INTRAVENOUS at 08:10

## 2023-10-05 RX ADMIN — FENOFIBRATE 145 MG: 145 TABLET, FILM COATED ORAL at 08:10

## 2023-10-05 RX ADMIN — PANTOPRAZOLE SODIUM 40 MG: 40 INJECTION, POWDER, FOR SOLUTION INTRAVENOUS at 08:10

## 2023-10-05 RX ADMIN — FOLIC ACID 1 MG: 1 TABLET ORAL at 08:10

## 2023-10-05 RX ADMIN — EZETIMIBE 10 MG: 10 TABLET ORAL at 08:10

## 2023-10-05 RX ADMIN — FERROUS SULFATE TAB 325 MG (65 MG ELEMENTAL FE) 1 EACH: 325 (65 FE) TAB at 08:10

## 2023-10-05 RX ADMIN — LEVOTHYROXINE SODIUM 25 MCG: 20 INJECTION, SOLUTION INTRAVENOUS at 08:10

## 2023-10-05 RX ADMIN — Medication 10 ML: at 05:10

## 2023-10-05 NOTE — PROGRESS NOTES
Pharmacist Intervention IV to PO Note    Richard Farr is a 78 y.o. male being treated with IV medication levothyroxine    Patient Data:    Vital Signs (Most Recent):  Temp: 98 °F (36.7 °C) (10/05/23 0720)  Pulse: 85 (10/05/23 0903)  Resp: 18 (10/05/23 0903)  BP: 138/73 (10/05/23 0720)  SpO2: 95 % (10/05/23 0903) Vital Signs (72h Range):  Temp:  [97.4 °F (36.3 °C)-98.5 °F (36.9 °C)]   Pulse:  [67-91]   Resp:  [17-22]   BP: (120-150)/(58-79)   SpO2:  [92 %-100 %]      CBC:  Recent Labs   Lab 10/03/23  0517 10/04/23  0506 10/05/23  0408   WBC 8.84 8.12 8.15   RBC 4.53* 4.24* 3.87*   HGB 10.6* 10.0* 9.3*   HCT 36.3* 34.3* 31.2*    281 269   MCV 80* 81* 81*   MCH 23.4* 23.6* 24.0*   MCHC 29.2* 29.2* 29.8*     CMP:     Recent Labs   Lab 10/03/23  0517 10/04/23  0506 10/05/23  0408    105 101   CALCIUM 9.3 9.1 8.8   ALBUMIN 3.0* 2.8* 2.5*   PROT 6.9 6.8 6.4    138 136   K 4.0 4.0 3.7   CO2 34* 33* 31*    103 101   BUN 15 25* 21   CREATININE 0.4* 0.5 0.4*   ALKPHOS 63 58 59   ALT 24 23 20   AST 35 33 31   BILITOT 0.8 0.7 0.7       Dietary Orders:  Diet Orders            Amino acid 4.25% - dextrose 5% (CLINIMIX-E) solution with additives (1L provides 42.5 gm AA, 50 gm CHO (170 kcal/L dextrose), Na 35, K 30, Mg 5, Ca 4.5, Acetate 70, Cl 39, Phos 15) at 75 mL/hr starting at 10/04 1245    Tube Feedings/Formulas [facility]; [facility] 1.2 Nic; 10; NG; Tube Feeding Bag: Tube Feeding (I) starting at 09/29 1635            Based on the following criteria, this patient qualifies for intravenous to oral conversion:  [x] The patients gastrointestinal tract is functioning (tolerating medications via oral or enteral route for 24 hours and tolerating food or enteral feeds for 24 hours).  [x] The patient is hemodynamically stable for 24 hours (heart rate <100 beats per minute, systolic blood pressure >99 mm Hg, and respiratory rate <20 breaths per minute).  [x] The patient shows clinical improvement (afebrile  for at least 24 hours and white blood cell count downtrending or normalized). Additionally, the patient must be non-neutropenic (absolute neutrophil count >500 cells/mm3).  [x] For antimicrobials, the patient has received IV therapy for at least 24 hours.    IV medication Levothyroxine will be changed to oral medication Levothyroxine     Pharmacist's Name: Tameka Whaley  Pharmacist's Extension: [phone]

## 2023-10-05 NOTE — PLAN OF CARE
Patient on oxygen @ documented setting. Attempt to wean FiO2 per Oxygen titration Protocol. Patient instructed on benefits of therapy.     Wearing nasal cannula at 2lpm this AM.  Will continue to monitor.

## 2023-10-05 NOTE — PROGRESS NOTES
Pharmacist Intervention IV to PO Note    Richard Farr is a 78 y.o. male being treated with IV medication pantoprazole    Patient Data:    Vital Signs (Most Recent):  Temp: 98 °F (36.7 °C) (10/05/23 0720)  Pulse: 85 (10/05/23 0903)  Resp: 18 (10/05/23 0903)  BP: 138/73 (10/05/23 0720)  SpO2: 95 % (10/05/23 0903) Vital Signs (72h Range):  Temp:  [97.4 °F (36.3 °C)-98.5 °F (36.9 °C)]   Pulse:  [67-91]   Resp:  [17-22]   BP: (120-150)/(58-79)   SpO2:  [92 %-100 %]      CBC:  Recent Labs   Lab 10/03/23  0517 10/04/23  0506 10/05/23  0408   WBC 8.84 8.12 8.15   RBC 4.53* 4.24* 3.87*   HGB 10.6* 10.0* 9.3*   HCT 36.3* 34.3* 31.2*    281 269   MCV 80* 81* 81*   MCH 23.4* 23.6* 24.0*   MCHC 29.2* 29.2* 29.8*     CMP:     Recent Labs   Lab 10/03/23  0517 10/04/23  0506 10/05/23  0408    105 101   CALCIUM 9.3 9.1 8.8   ALBUMIN 3.0* 2.8* 2.5*   PROT 6.9 6.8 6.4    138 136   K 4.0 4.0 3.7   CO2 34* 33* 31*    103 101   BUN 15 25* 21   CREATININE 0.4* 0.5 0.4*   ALKPHOS 63 58 59   ALT 24 23 20   AST 35 33 31   BILITOT 0.8 0.7 0.7       Dietary Orders:  Diet Orders            Amino acid 4.25% - dextrose 5% (CLINIMIX-E) solution with additives (1L provides 42.5 gm AA, 50 gm CHO (170 kcal/L dextrose), Na 35, K 30, Mg 5, Ca 4.5, Acetate 70, Cl 39, Phos 15) at 75 mL/hr starting at 10/04 1245    Tube Feedings/Formulas [facility]; [facility] 1.2 Nic; 10; NG; Tube Feeding Bag: Tube Feeding (I) starting at 09/29 1635            Based on the following criteria, this patient qualifies for intravenous to oral conversion:  [x] The patients gastrointestinal tract is functioning (tolerating medications via oral or enteral route for 24 hours and tolerating food or enteral feeds for 24 hours).  [x] The patient is hemodynamically stable for 24 hours (heart rate <100 beats per minute, systolic blood pressure >99 mm Hg, and respiratory rate <20 breaths per minute).  [x] The patient shows clinical improvement (afebrile  for at least 24 hours and white blood cell count downtrending or normalized). Additionally, the patient must be non-neutropenic (absolute neutrophil count >500 cells/mm3).  [x] For antimicrobials, the patient has received IV therapy for at least 24 hours.    IV medication Pantoprazole will be changed to oral medication  Pantoprazole    Pharmacist's Name: Tameka Whaley  Pharmacist's Extension: [phone]

## 2023-10-05 NOTE — ASSESSMENT & PLAN NOTE
Patient states he had upper and lower endoscopy in 2021.  Iron infusion prior to discharge.    Recent Labs   Lab 10/03/23  0517 10/04/23  0506 10/05/23  0408   Hemoglobin 10.6 L 10.0 L 9.3 L

## 2023-10-05 NOTE — CONSULTS
[facility] Surg  Adult Nutrition  Consult Note    SUMMARY     Recommendations  1. Rec'd EN: Jevity 1.2 @ rate of 10mL/hr increasing by 10mL every 4-6 hrs to goal rate of 60mL/hr to provide 1682kcal (102% EEN), 78g of protein (91% EPN), and 1162mL FW with a continuous 20mL/hr FWF.    2. Rec'd ClinimixE 4.25/5 @ 75mL/hr to provide 614kcal (36% EEN), 77g of protein (89%% EPN), and 1800mL TFV.   -Add 250mL of 20% lipids 3x/week to provide additional calories.   3. RD to follow and make rec's accordingly.  Goals:   1. Pt will be started on EN by next RD follow up.     2. Pt will reach TF goal rate within 48hrs of initiation.  Nutrition Goal Status: progressing towards goal  Communication of RD Recs: reviewed with RN    Assessment and Plan    Nutrition Problem  Inadequate oral intake     Related to (etiology):   NPO status     Signs and Symptoms (as evidenced by):   0% PO intake     Interventions/Recommendations (treatment strategy):  1. Rec'd EN: Jevity 1.2 @ rate of 10mL/hr increasing by 10mL every 4-6 hrs to goal rate of 60mL/hr to provide 1682kcal (102% EEN), 78g of protein (91% EPN), and 1162mL FW with a continuous 20mL/hr FWF.    2. Rec'd ClinimixE 4.25/5 @ 75mL/hr to provide 614kcal (36% EEN), 77g of protein (89%% EPN), and 1800mL TFV.   -Add 250mL of 20% lipids 3x/week to provide additional calories.   3. RD to follow and make rec's accordingly.     Reason for Assessment    Reason For Assessment: consult  Diagnosis: other (see comments) (Acute cystitis without hematuria)  Relevant Medical History: Anemia, Arthritis, Bilateral lower extremity edema, Carpal tunnel syndrome:bilateral, Chronic diastolic congestive heart failure, Colon polyp, Coronary artery disease, Depression due to physical illness, Encounter for blood transfusion, Gastric ulcer, History of herpes zoster, Hyperlipemia, Hypertension, Moderate tricuspid insufficiency, NSVT, PAC, Patent foramen ovale with right to left shunt, Pneumonia, Pulmonary  "hypertension, PVC's, Severe mitral regurgitation, Shingles, Stroke  Interdisciplinary Rounds: did not attend  General Information Comments: Recieved consult for PEG TF rec's. Rec's are alredy in. Communicated with RN. RD to follow and make rec's accordingly.  Nutrition Discharge Plannin. Rec'd EN: Jevity 1.2 @ rate of 10mL/hr increasing by 10mL every 4-6 hrs to goal rate of 60mL/hr to provide 1682kcal (102% EEN), 78g of protein (91% EPN), and 1162mL FW with a continuous 20mL/hr FWF.    Nutrition Risk Screen    Nutrition Risk Screen: tube feeding or parenteral nutrition    Nutrition/Diet History    Spiritual, Cultural Beliefs, Zoroastrian Practices, Values that Affect Care: no  Food Allergies: NKFA    Anthropometrics    Temp: 98 °F (36.7 °C)  Height: 5' 7.01" (170.2 cm)  Height (inches): 67.01 in  Weight Method: Bed Scale  Weight: 71.7 kg (158 lb 1.1 oz)  Weight (lb): 158.07 lb  Ideal Body Weight (IBW), Male: 148.06 lb  % Ideal Body Weight, Male (lb): 106.76 %  BMI (Calculated): 24.8  BMI Grade: 18.5-24.9 - normal    Lab/Procedures/Meds    Pertinent Labs Reviewed: reviewed  Pertinent Medications Reviewed: reviewed    Estimated/Assessed Needs    Weight Used For Calorie Calculations: 71.7 kg (158 lb 1.1 oz)  Energy Calorie Requirements (kcal): 1682 (MSJ x 1.2 SF)  Energy Need Method: [place]- Paulor  Protein Requirements: 86 (1.2g/kg)  Weight Used For Protein Calculations: 71.7 kg (158 lb 1.1 oz)  Fluid Requirements (mL): 1682 (1mL/kcal)  Estimated Fluid Requirement Method: RDA Method  RDA Method (mL): 1682    Nutrition Prescription Ordered    Current Diet Order: NPO  Current Nutrition Support Formula Ordered: Clinimix 4.25/5  Current Nutrition Support Rate Ordered: 75 (ml)  Current Nutrition Support Frequency Ordered: Continuous  Oral Nutrition Supplement: None    Evaluation of Received Nutrient/Fluid Intake    Enteral Calories (kcal): 0  Enteral Protein (gm): 0  Enteral (Free Water) Fluid (mL): 0  % Kcal Needs: " 0%  % Protein Needs: 0%  I/O: +170  Energy Calories Required: not meeting needs  Protein Required: not meeting needs  Tolerance: other (see comments)  % Intake of Estimated Energy Needs: 0 - 25 %  % Meal Intake: NPO    Nutrition Risk    Level of Risk/Frequency of Follow-up: moderate     Monitor and Evaluation    Food and Nutrient Intake: enteral nutrition intake  Food and Nutrient Adminstration: enteral and parenteral nutrition administration  Knowledge/Beliefs/Attitudes: food and nutrition knowledge/skill, beliefs and attitudes  Physical Activity and Function: nutrition-related ADLs and IADLs  Anthropometric Measurements: height/length, weight, weight change, body mass index  Biochemical Data, Medical Tests and Procedures: electrolyte and renal panel, gastrointestinal profile, glucose/endocrine profile, inflammatory profile, lipid profile  Nutrition-Focused Physical Findings: overall appearance     Nutrition Follow-Up    RD Follow-up?: Yes

## 2023-10-05 NOTE — ASSESSMENT & PLAN NOTE
Dietary modifications have been made.  SLP consulted for evaluation and treatment.  Significant aspiration.  NPO for now.  Pending Cardiology evaluation for GS to eval for PEG.  9/30 Remains NPO, will start PTPN feeds with continued IVF  10/1 PO meds changed to IV/IM. Continue with PTPN. PICC Line placement ordered. General Surgery awaiting cardiology evaluation for procedure  10/2:  Awaiting cardiac clearance prior to PEG placement. Continue TPN.  10/3:  PEG planned for tomorrow.  10/4:  PEG tube scheduled for today.  10/5:  PEG placed begin feeds

## 2023-10-05 NOTE — PLAN OF CARE
10/05/23 1112   Medicare Message   Important Message from Medicare regarding Discharge Appeal Rights Given to patient/caregiver;Explained to patient/caregiver;Signed/date by patient/caregiver   Date IMM was signed 10/05/23   Time IMM was signed 1100

## 2023-10-05 NOTE — ASSESSMENT & PLAN NOTE
Vital signs noted continue antihypertensives    BP Readings from Last 3 Encounters:   10/05/23 115/66   09/21/23 (!) 113/57   07/11/23 (!) 142/58

## 2023-10-05 NOTE — OP NOTE
[facility] - OR Periop Services  General Surgery Department  Operative Note    SUMMARY     Date of Procedure: 10/4/2023    Procedure: Procedure(s) (LRB):  INSERTION, PEG TUBE:   EGD, WITH CLOSED BIOPSY:      Surgeon(s) and Role:  Surgeon(s):  Queenie Berman MD    Pre-Operative Diagnosis: Pre-Op Diagnosis Codes:     * Other dysphagia [R13.19]    Post-Operative Diagnosis: Same         Anesthesia: Local MAC    Findings:  -No esophageal stricture or intraluminal abnormality   -Easy transit through esophagus with no evidence of external compression   -20Fr PEG tube placed     Indications for the Procedure:  Patient is a 78 y.o.male with need for long-term gastrostomy tube feedings.    Operative Conduct in Detail:   After consent was obtained and performing a timeout patient was prepped and draped in the standard sterile fashion. An endoscope was passed down through the mouth, esophagus, and into the stomach. Insufflation was achieved.  The stomach was slightly hyperemic with tow prominent fundal polyps.  Cold biopsies were taken of the polyps at this time.  We then inspected the first and second portion of duodenum which appeared normal with no ulcerations, masses or obstruction.  The scope was then withdrawn back into the stomach and the stomach was inspected carefully, at which time no masses or ulcers were present.    Transcutaneous illumination was noted in the LUQ.  Finger indentation was then identified using the endoscope once external pressure was applied.  Directly over this area we made a 1 cm incision sharply and then placed a finder needle and sheath through the incision and observed it entering into the gastric lumen under direct visualization.  The needle was then removed and wire was introduced through the sheath.    The wire was grasped and pulled through the mouth.  The 20Fr PEG tube was then attached to the wire, which was then pulled back out through the abdominal incision. Then the scope was  reintroduced.  The tube was positioned at 4cm at the skin, at which time it appeared to gently abut the anterior abdominal wall.  This was freely rotated, did not appear to be under any overdue tension.     The bumper was then positioned over the tube and sutured to the skin using nylon sutures.  A silk tie was placed around the base of the bumper to secure the tube in place.  The endoscope was then used to desufflate the abdomen and was withdrawn.  Once this was completed, an abdominal binder was placed. The patient tolerated the procedure well.    Complications: No    Estimated Blood Loss (EBL): Minimal           Implants:   Implant Name Type Inv. Item Serial No.  Lot No. LRB No. Used Action   TUBE GASTRO PEG DIMPLE 20F - [id number]  TUBE GASTRO PEG DIMPLE 20F  [facility] [id number]  1 Implanted       Specimens:   Specimens (From admission, onward)       Start     Ordered    10/04/23 1354  Specimen to Pathology, Surgery General Surgery  Once        Comments: Pre-op Diagnosis: Other dysphagia [R13.19]Procedure(s):INSERTION, PEG TUBEEGD, WITH CLOSED BIOPSY Number of specimens: 1Name of specimens: gastric polyp biopsy @ 1331     References:    Click here for ordering Quick Tip   Question Answer Comment   Procedure Type: General Surgery    Which provider would you like to cc? OLAMIDE DOS SANTOS    Release to patient Immediate        10/04/23 1354                             Condition: Good    Disposition: PACU - hemodynamically stable.      Olamide Dos Santos MD  General Surgery   [phone]

## 2023-10-05 NOTE — PT/OT/SLP PROGRESS
Occupational Therapy   Treatment    Name: Richard Farr  MRN: [id number]  Admitting Diagnosis:  Acute cystitis without hematuria  1 Day Post-Op    Recommendations:     Discharge Recommendations: home, home with home health  Discharge Equipment Recommendations:  none  Barriers to discharge:  None    Assessment:     Richard Farr is a 78 y.o. male with a medical diagnosis of Acute cystitis without hematuria.  He presents with good participation and motivation. Performance deficits affecting function are weakness, impaired endurance, impaired self care skills, impaired functional mobility, gait instability, impaired balance, impaired cognition, decreased coordination, decreased upper extremity function, decreased lower extremity function, decreased safety awareness, decreased ROM, impaired coordination, impaired fine motor, impaired cardiopulmonary response to activity, impaired joint extensibility, impaired muscle length. Patient able to completed increased reps of 2 x 15 while completing therapeutic exercise. Patient continues to need rest breaks t/o session due to impaired endurance.     Rehab Prognosis:  Fair; patient would benefit from acute skilled OT services to address these deficits and reach maximum level of function.       Plan:     Patient to be seen 5 x/week to address the above listed problems via self-care/home management, therapeutic activities, therapeutic exercises, cognitive retraining  Plan of Care Expires: 10/11/23  Plan of Care Reviewed with: patient    Subjective     Chief Complaint: Reported none  Patient/Family Comments/goals: Reported none  Pain/Comfort:  Pain Rating 1: 0/10  Pain Rating Post-Intervention 1: 0/10    Objective:     Communicated with: occupational therapist and RN prior to session.  Patient found up in chair with PEG Tube, telemetry, oxygen, peripheral IV, PureWick upon OT entry to room.    General Precautions: Standard, fall, aspiration    Orthopedic Precautions:N/A  Braces:  N/A  Respiratory Status: Nasal cannula, flow 2 L/min     Occupational Performance:     Functional Mobility/Transfers:  Patient completed Sit <> Stand Transfer with supervision and stand by assistance  with  no assistive device   Functional Mobility: patient completed 2 x 10 sit to stands with supervision/SBA with no AD.     [facility] 6 Click ADL: 19    Treatment & Education:  Patient engaged in active range of motion therapeutic exercise for 2 x 15 sitting up in chair in the following planes: shoulder flexion/extension, shoulder abduction/adduction, elbow flexion/extension, scapular retractions/protraction, scapular elevation/depression, shoulder rotations (forward and reverse), wrist flexion/extension, wrist radial/ulnar deviation, forearm supination/pronation, and composite fist. Patient needed rest breaks between each set due to impaired muscle endurance and activity tolerance.  Patient educated to continue to complete exercise throughout the day to increase strength and endurance to facilitate an increase in ADL/IADLs. Patient verbally understood.        Patient left up in chair with all lines intact and call button in reach    GOALS:   Multidisciplinary Problems       Occupational Therapy Goals          Problem: Occupational Therapy    Goal Priority Disciplines Outcome Interventions   Occupational Therapy Goal     OT, PT/OT Ongoing, Progressing    Description: Goals to be met by: 10/11/23 (Updated)    Patient will increase functional independence with ADLs by performing:    Feeding with Set-up Assistance.  UE Dressing with Set-up Assistance..  LE Dressing with Supervision.  Grooming while seated with Set-up Assistance.  Toileting from toilet with Supervision for hygiene and clothing management.   Bathing from  shower chair/bench with Supervision.  Toilet transfer to toilet with Set-up Assistance.                         Time Tracking:     OT Date of Treatment: 10/05/23  OT Start Time: 1319  OT Stop Time: 1340  OT  Total Time (min): 21 min    Billable Minutes:Therapeutic Exercise 21 min    OT/SIRI: SIRI     Number of SIRI visits since last OT visit: 1    10/5/2023  Doreen ALMEIDA

## 2023-10-05 NOTE — ASSESSMENT & PLAN NOTE
PO meds held as patient's current status NPO with no enteral access at this time.     10/5/23:  Restart via peg

## 2023-10-05 NOTE — PLAN OF CARE
Problem: Physical Therapy  Goal: Physical Therapy Goal  Description: Goals to be met by: 10/9/2023     Patient will increase functional independence with mobility by performin. Supine to sit with Supervision or Set-up Assistance.  2. Sit to supine with Supervision or Set-up Assistance.  3. Bed to chair transfer with Supervision or Set-up Assistance with rollator  using Step Transfer technique.  4. Sit to Stand with Supervision or Set-up Assistance with rollator   5. Gait  x 1000  feet with Supervision or Set-up Assistance with rollator   6. Lower extremity exercise program x10 reps.     Outcome: Ongoing, Progressing

## 2023-10-05 NOTE — PROGRESS NOTES
[facility] Medicine  Progress Note    Patient Name: Richard Farr  MRN: [id number]  Patient Class: IP- Inpatient   Admission Date: 9/22/2023  Length of Stay: 12 days  Attending Physician: Drew Moreira Jr., MD  Primary Care Provider: Drew Moreira Jr., MD        Subjective:     Principal Problem:Acute cystitis without hematuria        HPI:  ED HPI:  Richard Farr is a 78 y.o. male with PMHX of hypertension, hyperlipidemia, CHF, mitral regurg, tricuspid regurg, pulmonary hypertension, gastritis who presents to the emergency department C/O shortness of breath.     Patient presents after developing shortness of breath 1 hour prior to arrival.  Was recently admitted for anemia and received 4 units PRBCs in hospital as well as iron transfusion 2 days ago.  No fever but patient states he felt hot in emergency department.  Patient was diagnosed with urinary tract infection during previous hospitalization is on antibiotics.     PCP: Drew Moreira Jr., MD     IM HPI:  Patient was recently admitted by primary care and transfuse 4 units of packed red blood cells.  Patient felt much improved return home felt well for about 24 hours then began having increasing dyspnea.  Patient was readmitted found to have a urinary tract infection and a pneumonia.  Patient was started on antibiotics.  He was also given IV diuretics.  Patient has some peripheral edema but overall he states he feels dry and dehydrated.  Patient is having some oropharyngeal dysphagia and on exam I am unable to find the cause.  Patient has a history of CVA as well as a history of arthritis with several upper extremity joint deformities and contractures.      Overview/Hospital Course:  9/24 GT:  Patient is lying in bed this morning with spouse at bedside.  Patient is frail and chronically ill-appearing, but does not appear to be in any acute distress.  Patient reports his shortness a breath is at baseline, and he is tolerating  supplemental oxygen via nasal cannula.  Patient had some swallowing difficulties yesterday, SLP has been consulted for evaluation, treatment, dietary modifications have been made.  Patient's H&H is stable, and although anemic he is not at the point that he requires further transfusion.  Potassium mildly decreased, we will replete intravenously today due to swallowing difficulties and monitor with daily labs.  Blood cultures at this time are negative to date.  Patient and spouse deny any issues, concerns, questions.    9/25:  Patient eating breakfast this morning in no acute distress.  Speech therapy to evaluate the patient.  Thus far blood cultures are negative.  Patient feels back to baseline.  Urine culture without significant growth.  Consider discharge in the near future.  Patient having significant gas production check GI panel.    9/26:  No acute events overnight.  GI panel pending.  Provide iron infusion prior to potential discharge.  Patient to undergo modified barium swallow prior to discharge for completeness.  Home health will be coordinated ongoing outpatient needs.    Patient with significant aspiration by MBS.  Discuss with family potential needs for PEG placement vs ongoing ST.     9/27:  patient ct head negative.  Done for worsening dizziness/dysequilibrium after head trauma.  Awaiting  recs regarding PEG tube placement.     9/28/23:  patient resting this morning.  In favor or PEG placement tomorrow.   09/29/2023: No acute events overnight.  Planning potential endoscopy versus laparoscopy for PEG tube placement.  May need cardiac clearance.  Currently hemodynamically stable.  9/30: [place] weekend coverage. Awake and alert sitting on bedside chair. No new complaints at this time. Patient requires cardiology evaluation for endoscopy followed by laparoscopy guided PEG tube placement. Patient has been NPO, will start peripheral TPN along with IVF.   10/1 [place] weekend coverage. No acute events overnight.  "Patient not able to swallow PO meds. Convert PO meds to IV or IM. Follow up echocardiogram (10/2). Follow up cardiology ([facility]) pre-operative evaluation for endoscopy study on 10/4.     10/02/2023: Patient remains NPO.  Overall stable awaiting cardiac clearance prior to potential endoscopy 10/4.  10/3/23:  patient doing well this am awaiting PEG placement tomorrow.   10/4/23:  Patient undergo PEG placement this morning.  Overall clinically stable.  10/5/23:  Patient is status post PEG tube overall tolerating well will begin trickle feeds.      Interval History: Patient seen and examined.     Review of Systems   Constitutional:  Positive for activity change and appetite change. Negative for chills and fever.   HENT:  Positive for trouble swallowing. Negative for ear pain, mouth sores, nosebleeds and sore throat.    Eyes:  Negative for visual disturbance.   Respiratory:  Positive for shortness of breath. Negative for wheezing.    Cardiovascular:  Negative for chest pain, palpitations and leg swelling ("much improved").   Gastrointestinal:  Negative for abdominal distention, abdominal pain, blood in stool, diarrhea, nausea and vomiting.   Endocrine: Negative for polyphagia.   Genitourinary:  Positive for frequency. Negative for difficulty urinating, dysuria and flank pain.   Musculoskeletal:  Positive for arthralgias, gait problem and joint swelling.   Skin:  Negative for rash.   Neurological:  Positive for weakness. Negative for dizziness, tremors, seizures, syncope and headaches.   Hematological:  Negative for adenopathy.   Psychiatric/Behavioral:  Negative for agitation, confusion and hallucinations. The patient is not nervous/anxious.      Objective:     Vital Signs (Most Recent):  Temp: 97.2 °F (36.2 °C) (10/05/23 1151)  Pulse: 74 (10/05/23 1151)  Resp: 18 (10/05/23 1151)  BP: 115/66 (10/05/23 1151)  SpO2: 96 % (10/05/23 1151) Vital Signs (24h Range):  Temp:  [97.2 °F (36.2 °C)-98 °F (36.7 °C)] 97.2 °F " (36.2 °C)  Pulse:  [67-85] 74  Resp:  [18-20] 18  SpO2:  [93 %-99 %] 96 %  BP: (115-138)/(60-73) 115/66     Weight: 71.7 kg (158 lb 1.1 oz)  Body mass index is 24.75 kg/m².    Intake/Output Summary (Last 24 hours) at 10/5/2023 1236  Last data filed at 10/5/2023 0500  Gross per 24 hour   Intake 1170 ml   Output 700 ml   Net 470 ml         Physical Exam  Vitals and nursing note reviewed.   Constitutional:       General: He is not in acute distress.     Appearance: He is not ill-appearing, toxic-appearing or diaphoretic.      Comments: Frail, chronically ill-appearing   HENT:      Head: Normocephalic and atraumatic.      Comments: Forehead and nasal bridge lacerations intact with dressing in place.  No SOI     Nose: Nose normal. No congestion or rhinorrhea.      Mouth/Throat:      Mouth: Mucous membranes are dry.      Pharynx: No oropharyngeal exudate or posterior oropharyngeal erythema.   Eyes:      General: No scleral icterus.  Neck:      Vascular: No carotid bruit.   Cardiovascular:      Rate and Rhythm: Normal rate and regular rhythm.      Heart sounds: Murmur heard.      No friction rub. No gallop.   Pulmonary:      Effort: Pulmonary effort is normal. No respiratory distress.      Breath sounds: No stridor. Wheezing (faint distant) and rales present. No rhonchi.      Comments: Supplemental oxygen via nasal cannula  Chest:      Chest wall: No tenderness.   Abdominal:      General: There is no distension.      Palpations: Abdomen is soft. There is no mass.      Tenderness: There is no abdominal tenderness. There is no right CVA tenderness, left CVA tenderness, guarding or rebound.      Hernia: No hernia is present.      Comments: PEG   Musculoskeletal:         General: Deformity present. No tenderness.      Cervical back: Neck supple. No rigidity.      Right lower leg: No edema.      Left lower leg: No edema.   Lymphadenopathy:      Cervical: No cervical adenopathy.   Skin:     General: Skin is warm and dry.       Capillary Refill: Capillary refill takes less than 2 seconds.      Coloration: Skin is not jaundiced or pale.      Findings: No rash.   Neurological:      Mental Status: He is alert. Mental status is at baseline.      Cranial Nerves: No cranial nerve deficit.      Sensory: No sensory deficit.      Motor: Weakness present.      Coordination: Coordination normal.      Gait: Gait abnormal.   Psychiatric:         Mood and Affect: Mood normal.         Behavior: Behavior normal.         Thought Content: Thought content normal.         Judgment: Judgment normal.             Significant Labs: All pertinent labs within the past 24 hours have been reviewed.  BMP:   Recent Labs   Lab 10/05/23  0408         K 3.7      CO2 31*   BUN 21   CREATININE 0.4*   CALCIUM 8.8   MG 1.4*     CBC:   Recent Labs   Lab 10/04/23  0506 10/05/23  0408   WBC 8.12 8.15   HGB 10.0* 9.3*   HCT 34.3* 31.2*    269     CMP:   Recent Labs   Lab 10/04/23  0506 10/05/23  0408    136   K 4.0 3.7    101   CO2 33* 31*    101   BUN 25* 21   CREATININE 0.5 0.4*   CALCIUM 9.1 8.8   PROT 6.8 6.4   ALBUMIN 2.8* 2.5*   BILITOT 0.7 0.7   ALKPHOS 58 59   AST 33 31   ALT 23 20   ANIONGAP 2* 4     Magnesium:   Recent Labs   Lab 10/04/23  0506 10/05/23  0408   MG 1.8 1.4*       Significant Imaging: I have reviewed all pertinent imaging results/findings within the past 24 hours.      Assessment/Plan:      * Acute cystitis without hematuria  Continue antibiotics awaiting cultures    Blood cultures are negative to date.  Urine culture negative    Ataxia  Worsening disequilibrium since fall and head trauma.  CT to rule out intracranial bleeding injury.  CT scan negative.  Family reassured.      Laceration of forehead  Healing. Treatment per GS.      Hypokalemia  We will replete intravenously today due to dysphagia.  Follow with daily labs.      Patient has hypokalemia.  Last electrolytes reviewed-   Recent Labs   Lab  10/04/23  0506 10/05/23  0408   K 4.0 3.7   Will replace potassium as per sliding scale as needed and monitor electrolytes closely.       Other dysphagia  Dietary modifications have been made.  SLP consulted for evaluation and treatment.  Significant aspiration.  NPO for now.  Pending Cardiology evaluation for GS to eval for PEG.  9/30 Remains NPO, will start PTPN feeds with continued IVF  10/1 PO meds changed to IV/IM. Continue with PTPN. PICC Line placement ordered. General Surgery awaiting cardiology evaluation for procedure  10/2:  Awaiting cardiac clearance prior to PEG placement. Continue TPN.  10/3:  PEG planned for tomorrow.  10/4:  PEG tube scheduled for today.  10/5:  PEG placed begin feeds    Chronic heart failure with preserved ejection fraction (HFpEF) NICM NYHA3  Followed by Cardiology in the outpatient setting.  Euvolemic and well compensated currently.  Continue home medical therapy      Acute on chronic combined systolic and diastolic heart failure  Patient seems intravascularly dry.  Hold IV Lasix for now.    Appears euvolemic.        Pneumonia due to infectious organism  Continue current antibiotic regimen, supplemental oxygen.  Blood cultures are negative to date at this time.    Blood Culture, Routine   Date Value Ref Range Status   09/22/2023 No growth after 5 days.  Final     Urine Culture, Routine   Date Value Ref Range Status   09/22/2023 No significant growth  Final      9/27/23:  abx completed.       Pleural effusion, right  Unsure if this has been investigated will defer to primary.    Stable by hx.       Severe mitral regurgitation  At baseline.  Continue home medical therapy.      Essential hypertension  Vital signs noted continue antihypertensives    BP Readings from Last 3 Encounters:   10/05/23 115/66   09/21/23 (!) 113/57   07/11/23 (!) 142/58         Mixed hyperlipidemia  PO meds held as patient's current status NPO with no enteral access at this time.     10/5/23:  Restart via  peg      Acute superficial gastritis without hemorrhage  Continue home treatment      Iron deficiency anemia  Patient states he had upper and lower endoscopy in 2021.  Iron infusion prior to discharge.    Recent Labs   Lab 10/03/23  0517 10/04/23  0506 10/05/23  0408   Hemoglobin 10.6 L 10.0 L 9.3 L             VTE Risk Mitigation (From admission, onward)         Ordered     IP VTE HIGH RISK PATIENT  Once         09/23/23 0215     Place sequential compression device  Until discontinued         09/23/23 0215                Discharge Planning   JOSÉ:      Code Status: Full Code   Is the patient medically ready for discharge?:     Reason for patient still in hospital (select all that apply): Treatment  Discharge Plan A: Home with family                  Drew Moreira Jr, MD  Department of Hospital Medicine   [facility]

## 2023-10-05 NOTE — ASSESSMENT & PLAN NOTE
We will replete intravenously today due to dysphagia.  Follow with daily labs.      Patient has hypokalemia.  Last electrolytes reviewed-   Recent Labs   Lab 10/04/23  0504 10/05/23  0407   K 4.0 3.7   Will replace potassium as per sliding scale as needed and monitor electrolytes closely.

## 2023-10-05 NOTE — PT/OT/SLP PROGRESS
"Physical Therapy Treatment    Patient Name:  Richard Farr   MRN:  [id number]    Recommendations:     Discharge Recommendations: home, home with home health, home health PT  Discharge Equipment Recommendations: none  Barriers to discharge: None    Assessment:     Richard Farr is a 78 y.o. male admitted with a medical diagnosis of Acute cystitis without hematuria.  He presents with the following impairments/functional limitations: weakness, gait instability, decreased upper extremity function, impaired cardiopulmonary response to activity, impaired endurance, impaired balance, decreased lower extremity function, impaired joint extensibility, decreased safety awareness, impaired muscle length, impaired self care skills, impaired functional mobility, decreased coordination Patient had his medical procedure yesterday, he seems to be a little stronger  and in good spirits today. He ambulated ~1200 feet with a rollator with a dizem on the left hand to help with the  with O2 supplement via nasal cannula.  Increase distance ambulated, steadier gait..    Rehab Prognosis: Good; patient would benefit from acute skilled PT services to address these deficits and reach maximum level of function.    Recent Surgery: Procedure(s) (LRB):  INSERTION, PEG TUBE (N/A)  EGD, WITH CLOSED BIOPSY (N/A) 1 Day Post-Op    Plan:     During this hospitalization, patient to be seen 5 x/week to address the identified rehab impairments via gait training, therapeutic activities, therapeutic exercises, neuromuscular re-education and progress toward the following goals:    Plan of Care Expires:  10/09/23    Subjective     Chief Complaint: "I feel okay."   Patient/Family Comments/goals: Go home maybe tomorrow.  Pain/Comfort:  Pain Rating 1: 0/10  Pain Rating Post-Intervention 1: 0/10      Objective:     Communicated with nurse, patient and wife  prior to session.  Patient found supine with PEG Tube, telemetry, peripheral IV, oxygen, PureWick upon PT " entry to room.     General Precautions: Standard, fall, aspiration  Orthopedic Precautions: N/A  Braces: N/A  Respiratory Status: Nasal cannula, flow 2 L/min     Functional Mobility:  Bed Mobility:     Rolling Left:  supervision  Rolling Right: supervision  Scooting: supervision  Bridging: supervision  Supine to Sit: supervision  Transfers:     Sit to Stand:  stand by assistance with rollator   Gait: 1200 feet with rollator with stand by assistance with O2 supplement   Balance: set up  with static sitting , stand by assistance with static standing using a rollator       AM-PAC 6 CLICK MOBILITY  Turning over in bed (including adjusting bedclothes, sheets and blankets)?: 3  Sitting down on and standing up from a chair with arms (e.g., wheelchair, bedside commode, etc.): 3  Moving from lying on back to sitting on the side of the bed?: 3  Moving to and from a bed to a chair (including a wheelchair)?: 3  Need to walk in hospital room?: 3  Climbing 3-5 steps with a railing?: 3  Basic Mobility Total Score: 18       Treatment & Education:  Rolling side <> side  Supine to  sit  Scooting to the edge of the bed   Sitting balance/tolerance  Sit <> stand with rollator  Standing tolerance   Gait with rollator   Chair positioning   AROM on both LE in sitting        Patient left up in chair with all lines intact, call button in reach, nurse  notified, and wife  present..    GOALS:   Multidisciplinary Problems       Physical Therapy Goals          Problem: Physical Therapy    Goal Priority Disciplines Outcome Goal Variances Interventions   Physical Therapy Goal     PT, PT/OT Ongoing, Progressing     Description: Goals to be met by: 10/9/2023     Patient will increase functional independence with mobility by performin. Supine to sit with Supervision or Set-up Assistance.  2. Sit to supine with Supervision or Set-up Assistance.  3. Bed to chair transfer with Supervision or Set-up Assistance with rollator  using Step Transfer  technique.  4. Sit to Stand with Supervision or Set-up Assistance with rollator   5. Gait  x 1000  feet with Supervision or Set-up Assistance with rollator   6. Lower extremity exercise program x10 reps.                          Time Tracking:     PT Received On: 10/05/23  PT Start Time: 0910     PT Stop Time: 0935  PT Total Time (min): 25 min     Billable Minutes: Therapeutic Activity 15 and Therapeutic Exercise 10    Treatment Type: Treatment  PT/PTA: PT     Number of PTA visits since last PT visit: 0     10/05/2023

## 2023-10-05 NOTE — SUBJECTIVE & OBJECTIVE
"Interval History: Patient seen and examined.     Review of Systems   Constitutional:  Positive for activity change and appetite change. Negative for chills and fever.   HENT:  Positive for trouble swallowing. Negative for ear pain, mouth sores, nosebleeds and sore throat.    Eyes:  Negative for visual disturbance.   Respiratory:  Positive for shortness of breath. Negative for wheezing.    Cardiovascular:  Negative for chest pain, palpitations and leg swelling ("much improved").   Gastrointestinal:  Negative for abdominal distention, abdominal pain, blood in stool, diarrhea, nausea and vomiting.   Endocrine: Negative for polyphagia.   Genitourinary:  Positive for frequency. Negative for difficulty urinating, dysuria and flank pain.   Musculoskeletal:  Positive for arthralgias, gait problem and joint swelling.   Skin:  Negative for rash.   Neurological:  Positive for weakness. Negative for dizziness, tremors, seizures, syncope and headaches.   Hematological:  Negative for adenopathy.   Psychiatric/Behavioral:  Negative for agitation, confusion and hallucinations. The patient is not nervous/anxious.      Objective:     Vital Signs (Most Recent):  Temp: 97.2 °F (36.2 °C) (10/05/23 1151)  Pulse: 74 (10/05/23 1151)  Resp: 18 (10/05/23 1151)  BP: 115/66 (10/05/23 1151)  SpO2: 96 % (10/05/23 1151) Vital Signs (24h Range):  Temp:  [97.2 °F (36.2 °C)-98 °F (36.7 °C)] 97.2 °F (36.2 °C)  Pulse:  [67-85] 74  Resp:  [18-20] 18  SpO2:  [93 %-99 %] 96 %  BP: (115-138)/(60-73) 115/66     Weight: 71.7 kg (158 lb 1.1 oz)  Body mass index is 24.75 kg/m².    Intake/Output Summary (Last 24 hours) at 10/5/2023 1236  Last data filed at 10/5/2023 0500  Gross per 24 hour   Intake 1170 ml   Output 700 ml   Net 470 ml         Physical Exam  Vitals and nursing note reviewed.   Constitutional:       General: He is not in acute distress.     Appearance: He is not ill-appearing, toxic-appearing or diaphoretic.      Comments: Frail, chronically " ill-appearing   HENT:      Head: Normocephalic and atraumatic.      Comments: Forehead and nasal bridge lacerations intact with dressing in place.  No SOI     Nose: Nose normal. No congestion or rhinorrhea.      Mouth/Throat:      Mouth: Mucous membranes are dry.      Pharynx: No oropharyngeal exudate or posterior oropharyngeal erythema.   Eyes:      General: No scleral icterus.  Neck:      Vascular: No carotid bruit.   Cardiovascular:      Rate and Rhythm: Normal rate and regular rhythm.      Heart sounds: Murmur heard.      No friction rub. No gallop.   Pulmonary:      Effort: Pulmonary effort is normal. No respiratory distress.      Breath sounds: No stridor. Wheezing (faint distant) and rales present. No rhonchi.      Comments: Supplemental oxygen via nasal cannula  Chest:      Chest wall: No tenderness.   Abdominal:      General: There is no distension.      Palpations: Abdomen is soft. There is no mass.      Tenderness: There is no abdominal tenderness. There is no right CVA tenderness, left CVA tenderness, guarding or rebound.      Hernia: No hernia is present.      Comments: PEG   Musculoskeletal:         General: Deformity present. No tenderness.      Cervical back: Neck supple. No rigidity.      Right lower leg: No edema.      Left lower leg: No edema.   Lymphadenopathy:      Cervical: No cervical adenopathy.   Skin:     General: Skin is warm and dry.      Capillary Refill: Capillary refill takes less than 2 seconds.      Coloration: Skin is not jaundiced or pale.      Findings: No rash.   Neurological:      Mental Status: He is alert. Mental status is at baseline.      Cranial Nerves: No cranial nerve deficit.      Sensory: No sensory deficit.      Motor: Weakness present.      Coordination: Coordination normal.      Gait: Gait abnormal.   Psychiatric:         Mood and Affect: Mood normal.         Behavior: Behavior normal.         Thought Content: Thought content normal.         Judgment: Judgment  normal.             Significant Labs: All pertinent labs within the past 24 hours have been reviewed.  BMP:   Recent Labs   Lab 10/05/23  0408         K 3.7      CO2 31*   BUN 21   CREATININE 0.4*   CALCIUM 8.8   MG 1.4*     CBC:   Recent Labs   Lab 10/04/23  0506 10/05/23  0408   WBC 8.12 8.15   HGB 10.0* 9.3*   HCT 34.3* 31.2*    269     CMP:   Recent Labs   Lab 10/04/23  0506 10/05/23  0408    136   K 4.0 3.7    101   CO2 33* 31*    101   BUN 25* 21   CREATININE 0.5 0.4*   CALCIUM 9.1 8.8   PROT 6.8 6.4   ALBUMIN 2.8* 2.5*   BILITOT 0.7 0.7   ALKPHOS 58 59   AST 33 31   ALT 23 20   ANIONGAP 2* 4     Magnesium:   Recent Labs   Lab 10/04/23  0506 10/05/23  0408   MG 1.8 1.4*       Significant Imaging: I have reviewed all pertinent imaging results/findings within the past 24 hours.

## 2023-10-06 LAB
ALBUMIN SERPL BCP-MCNC: 2.7 G/DL (ref 3.5–5.2)
ALP SERPL-CCNC: 68 U/L (ref 55–135)
ALT SERPL W/O P-5'-P-CCNC: 23 U/L (ref 10–44)
ANION GAP SERPL CALC-SCNC: 1 MMOL/L (ref 3–11)
AST SERPL-CCNC: 32 U/L (ref 10–40)
BASOPHILS # BLD AUTO: 0.09 K/UL (ref 0–0.2)
BASOPHILS NFR BLD: 1.1 % (ref 0–1.9)
BILIRUB SERPL-MCNC: 0.6 MG/DL (ref 0.1–1)
BUN SERPL-MCNC: 28 MG/DL (ref 8–23)
CALCIUM SERPL-MCNC: 8.8 MG/DL (ref 8.7–10.5)
CHLORIDE SERPL-SCNC: 104 MMOL/L (ref 95–110)
CO2 SERPL-SCNC: 32 MMOL/L (ref 23–29)
CREAT SERPL-MCNC: 0.5 MG/DL (ref 0.5–1.4)
DIFFERENTIAL METHOD: ABNORMAL
EOSINOPHIL # BLD AUTO: 0.3 K/UL (ref 0–0.5)
EOSINOPHIL NFR BLD: 4 % (ref 0–8)
ERYTHROCYTE [DISTWIDTH] IN BLOOD BY AUTOMATED COUNT: ABNORMAL % (ref 11.5–14.5)
EST. GFR  (NO RACE VARIABLE): >60 ML/MIN/1.73 M^2
GLUCOSE SERPL-MCNC: 131 MG/DL (ref 70–110)
HCT VFR BLD AUTO: 31.7 % (ref 40–54)
HGB BLD-MCNC: 9.4 G/DL (ref 14–18)
IMM GRANULOCYTES # BLD AUTO: 0.05 K/UL (ref 0–0.04)
IMM GRANULOCYTES NFR BLD AUTO: 0.6 % (ref 0–0.5)
LYMPHOCYTES # BLD AUTO: 1.8 K/UL (ref 1–4.8)
LYMPHOCYTES NFR BLD: 21.4 % (ref 18–48)
MAGNESIUM SERPL-MCNC: 1.8 MG/DL (ref 1.6–2.6)
MCH RBC QN AUTO: 24.2 PG (ref 27–31)
MCHC RBC AUTO-ENTMCNC: 29.7 G/DL (ref 32–36)
MCV RBC AUTO: 82 FL (ref 82–98)
MONOCYTES # BLD AUTO: 1 K/UL (ref 0.3–1)
MONOCYTES NFR BLD: 11.2 % (ref 4–15)
NEUTROPHILS # BLD AUTO: 5.2 K/UL (ref 1.8–7.7)
NEUTROPHILS NFR BLD: 61.7 % (ref 38–73)
NRBC BLD-RTO: 0 /100 WBC
PLATELET # BLD AUTO: 275 K/UL (ref 150–450)
PMV BLD AUTO: ABNORMAL FL (ref 9.2–12.9)
POTASSIUM SERPL-SCNC: 4.3 MMOL/L (ref 3.5–5.1)
PROT SERPL-MCNC: 6.5 G/DL (ref 6–8.4)
RBC # BLD AUTO: 3.89 M/UL (ref 4.6–6.2)
SODIUM SERPL-SCNC: 137 MMOL/L (ref 136–145)
WBC # BLD AUTO: 8.45 K/UL (ref 3.9–12.7)

## 2023-10-06 PROCEDURE — 83735 ASSAY OF MAGNESIUM: CPT | Performed by: INTERNAL MEDICINE

## 2023-10-06 PROCEDURE — 85025 COMPLETE CBC W/AUTO DIFF WBC: CPT | Performed by: INTERNAL MEDICINE

## 2023-10-06 PROCEDURE — 99900035 HC TECH TIME PER 15 MIN (STAT)

## 2023-10-06 PROCEDURE — A4216 STERILE WATER/SALINE, 10 ML: HCPCS | Performed by: INTERNAL MEDICINE

## 2023-10-06 PROCEDURE — 63600175 PHARM REV CODE 636 W HCPCS: Performed by: INTERNAL MEDICINE

## 2023-10-06 PROCEDURE — 25000003 PHARM REV CODE 250: Performed by: INTERNAL MEDICINE

## 2023-10-06 PROCEDURE — 27000221 HC OXYGEN, UP TO 24 HOURS

## 2023-10-06 PROCEDURE — 36415 COLL VENOUS BLD VENIPUNCTURE: CPT | Performed by: INTERNAL MEDICINE

## 2023-10-06 PROCEDURE — 25000003 PHARM REV CODE 250: Performed by: STUDENT IN AN ORGANIZED HEALTH CARE EDUCATION/TRAINING PROGRAM

## 2023-10-06 PROCEDURE — 99900031 HC PATIENT EDUCATION (STAT)

## 2023-10-06 PROCEDURE — 80053 COMPREHEN METABOLIC PANEL: CPT | Performed by: INTERNAL MEDICINE

## 2023-10-06 PROCEDURE — 94761 N-INVAS EAR/PLS OXIMETRY MLT: CPT

## 2023-10-06 RX ORDER — FUROSEMIDE 40 MG/1
20 TABLET ORAL DAILY
Start: 2023-10-06

## 2023-10-06 RX ADMIN — FERROUS SULFATE TAB 325 MG (65 MG ELEMENTAL FE) 1 EACH: 325 (65 FE) TAB at 10:10

## 2023-10-06 RX ADMIN — FUROSEMIDE 40 MG: 10 INJECTION, SOLUTION INTRAVENOUS at 09:10

## 2023-10-06 RX ADMIN — Medication 10 ML: at 06:10

## 2023-10-06 RX ADMIN — METOROPROLOL TARTRATE 2.5 MG: 5 INJECTION, SOLUTION INTRAVENOUS at 09:10

## 2023-10-06 RX ADMIN — PANTOPRAZOLE SODIUM 40 MG: 40 GRANULE, DELAYED RELEASE ORAL at 10:10

## 2023-10-06 RX ADMIN — FOLIC ACID 1 MG: 1 TABLET ORAL at 10:10

## 2023-10-06 RX ADMIN — FENOFIBRATE 145 MG: 145 TABLET, FILM COATED ORAL at 10:10

## 2023-10-06 RX ADMIN — EZETIMIBE 10 MG: 10 TABLET ORAL at 10:10

## 2023-10-06 RX ADMIN — Medication 10 ML: at 12:10

## 2023-10-06 RX ADMIN — LEVOTHYROXINE SODIUM 50 MCG: 50 TABLET ORAL at 06:10

## 2023-10-06 NOTE — ASSESSMENT & PLAN NOTE
Patient states he had upper and lower endoscopy in 2021.  Iron infusion prior to discharge.    Recent Labs   Lab 10/04/23  0506 10/05/23  0408 10/06/23  0459   Hemoglobin 10.0 L 9.3 L 9.4 L

## 2023-10-06 NOTE — ASSESSMENT & PLAN NOTE
Vital signs noted continue antihypertensives    BP Readings from Last 3 Encounters:   10/06/23 115/72   09/21/23 (!) 113/57   07/11/23 (!) 142/58

## 2023-10-06 NOTE — ANESTHESIA POSTPROCEDURE EVALUATION
Anesthesia Post Evaluation    Patient: Richard Farr    Procedure(s) Performed: Procedure(s) (LRB):  INSERTION, PEG TUBE (N/A)  EGD, WITH CLOSED BIOPSY (N/A)    Final Anesthesia Type: MAC      Patient location during evaluation: med/surg floor  Patient participation: Yes- Able to Participate  Level of consciousness: awake  Post-procedure vital signs: reviewed and stable  Pain management: adequate  Airway patency: patent    PONV status at discharge: No PONV  Anesthetic complications: no      Cardiovascular status: blood pressure returned to baseline  Respiratory status: spontaneous ventilation  Hydration status: euvolemic  Follow-up not needed.          Vitals Value Taken Time   /67 10/06/23 0506   Temp 36.7 °C (98.1 °F) 10/06/23 0506   Pulse 77 10/06/23 0708   Resp 20 10/06/23 0506   SpO2 98 % 10/06/23 0506         No case tracking events are documented in the log.      Pain/Nato Score: No data recorded

## 2023-10-06 NOTE — PT/OT/SLP DISCHARGE
Physical Therapy Discharge Summary    Name: Richard Farr  MRN: [id number]   Principal Problem: Acute cystitis without hematuria     Patient Discharged from acute Physical Therapy on 10/6/2023 .  Please refer to prior PT noted date on 10/5/2023  for functional status.     Assessment:     Patient appropriate for care in another setting.    Objective:     GOALS:   Multidisciplinary Problems       Physical Therapy Goals          Problem: Physical Therapy    Goal Priority Disciplines Outcome Goal Variances Interventions   Physical Therapy Goal     PT, PT/OT Adequate for Care Transition     Description: Goals to be met by: 10/9/2023     Patient will increase functional independence with mobility by performin. Supine to sit with Supervision or Set-up Assistance.  2. Sit to supine with Supervision or Set-up Assistance.  3. Bed to chair transfer with Supervision or Set-up Assistance with rollator  using Step Transfer technique.  4. Sit to Stand with Supervision or Set-up Assistance with rollator   5. Gait  x 1000  feet with Supervision or Set-up Assistance with rollator   6. Lower extremity exercise program x10 reps.                          Reasons for Discontinuation of Therapy Services  Transfer to alternate level of care.      Plan:     Patient Discharged to: Home with Home Health Service.      10/6/2023

## 2023-10-06 NOTE — PLAN OF CARE
Problem: Physical Therapy  Goal: Physical Therapy Goal  Description: Goals to be met by: 10/9/2023     Patient will increase functional independence with mobility by performin. Supine to sit with Supervision or Set-up Assistance.  2. Sit to supine with Supervision or Set-up Assistance.  3. Bed to chair transfer with Supervision or Set-up Assistance with rollator  using Step Transfer technique.  4. Sit to Stand with Supervision or Set-up Assistance with rollator   5. Gait  x 1000  feet with Supervision or Set-up Assistance with rollator   6. Lower extremity exercise program x10 reps.     Outcome: Adequate for Care Transition, discharge to home with follow up from home health P.T.

## 2023-10-06 NOTE — PLAN OF CARE
Problem: Infection  Goal: Absence of Infection Signs and Symptoms  Outcome: Ongoing, Progressing     Problem: Adult Inpatient Plan of Care  Goal: Plan of Care Review  Outcome: Ongoing, Progressing  Goal: Patient-Specific Goal (Individualized)  Outcome: Ongoing, Progressing  Goal: Absence of Hospital-Acquired Illness or Injury  Outcome: Ongoing, Progressing  Goal: Optimal Comfort and Wellbeing  Outcome: Ongoing, Progressing  Goal: Readiness for Transition of Care  Outcome: Ongoing, Progressing     Problem: Fluid and Electrolyte Imbalance (Acute Kidney Injury/Impairment)  Goal: Fluid and Electrolyte Balance  Outcome: Ongoing, Progressing     Problem: Oral Intake Inadequate (Acute Kidney Injury/Impairment)  Goal: Optimal Nutrition Intake  Outcome: Ongoing, Progressing     Problem: Renal Function Impairment (Acute Kidney Injury/Impairment)  Goal: Effective Renal Function  Outcome: Ongoing, Progressing     Problem: Skin Injury Risk Increased  Goal: Skin Health and Integrity  Outcome: Ongoing, Progressing     Problem: Fluid Imbalance (Pneumonia)  Goal: Fluid Balance  Outcome: Ongoing, Progressing     Problem: Infection (Pneumonia)  Goal: Resolution of Infection Signs and Symptoms  Outcome: Ongoing, Progressing     Problem: Respiratory Compromise (Pneumonia)  Goal: Effective Oxygenation and Ventilation  Outcome: Ongoing, Progressing     Problem: Impaired Wound Healing  Goal: Optimal Wound Healing  Outcome: Ongoing, Progressing     Problem: Fall Injury Risk  Goal: Absence of Fall and Fall-Related Injury  Outcome: Ongoing, Progressing     Problem: Breathing Pattern Ineffective  Goal: Effective Breathing Pattern  Outcome: Ongoing, Progressing

## 2023-10-06 NOTE — ASSESSMENT & PLAN NOTE
We will replete intravenously today due to dysphagia.  Follow with daily labs.      Patient has hypokalemia.  Last electrolytes reviewed-   Recent Labs   Lab 10/05/23  8604 10/06/23  045   K 3.7 4.3   Will replace potassium as per sliding scale as needed and monitor electrolytes closely.

## 2023-10-06 NOTE — DISCHARGE SUMMARY
[facility]  Discharge Summary      Patient Name: Richard Farr  MRN: [id number]  [facility]: [id number]  Patient Class: IP- Inpatient  Admission Date: 9/22/2023  Hospital Length of Stay: 13 days  Discharge Date and Time:  10/06/2023 10:05 AM  Attending Physician: Drew Moreira Jr., MD   Discharging Provider: Drew Moreira Jr, MD  Primary Care Provider: Drew Moreira Jr., MD    Primary Care Team: Networked reference to record PCT     HPI:   ED HPI:  Richard Farr is a 78 y.o. male with PMHX of hypertension, hyperlipidemia, CHF, mitral regurg, tricuspid regurg, pulmonary hypertension, gastritis who presents to the emergency department C/O shortness of breath.     Patient presents after developing shortness of breath 1 hour prior to arrival.  Was recently admitted for anemia and received 4 units PRBCs in hospital as well as iron transfusion 2 days ago.  No fever but patient states he felt hot in emergency department.  Patient was diagnosed with urinary tract infection during previous hospitalization is on antibiotics.     PCP: Drew Moreira Jr., MD     IM HPI:  Patient was recently admitted by primary care and transfuse 4 units of packed red blood cells.  Patient felt much improved return home felt well for about 24 hours then began having increasing dyspnea.  Patient was readmitted found to have a urinary tract infection and a pneumonia.  Patient was started on antibiotics.  He was also given IV diuretics.  Patient has some peripheral edema but overall he states he feels dry and dehydrated.  Patient is having some oropharyngeal dysphagia and on exam I am unable to find the cause.  Patient has a history of CVA as well as a history of arthritis with several upper extremity joint deformities and contractures.      Procedure(s) (LRB):  INSERTION, PEG TUBE (N/A)  EGD, WITH CLOSED BIOPSY (N/A)      Hospital Course:   9/24 GT:  Patient is lying in bed this morning with spouse at bedside.   Patient is frail and chronically ill-appearing, but does not appear to be in any acute distress.  Patient reports his shortness a breath is at baseline, and he is tolerating supplemental oxygen via nasal cannula.  Patient had some swallowing difficulties yesterday, SLP has been consulted for evaluation, treatment, dietary modifications have been made.  Patient's H&H is stable, and although anemic he is not at the point that he requires further transfusion.  Potassium mildly decreased, we will replete intravenously today due to swallowing difficulties and monitor with daily labs.  Blood cultures at this time are negative to date.  Patient and spouse deny any issues, concerns, questions.    9/25:  Patient eating breakfast this morning in no acute distress.  Speech therapy to evaluate the patient.  Thus far blood cultures are negative.  Patient feels back to baseline.  Urine culture without significant growth.  Consider discharge in the near future.  Patient having significant gas production check GI panel.    9/26:  No acute events overnight.  GI panel pending.  Provide iron infusion prior to potential discharge.  Patient to undergo modified barium swallow prior to discharge for completeness.  Home health will be coordinated ongoing outpatient needs.    Patient with significant aspiration by MBS.  Discuss with family potential needs for PEG placement vs ongoing ST.     9/27:  patient ct head negative.  Done for worsening dizziness/dysequilibrium after head trauma.  Awaiting  recs regarding PEG tube placement.     9/28/23:  patient resting this morning.  In favor or PEG placement tomorrow.   09/29/2023: No acute events overnight.  Planning potential endoscopy versus laparoscopy for PEG tube placement.  May need cardiac clearance.  Currently hemodynamically stable.  9/30: [place] weekend coverage. Awake and alert sitting on bedside chair. No new complaints at this time. Patient requires cardiology evaluation for endoscopy  followed by laparoscopy guided PEG tube placement. Patient has been NPO, will start peripheral TPN along with IVF.   10/1 [place] weekend coverage. No acute events overnight. Patient not able to swallow PO meds. Convert PO meds to IV or IM. Follow up echocardiogram (10/2). Follow up cardiology (VOLODYMYR Mireles) pre-operative evaluation for endoscopy study on 10/4.     10/02/2023: Patient remains NPO.  Overall stable awaiting cardiac clearance prior to potential endoscopy 10/4.  10/3/23:  patient doing well this am awaiting PEG placement tomorrow.   10/4/23:  Patient undergo PEG placement this morning.  Overall clinically stable.  10/5/23:  Patient is status post PEG tube overall tolerating well will begin trickle feeds.  10/6/23:  tolerating PEG feeding well.  Will dc home after dietary education.         Goals of Care Treatment Preferences:  Code Status: Full Code      Consults:   Consults (From admission, onward)        Status Ordering Provider     Inpatient consult to Registered Dietitian/Nutritionist  Once        Provider:  (Not yet assigned)    Completed HOME BARKER JR     Inpatient consult to Registered Dietitian/Nutritionist  Once        Provider:  (Not yet assigned)    Completed HOME BARKER JR     Inpatient consult to Cardiology  Once        Provider:  Vaughn Yanez MD    Acknowledged HOME BARKER JR     Inpatient consult to Midline team  Once        Provider:  (Not yet assigned)    Acknowledged HOME BARKER JR     Inpatient consult to General Surgery  Once        Provider:  Queenie Berman MD    Completed HOME BARKER JR     Inpatient consult to Social Work/Case Management  Once        Provider:  (Not yet assigned)    Acknowledged HOME BARKER JR     Inpatient consult to Social Work/Case Management  Once        Provider:  (Not yet assigned)    Acknowledged HOME BARKER JR          Neuro  Ataxia  Worsening disequilibrium since fall and head trauma.  CT to rule out  intracranial bleeding injury.  CT scan negative.  Family reassured.      Pulmonary  Pneumonia due to infectious organism  Continue current antibiotic regimen, supplemental oxygen.  Blood cultures are negative to date at this time.    Blood Culture, Routine   Date Value Ref Range Status   09/22/2023 No growth after 5 days.  Final     Urine Culture, Routine   Date Value Ref Range Status   09/22/2023 No significant growth  Final      9/27/23:  abx completed.       Pleural effusion, right  Unsure if this has been investigated will defer to primary.    Stable by hx.       Cardiac/Vascular  Chronic heart failure with preserved ejection fraction (HFpEF) NICM NYHA3  Followed by Cardiology in the outpatient setting.  Euvolemic and well compensated currently.  Continue home medical therapy      Acute on chronic combined systolic and diastolic heart failure  Patient seems intravascularly dry.  Hold IV Lasix for now.    Appears euvolemic.        Severe mitral regurgitation  At baseline.  Continue home medical therapy.      Essential hypertension  Vital signs noted continue antihypertensives    BP Readings from Last 3 Encounters:   10/06/23 115/72   09/21/23 (!) 113/57   07/11/23 (!) 142/58         Mixed hyperlipidemia  PO meds held as patient's current status NPO with no enteral access at this time.     10/5/23:  Restart via peg      Renal/  * Acute cystitis without hematuria  Continue antibiotics awaiting cultures    Blood cultures are negative to date.  Urine culture negative    Hypokalemia  We will replete intravenously today due to dysphagia.  Follow with daily labs.      Patient has hypokalemia.  Last electrolytes reviewed-   Recent Labs   Lab 10/05/23  0408 10/06/23  0459   K 3.7 4.3   Will replace potassium as per sliding scale as needed and monitor electrolytes closely.       Oncology  Iron deficiency anemia  Patient states he had upper and lower endoscopy in 2021.  Iron infusion prior to discharge.    Recent Labs    Lab 10/04/23  0506 10/05/23  0408 10/06/23  0459   Hemoglobin 10.0 L 9.3 L 9.4 L             GI  Other dysphagia  Dietary modifications have been made.  SLP consulted for evaluation and treatment.  Significant aspiration.  NPO for now.  Pending Cardiology evaluation for GS to eval for PEG.  9/30 Remains NPO, will start PTPN feeds with continued IVF  10/1 PO meds changed to IV/IM. Continue with PTPN. PICC Line placement ordered. General Surgery awaiting cardiology evaluation for procedure  10/2:  Awaiting cardiac clearance prior to PEG placement. Continue TPN.  10/3:  PEG planned for tomorrow.  10/4:  PEG tube scheduled for today.  10/5:  PEG placed begin feeds  10/6:  Tolerating feeds.  DC home with HH    Acute superficial gastritis without hemorrhage  Continue home treatment      Orthopedic  Laceration of forehead  Healing. Treatment per .      Final Active Diagnoses:    Diagnosis Date Noted POA    PRINCIPAL PROBLEM:  Acute cystitis without hematuria [N30.00] 05/05/2023 Yes    Ataxia [R27.0] 09/27/2023 Yes    Laceration of forehead [S01.81XA] 09/26/2023 No    Other dysphagia [R13.19] 09/24/2023 Yes    Hypokalemia [E87.6] 09/24/2023 No    Chronic heart failure with preserved ejection fraction (HFpEF) NICM NYHA3 [I50.32] 05/18/2021 Yes    Acute on chronic combined systolic and diastolic heart failure [I50.43] 02/01/2021 Yes    Pneumonia due to infectious organism [J18.9]  Yes    Pleural effusion, right [J90] 01/31/2021 Yes    Severe mitral regurgitation [I34.0] 01/29/2021 Yes     Chronic    Essential hypertension [I10] 12/10/2020 Yes     Chronic    Mixed hyperlipidemia [E78.2] 12/10/2020 Yes     Chronic    Acute superficial gastritis without hemorrhage [K29.00] 11/23/2020 Yes    Iron deficiency anemia [D50.9] 11/20/2020 Yes      Problems Resolved During this Admission:    Diagnosis Date Noted Date Resolved POA    Severe group 5 pulmonary hypertension [I27.20] 06/07/2021 09/24/2023 Yes    Nephrotic  range proteinuria [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    Specialty: Internal Medicine  Contact information:  [address] [place] [id number]  [phone]                       Patient Instructions:      SUBSEQUENT HOME HEALTH ORDERS   Order Comments: Skilled nursing/PT/OT/ST/aid     Order Specific Question Answer Comments   What Home Health Agency is the patient currently using? Other/External        Significant Diagnostic Studies: Labs: All labs within the past 24 hours have been reviewed    Pending Diagnostic Studies:     Procedure Component Value Units Date/Time    Specimen to Pathology, Surgery General Surgery [[id number]] Collected: 10/04/23 1331    Order Status: Sent Lab Status: In process Updated: 10/04/23 1413    Specimen: Tissue          Medications:  Reconciled Home Medications:      Medication List      CONTINUE taking these medications    aspirin 81 MG Chew  Take 81 mg by mouth once daily.     atorvastatin 80 MG tablet  Commonly known as: LIPITOR  TAKE 1 TABLET BY MOUTH EVERY EVENING     ezetimibe 10 mg tablet  Commonly known as: ZETIA  Take 10 mg by mouth once daily.     fenofibrate 145 MG tablet  Commonly known as: TRICOR  Take 145 mg by mouth once daily.     folic acid 1 MG tablet  Commonly known as: FOLVITE  Take 1 tablet (1 mg total) by mouth once daily.     furosemide 40 MG tablet  Commonly known as: LASIX  Take 0.5 tablets (20 mg total) by mouth once daily.     levothyroxine 50 MCG tablet  Commonly known as: SYNTHROID  TAKE 1 TABLET BY MOUTH EVERY DAY BEFORE BREAKFAST.     metoprolol succinate 25 MG 24 hr tablet  Commonly known as: TOPROL-XL  TAKE 1/2 TABLET BY MOUTH EVERY DAY     pantoprazole 40 MG tablet  Commonly known  as: PROTONIX  TAKE 1 TABLET BY MOUTH EVERY DAY     potassium chloride 10 MEQ Cpsr  Commonly known as: MICRO-K  Take 20 mEq by mouth once daily.        STOP taking these medications    cefUROXime 500 MG tablet  Commonly known as: CEFTIN     spironolactone 25 MG tablet  Commonly known as: ALDACTONE            Indwelling Lines/Drains at time of discharge:   Lines/Drains/Airways     Drain  Duration                Gastrostomy/Enterostomy 10/04/23 1335 Percutaneous endoscopic gastrostomy (PEG) 1 day                Time spent on the discharge of patient: 60 minutes         Drew Moreira Jr, MD  Department of Hospital Medicine  [facility]

## 2023-10-06 NOTE — ASSESSMENT & PLAN NOTE
Dietary modifications have been made.  SLP consulted for evaluation and treatment.  Significant aspiration.  NPO for now.  Pending Cardiology evaluation for GS to eval for PEG.  9/30 Remains NPO, will start PTPN feeds with continued IVF  10/1 PO meds changed to IV/IM. Continue with PTPN. PICC Line placement ordered. General Surgery awaiting cardiology evaluation for procedure  10/2:  Awaiting cardiac clearance prior to PEG placement. Continue TPN.  10/3:  PEG planned for tomorrow.  10/4:  PEG tube scheduled for today.  10/5:  PEG placed begin feeds  10/6:  Tolerating feeds.  DC home with [facility]

## 2023-10-09 VITALS
WEIGHT: 158.06 LBS | RESPIRATION RATE: 20 BRPM | SYSTOLIC BLOOD PRESSURE: 102 MMHG | BODY MASS INDEX: 24.81 KG/M2 | DIASTOLIC BLOOD PRESSURE: 64 MMHG | OXYGEN SATURATION: 97 % | HEART RATE: 71 BPM | TEMPERATURE: 99 F | HEIGHT: 67 IN

## 2023-10-10 ENCOUNTER — LAB VISIT (OUTPATIENT)
Dept: LAB | Facility: HOSPITAL | Age: 78
End: 2023-10-10
Attending: NURSE PRACTITIONER
Payer: MEDICARE

## 2023-10-10 DIAGNOSIS — N30.00 ACUTE CYSTITIS: Primary | ICD-10-CM

## 2023-10-10 DIAGNOSIS — D50.9 IRON DEFICIENCY ANEMIA, UNSPECIFIED: ICD-10-CM

## 2023-10-10 LAB
ALBUMIN SERPL BCP-MCNC: 2.8 G/DL (ref 3.5–5.2)
ALP SERPL-CCNC: 91 U/L (ref 55–135)
ALT SERPL W/O P-5'-P-CCNC: 33 U/L (ref 10–44)
ANION GAP SERPL CALC-SCNC: 3 MMOL/L (ref 3–11)
ANISOCYTOSIS BLD QL SMEAR: ABNORMAL
AST SERPL-CCNC: 41 U/L (ref 10–40)
BACTERIA #/AREA URNS HPF: ABNORMAL /HPF
BASOPHILS # BLD AUTO: 0.09 K/UL (ref 0–0.2)
BASOPHILS NFR BLD: 1.5 % (ref 0–1.9)
BILIRUB SERPL-MCNC: 0.8 MG/DL (ref 0.1–1)
BILIRUB UR QL STRIP: NEGATIVE
BUN SERPL-MCNC: 14 MG/DL (ref 8–23)
CALCIUM SERPL-MCNC: 8.6 MG/DL (ref 8.7–10.5)
CAOX CRY URNS QL MICRO: ABNORMAL
CHLORIDE SERPL-SCNC: 102 MMOL/L (ref 95–110)
CLARITY UR: ABNORMAL
CO2 SERPL-SCNC: 32 MMOL/L (ref 23–29)
COLOR UR: YELLOW
CREAT SERPL-MCNC: 0.6 MG/DL (ref 0.5–1.4)
DIFFERENTIAL METHOD: ABNORMAL
EOSINOPHIL # BLD AUTO: 0.1 K/UL (ref 0–0.5)
EOSINOPHIL NFR BLD: 2.2 % (ref 0–8)
ERYTHROCYTE [DISTWIDTH] IN BLOOD BY AUTOMATED COUNT: ABNORMAL % (ref 11.5–14.5)
EST. GFR  (NO RACE VARIABLE): >60 ML/MIN/1.73 M^2
GLUCOSE SERPL-MCNC: 93 MG/DL (ref 70–110)
GLUCOSE UR QL STRIP: NEGATIVE
HCT VFR BLD AUTO: 30.4 % (ref 40–54)
HGB BLD-MCNC: 9.1 G/DL (ref 14–18)
HGB UR QL STRIP: ABNORMAL
HYALINE CASTS #/AREA URNS LPF: 0 /LPF
HYPOCHROMIA BLD QL SMEAR: ABNORMAL
IMM GRANULOCYTES # BLD AUTO: 0.03 K/UL (ref 0–0.04)
IMM GRANULOCYTES NFR BLD AUTO: 0.5 % (ref 0–0.5)
KETONES UR QL STRIP: NEGATIVE
LEUKOCYTE ESTERASE UR QL STRIP: ABNORMAL
LYMPHOCYTES # BLD AUTO: 1.6 K/UL (ref 1–4.8)
LYMPHOCYTES NFR BLD: 26.3 % (ref 18–48)
MCH RBC QN AUTO: 24.1 PG (ref 27–31)
MCHC RBC AUTO-ENTMCNC: 29.9 G/DL (ref 32–36)
MCV RBC AUTO: 81 FL (ref 82–98)
MICROSCOPIC COMMENT: ABNORMAL
MONOCYTES # BLD AUTO: 0.8 K/UL (ref 0.3–1)
MONOCYTES NFR BLD: 12.6 % (ref 4–15)
NEUTROPHILS # BLD AUTO: 3.4 K/UL (ref 1.8–7.7)
NEUTROPHILS NFR BLD: 56.9 % (ref 38–73)
NITRITE UR QL STRIP: NEGATIVE
NRBC BLD-RTO: 0 /100 WBC
PH UR STRIP: 6 [PH] (ref 5–8)
PLATELET # BLD AUTO: 365 K/UL (ref 150–450)
PLATELET BLD QL SMEAR: ABNORMAL
PMV BLD AUTO: ABNORMAL FL (ref 9.2–12.9)
POIKILOCYTOSIS BLD QL SMEAR: ABNORMAL
POLYCHROMASIA BLD QL SMEAR: ABNORMAL
POTASSIUM SERPL-SCNC: 4.4 MMOL/L (ref 3.5–5.1)
PROT SERPL-MCNC: 6.5 G/DL (ref 6–8.4)
PROT UR QL STRIP: ABNORMAL
RBC # BLD AUTO: 3.77 M/UL (ref 4.6–6.2)
RBC #/AREA URNS HPF: 4 /HPF (ref 0–4)
SCHISTOCYTES BLD QL SMEAR: PRESENT
SODIUM SERPL-SCNC: 137 MMOL/L (ref 136–145)
SP GR UR STRIP: 1.01 (ref 1–1.03)
SPECIMEN TO PATHOLOGY - SURGICAL: NORMAL
SPHEROCYTES BLD QL SMEAR: ABNORMAL
SQUAMOUS #/AREA URNS HPF: 12 /HPF
TARGETS BLD QL SMEAR: ABNORMAL
URN SPEC COLLECT METH UR: ABNORMAL
UROBILINOGEN UR STRIP-ACNC: 1 EU/DL
WBC # BLD AUTO: 5.96 K/UL (ref 3.9–12.7)
WBC #/AREA URNS HPF: >100 /HPF (ref 0–5)

## 2023-10-10 PROCEDURE — 85025 COMPLETE CBC W/AUTO DIFF WBC: CPT | Performed by: NURSE PRACTITIONER

## 2023-10-10 PROCEDURE — 80053 COMPREHEN METABOLIC PANEL: CPT | Performed by: NURSE PRACTITIONER

## 2023-10-10 PROCEDURE — 87186 SC STD MICRODIL/AGAR DIL: CPT | Performed by: NURSE PRACTITIONER

## 2023-10-10 PROCEDURE — 81000 URINALYSIS NONAUTO W/SCOPE: CPT | Performed by: NURSE PRACTITIONER

## 2023-10-10 PROCEDURE — 87086 URINE CULTURE/COLONY COUNT: CPT | Performed by: NURSE PRACTITIONER

## 2023-10-10 PROCEDURE — 87088 URINE BACTERIA CULTURE: CPT | Performed by: NURSE PRACTITIONER

## 2023-10-10 PROCEDURE — 87077 CULTURE AEROBIC IDENTIFY: CPT | Performed by: NURSE PRACTITIONER

## 2023-10-10 PROCEDURE — 36415 COLL VENOUS BLD VENIPUNCTURE: CPT | Performed by: NURSE PRACTITIONER

## 2023-10-12 LAB — BACTERIA UR CULT: ABNORMAL

## 2023-10-18 DIAGNOSIS — R13.10 DYSPHAGIA: Primary | ICD-10-CM

## 2023-10-23 ENCOUNTER — CLINICAL SUPPORT (OUTPATIENT)
Dept: REHABILITATION | Facility: HOSPITAL | Age: 78
End: 2023-10-23
Attending: OTOLARYNGOLOGY
Payer: MEDICARE

## 2023-10-23 ENCOUNTER — HOSPITAL ENCOUNTER (OUTPATIENT)
Dept: RADIOLOGY | Facility: HOSPITAL | Age: 78
Discharge: HOME | End: 2023-10-23
Attending: OTOLARYNGOLOGY
Payer: MEDICARE

## 2023-10-23 DIAGNOSIS — R13.13 PHARYNGEAL DYSPHAGIA: ICD-10-CM

## 2023-10-23 DIAGNOSIS — R13.10 DYSPHAGIA: ICD-10-CM

## 2023-10-23 PROCEDURE — 74230 X-RAY XM SWLNG FUNCJ C+: CPT | Mod: TC

## 2023-10-23 PROCEDURE — 92610 EVALUATE SWALLOWING FUNCTION: CPT | Mod: PN

## 2023-10-23 PROCEDURE — A9698 NON-RAD CONTRAST MATERIALNOC: HCPCS | Performed by: OTOLARYNGOLOGY

## 2023-10-23 PROCEDURE — 92611 MOTION FLUOROSCOPY/SWALLOW: CPT | Mod: PN

## 2023-10-23 PROCEDURE — 25500020 PHARM REV CODE 255: Performed by: OTOLARYNGOLOGY

## 2023-10-23 RX ADMIN — BARIUM SULFATE 148 ML: 0.81 POWDER, FOR SUSPENSION ORAL at 02:10

## 2023-10-23 NOTE — Clinical Note
Hi Dr. Moreira,  I saw your patient yesterday for Modified Barium Swallow Study in [place] due to the equipment being down at [place] and have sent this to you for your review. Quite frankly, I don't think he needed a feeding tube in the first place. I sent this report over to the referring physician (Dr. Dockery, ENT) as well. I'm free by phone ([phone]) or via Epic messaging if you'd like to discuss this case together.   Thank you, Swapna

## 2023-10-24 PROBLEM — R13.13 PHARYNGEAL DYSPHAGIA: Status: ACTIVE | Noted: 2023-10-24

## 2023-10-24 NOTE — PLAN OF CARE
[facility]  MODIFIED BARIUM SWALLOW STUDY    Date: 10/23/2023     Name: Richard Farr   MRN: [id number]    Therapy Diagnosis: pharyngeal dysphagia compounded by ACDF x2 and CVA; question age-related variations of normal     Physician: Sanjay Dockery MD  Physician Orders: SLP Video Swallow  Medical Diagnosis from Referral: R13.10 (ICD-10-CM) - Dysphagia     Date of Evaluation:  10/23/2023    Time In:  1350  Time Out:  1450  Total Billable Time: 60     Procedure Min.   Swallow and Oral Function Evaluation   15   Fl Modified Barium Swallow Speech  45     Precautions: Fall and Dysphagia  Subjective   Patient arrived to radiology suite, accompanied by his wife Linda. Patient was engaged, cooperative, and motivated to participate within the evaluation procedure.    Date of Onset: 3 weeks ago    Past Medical History: Richard Farr  has a past medical history of Anemia, Arthritis, Bilateral lower extremity edema, Carpal tunnel syndrome, bilateral, Chronic diastolic congestive heart failure, Colon polyp, Coronary artery disease, Depression due to physical illness, Encounter for blood transfusion, Gastric ulcer, History of herpes zoster, Hyperlipemia, Hypertension, Moderate tricuspid insufficiency, NSVT (nonsustained ventricular tachycardia), PAC (premature atrial contraction), Patent foramen ovale with right to left shunt, Pneumonia, Pulmonary hypertension, PVC's (premature ventricular contractions), Severe mitral regurgitation, Shingles, and Stroke.  Richard Farr  has a past surgical history that includes Neck surgery; Tonsillectomy; Colonoscopy (N/A, 11/23/2020); Cardiac catheterization; Transesophageal echocardiography; Esophagogastroduodenoscopy; Implantation of mitral valve leaflet clip (Right, 5/18/2021); Esophagogastroduodenoscopy (N/A, 5/21/2021); insertion, peg tube (N/A, 10/4/2023); and egd, with closed biopsy (N/A, 10/4/2023).    The patient is a 78 y.o. male who complains of  "unintentional weight loss for the last 6 months. He is currently feeding tube dependent with 30 lb weight loss, some of this weight loss was prior to hospitalization. He has been practicing excellent oral hygiene using toothbrush and toothpaste. Per patient and his wife, recommendations were for NPO following instrumental assessment - they were unable to articulate the reasoning other than "The flap was not working." Recently saw ENT for flexible laryngoscopy without noting any lesions or structural abnormalities. Patient and wife are anxious to see how his swallowing function is currently. Decreased quality of life reported as he has been uninterested in joining his family members during meal times, including while his son was visiting from out of town. Patient's wife overtly stated "I'm not even sure he needed that tube in the first place."    Patient is currently taking the following medications: has a current medication list which includes the following prescription(s): aspirin, atorvastatin, ciprofloxacin 250 mg/5 ml, ezetimibe, fenofibrate, furosemide, levothyroxine, metoprolol succinate, omeprazole, and potassium chloride, and the following Facility-Administered Medications: benzocaine.    History was provided by patient, family, and/or taken from chart review:   -Current diet at home: NPO, only sucking on mints and candy throughout the day - has not had any solids or liquids by mouth since PEG tube insertion.      -Recommended diet from previous study: NPO 9/26/23 with alternate means of nutrition/hydration recommended    -Therapy received:   Most Recent:   ST clinical swallow evaluation 9/25/23 with recommendations for IDDSI 6/0 (soft & bite-sized/thin liquids); no treatment; ST signed off immediately following Modified Barium Swallow Study on 9/26/23 "ST is no longer warranted at this time until further assessment and MD recommendation."  Modified Barium Swallow Study 9/26/23: "Patient demonstrates severe " "pharyngeal dysphagia characterized by deep penetration w/ aspiration across multiple consistencies. The pt demonstrates reduced hyoid excursion, laryngeal elevation, and absent epiglottic inversion. Residue found in valleculae post swallow and clears minimally w/ repeat swallows. Trace aspiration of residuals observed. Possible anatomical/physiological abnormality observed also demonstrating deviation of bolus to patient's L-side during A-P view. Further diagnostic evaluation is recommended."    -Imaging:   CT Soft Tissue Neck with Contrast 9/28/23: Soft tissue attenuation thickened supraglottic prevertebral soft tissues, a nonspecific appearance, possibly postoperative scarring.  No peripherally enhancing fluid or soft tissue emphysema. Severe C1-2 arthropathy, prior C3-4 and C6-7 fusion.   CT Soft Tissue Neck without Contrast 9/28/23: Thickening of the prevertebral soft tissues.  Fluid collection possible.  If further clinical imaging warranted, consider postcontrast exam. Somewhat asymmetric parotid glands with the left being larger than the right. Soft tissue density adjacent to the left sternoclavicular joint, possible joint effusion.  CT Head 9/26/23: Mild volume loss and old right basal ganglia infarct.  No acute intracranial findings.     -Surgery: Anterior Cervical Disk Fusion (ACDF) - one surgery in 2000 and another in 2002. No immediate complications reported following surgery.     In consideration of the interrelationships between body systems and swallowing, History was provided by patient, family, and/or taken from chart review. The following are medical conditions present in the patient's history which can result in or be attributed to dysphagia:  Respiratory Recent pneumonia   Cardiovascular Coronary artery disease; hypertension; CHF   Digestive GERD, treated with protonix   Infections  None noted in this category   Urinary None noted in this category   Endocrine None noted in this category   Nervous " "cerebrovascular accident (CVA)    Skeletal Anterior Cervical Discectomy and Fusion (ACDF) x2   Immune None noted in this category   Cancer None noted in this category   Psychiatric  None noted in this category   Congenital  None noted in this category   Other None noted in this category     The following observations were made:   -Mental status: Alert and Cooperative  -Factors affecting performance: no difficulties participating in the study  -Feeding Method: needs some assistance due to carpal tunnel in both hands    Respiratory Status:   -Respiratory Status: room air    Medical Hx and Allergies:    Review of patient's allergies indicates:   Allergen Reactions    Ativan [lorazepam]      Adverse reaction.     Medications that May Affect Swallowing  Beta-Blockers  (Used to treat hypertension arrhythmia, a-fib, angina, CHF, migraine prophylaxis)  []Acebutolol (Sectol)  []Carvedilol (Coreg)  [x]Metoprolol (Lopressor)  []Nadolel (Corgard)    Pain Scale:  0/10 on VAS currently.   Pain Location: n/a    Objective     Modified Barium Swallow Study  Purpose: to evaluate anatomy and physiology of the oropharyngeal swallow, to determine effectiveness of rehabilitation strategies, and to determine diet consistency and intervention recommendations. The study was performed using the "Gold Standard" of 30 fps with as low as reasonably achievable (ALARA) exposure.     The patient was seen in radiology seated in [place]'s position in a video imaging chair for lateral views of the larynx and an A/P view. The study was conducted using Varibar thin liquid (IDDSI 0), Varibar nectar liquid (IDDSI 2), Varibar pudding (IDDSI 4), Peaches covered in Varibar powder (IDDSI 6/2), and solid coated in Varibar pudding (IDDSI 7). He tolerated the procedure well.     A cranial nerve examination revealed the following:  Cranial Nerve Examination  Cranial Nerve 5: Trigeminal Nerve  Motor Jaw Posture at rest: Closed  Mandible Elevation/Depression: " WFL  Mandible lateralization: WFL  Abnormal movement: absent Interpretation: Intact bilaterally    Sensory Forehead: WFL  Cheek: WFL  Jaw: WFL  Facial Pain: None noted Interpretation: Intact bilaterally      Cranial Nerve 7: Facial Nerve  Motor Facial Symmetry: WNL  Wrinkle Forehead: WFL  Close eyes tightly: WFL  Labial Protrusion: WFL  Labial Retraction: WFL  Buccal Strength with Labial Seal: WFL  Abnormal movement: absent Interpretation: Intact bilaterally    Sensory Formal testing not completed.       Cranial Nerves IX and X: Glossopharyngeal and Vagus Nerves  Motor Palatal Symmetry (Rest): WNL  Palatal Symmetry (Movement): WNL  Cough: Perceptually strong  Voice Prior to PO intake: Clear  Resonance: Normal  Abnormal movement: absent Interpretation: Intact bilaterally      Cranial Nerve XII: Hypoglossal Nerve  Motor Tongue at rest: WNL  Lingual Protrusion: WNL  Lingual Protrusion against Resistance: WEAK  Lingual Lateralization: WNL  Abnormal movement: absent Interpretation: Subjective weakness appreciated with tasks including lingual resistance      Other information:  Mucosal Quality: No abnormal findings  Secretion Management: no overt deficits noted/observed  Dentition: Good condition for speech and mastication     CONSISTENCIES ADMINISTERED:       image prior to po intake    Consistency  Presentation  Findings Rosenbeck's Penetration/Aspiration Scale (PAS) Strategy Attempted    Thin (IDDSI 0) Method: Clinician-fed    Volume: 5ml x2 Self-regulated cup sip x1 Self-regulated straw sip x4    Projection: lateral view; AP view       *Self-regulated with assistance for feeding - patient independently siphoned from straw   Oral phase: trace residuals on posterior oral tongue with first 5mL trial; otherwise, no clinically significant oral residue is present across trials    Pharyngeal phase: initiation of the swallow is variable across presentations and trials, ranging from the ramus of the mandible to the  piriform sinus; initiation is not consistent whether via spoon, straw, or cup rim    Minimal airspace is appreciated where the arytenoid cartilage should approximate to the epiglottis at the height of the swallow; penetration occurs prior to swallow initiation with loss of bolus to the pharynx for 1 swallow occurring during next extension; no aspiration identified    Residue remains consistently as a collection amount in the vallecula following initial swallows; cleared by approximately half with effortful secondary swallows; biofeedback was helping on imaging for self-identification and awareness (patient is sensate to residue as well)    Esophageal screen: retrograde flow of thin liquids below the pharyngoesophageal segment - patient with known reflux and on medications      Residue following initial swallow      Residue following effortful swallow x2   Best: (1) Material does not enter the airway    Worst: (3) Material enters the airway, remains above the vocal folds, and is not ejected from the airway  Remaining residuals in the laryngeal vestibule are at the laryngeal surface of the epiglottis, not easily visible (meaning not overly concerning)  No aspiration events identified          Penetration before the swallow cup sip        Immediately following  penetration event (no clinically significant penetrated material remaining in the laryngeal vestibule) Effortful swallow was successful in reducing the amount of residuals in the vallecula    Neck extension with effortful swallow - did not improve physiological outcome or residual volume    Biofeedback - successful in self-identification of residuals and need for additional effortful swallow   Nectar thick (mildly thick/IDDSI 2) Method: Clinician-fed    Volume: 5ml x1 Self-regulated straw sip x1    Projection: lateral view Oral phase: good anterior-posterior transit; varying residuals with a collection on the posterior oral tongue with straw sip and only a trace  amount on the posterior tongue with 5mL spoon sip  Pharyngeal phase: initiation of the swallow occurs at the ramus of the mandible for the straw sip; due to fluoro on-time for 5mL trial, unable to ; penetration after the initial 5mL swallow when initiating a cleansing swallow; unable to visualize residuals in vestibule due to very trace amounts remaining; residuals remain as a collection in the vallecula and trace amounts in the piriforms and lateral channels, cleared approximately by half with effortful swallow; no observed epiglottic inversion    Esophogeal screen: no impairments identified in lateral view   Best: (1) Material does not enter the airway      Worst: (3) Material enters the airway, remains above the vocal folds, and is not ejected from the airway   Effortful swallow-cleared approximately half of the residue which was remaining in the vallecula following initial swallows as well as lateral channels   Puree (extremely thick/ IDDSI 4) Method: Clinician-fed    Volume: 5ml x2     Projection: lateral view; AP view  Oral phase: good anterior-posterior transit; trace residuals remained on the posterior oral tongue on 1st 5mL trial    Pharyngeal phase: appropriate aggregation of barium in the vallecula prior to swallow initiation; good airway protection; almost entire vallecula was filled following initial swallow for 1st trial with additional residuals in the lateral channel     Residuals were cleared to only a collection amount (approximately 1/2 filled vallecula) with effortful swallow and biofeedback  Epiglottic inversion PRESENT    Esophageal screen: retrograde flow of bolus below the UES observed and delayed emptying/retention of bolus      A/p view  18 seconds following initiation of trial; retention left esophageal wall and filling in medial esophagus Best: (1) Material does not enter the airway      Worst: (1) Material does not enter the airway     Effortful swallow and multiple swallows cleared  "residuals approximately by half in the vallecula  Biofeedback was also utilized as well as self-identified through questions regarding pharyngeal sensation of residue   Mixed consistency (thin/ IDDSI 0 + soft and bite sized/ IDDSI 6) Method: Clinician-fed    Volume: 2 peaches in juice x1    Projection: lateral view   Oral phase: good mastication and manipulation of solid; no oral residue    Pharyngeal phase: appropriate aggregation of material in the vallecula prior to swallow initiation; trace residuals in the vallecula following initial swallow; cleared mostly with secondary effortful swallow    Esophageal screen:  no impairments identified Best: (1) Material does not enter the airway      Worst: (1) Material does not enter the airway     No strategies completed or needed   Solid (regular/ IDDSI 7) Method: Clinician-fed    Volume: bite x1    Projection: lateral view; AP view  Oral phase: good mastication and manipulation of solid; no oral residue    Pharyngeal phase: appropriate aggregation of material in the vallecula prior to swallow initiation; collection of residuals in the vallecula following initial swallow; cleared mostly with secondary effortful swallow; speech-language pathologist provided liquid wash in error with mildly-thick liquids (NTL) which left residuals in the vallecula - cleared mostly with effortful secondary swallow     Esophageal screen: no impairments identified Best: (1) Material does not enter the airway      Worst: (1) Material does not enter the airway     Liquid wash and effortful swallow - cleared residuals to a trace amount in the vallecula   Barium tablet  (1/2) -Clinician-fed in water bolus    View:  - Lateral view  During initial swallow, 1/2 barium tablet lodged in the vallecula thought due to decreased pharyngeal contraction and epiglottis is rather "hook-shaped" causing easy lodging; additional sip of water easily cleared 1/2 barium tablet   (1) Material does not enter the " airway Additional sip of water     The [place] Assessment of Swallowing Ability (MASA) is a screening tool for identifying swallowing disorders in patients. The MASA contains 24 clinical items that are measured via 5 point to 10 point rating scales. Clinical areas of assessment include: alertness, cooperation, auditory comprehension, aphasia, apraxia, dysarthria, saliva control/management, lip seal, tongue movement, tongue strength, tongue coordination, oral preparation, respiration, respiratory rate for swallowing, gag reflex, palatal movement, bolus clearance, oral transit time, cough reflex, voluntary cough, voice, tracheostomy, pharyngeal phase, and pharyngeal response.The total score of the MASA is 200 points and the results of the MASA are interpreted as follows:  ?178 =   no abnormality  168-177= mild dysphagia  139-167 = moderate dysphagia    ?138 = severe dysphagia  Patient score = 193     Category Score   Alertness 10   Co-operation 10   Auditory Comprehension 10   Respiration 10   Respiratory Rate 5   Aphasia (Dysphasia) 5   Apraxia (Dyspraxia) 5   Dysarthria 4   Saliva 5   Lip Seal 5   Tongue Movement 10   Tongue Strength 8   Tongue Coordination 10   Oral Preparation 10   Gag 5   Palate 10   Bolus Clearance 8   Oral Transit 10   Cough reflex 5   Cough voluntary 10   Voice 10   Trachae 10   Pharyngeal Phase 8   Pharyngeal Response 10     Functional Oral Intake Scale (FOIS)  The Functional Oral Intake Scale (FOIS) is an ordinal scale that is used to assess the current status and meaningful change in the oral intake. FOIS levels include:    TUBE DEPENDENT (levels 1-3) 1. No oral intake  2. Tube dependent with minimal/inconsistent oral intake  3. Tube supplements with consistent oral intake      TOTAL ORAL INTAKE (levels 4-7) 4. Total oral intake of a single consistency  5. Total oral intake of multiple consistencies requiring special preparation  6. Total oral intake with no special preparation, but must avoid  specific foods or liquid items  7. Total oral intake with no restrictions     Patient is currently judged to be at FOIS level 7.      The Modified Barium Swallowing Impairment Profile: MBSiMP:   Impairments Identified During Worst Performance  Oral Phase   1) Lip Closure: 0 - No labial escape  2) Tongue Control During Bolus Hold: 0 - Cohesive bolus between tongue to palatal seal  3) Bolus Preparation/Mastication: 0 - Timely and efficient chewing and mashing  4) Bolus Transport/Lingual Motion: 0 - Brisk tongue motion  5) Oral Residue: 2 - Residue collection on oral structures  6) Initiation of Pharyngeal Swallow: 3 - Bolus head in pyriforms    Pharyngeal Phase  7) Soft Palate Elevation: 0 - No bolus between soft palate (SP)/pharyngeal wall (PW)  8) Laryngeal Elevation: 1 - Partial superior movement of thyroid cartilage/partial approximation of arytenoids to epiglottic petiole  9) Anterior Hyoid Excursion: 0 - Complete anterior movement  10) Epiglottic Movement: 2 - No inversion  11) Laryngeal Vestibular Closure: 1 - Incomplete; narrow column air/contrast in laryngeal vestibule  12) Pharyngeal Stripping Wave: 1 - Present; diminished  13) Pharyngeal Contraction: 0 - Complete  14) Pharyngoesophageal Segment Openin - Complete distention and complete duration; no obstruction of flow  15) Tongue Base (TB) Retraction: 2 - Narrow column of contrast or air between TB and PW  16) Pharyngeal Residue: 2 - Collection of residue within or on pharyngeal structures    Esophageal Phase:  17) Esophageal Clearance Upright Position: 2 - Esophageal retention with retrograde flow below pharyngoesophageal segment (PES)    Oral Phase Impairment Total Score: 5  Pharyngeal Phase Impairment Total Score: 9  Esophageal Phase Impairment Total Score: 2    Total MBSiMP Score: 16  Treatment   Total Treatment Time: 15 minutes  Patient educated regarding results and recommendations of the evaluation. See the recommendations section  below.    Education: Plan of Care, role of SLP in care, and anatomy and physiology of swallow mechanism as it relates to MBSS findings and recommendations were discussed with the patient. Patient expressed understanding. All questions were answered.     Assessment   Richard Farr is a 78 y.o. male referred for Modified Barium Swallow Study with a medical diagnosis of dysphagia. Patient endorses a chief complaint of unintentional weight loss for the past six months, increasing with tube dependence following hospitalization. Patient and wife both deny dysphagia-related symptoms prior to hospitalization with patient previously consuming a regular solid consistency diet with thin liquids. Today, he presents to instrumental assessment following an ENT visit to determine the integrity of his swallowing abilities.     Patient presents with a mild pharyngeal dysphagia as characterized by these primary attributing factors, listed in an intertwined superior-inferior physiological swallow: occasional loss of bolus posteriorly leading to entry of liquid into the laryngeal vestibule, decreased tongue based retraction, diminished pharyngeal constriction, slight airspace present between the arytenoid to epiglottis approximation at the height of the swallow, with pharyngeal residue remaining primarily as a collection in the vallecula immediately following initial swallow. Residuals in the pharynx were addressed through compensatory measures, utilizing both effortful swallow and multiple swallows with biofeedback most helpful in reducing the residuals by approximately half in amount. Penetration occurred with thin liquids before the swallow and were cleared with the exception of a very minute remaining on the laryngeal surface of the epiglottis, which is not considered to be clinically significant. Esophageal screening in the anterior-posterior view is significant for what appears to be esophageal retention with increased viscosities  (pudding), which may be alleviated with a liquid wash.     Together, these impairments are likely multifactorial due to previous anterior cervical disc fusion surgeries, a remote history of stroke in the basal ganglia, and even a normal age-related swallow. Patient will benefit from skilled speech therapy services in the form of dysphagia treatment addressing the above physiological components and a trial of oral preparatory set would be most helpful in reducing the quick flow of liquids through the pharynx.    Plan     Short Term Goals: (4 weeks) Current Progress:   Patient will state compensatory strategies (effortful swallow + spontaneous swallows) with 100% accuracy independently for use during intake of solids and liquids. Established this date    2. Patient will complete mendelsohn maneuver with instructions as needed x30 per session to assist with achieving prolonged laryngeal elevation.  Established this date    3. Patient will participate in tongue based retraction exercises through use of effortful swallow x30 per session in order to increase force and pressure of the tongue base to reduce residuals in the vallecula. Established this date     4. Patient will complete bolus driven pharyngeal exercise with pudding and solid consistencies without liquid wash x30 per session to enhance pharyngeal constriction.  (To the treating speech-language pathologist, use 5mL pudding, have him swallow and continue to swallow until he feels the residue is gone. Do not allow for liquid wash in between) Established this date     Long Term Goals: (8 weeks) Current Progress:   Using compensatory strategies, patient will consume the least restrictive consistencies without continued sensation of pharyngeal residue.  Established this date    2. Patient will consume 3 full meals a day without reservation in order to enhance his quality of life through participation in meal times. Established this date      Recommendations:      Consistency Recommendations: Thin liquids (IDDSI 0) and regular solid consistencies (IDDSI 7).  Medications should be taken whole in puree or thin liquids - provided patient with the option as he has not taken medications by mouth in approximately 3 weeks.  Risk Management: use good oral hygiene , sit upright for all PO intake, alternate bites and sips, multiple swallows per bolus, and allow extra time for meals  Specialist Referrals: GI and Dietician consult to transition patient from g-tube feedings to full oral diet.  GI only if reflux is a patient-reported discomfort  Dietitian to aid in transitioning from tube feeds to oral diet - may benefit from education given weight loss even prior to hospitalization  Ancillary Tests: Consider Barium Esophagram if clinically indicated  Therapy: Dysphagia therapy is recommended including Mendelsohn and effortful swallow; bolus driven pharyngeal exercise.  Follow-up exam: Follow up swallow study is not indicated at this time.    Please contact [facility] at [phone] if there are questions or concerns.    Therapist's Name:   Swapna Bird MS, CCC-SLP, CBIS  Speech-Language Pathologist  Certified Brain Injury Specialist  10/24/2023      I CERTIFY THE NEED FOR THESE SERVICES FURNISHED UNDER THIS PLAN OF TREATMENT AND WHILE UNDER MY CARE      Physician Name: _______________________________    Physician Signature: ____________________________

## 2023-11-01 PROBLEM — I70.0 AORTIC ATHEROSCLEROSIS: Status: ACTIVE | Noted: 2023-11-01

## 2023-11-01 PROBLEM — J43.2 CENTRILOBULAR EMPHYSEMA: Status: ACTIVE | Noted: 2023-11-01

## 2023-11-15 PROBLEM — N39.0 RECURRENT UTI: Status: ACTIVE | Noted: 2023-11-15

## 2023-11-22 ENCOUNTER — LAB VISIT (OUTPATIENT)
Dept: LAB | Facility: HOSPITAL | Age: 78
End: 2023-11-22
Attending: INTERNAL MEDICINE
Payer: MEDICARE

## 2023-11-22 DIAGNOSIS — I50.43 ACUTE ON CHRONIC COMBINED SYSTOLIC AND DIASTOLIC HEART FAILURE: ICD-10-CM

## 2023-11-22 DIAGNOSIS — Z00.00 ROUTINE GENERAL MEDICAL EXAMINATION AT A HEALTH CARE FACILITY: ICD-10-CM

## 2023-11-22 DIAGNOSIS — I10 ESSENTIAL HYPERTENSION, MALIGNANT: ICD-10-CM

## 2023-11-22 DIAGNOSIS — D64.9 ANEMIA, UNSPECIFIED: ICD-10-CM

## 2023-11-22 DIAGNOSIS — E05.80 THYROTROPIN OVERPRODUCTION: ICD-10-CM

## 2023-11-22 LAB
ALBUMIN SERPL BCP-MCNC: 3.6 G/DL (ref 3.5–5.2)
ALP SERPL-CCNC: 68 U/L (ref 55–135)
ALT SERPL W/O P-5'-P-CCNC: 17 U/L (ref 10–44)
ANION GAP SERPL CALC-SCNC: 6 MMOL/L (ref 3–11)
ANISOCYTOSIS BLD QL SMEAR: SLIGHT
AST SERPL-CCNC: 28 U/L (ref 10–40)
BASOPHILS # BLD AUTO: 0.08 K/UL (ref 0–0.2)
BASOPHILS NFR BLD: 1 % (ref 0–1.9)
BILIRUB SERPL-MCNC: 0.6 MG/DL (ref 0.1–1)
BUN SERPL-MCNC: 20 MG/DL (ref 8–23)
BURR CELLS BLD QL SMEAR: ABNORMAL
CALCIUM SERPL-MCNC: 9.3 MG/DL (ref 8.7–10.5)
CHLORIDE SERPL-SCNC: 105 MMOL/L (ref 95–110)
CO2 SERPL-SCNC: 29 MMOL/L (ref 23–29)
COMPLEXED PSA SERPL-MCNC: 0.37 NG/ML (ref 0–4)
CREAT SERPL-MCNC: 0.6 MG/DL (ref 0.5–1.4)
DIFFERENTIAL METHOD: ABNORMAL
EOSINOPHIL # BLD AUTO: 0.2 K/UL (ref 0–0.5)
EOSINOPHIL NFR BLD: 2.8 % (ref 0–8)
ERYTHROCYTE [DISTWIDTH] IN BLOOD BY AUTOMATED COUNT: 22.6 % (ref 11.5–14.5)
EST. GFR  (NO RACE VARIABLE): >60 ML/MIN/1.73 M^2
FERRITIN SERPL-MCNC: 34 NG/ML (ref 20–300)
GLUCOSE SERPL-MCNC: 89 MG/DL (ref 70–110)
HCT VFR BLD AUTO: 33.4 % (ref 40–54)
HGB BLD-MCNC: 10.5 G/DL (ref 14–18)
HYPOCHROMIA BLD QL SMEAR: ABNORMAL
IMM GRANULOCYTES # BLD AUTO: 0.03 K/UL (ref 0–0.04)
IMM GRANULOCYTES NFR BLD AUTO: 0.4 % (ref 0–0.5)
IRON SATN MFR SERPL: 16 % (ref 20–50)
IRON SERPL-MCNC: 63 UG/DL (ref 45–160)
LYMPHOCYTES # BLD AUTO: 2 K/UL (ref 1–4.8)
LYMPHOCYTES NFR BLD: 26.1 % (ref 18–48)
MCH RBC QN AUTO: 29.6 PG (ref 27–31)
MCHC RBC AUTO-ENTMCNC: 31.4 G/DL (ref 32–36)
MCV RBC AUTO: 94 FL (ref 82–98)
MONOCYTES # BLD AUTO: 0.7 K/UL (ref 0.3–1)
MONOCYTES NFR BLD: 8.9 % (ref 4–15)
NEUTROPHILS # BLD AUTO: 4.7 K/UL (ref 1.8–7.7)
NEUTROPHILS NFR BLD: 60.8 % (ref 38–73)
NRBC BLD-RTO: 0 /100 WBC
PLATELET # BLD AUTO: 294 K/UL (ref 150–450)
PMV BLD AUTO: 10.3 FL (ref 9.2–12.9)
POLYCHROMASIA BLD QL SMEAR: ABNORMAL
POTASSIUM SERPL-SCNC: 4.2 MMOL/L (ref 3.5–5.1)
PROT SERPL-MCNC: 7.1 G/DL (ref 6–8.4)
RBC # BLD AUTO: 3.55 M/UL (ref 4.6–6.2)
RETICS/RBC NFR AUTO: 2.8 % (ref 0.4–2)
SCHISTOCYTES BLD QL SMEAR: ABNORMAL
SODIUM SERPL-SCNC: 140 MMOL/L (ref 136–145)
TOTAL IRON BINDING CAPACITY: 403 UG/DL (ref 250–450)
WBC # BLD AUTO: 7.75 K/UL (ref 3.9–12.7)

## 2023-11-22 PROCEDURE — 85025 COMPLETE CBC W/AUTO DIFF WBC: CPT | Performed by: INTERNAL MEDICINE

## 2023-11-22 PROCEDURE — 80053 COMPREHEN METABOLIC PANEL: CPT | Performed by: INTERNAL MEDICINE

## 2023-11-22 PROCEDURE — 84153 ASSAY OF PSA TOTAL: CPT | Mod: GA | Performed by: INTERNAL MEDICINE

## 2023-11-22 PROCEDURE — 82728 ASSAY OF FERRITIN: CPT | Performed by: INTERNAL MEDICINE

## 2023-11-22 PROCEDURE — 85045 AUTOMATED RETICULOCYTE COUNT: CPT | Performed by: INTERNAL MEDICINE

## 2023-11-22 PROCEDURE — 83540 ASSAY OF IRON: CPT | Performed by: INTERNAL MEDICINE

## 2023-11-22 PROCEDURE — 36415 COLL VENOUS BLD VENIPUNCTURE: CPT | Performed by: INTERNAL MEDICINE

## 2023-11-22 PROCEDURE — 83550 IRON BINDING TEST: CPT | Performed by: INTERNAL MEDICINE

## 2023-12-28 ENCOUNTER — OFFICE VISIT (OUTPATIENT)
Dept: SURGERY | Facility: CLINIC | Age: 78
End: 2023-12-28
Payer: MEDICARE

## 2023-12-28 VITALS — OXYGEN SATURATION: 97 % | DIASTOLIC BLOOD PRESSURE: 62 MMHG | SYSTOLIC BLOOD PRESSURE: 133 MMHG | HEART RATE: 73 BPM

## 2023-12-28 DIAGNOSIS — Z01.818 PRE-OP TESTING: ICD-10-CM

## 2023-12-28 DIAGNOSIS — N32.1 COLOVESICAL FISTULA: Primary | ICD-10-CM

## 2023-12-28 PROCEDURE — 99214 OFFICE O/P EST MOD 30 MIN: CPT | Mod: S$PBB,,, | Performed by: STUDENT IN AN ORGANIZED HEALTH CARE EDUCATION/TRAINING PROGRAM

## 2023-12-28 PROCEDURE — 99214 OFFICE O/P EST MOD 30 MIN: CPT | Mod: PBBFAC | Performed by: STUDENT IN AN ORGANIZED HEALTH CARE EDUCATION/TRAINING PROGRAM

## 2023-12-28 PROCEDURE — 99214 PR OFFICE/OUTPT VISIT, EST, LEVL IV, 30-39 MIN: ICD-10-PCS | Mod: S$PBB,,, | Performed by: STUDENT IN AN ORGANIZED HEALTH CARE EDUCATION/TRAINING PROGRAM

## 2023-12-28 PROCEDURE — 99999 PR PBB SHADOW E&M-EST. PATIENT-LVL IV: CPT | Mod: PBBFAC,,, | Performed by: STUDENT IN AN ORGANIZED HEALTH CARE EDUCATION/TRAINING PROGRAM

## 2023-12-28 PROCEDURE — 99999 PR PBB SHADOW E&M-EST. PATIENT-LVL IV: ICD-10-PCS | Mod: PBBFAC,,, | Performed by: STUDENT IN AN ORGANIZED HEALTH CARE EDUCATION/TRAINING PROGRAM

## 2023-12-28 NOTE — H&P (VIEW-ONLY)
[facility] H&P      Consult: colovesicular fistula   Consulting Service: Dr. Villela   Chief Complaint: UTI    HPI: Pt is a 78 y.o. male with history of dysphagia s/p PEG tube who presents with colovesicular fistula.  Diagnosed while undergoing workup with urology for frequent UTIs. Last colonoscopy in 2020 with findings of severe diverticulosis.  Denies any acute flares since.  Currently on PO abx for UTI.      PMH:   Past Medical History:   Diagnosis Date    Anemia     Arthritis     Bilateral lower extremity edema     Carpal tunnel syndrome, bilateral     Chronic diastolic congestive heart failure     Colon polyp     Coronary artery disease     Depression due to physical illness     Encounter for blood transfusion     Gastric ulcer     History of herpes zoster     Hyperlipemia     Hypertension     Moderate tricuspid insufficiency     NSVT (nonsustained ventricular tachycardia)     PAC (premature atrial contraction)     Patent foramen ovale with right to left shunt     Pneumonia     Pneumonia due to infectious organism     Pulmonary hypertension     PVC's (premature ventricular contractions)     Severe mitral regurgitation     Shingles     Stroke     MINI TRAUMA; RIGHT SIDED WEAKNESS    Thyroid disease      PSH:   Past Surgical History:   Procedure Laterality Date    CARDIAC CATHETERIZATION      COLONOSCOPY N/A 11/23/2020    Procedure: COLONOSCOPY;  Surgeon: Abelino Garcia MD;  Location: [facility];  Service: General;  Laterality: N/A;    CYSTOSCOPY W/ RETROGRADES Bilateral 12/7/2023    Procedure: CYSTOSCOPY WITH RETROGRADE PYELOGRAM;  Surgeon: Juan Villela MD;  Location: [facility] OR;  Service: Urology;  Laterality: Bilateral;    DILATION OF URETHRA N/A 12/7/2023    Procedure: DILATION, URETHRA;  Surgeon: Juan Villela MD;  Location: [facility] OR;  Service: Urology;  Laterality: N/A;    EGD, WITH CLOSED BIOPSY N/A 10/4/2023    Procedure: EGD, WITH CLOSED BIOPSY;  Surgeon: Antonella  MD Queenie;  Location: [facility] OR;  Service: General;  Laterality: N/A;    ESOPHAGOGASTRODUODENOSCOPY      ESOPHAGOGASTRODUODENOSCOPY N/A 2021    Procedure: EGD (ESOPHAGOGASTRODUODENOSCOPY);  Surgeon: Sunny Rea MD;  Location: [facility] ENDO;  Service: Endoscopy;  Laterality: N/A;  COVID-VACCINATED    IMPLANTATION OF MITRAL VALVE LEAFLET CLIP Right 2021    Procedure: INSERTION, LEAFLET CLIP, MITRAL VALVE;  Surgeon: Zen Reina MD;  Location: [facility] CATH;  Service: Cardiovascular;  Laterality: Right;    INSERTION, PEG TUBE N/A 10/4/2023    Procedure: INSERTION, PEG TUBE;  Surgeon: Queenie Berman MD;  Location: [facility] OR;  Service: General;  Laterality: N/A;    NECK SURGERY      anterior cervical fusion x 2    TONSILLECTOMY      TRANSESOPHAGEAL ECHOCARDIOGRAPHY       Meds: See medication list;  No ASA or anticoagulation   ALL: Ativan [lorazepam]  FHX: non contributory   SOC:   Social History     Socioeconomic History    Marital status:    Tobacco Use    Smoking status: Former     Types: Cigars     Start date:      Quit date:      Years since quittin.9    Smokeless tobacco: Never   Substance and Sexual Activity    Alcohol use: Not Currently    Drug use: Never     Social Determinants of Health     Financial Resource Strain: Low Risk  (2023)    Overall Financial Resource Strain (CARDIA)     Difficulty of Paying Living Expenses: Not hard at all   Food Insecurity: No Food Insecurity (2023)    Hunger Vital Sign     Worried About Running Out of Food in the Last Year: Never true     Ran Out of Food in the Last Year: Never true   Transportation Needs: No Transportation Needs (2023)    PRAPARE - Transportation     Lack of Transportation (Medical): No     Lack of Transportation (Non-Medical): No   Physical Activity: Inactive (2023)    Exercise Vital Sign     Days of Exercise per Week: 0 days     Minutes of Exercise per Session: 0 min   Stress: No Stress Concern Present  (9/21/2023)    [employer] - Occupational Stress Questionnaire     Feeling of Stress : Only a little   Social Connections: Unknown (9/21/2023)    Social Connection and Isolation Panel [NHANES]     Frequency of Social Gatherings with Friends and Family: Once a week     Active Member of Clubs or Organizations: No     Attends Club or Organization Meetings: Never     Marital Status:    Housing Stability: Low Risk  (9/21/2023)    Housing Stability Vital Sign     Unable to Pay for Housing in the Last Year: No     Number of Places Lived in the Last Year: 1     Unstable Housing in the Last Year: No     ROS: Review of Systems   Constitutional:  Negative for chills, fever, malaise/fatigue and weight loss.   Respiratory:  Negative for cough.    Cardiovascular:  Negative for chest pain.   Gastrointestinal:  Negative for abdominal pain, blood in stool, constipation, diarrhea, heartburn, melena, nausea and vomiting.   Genitourinary:  Positive for dysuria.   All other systems reviewed and are negative.      Physical Exam:  /62 (BP Location: Left arm, Patient Position: Sitting, BP Method: Medium (Automatic))   Pulse 73   SpO2 97%   Physical Exam  Constitutional:       General: He is not in acute distress.     Appearance: He is not ill-appearing or toxic-appearing.   Cardiovascular:      Rate and Rhythm: Normal rate and regular rhythm.   Pulmonary:      Effort: Pulmonary effort is normal. No respiratory distress.   Abdominal:      General: There is no distension.      Palpations: Abdomen is soft.      Tenderness: There is no abdominal tenderness. There is no guarding or rebound.      Comments: LUQ PEG tube in place with no SOI   Skin:     General: Skin is warm and dry.      Capillary Refill: Capillary refill takes less than 2 seconds.   Neurological:      Mental Status: He is alert.           Imaging:   FL Retrograde Pyelogram:  FINDINGS:  Fluoroscopy time not provided, 4 images obtained.   Right kidney appears to be low lying.  No detected fixed ureteral filling defect or stricture bilaterally.  For full details please see procedure report.    CT abd/pelvis:  Impression:     1. Findings suspicious for colovesicular fistula.  2. Bilateral nephrolithiasis.  3. Cholelithiasis.  4. Right lower lobe infiltrate.    A/P: Pt is a 78 y.o. male who presents with suspected colovesicular fistula   -To Endo 1/8 for diag colonoscopy   -Suprep  -Pre-op  -Will discuss left sigmoidectomy with take down of fistula after scope     Queenie Berman MD  [phone]

## 2023-12-28 NOTE — H&P
[facility] H&P      Consult: colovesicular fistula   Consulting Service: Dr. Villela   Chief Complaint: UTI    HPI: Pt is a 78 y.o. male with history of dysphagia s/p PEG tube who presents with colovesicular fistula.  Diagnosed while undergoing workup with urology for frequent UTIs. Last colonoscopy in 2020 with findings of severe diverticulosis.  Denies any acute flares since.  Currently on PO abx for UTI.      PMH:   Past Medical History:   Diagnosis Date    Anemia     Arthritis     Bilateral lower extremity edema     Carpal tunnel syndrome, bilateral     Chronic diastolic congestive heart failure     Colon polyp     Coronary artery disease     Depression due to physical illness     Encounter for blood transfusion     Gastric ulcer     History of herpes zoster     Hyperlipemia     Hypertension     Moderate tricuspid insufficiency     NSVT (nonsustained ventricular tachycardia)     PAC (premature atrial contraction)     Patent foramen ovale with right to left shunt     Pneumonia     Pneumonia due to infectious organism     Pulmonary hypertension     PVC's (premature ventricular contractions)     Severe mitral regurgitation     Shingles     Stroke     MINI TRAUMA; RIGHT SIDED WEAKNESS    Thyroid disease      PSH:   Past Surgical History:   Procedure Laterality Date    CARDIAC CATHETERIZATION      COLONOSCOPY N/A 11/23/2020    Procedure: COLONOSCOPY;  Surgeon: Abelino Garcia MD;  Location: [facility];  Service: General;  Laterality: N/A;    CYSTOSCOPY W/ RETROGRADES Bilateral 12/7/2023    Procedure: CYSTOSCOPY WITH RETROGRADE PYELOGRAM;  Surgeon: Juan Villela MD;  Location: [facility] OR;  Service: Urology;  Laterality: Bilateral;    DILATION OF URETHRA N/A 12/7/2023    Procedure: DILATION, URETHRA;  Surgeon: Juan Villela MD;  Location: [facility] OR;  Service: Urology;  Laterality: N/A;    EGD, WITH CLOSED BIOPSY N/A 10/4/2023    Procedure: EGD, WITH CLOSED BIOPSY;  Surgeon: Antonella  MD Queenie;  Location: [facility] OR;  Service: General;  Laterality: N/A;    ESOPHAGOGASTRODUODENOSCOPY      ESOPHAGOGASTRODUODENOSCOPY N/A 2021    Procedure: EGD (ESOPHAGOGASTRODUODENOSCOPY);  Surgeon: Sunny Rea MD;  Location: [facility] ENDO;  Service: Endoscopy;  Laterality: N/A;  COVID-VACCINATED    IMPLANTATION OF MITRAL VALVE LEAFLET CLIP Right 2021    Procedure: INSERTION, LEAFLET CLIP, MITRAL VALVE;  Surgeon: Zen Reina MD;  Location: [facility] CATH;  Service: Cardiovascular;  Laterality: Right;    INSERTION, PEG TUBE N/A 10/4/2023    Procedure: INSERTION, PEG TUBE;  Surgeon: Queenie Berman MD;  Location: [facility] OR;  Service: General;  Laterality: N/A;    NECK SURGERY      anterior cervical fusion x 2    TONSILLECTOMY      TRANSESOPHAGEAL ECHOCARDIOGRAPHY       Meds: See medication list;  No ASA or anticoagulation   ALL: Ativan [lorazepam]  FHX: non contributory   SOC:   Social History     Socioeconomic History    Marital status:    Tobacco Use    Smoking status: Former     Types: Cigars     Start date:      Quit date:      Years since quittin.9    Smokeless tobacco: Never   Substance and Sexual Activity    Alcohol use: Not Currently    Drug use: Never     Social Determinants of Health     Financial Resource Strain: Low Risk  (2023)    Overall Financial Resource Strain (CARDIA)     Difficulty of Paying Living Expenses: Not hard at all   Food Insecurity: No Food Insecurity (2023)    Hunger Vital Sign     Worried About Running Out of Food in the Last Year: Never true     Ran Out of Food in the Last Year: Never true   Transportation Needs: No Transportation Needs (2023)    PRAPARE - Transportation     Lack of Transportation (Medical): No     Lack of Transportation (Non-Medical): No   Physical Activity: Inactive (2023)    Exercise Vital Sign     Days of Exercise per Week: 0 days     Minutes of Exercise per Session: 0 min   Stress: No Stress Concern Present  (9/21/2023)    [employer] - Occupational Stress Questionnaire     Feeling of Stress : Only a little   Social Connections: Unknown (9/21/2023)    Social Connection and Isolation Panel [NHANES]     Frequency of Social Gatherings with Friends and Family: Once a week     Active Member of Clubs or Organizations: No     Attends Club or Organization Meetings: Never     Marital Status:    Housing Stability: Low Risk  (9/21/2023)    Housing Stability Vital Sign     Unable to Pay for Housing in the Last Year: No     Number of Places Lived in the Last Year: 1     Unstable Housing in the Last Year: No     ROS: Review of Systems   Constitutional:  Negative for chills, fever, malaise/fatigue and weight loss.   Respiratory:  Negative for cough.    Cardiovascular:  Negative for chest pain.   Gastrointestinal:  Negative for abdominal pain, blood in stool, constipation, diarrhea, heartburn, melena, nausea and vomiting.   Genitourinary:  Positive for dysuria.   All other systems reviewed and are negative.      Physical Exam:  /62 (BP Location: Left arm, Patient Position: Sitting, BP Method: Medium (Automatic))   Pulse 73   SpO2 97%   Physical Exam  Constitutional:       General: He is not in acute distress.     Appearance: He is not ill-appearing or toxic-appearing.   Cardiovascular:      Rate and Rhythm: Normal rate and regular rhythm.   Pulmonary:      Effort: Pulmonary effort is normal. No respiratory distress.   Abdominal:      General: There is no distension.      Palpations: Abdomen is soft.      Tenderness: There is no abdominal tenderness. There is no guarding or rebound.      Comments: LUQ PEG tube in place with no SOI   Skin:     General: Skin is warm and dry.      Capillary Refill: Capillary refill takes less than 2 seconds.   Neurological:      Mental Status: He is alert.           Imaging:   FL Retrograde Pyelogram:  FINDINGS:  Fluoroscopy time not provided, 4 images obtained.   Right kidney appears to be low lying.  No detected fixed ureteral filling defect or stricture bilaterally.  For full details please see procedure report.    CT abd/pelvis:  Impression:     1. Findings suspicious for colovesicular fistula.  2. Bilateral nephrolithiasis.  3. Cholelithiasis.  4. Right lower lobe infiltrate.    A/P: Pt is a 78 y.o. male who presents with suspected colovesicular fistula   -To Endo 1/8 for diag colonoscopy   -Suprep  -Pre-op  -Will discuss left sigmoidectomy with take down of fistula after scope     Queenie Berman MD  [phone]

## 2023-12-31 RX ORDER — SODIUM CHLORIDE 9 MG/ML
INJECTION, SOLUTION INTRAVENOUS CONTINUOUS
Status: CANCELLED | OUTPATIENT
Start: 2023-12-31

## 2023-12-31 RX ORDER — SODIUM, POTASSIUM,MAG SULFATES 17.5-3.13G
1 SOLUTION, RECONSTITUTED, ORAL ORAL 2 TIMES DAILY
Qty: 1 KIT | Refills: 0 | Status: SHIPPED | OUTPATIENT
Start: 2023-12-31 | End: 2024-01-01

## 2023-12-31 RX ORDER — SODIUM CHLORIDE 0.9 % (FLUSH) 0.9 %
10 SYRINGE (ML) INJECTION
Status: CANCELLED | OUTPATIENT
Start: 2023-12-31

## 2024-01-04 ENCOUNTER — HOSPITAL ENCOUNTER (OUTPATIENT)
Dept: PULMONOLOGY | Facility: HOSPITAL | Age: 79
Discharge: HOME | End: 2024-01-04
Attending: STUDENT IN AN ORGANIZED HEALTH CARE EDUCATION/TRAINING PROGRAM
Payer: MEDICARE

## 2024-01-04 ENCOUNTER — ANESTHESIA EVENT (OUTPATIENT)
Dept: ENDOSCOPY | Facility: HOSPITAL | Age: 79
End: 2024-01-04
Payer: MEDICARE

## 2024-01-04 ENCOUNTER — HOSPITAL ENCOUNTER (OUTPATIENT)
Dept: PREADMISSION TESTING | Facility: HOSPITAL | Age: 79
Discharge: HOME | End: 2024-01-04
Attending: STUDENT IN AN ORGANIZED HEALTH CARE EDUCATION/TRAINING PROGRAM
Payer: MEDICARE

## 2024-01-04 VITALS — BODY MASS INDEX: 22.73 KG/M2 | WEIGHT: 150 LBS | HEIGHT: 68 IN

## 2024-01-04 DIAGNOSIS — Z01.818 PRE-OP TESTING: ICD-10-CM

## 2024-01-04 DIAGNOSIS — N32.1 COLOVESICAL FISTULA: ICD-10-CM

## 2024-01-04 PROCEDURE — 93010 ELECTROCARDIOGRAM REPORT: CPT | Mod: ,,, | Performed by: INTERNAL MEDICINE

## 2024-01-04 PROCEDURE — 93005 ELECTROCARDIOGRAM TRACING: CPT

## 2024-01-04 NOTE — PRE-PROCEDURE INSTRUCTIONS
NOTIFIED CRYSTAL@ OFFICE PATIENT NEEDS CARDIAC CLEARANCE PER DR. NEUMANN    Consent: The patient's consent was obtained including but not limited to risks of crusting, scabbing, blistering, scarring, darker or lighter pigmentary change, recurrence, incomplete removal and infection.

## 2024-01-05 NOTE — ANESTHESIA PREPROCEDURE EVALUATION
01/05/2024  Richard Farr is a 78 y.o., male.      Pre-op Assessment    I have reviewed the Patient Summary Reports.    I have reviewed the NPO Status.   I have reviewed the Medications.     Review of Systems  Anesthesia Hx:  No problems with previous Anesthesia             Denies Family Hx of Anesthesia complications.    Denies Personal Hx of Anesthesia complications.                    Social:  Former Smoker       Cardiovascular:     Hypertension, well controlled Valvular problems/Murmurs, MR  CAD       CHF       ECG has been reviewed. Mitral clip    hx pfo   pvc,pac nsvt                         Pulmonary:  Pneumonia COPD, mild   Shortness of breath                  Renal/:  Renal/ Normal                 Hepatic/GI:   PUD,  GERD, well controlled             Neurological:   CVA Neuromuscular Disease,                                   Endocrine:    Hyperthyroidism         Psych:    depression              Lab Results   Component Value Date    WBC 7.90 01/04/2024    HGB 11.7 (L) 01/04/2024    HCT 36.4 (L) 01/04/2024    MCV 94 01/04/2024     01/04/2024      CMP  Sodium   Date Value Ref Range Status   01/04/2024 138 136 - 145 mmol/L Final     Potassium   Date Value Ref Range Status   01/04/2024 3.9 3.5 - 5.1 mmol/L Final     Chloride   Date Value Ref Range Status   01/04/2024 104 95 - 110 mmol/L Final     CO2   Date Value Ref Range Status   01/04/2024 34 (H) 23 - 29 mmol/L Final     Glucose   Date Value Ref Range Status   01/04/2024 102 70 - 110 mg/dL Final     BUN   Date Value Ref Range Status   01/04/2024 22 8 - 23 mg/dL Final     Creatinine   Date Value Ref Range Status   01/04/2024 0.7 0.5 - 1.4 mg/dL Final     Calcium   Date Value Ref Range Status   01/04/2024 9.2 8.7 - 10.5 mg/dL Final     Total Protein   Date Value Ref Range Status   01/04/2024 8.2 6.0 - 8.4 g/dL Final     Albumin   Date Value  Ref Range Status   01/04/2024 3.8 3.5 - 5.2 g/dL Final     Total Bilirubin   Date Value Ref Range Status   01/04/2024 0.8 0.1 - 1.0 mg/dL Final     Comment:     For infants and newborns, interpretation of results should be based  on gestational age, weight and in agreement with clinical  observations.    Premature Infant recommended reference ranges:  Up to 24 hours.............<8.0 mg/dL  Up to 48 hours............<12.0 mg/dL  3-5 days..................<15.0 mg/dL  6-29 days.................<15.0 mg/dL    For patients on Eltrombopag therapy, use of Dimension [place] TBIL is   not   recommended.       Alkaline Phosphatase   Date Value Ref Range Status   01/04/2024 82 55 - 135 U/L Final     AST   Date Value Ref Range Status   01/04/2024 34 10 - 40 U/L Final     ALT   Date Value Ref Range Status   01/04/2024 25 10 - 44 U/L Final     Anion Gap   Date Value Ref Range Status   01/04/2024 0 (L) 3 - 11 mmol/L Final     eGFR   Date Value Ref Range Status   01/04/2024 >60.0 >60 mL/min/1.73 m^2 Final        Physical Exam  General: Well nourished    Airway:  Mallampati: II / II  Mouth Opening: Normal  TM Distance: Normal  Tongue: Normal  Neck ROM: Normal ROM    Dental:  Intact    Chest/Lungs:  Clear to auscultation    Heart:  Rate: Normal  Rhythm: Regular Rhythm  Sounds: Normal  Murmur: Systolic;  Systolic: Grade III;        Anesthesia Plan  Type of Anesthesia, risks & benefits discussed:    Anesthesia Type: MAC  Intra-op Monitoring Plan: Standard ASA Monitors  Post Op Pain Control Plan: multimodal analgesia  Induction:  IV  Airway Plan: Direct  Informed Consent: Informed consent signed with the Patient and all parties understand the risks and agree with anesthesia plan.  All questions answered.   ASA Score: 4  Day of Surgery Review of History & Physical: I have interviewed and examined the patient. I have reviewed the patient's H&P dated: There are no significant changes.     Ready For Surgery From Anesthesia Perspective.      .

## 2024-01-08 ENCOUNTER — HOSPITAL ENCOUNTER (OUTPATIENT)
Facility: HOSPITAL | Age: 79
Discharge: HOME | End: 2024-01-08
Attending: STUDENT IN AN ORGANIZED HEALTH CARE EDUCATION/TRAINING PROGRAM | Admitting: STUDENT IN AN ORGANIZED HEALTH CARE EDUCATION/TRAINING PROGRAM
Payer: MEDICARE

## 2024-01-08 ENCOUNTER — ANESTHESIA (OUTPATIENT)
Dept: ENDOSCOPY | Facility: HOSPITAL | Age: 79
End: 2024-01-08
Payer: MEDICARE

## 2024-01-08 DIAGNOSIS — N32.1 COLOVESICAL FISTULA: Primary | ICD-10-CM

## 2024-01-08 PROCEDURE — 25000003 PHARM REV CODE 250: Performed by: STUDENT IN AN ORGANIZED HEALTH CARE EDUCATION/TRAINING PROGRAM

## 2024-01-08 PROCEDURE — 37000008 HC ANESTHESIA 1ST 15 MINUTES: Performed by: STUDENT IN AN ORGANIZED HEALTH CARE EDUCATION/TRAINING PROGRAM

## 2024-01-08 PROCEDURE — G0121 COLON CA SCRN NOT HI RSK IND: HCPCS | Performed by: STUDENT IN AN ORGANIZED HEALTH CARE EDUCATION/TRAINING PROGRAM

## 2024-01-08 PROCEDURE — 37000009 HC ANESTHESIA EA ADD 15 MINS: Performed by: STUDENT IN AN ORGANIZED HEALTH CARE EDUCATION/TRAINING PROGRAM

## 2024-01-08 PROCEDURE — 63600175 PHARM REV CODE 636 W HCPCS: Performed by: NURSE ANESTHETIST, CERTIFIED REGISTERED

## 2024-01-08 PROCEDURE — G0121 COLON CA SCRN NOT HI RSK IND: HCPCS | Mod: ,,, | Performed by: STUDENT IN AN ORGANIZED HEALTH CARE EDUCATION/TRAINING PROGRAM

## 2024-01-08 RX ORDER — SODIUM CHLORIDE 0.9 % (FLUSH) 0.9 %
10 SYRINGE (ML) INJECTION
Status: DISCONTINUED | OUTPATIENT
Start: 2024-01-08 | End: 2024-01-08 | Stop reason: HOSPADM

## 2024-01-08 RX ORDER — SODIUM CHLORIDE 9 MG/ML
INJECTION, SOLUTION INTRAVENOUS CONTINUOUS
Status: DISCONTINUED | OUTPATIENT
Start: 2024-01-08 | End: 2024-01-08 | Stop reason: HOSPADM

## 2024-01-08 RX ORDER — PROPOFOL 10 MG/ML
INJECTION, EMULSION INTRAVENOUS
Status: DISCONTINUED | OUTPATIENT
Start: 2024-01-08 | End: 2024-01-08

## 2024-01-08 RX ADMIN — SODIUM CHLORIDE: 0.9 INJECTION, SOLUTION INTRAVENOUS at 08:01

## 2024-01-08 RX ADMIN — PROPOFOL 40 MG: 10 INJECTION, EMULSION INTRAVENOUS at 09:01

## 2024-01-08 RX ADMIN — PROPOFOL 70 MG: 10 INJECTION, EMULSION INTRAVENOUS at 09:01

## 2024-01-08 NOTE — PLAN OF CARE
Patient here since 6 am waiting to see if he could have his colonoscopy.  Instructed that he was supposed to have a cardiology clearance prior to   Doing his prep and coming to the hospital this morning. Wife said I didn't hear anything and we just saw Dr. Yanez at the end of November.  [facility] called and message left for them to call us back regarding his status. Dr. Haider notified of same and is going to talk to Dr. Berman.  Instructed me to start getting him ready.

## 2024-01-08 NOTE — INTERVAL H&P NOTE
Patient seen and examined.  No interval change since the below obtained H&P  Informed consent verified    NPO since midnight  Prep completed - Suprep  No anticoagulation  All questions and concerns addressed  To Endo for diag colonoscopy     Queenie Berman MD  General Surgery   [phone]

## 2024-01-08 NOTE — TRANSFER OF CARE
Anesthesia Transfer of Care Note    Patient: Richard Farr    Procedure(s) Performed: Procedure(s) (LRB):  COLONOSCOPY (N/A)    Patient location: OPS    Anesthesia Type: MAC    Transport from OR: Transported from OR on room air with adequate spontaneous ventilation    Post pain: adequate analgesia    Post assessment: no apparent anesthetic complications    Post vital signs: stable    Level of consciousness: awake    Nausea/Vomiting: no nausea/vomiting    Complications: none    Transfer of care protocol was followed      Last vitals:     BP 90/47  P 66  R 16  O2 Sat 99%

## 2024-01-09 VITALS
DIASTOLIC BLOOD PRESSURE: 57 MMHG | RESPIRATION RATE: 20 BRPM | TEMPERATURE: 98 F | SYSTOLIC BLOOD PRESSURE: 110 MMHG | OXYGEN SATURATION: 93 % | HEART RATE: 71 BPM

## 2024-01-09 NOTE — OP NOTE
[facility] OR Periop Services  General Surgery Department  Operative Note    SUMMARY     Date of Procedure: 1/8/2024    Procedure: Procedure(s) (LRB):  COLONOSCOPY:      Surgeon(s) and Role:  Queenie Berman MD     Pre-Operative Diagnosis: Pre-Op Diagnosis Codes:     * Screen for colon cancer [Z12.11]  Colovesicular fistual     Post-Operative Diagnosis: Same         Anesthesia: Local MAC      Findings:  -Slight luminal stenosis @ 30cc.  No masses or lesions identified   -No masses or polypoid lesions seen.    -There was normal mucosa with normal submucosal venous pattern.    -No vascular lesions were identified.    -Mild sigmoid diverticular disease   -Grade 1 internal hemorrhoids without external component    Indications for the Procedure:  77yo male with no family history of colorectal cancer who presents for --diagnostic colonoscopy for suspected colovesicular fistula seen on CT scan.     Operative Conduct in Detail:   The risks, benefits, and alternatives were thoroughly discussed with the patient. Despite the risks, the patient wished to proceed. Informed consent was obtained and it will scanned to the electronic chart.      The patient was placed on left lateral decubitus. After achieving moderate sedation, a digital rectal examination was performed; subsequently, a standard colonoscope was introduced through the anus and advanced without difficulty up to the cecum where terminal ileum and appendiceal orifice were visualized The scope was withdrawn slowly while the mucosa was carefully examined. The following findings were seen:    Bowel Preparation: good  Rectal exam was normal with good rectal tone and no masses or blood detected in the rectal vault.   -Slight luminal stenosis @ 30cc.  No masses or lesions identified   -No masses or polypoid lesions seen.    -There was normal mucosa with normal submucosal venous pattern.    -No vascular lesions were identified.    -Mild sigmoid diverticular disease    -Grade 1 internal hemorrhoids without external component    Withdrawal time >6 minutes (if no biopsies/polypectomies obtained)      Complications: No    Estimated Blood Loss (EBL): None             Specimens:   Specimens (From admission, onward)      None                   Condition: Good    Disposition: PACU - hemodynamically stable.    Recommendation:    No masses or lesions seen on at site of suspected fistula   May proceed with left sigmoidectomy after cardiac clearance     Queenie Berman MD  General Surgery   [phone]

## 2024-01-09 NOTE — DISCHARGE SUMMARY
[place] - Endoscopy  Discharge Note  Short Stay    Procedure(s) (LRB):  COLONOSCOPY (N/A)      OUTCOME: Patient tolerated treatment/procedure well without complication and is now ready for discharge.    DISPOSITION: Home or Self Care    FINAL DIAGNOSIS:  Colovesical fistula    FOLLOWUP: In clinic    DISCHARGE INSTRUCTIONS:    Discharge Procedure Orders   Diet Adult Regular     No driving until:     Notify your health care provider if you experience any of the following:  temperature >100.4     Notify your health care provider if you experience any of the following:  persistent nausea and vomiting or diarrhea     Notify your health care provider if you experience any of the following:  severe uncontrolled pain     Activity as tolerated        TIME SPENT ON DISCHARGE: 5 minutes

## 2024-01-09 NOTE — ANESTHESIA POSTPROCEDURE EVALUATION
Anesthesia Post Evaluation    Patient: Richard Farr    Procedure(s) Performed: Procedure(s) (LRB):  COLONOSCOPY (N/A)    Final Anesthesia Type: MAC      Patient location during evaluation: PACU  Patient participation: Yes- Able to Participate  Level of consciousness: awake  Post-procedure vital signs: reviewed and stable  Pain management: adequate  Airway patency: patent    PONV status at discharge: No PONV  Anesthetic complications: no      Cardiovascular status: blood pressure returned to baseline  Respiratory status: spontaneous ventilation  Hydration status: euvolemic  Follow-up not needed.              Vitals Value Taken Time   /57 01/08/24 1015   Temp 98.1 01/09/24 1453   Pulse 71 01/08/24 0945   Resp 20 01/08/24 0945   SpO2 93 % 01/08/24 1015         No case tracking events are documented in the log.      Pain/Nato Score: Nato Score: 10 (1/8/2024  9:25 AM)

## 2024-01-25 ENCOUNTER — OFFICE VISIT (OUTPATIENT)
Dept: SURGERY | Facility: CLINIC | Age: 79
End: 2024-01-25
Payer: MEDICARE

## 2024-01-25 DIAGNOSIS — N32.1 COLOVESICAL FISTULA: Primary | ICD-10-CM

## 2024-01-25 DIAGNOSIS — Z01.818 PRE-OP TESTING: ICD-10-CM

## 2024-01-25 PROCEDURE — 99024 POSTOP FOLLOW-UP VISIT: CPT | Mod: POP,,, | Performed by: STUDENT IN AN ORGANIZED HEALTH CARE EDUCATION/TRAINING PROGRAM

## 2024-01-29 RX ORDER — SODIUM CHLORIDE 9 MG/ML
INJECTION, SOLUTION INTRAVENOUS CONTINUOUS
Status: CANCELLED | OUTPATIENT
Start: 2024-01-29

## 2024-01-29 RX ORDER — NEOMYCIN SULFATE 500 MG/1
1000 TABLET ORAL 2 TIMES DAILY
Qty: 4 TABLET | Refills: 0 | Status: SHIPPED | OUTPATIENT
Start: 2024-01-29 | End: 2024-01-30

## 2024-01-29 RX ORDER — METRONIDAZOLE 500 MG/1
1000 TABLET ORAL 2 TIMES DAILY
Qty: 4 TABLET | Refills: 0 | Status: SHIPPED | OUTPATIENT
Start: 2024-01-29 | End: 2024-01-30

## 2024-01-29 RX ORDER — ONDANSETRON HYDROCHLORIDE 2 MG/ML
4 INJECTION, SOLUTION INTRAVENOUS EVERY 12 HOURS PRN
Status: CANCELLED | OUTPATIENT
Start: 2024-01-29

## 2024-01-29 RX ORDER — METOCLOPRAMIDE HYDROCHLORIDE 5 MG/ML
5 INJECTION INTRAMUSCULAR; INTRAVENOUS EVERY 6 HOURS PRN
Status: CANCELLED | OUTPATIENT
Start: 2024-01-29

## 2024-01-29 NOTE — H&P (VIEW-ONLY)
[facility]  General Surgery Clinic Progress Note    HPI:  Richard Farr is a 78 y.o. 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 Voices no complaints.     ROS:  Negative except above    PE:  There were no vitals filed for this visit.     Gen: Alert and oriented x3, judgement intact, NAD  CV: RRR  Resp: CTAB; breaths non-labored and symmetrical  Abd: Soft, NT, ND, BS+  Extremities: No c/c/e    Procedure:  Findings:  -Slight luminal stenosis @ 30cc.  No masses or lesions identified   -No masses or polypoid lesions seen.    -There was normal mucosa with normal submucosal venous pattern.    -No vascular lesions were identified.    -Mild sigmoid diverticular disease   -Grade 1 internal hemorrhoids without external component    A/P:  78 y.o. male who presents for pre-op for colovesicular fistula   -to OR for lap vs open sigmoid resection 2/21  -obtain cards clearance   -Pre-op on 2/12  -Will screen for UTI during pre-op and treat accordingly   -Colectomy prep given  -Informed consent obtained     Queenie Berman MD  General Surgery   [phone]        1/29/2024  12:10 AM

## 2024-01-29 NOTE — PROGRESS NOTES
[facility]  General Surgery Clinic Progress Note    HPI:  Richard Farr is a 78 y.o. 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 Voices no complaints.     ROS:  Negative except above    PE:  There were no vitals filed for this visit.     Gen: Alert and oriented x3, judgement intact, NAD  CV: RRR  Resp: CTAB; breaths non-labored and symmetrical  Abd: Soft, NT, ND, BS+  Extremities: No c/c/e    Procedure:  Findings:  -Slight luminal stenosis @ 30cc.  No masses or lesions identified   -No masses or polypoid lesions seen.    -There was normal mucosa with normal submucosal venous pattern.    -No vascular lesions were identified.    -Mild sigmoid diverticular disease   -Grade 1 internal hemorrhoids without external component    A/P:  78 y.o. male who presents for pre-op for colovesicular fistula   -to OR for lap vs open sigmoid resection 2/21  -obtain cards clearance   -Pre-op on 2/12  -Will screen for UTI during pre-op and treat accordingly   -Colectomy prep given  -Informed consent obtained     Queenie Berman MD  General Surgery   [phone]        1/29/2024  12:10 AM

## 2024-02-19 ENCOUNTER — ANESTHESIA EVENT (OUTPATIENT)
Dept: SURGERY | Facility: HOSPITAL | Age: 79
DRG: 329 | End: 2024-02-19
Payer: MEDICARE

## 2024-02-19 PROBLEM — N39.0 RECURRENT UTI: Status: RESOLVED | Noted: 2023-11-15 | Resolved: 2024-02-19

## 2024-02-19 NOTE — DISCHARGE INSTRUCTIONS
"BEFORE THE PROCEDURE:         PREPARING SKIN BEFORE SURGERY      Preparing skin before surgery can reduce the risk of infection at surgical site. To make the process easier this facility has chosen disposable cloths moistened with rinse free 2% Chlorhexidine Gluconate antiseptic solution. The steps below outline the prepping process and should be carefully followed.        DIRECTIONS:     Shower or bathe the night prior to surgery, wait at least 2 hours before prepping skin with disposable cloths. Once  prepping begins do not apply lotions, moisturizers or make up.         * Open prep wipes by holding top of package in one hand and and lift flap on backside with the other hand.      * Prep from the jaw line down using all cloths in package.       * Avoid contact with eyes, ears and mouth.     Prep is used on:    1. Neck, shoulders, arms, underarms and chest.    2. Both hands, between fingers.    3. Abdomen, groin and perineum.     4. Right leg, foot and between toes.    5. Left leg, foot and between toes.    6. Back, back of neck and buttocks.     Allow areas to air dry 1 minute. Do not wipe off or rinse. It is normal for the skin to have a temporary "tacky" feel for several minutes after the antiseptic solutions is applied.     Shower or bathe the morning of surgery.    REPORT ANY CHANGE IN YOUR PHYSICAL CONDITION TO YOUR DOCTOR IMMEDIATELY.  SELF ISOLATE AND CHECK TEMPERATURE DAILY, IF TEMP OVER 100, CALL PHYSICIAN IMMEDIATELY.  TRY TO REFRAIN FROM SMOKING AND ALCOHOL 72 HOURS BEFORE YOUR PROCEDURE.   DO NOT EAT OR DRINK ANYTHING AFTER MIDNIGHT THE NIGHT BEFORE YOUR PROCEDURE.  NO MAKE UP, NAIL POLISH OR JEWELRY.      CHECK INTO FIRST FLOOR REGISTRATION AT 8 AM    DAY OF YOUR PROCEDURE:    TAKE BLOOD PRESSURE MEDICATIONS THE MORNING OF YOUR PROCEDURE, WITH SMALL SIPS WATER, AS DIRECTED BY YOUR PHYSICIAN.   DO NOT TAKE ANY DIABETIC MEDICATIONS UNLESS DIRECTED TO DO SO BY YOUR PHYSICIAN.   CONTACT LENSES AND " DENTURES MUST BE REMOVED.  A RESPONSIBLE ADULT MUST ACCOMPANY YOU HOME UPON DISCHARGE.   ONLY 1 VISITOR ALLOWED PER ROOM.     YOUR THOUGHTS AND OPINIONS HELP US TO BETTER SERVE YOU.     PLEASE PARTICIPATE IN SURVEYS ABOUT YOUR CARE.    THANK YOU FOR CHOOSING [facility].

## 2024-02-19 NOTE — ANESTHESIA PREPROCEDURE EVALUATION
02/19/2024  Richard Farr is a 78 y.o., male.      Pre-op Assessment    I have reviewed the Patient Summary Reports.    I have reviewed the NPO Status.   I have reviewed the Medications.     Review of Systems  Anesthesia Hx:  No problems with previous Anesthesia             Denies Family Hx of Anesthesia complications.    Denies Personal Hx of Anesthesia complications.                    Social:  Former Smoker       Cardiovascular:     Hypertension, well controlled Valvular problems/Murmurs, MR  CAD  asymptomatic     CHF       ECG has been reviewed. PULMONARY HYPERTENSION, HX PFO,  CIS CLEARED, HX MITRAL CLIP                         Pulmonary:  Pneumonia COPD, mild   Shortness of breath                  Renal/:  Renal/ Normal                 Hepatic/GI:   PUD,  GERD, well controlled             Neurological:   CVA, residual symptoms        LEFT SIDE WEAKNESS                            Endocrine:  Endocrine Normal                Physical Exam  General: Well nourished    Airway:  Mallampati: III / II  Mouth Opening: Normal  TM Distance: Normal  Tongue: Normal  Neck ROM: Normal ROM    Dental:  Intact    Chest/Lungs:  Clear to auscultation    Heart:  Rate: Normal  Rhythm: Regular Rhythm  Sounds: Normal  Murmur: Systolic;        Anesthesia Plan  Type of Anesthesia, risks & benefits discussed:    Anesthesia Type: Gen ETT  Intra-op Monitoring Plan: Standard ASA Monitors  Post Op Pain Control Plan: multimodal analgesia  Induction:  IV  Airway Plan: Direct  Informed Consent: Informed consent signed with the Patient and all parties understand the risks and agree with anesthesia plan.  All questions answered.   ASA Score: 4  Day of Surgery Review of History & Physical: I have interviewed and examined the patient. I have reviewed the patient's H&P dated: There are no significant changes.     Ready For Surgery From  Anesthesia Perspective.     .

## 2024-02-20 ENCOUNTER — HOSPITAL ENCOUNTER (OUTPATIENT)
Dept: RADIOLOGY | Facility: HOSPITAL | Age: 79
Discharge: HOME | DRG: 329 | End: 2024-02-20
Attending: STUDENT IN AN ORGANIZED HEALTH CARE EDUCATION/TRAINING PROGRAM
Payer: MEDICARE

## 2024-02-20 ENCOUNTER — NURSE TRIAGE (OUTPATIENT)
Dept: ADMINISTRATIVE | Facility: CLINIC | Age: 79
End: 2024-02-20
Payer: MEDICARE

## 2024-02-20 ENCOUNTER — HOSPITAL ENCOUNTER (OUTPATIENT)
Dept: PULMONOLOGY | Facility: HOSPITAL | Age: 79
Discharge: HOME | DRG: 329 | End: 2024-02-20
Attending: STUDENT IN AN ORGANIZED HEALTH CARE EDUCATION/TRAINING PROGRAM
Payer: MEDICARE

## 2024-02-20 ENCOUNTER — HOSPITAL ENCOUNTER (OUTPATIENT)
Dept: PREADMISSION TESTING | Facility: HOSPITAL | Age: 79
Discharge: HOME | DRG: 329 | End: 2024-02-20
Attending: STUDENT IN AN ORGANIZED HEALTH CARE EDUCATION/TRAINING PROGRAM
Payer: MEDICARE

## 2024-02-20 VITALS — BODY MASS INDEX: 22.73 KG/M2 | HEIGHT: 68 IN | WEIGHT: 150 LBS

## 2024-02-20 DIAGNOSIS — Z01.818 PRE-OP TESTING: ICD-10-CM

## 2024-02-20 DIAGNOSIS — N32.1 COLOVESICAL FISTULA: ICD-10-CM

## 2024-02-20 LAB
OHS QRS DURATION: 106 MS
OHS QTC CALCULATION: 463 MS

## 2024-02-20 PROCEDURE — 71045 X-RAY EXAM CHEST 1 VIEW: CPT | Mod: TC

## 2024-02-20 PROCEDURE — 93005 ELECTROCARDIOGRAM TRACING: CPT

## 2024-02-20 PROCEDURE — 93010 ELECTROCARDIOGRAM REPORT: CPT | Mod: ,,, | Performed by: INTERNAL MEDICINE

## 2024-02-20 NOTE — DISCHARGE INSTRUCTIONS
"         PREPARING SKIN BEFORE SURGERY      Preparing skin before surgery can reduce the risk of infection at surgical site. To make the process easier this facility has chosen disposable cloths moistened with rinse free 2% Chlorhexidine Gluconate antiseptic solution. The steps below outline the prepping process and should be carefully followed.        DIRECTIONS:     Shower or bathe the night prior to surgery, wait at least 2 hours before prepping skin with disposable cloths. Once  prepping begins do not apply lotions, moisturizers or make up.         * Open prep wipes by holding top of package in one hand and and lift flap on backside with the other hand.      * Prep from the jaw line down using all cloths in package.       * Avoid contact with eyes, ears and mouth.     Prep is used on:    1. Neck, shoulders, arms, underarms and chest.    2. Both hands, between fingers.    3. Abdomen, groin and perineum.     4. Right leg, foot and between toes.    5. Left leg, foot and between toes.    6. Back, back of neck and buttocks.     Allow areas to air dry 1 minute. Do not wipe off or rinse. It is normal for the skin to have a temporary "tacky" feel for several minutes after the antiseptic solutions is applied.     Shower or bathe the morning of surgery.    BEFORE THE PROCEDURE:    REPORT ANY CHANGE IN YOUR PHYSICAL CONDITION TO YOUR DOCTOR IMMEDIATELY.  SELF ISOLATE AND CHECK TEMPERATURE DAILY, IF TEMP OVER 100, CALL PHYSICIAN IMMEDIATELY.  TRY TO REFRAIN FROM SMOKING AND ALCOHOL 72 HOURS BEFORE YOUR PROCEDURE.   DO NOT EAT OR DRINK ANYTHING AFTER MIDNIGHT THE NIGHT BEFORE YOUR PROCEDURE.  NO MAKE UP, NAIL POLISH OR JEWELRY.      Check into first floor registration at 8 am      DAY OF YOUR PROCEDURE:      TAKE BLOOD PRESSURE MEDICATIONS THE MORNING OF YOUR PROCEDURE, WITH SMALL SIPS WATER, AS DIRECTED BY YOUR PHYSICIAN.   DO NOT TAKE ANY DIABETIC MEDICATIONS UNLESS DIRECTED TO DO SO BY YOUR PHYSICIAN.   CONTACT LENSES AND " DENTURES MUST BE REMOVED.  A RESPONSIBLE ADULT MUST ACCOMPANY YOU HOME UPON DISCHARGE.   ONLY 1 VISITOR ALLOWED PER ROOM.     YOUR THOUGHTS AND OPINIONS HELP US TO BETTER SERVE YOU.     PLEASE PARTICIPATE IN SURVEYS ABOUT YOUR CARE.    THANK YOU FOR CHOOSING [facility].

## 2024-02-21 ENCOUNTER — ANESTHESIA (OUTPATIENT)
Dept: SURGERY | Facility: HOSPITAL | Age: 79
DRG: 329 | End: 2024-02-21
Payer: MEDICARE

## 2024-02-21 ENCOUNTER — HOSPITAL ENCOUNTER (INPATIENT)
Facility: HOSPITAL | Age: 79
LOS: 9 days | Discharge: NURSING, REHAB OR LONG-TERM CARE | DRG: 329 | End: 2024-03-01
Attending: STUDENT IN AN ORGANIZED HEALTH CARE EDUCATION/TRAINING PROGRAM | Admitting: STUDENT IN AN ORGANIZED HEALTH CARE EDUCATION/TRAINING PROGRAM
Payer: MEDICARE

## 2024-02-21 DIAGNOSIS — N32.1 COLOVESICAL FISTULA: Primary | ICD-10-CM

## 2024-02-21 DIAGNOSIS — E83.39 HYPOPHOSPHATEMIA: ICD-10-CM

## 2024-02-21 DIAGNOSIS — E87.29 RESPIRATORY ACIDOSIS: ICD-10-CM

## 2024-02-21 DIAGNOSIS — J90 BILATERAL PLEURAL EFFUSION: ICD-10-CM

## 2024-02-21 DIAGNOSIS — N30.00 ACUTE CYSTITIS WITHOUT HEMATURIA: ICD-10-CM

## 2024-02-21 DIAGNOSIS — E46 MALNUTRITION: ICD-10-CM

## 2024-02-21 DIAGNOSIS — Z09 SURGERY FOLLOW-UP: ICD-10-CM

## 2024-02-21 DIAGNOSIS — E46 MALNUTRITION, UNSPECIFIED TYPE: ICD-10-CM

## 2024-02-21 PROCEDURE — 88305 TISSUE EXAM BY PATHOLOGIST: CPT | Mod: TC | Performed by: ANESTHESIOLOGY

## 2024-02-21 PROCEDURE — 63600175 PHARM REV CODE 636 W HCPCS: Performed by: STUDENT IN AN ORGANIZED HEALTH CARE EDUCATION/TRAINING PROGRAM

## 2024-02-21 PROCEDURE — 63600175 PHARM REV CODE 636 W HCPCS: Performed by: NURSE ANESTHETIST, CERTIFIED REGISTERED

## 2024-02-21 PROCEDURE — 63600175 PHARM REV CODE 636 W HCPCS: Mod: JZ,JG | Performed by: STUDENT IN AN ORGANIZED HEALTH CARE EDUCATION/TRAINING PROGRAM

## 2024-02-21 PROCEDURE — 27000221 HC OXYGEN, UP TO 24 HOURS

## 2024-02-21 PROCEDURE — 25000003 PHARM REV CODE 250: Performed by: STUDENT IN AN ORGANIZED HEALTH CARE EDUCATION/TRAINING PROGRAM

## 2024-02-21 PROCEDURE — 44207 L COLECTOMY/COLOPROCTOSTOMY: CPT | Mod: ,,, | Performed by: STUDENT IN AN ORGANIZED HEALTH CARE EDUCATION/TRAINING PROGRAM

## 2024-02-21 PROCEDURE — 25000003 PHARM REV CODE 250: Performed by: NURSE ANESTHETIST, CERTIFIED REGISTERED

## 2024-02-21 PROCEDURE — 36000710: Performed by: STUDENT IN AN ORGANIZED HEALTH CARE EDUCATION/TRAINING PROGRAM

## 2024-02-21 PROCEDURE — 94761 N-INVAS EAR/PLS OXIMETRY MLT: CPT

## 2024-02-21 PROCEDURE — 37000009 HC ANESTHESIA EA ADD 15 MINS: Performed by: STUDENT IN AN ORGANIZED HEALTH CARE EDUCATION/TRAINING PROGRAM

## 2024-02-21 PROCEDURE — 99900031 HC PATIENT EDUCATION (STAT)

## 2024-02-21 PROCEDURE — 20000000 HC ICU ROOM

## 2024-02-21 PROCEDURE — 88307 TISSUE EXAM BY PATHOLOGIST: CPT | Mod: TC | Performed by: ANESTHESIOLOGY

## 2024-02-21 PROCEDURE — 99900035 HC TECH TIME PER 15 MIN (STAT)

## 2024-02-21 PROCEDURE — 27201423 OPTIME MED/SURG SUP & DEVICES STERILE SUPPLY: Performed by: STUDENT IN AN ORGANIZED HEALTH CARE EDUCATION/TRAINING PROGRAM

## 2024-02-21 PROCEDURE — 36000711: Performed by: STUDENT IN AN ORGANIZED HEALTH CARE EDUCATION/TRAINING PROGRAM

## 2024-02-21 PROCEDURE — 37000008 HC ANESTHESIA 1ST 15 MINUTES: Performed by: STUDENT IN AN ORGANIZED HEALTH CARE EDUCATION/TRAINING PROGRAM

## 2024-02-21 PROCEDURE — 27800903 OPTIME MED/SURG SUP & DEVICES OTHER IMPLANTS: Performed by: STUDENT IN AN ORGANIZED HEALTH CARE EDUCATION/TRAINING PROGRAM

## 2024-02-21 RX ORDER — TALC
6 POWDER (GRAM) TOPICAL NIGHTLY PRN
Status: DISCONTINUED | OUTPATIENT
Start: 2024-02-21 | End: 2024-03-01 | Stop reason: HOSPADM

## 2024-02-21 RX ORDER — SODIUM CHLORIDE 0.9 % (FLUSH) 0.9 %
10 SYRINGE (ML) INJECTION
Status: DISCONTINUED | OUTPATIENT
Start: 2024-02-21 | End: 2024-03-01 | Stop reason: HOSPADM

## 2024-02-21 RX ORDER — ACETAMINOPHEN 325 MG/1
650 TABLET ORAL EVERY 8 HOURS PRN
Status: DISCONTINUED | OUTPATIENT
Start: 2024-02-21 | End: 2024-03-01 | Stop reason: HOSPADM

## 2024-02-21 RX ORDER — GLYCOPYRROLATE 0.2 MG/ML
INJECTION, SOLUTION INTRAMUSCULAR; INTRAVENOUS
Status: DISCONTINUED | OUTPATIENT
Start: 2024-02-21 | End: 2024-02-21

## 2024-02-21 RX ORDER — ONDANSETRON 4 MG/1
8 TABLET, ORALLY DISINTEGRATING ORAL EVERY 8 HOURS PRN
Status: DISCONTINUED | OUTPATIENT
Start: 2024-02-21 | End: 2024-03-01 | Stop reason: HOSPADM

## 2024-02-21 RX ORDER — LIDOCAINE HYDROCHLORIDE AND EPINEPHRINE 20; 10 MG/ML; UG/ML
INJECTION, SOLUTION INFILTRATION; PERINEURAL
Status: DISCONTINUED | OUTPATIENT
Start: 2024-02-21 | End: 2024-02-21 | Stop reason: HOSPADM

## 2024-02-21 RX ORDER — SODIUM CHLORIDE, SODIUM LACTATE, POTASSIUM CHLORIDE, CALCIUM CHLORIDE 600; 310; 30; 20 MG/100ML; MG/100ML; MG/100ML; MG/100ML
INJECTION, SOLUTION INTRAVENOUS CONTINUOUS
Status: DISCONTINUED | OUTPATIENT
Start: 2024-02-21 | End: 2024-02-26

## 2024-02-21 RX ORDER — HYDROMORPHONE HYDROCHLORIDE 2 MG/ML
INJECTION, SOLUTION INTRAMUSCULAR; INTRAVENOUS; SUBCUTANEOUS
Status: DISCONTINUED | OUTPATIENT
Start: 2024-02-21 | End: 2024-02-21

## 2024-02-21 RX ORDER — ATORVASTATIN CALCIUM 80 MG/1
80 TABLET, FILM COATED ORAL NIGHTLY
Status: DISCONTINUED | OUTPATIENT
Start: 2024-02-21 | End: 2024-03-01 | Stop reason: HOSPADM

## 2024-02-21 RX ORDER — LEVOTHYROXINE SODIUM 50 UG/1
50 TABLET ORAL
Status: DISCONTINUED | OUTPATIENT
Start: 2024-02-22 | End: 2024-03-01 | Stop reason: HOSPADM

## 2024-02-21 RX ORDER — SODIUM CHLORIDE, SODIUM LACTATE, POTASSIUM CHLORIDE, CALCIUM CHLORIDE 600; 310; 30; 20 MG/100ML; MG/100ML; MG/100ML; MG/100ML
INJECTION, SOLUTION INTRAVENOUS CONTINUOUS PRN
Status: DISCONTINUED | OUTPATIENT
Start: 2024-02-21 | End: 2024-02-21

## 2024-02-21 RX ORDER — METOCLOPRAMIDE HYDROCHLORIDE 5 MG/ML
5 INJECTION INTRAMUSCULAR; INTRAVENOUS EVERY 6 HOURS PRN
Status: DISCONTINUED | OUTPATIENT
Start: 2024-02-21 | End: 2024-02-21 | Stop reason: HOSPADM

## 2024-02-21 RX ORDER — METHOCARBAMOL 500 MG/1
500 TABLET, FILM COATED ORAL 4 TIMES DAILY
Status: DISCONTINUED | OUTPATIENT
Start: 2024-02-21 | End: 2024-02-21

## 2024-02-21 RX ORDER — OXYCODONE HYDROCHLORIDE 5 MG/1
5 TABLET ORAL EVERY 4 HOURS PRN
Status: DISCONTINUED | OUTPATIENT
Start: 2024-02-21 | End: 2024-03-01 | Stop reason: HOSPADM

## 2024-02-21 RX ORDER — ROCURONIUM BROMIDE 10 MG/ML
INJECTION, SOLUTION INTRAVENOUS
Status: DISCONTINUED | OUTPATIENT
Start: 2024-02-21 | End: 2024-02-21

## 2024-02-21 RX ORDER — ONDANSETRON HYDROCHLORIDE 2 MG/ML
INJECTION, SOLUTION INTRAMUSCULAR; INTRAVENOUS
Status: DISCONTINUED | OUTPATIENT
Start: 2024-02-21 | End: 2024-02-21

## 2024-02-21 RX ORDER — EPHEDRINE SULFATE 50 MG/ML
INJECTION, SOLUTION INTRAVENOUS
Status: DISCONTINUED | OUTPATIENT
Start: 2024-02-21 | End: 2024-02-21

## 2024-02-21 RX ORDER — BUPIVACAINE HYDROCHLORIDE 5 MG/ML
INJECTION, SOLUTION EPIDURAL; INTRACAUDAL
Status: DISCONTINUED | OUTPATIENT
Start: 2024-02-21 | End: 2024-02-21 | Stop reason: HOSPADM

## 2024-02-21 RX ORDER — MUPIROCIN 20 MG/G
OINTMENT TOPICAL 2 TIMES DAILY
Status: DISCONTINUED | OUTPATIENT
Start: 2024-02-21 | End: 2024-02-26

## 2024-02-21 RX ORDER — ONDANSETRON HYDROCHLORIDE 2 MG/ML
4 INJECTION, SOLUTION INTRAVENOUS EVERY 12 HOURS PRN
Status: DISCONTINUED | OUTPATIENT
Start: 2024-02-21 | End: 2024-02-21 | Stop reason: HOSPADM

## 2024-02-21 RX ORDER — FENTANYL CITRATE 50 UG/ML
INJECTION, SOLUTION INTRAMUSCULAR; INTRAVENOUS
Status: DISCONTINUED | OUTPATIENT
Start: 2024-02-21 | End: 2024-02-21

## 2024-02-21 RX ORDER — NEOSTIGMINE METHYLSULFATE 5 MG/5 ML
SYRINGE (ML) INTRAVENOUS
Status: DISCONTINUED | OUTPATIENT
Start: 2024-02-21 | End: 2024-02-21

## 2024-02-21 RX ORDER — SODIUM CHLORIDE 9 MG/ML
INJECTION, SOLUTION INTRAVENOUS CONTINUOUS
Status: DISCONTINUED | OUTPATIENT
Start: 2024-02-21 | End: 2024-02-21

## 2024-02-21 RX ORDER — LIDOCAINE HYDROCHLORIDE 10 MG/ML
1 INJECTION, SOLUTION EPIDURAL; INFILTRATION; INTRACAUDAL; PERINEURAL ONCE AS NEEDED
Status: DISCONTINUED | OUTPATIENT
Start: 2024-02-21 | End: 2024-03-01 | Stop reason: HOSPADM

## 2024-02-21 RX ORDER — LIDOCAINE HYDROCHLORIDE 10 MG/ML
INJECTION, SOLUTION INTRAVENOUS
Status: DISCONTINUED | OUTPATIENT
Start: 2024-02-21 | End: 2024-02-21

## 2024-02-21 RX ORDER — MORPHINE SULFATE 4 MG/ML
4 INJECTION, SOLUTION INTRAMUSCULAR; INTRAVENOUS EVERY 4 HOURS PRN
Status: DISCONTINUED | OUTPATIENT
Start: 2024-02-21 | End: 2024-03-01 | Stop reason: HOSPADM

## 2024-02-21 RX ORDER — METHOCARBAMOL 500 MG/1
500 TABLET, FILM COATED ORAL 4 TIMES DAILY
Status: DISCONTINUED | OUTPATIENT
Start: 2024-02-21 | End: 2024-02-27

## 2024-02-21 RX ORDER — ETOMIDATE 2 MG/ML
INJECTION INTRAVENOUS
Status: DISCONTINUED | OUTPATIENT
Start: 2024-02-21 | End: 2024-02-21

## 2024-02-21 RX ORDER — ACETAMINOPHEN 10 MG/ML
1000 INJECTION, SOLUTION INTRAVENOUS EVERY 8 HOURS
Status: COMPLETED | OUTPATIENT
Start: 2024-02-21 | End: 2024-02-22

## 2024-02-21 RX ADMIN — MORPHINE SULFATE 4 MG: 4 INJECTION, SOLUTION INTRAMUSCULAR; INTRAVENOUS at 04:02

## 2024-02-21 RX ADMIN — SODIUM CHLORIDE, SODIUM LACTATE, POTASSIUM CHLORIDE, AND CALCIUM CHLORIDE: 600; 310; 30; 20 INJECTION, SOLUTION INTRAVENOUS at 12:02

## 2024-02-21 RX ADMIN — GLYCOPYRROLATE 0.6 MG: 0.2 INJECTION, SOLUTION INTRAMUSCULAR; INTRAVENOUS at 03:02

## 2024-02-21 RX ADMIN — SODIUM CHLORIDE: 9 INJECTION, SOLUTION INTRAVENOUS at 08:02

## 2024-02-21 RX ADMIN — SODIUM CHLORIDE, POTASSIUM CHLORIDE, SODIUM LACTATE AND CALCIUM CHLORIDE: 600; 310; 30; 20 INJECTION, SOLUTION INTRAVENOUS at 08:02

## 2024-02-21 RX ADMIN — MUPIROCIN: 20 OINTMENT TOPICAL at 08:02

## 2024-02-21 RX ADMIN — CEFOXITIN 2 G: 2 INJECTION, POWDER, FOR SOLUTION INTRAVENOUS at 09:02

## 2024-02-21 RX ADMIN — HYDROMORPHONE HYDROCHLORIDE 0.5 MG: 2 INJECTION, SOLUTION INTRAMUSCULAR; INTRAVENOUS; SUBCUTANEOUS at 01:02

## 2024-02-21 RX ADMIN — SODIUM CHLORIDE: 9 INJECTION, SOLUTION INTRAVENOUS at 02:02

## 2024-02-21 RX ADMIN — EPHEDRINE SULFATE 10 MG: 50 INJECTION, SOLUTION INTRAVENOUS at 01:02

## 2024-02-21 RX ADMIN — Medication 5 MG: at 03:02

## 2024-02-21 RX ADMIN — FENTANYL CITRATE 50 MCG: 50 INJECTION, SOLUTION INTRAMUSCULAR; INTRAVENOUS at 10:02

## 2024-02-21 RX ADMIN — FENTANYL CITRATE 100 MCG: 50 INJECTION, SOLUTION INTRAMUSCULAR; INTRAVENOUS at 09:02

## 2024-02-21 RX ADMIN — ACETAMINOPHEN 1000 MG: 10 INJECTION INTRAVENOUS at 10:02

## 2024-02-21 RX ADMIN — METHOCARBAMOL 500 MG: 500 TABLET ORAL at 06:02

## 2024-02-21 RX ADMIN — FENTANYL CITRATE 100 MCG: 50 INJECTION, SOLUTION INTRAMUSCULAR; INTRAVENOUS at 10:02

## 2024-02-21 RX ADMIN — SODIUM CHLORIDE: 9 INJECTION, SOLUTION INTRAVENOUS at 10:02

## 2024-02-21 RX ADMIN — ETOMIDATE 20 MG: 2 INJECTION, SOLUTION INTRAVENOUS at 10:02

## 2024-02-21 RX ADMIN — ONDANSETRON 4 MG: 2 INJECTION INTRAMUSCULAR; INTRAVENOUS at 09:02

## 2024-02-21 RX ADMIN — LIDOCAINE HYDROCHLORIDE 50 MG: 10 INJECTION, SOLUTION INTRAVENOUS at 10:02

## 2024-02-21 RX ADMIN — ATORVASTATIN CALCIUM 80 MG: 80 TABLET, FILM COATED ORAL at 08:02

## 2024-02-21 RX ADMIN — ROCURONIUM BROMIDE 20 MG: 10 INJECTION, SOLUTION INTRAVENOUS at 01:02

## 2024-02-21 RX ADMIN — EPHEDRINE SULFATE 15 MG: 50 INJECTION, SOLUTION INTRAVENOUS at 10:02

## 2024-02-21 RX ADMIN — METHOCARBAMOL 500 MG: 500 TABLET ORAL at 08:02

## 2024-02-21 NOTE — TELEPHONE ENCOUNTER
Linda calling on behalf of pt. States that pt is scheduled for Colectomy on tomorrow. States that pt is to take Miralax and Gatorade prep. Wife calling to clarify if med can be administered via peg tube.  Call placed to [facility] sup. States that provider is not on call and that as long as pt gets in med it is okay to take. Wife made aware that med can be administered both orally and via peg tube as long as peg tube is functioning properly. VU. Encounter routed to provider.     Reason for Disposition   [1] Follow-up call from patient regarding patient's clinical status AND [2] information urgent    Protocols used: PCP Call - No Triage-A-

## 2024-02-21 NOTE — TRANSFER OF CARE
Anesthesia Transfer of Care Note    Patient: Richard Farr    Procedure(s) Performed: Procedure(s) (LRB):  COLECTOMY, LEFT, LAPAROSCOPIC WITH GASTROSTOMY TUBE EXCHANGE (Left)    Patient location: ICU    Anesthesia Type: general    Transport from OR: Transported from OR on room air with adequate spontaneous ventilation. Transported from OR on 6-10 L/min O2 by face mask with adequate spontaneous ventilation    Post pain: adequate analgesia    Post assessment: no apparent anesthetic complications    Post vital signs: stable    Level of consciousness: awake    Nausea/Vomiting: no nausea/vomiting    Complications: none    Transfer of care protocol was followed      Last vitals:   99/57  98% O2 SAT  37 C TEMP  16 RR  68 HR

## 2024-02-21 NOTE — ANESTHESIA PROCEDURE NOTES
Intubation    Date/Time: 2/21/2024 10:03 AM    Performed by: Olaf Jerez CRNA  Authorized by: Olaf Jerez CRNA    Intubation:     Induction:  Intravenous    Intubated:  Postinduction    Mask Ventilation:  Not attempted    Attempts:  1    Attempted By:  CRNA    Method of Intubation:  Direct and bougie    Blade:  Lynn 4    Laryngeal View Grade: Grade I - full view of cords      Difficult Airway Encountered?: No      Complications:  None    Airway Device:  Oral endotracheal tube    Airway Device Size:  7.5    Style/Cuff Inflation:  Cuffed    Inflation Amount (mL):  6    Tube secured:  21    Secured at:  The lips    Placement Verified By:  Capnometry    Complicating Factors:  None    Findings Post-Intubation:  BS equal bilateral and atraumatic/condition of teeth unchanged

## 2024-02-21 NOTE — INTERVAL H&P NOTE
Patient seen and examined.  No interval change since the below obtained H&P  Informed consent verified and surgical site marked  NPO since midnight  All questions and concerns addressed  To OR for lap vs open left colectomy with repair of colovesicular fistula    Queenie Berman MD  General Surgery   [phone]

## 2024-02-21 NOTE — PLAN OF CARE
Assessment completed, patient C/O moderate pain to both hands from chronic arthritis. Peg tube noted to abdomen. Gloved to both hands removed.  Bilateral lower leg edema noted R>L, mild redness noted to both legs. Side rail up on one side, call bell within reach, Instructed to call prn for assistance.  Family at bedside

## 2024-02-22 LAB
ALBUMIN SERPL BCP-MCNC: 2.6 G/DL (ref 3.5–5.2)
ALP SERPL-CCNC: 53 U/L (ref 55–135)
ALT SERPL W/O P-5'-P-CCNC: 18 U/L (ref 10–44)
ANION GAP SERPL CALC-SCNC: 9 MMOL/L (ref 3–11)
AST SERPL-CCNC: 36 U/L (ref 10–40)
BILIRUB SERPL-MCNC: 0.6 MG/DL (ref 0.1–1)
BUN SERPL-MCNC: 16 MG/DL (ref 8–23)
CALCIUM SERPL-MCNC: 8 MG/DL (ref 8.7–10.5)
CHLORIDE SERPL-SCNC: 108 MMOL/L (ref 95–110)
CO2 SERPL-SCNC: 25 MMOL/L (ref 23–29)
CREAT SERPL-MCNC: 0.6 MG/DL (ref 0.5–1.4)
ERYTHROCYTE [DISTWIDTH] IN BLOOD BY AUTOMATED COUNT: 14.8 % (ref 11.5–14.5)
EST. GFR  (NO RACE VARIABLE): >60 ML/MIN/1.73 M^2
GLUCOSE SERPL-MCNC: 82 MG/DL (ref 70–110)
HCT VFR BLD AUTO: 29.2 % (ref 40–54)
HGB BLD-MCNC: 9.2 G/DL (ref 14–18)
MAGNESIUM SERPL-MCNC: 1.6 MG/DL (ref 1.6–2.6)
MCH RBC QN AUTO: 29.1 PG (ref 27–31)
MCHC RBC AUTO-ENTMCNC: 31.5 G/DL (ref 32–36)
MCV RBC AUTO: 92 FL (ref 82–98)
PHOSPHATE SERPL-MCNC: 3.3 MG/DL (ref 2.7–4.5)
PLATELET # BLD AUTO: 201 K/UL (ref 150–450)
PMV BLD AUTO: 10.9 FL (ref 9.2–12.9)
POTASSIUM SERPL-SCNC: 3.8 MMOL/L (ref 3.5–5.1)
PROT SERPL-MCNC: 5.9 G/DL (ref 6–8.4)
RBC # BLD AUTO: 3.16 M/UL (ref 4.6–6.2)
SODIUM SERPL-SCNC: 142 MMOL/L (ref 136–145)
WBC # BLD AUTO: 8.83 K/UL (ref 3.9–12.7)

## 2024-02-22 PROCEDURE — 94761 N-INVAS EAR/PLS OXIMETRY MLT: CPT

## 2024-02-22 PROCEDURE — 11000001 HC ACUTE MED/SURG PRIVATE ROOM

## 2024-02-22 PROCEDURE — 99233 SBSQ HOSP IP/OBS HIGH 50: CPT | Mod: ,,, | Performed by: STUDENT IN AN ORGANIZED HEALTH CARE EDUCATION/TRAINING PROGRAM

## 2024-02-22 PROCEDURE — 99900031 HC PATIENT EDUCATION (STAT)

## 2024-02-22 PROCEDURE — 97162 PT EVAL MOD COMPLEX 30 MIN: CPT

## 2024-02-22 PROCEDURE — 0DJD8ZZ INSPECTION OF LOWER INTESTINAL TRACT, VIA NATURAL OR ARTIFICIAL OPENING ENDOSCOPIC: ICD-10-PCS | Performed by: STUDENT IN AN ORGANIZED HEALTH CARE EDUCATION/TRAINING PROGRAM

## 2024-02-22 PROCEDURE — 80053 COMPREHEN METABOLIC PANEL: CPT | Performed by: STUDENT IN AN ORGANIZED HEALTH CARE EDUCATION/TRAINING PROGRAM

## 2024-02-22 PROCEDURE — 83735 ASSAY OF MAGNESIUM: CPT | Performed by: STUDENT IN AN ORGANIZED HEALTH CARE EDUCATION/TRAINING PROGRAM

## 2024-02-22 PROCEDURE — 63600175 PHARM REV CODE 636 W HCPCS: Performed by: STUDENT IN AN ORGANIZED HEALTH CARE EDUCATION/TRAINING PROGRAM

## 2024-02-22 PROCEDURE — 99900035 HC TECH TIME PER 15 MIN (STAT)

## 2024-02-22 PROCEDURE — 25000003 PHARM REV CODE 250: Performed by: STUDENT IN AN ORGANIZED HEALTH CARE EDUCATION/TRAINING PROGRAM

## 2024-02-22 PROCEDURE — 27000221 HC OXYGEN, UP TO 24 HOURS

## 2024-02-22 PROCEDURE — 0D20XUZ CHANGE FEEDING DEVICE IN UPPER INTESTINAL TRACT, EXTERNAL APPROACH: ICD-10-PCS | Performed by: STUDENT IN AN ORGANIZED HEALTH CARE EDUCATION/TRAINING PROGRAM

## 2024-02-22 PROCEDURE — 97166 OT EVAL MOD COMPLEX 45 MIN: CPT

## 2024-02-22 PROCEDURE — 36415 COLL VENOUS BLD VENIPUNCTURE: CPT | Performed by: STUDENT IN AN ORGANIZED HEALTH CARE EDUCATION/TRAINING PROGRAM

## 2024-02-22 PROCEDURE — 0DTN4ZZ RESECTION OF SIGMOID COLON, PERCUTANEOUS ENDOSCOPIC APPROACH: ICD-10-PCS | Performed by: STUDENT IN AN ORGANIZED HEALTH CARE EDUCATION/TRAINING PROGRAM

## 2024-02-22 PROCEDURE — 85027 COMPLETE CBC AUTOMATED: CPT | Performed by: STUDENT IN AN ORGANIZED HEALTH CARE EDUCATION/TRAINING PROGRAM

## 2024-02-22 PROCEDURE — 84100 ASSAY OF PHOSPHORUS: CPT | Performed by: STUDENT IN AN ORGANIZED HEALTH CARE EDUCATION/TRAINING PROGRAM

## 2024-02-22 RX ORDER — ENOXAPARIN SODIUM 100 MG/ML
40 INJECTION SUBCUTANEOUS EVERY 24 HOURS
Status: DISCONTINUED | OUTPATIENT
Start: 2024-02-22 | End: 2024-03-01 | Stop reason: HOSPADM

## 2024-02-22 RX ADMIN — ACETAMINOPHEN 1000 MG: 10 INJECTION INTRAVENOUS at 02:02

## 2024-02-22 RX ADMIN — METHOCARBAMOL 500 MG: 500 TABLET ORAL at 10:02

## 2024-02-22 RX ADMIN — METOPROLOL SUCCINATE 12.5 MG: 25 TABLET, EXTENDED RELEASE ORAL at 10:02

## 2024-02-22 RX ADMIN — METHOCARBAMOL 500 MG: 500 TABLET ORAL at 09:02

## 2024-02-22 RX ADMIN — MUPIROCIN: 20 OINTMENT TOPICAL at 10:02

## 2024-02-22 RX ADMIN — SODIUM CHLORIDE, POTASSIUM CHLORIDE, SODIUM LACTATE AND CALCIUM CHLORIDE: 600; 310; 30; 20 INJECTION, SOLUTION INTRAVENOUS at 05:02

## 2024-02-22 RX ADMIN — LEVOTHYROXINE SODIUM 50 MCG: 50 TABLET ORAL at 06:02

## 2024-02-22 RX ADMIN — METHOCARBAMOL 500 MG: 500 TABLET ORAL at 02:02

## 2024-02-22 RX ADMIN — PIPERACILLIN SODIUM AND TAZOBACTAM SODIUM 4.5 G: 4; .5 INJECTION, POWDER, FOR SOLUTION INTRAVENOUS at 07:02

## 2024-02-22 RX ADMIN — MUPIROCIN: 20 OINTMENT TOPICAL at 09:02

## 2024-02-22 RX ADMIN — ATORVASTATIN CALCIUM 80 MG: 80 TABLET, FILM COATED ORAL at 09:02

## 2024-02-22 RX ADMIN — ENOXAPARIN SODIUM 40 MG: 40 INJECTION SUBCUTANEOUS at 04:02

## 2024-02-22 RX ADMIN — ACETAMINOPHEN 1000 MG: 10 INJECTION INTRAVENOUS at 05:02

## 2024-02-22 RX ADMIN — METHOCARBAMOL 500 MG: 500 TABLET ORAL at 04:02

## 2024-02-22 NOTE — OP NOTE
[facility] - OR Peri Services  General Surgery Department  Operative Note    SUMMARY     Date of Procedure: 2/21/2024    Procedure: Procedure(s) (LRB):  COLECTOMY, LEFT, LAPAROSCOPIC WITH GASTROSTOMY TUBE EXCHANGE:      Surgeon(s) and Role:  Queenie Berman MD     Pre-Operative Diagnosis: Pre-Op Diagnosis Codes:     * Colovesical fistula [N32.1]    Post-Operative Diagnosis: Same         Anesthesia: General    Findings:  -Short segment of sigmoid colon with evidence of diverticular disease with fistula formation to the posterior bladder   -Sigmoid resection with stapled end to end anastomosis - two donuts retrieved; leak test negative   -Bladder irrigated with no signs of leak - area of fistula oversewn  -Exchange of 20Fr PEG tube for 20Fr balloon gastrostomy tube     Indications for the Procedure:  77yo male with history of diverticulosis who presents with colovesical fistual.     Operative Conduct in Detail:    Patient was seen in holding where questions and concerns were addressed.  Preoperative antibiotics given in day surgery.  Patient was brought to the operating room and placed in supine position where SCDs were applied and endotracheal intubation was initiated.  James catheter was placed and he was placed in the lithotomy position- ensuring to protect all relevant pressure points.  The perineum and abdomen were prepped and draped in sterile fashion.  A time-out was performed in the operating room with all present and in agreement.      Abdominal access achieved with a supraumbilical incision via Severino technique.  Additional ports were placed in the epigastric suprapubic and right lower quadrant positions.  Upon inspection of the abdomen the patient with a short segment of sigmoid colon densely adhered to the midline abdominal wall at the site of our suspected fistula.  The fistula was taken down with a combination of blunt dissection and electrocautery endo Bonnie staying closer to the colon than  the peritoneal wall on order to minimize risk to risk of bladder injury.  Once the fistula had been taken down the proximal rectum and the remainder of the sigmoid and descending colon were mobilized along the white line Toldt.  Once this had been achieved pneumoperitoneum was alleviated and a small suprapubic vertical incision was created.  Our specimen was eviscerated through the incision and the segment of colon resected with a blue load CRISTIANE stapler proximally and a contour blue load distally.  Mesentery was ligated with the handheld harmonic achieving hemostasis with the neurovascular bundles as appropriate.  The proximal colon easily reached our rectum without tension.  A 29 EEA stapler was selected and the anvil was secured on our descending colon with a with a Prolene pursestring suture.  The stapler was inserted through the rectum and opened in order to articulate to the anvil.  Stapler was then fired and two donuts were extracted from the stapler.  A leak test done with an asepto and proctoscope was performed noting no bubbles and positive inflation of the area of our anastomosis.  The anterior and lateral portions of the staple line reinforced with 2-0 silk Lembert sutures.    The James catheter was clamped and irrigated with sterile milk.  No leakage was detected.  The bladder portion of the fistula was oversewn with 2-0 silk Lembert sutures.  Abdomen was copiously irrigated with normal saline.  The lower midline incision was closed with a running 0 loop PDS suture.  Umbilical incision closed with interrupted 0 Ethibond sutures.  Skin closed with staples.    At the end of the case after wounds were covered, the previously placed PEG tube was removed transabdominal and replaced with a 20 Gabonese 20 cc balloon gastrostomy tube.  Patient was then awakened from anesthesia and extubated in the operating room with no untoward event.  All lap and instrument counts were correct x2 prior to and after abdominal  closure.    Complications: No    Estimated Blood Loss (EBL): 100cc           Implants:   Implant Name Type Inv. Item Serial No.  Lot No. LRB No. Used Action   Kangaroo Gastrostomy Feeding Tube with ENFit Y-Port      Left 1 Implanted       Specimens:   Specimens (From admission, onward)       Start     Ordered    02/21/24 1557  Specimen to Pathology, Surgery General Surgery  Once        Comments: Pre-op Diagnosis: Colovesical fistula [N32.1]Procedure(s):COLECTOMY, LEFT, LAPAROSCOPIC WITH GASTROSTOMY TUBE EXCHANGE Number of specimens: 2Name of specimens: sigmoid colon with fistula @ 1500, anastomatic donuts @ 1500     References:    Click here for ordering Quick Tip   Question Answer Comment   Procedure Type: General Surgery    Which provider would you like to cc? OLAMIDE DOS SANTOS    Release to patient Immediate        02/21/24 1559                             Condition: Good    Disposition: ICU- extubated and HD stable         Olamide Dos Santos MD  General Surgery   [phone]

## 2024-02-22 NOTE — PLAN OF CARE
Care plan reviewed and updated. Morphine given for pain. Peg tube in place. Ok to give meds per peg tube per Dr. Berman. James cath draining without difficulty. Pillow in place to abd. Educated on usage.

## 2024-02-22 NOTE — SUBJECTIVE & OBJECTIVE
Interval History: NAEON.  VSS; afebrile.  Tolerating ice chips and sips of clears.  +flatus.  Pain controlled with medication.  Ambulated with PT today.     Medications:  Continuous Infusions:   lactated ringers 75 mL/hr at 02/22/24 0948     Scheduled Meds:   atorvastatin  80 mg Oral QHS    enoxparin  40 mg Subcutaneous Q24H (prophylaxis, 1700)    levothyroxine  50 mcg Oral Before breakfast    methocarbamoL  500 mg Per G Tube QID    metoprolol succinate  12.5 mg Oral Daily    mupirocin   Nasal BID     PRN Meds:acetaminophen, LIDOcaine (PF) 10 mg/ml (1%), melatonin, morphine, ondansetron, oxyCODONE, sodium chloride 0.9%     Review of patient's allergies indicates:   Allergen Reactions    Ativan [lorazepam]      Adverse reaction.     Objective:     Vital Signs (Most Recent):  Temp: 98.5 °F (36.9 °C) (02/22/24 1501)  Pulse: 82 (02/22/24 1705)  Resp: 20 (02/22/24 1705)  BP: (!) 113/56 (02/22/24 1008)  SpO2: 96 % (02/22/24 1705) Vital Signs (24h Range):  Temp:  [97.2 °F (36.2 °C)-98.5 °F (36.9 °C)] 98.5 °F (36.9 °C)  Pulse:  [70-82] 82  Resp:  [11-28] 20  SpO2:  [93 %-100 %] 96 %  BP: ()/(52-61) 113/56     Weight: 76.7 kg (169 lb)  Body mass index is 25.7 kg/m².    Intake/Output - Last 3 Shifts         02/20 0700  02/21 0659 02/21 0700  02/22 0659 02/22 0700  02/23 0659    I.V. (mL/kg)  4865 (63.4)     Blood  100     Other  170     NG/GT  60     IV Piggyback  100     Total Intake(mL/kg)  5295 (69)     Urine (mL/kg/hr)  570 550 (0.7)    Stool  0     Total Output  570 550    Net  +4725 -550           Stool Occurrence  3 x              Physical Exam  Vitals reviewed.   Constitutional:       General: He is not in acute distress.     Appearance: He is not ill-appearing or toxic-appearing.   Cardiovascular:      Rate and Rhythm: Normal rate and regular rhythm.   Pulmonary:      Effort: Pulmonary effort is normal. No respiratory distress.   Abdominal:      General: There is no distension.      Palpations: Abdomen is soft.       Tenderness: There is no abdominal tenderness. There is no guarding or rebound.      Comments: Midline incisions with surgical dressing in place    Musculoskeletal:      Right lower leg: No edema.      Left lower leg: No edema.   Skin:     General: Skin is warm and dry.      Capillary Refill: Capillary refill takes less than 2 seconds.   Neurological:      Mental Status: He is alert. Mental status is at baseline.          Significant Labs:  I have reviewed all pertinent lab results within the past 24 hours.  CBC:   Recent Labs   Lab 02/22/24  0410   WBC 8.83   RBC 3.16*   HGB 9.2*   HCT 29.2*      MCV 92   MCH 29.1   MCHC 31.5*     CMP:   Recent Labs   Lab 02/22/24 0410   GLU 82   CALCIUM 8.0*   ALBUMIN 2.6*   PROT 5.9*      K 3.8   CO2 25      BUN 16   CREATININE 0.6   ALKPHOS 53*   ALT 18   AST 36   BILITOT 0.6       Significant Diagnostics:  I have reviewed all pertinent imaging results/findings within the past 24 hours.

## 2024-02-22 NOTE — PLAN OF CARE
Problem: Adult Inpatient Plan of Care  Goal: Plan of Care Review  Outcome: Ongoing, Progressing     Problem: Adult Inpatient Plan of Care  Goal: Absence of Hospital-Acquired Illness or Injury  Outcome: Ongoing, Progressing     Problem: Fall Injury Risk  Goal: Absence of Fall and Fall-Related Injury  Outcome: Ongoing, Progressing     Problem: Infection  Goal: Absence of Infection Signs and Symptoms  Outcome: Ongoing, Progressing     Problem: Skin Injury Risk Increased  Goal: Skin Health and Integrity  Outcome: Ongoing, Progressing     Problem: Fall Injury Risk  Goal: Absence of Fall and Fall-Related Injury  Outcome: Ongoing, Progressing

## 2024-02-22 NOTE — PT/OT/SLP EVAL
Physical Therapy Evaluation     Patient Name: Richard Farr   MRN: [id number]  Recent Surgery: Procedure(s) (LRB):  COLECTOMY, LEFT, LAPAROSCOPIC WITH GASTROSTOMY TUBE EXCHANGE (Left) 1 Day Post-Op    Recommendations:     Discharge Recommendations: Low Intensity Therapy   Discharge Equipment Recommendations: none   Barriers to discharge: None    Assessment:     Richard Farr is a 78 y.o. male admitted with a medical diagnosis of Colovesical fistula. He presents with the following impairments/functional limitations: weakness, impaired joint extensibility, impaired endurance, decreased ROM, impaired muscle length, impaired sensation, impaired self care skills, decreased upper extremity function, impaired functional mobility, decreased lower extremity function, gait instability, decreased safety awareness, impaired balance, impaired cardiopulmonary response to activity. Patient reports that he lives with his wife and his children lives close by and assist in his care.  He was ambulatory with a rollator with supervision, he has history of CVA with left residual weakness.  At the time of evaluation, patient was in ICU with multiple attachments.  He required min assist with supine to sit, min assist with  sit <> stand from the bed and from the regular toilet, he had a continent bowel episode on the toilet, ambulated ~213 feet with RW with venturi mask.  Patient is left sitting on the recliner chair with all lines intact and call light  within reach, nurse Sherly Gambino RN aware.  We will progress patient's mobility as tolerated.     Rehab Prognosis: Good; patient would benefit from acute PT services to address these deficits and reach maximum level of function.    Plan:     During this hospitalization, patient to be seen 5 x/week to address the above listed problems via gait training, therapeutic activities, therapeutic exercises, neuromuscular re-education    Plan of Care Expires: 02/29/24    Subjective     Chief  "Complaint: "I need to use the bathroom."   Patient Comments/Goals: Go home.   Pain/Comfort:  Pain Rating 1: 0/10  Pain Rating Post-Intervention 1: 0/10    Social History:  Living Environment: Patient lives with their spouse in a single story home with can live on 1st floor  Prior Level of Function: Prior to admission, patient ambulated household distances using rollator  Equipment Used at Home: blood pressure machine, feeding device, rollator, nutrition supplies, raised toilet, grab bar  DME owned (not currently used): none  Assistance Upon Discharge: significant other and family    Objective:     Communicated with nurse and patient  prior to session. Patient found HOB elevated with blood pressure cuff, vilchis catheter, peripheral IV, telemetry, pulse ox (continuous), SCD, Other (comments) (gastrostomy) upon PT entry to room.    General Precautions: Standard, fall   Orthopedic Precautions: N/A   Braces: N/A    Respiratory Status:  Venturi mask     Exams:  Cognition: Patient is oriented to Person, Place, Time, Situation  RLE ROM: WFL  RLE Strength:  grossly graded 4/5   LLE ROM: WFL  LLE Strength:  grossly graded 3/5   Fine Motor Coordination:    -       Impaired  Left hand, finger to nose .  Gross Motor Coordination: WFL  Postural Exam: Patient presented with the following abnormalities:    -       Rounded shoulders  -       Forward head  -       Kyphosis  Sensation:    -       Impaired  light/touch left UE   Skin Integrity/Edema:     -       Edema: Mild on both LE     Functional Mobility:  Gait belt applied - Yes  Bed Mobility  Rolling Left: minimum assistance  Rolling Right: minimum assistance  Scooting: minimum assistance  Supine to Sit: minimum assistance for LE management and trunk management  Transfers  Sit to Stand: minimum assistance with rolling walker and with cues for hand placement and foot placement  Toilet Transfer: minimum assistance with rolling walker and with cues for hand placement and foot " placement using Step Transfer  Gait  Patient ambulated ~213 feet  with rolling walker and O2 supplement   and minimum assistance. Patient demonstrates occasional unsteady gait, decreased step length, decreased foot clearance, ambulates outside HERB of RW, and flexed posture. .. All lines remained intact throughout ambulation trail, chair follow for patient safety, and portable Supplemental O2 10L utilized.  Balance  Sitting: modified independence  Standing: contact guard assistance      Therapeutic Activities and Exercises:   Patient educated on role of acute care PT and PT POC, safety while in hospital including calling nurse for mobility, and call light usage  Patient educated about importance of OOB mobility and remaining up in chair most of the day.      AM-PAC 6 CLICK MOBILITY  Total Score:18    Patient left up in chair with all lines intact, call button in reach, and RN notified.    GOALS:   Multidisciplinary Problems       Physical Therapy Goals          Problem: Physical Therapy    Goal Priority Disciplines Outcome Goal Variances Interventions   Physical Therapy Goal     PT, PT/OT Ongoing, Progressing     Description: Goals to be met by: 2024    Patient will increase functional independence with mobility by performin. Supine to sit with Modified Independent.  2. Sit to supine with Modified Independent.  3. Bed to chair transfer with Supervision or Set-up Assistance with rolling walker using Step Transfer technique.  4. Sit to Stand with Supervision or Set-up Assistance with rolling walker.  5. Gait  x 300  feet with Supervision or Set-up Assistance with rolling walker.  6. Lower extremity exercise program x10 reps.                        History:     Past Medical History:   Diagnosis Date    Anal fistula 2024    Anemia     Arthritis     At high risk for falls     Bilateral lower extremity edema     Carpal tunnel syndrome, bilateral     Chronic diastolic congestive heart failure     Colon polyp      Colovesical fistula 02/2024    Coronary artery disease     Depression due to physical illness     Encounter for blood transfusion     Gastric ulcer     History of herpes zoster     Hyperlipemia     Hypertension     Moderate tricuspid insufficiency     NSVT (nonsustained ventricular tachycardia)     PAC (premature atrial contraction)     Patent foramen ovale with right to left shunt     Pneumonia     Pneumonia due to infectious organism     Pulmonary hypertension     PVC's (premature ventricular contractions)     Severe mitral regurgitation     Shingles     Stroke     MINI TRAUMA; LEFT SIDED WEAKNESS    Thyroid disease     Walker as ambulation aid     Wears contact lenses        Past Surgical History:   Procedure Laterality Date    CARDIAC CATHETERIZATION      COLONOSCOPY N/A 11/23/2020    Procedure: COLONOSCOPY;  Surgeon: Abelino Garcia MD;  Location: [facility] ENDO;  Service: General;  Laterality: N/A;    COLONOSCOPY N/A 1/8/2024    Procedure: COLONOSCOPY;  Surgeon: Queenie Berman MD;  Location: [facility] ENDO;  Service: General;  Laterality: N/A;  2nd 0600    CYSTOSCOPY W/ RETROGRADES Bilateral 12/7/2023    Procedure: CYSTOSCOPY WITH RETROGRADE PYELOGRAM;  Surgeon: Juan Villela MD;  [facility];  Service: Urology;  Laterality: Bilateral;    DILATION OF URETHRA N/A 12/7/2023    Procedure: DILATION, URETHRA;  Surgeon: Juan Villela MD;  [facility];  Service: Urology;  Laterality: N/A;    EGD, WITH CLOSED BIOPSY N/A 10/4/2023    Procedure: EGD, WITH CLOSED BIOPSY;  Surgeon: Queenie Berman MD;  Location: [facility] OR;  Service: General;  Laterality: N/A;    ESOPHAGOGASTRODUODENOSCOPY      ESOPHAGOGASTRODUODENOSCOPY N/A 5/21/2021    Procedure: EGD (ESOPHAGOGASTRODUODENOSCOPY);  Surgeon: Sunny Rea MD;  [facility]: [facility];  Service: Endoscopy;  Laterality: N/A;  COVID-VACCINATED    IMPLANTATION OF MITRAL VALVE LEAFLET CLIP Right 5/18/2021    Procedure: INSERTION, LEAFLET CLIP, MITRAL VALVE;   Surgeon: Zen Reina MD;  Location: [facility] CATH;  Service: Cardiovascular;  Laterality: Right;    INSERTION, PEG TUBE N/A 10/4/2023    Procedure: INSERTION, PEG TUBE;  Surgeon: Queenie Berman MD;  Location: [facility] OR;  Service: General;  Laterality: N/A;    NECK SURGERY      anterior cervical fusion x 2    TONSILLECTOMY      TRANSESOPHAGEAL ECHOCARDIOGRAPHY         Time Tracking:     PT Received On: 02/22/24  PT Start Time: 1447  PT Stop Time: 1517  PT Total Time (min): 30 min     Billable Minutes: Evaluation moderate complexity     2/22/2024

## 2024-02-22 NOTE — PROGRESS NOTES
[facility] - Intensive Care  General Surgery  Progress Note    Subjective:     History of Present Illness:  77yo male with colovesical fistula who presents for fistula take down and sigmoid resection.      Post-Op Info:  Procedure(s) (LRB):  COLECTOMY, LEFT, LAPAROSCOPIC WITH GASTROSTOMY TUBE EXCHANGE (Left)   1 Day Post-Op     Interval History: NAEON.  VSS; afebrile.  Tolerating ice chips and sips of clears.  +flatus.  Pain controlled with medication.  Ambulated with PT today.     Medications:  Continuous Infusions:   lactated ringers 75 mL/hr at 02/22/24 0948     Scheduled Meds:   atorvastatin  80 mg Oral QHS    enoxparin  40 mg Subcutaneous Q24H (prophylaxis, 1700)    levothyroxine  50 mcg Oral Before breakfast    methocarbamoL  500 mg Per G Tube QID    metoprolol succinate  12.5 mg Oral Daily    mupirocin   Nasal BID     PRN Meds:acetaminophen, LIDOcaine (PF) 10 mg/ml (1%), melatonin, morphine, ondansetron, oxyCODONE, sodium chloride 0.9%     Review of patient's allergies indicates:   Allergen Reactions    Ativan [lorazepam]      Adverse reaction.     Objective:     Vital Signs (Most Recent):  Temp: 98.5 °F (36.9 °C) (02/22/24 1501)  Pulse: 82 (02/22/24 1705)  Resp: 20 (02/22/24 1705)  BP: (!) 113/56 (02/22/24 1008)  SpO2: 96 % (02/22/24 1705) Vital Signs (24h Range):  Temp:  [97.2 °F (36.2 °C)-98.5 °F (36.9 °C)] 98.5 °F (36.9 °C)  Pulse:  [70-82] 82  Resp:  [11-28] 20  SpO2:  [93 %-100 %] 96 %  BP: ()/(52-61) 113/56     Weight: 76.7 kg (169 lb)  Body mass index is 25.7 kg/m².    Intake/Output - Last 3 Shifts         02/20 0700  02/21 0659 02/21 0700  02/22 0659 02/22 0700  02/23 0659    I.V. (mL/kg)  4865 (63.4)     Blood  100     Other  170     NG/GT  60     IV Piggyback  100     Total Intake(mL/kg)  5295 (69)     Urine (mL/kg/hr)  570 550 (0.7)    Stool  0     Total Output  570 550    Net  +4725 -550           Stool Occurrence  3 x              Physical Exam  Vitals reviewed.   Constitutional:        General: He is not in acute distress.     Appearance: He is not ill-appearing or toxic-appearing.   Cardiovascular:      Rate and Rhythm: Normal rate and regular rhythm.   Pulmonary:      Effort: Pulmonary effort is normal. No respiratory distress.   Abdominal:      General: There is no distension.      Palpations: Abdomen is soft.      Tenderness: There is no abdominal tenderness. There is no guarding or rebound.      Comments: Midline incisions with surgical dressing in place    Musculoskeletal:      Right lower leg: No edema.      Left lower leg: No edema.   Skin:     General: Skin is warm and dry.      Capillary Refill: Capillary refill takes less than 2 seconds.   Neurological:      Mental Status: He is alert. Mental status is at baseline.          Significant Labs:  I have reviewed all pertinent lab results within the past 24 hours.  CBC:   Recent Labs   Lab 02/22/24  0410   WBC 8.83   RBC 3.16*   HGB 9.2*   HCT 29.2*      MCV 92   MCH 29.1   MCHC 31.5*     CMP:   Recent Labs   Lab 02/22/24 0410   GLU 82   CALCIUM 8.0*   ALBUMIN 2.6*   PROT 5.9*      K 3.8   CO2 25      BUN 16   CREATININE 0.6   ALKPHOS 53*   ALT 18   AST 36   BILITOT 0.6       Significant Diagnostics:  I have reviewed all pertinent imaging results/findings within the past 24 hours.  Assessment/Plan:     * Colovesical fistula  POD#1 s/p lap sig colectomy with fistula takedown  -CLD   -OK to use G tube for medications   -IVFs @ 75cc/hr  -Multimodal pain control   -Restart home medications   -Lovenox today   -PT/OT    Acute cystitis without hematuria  Acute on chronic secondary to colovesical fistula   Pre-op UA positive for GNR  Start Zosyn   Keep vilchis due to pelvic surgery and bladder fistula   Will perform cysto prior to removal           Queenie Berman MD  General Surgery  [facility] - Intensive Care

## 2024-02-22 NOTE — PLAN OF CARE
Problem: Physical Therapy  Goal: Physical Therapy Goal  Description: Goals to be met by: 2024    Patient will increase functional independence with mobility by performin. Supine to sit with Modified Independent.  2. Sit to supine with Modified Independent.  3. Bed to chair transfer with Supervision or Set-up Assistance with rolling walker using Step Transfer technique.  4. Sit to Stand with Supervision or Set-up Assistance with rolling walker.  5. Gait  x 300  feet with Supervision or Set-up Assistance with rolling walker.  6. Lower extremity exercise program x10 reps.   Outcome: Plan of care established

## 2024-02-22 NOTE — ASSESSMENT & PLAN NOTE
Acute on chronic secondary to colovesical fistula   Pre-op UA positive for GNR  Start Zosyn   Keep vilchis due to pelvic surgery and bladder fistula   Will perform cysto prior to removal

## 2024-02-22 NOTE — PLAN OF CARE
[facility] - Intensive Care  Initial Discharge Assessment       Primary Care Provider: Drew Moreira Jr., MD    Admission Diagnosis: Colovesical fistula [N32.1]  Surgery follow-up [Z09]    Admission Date: 2/21/2024  Expected Discharge Date:     Transition of Care Barriers: None    Payor: MEDICARE / Plan: MEDICARE PART A & B / Product Type: Government /     Extended Emergency Contact Information  Primary Emergency Contact: Linda Farr  Mobile Phone: [phone]  Relation: Spouse  Secondary Emergency Contact: LUIS NEVAREZ  Mobile Phone: [phone]  Relation: Daughter  Preferred language: English   needed? No    Discharge Plan A: Home with family, Home Health (TBD)  Discharge Plan B: Home with family, Home Health      [email or web address] - [place], [place] - [address]  [address] [address]  Phone: [phone] Fax: [phone]    [facility] - [place], [place] - [address]  [address]  [place] [address]  Phone: [phone] Fax: [phone]      Initial Assessment (most recent)       Adult Discharge Assessment - 02/22/24 0955          Discharge Assessment    Assessment Type Discharge Planning Assessment     Confirmed/corrected address, phone number and insurance Yes     Confirmed Demographics Correct on Facesheet     Source of Information patient     When was your last doctors appointment? 01/25/24     Reason For Admission Colovesical fistula     People in Home spouse     Do you expect to return to your current living situation? Yes     Do you have help at home or someone to help you manage your care at home? Yes     Who are your caregiver(s) and their phone number(s)? Linda (Spouse) [phone] (Mobile)     Prior to hospitilization cognitive status: Alert/Oriented     Current cognitive status: Alert/Oriented     Walking or Climbing Stairs Difficulty yes     Walking or Climbing Stairs ambulation difficulty, requires equipment      Mobility Management rolator   The patient's spouse stated that they do have a rolling walker, but the patient does not use it.    Dressing/Bathing Difficulty yes     Dressing/Bathing bathing difficulty, requires equipment;bathing difficulty, assistance 1 person;dressing difficulty, assistance 1 person     Dressing/Bathing Management grab bars   The patient does have a shower chair, but he does not use it.    Do you have any problems with: Errands/Grocery;Needs other help     Home Accessibility --   The patient has a ramp attached to his home.    Home Layout Able to live on 1st floor     Equipment Currently Used at Home blood pressure machine;feeding device;rollator;nutrition supplies;raised toilet;grab bar;other (see comments)   The patient does have other equipment he currently is not using.    Readmission within 30 days? No     Patient currently being followed by outpatient case management? No     Do you currently have service(s) that help you manage your care at home? Yes     Name and Contact number of agency [phone]     Is the pt/caregiver preference to resume services with current agency Yes     Do you take prescription medications? Yes     Do you have prescription coverage? Yes     Coverage [employer]     Do you have any problems affording any of your prescribed medications? TBD     Is the patient taking medications as prescribed? yes     How do you get to doctors appointments? family or friend will provide     Are you on dialysis? No     Do you take coumadin? No     Discharge Plan A Home with family;Home Health   TBD    Discharge Plan B Home with family;Home Health     DME Needed Upon Discharge  other (see comments)   TBD    Discharge Plan discussed with: Patient;Spouse/sig other     Name(s) and Number(s) Linda (Spouse) [phone] (Mobile)     Transition of Care Barriers None                 Initial discharge assessment is completed. Spoke to the patient in his room. He was awake,  alert, and oriented to the questions being asked. I also spoke to the patient's spouse, Linda, via phone. The patient lives with his spouse. He does require assistance and equipment with his ADLs. The patient has home health services with [facility], and he plans to resume services with this agency. Kristina with [facility] was notified that the patient is currently admitted. At this time, this patient does not require any assistance from case management. Contact information was left in the patient's room. RAI/SERENE will remain available.

## 2024-02-22 NOTE — PROGRESS NOTES
Pharmacist Renal Dose Adjustment Note    Richard Farr is a 78 y.o. male being treated with the medication piperacillin-tazobactam.     Patient Data:    Vital Signs (Most Recent):  Temp: 98.5 °F (36.9 °C) (02/22/24 1501)  Pulse: 82 (02/22/24 1705)  Resp: 20 (02/22/24 1705)  BP: (!) 113/56 (02/22/24 1008)  SpO2: 96 % (02/22/24 1705) Vital Signs (72h Range):  Temp:  [97.2 °F (36.2 °C)-98.5 °F (36.9 °C)]   Pulse:  [70-90]   Resp:  [11-28]   BP: ()/(52-61)   SpO2:  [90 %-100 %]      Recent Labs   Lab 02/20/24  1037 02/22/24  0410   CREATININE 0.7 0.6     Serum creatinine: 0.6 mg/dL 02/22/24 0410  Estimated creatinine clearance: 98.2 mL/min    Piperacillin-tazobactam 4.5 grams IV every 12 hours will be changed to piperacillin-tazobactam 4.5 grams IV every 8 hours per renal dose protocol for CrCl > 20 ml/min.     Thank you,   Pharmacist's Name: Nasir Alvarado

## 2024-02-22 NOTE — PLAN OF CARE
Problem: Occupational Therapy  Goal: Occupational Therapy Goal  Description: Goals to be met by: 03/01/24     Patient will increase functional independence with ADLs by performing:    UE Dressing with Minimal Assistance.  LE Dressing with Moderate Assistance.  Grooming while seated at sink with Set-up Assistance.  Toileting from toilet with Minimal Assistance for hygiene and clothing management.   Bathing from  shower chair/bench with Minimal Assistance.  Toilet transfer to toilet with Stand-by Assistance.    Outcome: Established OT POC

## 2024-02-22 NOTE — PT/OT/SLP EVAL
Occupational Therapy   Evaluation    Name: Richard Farr  MRN: [id number]  Admitting Diagnosis: Colovesical fistula  Recent Surgery: Procedure(s) (LRB):  COLECTOMY, LEFT, LAPAROSCOPIC WITH GASTROSTOMY TUBE EXCHANGE (Left) 1 Day Post-Op    Recommendations:     Discharge Recommendations: Low Intensity Therapy  Discharge Equipment Recommendations:  none  Barriers to discharge:  Other (Comment) (Medical status)    Assessment:     Richard Farr is a 78 y.o. male with a medical diagnosis of Colovesical fistula.  He presents with IV to (L) bicep not intact with tubing in recliner and gown saturated on (L) side while patient not aware. However, patient verbalized multiple attempts to reposition self in recliner. Performance deficits affecting function: weakness, impaired endurance, impaired self care skills, impaired functional mobility, impaired balance, decreased coordination, decreased upper extremity function, decreased lower extremity function, decreased safety awareness, pain, abnormal tone, decreased ROM, impaired fine motor, impaired cardiopulmonary response to activity, impaired joint extensibility, impaired muscle length.      Rehab Prognosis: Good; patient would benefit from acute skilled OT services to address these deficits and reach maximum level of function.       Plan:     Patient to be seen 5 x/week to address the above listed problems via self-care/home management, therapeutic activities, therapeutic exercises  Plan of Care Expires: 03/01/24  Plan of Care Reviewed with: patient    Subjective     Chief Complaint: lack of activity  Patient/Family Comments/goals: Pt would like to return home with spouse.     Occupational Profile:  Living Environment: Pt lives with spouse in a [facility] with ramp to enter/exit.   Previous level of function: Assistance from spouse with bathing and dressing  Roles and Routines: ADL's  Equipment Used at Home: bedside commode, shower chair, rollator, raised toilet, nutrition supplies,  feeding device, blood pressure machine  Assistance upon Discharge: Spouse    Pain/Comfort:  Pain Rating 1: 5/10  Location - Side 1: Bilateral  Location 1: hand  Pain Addressed 1: Reposition, Distraction, Nurse notified  Pain Rating Post-Intervention 1: 5/10    Patients cultural, spiritual, Bahai conflicts given the current situation: no    Objective:     Communicated with: nurse prior to session.  Patient found up in chair with telemetry, SCD, pulse ox (continuous), peripheral IV, vilchis catheter, oxygen, blood pressure cuff upon OT entry to room.    General Precautions: Standard, fall  Orthopedic Precautions: N/A  Braces: N/A  Respiratory Status:  Venturi mask    Occupational Performance:    Bed Mobility:    Patient completed Sit to Supine with moderate assistance    Functional Mobility/Transfers:  Patient completed Sit <> Stand Transfer with minimum assistance  with  rolling walker   Patient completed Bed <> Chair Transfer using Step Transfer technique with minimum assistance with rolling walker  Patient completed Toilet Transfer Step Transfer technique with minimum assistance with  bedside commode    Activities of Daily Living:  Feeding:  Clear Liquid Diet    Grooming: supervision    Bathing: moderate assistance   Upper Body Dressing: minimum assistance    Lower Body Dressing: maximum assistance    Toileting: moderate assistance      Cognitive/Visual Perceptual:  Cognitive/Psychosocial Skills:  -       Oriented to: Person, Place, Time, and Situation   -       Follows Commands/attention:Follows multistep  commands  -       Communication: Impacted by Venturi mask  -       Memory: No Deficits noted  -       Safety awareness/insight to disability: impaired   -       Mood/Affect/Coping skills/emotional control: Cooperative    Physical Exam:  Postural examination/scapula alignment: -       Rounded shoulders  -       Forward head  -       Kyphosis  Sensation: -       Impaired  light/touch (L) UE  Dominant hand: -        Right  Upper Extremity Range of Motion:  -       Right Upper Extremity: WFL except 55 degrees shoulder flexion in plane of scaption  -       Left Upper Extremity: Deficits: Minimal active shoulder and elbow ROM with approx 25% of normal range in wrist and loose composite grasp in hand  Upper Extremity Strength: -       Right Upper Extremity: Deficits: 2+ to 3+/5  -       Left Upper Extremity: Deficits: 1 to 3-/5  Fine Motor Coordination: -       Impaired  Left hand, manipulation of objects greater impairment and Right hand, manipulation of objects    Gross motor coordination: (L) UE hemiplegia/paresis    AMPA 6 Click ADL:  AMPAC Total Score: 19    Treatment & Education:  Pt was provided education / instruction regarding role of OT and established OT POC.    Patient left HOB elevated with all lines intact, call button in reach, and nurse notified    GOALS:   Goals to be met by: 03/01/24     Patient will increase functional independence with ADLs by performing:    UE Dressing with Minimal Assistance.  LE Dressing with Moderate Assistance.  Grooming while seated at sink with Set-up Assistance.  Toileting from toilet with Minimal Assistance for hygiene and clothing management.   Bathing from  shower chair/bench with Minimal Assistance.  Toilet transfer to toilet with Stand-by Assistance.      History:     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    Coronary artery disease     Depression due to physical illness     Encounter for blood transfusion     Gastric ulcer     History of herpes zoster     Hyperlipemia     Hypertension     Moderate tricuspid insufficiency     NSVT (nonsustained ventricular tachycardia)     PAC (premature atrial contraction)     Patent foramen ovale with  right to left shunt     Pneumonia     Pneumonia due to infectious organism     Pulmonary hypertension     PVC's (premature ventricular contractions)     Severe mitral regurgitation     Shingles     Stroke     MINI TRAUMA; LEFT SIDED WEAKNESS    Thyroid disease     Walker as ambulation aid     Wears contact lenses          Past Surgical History:   Procedure Laterality Date    CARDIAC CATHETERIZATION      COLONOSCOPY N/A 11/23/2020    Procedure: COLONOSCOPY;  Surgeon: Abelino Garcia MD;  Location: [facility] ENDO;  Service: General;  Laterality: N/A;    COLONOSCOPY N/A 1/8/2024    Procedure: COLONOSCOPY;  Surgeon: Queenie Berman MD;  Location: [facility] ENDO;  Service: General;  Laterality: N/A;  2nd 0600    CYSTOSCOPY W/ RETROGRADES Bilateral 12/7/2023    Procedure: CYSTOSCOPY WITH RETROGRADE PYELOGRAM;  Surgeon: Juan Villela MD;  Location: [facility] OR;  Service: Urology;  Laterality: Bilateral;    DILATION OF URETHRA N/A 12/7/2023    Procedure: DILATION, URETHRA;  Surgeon: Juan Villela MD;  Location: [facility] OR;  Service: Urology;  Laterality: N/A;    EGD, WITH CLOSED BIOPSY N/A 10/4/2023    Procedure: EGD, WITH CLOSED BIOPSY;  Surgeon: Queenie Berman MD;  Location: [facility] OR;  Service: General;  Laterality: N/A;    ESOPHAGOGASTRODUODENOSCOPY      ESOPHAGOGASTRODUODENOSCOPY N/A 5/21/2021    Procedure: EGD (ESOPHAGOGASTRODUODENOSCOPY);  Surgeon: Sunny Rea MD;  Location: [facility] ENDO;  Service: Endoscopy;  Laterality: N/A;  COVID-VACCINATED    IMPLANTATION OF MITRAL VALVE LEAFLET CLIP Right 5/18/2021    Procedure: INSERTION, LEAFLET CLIP, MITRAL VALVE;  Surgeon: Zen Reina MD;  Location: [facility] CATH;  Service: Cardiovascular;  Laterality: Right;    INSERTION, PEG TUBE N/A 10/4/2023    Procedure: INSERTION, PEG TUBE;  Surgeon: Queenie Berman MD;  Location: [facility] OR;  Service: General;  Laterality: N/A;    NECK SURGERY      anterior cervical fusion x 2    TONSILLECTOMY      TRANSESOPHAGEAL  ECHOCARDIOGRAPHY         Time Tracking:     OT Date of Treatment: 02/22/24  OT Start Time: 1602  OT Stop Time: 1640  OT Total Time (min): 38 min    Billable Minutes:Evaluation 38    2/22/2024   No

## 2024-02-22 NOTE — ASSESSMENT & PLAN NOTE
POD#1 s/p lap sig colectomy with fistula takedown  -CLD   -OK to use G tube for medications   -IVFs @ 75cc/hr  -Multimodal pain control   -Restart home medications   -Lovenox today   -PT/OT

## 2024-02-23 PROBLEM — E83.39 HYPOPHOSPHATEMIA: Status: ACTIVE | Noted: 2024-02-23

## 2024-02-23 PROBLEM — Z79.899 ON DEEP VEIN THROMBOSIS (DVT) PROPHYLAXIS: Status: ACTIVE | Noted: 2024-02-23

## 2024-02-23 LAB
ALBUMIN SERPL BCP-MCNC: 2.5 G/DL (ref 3.5–5.2)
ANION GAP SERPL CALC-SCNC: 2 MMOL/L (ref 3–11)
BUN SERPL-MCNC: 9 MG/DL (ref 8–23)
CALCIUM SERPL-MCNC: 8.1 MG/DL (ref 8.7–10.5)
CHLORIDE SERPL-SCNC: 111 MMOL/L (ref 95–110)
CO2 SERPL-SCNC: 27 MMOL/L (ref 23–29)
CREAT SERPL-MCNC: 0.4 MG/DL (ref 0.5–1.4)
ERYTHROCYTE [DISTWIDTH] IN BLOOD BY AUTOMATED COUNT: 15.1 % (ref 11.5–14.5)
EST. GFR  (NO RACE VARIABLE): >60 ML/MIN/1.73 M^2
GLUCOSE SERPL-MCNC: 113 MG/DL (ref 70–110)
HCT VFR BLD AUTO: 28.2 % (ref 40–54)
HGB BLD-MCNC: 8.8 G/DL (ref 14–18)
MAGNESIUM SERPL-MCNC: 1.7 MG/DL (ref 1.6–2.6)
MCH RBC QN AUTO: 29 PG (ref 27–31)
MCHC RBC AUTO-ENTMCNC: 31.2 G/DL (ref 32–36)
MCV RBC AUTO: 93 FL (ref 82–98)
PHOSPHATE SERPL-MCNC: 1.6 MG/DL (ref 2.7–4.5)
PLATELET # BLD AUTO: 184 K/UL (ref 150–450)
PMV BLD AUTO: 10.8 FL (ref 9.2–12.9)
POTASSIUM SERPL-SCNC: 3.6 MMOL/L (ref 3.5–5.1)
RBC # BLD AUTO: 3.03 M/UL (ref 4.6–6.2)
SODIUM SERPL-SCNC: 140 MMOL/L (ref 136–145)
WBC # BLD AUTO: 9.55 K/UL (ref 3.9–12.7)

## 2024-02-23 PROCEDURE — 25000003 PHARM REV CODE 250: Performed by: STUDENT IN AN ORGANIZED HEALTH CARE EDUCATION/TRAINING PROGRAM

## 2024-02-23 PROCEDURE — 25000003 PHARM REV CODE 250

## 2024-02-23 PROCEDURE — 21400001 HC TELEMETRY ROOM

## 2024-02-23 PROCEDURE — 97530 THERAPEUTIC ACTIVITIES: CPT | Mod: CQ

## 2024-02-23 PROCEDURE — 63600175 PHARM REV CODE 636 W HCPCS: Performed by: STUDENT IN AN ORGANIZED HEALTH CARE EDUCATION/TRAINING PROGRAM

## 2024-02-23 PROCEDURE — 85027 COMPLETE CBC AUTOMATED: CPT | Performed by: STUDENT IN AN ORGANIZED HEALTH CARE EDUCATION/TRAINING PROGRAM

## 2024-02-23 PROCEDURE — 80069 RENAL FUNCTION PANEL: CPT | Performed by: STUDENT IN AN ORGANIZED HEALTH CARE EDUCATION/TRAINING PROGRAM

## 2024-02-23 PROCEDURE — 36415 COLL VENOUS BLD VENIPUNCTURE: CPT | Performed by: STUDENT IN AN ORGANIZED HEALTH CARE EDUCATION/TRAINING PROGRAM

## 2024-02-23 PROCEDURE — 97116 GAIT TRAINING THERAPY: CPT | Mod: CQ

## 2024-02-23 PROCEDURE — 94761 N-INVAS EAR/PLS OXIMETRY MLT: CPT

## 2024-02-23 PROCEDURE — 83735 ASSAY OF MAGNESIUM: CPT | Performed by: STUDENT IN AN ORGANIZED HEALTH CARE EDUCATION/TRAINING PROGRAM

## 2024-02-23 PROCEDURE — 99900035 HC TECH TIME PER 15 MIN (STAT)

## 2024-02-23 PROCEDURE — 27000221 HC OXYGEN, UP TO 24 HOURS

## 2024-02-23 PROCEDURE — 97110 THERAPEUTIC EXERCISES: CPT | Mod: CO

## 2024-02-23 PROCEDURE — 11000001 HC ACUTE MED/SURG PRIVATE ROOM

## 2024-02-23 PROCEDURE — 99900031 HC PATIENT EDUCATION (STAT)

## 2024-02-23 RX ORDER — SODIUM,POTASSIUM PHOSPHATES 280-250MG
2 POWDER IN PACKET (EA) ORAL 3 TIMES DAILY
Status: COMPLETED | OUTPATIENT
Start: 2024-02-23 | End: 2024-02-23

## 2024-02-23 RX ORDER — SODIUM,POTASSIUM PHOSPHATES 280-250MG
2 POWDER IN PACKET (EA) ORAL 3 TIMES DAILY
Status: DISCONTINUED | OUTPATIENT
Start: 2024-02-23 | End: 2024-02-23

## 2024-02-23 RX ADMIN — MUPIROCIN: 20 OINTMENT TOPICAL at 10:02

## 2024-02-23 RX ADMIN — SODIUM CHLORIDE, POTASSIUM CHLORIDE, SODIUM LACTATE AND CALCIUM CHLORIDE: 600; 310; 30; 20 INJECTION, SOLUTION INTRAVENOUS at 05:02

## 2024-02-23 RX ADMIN — METHOCARBAMOL 500 MG: 500 TABLET ORAL at 04:02

## 2024-02-23 RX ADMIN — METHOCARBAMOL 500 MG: 500 TABLET ORAL at 01:02

## 2024-02-23 RX ADMIN — POTASSIUM & SODIUM PHOSPHATES POWDER PACK 280-160-250 MG 2 PACKET: 280-160-250 PACK at 10:02

## 2024-02-23 RX ADMIN — ENOXAPARIN SODIUM 40 MG: 40 INJECTION SUBCUTANEOUS at 04:02

## 2024-02-23 RX ADMIN — LEVOTHYROXINE SODIUM 50 MCG: 50 TABLET ORAL at 05:02

## 2024-02-23 RX ADMIN — POTASSIUM & SODIUM PHOSPHATES POWDER PACK 280-160-250 MG 2 PACKET: 280-160-250 PACK at 03:02

## 2024-02-23 RX ADMIN — ATORVASTATIN CALCIUM 80 MG: 80 TABLET, FILM COATED ORAL at 10:02

## 2024-02-23 RX ADMIN — METOPROLOL SUCCINATE 12.5 MG: 25 TABLET, EXTENDED RELEASE ORAL at 10:02

## 2024-02-23 RX ADMIN — METHOCARBAMOL 500 MG: 500 TABLET ORAL at 10:02

## 2024-02-23 RX ADMIN — CEFTRIAXONE 1 G: 1 INJECTION, POWDER, FOR SOLUTION INTRAMUSCULAR; INTRAVENOUS at 10:02

## 2024-02-23 RX ADMIN — SODIUM CHLORIDE, POTASSIUM CHLORIDE, SODIUM LACTATE AND CALCIUM CHLORIDE: 600; 310; 30; 20 INJECTION, SOLUTION INTRAVENOUS at 02:02

## 2024-02-23 RX ADMIN — PIPERACILLIN SODIUM AND TAZOBACTAM SODIUM 4.5 G: 4; .5 INJECTION, POWDER, FOR SOLUTION INTRAVENOUS at 03:02

## 2024-02-23 NOTE — PLAN OF CARE
Problem: Physical Therapy  Goal: Physical Therapy Goal  Description: Goals to be met by: 2024    Patient will increase functional independence with mobility by performin. Supine to sit with Modified Independent.  2. Sit to supine with Modified Independent.  3. Bed to chair transfer with Supervision or Set-up Assistance with rolling walker using Step Transfer technique.  4. Sit to Stand with Supervision or Set-up Assistance with rolling walker.  5. Gait  x 300  feet with Supervision or Set-up Assistance with rolling walker.  6. Lower extremity exercise program x10 reps.   Outcome: Ongoing, Progressing     Yuki Castaneda, PTA  2024

## 2024-02-23 NOTE — ASSESSMENT & PLAN NOTE
Acute on chronic secondary to colovesical fistula   Pre-op UA positive for GNR  Start Zosyn   Keep vilchis due to pelvic surgery and bladder fistula   Will perform cysto prior to removal     02/23 CP:   Urine culture resulted + for Klebsiella Pneumoniae, on appropriate abx, continue IV Zosyn  Continue Vilchis to gravity s/t pelvis surgery and bladder fistula

## 2024-02-23 NOTE — PLAN OF CARE
Problem: Occupational Therapy  Goal: Occupational Therapy Goal  Description: Goals to be met by: 03/01/24     Patient will increase functional independence with ADLs by performing:    UE Dressing with Minimal Assistance.  LE Dressing with Moderate Assistance.  Grooming while seated at sink with Set-up Assistance.  Toileting from toilet with Minimal Assistance for hygiene and clothing management.   Bathing from  shower chair/bench with Minimal Assistance.  Toilet transfer to toilet with Stand-by Assistance.    Outcome: Ongoing, Progressing

## 2024-02-23 NOTE — PLAN OF CARE
Plan of care reviewed with patient. Pt is free of falls and injury. NADN, VSS. Pt tolerating medications well. Pt afebrile during shift. Questions and concerns answered.

## 2024-02-23 NOTE — ANESTHESIA POSTPROCEDURE EVALUATION
Anesthesia Post Evaluation    Patient: Richard Farr    Procedure(s) Performed: Procedure(s) (LRB):  COLECTOMY, LEFT, LAPAROSCOPIC WITH GASTROSTOMY TUBE EXCHANGE (Left)    Final Anesthesia Type: general      Patient location during evaluation: ICU  Patient participation: Yes- Able to Participate  Level of consciousness: awake  Post-procedure vital signs: reviewed and stable  Pain management: adequate  Airway patency: patent    PONV status at discharge: No PONV  Anesthetic complications: no      Cardiovascular status: blood pressure returned to baseline  Respiratory status: spontaneous ventilation  Hydration status: euvolemic  Follow-up not needed.              Vitals Value Taken Time   /76 02/23/24 0832   Temp 36.5 °C (97.7 °F) 02/23/24 0701   Pulse 95 02/23/24 0844   Resp 29 02/23/24 0844   SpO2 90 % 02/23/24 0844   Vitals shown include unvalidated device data.      No case tracking events are documented in the log.      Pain/Nato Score: Pain Rating Prior to Med Admin: 2 (2/22/2024  2:20 PM)  Pain Rating Post Med Admin: 2 (2/22/2024  2:52 PM)

## 2024-02-23 NOTE — PLAN OF CARE
Problem: Adult Inpatient Plan of Care  Goal: Plan of Care Review  Outcome: Ongoing, Progressing  Goal: Patient-Specific Goal (Individualized)  Outcome: Ongoing, Progressing  Goal: Absence of Hospital-Acquired Illness or Injury  Outcome: Ongoing, Progressing  Goal: Optimal Comfort and Wellbeing  Outcome: Ongoing, Progressing  Goal: Readiness for Transition of Care  Outcome: Ongoing, Progressing     Problem: Fluid and Electrolyte Imbalance (Acute Kidney Injury/Impairment)  Goal: Fluid and Electrolyte Balance  Outcome: Ongoing, Progressing     Problem: Oral Intake Inadequate (Acute Kidney Injury/Impairment)  Goal: Optimal Nutrition Intake  Outcome: Ongoing, Progressing     Problem: Renal Function Impairment (Acute Kidney Injury/Impairment)  Goal: Effective Renal Function  Outcome: Ongoing, Progressing     Problem: Infection  Goal: Absence of Infection Signs and Symptoms  Outcome: Ongoing, Progressing     Problem: Fall Injury Risk  Goal: Absence of Fall and Fall-Related Injury  Outcome: Ongoing, Progressing     Problem: Skin Injury Risk Increased  Goal: Skin Health and Integrity  Outcome: Ongoing, Progressing

## 2024-02-23 NOTE — HOSPITAL COURSE
02/23 CP: Colovesical fistula s/p fistula take down and sigmoid resection POD#2. Pt is tolerating CLD with + BS, passing flatus, and 3 small bms. Reports pain is controlled. PT OT consulted for early mobilization. Urine culture resulted, on IV Zosyn.  02/26 CP: Colovesical fistula s/p fistula take down and sigmoid resection POD#5. Pt experienced increased O2 requirements with respiratory acidosis and hypoxia. On bipap. Reports pain is controlled. Limited po intake.  02/27 CP: Colovesical fistula s/p fistula take down and sigmoid resection POD#6. Surgical dressings removed, staples to incision sites intact. Pt being weaned off bipap, on supplemental O2 via NC. Responding well to current abx regimen, afebrile and WBC trending down. Diuresing well after Lasix dose increased. Encouraged good po intake. Appreciate internal medicine and dietary recs.  02/28 CP: Colovesical fistula s/p fistula take down and sigmoid resection POD#7. Surgical dressing changed-iodine paint and mepilex. Multiple bms yesterday after starting bolus TF. On 4 NC. Runs of SVT noted. Discharge planning to IRF.  2/29: Improvement with PM BiPAP.  HD stable.  James removed.  Tolerating soft diet.  OK for discharge to SNF.

## 2024-02-23 NOTE — PT/OT/SLP PROGRESS
"Physical Therapy Treatment    Patient Name:  Richard Farr   MRN:  [id number]    Recommendations:     Discharge Recommendations: Low Intensity Therapy  Discharge Equipment Recommendations: none  Barriers to discharge: None    Assessment:     Richard Farr is a 78 y.o. male admitted with a medical diagnosis of Colovesical fistula.  He presents with the following impairments/functional limitations: weakness, impaired endurance, impaired self care skills, impaired functional mobility, impaired balance, gait instability, decreased lower extremity function, decreased upper extremity function, decreased ROM, decreased safety awareness, impaired muscle length, impaired joint extensibility, impaired fine motor. Patient able to ambulate ~100ft with rolling walker with CGA. Patient reports using a rollator at home, however patient gait unsteady with rolling walker. Patient demonstrated difficulty turning to left and difficulty navigating rolling walker during ambulation.    Rehab Prognosis: Good; patient would benefit from acute skilled PT services to address these deficits and reach maximum level of function.    Recent Surgery: Procedure(s) (LRB):  COLECTOMY, LEFT, LAPAROSCOPIC WITH GASTROSTOMY TUBE EXCHANGE (Left) 2 Days Post-Op    Plan:     During this hospitalization, patient to be seen 5 x/week to address the identified rehab impairments via gait training, therapeutic activities, therapeutic exercises, neuromuscular re-education and progress toward the following goals:    Plan of Care Expires:  02/29/24    Subjective     Chief Complaint: "I need to go to the bathroom"  Patient/Family Comments/goals: to get better  Pain/Comfort:  Pain Rating 1: 0/10  Pain Rating Post-Intervention 1: 0/10      Objective:     Communicated with patient, nurse prior to session.  Patient found HOB elevated with telemetry, peripheral IV, oxygen upon PT entry to room.     General Precautions: Standard, fall  Orthopedic Precautions: N/A  Braces: " N/A  Respiratory Status: Nasal cannula, flow 3 L/min     Functional Mobility:  Bed Mobility:     Rolling Left:  minimum assistance  Rolling Right: minimum assistance  Scooting: minimum assistance  Bridging: minimum assistance  Supine to Sit: minimum assistance  Transfers:     Sit to Stand:  contact guard assistance with rolling walker  Toilet Transfer: contact guard assistance with  rolling walker  using  Step Transfer  Gait: Patient ambulated ~100ft with rolling walker with CGA. Patient presented with unstable gait and varied gait pattern. Patient Required verbal and tactile cues for safety.       AM-PAC 6 CLICK MOBILITY  Turning over in bed (including adjusting bedclothes, sheets and blankets)?: 3  Sitting down on and standing up from a chair with arms (e.g., wheelchair, bedside commode, etc.): 3  Moving from lying on back to sitting on the side of the bed?: 3  Moving to and from a bed to a chair (including a wheelchair)?: 3  Need to walk in hospital room?: 3  Climbing 3-5 steps with a railing?: 2 (clinical judgment)  Basic Mobility Total Score: 17       Treatment & Education:  Patient required verbal and tactile cues for safety during ambulation and during sit to stand transitions. Patient may need further reinforcement for safety education.     Patient left  on bedside commode  with all lines intact, call button in reach, and nurse and PCT notified..    GOALS:   Multidisciplinary Problems       Physical Therapy Goals          Problem: Physical Therapy    Goal Priority Disciplines Outcome Goal Variances Interventions   Physical Therapy Goal     PT, PT/OT Ongoing, Progressing     Description: Goals to be met by: 2024    Patient will increase functional independence with mobility by performin. Supine to sit with Modified Independent.  2. Sit to supine with Modified Independent.  3. Bed to chair transfer with Supervision or Set-up Assistance with rolling walker using Step Transfer technique.  4. Sit to  Stand with Supervision or Set-up Assistance with rolling walker.  5. Gait  x 300  feet with Supervision or Set-up Assistance with rolling walker.  6. Lower extremity exercise program x10 reps.                        Time Tracking:     PT Received On: 02/23/24  PT Start Time: 1333     PT Stop Time: 1356  PT Total Time (min): 23 min     Billable Minutes: Gait Training 10 and Therapeutic Activity 13    Treatment Type: Treatment  PT/PTA: PTA     Number of PTA visits since last PT visit: 1   Yuki Castaneda, PTA  02/23/2024

## 2024-02-23 NOTE — SUBJECTIVE & OBJECTIVE
Interval History: NAEON.  VSS; afebrile.  Tolerating ice chips and sips of clears.  +flatus.  Pain controlled with medication.  Ambulated with PT today.     Medications:  Continuous Infusions:   lactated ringers 75 mL/hr at 02/23/24 0848     Scheduled Meds:   atorvastatin  80 mg Oral QHS    enoxparin  40 mg Subcutaneous Q24H (prophylaxis, 1700)    levothyroxine  50 mcg Oral Before breakfast    methocarbamoL  500 mg Per G Tube QID    metoprolol succinate  12.5 mg Oral Daily    mupirocin   Nasal BID    piperacillin-tazobactam (Zosyn) IV (PEDS and ADULTS) (extended infusion is not appropriate)  4.5 g Intravenous Q8H    potassium, sodium phosphates  2 packet Per G Tube TID     PRN Meds:acetaminophen, LIDOcaine (PF) 10 mg/ml (1%), melatonin, morphine, ondansetron, oxyCODONE, sodium chloride 0.9%     Review of patient's allergies indicates:   Allergen Reactions    Ativan [lorazepam]      Adverse reaction.     Objective:     Vital Signs (Most Recent):  Temp: 97.7 °F (36.5 °C) (02/23/24 0701)  Pulse: 95 (02/23/24 0845)  Resp: (!) 29 (02/23/24 0845)  BP: 134/76 (02/23/24 0830)  SpO2: (!) 90 % (02/23/24 0845) Vital Signs (24h Range):  Temp:  [97.2 °F (36.2 °C)-98.6 °F (37 °C)] 97.7 °F (36.5 °C)  Pulse:  [] 95  Resp:  [17-39] 29  SpO2:  [88 %-98 %] 90 %  BP: (103-144)/(51-76) 134/76     Weight: 76.7 kg (169 lb)  Body mass index is 25.7 kg/m².    Intake/Output - Last 3 Shifts         02/21 0700 02/22 0659 02/22 0700 02/23 0659 02/23 0700 02/24 0659    P.O.  600     I.V. (mL/kg) 4865 (63.4) 1902.4 (24.8) 267.4 (3.5)    Blood 100      Other 170      NG/GT 60 120     IV Piggyback 100 346.2 50.6    Total Intake(mL/kg) 5295 (69) 2968.6 (38.7) 317.9 (4.1)    Urine (mL/kg/hr) 570 550 (0.3)     Stool 0 1     Total Output 570 551     Net +4725 +2417.6 +317.9           Stool Occurrence 3 x 3 x              Physical Exam  Vitals and nursing note reviewed.   Constitutional:       General: He is not in acute distress.      Appearance: He is not ill-appearing or toxic-appearing.   Cardiovascular:      Rate and Rhythm: Normal rate and regular rhythm.   Pulmonary:      Effort: Pulmonary effort is normal. No respiratory distress.   Abdominal:      General: Bowel sounds are normal. There is no distension.      Palpations: Abdomen is soft.      Tenderness: There is abdominal tenderness. There is no guarding or rebound.      Comments: Midline incisions with surgical dressing in place    Musculoskeletal:      Right lower leg: No edema.      Left lower leg: No edema.   Skin:     General: Skin is warm and dry.      Capillary Refill: Capillary refill takes less than 2 seconds.   Neurological:      Mental Status: He is alert. Mental status is at baseline.          Significant Labs:  I have reviewed all pertinent lab results within the past 24 hours.  CBC:   Recent Labs   Lab 02/23/24  0425   WBC 9.55   RBC 3.03*   HGB 8.8*   HCT 28.2*      MCV 93   MCH 29.0   MCHC 31.2*       CMP:   Recent Labs   Lab 02/22/24  0410 02/23/24  0425   GLU 82 113*   CALCIUM 8.0* 8.1*   ALBUMIN 2.6* 2.5*   PROT 5.9*  --     140   K 3.8 3.6   CO2 25 27    111*   BUN 16 9   CREATININE 0.6 0.4*   ALKPHOS 53*  --    ALT 18  --    AST 36  --    BILITOT 0.6  --          Significant Diagnostics:  I have reviewed all pertinent imaging results/findings within the past 24 hours.

## 2024-02-23 NOTE — PROGRESS NOTES
[facility] Intensive Care  General Surgery  Progress Note    Subjective:     History of Present Illness:  77yo male with colovesical fistula who presents for fistula take down and sigmoid resection.       Hospital Course:  02/23 CP: Colovesical fistula s/p fistula take down and sigmoid resection POD#2. Pt is tolerating CLD with + BS, passing flatus, and 3 small bms. Reports pain is controlled. PT OT consulted for early mobilization. Urine culture resulted, on IV Zosyn.    Post-Op Info:  Procedure(s) (LRB):  COLECTOMY, LEFT, LAPAROSCOPIC WITH GASTROSTOMY TUBE EXCHANGE (Left)   2 Days Post-Op     Interval History: NAEON.  VSS; afebrile.  Tolerating ice chips and sips of clears.  +flatus.  Pain controlled with medication.  Ambulated with PT today.     Medications:  Continuous Infusions:   lactated ringers 75 mL/hr at 02/23/24 0848     Scheduled Meds:   atorvastatin  80 mg Oral QHS    enoxparin  40 mg Subcutaneous Q24H (prophylaxis, 1700)    levothyroxine  50 mcg Oral Before breakfast    methocarbamoL  500 mg Per G Tube QID    metoprolol succinate  12.5 mg Oral Daily    mupirocin   Nasal BID    piperacillin-tazobactam (Zosyn) IV (PEDS and ADULTS) (extended infusion is not appropriate)  4.5 g Intravenous Q8H    potassium, sodium phosphates  2 packet Per G Tube TID     PRN Meds:acetaminophen, LIDOcaine (PF) 10 mg/ml (1%), melatonin, morphine, ondansetron, oxyCODONE, sodium chloride 0.9%     Review of patient's allergies indicates:   Allergen Reactions    Ativan [lorazepam]      Adverse reaction.     Objective:     Vital Signs (Most Recent):  Temp: 97.7 °F (36.5 °C) (02/23/24 0701)  Pulse: 95 (02/23/24 0845)  Resp: (!) 29 (02/23/24 0845)  BP: 134/76 (02/23/24 0830)  SpO2: (!) 90 % (02/23/24 0845) Vital Signs (24h Range):  Temp:  [97.2 °F (36.2 °C)-98.6 °F (37 °C)] 97.7 °F (36.5 °C)  Pulse:  [] 95  Resp:  [17-39] 29  SpO2:  [88 %-98 %] 90 %  BP: (103-144)/(51-76) 134/76     Weight: 76.7 kg (169 lb)  Body mass index is  25.7 kg/m².    Intake/Output - Last 3 Shifts         02/21 0700  02/22 0659 02/22 0700 02/23 0659 02/23 0700 02/24 0659    P.O.  600     I.V. (mL/kg) 4865 (63.4) 1902.4 (24.8) 267.4 (3.5)    Blood 100      Other 170      NG/GT 60 120     IV Piggyback 100 346.2 50.6    Total Intake(mL/kg) 5295 (69) 2968.6 (38.7) 317.9 (4.1)    Urine (mL/kg/hr) 570 550 (0.3)     Stool 0 1     Total Output 570 551     Net +4725 +2417.6 +317.9           Stool Occurrence 3 x 3 x             Physical Exam  Vitals and nursing note reviewed.   Constitutional:       General: He is not in acute distress.     Appearance: He is not ill-appearing or toxic-appearing.   Cardiovascular:      Rate and Rhythm: Normal rate and regular rhythm.   Pulmonary:      Effort: Pulmonary effort is normal. No respiratory distress.   Abdominal:      General: Bowel sounds are normal. There is no distension.      Palpations: Abdomen is soft.      Tenderness: There is abdominal tenderness. There is no guarding or rebound.      Comments: Midline incisions with surgical dressing in place    Musculoskeletal:      Right lower leg: No edema.      Left lower leg: No edema.   Skin:     General: Skin is warm and dry.      Capillary Refill: Capillary refill takes less than 2 seconds.   Neurological:      Mental Status: He is alert. Mental status is at baseline.          Significant Labs:  I have reviewed all pertinent lab results within the past 24 hours.  CBC:   Recent Labs   Lab 02/23/24  0425   WBC 9.55   RBC 3.03*   HGB 8.8*   HCT 28.2*      MCV 93   MCH 29.0   MCHC 31.2*       CMP:   Recent Labs   Lab 02/22/24  0410 02/23/24  0425   GLU 82 113*   CALCIUM 8.0* 8.1*   ALBUMIN 2.6* 2.5*   PROT 5.9*  --     140   K 3.8 3.6   CO2 25 27    111*   BUN 16 9   CREATININE 0.6 0.4*   ALKPHOS 53*  --    ALT 18  --    AST 36  --    BILITOT 0.6  --          Significant Diagnostics:  I have reviewed all pertinent imaging results/findings within the past 24  hours.  Assessment/Plan:     * Colovesical fistula  POD#1 s/p lap sig colectomy with fistula takedown  -CLD   -OK to use G tube for medications   -IVFs @ 75cc/hr  -Multimodal pain control   -Restart home medications   -Lovenox today   -PT/OT    02/23 CP: POD#2  Advance diet to full liquids  Multimodal pan control   PT/OT  Surgical dressings intact    Hypophosphatemia  Monitor and replace per G tube.    On deep vein thrombosis (DVT) prophylaxis  Lovenox therapy.    Acute cystitis without hematuria  Acute on chronic secondary to colovesical fistula   Pre-op UA positive for GNR  Start Zosyn   Keep vilchis due to pelvic surgery and bladder fistula   Will perform cysto prior to removal     02/23 CP:   Urine culture resulted + for Klebsiella Pneumoniae, on appropriate abx, continue IV Zosyn  Continue Vilchis to gravity s/t pelvis surgery and bladder fistula           CHAVEZ BUTLER  General Surgery  [place] - Intensive Care

## 2024-02-23 NOTE — PT/OT/SLP PROGRESS
Occupational Therapy   Treatment    Name: Richard Farr  MRN: [id number]  Admitting Diagnosis:  Colovesical fistula  2 Days Post-Op    Recommendations:     Discharge Recommendations: Low Intensity Therapy  Discharge Equipment Recommendations:  none  Barriers to discharge:  Other (Comment) (medical status)    Assessment:     Richard Farr is a 78 y.o. male with a medical diagnosis of Colovesical fistula.  He presents with fair participation. Performance deficits affecting function are weakness, impaired endurance, impaired self care skills, impaired functional mobility, gait instability, impaired balance, decreased coordination, decreased upper extremity function, decreased lower extremity function, decreased safety awareness, pain, abnormal tone, decreased ROM, impaired coordination, impaired fine motor, impaired cardiopulmonary response to activity, impaired joint extensibility, impaired muscle length.     Rehab Prognosis:  Fair; patient would benefit from acute skilled OT services to address these deficits and reach maximum level of function.       Plan:     Patient to be seen 5 x/week to address the above listed problems via self-care/home management, therapeutic activities, therapeutic exercises  Plan of Care Expires: 03/01/24  Plan of Care Reviewed with: patient    Subjective     Chief Complaint: Reported none  Patient/Family Comments/goals: Get better  Pain/Comfort:  Pain Rating 1: 0/10  Pain Rating Post-Intervention 1: 0/10    Objective:     Communicated with: occupational therapist and RN prior to session.  Patient found HOB elevated with telemetry, SCD, pulse ox (continuous), peripheral IV, vilchis catheter, oxygen, blood pressure cuff upon OT entry to room.    General Precautions: Standard, fall    Orthopedic Precautions:N/A  Braces: N/A  Respiratory Status: Nasal cannula     AMPAC 6 Click ADL: 19    Treatment & Education:  Patient engaged in active range of motion therapeutic exercise for 2 x 15 supine with  HOB elevated in the following planes: shoulder flexion/extension, shoulder abduction/adduction, elbow flexion/extension, scapular retractions/protraction, scapular elevation/depression, shoulder rotations (forward and reverse), wrist flexion/extension, wrist radial/ulnar deviation, forearm supination/pronation, and composite fist. Patient needed rest breaks between each set due to impaired muscle endurance and activity tolerance. Patient educated to continue to complete exercise throughout the day to increase strength and endurance to facilitate an increase in ADL/IADLs. Patient verbally understood.   LUE had very little movement due to arthritis per patient. Pt with contracture in left MCP joints common with RA.     Patient left HOB elevated with all lines intact, call button in reach, and nurse notified    GOALS:   Multidisciplinary Problems       Occupational Therapy Goals          Problem: Occupational Therapy    Goal Priority Disciplines Outcome Interventions   Occupational Therapy Goal     OT, PT/OT Ongoing, Progressing    Description: Goals to be met by: 03/01/24     Patient will increase functional independence with ADLs by performing:    UE Dressing with Minimal Assistance.  LE Dressing with Moderate Assistance.  Grooming while seated at sink with Set-up Assistance.  Toileting from toilet with Minimal Assistance for hygiene and clothing management.   Bathing from  shower chair/bench with Minimal Assistance.  Toilet transfer to toilet with Stand-by Assistance.                         Time Tracking:     OT Date of Treatment: 02/23/24  OT Start Time: 1418  OT Stop Time: 1428  OT Total Time (min): 10 min    Billable Minutes:Therapeutic Exercise 10 min    OT/SIRI: SIRI     Number of SIRI visits since last OT visit: 1    2/23/2024  Doreen ALMEIDA

## 2024-02-23 NOTE — ASSESSMENT & PLAN NOTE
POD#1 s/p lap sig colectomy with fistula takedown  -CLD   -OK to use G tube for medications   -IVFs @ 75cc/hr  -Multimodal pain control   -Restart home medications   -Lovenox today   -PT/OT    02/23 CP: POD#2  Advance diet to full liquids  Multimodal pan control   PT/OT  Surgical dressings intact

## 2024-02-24 LAB
ALBUMIN SERPL BCP-MCNC: 2.8 G/DL (ref 3.5–5.2)
ANION GAP SERPL CALC-SCNC: 3 MMOL/L (ref 3–11)
BUN SERPL-MCNC: 12 MG/DL (ref 8–23)
CALCIUM SERPL-MCNC: 8.7 MG/DL (ref 8.7–10.5)
CHLORIDE SERPL-SCNC: 108 MMOL/L (ref 95–110)
CO2 SERPL-SCNC: 27 MMOL/L (ref 23–29)
CREAT SERPL-MCNC: 0.4 MG/DL (ref 0.5–1.4)
ERYTHROCYTE [DISTWIDTH] IN BLOOD BY AUTOMATED COUNT: 15.1 % (ref 11.5–14.5)
EST. GFR  (NO RACE VARIABLE): >60 ML/MIN/1.73 M^2
GLUCOSE SERPL-MCNC: 137 MG/DL (ref 70–110)
HCT VFR BLD AUTO: 30.2 % (ref 40–54)
HGB BLD-MCNC: 9.7 G/DL (ref 14–18)
MAGNESIUM SERPL-MCNC: 1.8 MG/DL (ref 1.6–2.6)
MCH RBC QN AUTO: 29.4 PG (ref 27–31)
MCHC RBC AUTO-ENTMCNC: 32.1 G/DL (ref 32–36)
MCV RBC AUTO: 92 FL (ref 82–98)
PHOSPHATE SERPL-MCNC: 2 MG/DL (ref 2.7–4.5)
PLATELET # BLD AUTO: 195 K/UL (ref 150–450)
PMV BLD AUTO: 11 FL (ref 9.2–12.9)
POTASSIUM SERPL-SCNC: 3.9 MMOL/L (ref 3.5–5.1)
RBC # BLD AUTO: 3.3 M/UL (ref 4.6–6.2)
SODIUM SERPL-SCNC: 138 MMOL/L (ref 136–145)
WBC # BLD AUTO: 10.23 K/UL (ref 3.9–12.7)

## 2024-02-24 PROCEDURE — 94761 N-INVAS EAR/PLS OXIMETRY MLT: CPT

## 2024-02-24 PROCEDURE — 63600175 PHARM REV CODE 636 W HCPCS: Performed by: STUDENT IN AN ORGANIZED HEALTH CARE EDUCATION/TRAINING PROGRAM

## 2024-02-24 PROCEDURE — 83735 ASSAY OF MAGNESIUM: CPT

## 2024-02-24 PROCEDURE — 85027 COMPLETE CBC AUTOMATED: CPT

## 2024-02-24 PROCEDURE — 36415 COLL VENOUS BLD VENIPUNCTURE: CPT

## 2024-02-24 PROCEDURE — 27000221 HC OXYGEN, UP TO 24 HOURS

## 2024-02-24 PROCEDURE — 21400001 HC TELEMETRY ROOM

## 2024-02-24 PROCEDURE — 99900035 HC TECH TIME PER 15 MIN (STAT)

## 2024-02-24 PROCEDURE — 94640 AIRWAY INHALATION TREATMENT: CPT

## 2024-02-24 PROCEDURE — 80069 RENAL FUNCTION PANEL: CPT

## 2024-02-24 PROCEDURE — 25000003 PHARM REV CODE 250: Performed by: STUDENT IN AN ORGANIZED HEALTH CARE EDUCATION/TRAINING PROGRAM

## 2024-02-24 PROCEDURE — 25000242 PHARM REV CODE 250 ALT 637 W/ HCPCS: Performed by: STUDENT IN AN ORGANIZED HEALTH CARE EDUCATION/TRAINING PROGRAM

## 2024-02-24 PROCEDURE — 99900031 HC PATIENT EDUCATION (STAT)

## 2024-02-24 PROCEDURE — 11000001 HC ACUTE MED/SURG PRIVATE ROOM

## 2024-02-24 RX ORDER — IPRATROPIUM BROMIDE AND ALBUTEROL SULFATE 2.5; .5 MG/3ML; MG/3ML
3 SOLUTION RESPIRATORY (INHALATION)
Status: DISCONTINUED | OUTPATIENT
Start: 2024-02-24 | End: 2024-02-27

## 2024-02-24 RX ORDER — TRAZODONE HYDROCHLORIDE 50 MG/1
50 TABLET ORAL NIGHTLY
Status: DISCONTINUED | OUTPATIENT
Start: 2024-02-24 | End: 2024-02-26

## 2024-02-24 RX ADMIN — ENOXAPARIN SODIUM 40 MG: 40 INJECTION SUBCUTANEOUS at 05:02

## 2024-02-24 RX ADMIN — LEVOTHYROXINE SODIUM 50 MCG: 50 TABLET ORAL at 07:02

## 2024-02-24 RX ADMIN — TRAZODONE HYDROCHLORIDE 50 MG: 50 TABLET ORAL at 09:02

## 2024-02-24 RX ADMIN — METHOCARBAMOL 500 MG: 500 TABLET ORAL at 09:02

## 2024-02-24 RX ADMIN — IPRATROPIUM BROMIDE AND ALBUTEROL SULFATE 3 ML: .5; 3 SOLUTION RESPIRATORY (INHALATION) at 07:02

## 2024-02-24 RX ADMIN — METOPROLOL SUCCINATE 12.5 MG: 25 TABLET, EXTENDED RELEASE ORAL at 09:02

## 2024-02-24 RX ADMIN — SODIUM CHLORIDE, POTASSIUM CHLORIDE, SODIUM LACTATE AND CALCIUM CHLORIDE: 600; 310; 30; 20 INJECTION, SOLUTION INTRAVENOUS at 07:02

## 2024-02-24 RX ADMIN — ATORVASTATIN CALCIUM 80 MG: 80 TABLET, FILM COATED ORAL at 09:02

## 2024-02-24 RX ADMIN — MUPIROCIN: 20 OINTMENT TOPICAL at 09:02

## 2024-02-24 RX ADMIN — METHOCARBAMOL 500 MG: 500 TABLET ORAL at 12:02

## 2024-02-24 RX ADMIN — OXYCODONE HYDROCHLORIDE 5 MG: 5 TABLET ORAL at 09:02

## 2024-02-24 RX ADMIN — METHOCARBAMOL 500 MG: 500 TABLET ORAL at 05:02

## 2024-02-24 RX ADMIN — CEFTRIAXONE 1 G: 1 INJECTION, POWDER, FOR SOLUTION INTRAMUSCULAR; INTRAVENOUS at 11:02

## 2024-02-24 RX ADMIN — IPRATROPIUM BROMIDE AND ALBUTEROL SULFATE 3 ML: .5; 3 SOLUTION RESPIRATORY (INHALATION) at 05:02

## 2024-02-24 NOTE — CARE UPDATE
Called to room by RN due to patient oxygen saturation 82-86% on NC 3L. Upon entering room, patient noted to have labored respirations. Placed on venti mask 12L/50% with improvement of oxygen saturation to 93%, as well as improvement in work of breathing. RN at bedside and aware. Patient resting more comfortably at this time.

## 2024-02-24 NOTE — ASSESSMENT & PLAN NOTE
POD#3 s/p lap sig colectomy with fistula takedown  -Stat CXR and flat and erect   -Pneumonia vs early signs of leak high on differential   -WBC wnl;  HR 96 this afternoon after increasing O2   -Continue FLD   -Lovenox; pepcid   -Will closely follow

## 2024-02-24 NOTE — SUBJECTIVE & OBJECTIVE
Interval History:   Patient seen and examined.  Desat on NC overnight with Sat > 90% on mask.  Agitated with tachycardia yet oriented to person and place.  No c/o abdominal pain.  Did not want to eat this morning.  Passing flatus with bowel movements.     Medications:  Continuous Infusions:   lactated ringers 75 mL/hr at 02/24/24 0736     Scheduled Meds:   atorvastatin  80 mg Oral QHS    cefTRIAXone (Rocephin) IV (PEDS and ADULTS)  1 g Intravenous Q24H    enoxparin  40 mg Subcutaneous Q24H (prophylaxis, 1700)    levothyroxine  50 mcg Oral Before breakfast    methocarbamoL  500 mg Per G Tube QID    metoprolol succinate  12.5 mg Oral Daily    mupirocin   Nasal BID     PRN Meds:acetaminophen, LIDOcaine (PF) 10 mg/ml (1%), melatonin, morphine, ondansetron, oxyCODONE, sodium chloride 0.9%     Review of patient's allergies indicates:   Allergen Reactions    Ativan [lorazepam]      Adverse reaction.     Objective:     Vital Signs (Most Recent):  Temp: 98.1 °F (36.7 °C) (02/24/24 1112)  Pulse: 97 (02/24/24 1112)  Resp: 20 (02/24/24 1112)  BP: (!) 178/80 (02/24/24 1112)  SpO2: 96 % (02/24/24 1112) Vital Signs (24h Range):  Temp:  [96.6 °F (35.9 °C)-98.3 °F (36.8 °C)] 98.1 °F (36.7 °C)  Pulse:  [] 97  Resp:  [16-20] 20  SpO2:  [85 %-98 %] 96 %  BP: (130-178)/(63-85) 178/80     Weight: 76.7 kg (169 lb)  Body mass index is 25.7 kg/m².    Intake/Output - Last 3 Shifts         02/22 0700 02/23 0659 02/23 0700 02/24 0659 02/24 0700 02/25 0659    P.O. 600 560     I.V. (mL/kg) 1902.4 (24.8) 1427.3 (18.6)     Blood       Other       NG/ 200     IV Piggyback 346.2 148.8     Total Intake(mL/kg) 2968.6 (38.7) 2336.1 (30.5)     Urine (mL/kg/hr) 550 (0.3) 510 (0.3)     Stool 1 2     Total Output 551 512     Net +2417.6 +1824.1            Urine Occurrence  1 x     Stool Occurrence 3 x 5 x              Physical Exam  Vitals reviewed.   Constitutional:       General: He is in acute distress.      Appearance: He is  ill-appearing. He is not toxic-appearing.   Cardiovascular:      Rate and Rhythm: Regular rhythm. Tachycardia present.   Pulmonary:      Effort: No respiratory distress.      Comments: Slight increased WOB  Abdominal:      General: There is no distension.      Palpations: Abdomen is soft.      Tenderness: There is no abdominal tenderness. There is no guarding or rebound.   Musculoskeletal:      Right lower leg: No edema.      Left lower leg: No edema.   Skin:     General: Skin is warm and dry.      Capillary Refill: Capillary refill takes less than 2 seconds.   Neurological:      Mental Status: He is alert. Mental status is at baseline.          Significant Labs:  I have reviewed all pertinent lab results within the past 24 hours.  CBC:   Recent Labs   Lab 02/24/24  0527   WBC 10.23   RBC 3.30*   HGB 9.7*   HCT 30.2*      MCV 92   MCH 29.4   MCHC 32.1     CMP:   Recent Labs   Lab 02/22/24  0410 02/23/24  0425 02/24/24  0527   GLU 82   < > 137*   CALCIUM 8.0*   < > 8.7   ALBUMIN 2.6*   < > 2.8*   PROT 5.9*  --   --       < > 138   K 3.8   < > 3.9   CO2 25   < > 27      < > 108   BUN 16   < > 12   CREATININE 0.6   < > 0.4*   ALKPHOS 53*  --   --    ALT 18  --   --    AST 36  --   --    BILITOT 0.6  --   --     < > = values in this interval not displayed.       Significant Diagnostics:  I have reviewed all pertinent imaging results/findings within the past 24 hours.

## 2024-02-24 NOTE — PROGRESS NOTES
[facility]  General Surgery  Progress Note    Subjective:     History of Present Illness:  79yo male with colovesical fistula who presents for fistula take down and sigmoid resection.      Post-Op Info:  Procedure(s) (LRB):  COLECTOMY, LEFT, LAPAROSCOPIC WITH GASTROSTOMY TUBE EXCHANGE (Left)   3 Days Post-Op     Interval History:   Patient seen and examined.  Desat on NC overnight with Sat > 90% on mask.  Agitated with tachycardia yet oriented to person and place.  No c/o abdominal pain.  Did not want to eat this morning.  Passing flatus with bowel movements.     Medications:  Continuous Infusions:   lactated ringers 75 mL/hr at 02/24/24 0736     Scheduled Meds:   atorvastatin  80 mg Oral QHS    cefTRIAXone (Rocephin) IV (PEDS and ADULTS)  1 g Intravenous Q24H    enoxparin  40 mg Subcutaneous Q24H (prophylaxis, 1700)    levothyroxine  50 mcg Oral Before breakfast    methocarbamoL  500 mg Per G Tube QID    metoprolol succinate  12.5 mg Oral Daily    mupirocin   Nasal BID     PRN Meds:acetaminophen, LIDOcaine (PF) 10 mg/ml (1%), melatonin, morphine, ondansetron, oxyCODONE, sodium chloride 0.9%     Review of patient's allergies indicates:   Allergen Reactions    Ativan [lorazepam]      Adverse reaction.     Objective:     Vital Signs (Most Recent):  Temp: 98.1 °F (36.7 °C) (02/24/24 1112)  Pulse: 97 (02/24/24 1112)  Resp: 20 (02/24/24 1112)  BP: (!) 178/80 (02/24/24 1112)  SpO2: 96 % (02/24/24 1112) Vital Signs (24h Range):  Temp:  [96.6 °F (35.9 °C)-98.3 °F (36.8 °C)] 98.1 °F (36.7 °C)  Pulse:  [] 97  Resp:  [16-20] 20  SpO2:  [85 %-98 %] 96 %  BP: (130-178)/(63-85) 178/80     Weight: 76.7 kg (169 lb)  Body mass index is 25.7 kg/m².    Intake/Output - Last 3 Shifts         02/22 0700 02/23 0659 02/23 0700 02/24 0659 02/24 0700 02/25 0659    P.O. 600 560     I.V. (mL/kg) 1902.4 (24.8) 1427.3 (18.6)     Blood       Other       NG/ 200     IV Piggyback 346.2 148.8     Total Intake(mL/kg) 2968.6  (38.7) 2336.1 (30.5)     Urine (mL/kg/hr) 550 (0.3) 510 (0.3)     Stool 1 2     Total Output 551 512     Net +2417.6 +1824.1            Urine Occurrence  1 x     Stool Occurrence 3 x 5 x              Physical Exam  Vitals reviewed.   Constitutional:       General: He is in acute distress.      Appearance: He is ill-appearing. He is not toxic-appearing.   Cardiovascular:      Rate and Rhythm: Regular rhythm. Tachycardia present.   Pulmonary:      Effort: No respiratory distress.      Comments: Slight increased WOB  Abdominal:      General: There is no distension.      Palpations: Abdomen is soft.      Tenderness: There is no abdominal tenderness. There is no guarding or rebound.   Musculoskeletal:      Right lower leg: No edema.      Left lower leg: No edema.   Skin:     General: Skin is warm and dry.      Capillary Refill: Capillary refill takes less than 2 seconds.   Neurological:      Mental Status: He is alert. Mental status is at baseline.          Significant Labs:  I have reviewed all pertinent lab results within the past 24 hours.  CBC:   Recent Labs   Lab 02/24/24  0527   WBC 10.23   RBC 3.30*   HGB 9.7*   HCT 30.2*      MCV 92   MCH 29.4   MCHC 32.1     CMP:   Recent Labs   Lab 02/22/24  0410 02/23/24  0425 02/24/24  0527   GLU 82   < > 137*   CALCIUM 8.0*   < > 8.7   ALBUMIN 2.6*   < > 2.8*   PROT 5.9*  --   --       < > 138   K 3.8   < > 3.9   CO2 25   < > 27      < > 108   BUN 16   < > 12   CREATININE 0.6   < > 0.4*   ALKPHOS 53*  --   --    ALT 18  --   --    AST 36  --   --    BILITOT 0.6  --   --     < > = values in this interval not displayed.       Significant Diagnostics:  I have reviewed all pertinent imaging results/findings within the past 24 hours.  Assessment/Plan:     * Colovesical fistula  POD#3 s/p lap sig colectomy with fistula takedown  -Stat CXR and flat and erect   -Pneumonia vs early signs of leak high on differential   -WBC wnl;  HR 96 this afternoon after  increasing O2   -Continue FLD   -Lovenox; pepcid   -Will closely follow     Hypophosphatemia  Monitor and replace per G tube.    On deep vein thrombosis (DVT) prophylaxis  Lovenox therapy.    Acute cystitis without hematuria  Acute on chronic secondary to colovesical fistula   Treating for Klebsiella   IV Ceftriaxone   Continue mame Berman MD  General Surgery  [facility] Surg

## 2024-02-24 NOTE — CARE UPDATE
02/24/24 0749   PRE-TX-O2   Device (Oxygen Therapy) (S)  nasal cannula  (Unable to wean O2 to NC 5lpm. O2 replaced to pt at 50% Ventimask.)   $ Is the patient on Low Flow Oxygen? Yes   Flow (L/min) (S)  5   Oxygen Concentration (%) 40   SpO2 (!) (S)  85 %   Pulse (!) 111

## 2024-02-24 NOTE — PLAN OF CARE
Problem: Adult Inpatient Plan of Care  Goal: Optimal Comfort and Wellbeing  Outcome: Ongoing, Not Progressing  Goal: Readiness for Transition of Care  Outcome: Ongoing, Not Progressing         Problem: Adult Inpatient Plan of Care  Goal: Plan of Care Review  Outcome: Ongoing, Progressing  Goal: Patient-Specific Goal (Individualized)  Outcome: Ongoing, Progressing  Goal: Absence of Hospital-Acquired Illness or Injury  Outcome: Ongoing, Progressing     Problem: Fluid and Electrolyte Imbalance (Acute Kidney Injury/Impairment)  Goal: Fluid and Electrolyte Balance  Outcome: Ongoing, Progressing     Problem: Oral Intake Inadequate (Acute Kidney Injury/Impairment)  Goal: Optimal Nutrition Intake  Outcome: Ongoing, Progressing     Problem: Renal Function Impairment (Acute Kidney Injury/Impairment)  Goal: Effective Renal Function  Outcome: Ongoing, Progressing     Problem: Infection  Goal: Absence of Infection Signs and Symptoms  Outcome: Ongoing, Progressing     Problem: Fall Injury Risk  Goal: Absence of Fall and Fall-Related Injury  Outcome: Ongoing, Progressing     Problem: Skin Injury Risk Increased  Goal: Skin Health and Integrity  Outcome: Ongoing, Progressing

## 2024-02-25 PROBLEM — E87.29 RESPIRATORY ACIDOSIS: Status: ACTIVE | Noted: 2024-02-25

## 2024-02-25 LAB
ALBUMIN SERPL BCP-MCNC: 2.7 G/DL (ref 3.5–5.2)
ANION GAP SERPL CALC-SCNC: -3 MMOL/L (ref 3–11)
BASOPHILS # BLD AUTO: 0.04 K/UL (ref 0–0.2)
BASOPHILS NFR BLD: 0.3 % (ref 0–1.9)
BIPAP: ABNORMAL
BUN SERPL-MCNC: 22 MG/DL (ref 8–23)
CALCIUM SERPL-MCNC: 8.5 MG/DL (ref 8.7–10.5)
CHLORIDE SERPL-SCNC: 109 MMOL/L (ref 95–110)
CO2 SERPL-SCNC: 32 MMOL/L (ref 23–29)
COMMENTS: ABNORMAL
CREAT SERPL-MCNC: 0.5 MG/DL (ref 0.5–1.4)
DIFFERENTIAL METHOD BLD: ABNORMAL
EOSINOPHIL # BLD AUTO: 0 K/UL (ref 0–0.5)
EOSINOPHIL NFR BLD: 0.1 % (ref 0–8)
ERYTHROCYTE [DISTWIDTH] IN BLOOD BY AUTOMATED COUNT: 15.6 % (ref 11.5–14.5)
EST. GFR  (NO RACE VARIABLE): >60 ML/MIN/1.73 M^2
FIO2: 50 %
FIO2: 50 %
GLUCOSE SERPL-MCNC: 135 MG/DL (ref 70–110)
HCT VFR BLD AUTO: 32 % (ref 40–54)
HGB BLD-MCNC: 9.7 G/DL (ref 14–18)
IMM GRANULOCYTES # BLD AUTO: 0.2 K/UL (ref 0–0.04)
IMM GRANULOCYTES NFR BLD AUTO: 1.7 % (ref 0–0.5)
LPM: 12
LYMPHOCYTES # BLD AUTO: 1.2 K/UL (ref 1–4.8)
LYMPHOCYTES NFR BLD: 10 % (ref 18–48)
Lab: ABNORMAL
Lab: ABNORMAL
MAGNESIUM SERPL-MCNC: 2 MG/DL (ref 1.6–2.6)
MCH RBC QN AUTO: 29.2 PG (ref 27–31)
MCHC RBC AUTO-ENTMCNC: 30.3 G/DL (ref 32–36)
MCV RBC AUTO: 96 FL (ref 82–98)
MODIFIED ALLEN'S TEST: ABNORMAL
MODIFIED ALLEN'S TEST: ABNORMAL
MONOCYTES # BLD AUTO: 1.1 K/UL (ref 0.3–1)
MONOCYTES NFR BLD: 9.4 % (ref 4–15)
NEUTROPHILS # BLD AUTO: 9.3 K/UL (ref 1.8–7.7)
NEUTROPHILS NFR BLD: 78.5 % (ref 38–73)
NOTIFIED BY: ABNORMAL
NOTIFIED BY: ABNORMAL
NRBC BLD-RTO: 0 /100 WBC
O2DEVICE: ABNORMAL
O2DEVICE: ABNORMAL
PCO2 BLDA: 64.3 MMHG (ref 35–45)
PCO2 BLDA: 78.2 MMHG (ref 35–45)
PH SMN: 7.19 [PH] (ref 7.34–7.45)
PH SMN: 7.28 [PH] (ref 7.34–7.45)
PHOSPHATE SERPL-MCNC: 2.8 MG/DL (ref 2.7–4.5)
PLATELET # BLD AUTO: 233 K/UL (ref 150–450)
PMV BLD AUTO: 11 FL (ref 9.2–12.9)
PO2 BLDA: 148 MMHG (ref 80–100)
PO2 BLDA: 72.6 MMHG (ref 80–100)
POC BASE DEFICIT: 1.7 MMOL/L (ref -2–2)
POC BASE DEFICIT: 3.4 MMOL/L (ref -2–2)
POC HCO3: 24.7 MMOL/L (ref 24–28)
POC HCO3: 26.3 MMOL/L (ref 24–28)
POC NOTIFIED NOTE: ABNORMAL
POC NOTIFIED NOTE: ABNORMAL
POC PERFORMED BY: ABNORMAL
POC PERFORMED BY: ABNORMAL
POC SATURATED O2: 93.1 % (ref 95–100)
POC SATURATED O2: 99.9 % (ref 95–100)
POC TEMPERATURE: 37 C
POC TEMPERATURE: 37 C
POTASSIUM SERPL-SCNC: 4.6 MMOL/L (ref 3.5–5.1)
PROVIDER NOTIFIED: ABNORMAL
PROVIDER NOTIFIED: ABNORMAL
RBC # BLD AUTO: 3.32 M/UL (ref 4.6–6.2)
SODIUM SERPL-SCNC: 138 MMOL/L (ref 136–145)
SPECIMEN SOURCE: ABNORMAL
SPECIMEN SOURCE: ABNORMAL
VT: 450
WBC # BLD AUTO: 11.88 K/UL (ref 3.9–12.7)

## 2024-02-25 PROCEDURE — 27100171 HC OXYGEN HIGH FLOW UP TO 24 HOURS

## 2024-02-25 PROCEDURE — 36600 WITHDRAWAL OF ARTERIAL BLOOD: CPT

## 2024-02-25 PROCEDURE — 25000242 PHARM REV CODE 250 ALT 637 W/ HCPCS: Performed by: STUDENT IN AN ORGANIZED HEALTH CARE EDUCATION/TRAINING PROGRAM

## 2024-02-25 PROCEDURE — 27000190 HC CPAP FULL FACE MASK W/VALVE

## 2024-02-25 PROCEDURE — 85025 COMPLETE CBC W/AUTO DIFF WBC: CPT | Performed by: STUDENT IN AN ORGANIZED HEALTH CARE EDUCATION/TRAINING PROGRAM

## 2024-02-25 PROCEDURE — 11000001 HC ACUTE MED/SURG PRIVATE ROOM

## 2024-02-25 PROCEDURE — 82803 BLOOD GASES ANY COMBINATION: CPT

## 2024-02-25 PROCEDURE — 21400001 HC TELEMETRY ROOM

## 2024-02-25 PROCEDURE — 94761 N-INVAS EAR/PLS OXIMETRY MLT: CPT

## 2024-02-25 PROCEDURE — 25000003 PHARM REV CODE 250: Performed by: STUDENT IN AN ORGANIZED HEALTH CARE EDUCATION/TRAINING PROGRAM

## 2024-02-25 PROCEDURE — 63600175 PHARM REV CODE 636 W HCPCS: Performed by: STUDENT IN AN ORGANIZED HEALTH CARE EDUCATION/TRAINING PROGRAM

## 2024-02-25 PROCEDURE — 99900035 HC TECH TIME PER 15 MIN (STAT)

## 2024-02-25 PROCEDURE — 83735 ASSAY OF MAGNESIUM: CPT

## 2024-02-25 PROCEDURE — 94660 CPAP INITIATION&MGMT: CPT

## 2024-02-25 PROCEDURE — 27000221 HC OXYGEN, UP TO 24 HOURS

## 2024-02-25 PROCEDURE — 94640 AIRWAY INHALATION TREATMENT: CPT

## 2024-02-25 PROCEDURE — 80069 RENAL FUNCTION PANEL: CPT

## 2024-02-25 PROCEDURE — 99900031 HC PATIENT EDUCATION (STAT)

## 2024-02-25 RX ORDER — FUROSEMIDE 10 MG/ML
40 INJECTION INTRAMUSCULAR; INTRAVENOUS DAILY
Status: DISCONTINUED | OUTPATIENT
Start: 2024-02-25 | End: 2024-02-26

## 2024-02-25 RX ADMIN — METHOCARBAMOL 500 MG: 500 TABLET ORAL at 09:02

## 2024-02-25 RX ADMIN — METHOCARBAMOL 500 MG: 500 TABLET ORAL at 05:02

## 2024-02-25 RX ADMIN — MUPIROCIN: 20 OINTMENT TOPICAL at 09:02

## 2024-02-25 RX ADMIN — LEVOTHYROXINE SODIUM 50 MCG: 50 TABLET ORAL at 06:02

## 2024-02-25 RX ADMIN — SODIUM CHLORIDE, POTASSIUM CHLORIDE, SODIUM LACTATE AND CALCIUM CHLORIDE: 600; 310; 30; 20 INJECTION, SOLUTION INTRAVENOUS at 06:02

## 2024-02-25 RX ADMIN — TRAZODONE HYDROCHLORIDE 50 MG: 50 TABLET ORAL at 09:02

## 2024-02-25 RX ADMIN — FUROSEMIDE 40 MG: 10 INJECTION, SOLUTION INTRAVENOUS at 10:02

## 2024-02-25 RX ADMIN — ENOXAPARIN SODIUM 40 MG: 40 INJECTION SUBCUTANEOUS at 05:02

## 2024-02-25 RX ADMIN — IPRATROPIUM BROMIDE AND ALBUTEROL SULFATE 3 ML: .5; 3 SOLUTION RESPIRATORY (INHALATION) at 11:02

## 2024-02-25 RX ADMIN — METHOCARBAMOL 500 MG: 500 TABLET ORAL at 12:02

## 2024-02-25 RX ADMIN — CEFTRIAXONE 1 G: 1 INJECTION, POWDER, FOR SOLUTION INTRAMUSCULAR; INTRAVENOUS at 10:02

## 2024-02-25 RX ADMIN — METOPROLOL SUCCINATE 12.5 MG: 25 TABLET, EXTENDED RELEASE ORAL at 09:02

## 2024-02-25 RX ADMIN — ATORVASTATIN CALCIUM 80 MG: 80 TABLET, FILM COATED ORAL at 09:02

## 2024-02-25 RX ADMIN — IPRATROPIUM BROMIDE AND ALBUTEROL SULFATE 3 ML: .5; 3 SOLUTION RESPIRATORY (INHALATION) at 03:02

## 2024-02-25 RX ADMIN — IPRATROPIUM BROMIDE AND ALBUTEROL SULFATE 3 ML: .5; 3 SOLUTION RESPIRATORY (INHALATION) at 07:02

## 2024-02-25 RX ADMIN — OXYCODONE HYDROCHLORIDE 5 MG: 5 TABLET ORAL at 01:02

## 2024-02-25 NOTE — SUBJECTIVE & OBJECTIVE
Interval History:   Patient s/e.  Somnolent.  Increased O2 req overnight.     Medications:  Continuous Infusions:   lactated ringers 100 mL/hr at 02/25/24 0642     Scheduled Meds:   albuterol-ipratropium  3 mL Nebulization Q4H WAKE    atorvastatin  80 mg Oral QHS    cefTRIAXone (Rocephin) IV (PEDS and ADULTS)  1 g Intravenous Q24H    enoxparin  40 mg Subcutaneous Q24H (prophylaxis, 1700)    furosemide (LASIX) injection  40 mg Intravenous Daily    levothyroxine  50 mcg Oral Before breakfast    methocarbamoL  500 mg Per G Tube QID    metoprolol succinate  12.5 mg Oral Daily    mupirocin   Nasal BID    traZODone  50 mg Oral QHS     PRN Meds:acetaminophen, LIDOcaine (PF) 10 mg/ml (1%), melatonin, morphine, ondansetron, oxyCODONE, sodium chloride 0.9%     Review of patient's allergies indicates:   Allergen Reactions    Ativan [lorazepam]      Adverse reaction.     Objective:     Vital Signs (Most Recent):  Temp: 97.4 °F (36.3 °C) (02/25/24 0713)  Pulse: 100 (02/25/24 1056)  Resp: (!) 36 (02/25/24 0719)  BP: (!) 141/70 (02/25/24 0713)  SpO2: 95 % (02/25/24 1056) Vital Signs (24h Range):  Temp:  [97.4 °F (36.3 °C)-98.1 °F (36.7 °C)] 97.4 °F (36.3 °C)  Pulse:  [] 100  Resp:  [16-36] 36  SpO2:  [91 %-96 %] 95 %  BP: (126-178)/(61-80) 141/70     Weight: 76.7 kg (169 lb)  Body mass index is 25.7 kg/m².    Intake/Output - Last 3 Shifts         02/23 0700 02/24 0659 02/24 0700 02/25 0659 02/25 0700 02/26 0659    P.O. 560 580     I.V. (mL/kg) 1427.3 (18.6) 2117.3 (27.6)     NG/      IV Piggyback 148.8 98.9     Total Intake(mL/kg) 2336.1 (30.5) 2796.2 (36.5)     Urine (mL/kg/hr) 510 (0.3) 350 (0.2)     Stool 2 0     Total Output 512 350     Net +1824.1 +2446.2            Urine Occurrence 1 x 2 x     Stool Occurrence 5 x 1 x              Physical Exam  Vitals reviewed.   Constitutional:       General: He is not in acute distress.     Appearance: He is ill-appearing. He is not toxic-appearing.   Cardiovascular:       Rate and Rhythm: Normal rate and regular rhythm.   Pulmonary:      Effort: Pulmonary effort is normal. No respiratory distress.   Abdominal:      General: There is no distension.      Palpations: Abdomen is soft.      Tenderness: There is no abdominal tenderness. There is no guarding or rebound.      Comments: Midline incisions with surgical dressing in place    Musculoskeletal:      Right lower leg: No edema.      Left lower leg: No edema.   Skin:     General: Skin is warm and dry.      Capillary Refill: Capillary refill takes less than 2 seconds.   Neurological:      Mental Status: He is alert.      Comments: somnolent          Significant Labs:  I have reviewed all pertinent lab results within the past 24 hours.  CBC:   Recent Labs   Lab 02/25/24  0910   WBC 11.88   RBC 3.32*   HGB 9.7*   HCT 32.0*      MCV 96   MCH 29.2   MCHC 30.3*     CMP:   Recent Labs   Lab 02/22/24  0410 02/23/24  0425 02/25/24  0910   GLU 82   < > 135*   CALCIUM 8.0*   < > 8.5*   ALBUMIN 2.6*   < > 2.7*   PROT 5.9*  --   --       < > 138   K 3.8   < > 4.6   CO2 25   < > 32*      < > 109   BUN 16   < > 22   CREATININE 0.6   < > 0.5   ALKPHOS 53*  --   --    ALT 18  --   --    AST 36  --   --    BILITOT 0.6  --   --     < > = values in this interval not displayed.     Recent Labs     02/25/24  1019   PH 7.190*   PCO2 78.2*   PO2 72.6*   HCO3 24.7   POCSATURATED 93.1*        Significant Diagnostics:  I have reviewed all pertinent imaging results/findings within the past 24 hours.

## 2024-02-25 NOTE — ASSESSMENT & PLAN NOTE
Acute on chronic secondary to colovesical fistula   Treating for Klebsiella   IV Ceftriaxone   Continue vilchis

## 2024-02-25 NOTE — ASSESSMENT & PLAN NOTE
POD#4 s/p lap sig colectomy with fistula takedown  -CLD via g tube   -passing flatus   -complicated now with respiratory acidosis and hypoxia   -Multimodal, opioid sparing pain control   -Lovenox

## 2024-02-25 NOTE — ASSESSMENT & PLAN NOTE
ABG with pCO2 of 78  CXR yesterday with new bilateral basilar infiltrates  Lasix 40mg daily   Bipap today   Repeat AGB in 4 hours   Continue IV abx for coverage of impending pneumonia and UTI   Decrease IVFs

## 2024-02-25 NOTE — CARE UPDATE
02/25/24 0739   PRE-TX-O2   Device (Oxygen Therapy) (S)  venturi mask   $ Is the patient on Low Flow Oxygen? Yes   Flow (L/min) 12   Oxygen Concentration (%) (S)  50  (Fio2 decreased from 55% to 50%)   SpO2 (S)  95 %   Pulse Oximetry Type Intermittent   $ Pulse Oximetry - Multiple Charge Pulse Oximetry - Multiple   Pulse 98

## 2024-02-25 NOTE — RESPIRATORY THERAPY
Pt appears more alert from 1056  and able to engage in conversation. Swabbed mouth several times with green sponge and water. Pt able to state how dry mouth was. Pt placed back on AVAPS after swabbing mouth with water and green sponge. NARN.

## 2024-02-25 NOTE — PROGRESS NOTES
[facility]  General Surgery  Progress Note    Subjective:     History of Present Illness:  79yo male with colovesical fistula who presents for fistula take down and sigmoid resection.      Post-Op Info:  Procedure(s) (LRB):  COLECTOMY, LEFT, LAPAROSCOPIC WITH GASTROSTOMY TUBE EXCHANGE (Left)   4 Days Post-Op     Interval History:   Patient s/e.  Somnolent.  Increased O2 req overnight.     Medications:  Continuous Infusions:   lactated ringers 100 mL/hr at 02/25/24 0642     Scheduled Meds:   albuterol-ipratropium  3 mL Nebulization Q4H WAKE    atorvastatin  80 mg Oral QHS    cefTRIAXone (Rocephin) IV (PEDS and ADULTS)  1 g Intravenous Q24H    enoxparin  40 mg Subcutaneous Q24H (prophylaxis, 1700)    furosemide (LASIX) injection  40 mg Intravenous Daily    levothyroxine  50 mcg Oral Before breakfast    methocarbamoL  500 mg Per G Tube QID    metoprolol succinate  12.5 mg Oral Daily    mupirocin   Nasal BID    traZODone  50 mg Oral QHS     PRN Meds:acetaminophen, LIDOcaine (PF) 10 mg/ml (1%), melatonin, morphine, ondansetron, oxyCODONE, sodium chloride 0.9%     Review of patient's allergies indicates:   Allergen Reactions    Ativan [lorazepam]      Adverse reaction.     Objective:     Vital Signs (Most Recent):  Temp: 97.4 °F (36.3 °C) (02/25/24 0713)  Pulse: 100 (02/25/24 1056)  Resp: (!) 36 (02/25/24 0719)  BP: (!) 141/70 (02/25/24 0713)  SpO2: 95 % (02/25/24 1056) Vital Signs (24h Range):  Temp:  [97.4 °F (36.3 °C)-98.1 °F (36.7 °C)] 97.4 °F (36.3 °C)  Pulse:  [] 100  Resp:  [16-36] 36  SpO2:  [91 %-96 %] 95 %  BP: (126-178)/(61-80) 141/70     Weight: 76.7 kg (169 lb)  Body mass index is 25.7 kg/m².    Intake/Output - Last 3 Shifts         02/23 0700 02/24 0659 02/24 0700 02/25 0659 02/25 0700 02/26 0659    P.O. 560 580     I.V. (mL/kg) 1427.3 (18.6) 2117.3 (27.6)     NG/      IV Piggyback 148.8 98.9     Total Intake(mL/kg) 2336.1 (30.5) 2796.2 (36.5)     Urine (mL/kg/hr) 510 (0.3) 350 (0.2)      Stool 2 0     Total Output 512 350     Net +1824.1 +2446.2            Urine Occurrence 1 x 2 x     Stool Occurrence 5 x 1 x              Physical Exam  Vitals reviewed.   Constitutional:       General: He is not in acute distress.     Appearance: He is ill-appearing. He is not toxic-appearing.   Cardiovascular:      Rate and Rhythm: Normal rate and regular rhythm.   Pulmonary:      Effort: Pulmonary effort is normal. No respiratory distress.   Abdominal:      General: There is no distension.      Palpations: Abdomen is soft.      Tenderness: There is no abdominal tenderness. There is no guarding or rebound.      Comments: Midline incisions with surgical dressing in place    Musculoskeletal:      Right lower leg: No edema.      Left lower leg: No edema.   Skin:     General: Skin is warm and dry.      Capillary Refill: Capillary refill takes less than 2 seconds.   Neurological:      Mental Status: He is alert.      Comments: somnolent          Significant Labs:  I have reviewed all pertinent lab results within the past 24 hours.  CBC:   Recent Labs   Lab 02/25/24  0910   WBC 11.88   RBC 3.32*   HGB 9.7*   HCT 32.0*      MCV 96   MCH 29.2   MCHC 30.3*     CMP:   Recent Labs   Lab 02/22/24  0410 02/23/24  0425 02/25/24  0910   GLU 82   < > 135*   CALCIUM 8.0*   < > 8.5*   ALBUMIN 2.6*   < > 2.7*   PROT 5.9*  --   --       < > 138   K 3.8   < > 4.6   CO2 25   < > 32*      < > 109   BUN 16   < > 22   CREATININE 0.6   < > 0.5   ALKPHOS 53*  --   --    ALT 18  --   --    AST 36  --   --    BILITOT 0.6  --   --     < > = values in this interval not displayed.     Recent Labs     02/25/24  1019   PH 7.190*   PCO2 78.2*   PO2 72.6*   HCO3 24.7   POCSATURATED 93.1*        Significant Diagnostics:  I have reviewed all pertinent imaging results/findings within the past 24 hours.  Assessment/Plan:     * Colovesical fistula  POD#4 s/p lap sig colectomy with fistula takedown  -CLD via g tube   -passing flatus    -complicated now with respiratory acidosis and hypoxia   -Multimodal, opioid sparing pain control   -Lovenox       Respiratory acidosis  ABG with pCO2 of 78  CXR yesterday with new bilateral basilar infiltrates  Lasix 40mg daily   Bipap today   Repeat AGB in 4 hours   Continue IV abx for coverage of impending pneumonia and UTI   Decrease IVFs    Hypophosphatemia  Monitor and replace per G tube.    On deep vein thrombosis (DVT) prophylaxis  Lovenox therapy.    Acute cystitis without hematuria  Acute on chronic secondary to colovesical fistula   Treating for Klebsiella   IV Ceftriaxone   Continue mame Berman MD  General Surgery  [facility]

## 2024-02-25 NOTE — CARE UPDATE
02/25/24 1056   PRE-TX-O2   Device (Oxygen Therapy) BIPAP  (AVAPS)   $ Is the patient on High Flow Oxygen? Yes   Oxygen Concentration (%) 50   SpO2 95 %   Pulse 100   Preset CPAP/BiPAP Settings   Mode Of Delivery AVAPS   $ CPAP/BiPAP Daily Charge BiPAP/CPAP Daily   $ Initial CPAP/BiPAP Setup? Yes   $ Is patient using? Yes   Size of Mask Medium/Large   Sized Appropriately? Yes   Equipment Type Trilogy    Airway Device Type medium full face mask   Humidifier not applicable   EPAP (cm H2O) 6   AVAPS Min P (cm H2O) 10   AVAPS Max P (cm H2O) 22   Set Tidal Volume (mL) 450 mL   Set Rate (Breaths/Min) 8   ITime (sec) 1.3   Rise Time (sec) 2   Patient CPAP/BiPAP Settings   CPAP/BIPAP ID 14   RR Total (Breaths/Min) 22   Tidal Volume (mL) 621   VE Minute Ventilation (L/min) 5.1 L/min   Peak Inspiratory Pressure (cm H2O) 18.4   Total Leak (L/Min) 51.9   Patient Trigger - ST Mode Only (%) 100   CPAP/BiPAP Backup Settings   Backup Rate 8 breaths per minute (bpm)   Education   $ Education Other (see comment);15 min  (AVAPS)   Respiratory Evaluation   $ Care Plan Tech Time 15 min

## 2024-02-26 PROBLEM — E63.9 INADEQUATE NUTRITION: Status: ACTIVE | Noted: 2024-02-26

## 2024-02-26 LAB
ALBUMIN SERPL BCP-MCNC: 2.6 G/DL (ref 3.5–5.2)
ANION GAP SERPL CALC-SCNC: -1 MMOL/L (ref 3–11)
BIPAP: ABNORMAL
BUN SERPL-MCNC: 19 MG/DL (ref 8–23)
CALCIUM SERPL-MCNC: 8.6 MG/DL (ref 8.7–10.5)
CHLORIDE SERPL-SCNC: 108 MMOL/L (ref 95–110)
CO2 SERPL-SCNC: 33 MMOL/L (ref 23–29)
COMMENTS: ABNORMAL
CREAT SERPL-MCNC: 0.5 MG/DL (ref 0.5–1.4)
ERYTHROCYTE [DISTWIDTH] IN BLOOD BY AUTOMATED COUNT: 15.6 % (ref 11.5–14.5)
EST. GFR  (NO RACE VARIABLE): >60 ML/MIN/1.73 M^2
FIO2: 40 %
GLUCOSE SERPL-MCNC: 105 MG/DL (ref 70–110)
HCT VFR BLD AUTO: 31 % (ref 40–54)
HGB BLD-MCNC: 9.4 G/DL (ref 14–18)
LACTATE SERPL-SCNC: 1.1 MMOL/L (ref 0.5–2.2)
Lab: ABNORMAL
MAGNESIUM SERPL-MCNC: 2 MG/DL (ref 1.6–2.6)
MCH RBC QN AUTO: 28.9 PG (ref 27–31)
MCHC RBC AUTO-ENTMCNC: 30.3 G/DL (ref 32–36)
MCV RBC AUTO: 95 FL (ref 82–98)
MODIFIED ALLEN'S TEST: POSITIVE
NOTIFIED BY: ABNORMAL
O2DEVICE: ABNORMAL
PCO2 BLDA: 59.2 MMHG (ref 35–45)
PH SMN: 7.33 [PH] (ref 7.34–7.45)
PHOSPHATE SERPL-MCNC: 1.3 MG/DL (ref 2.7–4.5)
PLATELET # BLD AUTO: 237 K/UL (ref 150–450)
PMV BLD AUTO: 11.1 FL (ref 9.2–12.9)
PO2 BLDA: 99.3 MMHG (ref 80–100)
POC BASE DEFICIT: 5.7 MMOL/L (ref -2–2)
POC HCO3: 28.6 MMOL/L (ref 24–28)
POC NOTIFIED NOTE: ABNORMAL
POC PERFORMED BY: ABNORMAL
POC SATURATED O2: 98.5 % (ref 95–100)
POC TEMPERATURE: 37 C
POTASSIUM SERPL-SCNC: 4.2 MMOL/L (ref 3.5–5.1)
PROVIDER NOTIFIED: ABNORMAL
RBC # BLD AUTO: 3.25 M/UL (ref 4.6–6.2)
SODIUM SERPL-SCNC: 140 MMOL/L (ref 136–145)
SPECIMEN SOURCE: ABNORMAL
WBC # BLD AUTO: 13.85 K/UL (ref 3.9–12.7)

## 2024-02-26 PROCEDURE — 21400001 HC TELEMETRY ROOM

## 2024-02-26 PROCEDURE — 25000242 PHARM REV CODE 250 ALT 637 W/ HCPCS: Performed by: STUDENT IN AN ORGANIZED HEALTH CARE EDUCATION/TRAINING PROGRAM

## 2024-02-26 PROCEDURE — 94640 AIRWAY INHALATION TREATMENT: CPT

## 2024-02-26 PROCEDURE — 63600175 PHARM REV CODE 636 W HCPCS: Performed by: INTERNAL MEDICINE

## 2024-02-26 PROCEDURE — 80069 RENAL FUNCTION PANEL: CPT

## 2024-02-26 PROCEDURE — 99024 POSTOP FOLLOW-UP VISIT: CPT | Mod: POP,,,

## 2024-02-26 PROCEDURE — 82803 BLOOD GASES ANY COMBINATION: CPT

## 2024-02-26 PROCEDURE — 99900035 HC TECH TIME PER 15 MIN (STAT)

## 2024-02-26 PROCEDURE — 83605 ASSAY OF LACTIC ACID: CPT | Performed by: STUDENT IN AN ORGANIZED HEALTH CARE EDUCATION/TRAINING PROGRAM

## 2024-02-26 PROCEDURE — 94761 N-INVAS EAR/PLS OXIMETRY MLT: CPT

## 2024-02-26 PROCEDURE — 11000001 HC ACUTE MED/SURG PRIVATE ROOM

## 2024-02-26 PROCEDURE — 36600 WITHDRAWAL OF ARTERIAL BLOOD: CPT

## 2024-02-26 PROCEDURE — 36415 COLL VENOUS BLD VENIPUNCTURE: CPT | Mod: XB | Performed by: STUDENT IN AN ORGANIZED HEALTH CARE EDUCATION/TRAINING PROGRAM

## 2024-02-26 PROCEDURE — 36415 COLL VENOUS BLD VENIPUNCTURE: CPT

## 2024-02-26 PROCEDURE — 83735 ASSAY OF MAGNESIUM: CPT

## 2024-02-26 PROCEDURE — 99900031 HC PATIENT EDUCATION (STAT)

## 2024-02-26 PROCEDURE — 63600175 PHARM REV CODE 636 W HCPCS: Performed by: STUDENT IN AN ORGANIZED HEALTH CARE EDUCATION/TRAINING PROGRAM

## 2024-02-26 PROCEDURE — 27100171 HC OXYGEN HIGH FLOW UP TO 24 HOURS

## 2024-02-26 PROCEDURE — 25000003 PHARM REV CODE 250

## 2024-02-26 PROCEDURE — 94660 CPAP INITIATION&MGMT: CPT

## 2024-02-26 PROCEDURE — 85027 COMPLETE CBC AUTOMATED: CPT | Performed by: STUDENT IN AN ORGANIZED HEALTH CARE EDUCATION/TRAINING PROGRAM

## 2024-02-26 PROCEDURE — 25000003 PHARM REV CODE 250: Performed by: STUDENT IN AN ORGANIZED HEALTH CARE EDUCATION/TRAINING PROGRAM

## 2024-02-26 RX ORDER — FUROSEMIDE 10 MG/ML
40 INJECTION INTRAMUSCULAR; INTRAVENOUS EVERY 12 HOURS
Status: DISCONTINUED | OUTPATIENT
Start: 2024-02-26 | End: 2024-03-01 | Stop reason: HOSPADM

## 2024-02-26 RX ORDER — TRAZODONE HYDROCHLORIDE 50 MG/1
50 TABLET ORAL NIGHTLY PRN
Status: DISCONTINUED | OUTPATIENT
Start: 2024-02-26 | End: 2024-03-01 | Stop reason: HOSPADM

## 2024-02-26 RX ADMIN — IPRATROPIUM BROMIDE AND ALBUTEROL SULFATE 3 ML: .5; 3 SOLUTION RESPIRATORY (INHALATION) at 07:02

## 2024-02-26 RX ADMIN — IPRATROPIUM BROMIDE AND ALBUTEROL SULFATE 3 ML: .5; 3 SOLUTION RESPIRATORY (INHALATION) at 03:02

## 2024-02-26 RX ADMIN — SODIUM CHLORIDE, POTASSIUM CHLORIDE, SODIUM LACTATE AND CALCIUM CHLORIDE: 600; 310; 30; 20 INJECTION, SOLUTION INTRAVENOUS at 09:02

## 2024-02-26 RX ADMIN — AZITHROMYCIN MONOHYDRATE 500 MG: 500 INJECTION, POWDER, LYOPHILIZED, FOR SOLUTION INTRAVENOUS at 04:02

## 2024-02-26 RX ADMIN — ATORVASTATIN CALCIUM 80 MG: 80 TABLET, FILM COATED ORAL at 09:02

## 2024-02-26 RX ADMIN — OXYCODONE HYDROCHLORIDE 5 MG: 5 TABLET ORAL at 09:02

## 2024-02-26 RX ADMIN — FUROSEMIDE 40 MG: 10 INJECTION, SOLUTION INTRAVENOUS at 09:02

## 2024-02-26 RX ADMIN — MUPIROCIN: 20 OINTMENT TOPICAL at 09:02

## 2024-02-26 RX ADMIN — POTASSIUM PHOSPHATE, MONOBASIC AND POTASSIUM PHOSPHATE, DIBASIC 20 MMOL: 224; 236 INJECTION, SOLUTION, CONCENTRATE INTRAVENOUS at 11:02

## 2024-02-26 RX ADMIN — METHOCARBAMOL 500 MG: 500 TABLET ORAL at 11:02

## 2024-02-26 RX ADMIN — ENOXAPARIN SODIUM 40 MG: 40 INJECTION SUBCUTANEOUS at 06:02

## 2024-02-26 RX ADMIN — METHOCARBAMOL 500 MG: 500 TABLET ORAL at 09:02

## 2024-02-26 RX ADMIN — METOPROLOL SUCCINATE 12.5 MG: 25 TABLET, EXTENDED RELEASE ORAL at 11:02

## 2024-02-26 RX ADMIN — IPRATROPIUM BROMIDE AND ALBUTEROL SULFATE 3 ML: .5; 3 SOLUTION RESPIRATORY (INHALATION) at 11:02

## 2024-02-26 RX ADMIN — CEFTRIAXONE SODIUM 1 G: 1 INJECTION, POWDER, FOR SOLUTION INTRAMUSCULAR; INTRAVENOUS at 04:02

## 2024-02-26 RX ADMIN — TRAZODONE HYDROCHLORIDE 50 MG: 50 TABLET ORAL at 09:02

## 2024-02-26 RX ADMIN — MORPHINE SULFATE 4 MG: 4 INJECTION, SOLUTION INTRAMUSCULAR; INTRAVENOUS at 09:02

## 2024-02-26 RX ADMIN — LEVOTHYROXINE SODIUM 50 MCG: 50 TABLET ORAL at 05:02

## 2024-02-26 NOTE — ASSESSMENT & PLAN NOTE
S/t decreased po intake  Give full liquid diet via g tube  Dietician consult for nutrition recs for bolus feedings per g tube

## 2024-02-26 NOTE — PROGRESS NOTES
[facility]  General Surgery  Progress Note    Subjective:     History of Present Illness:  79yo male with colovesical fistula who presents for fistula take down and sigmoid resection.       Hospital Course:  02/23 CP: Colovesical fistula s/p fistula take down and sigmoid resection POD#2. Pt is tolerating CLD with + BS, passing flatus, and 3 small bms. Reports pain is controlled. PT OT consulted for early mobilization. Urine culture resulted, on IV Zosyn.  02/26 CP: Colovesical fistula s/p fistula take down and sigmoid resection POD#5. Pt experienced increased O2 requirements with respiratory acidosis and hypoxia. On bipap. Reports pain is controlled. Limited po intake.    Post-Op Info:  Procedure(s) (LRB):  COLECTOMY, LEFT, LAPAROSCOPIC WITH GASTROSTOMY TUBE EXCHANGE (Left)   5 Days Post-Op     Interval History:   Patient s/e.  Somnolent.  Increased O2 req overnight.     Medications:  Continuous Infusions:   lactated ringers 75 mL/hr at 02/26/24 0909     Scheduled Meds:   albuterol-ipratropium  3 mL Nebulization Q4H WAKE    atorvastatin  80 mg Oral QHS    azithromycin  500 mg Intravenous Q24H    cefTRIAXone (Rocephin) IV (PEDS and ADULTS)  1 g Intravenous Q24H    enoxparin  40 mg Subcutaneous Q24H (prophylaxis, 1700)    furosemide (LASIX) injection  40 mg Intravenous Daily    levothyroxine  50 mcg Oral Before breakfast    methocarbamoL  500 mg Per G Tube QID    metoprolol succinate  12.5 mg Oral Daily    potassium phosphate IVPB  20 mmol Intravenous Once     PRN Meds:acetaminophen, LIDOcaine (PF) 10 mg/ml (1%), melatonin, morphine, ondansetron, oxyCODONE, sodium chloride 0.9%, traZODone     Review of patient's allergies indicates:   Allergen Reactions    Ativan [lorazepam]      Adverse reaction.     Objective:     Vital Signs (Most Recent):  Temp: 98.6 °F (37 °C) (02/26/24 0435)  Pulse: 74 (02/26/24 0732)  Resp: (!) 21 (02/26/24 0912)  BP: (!) 155/79 (02/26/24 0435)  SpO2: 97 % (02/26/24 0732) Vital Signs  (24h Range):  Temp:  [97.6 °F (36.4 °C)-98.9 °F (37.2 °C)] 98.6 °F (37 °C)  Pulse:  [] 74  Resp:  [14-27] 21  SpO2:  [95 %-98 %] 97 %  BP: (118-155)/(56-79) 155/79     Weight: 76.7 kg (169 lb)  Body mass index is 25.7 kg/m².    Intake/Output - Last 3 Shifts          07 06 0659  07 0659    P.O. 580 0     I.V. (mL/kg) 2117.3 (27.6) 861.9 (11.2)     NG/GT  40     IV Piggyback 98.9      Total Intake(mL/kg) 2796.2 (36.5) 901.9 (11.8)     Urine (mL/kg/hr) 350 (0.2) 1450 (0.8)     Stool 0 0     Total Output 350 1450     Net +2446.2 -548.1            Urine Occurrence 2 x      Stool Occurrence 1 x 0 x             Physical Exam  Vitals and nursing note reviewed.   Constitutional:       General: He is awake. He is not in acute distress.     Appearance: He is ill-appearing. He is not toxic-appearing.      Interventions: Face mask in place.   Cardiovascular:      Rate and Rhythm: Normal rate and regular rhythm.   Pulmonary:      Effort: Tachypnea present. No respiratory distress.      Breath sounds: Decreased breath sounds, rhonchi and rales present.      Comments: Coarse breath sounds  Abdominal:      General: There is no distension.      Tenderness: There is no abdominal tenderness. There is no guarding or rebound.      Comments: Midline incisions with surgical dressing in place   Abdomen firm  G tube   Musculoskeletal:      Right lower le+ Pitting Edema present.      Left lower le+ Pitting Edema present.   Skin:     General: Skin is warm and dry.      Capillary Refill: Capillary refill takes less than 2 seconds.   Neurological:      Mental Status: He is alert.      Motor: Weakness present.      Gait: Gait abnormal.      Comments: somnolent   Psychiatric:         Behavior: Behavior is cooperative.          Significant Labs:  I have reviewed all pertinent lab results within the past 24 hours.  CBC:   Recent Labs   Lab 24  0530   WBC 13.85*   RBC 3.25*   HGB  9.4*   HCT 31.0*      MCV 95   MCH 28.9   MCHC 30.3*       CMP:   Recent Labs   Lab 02/22/24  0410 02/23/24  0425 02/26/24  0530   GLU 82   < > 105   CALCIUM 8.0*   < > 8.6*   ALBUMIN 2.6*   < > 2.6*   PROT 5.9*  --   --       < > 140   K 3.8   < > 4.2   CO2 25   < > 33*      < > 108   BUN 16   < > 19   CREATININE 0.6   < > 0.5   ALKPHOS 53*  --   --    ALT 18  --   --    AST 36  --   --    BILITOT 0.6  --   --     < > = values in this interval not displayed.       Recent Labs     02/26/24  0839   PH 7.335*   PCO2 59.2*   PO2 99.3   HCO3 28.6*   POCSATURATED 98.5          Significant Diagnostics:  I have reviewed all pertinent imaging results/findings within the past 24 hours.  Assessment/Plan:     * Colovesical fistula  POD#4 s/p lap sig colectomy with fistula takedown  -CLD via g tube   -passing flatus   -complicated now with respiratory acidosis and hypoxia   -Multimodal, opioid sparing pain control   -Lovenox     02/26 CP: POD#5 s/p lap sig colectomy with fistula takedown  Full liquid diet via g tube  Passing flatus  Continue multimodal pain management   PT / OT  Lovenox      Inadequate nutrition  S/t decreased po intake  Give full liquid diet via g tube  Dietician consult for nutrition recs for bolus feedings per g tube    Respiratory acidosis  ABG with pCO2 of 78  CXR yesterday with new bilateral basilar infiltrates  Lasix 40mg daily   Bipap today   Repeat AGB in 4 hours   Continue IV abx for coverage of impending pneumonia and UTI   Decrease IVFs    02/26 CP:   ABG improving with pCO2 of 59 and pH of 7.335  Repeat chest x-ray today  Continue bipap  Continue IV abx for PNA coverage  Continue Lasix s/t bilateral pleural effusions  Consult internal medicine, appreciate recs       Hypophosphatemia  Monitor and replace per G tube.  02/26 CP: Replace per IV.    On deep vein thrombosis (DVT) prophylaxis  Lovenox therapy.    Acute cystitis without hematuria  Acute on chronic secondary to  colovesical fistula   Treating for Klebsiella   IV Ceftriaxone   Continue CHAVEZ Dasilva  General Surgery  [facility]

## 2024-02-26 NOTE — ASSESSMENT & PLAN NOTE
POD#4 s/p lap sig colectomy with fistula takedown  -CLD via g tube   -passing flatus   -complicated now with respiratory acidosis and hypoxia   -Multimodal, opioid sparing pain control   -Lovenox     02/26 CP: POD#5 s/p lap sig colectomy with fistula takedown  Full liquid diet via g tube  Passing flatus  Continue multimodal pain management   PT / OT  Lovenox

## 2024-02-26 NOTE — PT/OT/SLP PROGRESS
Physical Therapy      Patient Name:  Richard Farr   MRN:  [id number]    Patient not seen today  (second attempt for today) secondary to Nurse/ SALEEM hold. Patient's nurse Ambika Figueredo RN wants to defer treatment at this time due to medical issues. Will follow-up tomorrow, 2/27/2024.

## 2024-02-26 NOTE — ASSESSMENT & PLAN NOTE
ABG with pCO2 of 78  CXR yesterday with new bilateral basilar infiltrates  Lasix 40mg daily   Bipap today   Repeat AGB in 4 hours   Continue IV abx for coverage of impending pneumonia and UTI   Decrease IVFs    02/26 CP:   ABG improving with pCO2 of 59 and pH of 7.335  Repeat chest x-ray today  Continue bipap  Continue IV abx for PNA coverage  Continue Lasix s/t bilateral pleural effusions  Consult internal medicine, appreciate recs

## 2024-02-26 NOTE — PLAN OF CARE
Problem: Adult Inpatient Plan of Care  Goal: Optimal Comfort and Wellbeing  Outcome: Ongoing, Not Progressing  Goal: Readiness for Transition of Care  Outcome: Ongoing, Not Progressing     Problem: Gas Exchange Impaired  Goal: Optimal Gas Exchange  Outcome: Ongoing, Not Progressing         Problem: Adult Inpatient Plan of Care  Goal: Plan of Care Review  Outcome: Ongoing, Progressing  Goal: Patient-Specific Goal (Individualized)  Outcome: Ongoing, Progressing  Goal: Absence of Hospital-Acquired Illness or Injury  Outcome: Ongoing, Progressing     Problem: Fluid and Electrolyte Imbalance (Acute Kidney Injury/Impairment)  Goal: Fluid and Electrolyte Balance  Outcome: Ongoing, Progressing     Problem: Oral Intake Inadequate (Acute Kidney Injury/Impairment)  Goal: Optimal Nutrition Intake  Outcome: Ongoing, Progressing     Problem: Renal Function Impairment (Acute Kidney Injury/Impairment)  Goal: Effective Renal Function  Outcome: Ongoing, Progressing     Problem: Infection  Goal: Absence of Infection Signs and Symptoms  Outcome: Ongoing, Progressing     Problem: Fall Injury Risk  Goal: Absence of Fall and Fall-Related Injury  Outcome: Ongoing, Progressing     Problem: Skin Injury Risk Increased  Goal: Skin Health and Integrity  Outcome: Ongoing, Progressing

## 2024-02-26 NOTE — PT/OT/SLP PROGRESS
Occupational Therapy      Patient Name:  Richard Farr   MRN:  [id number]    Patient not seen today secondary to Other (Comment). Patient on BiPAP and unable to be seen today. Patient will be followed up as appropriate.     2/26/2024  Doreen ALMEIDA

## 2024-02-26 NOTE — PT/OT/SLP PROGRESS
Physical Therapy      Patient Name:  Richard Farr   MRN:  [id number]    Patient not seen today secondary to Other (Comment). Patient on BiPAP and unable to be seen today. Patient will be followed up on as appropriate once off of BiPAP.

## 2024-02-26 NOTE — SUBJECTIVE & OBJECTIVE
Interval History:   Patient s/e.  Somnolent.  Increased O2 req overnight.     Medications:  Continuous Infusions:   lactated ringers 75 mL/hr at 02/26/24 0909     Scheduled Meds:   albuterol-ipratropium  3 mL Nebulization Q4H WAKE    atorvastatin  80 mg Oral QHS    azithromycin  500 mg Intravenous Q24H    cefTRIAXone (Rocephin) IV (PEDS and ADULTS)  1 g Intravenous Q24H    enoxparin  40 mg Subcutaneous Q24H (prophylaxis, 1700)    furosemide (LASIX) injection  40 mg Intravenous Daily    levothyroxine  50 mcg Oral Before breakfast    methocarbamoL  500 mg Per G Tube QID    metoprolol succinate  12.5 mg Oral Daily    potassium phosphate IVPB  20 mmol Intravenous Once     PRN Meds:acetaminophen, LIDOcaine (PF) 10 mg/ml (1%), melatonin, morphine, ondansetron, oxyCODONE, sodium chloride 0.9%, traZODone     Review of patient's allergies indicates:   Allergen Reactions    Ativan [lorazepam]      Adverse reaction.     Objective:     Vital Signs (Most Recent):  Temp: 98.6 °F (37 °C) (02/26/24 0435)  Pulse: 74 (02/26/24 0732)  Resp: (!) 21 (02/26/24 0912)  BP: (!) 155/79 (02/26/24 0435)  SpO2: 97 % (02/26/24 0732) Vital Signs (24h Range):  Temp:  [97.6 °F (36.4 °C)-98.9 °F (37.2 °C)] 98.6 °F (37 °C)  Pulse:  [] 74  Resp:  [14-27] 21  SpO2:  [95 %-98 %] 97 %  BP: (118-155)/(56-79) 155/79     Weight: 76.7 kg (169 lb)  Body mass index is 25.7 kg/m².    Intake/Output - Last 3 Shifts         02/24 0700 02/25 0659 02/25 0700 02/26 0659 02/26 0700 02/27 0659    P.O. 580 0     I.V. (mL/kg) 2117.3 (27.6) 861.9 (11.2)     NG/GT  40     IV Piggyback 98.9      Total Intake(mL/kg) 2796.2 (36.5) 901.9 (11.8)     Urine (mL/kg/hr) 350 (0.2) 1450 (0.8)     Stool 0 0     Total Output 350 1450     Net +2446.2 -548.1            Urine Occurrence 2 x      Stool Occurrence 1 x 0 x              Physical Exam  Vitals and nursing note reviewed.   Constitutional:       General: He is awake. He is not in acute distress.     Appearance: He is  ill-appearing. He is not toxic-appearing.      Interventions: Face mask in place.   Cardiovascular:      Rate and Rhythm: Normal rate and regular rhythm.   Pulmonary:      Effort: Tachypnea present. No respiratory distress.      Breath sounds: Decreased breath sounds, rhonchi and rales present.      Comments: Coarse breath sounds  Abdominal:      General: There is no distension.      Tenderness: There is no abdominal tenderness. There is no guarding or rebound.      Comments: Midline incisions with surgical dressing in place   Abdomen firm  G tube   Musculoskeletal:      Right lower le+ Pitting Edema present.      Left lower le+ Pitting Edema present.   Skin:     General: Skin is warm and dry.      Capillary Refill: Capillary refill takes less than 2 seconds.   Neurological:      Mental Status: He is alert.      Motor: Weakness present.      Gait: Gait abnormal.      Comments: somnolent   Psychiatric:         Behavior: Behavior is cooperative.          Significant Labs:  I have reviewed all pertinent lab results within the past 24 hours.  CBC:   Recent Labs   Lab 24  0530   WBC 13.85*   RBC 3.25*   HGB 9.4*   HCT 31.0*      MCV 95   MCH 28.9   MCHC 30.3*       CMP:   Recent Labs   Lab 24  0410 24  0425 24  0530   GLU 82   < > 105   CALCIUM 8.0*   < > 8.6*   ALBUMIN 2.6*   < > 2.6*   PROT 5.9*  --   --       < > 140   K 3.8   < > 4.2   CO2 25   < > 33*      < > 108   BUN 16   < > 19   CREATININE 0.6   < > 0.5   ALKPHOS 53*  --   --    ALT 18  --   --    AST 36  --   --    BILITOT 0.6  --   --     < > = values in this interval not displayed.       Recent Labs     24  0839   PH 7.335*   PCO2 59.2*   PO2 99.3   HCO3 28.6*   POCSATURATED 98.5          Significant Diagnostics:  I have reviewed all pertinent imaging results/findings within the past 24 hours.

## 2024-02-27 PROBLEM — E46 MALNUTRITION: Status: RESOLVED | Noted: 2024-02-27 | Resolved: 2024-02-27

## 2024-02-27 PROBLEM — E46 MALNUTRITION: Status: ACTIVE | Noted: 2024-02-27

## 2024-02-27 PROBLEM — J90 BILATERAL PLEURAL EFFUSION: Status: ACTIVE | Noted: 2024-02-27

## 2024-02-27 LAB
ALBUMIN SERPL BCP-MCNC: 2.6 G/DL (ref 3.5–5.2)
ANION GAP SERPL CALC-SCNC: 2 MMOL/L (ref 3–11)
BUN SERPL-MCNC: 15 MG/DL (ref 8–23)
CALCIUM SERPL-MCNC: 8.5 MG/DL (ref 8.7–10.5)
CHLORIDE SERPL-SCNC: 103 MMOL/L (ref 95–110)
CO2 SERPL-SCNC: 35 MMOL/L (ref 23–29)
CREAT SERPL-MCNC: 0.4 MG/DL (ref 0.5–1.4)
ERYTHROCYTE [DISTWIDTH] IN BLOOD BY AUTOMATED COUNT: 15.8 % (ref 11.5–14.5)
EST. GFR  (NO RACE VARIABLE): >60 ML/MIN/1.73 M^2
GLUCOSE SERPL-MCNC: 79 MG/DL (ref 70–110)
HCT VFR BLD AUTO: 30.7 % (ref 40–54)
HGB BLD-MCNC: 9.5 G/DL (ref 14–18)
MAGNESIUM SERPL-MCNC: 2 MG/DL (ref 1.6–2.6)
MCH RBC QN AUTO: 29.1 PG (ref 27–31)
MCHC RBC AUTO-ENTMCNC: 30.9 G/DL (ref 32–36)
MCV RBC AUTO: 94 FL (ref 82–98)
PHOSPHATE SERPL-MCNC: 1.5 MG/DL (ref 2.7–4.5)
PLATELET # BLD AUTO: 239 K/UL (ref 150–450)
PMV BLD AUTO: 10.7 FL (ref 9.2–12.9)
POTASSIUM SERPL-SCNC: 3.5 MMOL/L (ref 3.5–5.1)
RBC # BLD AUTO: 3.27 M/UL (ref 4.6–6.2)
SODIUM SERPL-SCNC: 140 MMOL/L (ref 136–145)
WBC # BLD AUTO: 10.71 K/UL (ref 3.9–12.7)

## 2024-02-27 PROCEDURE — 76937 US GUIDE VASCULAR ACCESS: CPT

## 2024-02-27 PROCEDURE — A4216 STERILE WATER/SALINE, 10 ML: HCPCS | Performed by: STUDENT IN AN ORGANIZED HEALTH CARE EDUCATION/TRAINING PROGRAM

## 2024-02-27 PROCEDURE — 99900035 HC TECH TIME PER 15 MIN (STAT)

## 2024-02-27 PROCEDURE — 25000003 PHARM REV CODE 250

## 2024-02-27 PROCEDURE — 21400001 HC TELEMETRY ROOM

## 2024-02-27 PROCEDURE — 63600175 PHARM REV CODE 636 W HCPCS: Performed by: STUDENT IN AN ORGANIZED HEALTH CARE EDUCATION/TRAINING PROGRAM

## 2024-02-27 PROCEDURE — 63600175 PHARM REV CODE 636 W HCPCS: Performed by: INTERNAL MEDICINE

## 2024-02-27 PROCEDURE — 97530 THERAPEUTIC ACTIVITIES: CPT | Mod: CO

## 2024-02-27 PROCEDURE — 25000242 PHARM REV CODE 250 ALT 637 W/ HCPCS: Performed by: STUDENT IN AN ORGANIZED HEALTH CARE EDUCATION/TRAINING PROGRAM

## 2024-02-27 PROCEDURE — 80069 RENAL FUNCTION PANEL: CPT

## 2024-02-27 PROCEDURE — C1751 CATH, INF, PER/CENT/MIDLINE: HCPCS

## 2024-02-27 PROCEDURE — 36415 COLL VENOUS BLD VENIPUNCTURE: CPT

## 2024-02-27 PROCEDURE — 11000001 HC ACUTE MED/SURG PRIVATE ROOM

## 2024-02-27 PROCEDURE — 25000003 PHARM REV CODE 250: Performed by: STUDENT IN AN ORGANIZED HEALTH CARE EDUCATION/TRAINING PROGRAM

## 2024-02-27 PROCEDURE — 83735 ASSAY OF MAGNESIUM: CPT

## 2024-02-27 PROCEDURE — 97530 THERAPEUTIC ACTIVITIES: CPT

## 2024-02-27 PROCEDURE — 99900031 HC PATIENT EDUCATION (STAT)

## 2024-02-27 PROCEDURE — 94761 N-INVAS EAR/PLS OXIMETRY MLT: CPT

## 2024-02-27 PROCEDURE — 99024 POSTOP FOLLOW-UP VISIT: CPT | Mod: POP,,,

## 2024-02-27 PROCEDURE — 94660 CPAP INITIATION&MGMT: CPT

## 2024-02-27 PROCEDURE — 27100171 HC OXYGEN HIGH FLOW UP TO 24 HOURS

## 2024-02-27 PROCEDURE — 97535 SELF CARE MNGMENT TRAINING: CPT | Mod: CO

## 2024-02-27 PROCEDURE — 85027 COMPLETE CBC AUTOMATED: CPT

## 2024-02-27 PROCEDURE — 36410 VNPNXR 3YR/> PHY/QHP DX/THER: CPT

## 2024-02-27 PROCEDURE — 94640 AIRWAY INHALATION TREATMENT: CPT

## 2024-02-27 PROCEDURE — 25000242 PHARM REV CODE 250 ALT 637 W/ HCPCS: Performed by: INTERNAL MEDICINE

## 2024-02-27 RX ORDER — SODIUM CHLORIDE 0.9 % (FLUSH) 0.9 %
10 SYRINGE (ML) INJECTION
Status: DISCONTINUED | OUTPATIENT
Start: 2024-02-27 | End: 2024-03-01 | Stop reason: HOSPADM

## 2024-02-27 RX ORDER — IPRATROPIUM BROMIDE AND ALBUTEROL SULFATE 2.5; .5 MG/3ML; MG/3ML
3 SOLUTION RESPIRATORY (INHALATION)
Status: DISCONTINUED | OUTPATIENT
Start: 2024-02-27 | End: 2024-03-01 | Stop reason: HOSPADM

## 2024-02-27 RX ORDER — METHOCARBAMOL 500 MG/1
500 TABLET, FILM COATED ORAL EVERY 8 HOURS PRN
Status: DISCONTINUED | OUTPATIENT
Start: 2024-02-27 | End: 2024-03-01 | Stop reason: HOSPADM

## 2024-02-27 RX ORDER — SODIUM CHLORIDE 0.9 % (FLUSH) 0.9 %
10 SYRINGE (ML) INJECTION EVERY 6 HOURS
Status: DISCONTINUED | OUTPATIENT
Start: 2024-02-27 | End: 2024-03-01 | Stop reason: HOSPADM

## 2024-02-27 RX ADMIN — IPRATROPIUM BROMIDE AND ALBUTEROL SULFATE 3 ML: .5; 3 SOLUTION RESPIRATORY (INHALATION) at 07:02

## 2024-02-27 RX ADMIN — IPRATROPIUM BROMIDE AND ALBUTEROL SULFATE 3 ML: .5; 3 SOLUTION RESPIRATORY (INHALATION) at 01:02

## 2024-02-27 RX ADMIN — ENOXAPARIN SODIUM 40 MG: 40 INJECTION SUBCUTANEOUS at 06:02

## 2024-02-27 RX ADMIN — LEVOTHYROXINE SODIUM 50 MCG: 50 TABLET ORAL at 05:02

## 2024-02-27 RX ADMIN — POTASSIUM PHOSPHATE, MONOBASIC AND POTASSIUM PHOSPHATE, DIBASIC 30 MMOL: 224; 236 INJECTION, SOLUTION, CONCENTRATE INTRAVENOUS at 11:02

## 2024-02-27 RX ADMIN — CEFTRIAXONE SODIUM 1 G: 1 INJECTION, POWDER, FOR SOLUTION INTRAMUSCULAR; INTRAVENOUS at 04:02

## 2024-02-27 RX ADMIN — FUROSEMIDE 40 MG: 10 INJECTION, SOLUTION INTRAVENOUS at 09:02

## 2024-02-27 RX ADMIN — Medication 10 ML: at 06:02

## 2024-02-27 RX ADMIN — METOPROLOL SUCCINATE 12.5 MG: 25 TABLET, EXTENDED RELEASE ORAL at 08:02

## 2024-02-27 RX ADMIN — TRAZODONE HYDROCHLORIDE 50 MG: 50 TABLET ORAL at 08:02

## 2024-02-27 RX ADMIN — AZITHROMYCIN MONOHYDRATE 500 MG: 500 INJECTION, POWDER, LYOPHILIZED, FOR SOLUTION INTRAVENOUS at 05:02

## 2024-02-27 RX ADMIN — OXYCODONE HYDROCHLORIDE 5 MG: 5 TABLET ORAL at 08:02

## 2024-02-27 RX ADMIN — FUROSEMIDE 40 MG: 10 INJECTION, SOLUTION INTRAVENOUS at 08:02

## 2024-02-27 RX ADMIN — ATORVASTATIN CALCIUM 80 MG: 80 TABLET, FILM COATED ORAL at 08:02

## 2024-02-27 NOTE — ASSESSMENT & PLAN NOTE
S/t decreased po intake  Give full liquid diet via g tube  Dietician consult for nutrition recs for bolus feedings per g tube    02/27 CP:  Appreciate dietician recs  Encouraged good po intake, continue full liquid diet, may give po  TF per G tube  Monitor nutrition status and electrolytes

## 2024-02-27 NOTE — SUBJECTIVE & OBJECTIVE
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    Coronary artery disease     Depression due to physical illness     Encounter for blood transfusion     Gastric ulcer     History of herpes zoster     Hyperlipemia     Hypertension     Moderate tricuspid insufficiency     NSVT (nonsustained ventricular tachycardia)     PAC (premature atrial contraction)     Patent foramen ovale with right to left shunt     Pneumonia     Pneumonia due to infectious organism     Pulmonary hypertension     PVC's (premature ventricular contractions)     Severe mitral regurgitation     Shingles     Stroke     MINI TRAUMA; LEFT SIDED WEAKNESS    Thyroid disease     Walker as ambulation aid     Wears contact lenses        Past Surgical History:   Procedure Laterality Date    CARDIAC CATHETERIZATION      COLONOSCOPY N/A 11/23/2020    Procedure: COLONOSCOPY;  Surgeon: Abelino Garcia MD;  Location: [facility] ENDO;  Service: General;  Laterality: N/A;    COLONOSCOPY N/A 1/8/2024    Procedure: COLONOSCOPY;  Surgeon: Queenie Berman MD;  Location: [facility] ENDO;  Service: General;  Laterality: N/A;  2nd 0600    CYSTOSCOPY W/ RETROGRADES Bilateral 12/7/2023    Procedure: CYSTOSCOPY WITH RETROGRADE PYELOGRAM;  Surgeon: Juan Villela MD;  Location: [facility] OR;  Service: Urology;  Laterality: Bilateral;    DILATION OF URETHRA N/A 12/7/2023    Procedure: DILATION, URETHRA;  Surgeon: Juan Villela MD;  Location: [facility] OR;  Service: Urology;  Laterality: N/A;    EGD, WITH CLOSED BIOPSY N/A 10/4/2023    Procedure: EGD, WITH CLOSED BIOPSY;  Surgeon: Queenie Berman MD;  Location: [facility] OR;  Service: General;  Laterality: N/A;    ESOPHAGOGASTRODUODENOSCOPY      ESOPHAGOGASTRODUODENOSCOPY N/A 5/21/2021    Procedure: EGD  (ESOPHAGOGASTRODUODENOSCOPY);  Surgeon: Sunny Rea MD;  Location: [facility] ENDO;  Service: Endoscopy;  Laterality: N/A;  COVID-VACCINATED    IMPLANTATION OF MITRAL VALVE LEAFLET CLIP Right 5/18/2021    Procedure: INSERTION, LEAFLET CLIP, MITRAL VALVE;  Surgeon: Zen Reina MD;  Location: [facility] CATH;  Service: Cardiovascular;  Laterality: Right;    INSERTION, PEG TUBE N/A 10/4/2023    Procedure: INSERTION, PEG TUBE;  Surgeon: Queenie Dos Santos MD;  Location: [facility] OR;  Service: General;  Laterality: N/A;    LAPAROSCOPIC LEFT COLECTOMY Left 2/21/2024    Procedure: COLECTOMY, LEFT, LAPAROSCOPIC WITH GASTROSTOMY TUBE EXCHANGE;  Surgeon: Queenie Dos Santos MD;  Location: [facility] OR;  Service: General;  Laterality: Left;  4th 0800    NECK SURGERY      anterior cervical fusion x 2    TONSILLECTOMY      TRANSESOPHAGEAL ECHOCARDIOGRAPHY         Review of patient's allergies indicates:   Allergen Reactions    Ativan [lorazepam]      Adverse reaction.       Current Facility-Administered Medications on File Prior to Encounter   Medication    benzocaine 20 % mouth spray    lactated ringers infusion     Current Outpatient Medications on File Prior to Encounter   Medication Sig    aspirin 81 MG Chew Take 81 mg by mouth. OK TO CONTINUE PER DR. DOS SANTOS    atorvastatin (LIPITOR) 80 MG tablet TAKE 1 TABLET BY MOUTH EVERY EVENING    ezetimibe (ZETIA) 10 mg tablet Take 10 mg by mouth once daily.     fenofibrate (TRICOR) 145 MG tablet Take 145 mg by mouth once daily.    furosemide (LASIX) 40 MG tablet Take 0.5 tablets (20 mg total) by mouth once daily. (Patient taking differently: Take 40 mg by mouth once daily.)    KLOR-CON 20 mEq Pack GIVE 2 PACKS BY PER NG TUBE ROUTE ONCE DAILY FOR 30 DOSES    levothyroxine (SYNTHROID) 50 MCG tablet TAKE 1 TABLET BY MOUTH EVERY DAY BEFORE BREAKFAST.    metoprolol succinate (TOPROL-XL) 25 MG 24 hr tablet TAKE 1/2 TABLET BY MOUTH EVERY DAY    omeprazole (PRILOSEC) 40 MG capsule Take 1 capsule  (40 mg total) by mouth once daily.     Family History       Problem Relation (Age of Onset)    Cancer Brother, Brother    Heart attack Father    Heart disease Sister, Sister    No Known Problems Sister    Pneumonia Sister    Stroke Mother          Tobacco Use    Smoking status: Former     Types: Cigars     Start date:      Quit date:      Years since quittin.1    Smokeless tobacco: Never   Substance and Sexual Activity    Alcohol use: Not Currently    Drug use: Never    Sexual activity: Not on file     Comment:  GAYLE     Review of Systems   Constitutional:  Positive for fatigue. Negative for chills and fever.   HENT:  Negative for rhinorrhea, sneezing and sore throat.    Eyes:  Negative for pain and visual disturbance.   Respiratory:  Positive for shortness of breath. Negative for cough.    Cardiovascular:  Positive for leg swelling. Negative for chest pain.   Gastrointestinal:  Positive for abdominal pain. Negative for constipation and diarrhea.   Endocrine: Negative for cold intolerance and heat intolerance.   Genitourinary:  Negative for difficulty urinating.   Musculoskeletal:  Negative for arthralgias and joint swelling.   Skin:  Negative for rash.   Allergic/Immunologic: Negative for environmental allergies.   Neurological:  Positive for weakness. Negative for dizziness and syncope.   Hematological:  Does not bruise/bleed easily.   Psychiatric/Behavioral:  Positive for confusion. Negative for dysphoric mood. The patient is not nervous/anxious.      Objective:     Vital Signs (Most Recent):  Temp: 97.2 °F (36.2 °C) (24 0940)  Pulse: 86 (24 1129)  Resp: (!) 25 (24 0725)  BP: (!) 142/72 (24 0846)  SpO2: 99 % (24 0725) Vital Signs (24h Range):  Temp:  [96.1 °F (35.6 °C)-98.1 °F (36.7 °C)] 97.2 °F (36.2 °C)  Pulse:  [] 86  Resp:  [22-25] 25  SpO2:  [95 %-99 %] 99 %  BP: (128-174)/(60-76) 142/72     Weight: 76.7 kg (169 lb 1.5 oz)  Body mass index is 25.71  kg/m².     Physical Exam  Vitals and nursing note reviewed.   Constitutional:       Appearance: He is ill-appearing.   HENT:      Head: Normocephalic and atraumatic.      Nose: Nose normal.   Eyes:      Extraocular Movements: Extraocular movements intact.      Pupils: Pupils are equal, round, and reactive to light.   Cardiovascular:      Rate and Rhythm: Normal rate and regular rhythm.      Heart sounds: Murmur heard.      No friction rub. No gallop.   Pulmonary:      Effort: Respiratory distress present.      Breath sounds: Rhonchi and rales present.   Abdominal:      General: Abdomen is flat. Bowel sounds are normal.      Palpations: Abdomen is soft.      Comments: Midline incisions with staples in place, erythema and moisture noted to bottom of midline incision at the abdominal fold.  G tube with dressing in place      Musculoskeletal:         General: Deformity present. No swelling.      Cervical back: Normal range of motion and neck supple.      Right lower leg: Edema present.      Left lower leg: Edema present.   Skin:     General: Skin is warm and dry.      Capillary Refill: Capillary refill takes less than 2 seconds.   Neurological:      General: No focal deficit present.      Mental Status: He is oriented to person, place, and time.      Motor: Weakness present.      Coordination: Coordination abnormal.      Gait: Gait abnormal.   Psychiatric:         Mood and Affect: Mood normal.         Behavior: Behavior normal.          Significant Labs: All pertinent labs within the past 24 hours have been reviewed.    Significant Imaging: I have reviewed all pertinent imaging results/findings within the past 24 hours.

## 2024-02-27 NOTE — SUBJECTIVE & OBJECTIVE
Interval History:   Patient s/e.  Somnolent.  Increased O2 req overnight.     Medications:  Continuous Infusions:      Scheduled Meds:   albuterol-ipratropium  3 mL Nebulization Q4H WAKE    atorvastatin  80 mg Oral QHS    azithromycin  500 mg Intravenous Q24H    cefTRIAXone (Rocephin) IV (PEDS and ADULTS)  1 g Intravenous Q24H    enoxparin  40 mg Subcutaneous Q24H (prophylaxis, 1700)    furosemide (LASIX) injection  40 mg Intravenous Q12H    levothyroxine  50 mcg Oral Before breakfast    metoprolol succinate  12.5 mg Oral Daily    potassium phosphate IVPB  30 mmol Intravenous Once     PRN Meds:acetaminophen, LIDOcaine (PF) 10 mg/ml (1%), melatonin, methocarbamoL, morphine, ondansetron, oxyCODONE, sodium chloride 0.9%, traZODone     Review of patient's allergies indicates:   Allergen Reactions    Ativan [lorazepam]      Adverse reaction.     Objective:     Vital Signs (Most Recent):  Temp: 97.2 °F (36.2 °C) (02/27/24 0940)  Pulse: 88 (02/27/24 0846)  Resp: (!) 25 (02/27/24 0725)  BP: (!) 142/72 (02/27/24 0846)  SpO2: 99 % (02/27/24 0725) Vital Signs (24h Range):  Temp:  [96.1 °F (35.6 °C)-98.2 °F (36.8 °C)] 97.2 °F (36.2 °C)  Pulse:  [] 88  Resp:  [22-25] 25  SpO2:  [95 %-100 %] 99 %  BP: (128-174)/(60-76) 142/72     Weight: 76.7 kg (169 lb 1.5 oz)  Body mass index is 25.71 kg/m².    Intake/Output - Last 3 Shifts         02/25 0700 02/26 0659 02/26 0700 02/27 0659 02/27 0700 02/28 0659    P.O. 0 0     I.V. (mL/kg) 861.9 (11.2) 258 (3.4)     NG/GT 40 60     IV Piggyback  722.7     Total Intake(mL/kg) 901.9 (11.8) 1040.7 (13.6)     Urine (mL/kg/hr) 1450 (0.8) 3850 (2.1)     Stool 0 0     Total Output 1450 3850     Net -548.1 -2809.3            Stool Occurrence 0 x 0 x              Physical Exam  Vitals and nursing note reviewed.   Constitutional:       General: He is awake. He is not in acute distress.     Appearance: He is ill-appearing. He is not toxic-appearing.      Interventions: Nasal cannula in place.    Cardiovascular:      Rate and Rhythm: Normal rate and regular rhythm.      Heart sounds: Murmur heard.      Comments: BLE edema improving   Pulmonary:      Effort: Tachypnea present. No respiratory distress.      Breath sounds: Decreased breath sounds, wheezing and rhonchi present. No rales.      Comments: Coarse breath sounds  Abdominal:      General: There is no distension.      Tenderness: There is no abdominal tenderness. There is no guarding or rebound.      Comments: Midline incisions with staples in place, erythema and moisture noted to bottom of midline incision at the abdominal fold.  G tube with dressing in place   Musculoskeletal:      Right lower leg: Edema present.      Left lower leg: Edema present.   Skin:     General: Skin is warm and dry.      Capillary Refill: Capillary refill takes less than 2 seconds.      Findings: Bruising present.   Neurological:      Mental Status: He is alert.      Motor: Weakness present.      Gait: Gait abnormal.   Psychiatric:         Behavior: Behavior is cooperative.          Significant Labs:  I have reviewed all pertinent lab results within the past 24 hours.  CBC:   Recent Labs   Lab 02/27/24  0333   WBC 10.71   RBC 3.27*   HGB 9.5*   HCT 30.7*      MCV 94   MCH 29.1   MCHC 30.9*       CMP:   Recent Labs   Lab 02/22/24  0410 02/23/24  0425 02/27/24  0333   GLU 82   < > 79   CALCIUM 8.0*   < > 8.5*   ALBUMIN 2.6*   < > 2.6*   PROT 5.9*  --   --       < > 140   K 3.8   < > 3.5   CO2 25   < > 35*      < > 103   BUN 16   < > 15   CREATININE 0.6   < > 0.4*   ALKPHOS 53*  --   --    ALT 18  --   --    AST 36  --   --    BILITOT 0.6  --   --     < > = values in this interval not displayed.       Recent Labs     02/26/24  0839   PH 7.335*   PCO2 59.2*   PO2 99.3   HCO3 28.6*   POCSATURATED 98.5          Significant Diagnostics:  I have reviewed all pertinent imaging results/findings within the past 24 hours.

## 2024-02-27 NOTE — ASSESSMENT & PLAN NOTE
Diet via peg tube  Patient has protein malnutrition.  Last trend as follows-   Lab Results   Component Value Date    ALBUMIN 2.6 (L) 02/27/2024    ALBUMIN 2.6 (L) 02/26/2024    ALBUMIN 2.7 (L) 02/25/2024

## 2024-02-27 NOTE — PLAN OF CARE
Nutrition Plan of Care:    Recommendations     1. TF recommendations: Jevity 1.2   Bolus: 1 carton (240mls) 6 times per 24 hours (1 carton q 4 hours)  TF to provide: 1728kcals, 80gPRO, 243gCHO, 1162mls of FW  Flush with 90mls of FW q 6 hours or per md order    2. Monitor TF tolerance and provide adjustment recommendations as appropriate   3. Monitor labs   4. Collaboration with medical providers      Goals: Patient to advance on EN support prior to RD follow up.  Nutrition Goal Status: new  Communication of RD Recs: other (comment) (consult, poc, sticky notes)     Assessment and Plan  Nutrition Problem  Inadequate nutrition     Related to (etiology):   Inability to consume adequate oral intake     Signs and Symptoms (as evidenced by):   PEG & EN support      Interventions/Recommendations (treatment strategy):  EN Support   Collaboration with medical providers     Nutrition Diagnosis Status:   Scotty Engel MS, RDN, LDN

## 2024-02-27 NOTE — ASSESSMENT & PLAN NOTE
* Colovesical fistula  POD#4 s/p lap sig colectomy with fistula takedown  -CLD via g tube   -passing flatus   -complicated now with respiratory acidosis and hypoxia   -Multimodal, opioid sparing pain control   -Lovenox      02/26 CP: POD#5 s/p lap sig colectomy with fistula takedown  Full liquid diet via g tube  Passing flatus  Continue multimodal pain management   PT / OT  Lovenox     02/27 CP: POD#6 s/p lap sig colectomy with fistula takedown  Surgical dressings removed, staples to incision sites intact-erythema to bottom midline incision in abdominal fold-wound cleanser applied and patted dry, keep area dry  Continue full liquid diet, may give po  Continue multimodal pain management  PT / OT  Passing flatus       Continue treatment per GS recommendations.

## 2024-02-27 NOTE — CONSULTS
[facility] Medicine  Consult Note    Patient Name: Richard Farr  MRN: [id number]  Admission Date: 2/21/2024  Hospital Length of Stay: 6 days  Attending Physician: Queenie Berman MD   Primary Care Provider: Drew Moreira Jr., MD           Patient information was obtained from ER records.     Consults  Subjective:     Principal Problem: Colovesical fistula    Chief Complaint: No chief complaint on file.       HPI: History of Present Illness:  79yo male with colovesical fistula who presents for fistula take down and sigmoid resection.        Hospital Course:  02/23 CP: Colovesical fistula s/p fistula take down and sigmoid resection POD#2. Pt is tolerating CLD with + BS, passing flatus, and 3 small bms. Reports pain is controlled. PT OT consulted for early mobilization. Urine culture resulted, on IV Zosyn.  02/26 CP: Colovesical fistula s/p fistula take down and sigmoid resection POD#5. Pt experienced increased O2 requirements with respiratory acidosis and hypoxia. On bipap. Reports pain is controlled. Limited po intake.  02/27 CP: Colovesical fistula s/p fistula take down and sigmoid resection POD#6. Surgical dressings removed, staples to incision sites intact. Pt being weaned off bipap, on supplemental O2 via NC. Responding well to current abx regimen, afebrile and WBC trending down. Diuresing well after Lasix dose increased. Encouraged good po intake. Appreciate internal medicine and dietary recs.    Patient diuresed well overnight.  Wean bipap as tolerated.     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    Coronary artery disease     Depression due to physical illness     Encounter for blood transfusion     Gastric ulcer     History of herpes zoster      Hyperlipemia     Hypertension     Moderate tricuspid insufficiency     NSVT (nonsustained ventricular tachycardia)     PAC (premature atrial contraction)     Patent foramen ovale with right to left shunt     Pneumonia     Pneumonia due to infectious organism     Pulmonary hypertension     PVC's (premature ventricular contractions)     Severe mitral regurgitation     Shingles     Stroke     MINI TRAUMA; LEFT SIDED WEAKNESS    Thyroid disease     Walker as ambulation aid     Wears contact lenses        Past Surgical History:   Procedure Laterality Date    CARDIAC CATHETERIZATION      COLONOSCOPY N/A 11/23/2020    Procedure: COLONOSCOPY;  Surgeon: Abelino Garcia MD;  Location: [facility] ENDO;  Service: General;  Laterality: N/A;    COLONOSCOPY N/A 1/8/2024    Procedure: COLONOSCOPY;  Surgeon: Queenie Berman MD;  Location: [facility] ENDO;  Service: General;  Laterality: N/A;  2nd 0600    CYSTOSCOPY W/ RETROGRADES Bilateral 12/7/2023    Procedure: CYSTOSCOPY WITH RETROGRADE PYELOGRAM;  Surgeon: Juan Villela MD;  Location: [facility] OR;  Service: Urology;  Laterality: Bilateral;    DILATION OF URETHRA N/A 12/7/2023    Procedure: DILATION, URETHRA;  Surgeon: uJan Villela MD;  Location: [facility] OR;  Service: Urology;  Laterality: N/A;    EGD, WITH CLOSED BIOPSY N/A 10/4/2023    Procedure: EGD, WITH CLOSED BIOPSY;  Surgeon: Queenie Berman MD;  Location: [facility] OR;  Service: General;  Laterality: N/A;    ESOPHAGOGASTRODUODENOSCOPY      ESOPHAGOGASTRODUODENOSCOPY N/A 5/21/2021    Procedure: EGD (ESOPHAGOGASTRODUODENOSCOPY);  Surgeon: Sunny Rea MD;  Location: [facility] ENDO;  Service: Endoscopy;  Laterality: N/A;  COVID-VACCINATED    IMPLANTATION OF MITRAL VALVE LEAFLET CLIP Right 5/18/2021    Procedure: INSERTION, LEAFLET CLIP, MITRAL VALVE;  Surgeon: Zen Reina MD;  Location: [facility] CATH;  Service: Cardiovascular;  Laterality: Right;    INSERTION, PEG TUBE N/A 10/4/2023    Procedure: INSERTION, PEG TUBE;  Surgeon:  Queenie Dos Santos MD;  [facility]: [facility];  Service: General;  Laterality: N/A;    LAPAROSCOPIC LEFT COLECTOMY Left 2024    Procedure: COLECTOMY, LEFT, LAPAROSCOPIC WITH GASTROSTOMY TUBE EXCHANGE;  Surgeon: Queenie Dos Santos MD;  Location: [facility] OR;  Service: General;  Laterality: Left;  4th 0800    NECK SURGERY      anterior cervical fusion x 2    TONSILLECTOMY      TRANSESOPHAGEAL ECHOCARDIOGRAPHY         Review of patient's allergies indicates:   Allergen Reactions    Ativan [lorazepam]      Adverse reaction.       Current Facility-Administered Medications on File Prior to Encounter   Medication    benzocaine 20 % mouth spray    lactated ringers infusion     Current Outpatient Medications on File Prior to Encounter   Medication Sig    aspirin 81 MG Chew Take 81 mg by mouth. OK TO CONTINUE PER DR. DOS SANTOS    atorvastatin (LIPITOR) 80 MG tablet TAKE 1 TABLET BY MOUTH EVERY EVENING    ezetimibe (ZETIA) 10 mg tablet Take 10 mg by mouth once daily.     fenofibrate (TRICOR) 145 MG tablet Take 145 mg by mouth once daily.    furosemide (LASIX) 40 MG tablet Take 0.5 tablets (20 mg total) by mouth once daily. (Patient taking differently: Take 40 mg by mouth once daily.)    KLOR-CON 20 mEq Pack GIVE 2 PACKS BY PER NG TUBE ROUTE ONCE DAILY FOR 30 DOSES    levothyroxine (SYNTHROID) 50 MCG tablet TAKE 1 TABLET BY MOUTH EVERY DAY BEFORE BREAKFAST.    metoprolol succinate (TOPROL-XL) 25 MG 24 hr tablet TAKE 1/2 TABLET BY MOUTH EVERY DAY    omeprazole (PRILOSEC) 40 MG capsule Take 1 capsule (40 mg total) by mouth once daily.     Family History       Problem Relation (Age of Onset)    Cancer Brother, Brother    Heart attack Father    Heart disease Sister, Sister    No Known Problems Sister    Pneumonia Sister    Stroke Mother          Tobacco Use    Smoking status: Former     Types: Cigars     Start date:      Quit date:      Years since quittin.1    Smokeless tobacco: Never   Substance and Sexual Activity     Alcohol use: Not Currently    Drug use: Never    Sexual activity: Not on file     Comment:  GAYLE     Review of Systems   Constitutional:  Positive for fatigue. Negative for chills and fever.   HENT:  Negative for rhinorrhea, sneezing and sore throat.    Eyes:  Negative for pain and visual disturbance.   Respiratory:  Positive for shortness of breath. Negative for cough.    Cardiovascular:  Positive for leg swelling. Negative for chest pain.   Gastrointestinal:  Positive for abdominal pain. Negative for constipation and diarrhea.   Endocrine: Negative for cold intolerance and heat intolerance.   Genitourinary:  Negative for difficulty urinating.   Musculoskeletal:  Negative for arthralgias and joint swelling.   Skin:  Negative for rash.   Allergic/Immunologic: Negative for environmental allergies.   Neurological:  Positive for weakness. Negative for dizziness and syncope.   Hematological:  Does not bruise/bleed easily.   Psychiatric/Behavioral:  Positive for confusion. Negative for dysphoric mood. The patient is not nervous/anxious.      Objective:     Vital Signs (Most Recent):  Temp: 97.2 °F (36.2 °C) (02/27/24 0940)  Pulse: 86 (02/27/24 1129)  Resp: (!) 25 (02/27/24 0725)  BP: (!) 142/72 (02/27/24 0846)  SpO2: 99 % (02/27/24 0725) Vital Signs (24h Range):  Temp:  [96.1 °F (35.6 °C)-98.1 °F (36.7 °C)] 97.2 °F (36.2 °C)  Pulse:  [] 86  Resp:  [22-25] 25  SpO2:  [95 %-99 %] 99 %  BP: (128-174)/(60-76) 142/72     Weight: 76.7 kg (169 lb 1.5 oz)  Body mass index is 25.71 kg/m².     Physical Exam  Vitals and nursing note reviewed.   Constitutional:       Appearance: He is ill-appearing.   HENT:      Head: Normocephalic and atraumatic.      Nose: Nose normal.   Eyes:      Extraocular Movements: Extraocular movements intact.      Pupils: Pupils are equal, round, and reactive to light.   Cardiovascular:      Rate and Rhythm: Normal rate and regular rhythm.      Heart sounds: Murmur heard.      No friction  rub. No gallop.   Pulmonary:      Effort: Respiratory distress present.      Breath sounds: Rhonchi and rales present.   Abdominal:      General: Abdomen is flat. Bowel sounds are normal.      Palpations: Abdomen is soft.      Comments: Midline incisions with staples in place, erythema and moisture noted to bottom of midline incision at the abdominal fold.  G tube with dressing in place      Musculoskeletal:         General: Deformity present. No swelling.      Cervical back: Normal range of motion and neck supple.      Right lower leg: Edema present.      Left lower leg: Edema present.   Skin:     General: Skin is warm and dry.      Capillary Refill: Capillary refill takes less than 2 seconds.   Neurological:      General: No focal deficit present.      Mental Status: He is oriented to person, place, and time.      Motor: Weakness present.      Coordination: Coordination abnormal.      Gait: Gait abnormal.   Psychiatric:         Mood and Affect: Mood normal.         Behavior: Behavior normal.          Significant Labs: All pertinent labs within the past 24 hours have been reviewed.    Significant Imaging: I have reviewed all pertinent imaging results/findings within the past 24 hours.  Assessment/Plan:     * Colovesical fistula  * Colovesical fistula  POD#4 s/p lap sig colectomy with fistula takedown  -CLD via g tube   -passing flatus   -complicated now with respiratory acidosis and hypoxia   -Multimodal, opioid sparing pain control   -Lovenox      02/26 CP: POD#5 s/p lap sig colectomy with fistula takedown  Full liquid diet via g tube  Passing flatus  Continue multimodal pain management   PT / OT  Lovenox     02/27 CP: POD#6 s/p lap sig colectomy with fistula takedown  Surgical dressings removed, staples to incision sites intact-erythema to bottom midline incision in abdominal fold-wound cleanser applied and patted dry, keep area dry  Continue full liquid diet, may give po  Continue multimodal pain management  PT  / OT  Passing flatus       Continue treatment per GS recommendations.      Inadequate nutrition    Diet via peg tube  Patient has protein malnutrition.  Last trend as follows-   Lab Results   Component Value Date    ALBUMIN 2.6 (L) 02/27/2024    ALBUMIN 2.6 (L) 02/26/2024    ALBUMIN 2.7 (L) 02/25/2024           Respiratory acidosis  Lasix increased.  Wean bipap as tolerated.       Hypophosphatemia  Patient has Abnormal Phosphorus: hypophosphatemia. Will continue to monitor electrolytes closely. Will replace the affected electrolytes and repeat labs to be done after interventions completed. The patient's phosphorus results have been reviewed and are listed below.  Recent Labs   Lab 02/27/24  0333   PHOS 1.5*        On deep vein thrombosis (DVT) prophylaxis  lovenox      Acute cystitis without hematuria  Secondary to fistula.  Continue rocephin for klebsiella.     Patient has a leukocytosis.  Last trend as follows-   Lab Results   Component Value Date    WBC 10.71 02/27/2024    WBC 13.85 (H) 02/26/2024    WBC 11.88 02/25/2024           Pleural effusion  Patient found to have small pleural effusion on imaging. I have personally reviewed and interpreted the following imaging: Xray. A thoracentesis was deferred. Most likely etiology includes Volume overload not due to CHF. Management to include Diuresis      History of stroke  Patient debilitated at baseline. PT/OT      VTE Risk Mitigation (From admission, onward)           Ordered     enoxaparin injection 40 mg  Every 24 hours         02/22/24 0947     IP VTE HIGH RISK PATIENT  Once         02/21/24 1622     Place sequential compression device  Until discontinued         02/21/24 1622                        Thank you for your consult. I will follow-up with patient. Please contact us if you have any additional questions.    Drew Moreira Jr, MD  Department of Hospital Medicine   [facility]

## 2024-02-27 NOTE — ASSESSMENT & PLAN NOTE
Patient found to have small pleural effusion on imaging. I have personally reviewed and interpreted the following imaging: Xray. A thoracentesis was deferred. Most likely etiology includes Volume overload not due to CHF. Management to include Diuresis

## 2024-02-27 NOTE — PROGRESS NOTES
[facility]  General Surgery  Progress Note    Subjective:     History of Present Illness:  77yo male with colovesical fistula who presents for fistula take down and sigmoid resection.       Hospital Course:  02/23 CP: Colovesical fistula s/p fistula take down and sigmoid resection POD#2. Pt is tolerating CLD with + BS, passing flatus, and 3 small bms. Reports pain is controlled. PT OT consulted for early mobilization. Urine culture resulted, on IV Zosyn.  02/26 CP: Colovesical fistula s/p fistula take down and sigmoid resection POD#5. Pt experienced increased O2 requirements with respiratory acidosis and hypoxia. On bipap. Reports pain is controlled. Limited po intake.  02/27 CP: Colovesical fistula s/p fistula take down and sigmoid resection POD#6. Surgical dressings removed, staples to incision sites intact. Pt being weaned off bipap, on supplemental O2 via NC. Responding well to current abx regimen, afebrile and WBC trending down. Diuresing well after Lasix dose increased. Encouraged good po intake. Appreciate internal medicine and dietary recs.    Post-Op Info:  Procedure(s) (LRB):  COLECTOMY, LEFT, LAPAROSCOPIC WITH GASTROSTOMY TUBE EXCHANGE (Left)   6 Days Post-Op     Interval History:   Patient s/e.  Somnolent.  Increased O2 req overnight.     Medications:  Continuous Infusions:      Scheduled Meds:   albuterol-ipratropium  3 mL Nebulization Q4H WAKE    atorvastatin  80 mg Oral QHS    azithromycin  500 mg Intravenous Q24H    cefTRIAXone (Rocephin) IV (PEDS and ADULTS)  1 g Intravenous Q24H    enoxparin  40 mg Subcutaneous Q24H (prophylaxis, 1700)    furosemide (LASIX) injection  40 mg Intravenous Q12H    levothyroxine  50 mcg Oral Before breakfast    metoprolol succinate  12.5 mg Oral Daily    potassium phosphate IVPB  30 mmol Intravenous Once     PRN Meds:acetaminophen, LIDOcaine (PF) 10 mg/ml (1%), melatonin, methocarbamoL, morphine, ondansetron, oxyCODONE, sodium chloride 0.9%, traZODone      Review of patient's allergies indicates:   Allergen Reactions    Ativan [lorazepam]      Adverse reaction.     Objective:     Vital Signs (Most Recent):  Temp: 97.2 °F (36.2 °C) (02/27/24 0940)  Pulse: 88 (02/27/24 0846)  Resp: (!) 25 (02/27/24 0725)  BP: (!) 142/72 (02/27/24 0846)  SpO2: 99 % (02/27/24 0725) Vital Signs (24h Range):  Temp:  [96.1 °F (35.6 °C)-98.2 °F (36.8 °C)] 97.2 °F (36.2 °C)  Pulse:  [] 88  Resp:  [22-25] 25  SpO2:  [95 %-100 %] 99 %  BP: (128-174)/(60-76) 142/72     Weight: 76.7 kg (169 lb 1.5 oz)  Body mass index is 25.71 kg/m².    Intake/Output - Last 3 Shifts         02/25 0700 02/26 0659 02/26 0700 02/27 0659 02/27 0700 02/28 0659    P.O. 0 0     I.V. (mL/kg) 861.9 (11.2) 258 (3.4)     NG/GT 40 60     IV Piggyback  722.7     Total Intake(mL/kg) 901.9 (11.8) 1040.7 (13.6)     Urine (mL/kg/hr) 1450 (0.8) 3850 (2.1)     Stool 0 0     Total Output 1450 3850     Net -548.1 -2809.3            Stool Occurrence 0 x 0 x             Physical Exam  Vitals and nursing note reviewed.   Constitutional:       General: He is awake. He is not in acute distress.     Appearance: He is ill-appearing. He is not toxic-appearing.      Interventions: Nasal cannula in place.   Cardiovascular:      Rate and Rhythm: Normal rate and regular rhythm.      Heart sounds: Murmur heard.      Comments: BLE edema improving   Pulmonary:      Effort: Tachypnea present. No respiratory distress.      Breath sounds: Decreased breath sounds, wheezing and rhonchi present. No rales.      Comments: Coarse breath sounds  Abdominal:      General: There is no distension.      Tenderness: There is no abdominal tenderness. There is no guarding or rebound.      Comments: Midline incisions with staples in place, erythema and moisture noted to bottom of midline incision at the abdominal fold.  G tube with dressing in place   Musculoskeletal:      Right lower leg: Edema present.      Left lower leg: Edema present.   Skin:      General: Skin is warm and dry.      Capillary Refill: Capillary refill takes less than 2 seconds.      Findings: Bruising present.   Neurological:      Mental Status: He is alert.      Motor: Weakness present.      Gait: Gait abnormal.   Psychiatric:         Behavior: Behavior is cooperative.          Significant Labs:  I have reviewed all pertinent lab results within the past 24 hours.  CBC:   Recent Labs   Lab 02/27/24  0333   WBC 10.71   RBC 3.27*   HGB 9.5*   HCT 30.7*      MCV 94   MCH 29.1   MCHC 30.9*       CMP:   Recent Labs   Lab 02/22/24  0410 02/23/24  0425 02/27/24  0333   GLU 82   < > 79   CALCIUM 8.0*   < > 8.5*   ALBUMIN 2.6*   < > 2.6*   PROT 5.9*  --   --       < > 140   K 3.8   < > 3.5   CO2 25   < > 35*      < > 103   BUN 16   < > 15   CREATININE 0.6   < > 0.4*   ALKPHOS 53*  --   --    ALT 18  --   --    AST 36  --   --    BILITOT 0.6  --   --     < > = values in this interval not displayed.       Recent Labs     02/26/24  0839   PH 7.335*   PCO2 59.2*   PO2 99.3   HCO3 28.6*   POCSATURATED 98.5          Significant Diagnostics:  I have reviewed all pertinent imaging results/findings within the past 24 hours.  Assessment/Plan:     * Colovesical fistula  POD#4 s/p lap sig colectomy with fistula takedown  -CLD via g tube   -passing flatus   -complicated now with respiratory acidosis and hypoxia   -Multimodal, opioid sparing pain control   -Lovenox     02/26 CP: POD#5 s/p lap sig colectomy with fistula takedown  Full liquid diet via g tube  Passing flatus  Continue multimodal pain management   PT / OT  Lovenox    02/27 CP: POD#6 s/p lap sig colectomy with fistula takedown  Surgical dressings removed, staples to incision sites intact-erythema to bottom midline incision in abdominal fold-wound cleanser applied and patted dry, keep area dry  Continue full liquid diet, may give po  Continue multimodal pain management  PT / OT  Passing flatus    Inadequate nutrition  S/t decreased po  intake  Give full liquid diet via g tube  Dietician consult for nutrition recs for bolus feedings per g tube    02/27 CP:  Appreciate dietician recs  Encouraged good po intake, continue full liquid diet, may give po  TF per G tube  Monitor nutrition status and electrolytes     Respiratory acidosis  ABG with pCO2 of 78  CXR yesterday with new bilateral basilar infiltrates  Lasix 40mg daily   Bipap today   Repeat AGB in 4 hours   Continue IV abx for coverage of impending pneumonia and UTI   Decrease IVFs    02/26 CP:   ABG improving with pCO2 of 59 and pH of 7.335  Repeat chest x-ray today  Continue bipap  Continue IV abx for PNA coverage  Continue Lasix s/t bilateral pleural effusions  Consult internal medicine, appreciate recs     02/27 CP:   Managed per internal medicine  Bipap wean, on NC  Lung sounds improved  Continue increased Lasix  Responding well to current abx regimen      Hypophosphatemia  Monitor and replace per G tube.  02/26 CP: Replace per IV.    On deep vein thrombosis (DVT) prophylaxis  Lovenox therapy.    Acute cystitis without hematuria  Acute on chronic secondary to colovesical fistula   Treating for Klebsiella   IV Ceftriaxone   Continue vilchis       Pleural effusion  See respiratory acidosis.         CHAVEZ BUTLER  General Surgery  [facility]

## 2024-02-27 NOTE — PLAN OF CARE
Problem: Physical Therapy  Goal: Physical Therapy Goal  Description: Goals to be met by: 2024    Patient will increase functional independence with mobility by performin. Supine to sit with Modified Independent.  2. Sit to supine with Modified Independent.  3. Bed to chair transfer with Supervision or Set-up Assistance with rolling walker using Step Transfer technique.  4. Sit to Stand with Supervision or Set-up Assistance with rolling walker.   5. Gait  x 300  feet with Supervision or Set-up Assistance with rolling walker.  6. Lower extremity exercise program x10 reps.   Outcome: Ongoing, Progressing

## 2024-02-27 NOTE — HPI
History of Present Illness:  79yo male with colovesical fistula who presents for fistula take down and sigmoid resection.        Hospital Course:  02/23 CP: Colovesical fistula s/p fistula take down and sigmoid resection POD#2. Pt is tolerating CLD with + BS, passing flatus, and 3 small bms. Reports pain is controlled. PT OT consulted for early mobilization. Urine culture resulted, on IV Zosyn.  02/26 CP: Colovesical fistula s/p fistula take down and sigmoid resection POD#5. Pt experienced increased O2 requirements with respiratory acidosis and hypoxia. On bipap. Reports pain is controlled. Limited po intake.  02/27 CP: Colovesical fistula s/p fistula take down and sigmoid resection POD#6. Surgical dressings removed, staples to incision sites intact. Pt being weaned off bipap, on supplemental O2 via NC. Responding well to current abx regimen, afebrile and WBC trending down. Diuresing well after Lasix dose increased. Encouraged good po intake. Appreciate internal medicine and dietary recs.    Patient diuresed well overnight.  Wean bipap as tolerated.

## 2024-02-27 NOTE — PLAN OF CARE
Problem: Adult Inpatient Plan of Care  Goal: Plan of Care Review  Outcome: Ongoing, Progressing  Goal: Patient-Specific Goal (Individualized)  Outcome: Ongoing, Progressing  Goal: Absence of Hospital-Acquired Illness or Injury  Outcome: Ongoing, Progressing  Goal: Optimal Comfort and Wellbeing  Outcome: Ongoing, Progressing  Goal: Readiness for Transition of Care  Outcome: Ongoing, Progressing     Problem: Fluid and Electrolyte Imbalance (Acute Kidney Injury/Impairment)  Goal: Fluid and Electrolyte Balance  Outcome: Ongoing, Progressing     Problem: Oral Intake Inadequate (Acute Kidney Injury/Impairment)  Goal: Optimal Nutrition Intake  Outcome: Ongoing, Progressing     Problem: Renal Function Impairment (Acute Kidney Injury/Impairment)  Goal: Effective Renal Function  Outcome: Ongoing, Progressing     Problem: Infection  Goal: Absence of Infection Signs and Symptoms  Outcome: Ongoing, Progressing     Problem: Fall Injury Risk  Goal: Absence of Fall and Fall-Related Injury  Outcome: Ongoing, Progressing     Problem: Skin Injury Risk Increased  Goal: Skin Health and Integrity  Outcome: Ongoing, Progressing     Problem: Gas Exchange Impaired  Goal: Optimal Gas Exchange  Outcome: Ongoing, Progressing     Problem: Behavioral Health Comorbidity  Goal: Maintenance of Behavioral Health Symptom Control  Outcome: Ongoing, Progressing     Problem: Heart Failure Comorbidity  Goal: Maintenance of Heart Failure Symptom Control  Outcome: Ongoing, Progressing     Problem: Hypertension Comorbidity  Goal: Blood Pressure in Desired Range  Outcome: Ongoing, Progressing     Problem: Pain Chronic (Persistent) (Comorbidity Management)  Goal: Acceptable Pain Control and Functional Ability  Outcome: Ongoing, Progressing

## 2024-02-27 NOTE — CONSULTS
[facility] Surg  Adult Nutrition  Consult Note    SUMMARY     Recommendations    1. TF recommendations: Jevity 1.2   Bolus: 1 carton (240mls) 6 times per 24 hours (1 carton q 4 hours)  TF to provide: 1728kcals, 80gPRO, 243gCHO, 1162mls of FW  Flush with 90mls of FW q 6 hours or per md order    2. Monitor TF tolerance and provide adjustment recommendations as appropriate   3. Monitor labs   4. Collaboration with medical providers     Goals: Patient to advance on EN support prior to RD follow up.  Nutrition Goal Status: new  Communication of RD Recs: other (comment) (consult, poc, sticky notes)    Assessment and Plan  Nutrition Problem  Inadequate nutrition    Related to (etiology):   Inability to consume adequate oral intake    Signs and Symptoms (as evidenced by):   PEG & EN support     Interventions/Recommendations (treatment strategy):  EN Support   Collaboration with medical providers    Nutrition Diagnosis Status:   New         Malnutrition Assessment                                       Reason for Assessment    Reason For Assessment: new tube feeding, consult (Bolus recommendations)    Diagnosis:  (colovesical fistula)  Patient Active Problem List   Diagnosis    Iron deficiency anemia    Adenomatous duodenal polyp    Acute superficial gastritis without hemorrhage    Diverticulosis of sigmoid colon    History of stroke    Mixed hyperlipidemia    Essential hypertension    Severe mitral regurgitation    Tricuspid regurgitation    MELE (acute kidney injury)    Pleural effusion, right    Acute on chronic combined systolic and diastolic heart failure    Chronic diastolic congestive heart failure    Patent foramen ovale with right to left shunt    Joint pain    GERD (gastroesophageal reflux disease)    Weakness    Mitral valve insufficiency    SOB (shortness of breath)    Hypotension    Localized edema    Chronic heart failure with preserved ejection fraction (HFpEF) NICM NYHA3    Thyroid dysfunction     Secondary hyperthyroidism    Symptomatic anemia    Essential fatty acid (EFA) deficiency    Deficiency of coenzyme Q10    Encounter for immunization    Rash    Acute cystitis without hematuria    UTI (urinary tract infection)    Other dysphagia    Hypokalemia    Laceration of forehead    Ataxia    Pharyngeal dysphagia    Aortic atherosclerosis    Centrilobular emphysema    Colovesical fistula    On deep vein thrombosis (DVT) prophylaxis    Hypophosphatemia    Respiratory acidosis    Inadequate nutrition       Relevant Medical History:   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    Coronary artery disease     Depression due to physical illness     Encounter for blood transfusion     Gastric ulcer     History of herpes zoster     Hyperlipemia     Hypertension     Moderate tricuspid insufficiency     NSVT (nonsustained ventricular tachycardia)     PAC (premature atrial contraction)     Patent foramen ovale with right to left shunt     Pneumonia     Pneumonia due to infectious organism     Pulmonary hypertension     PVC's (premature ventricular contractions)     Severe mitral regurgitation     Shingles     Stroke     MINI TRAUMA; LEFT SIDED WEAKNESS    Thyroid disease     Walker as ambulation aid     Wears contact lenses        Interdisciplinary Rounds: did not attend (RD remote)    General Information Comments:   2/27/24: Patient with full liquids ordered at this time.  RD consulted for EN bolus recommendations.  Recommendations provided above.  Patient s/p GI surgery.  PEG in place.  Labs reviewed.  NKFA.  LBM: 2/24/24.  RD to continue to monitor EN support and adjust recommendations as needed.  ON site RD to complete NFPE.    Nutrition Discharge Planning: Pending medical  "course    Nutrition Risk Screen    Nutrition Risk Screen: tube feeding or parenteral nutrition    Nutrition/Diet History    Spiritual, Cultural Beliefs, Baptist Practices, Values that Affect Care: no  Food Allergies: NKFA  Factors Affecting Nutritional Intake: altered gastrointestinal function    Anthropometrics    Temp: 97.2 °F (36.2 °C)  Height Method: Stated  Height: 5' 8" (172.7 cm)  Height (inches): 68 in  Weight Method: Bed Scale  Weight: 76.7 kg (169 lb 1.5 oz)  Weight (lb): 169.09 lb  Ideal Body Weight (IBW), Male: 154 lb  % Ideal Body Weight, Male (lb): 109.8 %  BMI (Calculated): 25.7  BMI Grade: 25 - 29.9 - overweight       Lab/Procedures/Meds    Pertinent Labs Reviewed: reviewed  BMP  Lab Results   Component Value Date     02/27/2024    K 3.5 02/27/2024     02/27/2024    CO2 35 (H) 02/27/2024    BUN 15 02/27/2024    CREATININE 0.4 (L) 02/27/2024    CALCIUM 8.5 (L) 02/27/2024    ANIONGAP 2 (L) 02/27/2024    EGFRNORACEVR >60.0 02/27/2024     Lab Results   Component Value Date    HGBA1C <3.8 (A) 09/20/2023     Lab Results   Component Value Date    CALCIUM 8.5 (L) 02/27/2024    PHOS 1.5 (L) 02/27/2024     Glucose   Date Value Ref Range Status   02/27/2024 79 70 - 110 mg/dL Final       Pertinent Medications Reviewed: reviewed  Scheduled Meds:   albuterol-ipratropium  3 mL Nebulization Q4H WAKE    atorvastatin  80 mg Oral QHS    azithromycin  500 mg Intravenous Q24H    cefTRIAXone (Rocephin) IV (PEDS and ADULTS)  1 g Intravenous Q24H    enoxparin  40 mg Subcutaneous Q24H (prophylaxis, 1700)    furosemide (LASIX) injection  40 mg Intravenous Q12H    levothyroxine  50 mcg Oral Before breakfast    metoprolol succinate  12.5 mg Oral Daily    potassium phosphate IVPB  30 mmol Intravenous Once     Continuous Infusions:  PRN Meds:.acetaminophen, LIDOcaine (PF) 10 mg/ml (1%), melatonin, methocarbamoL, morphine, ondansetron, oxyCODONE, sodium chloride 0.9%, traZODone    Physical Findings/Assessment     "     Estimated/Assessed Needs    Weight Used For Calorie Calculations: 76.7 kg (169 lb 1.5 oz)  Energy Calorie Requirements (kcal): 25-30kcals/kg (1918-2301kcals/day)  Energy Need Method: Kcal/kg  Protein Requirements: 1.2g/kg (92g/day)  Weight Used For Protein Calculations: 76.7 kg (169 lb 1.5 oz)  Fluid Requirements (mL): 1ml/kcal or per md order  Estimated Fluid Requirement Method: RDA Method  RDA Method (mL): 25  CHO Requirement: 250      Nutrition Prescription Ordered    Current Diet Order: full liquids    Evaluation of Received Nutrient/Fluid Intake    % Kcal Needs: <50%  % Protein Needs: <50%  I/O: +8.1L since admit  Energy Calories Required: not meeting needs  Protein Required: not meeting needs  Fluid Required: not meeting needs  Comments: LBM: 2/24/24  Tolerance: other (see comments) (EN support consult)  % Intake of Estimated Energy Needs: 25 - 50 %  % Meal Intake: monitoring %    Nutrition Risk    Level of Risk/Frequency of Follow-up: moderate - high (Follow up: 2x per week)       Monitor and Evaluation    Food and Nutrient Intake: energy intake, enteral nutrition intake  Food and Nutrient Adminstration: enteral and parenteral nutrition administration  Knowledge/Beliefs/Attitudes: beliefs and attitudes  Physical Activity and Function: factors affecting access to physical activity, nutrition-related ADLs and IADLs  Anthropometric Measurements: body mass index, weight change, weight, height/length  Biochemical Data, Medical Tests and Procedures: electrolyte and renal panel, gastrointestinal profile, glucose/endocrine profile, inflammatory profile, lipid profile       Nutrition Follow-Up    RD Follow-up?: Yes  Magi Engel, MS, RDN, LDN

## 2024-02-27 NOTE — PT/OT/SLP PROGRESS
Occupational Therapy   Treatment    Name: Richard Farr  MRN: [id number]  Admitting Diagnosis:  Colovesical fistula  6 Days Post-Op    Recommendations:     Discharge Recommendations: Low Intensity Therapy  Discharge Equipment Recommendations:  none  Barriers to discharge:  Other (Comment) (current medical status)    Assessment:     Richard Farr is a 78 y.o. male with a medical diagnosis of Colovesical fistula.  He presents with good participation. Performance deficits affecting function are weakness, impaired endurance, impaired self care skills, impaired functional mobility, gait instability, impaired balance, decreased coordination, decreased upper extremity function, decreased lower extremity function, decreased safety awareness, decreased ROM, impaired coordination, impaired fine motor, edema, impaired cardiopulmonary response to activity, impaired joint extensibility, impaired muscle length. Upon entering room, patient reported he needed to have a bowel mvmt. Pt completed bed mobility with CGA/min assistance to sitting edge of bed. Pt then transferred to BSC with min assistance needed to stand and CGA for guiding patient to BSC. Pt had successful small/dark/soft BM; registered nurse and student LPN notified. While patient was on BSC, fresh linens applied to bed. Pt needed max assistance for donning doffing diaper while standing with rolling walker. Pt also needed max assistance for myrna-care while standing with rolling walker. Pt them transferred back to edge of bed with min assistance to stand and CGA for guiding. Pt BSC was changed for a recliner chair. Pt then transferred to recliner in upright position. Pt left sitting up in chair with student LPN, registered nurse Apple, and PCT notified that patient was sitting up in recliner.     Rehab Prognosis:  Good; patient would benefit from acute skilled OT services to address these deficits and reach maximum level of function.       Plan:     Patient to be  seen 5 x/week to address the above listed problems via self-care/home management, therapeutic activities, therapeutic exercises  Plan of Care Expires: 03/01/24  Plan of Care Reviewed with: patient    Subjective     Chief Complaint: I think I need to have a BM.  Patient/Family Comments/goals: Get better  Pain/Comfort:  Pain Rating 1: 0/10  Pain Rating Post-Intervention 1: 0/10    Objective:     Communicated with: occupational therapist and RN prior to session.  Patient found HOB elevated with telemetry, peripheral IV, oxygen upon OT entry to room.    General Precautions: Standard, fall    Orthopedic Precautions:N/A  Braces: N/A  Respiratory Status: Nasal cannula, flow 4 L/min     Occupational Performance:     Bed Mobility:     Patient completed Rolling/Turning to Left with  stand by assistance and contact guard assistance  Patient completed Rolling/Turning to Right with stand by assistance and contact guard assistance  Patient completed Scooting/Bridging with minimum assistance  Patient completed Supine to Sit with minimum assistance    Functional Mobility/Transfers:  Patient completed Sit <> Stand Transfer with minimum assistance  with  rolling walker   Patient completed Bed <> Chair Transfer using Step Transfer technique with minimum assistance with rolling walker  Patient completed Toilet Transfer Step Transfer technique with minimum assistance with  rolling walker  Functional Mobility: Pt completed tranfers above with no LOB noted    Activities of Daily Living:  Lower Body Dressing: maximal assistance to donning/doffing diaper while standing with rolling walker and CGA  Toileting: maximal assistance for myrna-care      [facility] 6 Click ADL: 19    Treatment & Education:  Pt was provided education / instruction regarding role of OT and established OT POC.      Patient left up in chair with all lines intact, call button in reach, and registered nurse, student LPN, and PCT notified    GOALS:   Multidisciplinary Problems        Occupational Therapy Goals          Problem: Occupational Therapy    Goal Priority Disciplines Outcome Interventions   Occupational Therapy Goal     OT, PT/OT Ongoing, Progressing    Description: Goals to be met by: 03/01/24     Patient will increase functional independence with ADLs by performing:    UE Dressing with Minimal Assistance.  LE Dressing with Moderate Assistance.  Grooming while seated at sink with Set-up Assistance.  Toileting from toilet with Minimal Assistance for hygiene and clothing management.   Bathing from  shower chair/bench with Minimal Assistance.  Toilet transfer to toilet with Stand-by Assistance.                         Time Tracking:     OT Date of Treatment: 02/27/24  OT Start Time: 1001  OT Stop Time: 1058  OT Total Time (min): 57 min    Billable Minutes:Self Care/Home Management 30 min  Therapeutic Activity 27 min    OT/SIRI: SIRI     Number of SIRI visits since last OT visit: 1 2/27/2024  Doreen ALMEIDA

## 2024-02-27 NOTE — ASSESSMENT & PLAN NOTE
POD#4 s/p lap sig colectomy with fistula takedown  -CLD via g tube   -passing flatus   -complicated now with respiratory acidosis and hypoxia   -Multimodal, opioid sparing pain control   -Lovenox     02/26 CP: POD#5 s/p lap sig colectomy with fistula takedown  Full liquid diet via g tube  Passing flatus  Continue multimodal pain management   PT / OT  Lovenox    02/27 CP: POD#6 s/p lap sig colectomy with fistula takedown  Surgical dressings removed, staples to incision sites intact-erythema to bottom midline incision in abdominal fold-wound cleanser applied and patted dry, keep area dry  Continue full liquid diet, may give po  Continue multimodal pain management  PT / OT  Passing flatus

## 2024-02-27 NOTE — PROCEDURES
"Richard Farr is a 78 y.o. male patient.    Temp: 97.2 °F (36.2 °C) (02/27/24 0940)  Pulse: 96 (02/27/24 1502)  Resp: 20 (02/27/24 1314)  BP: (!) 142/72 (02/27/24 0846)  SpO2: (!) 94 % (02/27/24 1314)  Weight: 76.7 kg (169 lb 1.5 oz) (02/27/24 0940)  Height: 5' 8" (172.7 cm) (02/27/24 0940)    PICC  Date/Time: 2/27/2024 5:05 PM  Performed by: Nicolette Bowen RN  Consent Done: Yes  Time out: Immediately prior to procedure a time out was called to verify the correct patient, procedure, equipment, support staff and site/side marked as required  Indications: vascular access and med administration  Anesthesia: local infiltration  Local anesthetic: lidocaine 1% without epinephrine    Preparation: skin prepped with ChloraPrep  Skin prep agent dried: skin prep agent completely dried prior to procedure  Sterile barriers: all five maximum sterile barriers used - cap, mask, sterile gown, sterile gloves, and large sterile sheet  Hand hygiene: hand hygiene performed prior to central venous catheter insertion  Location details: left basilic  Catheter type: single lumen  Catheter size: 4 Fr  Catheter Length: 17cm    Ultrasound guidance: yes  Vessel Caliber: patent, compressibility normal  Vascular Doppler: not done  Needle advanced into vessel with real time Ultrasound guidance.  Guidewire confirmed in vessel.  Sterile sheath used.  Number of attempts: 1  Post-procedure: blood return through all ports, chlorhexidine patch and sterile dressing applied            Name RUTH ANN Bowen RN   2/27/2024    "

## 2024-02-27 NOTE — ASSESSMENT & PLAN NOTE
ABG with pCO2 of 78  CXR yesterday with new bilateral basilar infiltrates  Lasix 40mg daily   Bipap today   Repeat AGB in 4 hours   Continue IV abx for coverage of impending pneumonia and UTI   Decrease IVFs    02/26 CP:   ABG improving with pCO2 of 59 and pH of 7.335  Repeat chest x-ray today  Continue bipap  Continue IV abx for PNA coverage  Continue Lasix s/t bilateral pleural effusions  Consult internal medicine, appreciate recs     02/27 CP:   Managed per internal medicine  Bipap wean, on NC  Lung sounds improved  Continue increased Lasix  Responding well to current abx regimen

## 2024-02-27 NOTE — PT/OT/SLP PROGRESS
Physical Therapy Treatment    Patient Name:  Richard Farr   MRN:  [id number]    Recommendations:     Discharge Recommendations: Low Intensity Therapy  Discharge Equipment Recommendations: none  Barriers to discharge: None    Assessment:     Richard Farr is a 78 y.o. male admitted with a medical diagnosis of Colovesical fistula.  He presents with the following impairments/functional limitations: weakness, impaired joint extensibility, impaired cognition, impaired endurance, decreased ROM, impaired self care skills, impaired functional mobility, decreased lower extremity function, decreased safety awareness, impaired balance. Patient is able to perform bed mobility well this visit with verbal cuing and Min A required for scooting in bed. He is able to perform rolling well with no assistance required. SOB and fatigue noted throughout treatment    Rehab Prognosis: Good; patient would benefit from acute skilled PT services to address these deficits and reach maximum level of function.    Recent Surgery: Procedure(s) (LRB):  COLECTOMY, LEFT, LAPAROSCOPIC WITH GASTROSTOMY TUBE EXCHANGE (Left) 6 Days Post-Op    Plan:     During this hospitalization, patient to be seen 5 x/week to address the identified rehab impairments via gait training, therapeutic activities, therapeutic exercises, neuromuscular re-education and progress toward the following goals:    Plan of Care Expires:  03/08/24    Subjective     Chief Complaint: weakness  Patient/Family Comments/goals: return home  Pain/Comfort:  Pain Rating 1: 0/10      Objective:     Communicated with patient and nurse prior to session.  Patient found HOB elevated with telemetry, peripheral IV, oxygen upon PT entry to room.     General Precautions: Standard, fall  Orthopedic Precautions: N/A  Braces: N/A  Respiratory Status: Nasal cannula, flow 4.0 L/min     Functional Mobility:  Bed Mobility:     Rolling Left:  supervision  Rolling Right: supervision  Scooting: minimum  assistance  Bridging: minimum assistance  Supine to Sit: minimum assistance  Sit to Supine: minimum assistance  Transfers:  Unable to perform Sit to stand transfer this visit secondary to fatigue and SOB       AM-PAC 6 CLICK MOBILITY  Turning over in bed (including adjusting bedclothes, sheets and blankets)?: 3  Sitting down on and standing up from a chair with arms (e.g., wheelchair, bedside commode, etc.): 3  Moving from lying on back to sitting on the side of the bed?: 3  Moving to and from a bed to a chair (including a wheelchair)?: 3  Need to walk in hospital room?: 3  Climbing 3-5 steps with a railing?: 2  Basic Mobility Total Score: 17       Treatment & Education:  Patient able to perform bed mobility and transfers this visit. He performed 2x10 reps seated marching. Patient required significant cuing for proper form and presented with some confusion this visit.     Patient left HOB elevated with all lines intact, call button in reach, bed alarm on, and nurse notified..    GOALS:   Multidisciplinary Problems       Physical Therapy Goals          Problem: Physical Therapy    Goal Priority Disciplines Outcome Goal Variances Interventions   Physical Therapy Goal     PT, PT/OT Ongoing, Progressing     Description: Goals to be met by: 2024    Patient will increase functional independence with mobility by performin. Supine to sit with Modified Independent.  2. Sit to supine with Modified Independent.  3. Bed to chair transfer with Supervision or Set-up Assistance with rolling walker using Step Transfer technique.  4. Sit to Stand with Supervision or Set-up Assistance with rolling walker.  5. Gait  x 300  feet with Supervision or Set-up Assistance with rolling walker.  6. Lower extremity exercise program x10 reps.                        Time Tracking:     PT Received On: 24  PT Start Time: 153     PT Stop Time: 1550  PT Total Time (min): 14 min     Billable Minutes: Therapeutic Activity 14  minutes    Treatment Type: Treatment  PT/PTA: PT     Number of PTA visits since last PT visit: 0     02/27/2024

## 2024-02-27 NOTE — ASSESSMENT & PLAN NOTE
Secondary to fistula.  Continue rocephin for klebsiella.     Patient has a leukocytosis.  Last trend as follows-   Lab Results   Component Value Date    WBC 10.71 02/27/2024    WBC 13.85 (H) 02/26/2024    WBC 11.88 02/25/2024

## 2024-02-27 NOTE — ASSESSMENT & PLAN NOTE
Patient has Abnormal Phosphorus: hypophosphatemia. Will continue to monitor electrolytes closely. Will replace the affected electrolytes and repeat labs to be done after interventions completed. The patient's phosphorus results have been reviewed and are listed below.  Recent Labs   Lab 02/27/24  0333   PHOS 1.5*

## 2024-02-27 NOTE — HOSPITAL COURSE
2/27/24:  consulted for volume overload post op.  Patient responding to higher doses of lasix.  Wean bipap as tolerated.      2/28/24: Patient more tachypneic per Respiratory therapy.  ABG reveals hypoventilation with hypoxemia.  Patient placed back on AVAPS.  Episodes of SVT.  Cardiology consulted.  Transitioned to ICU for closer monitoring.    02/29/2024: Patient doing well since transitioned to the ICU.  Tolerating BiPAP with significant improvement in vitals.  Appreciate Cardiology input.  Metoprolol has been increased.  Will continue to wean BiPAP as tolerated.    03/01/2024:  No acute events overnight.  Patient tolerating nasal cannula this morning.  Appears much more comfortable.  Diuresing nicely.  Discussed with case management the pursuit of skilled nursing at time of discharge.

## 2024-02-28 PROBLEM — Z78.9 IMPAIRED MOBILITY AND ADLS: Status: ACTIVE | Noted: 2024-02-28

## 2024-02-28 PROBLEM — E46 MALNUTRITION: Status: ACTIVE | Noted: 2024-02-28

## 2024-02-28 PROBLEM — Z74.09 IMPAIRED MOBILITY AND ADLS: Status: ACTIVE | Noted: 2024-02-28

## 2024-02-28 PROBLEM — I47.10 SVT (SUPRAVENTRICULAR TACHYCARDIA): Status: ACTIVE | Noted: 2024-02-28

## 2024-02-28 LAB
ALBUMIN SERPL BCP-MCNC: 2.6 G/DL (ref 3.5–5.2)
ANION GAP SERPL CALC-SCNC: 1 MMOL/L (ref 3–11)
BUN SERPL-MCNC: 14 MG/DL (ref 8–23)
CALCIUM SERPL-MCNC: 8.5 MG/DL (ref 8.7–10.5)
CHLORIDE SERPL-SCNC: 100 MMOL/L (ref 95–110)
CO2 SERPL-SCNC: 37 MMOL/L (ref 23–29)
COMMENTS: ABNORMAL
CREAT SERPL-MCNC: 0.4 MG/DL (ref 0.5–1.4)
ERYTHROCYTE [DISTWIDTH] IN BLOOD BY AUTOMATED COUNT: 16.2 % (ref 11.5–14.5)
EST. GFR  (NO RACE VARIABLE): >60 ML/MIN/1.73 M^2
FIO2: 36 %
GLUCOSE SERPL-MCNC: 178 MG/DL (ref 70–110)
HCT VFR BLD AUTO: 31.9 % (ref 40–54)
HGB BLD-MCNC: 10 G/DL (ref 14–18)
LPM: 4
Lab: ABNORMAL
MAGNESIUM SERPL-MCNC: 2 MG/DL (ref 1.6–2.6)
MCH RBC QN AUTO: 29 PG (ref 27–31)
MCHC RBC AUTO-ENTMCNC: 31.3 G/DL (ref 32–36)
MCV RBC AUTO: 93 FL (ref 82–98)
MODIFIED ALLEN'S TEST: POSITIVE
NOTIFIED BY: ABNORMAL
O2DEVICE: ABNORMAL
PCO2 BLDA: 61.4 MMHG (ref 35–45)
PH SMN: 7.41 [PH] (ref 7.34–7.45)
PHOSPHATE SERPL-MCNC: 2.2 MG/DL (ref 2.7–4.5)
PLATELET # BLD AUTO: 255 K/UL (ref 150–450)
PMV BLD AUTO: 11.1 FL (ref 9.2–12.9)
PO2 BLDA: 63.1 MMHG (ref 80–100)
POC BASE DEFICIT: 14.5 MMOL/L (ref -2–2)
POC HCO3: 36.3 MMOL/L (ref 24–28)
POC NOTIFIED NOTE: ABNORMAL
POC PERFORMED BY: ABNORMAL
POC SATURATED O2: 93.2 % (ref 95–100)
POC TEMPERATURE: 37 C
POTASSIUM SERPL-SCNC: 3.4 MMOL/L (ref 3.5–5.1)
PROVIDER NOTIFIED: ABNORMAL
RBC # BLD AUTO: 3.45 M/UL (ref 4.6–6.2)
SODIUM SERPL-SCNC: 138 MMOL/L (ref 136–145)
SPECIMEN SOURCE: ABNORMAL
WBC # BLD AUTO: 10.64 K/UL (ref 3.9–12.7)

## 2024-02-28 PROCEDURE — 80069 RENAL FUNCTION PANEL: CPT

## 2024-02-28 PROCEDURE — 25000003 PHARM REV CODE 250

## 2024-02-28 PROCEDURE — 11000001 HC ACUTE MED/SURG PRIVATE ROOM

## 2024-02-28 PROCEDURE — 36600 WITHDRAWAL OF ARTERIAL BLOOD: CPT

## 2024-02-28 PROCEDURE — 83735 ASSAY OF MAGNESIUM: CPT

## 2024-02-28 PROCEDURE — A4216 STERILE WATER/SALINE, 10 ML: HCPCS | Performed by: STUDENT IN AN ORGANIZED HEALTH CARE EDUCATION/TRAINING PROGRAM

## 2024-02-28 PROCEDURE — 25000003 PHARM REV CODE 250: Performed by: STUDENT IN AN ORGANIZED HEALTH CARE EDUCATION/TRAINING PROGRAM

## 2024-02-28 PROCEDURE — 25000003 PHARM REV CODE 250: Performed by: INTERNAL MEDICINE

## 2024-02-28 PROCEDURE — 94660 CPAP INITIATION&MGMT: CPT

## 2024-02-28 PROCEDURE — 27000221 HC OXYGEN, UP TO 24 HOURS

## 2024-02-28 PROCEDURE — 25000242 PHARM REV CODE 250 ALT 637 W/ HCPCS: Performed by: INTERNAL MEDICINE

## 2024-02-28 PROCEDURE — 99024 POSTOP FOLLOW-UP VISIT: CPT | Mod: POP,,,

## 2024-02-28 PROCEDURE — 63600175 PHARM REV CODE 636 W HCPCS: Performed by: INTERNAL MEDICINE

## 2024-02-28 PROCEDURE — 94761 N-INVAS EAR/PLS OXIMETRY MLT: CPT | Mod: XB

## 2024-02-28 PROCEDURE — 36415 COLL VENOUS BLD VENIPUNCTURE: CPT

## 2024-02-28 PROCEDURE — 94640 AIRWAY INHALATION TREATMENT: CPT

## 2024-02-28 PROCEDURE — 99900035 HC TECH TIME PER 15 MIN (STAT)

## 2024-02-28 PROCEDURE — 27100171 HC OXYGEN HIGH FLOW UP TO 24 HOURS

## 2024-02-28 PROCEDURE — 63600175 PHARM REV CODE 636 W HCPCS: Performed by: STUDENT IN AN ORGANIZED HEALTH CARE EDUCATION/TRAINING PROGRAM

## 2024-02-28 PROCEDURE — 99900031 HC PATIENT EDUCATION (STAT)

## 2024-02-28 PROCEDURE — 82803 BLOOD GASES ANY COMBINATION: CPT

## 2024-02-28 PROCEDURE — 85027 COMPLETE CBC AUTOMATED: CPT

## 2024-02-28 RX ORDER — POTASSIUM CHLORIDE 7.45 MG/ML
10 INJECTION INTRAVENOUS
Status: COMPLETED | OUTPATIENT
Start: 2024-02-28 | End: 2024-02-28

## 2024-02-28 RX ORDER — SODIUM CHLORIDE 9 MG/ML
INJECTION, SOLUTION INTRAVENOUS
Status: DISCONTINUED | OUTPATIENT
Start: 2024-02-28 | End: 2024-03-01 | Stop reason: HOSPADM

## 2024-02-28 RX ORDER — METOPROLOL SUCCINATE 25 MG/1
25 TABLET, EXTENDED RELEASE ORAL 2 TIMES DAILY
Status: DISCONTINUED | OUTPATIENT
Start: 2024-02-28 | End: 2024-03-01 | Stop reason: HOSPADM

## 2024-02-28 RX ORDER — SODIUM,POTASSIUM PHOSPHATES 280-250MG
2 POWDER IN PACKET (EA) ORAL
Status: COMPLETED | OUTPATIENT
Start: 2024-02-28 | End: 2024-02-28

## 2024-02-28 RX ORDER — METOPROLOL SUCCINATE 25 MG/1
25 TABLET, EXTENDED RELEASE ORAL DAILY
Status: DISCONTINUED | OUTPATIENT
Start: 2024-02-28 | End: 2024-02-28

## 2024-02-28 RX ORDER — DOXYCYCLINE HYCLATE 100 MG
100 TABLET ORAL EVERY 12 HOURS
Status: DISCONTINUED | OUTPATIENT
Start: 2024-02-28 | End: 2024-03-01 | Stop reason: HOSPADM

## 2024-02-28 RX ADMIN — SODIUM CHLORIDE: 9 INJECTION, SOLUTION INTRAVENOUS at 10:02

## 2024-02-28 RX ADMIN — ENOXAPARIN SODIUM 40 MG: 40 INJECTION SUBCUTANEOUS at 05:02

## 2024-02-28 RX ADMIN — POTASSIUM CHLORIDE 10 MEQ: 10 INJECTION, SOLUTION INTRAVENOUS at 02:02

## 2024-02-28 RX ADMIN — POTASSIUM CHLORIDE 10 MEQ: 10 INJECTION, SOLUTION INTRAVENOUS at 12:02

## 2024-02-28 RX ADMIN — DOXYCYCLINE HYCLATE 100 MG: 100 TABLET, COATED ORAL at 08:02

## 2024-02-28 RX ADMIN — Medication 10 ML: at 12:02

## 2024-02-28 RX ADMIN — IPRATROPIUM BROMIDE AND ALBUTEROL SULFATE 3 ML: .5; 3 SOLUTION RESPIRATORY (INHALATION) at 01:02

## 2024-02-28 RX ADMIN — POTASSIUM CHLORIDE 10 MEQ: 10 INJECTION, SOLUTION INTRAVENOUS at 05:02

## 2024-02-28 RX ADMIN — LEVOTHYROXINE SODIUM 50 MCG: 50 TABLET ORAL at 06:02

## 2024-02-28 RX ADMIN — DOXYCYCLINE HYCLATE 100 MG: 100 TABLET, COATED ORAL at 10:02

## 2024-02-28 RX ADMIN — Medication 6 MG: at 11:02

## 2024-02-28 RX ADMIN — FUROSEMIDE 40 MG: 10 INJECTION, SOLUTION INTRAVENOUS at 08:02

## 2024-02-28 RX ADMIN — FUROSEMIDE 40 MG: 10 INJECTION, SOLUTION INTRAVENOUS at 10:02

## 2024-02-28 RX ADMIN — Medication 10 ML: at 06:02

## 2024-02-28 RX ADMIN — Medication 10 ML: at 11:02

## 2024-02-28 RX ADMIN — POTASSIUM & SODIUM PHOSPHATES POWDER PACK 280-160-250 MG 2 PACKET: 280-160-250 PACK at 03:02

## 2024-02-28 RX ADMIN — METHOCARBAMOL 500 MG: 500 TABLET ORAL at 03:02

## 2024-02-28 RX ADMIN — POTASSIUM & SODIUM PHOSPHATES POWDER PACK 280-160-250 MG 2 PACKET: 280-160-250 PACK at 10:02

## 2024-02-28 RX ADMIN — METOPROLOL SUCCINATE 25 MG: 25 TABLET, EXTENDED RELEASE ORAL at 08:02

## 2024-02-28 RX ADMIN — ATORVASTATIN CALCIUM 80 MG: 80 TABLET, FILM COATED ORAL at 08:02

## 2024-02-28 RX ADMIN — IPRATROPIUM BROMIDE AND ALBUTEROL SULFATE 3 ML: .5; 3 SOLUTION RESPIRATORY (INHALATION) at 07:02

## 2024-02-28 RX ADMIN — POTASSIUM CHLORIDE 10 MEQ: 10 INJECTION, SOLUTION INTRAVENOUS at 10:02

## 2024-02-28 RX ADMIN — POTASSIUM & SODIUM PHOSPHATES POWDER PACK 280-160-250 MG 2 PACKET: 280-160-250 PACK at 08:02

## 2024-02-28 RX ADMIN — METOPROLOL SUCCINATE 25 MG: 25 TABLET, EXTENDED RELEASE ORAL at 10:02

## 2024-02-28 RX ADMIN — CEFTRIAXONE 1 G: 1 INJECTION, POWDER, FOR SOLUTION INTRAMUSCULAR; INTRAVENOUS at 03:02

## 2024-02-28 NOTE — ASSESSMENT & PLAN NOTE
Patient has Abnormal Phosphorus: hypophosphatemia. Will continue to monitor electrolytes closely. Will replace the affected electrolytes and repeat labs to be done after interventions completed. The patient's phosphorus results have been reviewed and are listed below.  Recent Labs   Lab 02/28/24  0339   PHOS 2.2*

## 2024-02-28 NOTE — ASSESSMENT & PLAN NOTE
Secondary to fistula.  Continue rocephin for klebsiella.     Patient has a leukocytosis.  Last trend as follows-   Lab Results   Component Value Date    WBC 10.64 02/28/2024    WBC 10.71 02/27/2024    WBC 13.85 (H) 02/26/2024

## 2024-02-28 NOTE — PROGRESS NOTES
[facility] Medicine  Progress Note    Patient Name: Richard Farr  MRN: [id number]  Patient Class: IP- Inpatient   Admission Date: 2/21/2024  Length of Stay: 7 days  Attending Physician: Queenie Berman MD  Primary Care Provider: Drew Moreira Jr., MD        Subjective:     Principal Problem:Colovesical fistula        HPI:  History of Present Illness:  79yo male with colovesical fistula who presents for fistula take down and sigmoid resection.        Hospital Course:  02/23 CP: Colovesical fistula s/p fistula take down and sigmoid resection POD#2. Pt is tolerating CLD with + BS, passing flatus, and 3 small bms. Reports pain is controlled. PT OT consulted for early mobilization. Urine culture resulted, on IV Zosyn.  02/26 CP: Colovesical fistula s/p fistula take down and sigmoid resection POD#5. Pt experienced increased O2 requirements with respiratory acidosis and hypoxia. On bipap. Reports pain is controlled. Limited po intake.  02/27 CP: Colovesical fistula s/p fistula take down and sigmoid resection POD#6. Surgical dressings removed, staples to incision sites intact. Pt being weaned off bipap, on supplemental O2 via NC. Responding well to current abx regimen, afebrile and WBC trending down. Diuresing well after Lasix dose increased. Encouraged good po intake. Appreciate internal medicine and dietary recs.    Patient diuresed well overnight.  Wean bipap as tolerated.     Overview/Hospital Course:  2/27/24:  consulted for volume overload post op.  Patient responding to higher doses of lasix.  Wean bipap as tolerated.      /28: Patient more tachypneic per Respiratory therapy.  ABG reveals hypoventilation with hypoxemia.  Patient placed back on AVAPS.  Episodes of SVT.  Cardiology consulted.  Transitioned to ICU for closer monitoring.    Interval History:   Patient s/e.  Somnolent.  Increased O2 req overnight.     Medications:  Continuous Infusions:      Scheduled Meds:    albuterol-ipratropium  3 mL Nebulization Q6H WAKE    atorvastatin  80 mg Oral QHS    cefTRIAXone (Rocephin) IV (PEDS and ADULTS)  1 g Intravenous Q24H    doxycycline  100 mg Oral Q12H    enoxparin  40 mg Subcutaneous Q24H (prophylaxis, 1700)    furosemide (LASIX) injection  40 mg Intravenous Q12H    levothyroxine  50 mcg Oral Before breakfast    metoprolol succinate  25 mg Oral Daily    potassium chloride  10 mEq Intravenous Q2H    potassium, sodium phosphates  2 packet Oral QID (AC & HS)    sodium chloride 0.9%  10 mL Intravenous Q6H     PRN Meds:sodium chloride 0.9%, acetaminophen, LIDOcaine (PF) 10 mg/ml (1%), melatonin, methocarbamoL, morphine, ondansetron, oxyCODONE, sodium chloride 0.9%, Flushing PICC/Midline Protocol **AND** sodium chloride 0.9% **AND** sodium chloride 0.9%, traZODone     Review of patient's allergies indicates:   Allergen Reactions    Ativan [lorazepam]      Adverse reaction.     Objective:     Vital Signs (Most Recent):  Temp: 97.3 °F (36.3 °C) (02/28/24 1137)  Pulse: 78 (02/28/24 1137)  Resp: 18 (02/28/24 1137)  BP: (!) 141/78 (02/28/24 1137)  SpO2: 100 % (02/28/24 1137) Vital Signs (24h Range):  Temp:  [96.4 °F (35.8 °C)-97.8 °F (36.6 °C)] 97.3 °F (36.3 °C)  Pulse:  [] 78  Resp:  [18-26] 18  SpO2:  [94 %-100 %] 100 %  BP: (141-162)/(65-96) 141/78     Weight: 76.7 kg (169 lb 1.5 oz)  Body mass index is 25.71 kg/m².    Intake/Output - Last 3 Shifts         02/26 0700 02/27 0659 02/27 0700 02/28 0659 02/28 0700 02/29 0659    P.O. 0 0     I.V. (mL/kg) 258 (3.4)      NG/GT 60 900     IV Piggyback 722.7 250     Total Intake(mL/kg) 1040.7 (13.6) 1150 (15)     Urine (mL/kg/hr) 3850 (2.1) 2975 (1.6)     Stool 0 2     Total Output 3850 2977     Net -2809.3 -1827            Stool Occurrence 0 x 1 x              Physical Exam  Vitals and nursing note reviewed.   Constitutional:       General: He is awake. He is not in acute distress.     Appearance: He is ill-appearing. He is not  toxic-appearing.      Interventions: Nasal cannula in place.   Cardiovascular:      Rate and Rhythm: Normal rate and regular rhythm.      Heart sounds: Murmur heard.      Comments: BLE edema improving   Pulmonary:      Effort: Tachypnea present. No respiratory distress.      Breath sounds: Rhonchi present. No wheezing or rales.      Comments: Coarse breath sounds  Abdominal:      General: Bowel sounds are normal. There is no distension.      Tenderness: There is no abdominal tenderness. There is no guarding or rebound.      Comments: Midline incisions with staples in place, erythema and moisture noted to bottom of midline incision at the abdominal fold.  G tube with dressing in place   Musculoskeletal:      Right lower leg: Edema present.      Left lower leg: Edema present.   Skin:     General: Skin is warm and dry.      Capillary Refill: Capillary refill takes less than 2 seconds.      Findings: Bruising present.   Neurological:      Mental Status: He is alert.      Motor: Weakness present.      Gait: Gait abnormal.   Psychiatric:         Behavior: Behavior is cooperative.          Significant Labs:  I have reviewed all pertinent lab results within the past 24 hours.  CBC:   Recent Labs   Lab 02/28/24  0339   WBC 10.64   RBC 3.45*   HGB 10.0*   HCT 31.9*      MCV 93   MCH 29.0   MCHC 31.3*     CMP:   Recent Labs   Lab 02/22/24  0410 02/23/24  0425 02/28/24  0339   GLU 82   < > 178*   CALCIUM 8.0*   < > 8.5*   ALBUMIN 2.6*   < > 2.6*   PROT 5.9*  --   --       < > 138   K 3.8   < > 3.4*   CO2 25   < > 37*      < > 100   BUN 16   < > 14   CREATININE 0.6   < > 0.4*   ALKPHOS 53*  --   --    ALT 18  --   --    AST 36  --   --    BILITOT 0.6  --   --     < > = values in this interval not displayed.     Recent Labs     02/28/24  1006   PH 7.412   PCO2 61.4*   PO2 63.1*   HCO3 36.3*   POCSATURATED 93.2*        Significant Diagnostics:  I have reviewed all pertinent imaging results/findings within the  past 24 hours.  Review of Systems    Assessment/Plan:      * Colovesical fistula  * Colovesical fistula  POD#4 s/p lap sig colectomy with fistula takedown  -CLD via g tube   -passing flatus   -complicated now with respiratory acidosis and hypoxia   -Multimodal, opioid sparing pain control   -Lovenox      02/26 CP: POD#5 s/p lap sig colectomy with fistula takedown  Full liquid diet via g tube  Passing flatus  Continue multimodal pain management   PT / OT  Lovenox     02/27 CP: POD#6 s/p lap sig colectomy with fistula takedown  Surgical dressings removed, staples to incision sites intact-erythema to bottom midline incision in abdominal fold-wound cleanser applied and patted dry, keep area dry  Continue full liquid diet, may give po  Continue multimodal pain management  PT / OT  Passing flatus       Continue treatment per GS recommendations.      SVT (supraventricular tachycardia)  Metoprolol increased.  Cardiology consulted      Inadequate nutrition    Diet via peg tube  Patient has protein malnutrition.  Last trend as follows-   Lab Results   Component Value Date    ALBUMIN 2.6 (L) 02/27/2024    ALBUMIN 2.6 (L) 02/26/2024    ALBUMIN 2.7 (L) 02/25/2024           Respiratory acidosis  Lasix increased.  Wean bipap as tolerated.     2/28:  respiratory decompensation now on AVAPS.      Hypophosphatemia  Patient has Abnormal Phosphorus: hypophosphatemia. Will continue to monitor electrolytes closely. Will replace the affected electrolytes and repeat labs to be done after interventions completed. The patient's phosphorus results have been reviewed and are listed below.  Recent Labs   Lab 02/28/24  0339   PHOS 2.2*          On deep vein thrombosis (DVT) prophylaxis  lovenox      Hypokalemia  Patient has hypokalemia which is Acute and currently uncontrolled. Most recent potassium levels reviewed-   Lab Results   Component Value Date    K 3.4 (L) 02/28/2024   . Will continue potassium replacement per protocol and recheck  repeat levels after replacement completed.     Acute cystitis without hematuria  Secondary to fistula.  Continue rocephin for klebsiella.     Patient has a leukocytosis.  Last trend as follows-   Lab Results   Component Value Date    WBC 10.64 02/28/2024    WBC 10.71 02/27/2024    WBC 13.85 (H) 02/26/2024           Bilateral pleural effusion  Patient found to have small pleural effusion on imaging. I have personally reviewed and interpreted the following imaging: Xray. A thoracentesis was deferred. Most likely etiology includes Volume overload not due to CHF. Management to include Diuresis      Volume overload:  Continue to monitor strict intake and output.    I/O last 3 completed shifts:  In: 2190.7 [I.V.:258; NG/GT:960; IV Piggyback:972.7]  Out: 5327 [Urine:5325; Stool:2]  No intake/output data recorded.         History of stroke  Patient debilitated at baseline. PT/OT.  IPR at dc.      VTE Risk Mitigation (From admission, onward)           Ordered     enoxaparin injection 40 mg  Every 24 hours         02/22/24 0947     IP VTE HIGH RISK PATIENT  Once         02/21/24 1622     Place sequential compression device  Until discontinued         02/21/24 1622                    Discharge Planning   JOSÉ:      Code Status: Full Code   Is the patient medically ready for discharge?:     Reason for patient still in hospital (select all that apply): Treatment  Discharge Plan A: Rehab                  Drew Moreira Jr, MD  Department of Hospital Medicine   [facility]

## 2024-02-28 NOTE — SUBJECTIVE & OBJECTIVE
Interval History:   Patient s/e.  Somnolent.  Increased O2 req overnight.     Medications:  Continuous Infusions:      Scheduled Meds:   albuterol-ipratropium  3 mL Nebulization Q6H WAKE    atorvastatin  80 mg Oral QHS    azithromycin  500 mg Intravenous Q24H    cefTRIAXone (Rocephin) IV (PEDS and ADULTS)  1 g Intravenous Q24H    enoxparin  40 mg Subcutaneous Q24H (prophylaxis, 1700)    furosemide (LASIX) injection  40 mg Intravenous Q12H    levothyroxine  50 mcg Oral Before breakfast    metoprolol succinate  25 mg Oral Daily    potassium chloride  10 mEq Intravenous Q2H    potassium, sodium phosphates  2 packet Oral QID (AC & HS)    sodium chloride 0.9%  10 mL Intravenous Q6H     PRN Meds:acetaminophen, LIDOcaine (PF) 10 mg/ml (1%), melatonin, methocarbamoL, morphine, ondansetron, oxyCODONE, sodium chloride 0.9%, Flushing PICC/Midline Protocol **AND** sodium chloride 0.9% **AND** sodium chloride 0.9%, traZODone     Review of patient's allergies indicates:   Allergen Reactions    Ativan [lorazepam]      Adverse reaction.     Objective:     Vital Signs (Most Recent):  Temp: 96.4 °F (35.8 °C) (02/28/24 0709)  Pulse: 92 (02/28/24 0727)  Resp: (!) 22 (02/28/24 0725)  BP: (!) 159/75 (02/28/24 0709)  SpO2: 98 % (02/28/24 0725) Vital Signs (24h Range):  Temp:  [96.4 °F (35.8 °C)-97.9 °F (36.6 °C)] 96.4 °F (35.8 °C)  Pulse:  [] 92  Resp:  [20-26] 22  SpO2:  [94 %-98 %] 98 %  BP: (144-162)/(65-96) 159/75     Weight: 76.7 kg (169 lb 1.5 oz)  Body mass index is 25.71 kg/m².    Intake/Output - Last 3 Shifts         02/26 0700 02/27 0659 02/27 0700 02/28 0659 02/28 0700 02/29 0659    P.O. 0 0     I.V. (mL/kg) 258 (3.4)      NG/GT 60 900     IV Piggyback 722.7 250     Total Intake(mL/kg) 1040.7 (13.6) 1150 (15)     Urine (mL/kg/hr) 3850 (2.1) 2975 (1.6)     Stool 0 2     Total Output 3850 2977     Net -2809.3 -1827            Stool Occurrence 0 x 1 x              Physical Exam  Vitals and nursing note reviewed.    Constitutional:       General: He is awake. He is not in acute distress.     Appearance: He is ill-appearing. He is not toxic-appearing.      Interventions: Nasal cannula in place.   Cardiovascular:      Rate and Rhythm: Normal rate and regular rhythm.      Heart sounds: Murmur heard.      Comments: BLE edema improving   Pulmonary:      Effort: Tachypnea present. No respiratory distress.      Breath sounds: Rhonchi present. No wheezing or rales.      Comments: Coarse breath sounds  Abdominal:      General: Bowel sounds are normal. There is no distension.      Tenderness: There is no abdominal tenderness. There is no guarding or rebound.      Comments: Midline incisions with staples in place, erythema and moisture noted to bottom of midline incision at the abdominal fold.  G tube with dressing in place   Musculoskeletal:      Right lower leg: Edema present.      Left lower leg: Edema present.   Skin:     General: Skin is warm and dry.      Capillary Refill: Capillary refill takes less than 2 seconds.      Findings: Bruising present.   Neurological:      Mental Status: He is alert.      Motor: Weakness present.      Gait: Gait abnormal.   Psychiatric:         Behavior: Behavior is cooperative.          Significant Labs:  I have reviewed all pertinent lab results within the past 24 hours.  CBC:   Recent Labs   Lab 02/28/24  0339   WBC 10.64   RBC 3.45*   HGB 10.0*   HCT 31.9*      MCV 93   MCH 29.0   MCHC 31.3*       CMP:   Recent Labs   Lab 02/22/24  0410 02/23/24  0425 02/28/24  0339   GLU 82   < > 178*   CALCIUM 8.0*   < > 8.5*   ALBUMIN 2.6*   < > 2.6*   PROT 5.9*  --   --       < > 138   K 3.8   < > 3.4*   CO2 25   < > 37*      < > 100   BUN 16   < > 14   CREATININE 0.6   < > 0.4*   ALKPHOS 53*  --   --    ALT 18  --   --    AST 36  --   --    BILITOT 0.6  --   --     < > = values in this interval not displayed.       Recent Labs     02/26/24  0839   PH 7.335*   PCO2 59.2*   PO2 99.3   HCO3  28.6*   POCSATURATED 98.5          Significant Diagnostics:  I have reviewed all pertinent imaging results/findings within the past 24 hours.

## 2024-02-28 NOTE — PT/OT/SLP PROGRESS
Physical Therapy      Patient Name:  Richard Farr   MRN:  [id number]    Patient not seen today secondary to MD hold (Comment). Patient transferred down to ICU and on BiPAP. MD hold for today. Will follow-up 2/29/24.

## 2024-02-28 NOTE — CONSULTS
[facility] - Intensive Care  Cardiology  Consult Note    Patient Name: Richard Farr  MRN: [id number]  Admission Date: 2/21/2024  Hospital Length of Stay: 7 days  Code Status: Full Code   Attending Provider: Queenie Berman MD   Consulting Provider: Vaughn Yanez MD  Primary Care Physician: Drew Moreira Jr., MD  Principal Problem:Colovesical fistula    Patient information was obtained from patient, past medical records, and ER records.     Inpatient consult to Cardiology  Consult performed by: Mia Hidalgo PA-C  Consult ordered by: Drew Moreira Jr., MD      Consult performed by: Vaughn Yanez MD  Subjective:     Chief Complaint:  SVT     HPI:   This is a 77 yo male with pmhx severe MR s/p mitral clip, mod-severe TR, HTN, HLD, and anemia due to gastric ulcer who is POD #7 colovesical fistula take down and sigmoid resection. He has had a complicated post-op course with respiratory acidosis and hypoxia requiring BiPAP which has been weaned to NC. Pt was volume overloaded post-op and has been diuresing well on lasix. Primary following. Pt respiratory status worsening; he was placed on AVAPS and transferred to ICU for close monitoring and care.     Cardiology consulted for evaluation and management of SVT. Metoprolol succinate increased to 25 mg po qd. He had 3 episodes of HR >160s that were nonsustained and spontaneously resolved. Asymptomatic during the episodes. Currently, rate controlled with no further episodes noted on telemetry. We will increase metoprolol succinate to 25 mg po bid and hold afternoon dose if HR < 60.    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    Coronary artery disease     Depression due to physical illness     Encounter for blood transfusion      Gastric ulcer     History of herpes zoster     Hyperlipemia     Hypertension     Moderate tricuspid insufficiency     NSVT (nonsustained ventricular tachycardia)     PAC (premature atrial contraction)     Patent foramen ovale with right to left shunt     Pneumonia     Pneumonia due to infectious organism     Pulmonary hypertension     PVC's (premature ventricular contractions)     Severe mitral regurgitation     Shingles     Stroke     MINI TRAUMA; LEFT SIDED WEAKNESS    Thyroid disease     Walker as ambulation aid     Wears contact lenses        Past Surgical History:   Procedure Laterality Date    CARDIAC CATHETERIZATION      COLONOSCOPY N/A 11/23/2020    Procedure: COLONOSCOPY;  Surgeon: Abelino Garcia MD;  Location: [facility] ENDO;  Service: General;  Laterality: N/A;    COLONOSCOPY N/A 1/8/2024    Procedure: COLONOSCOPY;  Surgeon: Queenie Berman MD;  Location: [facility] ENDO;  Service: General;  Laterality: N/A;  2nd 0600    CYSTOSCOPY W/ RETROGRADES Bilateral 12/7/2023    Procedure: CYSTOSCOPY WITH RETROGRADE PYELOGRAM;  Surgeon: Juan Villela MD;  Location: [facility] OR;  Service: Urology;  Laterality: Bilateral;    DILATION OF URETHRA N/A 12/7/2023    Procedure: DILATION, URETHRA;  Surgeon: Juan Villela MD;  Location: [facility] OR;  Service: Urology;  Laterality: N/A;    EGD, WITH CLOSED BIOPSY N/A 10/4/2023    Procedure: EGD, WITH CLOSED BIOPSY;  Surgeon: Queenie Berman MD;  Location: [facility] OR;  Service: General;  Laterality: N/A;    ESOPHAGOGASTRODUODENOSCOPY      ESOPHAGOGASTRODUODENOSCOPY N/A 5/21/2021    Procedure: EGD (ESOPHAGOGASTRODUODENOSCOPY);  Surgeon: Sunny Rea MD;  Location: [facility] ENDO;  Service: Endoscopy;  Laterality: N/A;  COVID-VACCINATED    IMPLANTATION OF MITRAL VALVE LEAFLET CLIP Right 5/18/2021    Procedure: INSERTION, LEAFLET CLIP, MITRAL VALVE;  Surgeon: Zen Reina MD;  Location: [facility] CATH;  Service: Cardiovascular;  Laterality: Right;    INSERTION, PEG TUBE N/A  10/4/2023    Procedure: INSERTION, PEG TUBE;  Surgeon: Queenie Dos Santos MD;  Location: [facility] OR;  Service: General;  Laterality: N/A;    LAPAROSCOPIC LEFT COLECTOMY Left 2024    Procedure: COLECTOMY, LEFT, LAPAROSCOPIC WITH GASTROSTOMY TUBE EXCHANGE;  Surgeon: Queenie Dos Santos MD;  Location: [facility] OR;  Service: General;  Laterality: Left;   0800    NECK SURGERY      anterior cervical fusion x 2    TONSILLECTOMY      TRANSESOPHAGEAL ECHOCARDIOGRAPHY         Review of patient's allergies indicates:   Allergen Reactions    Ativan [lorazepam]      Adverse reaction.       Current Facility-Administered Medications on File Prior to Encounter   Medication    benzocaine 20 % mouth spray    lactated ringers infusion     Current Outpatient Medications on File Prior to Encounter   Medication Sig    aspirin 81 MG Chew Take 81 mg by mouth. OK TO CONTINUE PER DR. DOS SANTOS    atorvastatin (LIPITOR) 80 MG tablet TAKE 1 TABLET BY MOUTH EVERY EVENING    ezetimibe (ZETIA) 10 mg tablet Take 10 mg by mouth once daily.     fenofibrate (TRICOR) 145 MG tablet Take 145 mg by mouth once daily.    furosemide (LASIX) 40 MG tablet Take 0.5 tablets (20 mg total) by mouth once daily. (Patient taking differently: Take 40 mg by mouth once daily.)    KLOR-CON 20 mEq Pack GIVE 2 PACKS BY PER NG TUBE ROUTE ONCE DAILY FOR 30 DOSES    levothyroxine (SYNTHROID) 50 MCG tablet TAKE 1 TABLET BY MOUTH EVERY DAY BEFORE BREAKFAST.    metoprolol succinate (TOPROL-XL) 25 MG 24 hr tablet TAKE 1/2 TABLET BY MOUTH EVERY DAY    omeprazole (PRILOSEC) 40 MG capsule Take 1 capsule (40 mg total) by mouth once daily.     Family History       Problem Relation (Age of Onset)    Cancer Brother, Brother    Heart attack Father    Heart disease Sister, Sister    No Known Problems Sister    Pneumonia Sister    Stroke Mother          Tobacco Use    Smoking status: Former     Types: Cigars     Start date:      Quit date:      Years since quittin.1     Smokeless tobacco: Never   Substance and Sexual Activity    Alcohol use: Not Currently    Drug use: Never    Sexual activity: Not on file     Comment:  GAYLE     Review of Systems   Unable to perform ROS: Other (somnolent, limiting history)   Cardiovascular:  Positive for leg swelling. Negative for chest pain and palpitations.   Respiratory:  Positive for shortness of breath.      Objective:     Vital Signs (Most Recent):  Temp: 98.2 °F (36.8 °C) (02/28/24 1501)  Pulse: 78 (02/28/24 2057)  Resp: (!) 23 (02/28/24 1917)  BP: 129/60 (02/28/24 2057)  SpO2: 100 % (02/28/24 1917) Vital Signs (24h Range):  Temp:  [96.4 °F (35.8 °C)-98.2 °F (36.8 °C)] 98.2 °F (36.8 °C)  Pulse:  [] 78  Resp:  [18-29] 23  SpO2:  [95 %-100 %] 100 %  BP: (114-162)/(59-96) 129/60     Weight: 76.7 kg (169 lb 1.5 oz)  Body mass index is 25.71 kg/m².    SpO2: 100 %         Intake/Output Summary (Last 24 hours) at 2/28/2024 2116  Last data filed at 2/28/2024 1903  Gross per 24 hour   Intake 1924.14 ml   Output 2576 ml   Net -651.86 ml       Lines/Drains/Airways       Drain  Duration                  Gastrostomy/Enterostomy 02/21/24 1534 Gastrostomy tube w/ balloon LUQ feeding 7 days         Urethral Catheter 02/21/24 1020 Silicone 16 Fr. 7 days              Peripheral Intravenous Line  Duration                  Peripheral IV - Single Lumen 18 G Right Antecubital -- days         Midline Catheter Insertion/Assessment  - Single Lumen 02/27/24 1704 Left basilic vein (medial side of arm) 1 day                    Physical Exam  Constitutional:       Appearance: He is ill-appearing.      Comments: Somnolent but arousable   HENT:      Head: Normocephalic and atraumatic.      Nose:      Comments: NC in place     Mouth/Throat:      Mouth: Mucous membranes are moist.      Pharynx: Oropharynx is clear.   Cardiovascular:      Rate and Rhythm: Regular rhythm. Tachycardia present.      Pulses: Normal pulses.   Pulmonary:      Breath sounds:  Wheezing, rhonchi and rales present.   Musculoskeletal:      Right lower leg: Edema present.      Left lower leg: Edema present.   Skin:     General: Skin is warm and dry.      Capillary Refill: Capillary refill takes less than 2 seconds.   Neurological:      Mental Status: He is oriented to person, place, and time and easily aroused.      Comments: Somnolent but arousable and oriented          Significant Labs: BMP:   Recent Labs   Lab 02/27/24 0333 02/28/24 0339   GLU 79 178*    138   K 3.5 3.4*    100   CO2 35* 37*   BUN 15 14   CREATININE 0.4* 0.4*   CALCIUM 8.5* 8.5*   MG 2.0 2.0   , CMP   Recent Labs   Lab 02/27/24 0333 02/28/24 0339    138   K 3.5 3.4*    100   CO2 35* 37*   GLU 79 178*   BUN 15 14   CREATININE 0.4* 0.4*   CALCIUM 8.5* 8.5*   ALBUMIN 2.6* 2.6*   ANIONGAP 2* 1*   , CBC   Recent Labs   Lab 02/27/24 0333 02/28/24 0339   WBC 10.71 10.64   HGB 9.5* 10.0*   HCT 30.7* 31.9*    255   , and All pertinent lab results from the last 24 hours have been reviewed.    Significant Imaging:   TTE 9/2023:      Left Ventricle: The left ventricle is mildly dilated. Normal wall motion. There is normal systolic function with a visually estimated ejection fraction of 55 - 60%. There is normal diastolic function.    Left Atrium: Left atrium is moderately dilated.    Right Ventricle: Normal right ventricular cavity size.    Right Atrium: Right atrium is mildly dilated.    Aortic Valve: The aortic valve is a trileaflet valve. Mildly calcified right cusp. There is mild annular calcification present. Mildly restricted motion.    Mitral Valve: The mitral valve is repaired by MitraClip   (XTw  MitraClip: 5/18/2021- Dr. Reina and Dr. Yanez..  Indication: Severe mitral valve prolapse.  Preop MR: 4+.  Postop MR: 2+) There is moderate regurgitation with an anteromedial eccentrically directed jet.    Tricuspid Valve: There is mild regurgitation with a centrally directed jet.    Aorta:  Calcified atherosclerosis of the ascending aorta.     Coronary angiogram 4/27/2021:       Assessment and Plan:    Nonsustatined SVT   -3 nonsustained episodes of SVT   -increase metoprolol succinate to 25 mg po bid - hold pm dose HR < 60   -continue to monitor on telemetry   -12-lead EKG as needed for acute change in rhythm     Colovesical fistula   -POD#7 lap sigmoid colectomy with fistula takedown   -general surgery following     Respiratory acidosis   -acute respiratory distress - transferred to ICU for close monitoring and care   -currently on AVAPS   -IM following     Electrolyte imbalance   -hypophosphatemia - phos 2.2 - replace as per medicine team   -hypokalemia - K 3.4 - replace as needed to keep K>4    Small bilateral pleural effusion  -evident on CXR   -volume status continues to improve   -continue with lasix - strict I/Os   -continue to monitor        Active Diagnoses:    Diagnosis Date Noted POA    PRINCIPAL PROBLEM:  Colovesical fistula [N32.1] 01/08/2024 Yes    SVT (supraventricular tachycardia) [I47.10] 02/28/2024 No    Impaired mobility and ADLs [Z74.09, Z78.9] 02/28/2024 Yes    Malnutrition [E46] 02/28/2024 Unknown    Inadequate nutrition [E63.9] 02/26/2024 No    Respiratory acidosis [E87.29] 02/25/2024 No    On deep vein thrombosis (DVT) prophylaxis [Z79.899] 02/23/2024 Not Applicable    Hypophosphatemia [E83.39] 02/23/2024 Yes    Hypokalemia [E87.6] 09/24/2023 Yes    Acute cystitis without hematuria [N30.00] 05/05/2023 Yes    Bilateral pleural effusion [J90] 01/31/2021 Yes    History of stroke [Z86.73] 12/10/2020 Not Applicable     Chronic      Problems Resolved During this Admission:       VTE Risk Mitigation (From admission, onward)           Ordered     enoxaparin injection 40 mg  Every 24 hours         02/22/24 0947     IP VTE HIGH RISK PATIENT  Once         02/21/24 1622     Place sequential compression device  Until discontinued         02/21/24 1622                    Thank you for your  consult.    Mia Hidalgo PA-C - scribed for Dr. Beau Yanez MD  Cardiology   [place] - Intensive [facility]

## 2024-02-28 NOTE — ASSESSMENT & PLAN NOTE
Patient has hypokalemia which is Acute and currently uncontrolled. Most recent potassium levels reviewed-   Lab Results   Component Value Date    K 3.4 (L) 02/28/2024   . Will continue potassium replacement per protocol and recheck repeat levels after replacement completed.

## 2024-02-28 NOTE — ASSESSMENT & PLAN NOTE
ABG with pCO2 of 78  CXR yesterday with new bilateral basilar infiltrates  Lasix 40mg daily   Bipap today   Repeat AGB in 4 hours   Continue IV abx for coverage of impending pneumonia and UTI   Decrease IVFs    02/26 CP:   ABG improving with pCO2 of 59 and pH of 7.335  Repeat chest x-ray today  Continue bipap  Continue IV abx for PNA coverage  Continue Lasix s/t bilateral pleural effusions  Consult internal medicine, appreciate recs     02/27 CP:   Managed per internal medicine  Bipap wean, on NC  Lung sounds improved  Continue increased Lasix  Responding well to current abx regimen    02/28 CP:   Managed per internal medicine   On 4 L NC  Continue Lasix and current abx

## 2024-02-28 NOTE — NURSING
1908 Sleeping awakened to assess. C/O gas pains. When awake he is alert and oriented times 4. Calm and cooperative. Abdomen taunt with active bowel sounds. Midline abdominal dressing with a small amount of old drainage noted to bottom of dressing. No new drainage noted. Peg tube intact and patent. Continuous cardiac monitoring in progress NSR on monitor. Venturi mask in progress 12 liters, 50%. James catheter in progress. Discussed plan of care with patient. Will continue to monitor.    0600 Patient had no complications throughout the night. Midline Abdominal incision dressing intact with no new drainage. Abdomen is taunt. Peg tube intact and patent. LR infusing at 100 ml/hr. Continuous cardiac monitoring NSR. Venturi mask in progress. Repositioning every 2 hours. No distress noted. Will continue to monitor. Call button in reach.  
Patient transferring to icu unit for closer observation per Dr. Moreira. Patient placed back on avap after critical report of Abg. Report received from THEE Winslow at 1140. Patient transferred to icu room 206 via bed with avap in place, respiratory assistance. Patient stable upon transfer. IV fluids and K rider continued. No concerns at this time.   
Patient with 2 runs of SVT. @0748 SVT with a rate of 169. Then @0803 SVT with a rate of 183. Dr. Moreira was notified and new orders were placed.   Dr. Yanez was contacted for cardiology consult.   
Pt again calling for help.  Pt legs out of bed, shoulders in bed.  Has to go to the bathroom.  Again cleaned of BM.  Bed alarm put on bed.  Pt told not to get out of bed alone without calling us.    
Pt calling for assistance with incontinence with BM. Not completed once myrna care performed. Pt instructed to take his time. Small brown soft stools with each movement. No c/o pain. No emesis noted or reported. No active drainage to Tube insertion. Observe.  
Pt having frequent loose stools, multiple during the AM. Skin protectant applied. Cloudy, yellow/orange urine per tubing. No c/o pain. No active bleeding from dressings to abd. Old drainage to silver midline dressing. Venti mask maintained. VSS. IVF maintained. Continue to observe.  
Pt trying to get out of bed to BSC, now yelling for help.  Legs out of bed, lying down with shoulders on bed.  Pt pulled up in bed.  Pt cleaned of BM and told not to get out of bed without calling us.  Pt agreed. Call bell at right hand.    
Telesitter moved into room and order put in computer per THEE Guadarrama.  
verbal instruction

## 2024-02-28 NOTE — ASSESSMENT & PLAN NOTE
Acute on chronic secondary to colovesical fistula   Treating for Klebsiella   IV Ceftriaxone   Continue vilchis     02/28 CP:   Remove Vilchis  Good hygiene   Continue IV abx

## 2024-02-28 NOTE — PT/OT/SLP PROGRESS
Occupational Therapy      Patient Name:  Richard Farr   MRN:  [id number]    Patient not seen today secondary to Nurse/ SALEEM hold. Registered nurse Nayla at bed side requested not to see patient at this time because he was just placed on BIPAP and is asleep. Will follow-up later today if time allows or 3/1/24.    2/28/2024  Doreen ALMEIDA

## 2024-02-28 NOTE — ASSESSMENT & PLAN NOTE
Patient has hypokalemia which is Acute and currently uncontrolled. Most recent potassium levels reviewed-   Lab Results   Component Value Date    K 3.4 (L) 02/28/2024   Will continue potassium replacement per protocol and recheck repeat levels after replacement completed.

## 2024-02-28 NOTE — ASSESSMENT & PLAN NOTE
Patient found to have small pleural effusion on imaging. I have personally reviewed and interpreted the following imaging: Xray. A thoracentesis was deferred. Most likely etiology includes Volume overload not due to CHF. Management to include Diuresis      Volume overload:  Continue to monitor strict intake and output.    I/O last 3 completed shifts:  In: 2190.7 [I.V.:258; NG/GT:960; IV Piggyback:972.7]  Out: 5327 [Urine:5325; Stool:2]  No intake/output data recorded.

## 2024-02-28 NOTE — PLAN OF CARE
Pt is free of falls and injury. NADN, VSS, afebrile, 4L NC, James draining, tries to get OOB when awake. Pt tolerating medications and TF well via PEG. Pain controlled with PRN pain meds. Questions and concerns addressed.     Problem: Adult Inpatient Plan of Care  Goal: Plan of Care Review  Outcome: Ongoing, Progressing  Goal: Patient-Specific Goal (Individualized)  Outcome: Ongoing, Progressing  Goal: Absence of Hospital-Acquired Illness or Injury  Outcome: Ongoing, Progressing  Goal: Optimal Comfort and Wellbeing  Outcome: Ongoing, Progressing  Goal: Readiness for Transition of Care  Outcome: Ongoing, Progressing     Problem: Fluid and Electrolyte Imbalance (Acute Kidney Injury/Impairment)  Goal: Fluid and Electrolyte Balance  Outcome: Ongoing, Progressing     Problem: Oral Intake Inadequate (Acute Kidney Injury/Impairment)  Goal: Optimal Nutrition Intake  Outcome: Ongoing, Progressing     Problem: Renal Function Impairment (Acute Kidney Injury/Impairment)  Goal: Effective Renal Function  Outcome: Ongoing, Progressing     Problem: Infection  Goal: Absence of Infection Signs and Symptoms  Outcome: Ongoing, Progressing     Problem: Fall Injury Risk  Goal: Absence of Fall and Fall-Related Injury  Outcome: Ongoing, Progressing     Problem: Skin Injury Risk Increased  Goal: Skin Health and Integrity  Outcome: Ongoing, Progressing     Problem: Gas Exchange Impaired  Goal: Optimal Gas Exchange  Outcome: Ongoing, Progressing     Problem: Behavioral Health Comorbidity  Goal: Maintenance of Behavioral Health Symptom Control  Outcome: Ongoing, Progressing     Problem: Heart Failure Comorbidity  Goal: Maintenance of Heart Failure Symptom Control  Outcome: Ongoing, Progressing     Problem: Hypertension Comorbidity  Goal: Blood Pressure in Desired Range  Outcome: Ongoing, Progressing     Problem: Pain Chronic (Persistent) (Comorbidity Management)  Goal: Acceptable Pain Control and Functional Ability  Outcome: Ongoing,  Progressing

## 2024-02-28 NOTE — SUBJECTIVE & OBJECTIVE
Interval History:   Patient s/e.  Somnolent.  Increased O2 req overnight.     Medications:  Continuous Infusions:      Scheduled Meds:   albuterol-ipratropium  3 mL Nebulization Q6H WAKE    atorvastatin  80 mg Oral QHS    cefTRIAXone (Rocephin) IV (PEDS and ADULTS)  1 g Intravenous Q24H    doxycycline  100 mg Oral Q12H    enoxparin  40 mg Subcutaneous Q24H (prophylaxis, 1700)    furosemide (LASIX) injection  40 mg Intravenous Q12H    levothyroxine  50 mcg Oral Before breakfast    metoprolol succinate  25 mg Oral Daily    potassium chloride  10 mEq Intravenous Q2H    potassium, sodium phosphates  2 packet Oral QID (AC & HS)    sodium chloride 0.9%  10 mL Intravenous Q6H     PRN Meds:sodium chloride 0.9%, acetaminophen, LIDOcaine (PF) 10 mg/ml (1%), melatonin, methocarbamoL, morphine, ondansetron, oxyCODONE, sodium chloride 0.9%, Flushing PICC/Midline Protocol **AND** sodium chloride 0.9% **AND** sodium chloride 0.9%, traZODone     Review of patient's allergies indicates:   Allergen Reactions    Ativan [lorazepam]      Adverse reaction.     Objective:     Vital Signs (Most Recent):  Temp: 97.3 °F (36.3 °C) (02/28/24 1137)  Pulse: 78 (02/28/24 1137)  Resp: 18 (02/28/24 1137)  BP: (!) 141/78 (02/28/24 1137)  SpO2: 100 % (02/28/24 1137) Vital Signs (24h Range):  Temp:  [96.4 °F (35.8 °C)-97.8 °F (36.6 °C)] 97.3 °F (36.3 °C)  Pulse:  [] 78  Resp:  [18-26] 18  SpO2:  [94 %-100 %] 100 %  BP: (141-162)/(65-96) 141/78     Weight: 76.7 kg (169 lb 1.5 oz)  Body mass index is 25.71 kg/m².    Intake/Output - Last 3 Shifts         02/26 0700 02/27 0659 02/27 0700 02/28 0659 02/28 0700 02/29 0659    P.O. 0 0     I.V. (mL/kg) 258 (3.4)      NG/GT 60 900     IV Piggyback 722.7 250     Total Intake(mL/kg) 1040.7 (13.6) 1150 (15)     Urine (mL/kg/hr) 3850 (2.1) 2975 (1.6)     Stool 0 2     Total Output 3850 2977     Net -2809.3 -1827            Stool Occurrence 0 x 1 x              Physical Exam  Vitals and nursing note  reviewed.   Constitutional:       General: He is awake. He is not in acute distress.     Appearance: He is ill-appearing. He is not toxic-appearing.      Interventions: Nasal cannula in place.   Cardiovascular:      Rate and Rhythm: Normal rate and regular rhythm.      Heart sounds: Murmur heard.      Comments: BLE edema improving   Pulmonary:      Effort: Tachypnea present. No respiratory distress.      Breath sounds: Rhonchi present. No wheezing or rales.      Comments: Coarse breath sounds  Abdominal:      General: Bowel sounds are normal. There is no distension.      Tenderness: There is no abdominal tenderness. There is no guarding or rebound.      Comments: Midline incisions with staples in place, erythema and moisture noted to bottom of midline incision at the abdominal fold.  G tube with dressing in place   Musculoskeletal:      Right lower leg: Edema present.      Left lower leg: Edema present.   Skin:     General: Skin is warm and dry.      Capillary Refill: Capillary refill takes less than 2 seconds.      Findings: Bruising present.   Neurological:      Mental Status: He is alert.      Motor: Weakness present.      Gait: Gait abnormal.   Psychiatric:         Behavior: Behavior is cooperative.          Significant Labs:  I have reviewed all pertinent lab results within the past 24 hours.  CBC:   Recent Labs   Lab 02/28/24  0339   WBC 10.64   RBC 3.45*   HGB 10.0*   HCT 31.9*      MCV 93   MCH 29.0   MCHC 31.3*     CMP:   Recent Labs   Lab 02/22/24  0410 02/23/24  0425 02/28/24  0339   GLU 82   < > 178*   CALCIUM 8.0*   < > 8.5*   ALBUMIN 2.6*   < > 2.6*   PROT 5.9*  --   --       < > 138   K 3.8   < > 3.4*   CO2 25   < > 37*      < > 100   BUN 16   < > 14   CREATININE 0.6   < > 0.4*   ALKPHOS 53*  --   --    ALT 18  --   --    AST 36  --   --    BILITOT 0.6  --   --     < > = values in this interval not displayed.     Recent Labs     02/28/24  1006   PH 7.412   PCO2 61.4*   PO2 63.1*    HCO3 36.3*   POCSATURATED 93.2*        Significant Diagnostics:  I have reviewed all pertinent imaging results/findings within the past 24 hours.  Review of Systems

## 2024-02-28 NOTE — PLAN OF CARE
02/28/24 1024   Post-Acute Status   Post-Acute Authorization Placement   Post-Acute Placement Status Referrals Sent   Coverage MEDICARE - MEDICARE PART A & B   Discharge Plan   Discharge Plan A Rehab   Discharge Plan B Other;Long-term acute care facility (LTAC)  (TBD)     New referral. The patient's spouse is in agreement with the inpatient rehab referral. Referral was sent via [facility].     Addendum: Spoke to Pedrito with [facility]'s case management department, and she is aware of the referral.

## 2024-02-28 NOTE — ASSESSMENT & PLAN NOTE
POD#4 s/p lap sig colectomy with fistula takedown  -CLD via g tube   -passing flatus   -complicated now with respiratory acidosis and hypoxia   -Multimodal, opioid sparing pain control   -Lovenox     02/26 CP: POD#5 s/p lap sig colectomy with fistula takedown  Full liquid diet via g tube  Passing flatus  Continue multimodal pain management   PT / OT  Lovenox    02/27 CP: POD#6 s/p lap sig colectomy with fistula takedown  Surgical dressings removed, staples to incision sites intact-erythema to bottom midline incision in abdominal fold-wound cleanser applied and patted dry, keep area dry  Continue full liquid diet, may give po  Continue multimodal pain management  PT / OT  Passing flatus    02/28 CP: POD #7 s/p lap sig colectomy with fistula takedown  Surgical dressing changed-iodine paint and mepilex  Multiple bms yesterday after starting bolus TF  Advance diet to soft and bite sized  PT / OT

## 2024-02-28 NOTE — ASSESSMENT & PLAN NOTE
S/t decreased po intake  Give full liquid diet via g tube  Dietician consult for nutrition recs for bolus feedings per g tube    02/27 CP:  Appreciate dietician recs  Encouraged good po intake, continue full liquid diet, may give po  TF per G tube  Monitor nutrition status and electrolytes     02/28 CP:   Advanced to soft and bite sized diet  Tolerating TF well  Appreciate dietician recs

## 2024-02-28 NOTE — PLAN OF CARE
Problem: Adult Inpatient Plan of Care  Goal: Plan of Care Review  Outcome: Ongoing, Progressing  Goal: Patient-Specific Goal (Individualized)  Outcome: Ongoing, Progressing  Goal: Absence of Hospital-Acquired Illness or Injury  Outcome: Ongoing, Progressing  Goal: Optimal Comfort and Wellbeing  Outcome: Ongoing, Progressing  Goal: Readiness for Transition of Care  Outcome: Ongoing, Progressing     Problem: Fluid and Electrolyte Imbalance (Acute Kidney Injury/Impairment)  Goal: Fluid and Electrolyte Balance  Outcome: Ongoing, Progressing     Problem: Oral Intake Inadequate (Acute Kidney Injury/Impairment)  Goal: Optimal Nutrition Intake  Outcome: Ongoing, Progressing     Problem: Renal Function Impairment (Acute Kidney Injury/Impairment)  Goal: Effective Renal Function  Outcome: Ongoing, Progressing     Problem: Infection  Goal: Absence of Infection Signs and Symptoms  Outcome: Ongoing, Progressing     Problem: Fall Injury Risk  Goal: Absence of Fall and Fall-Related Injury  Outcome: Ongoing, Progressing     Problem: Skin Injury Risk Increased  Goal: Skin Health and Integrity  Outcome: Ongoing, Progressing     Problem: Gas Exchange Impaired  Goal: Optimal Gas Exchange  Outcome: Ongoing, Progressing     Problem: Behavioral Health Comorbidity  Goal: Maintenance of Behavioral Health Symptom Control  Outcome: Ongoing, Progressing     Problem: Heart Failure Comorbidity  Goal: Maintenance of Heart Failure Symptom Control  Outcome: Ongoing, Progressing     Problem: Hypertension Comorbidity  Goal: Blood Pressure in Desired Range  Outcome: Ongoing, Progressing     Problem: Pain Chronic (Persistent) (Comorbidity Management)  Goal: Acceptable Pain Control and Functional Ability  Outcome: Ongoing, Progressing     Problem: Aspiration (Enteral Nutrition)  Goal: Absence of Aspiration Signs and Symptoms  Outcome: Ongoing, Progressing     Problem: Device-Related Complication Risk (Enteral Nutrition)  Goal: Safe, Effective Therapy  Delivery  Outcome: Ongoing, Progressing     Problem: Feeding Intolerance (Enteral Nutrition)  Goal: Feeding Tolerance  Outcome: Ongoing, Progressing

## 2024-02-28 NOTE — ASSESSMENT & PLAN NOTE
Continue PT/ OT. Pt would be an excellent candidate for IRF- requires intensive PT/OT, physician and nurse close monitoring, and med management. Case management consulted for discharge planning.

## 2024-02-28 NOTE — PROGRESS NOTES
[facility]  General Surgery  Progress Note    Subjective:     History of Present Illness:  79yo male with colovesical fistula who presents for fistula take down and sigmoid resection.       Hospital Course:  02/23 CP: Colovesical fistula s/p fistula take down and sigmoid resection POD#2. Pt is tolerating CLD with + BS, passing flatus, and 3 small bms. Reports pain is controlled. PT OT consulted for early mobilization. Urine culture resulted, on IV Zosyn.  02/26 CP: Colovesical fistula s/p fistula take down and sigmoid resection POD#5. Pt experienced increased O2 requirements with respiratory acidosis and hypoxia. On bipap. Reports pain is controlled. Limited po intake.  02/27 CP: Colovesical fistula s/p fistula take down and sigmoid resection POD#6. Surgical dressings removed, staples to incision sites intact. Pt being weaned off bipap, on supplemental O2 via NC. Responding well to current abx regimen, afebrile and WBC trending down. Diuresing well after Lasix dose increased. Encouraged good po intake. Appreciate internal medicine and dietary recs.  02/28 CP: Colovesical fistula s/p fistula take down and sigmoid resection POD#7. Surgical dressing changed-iodine paint and mepilex. Multiple bms yesterday after starting bolus TF. On 4 NC. Runs of SVT noted. Discharge planning to IRF.    Post-Op Info:  Procedure(s) (LRB):  COLECTOMY, LEFT, LAPAROSCOPIC WITH GASTROSTOMY TUBE EXCHANGE (Left)   7 Days Post-Op     Interval History:   Patient s/e.  Somnolent.  Increased O2 req overnight.     Medications:  Continuous Infusions:      Scheduled Meds:   albuterol-ipratropium  3 mL Nebulization Q6H WAKE    atorvastatin  80 mg Oral QHS    azithromycin  500 mg Intravenous Q24H    cefTRIAXone (Rocephin) IV (PEDS and ADULTS)  1 g Intravenous Q24H    enoxparin  40 mg Subcutaneous Q24H (prophylaxis, 1700)    furosemide (LASIX) injection  40 mg Intravenous Q12H    levothyroxine  50 mcg Oral Before breakfast    metoprolol  succinate  25 mg Oral Daily    potassium chloride  10 mEq Intravenous Q2H    potassium, sodium phosphates  2 packet Oral QID (AC & HS)    sodium chloride 0.9%  10 mL Intravenous Q6H     PRN Meds:acetaminophen, LIDOcaine (PF) 10 mg/ml (1%), melatonin, methocarbamoL, morphine, ondansetron, oxyCODONE, sodium chloride 0.9%, Flushing PICC/Midline Protocol **AND** sodium chloride 0.9% **AND** sodium chloride 0.9%, traZODone     Review of patient's allergies indicates:   Allergen Reactions    Ativan [lorazepam]      Adverse reaction.     Objective:     Vital Signs (Most Recent):  Temp: 96.4 °F (35.8 °C) (02/28/24 0709)  Pulse: 92 (02/28/24 0727)  Resp: (!) 22 (02/28/24 0725)  BP: (!) 159/75 (02/28/24 0709)  SpO2: 98 % (02/28/24 0725) Vital Signs (24h Range):  Temp:  [96.4 °F (35.8 °C)-97.9 °F (36.6 °C)] 96.4 °F (35.8 °C)  Pulse:  [] 92  Resp:  [20-26] 22  SpO2:  [94 %-98 %] 98 %  BP: (144-162)/(65-96) 159/75     Weight: 76.7 kg (169 lb 1.5 oz)  Body mass index is 25.71 kg/m².    Intake/Output - Last 3 Shifts         02/26 0700 02/27 0659 02/27 0700 02/28 0659 02/28 0700 02/29 0659    P.O. 0 0     I.V. (mL/kg) 258 (3.4)      NG/GT 60 900     IV Piggyback 722.7 250     Total Intake(mL/kg) 1040.7 (13.6) 1150 (15)     Urine (mL/kg/hr) 3850 (2.1) 2975 (1.6)     Stool 0 2     Total Output 3850 2977     Net -2809.3 -1827            Stool Occurrence 0 x 1 x             Physical Exam  Vitals and nursing note reviewed.   Constitutional:       General: He is awake. He is not in acute distress.     Appearance: He is ill-appearing. He is not toxic-appearing.      Interventions: Nasal cannula in place.   Cardiovascular:      Rate and Rhythm: Normal rate and regular rhythm.      Heart sounds: Murmur heard.      Comments: BLE edema improving   Pulmonary:      Effort: Tachypnea present. No respiratory distress.      Breath sounds: Rhonchi present. No wheezing or rales.      Comments: Coarse breath sounds  Abdominal:      General:  Bowel sounds are normal. There is no distension.      Tenderness: There is no abdominal tenderness. There is no guarding or rebound.      Comments: Midline incisions with staples in place, erythema and moisture noted to bottom of midline incision at the abdominal fold.  G tube with dressing in place   Musculoskeletal:      Right lower leg: Edema present.      Left lower leg: Edema present.   Skin:     General: Skin is warm and dry.      Capillary Refill: Capillary refill takes less than 2 seconds.      Findings: Bruising present.   Neurological:      Mental Status: He is alert.      Motor: Weakness present.      Gait: Gait abnormal.   Psychiatric:         Behavior: Behavior is cooperative.          Significant Labs:  I have reviewed all pertinent lab results within the past 24 hours.  CBC:   Recent Labs   Lab 02/28/24  0339   WBC 10.64   RBC 3.45*   HGB 10.0*   HCT 31.9*      MCV 93   MCH 29.0   MCHC 31.3*       CMP:   Recent Labs   Lab 02/22/24  0410 02/23/24  0425 02/28/24  0339   GLU 82   < > 178*   CALCIUM 8.0*   < > 8.5*   ALBUMIN 2.6*   < > 2.6*   PROT 5.9*  --   --       < > 138   K 3.8   < > 3.4*   CO2 25   < > 37*      < > 100   BUN 16   < > 14   CREATININE 0.6   < > 0.4*   ALKPHOS 53*  --   --    ALT 18  --   --    AST 36  --   --    BILITOT 0.6  --   --     < > = values in this interval not displayed.       Recent Labs     02/26/24  0839   PH 7.335*   PCO2 59.2*   PO2 99.3   HCO3 28.6*   POCSATURATED 98.5          Significant Diagnostics:  I have reviewed all pertinent imaging results/findings within the past 24 hours.  Assessment/Plan:     * Colovesical fistula  POD#4 s/p lap sig colectomy with fistula takedown  -CLD via g tube   -passing flatus   -complicated now with respiratory acidosis and hypoxia   -Multimodal, opioid sparing pain control   -Lovenox     02/26 CP: POD#5 s/p lap sig colectomy with fistula takedown  Full liquid diet via g tube  Passing flatus  Continue multimodal  pain management   PT / OT  Lovenox    02/27 CP: POD#6 s/p lap sig colectomy with fistula takedown  Surgical dressings removed, staples to incision sites intact-erythema to bottom midline incision in abdominal fold-wound cleanser applied and patted dry, keep area dry  Continue full liquid diet, may give po  Continue multimodal pain management  PT / OT  Passing flatus    02/28 CP: POD #7 s/p lap sig colectomy with fistula takedown  Surgical dressing changed-iodine paint and mepilex  Multiple bms yesterday after starting bolus TF  Advance diet to soft and bite sized  PT / OT    Impaired mobility and ADLs  Continue PT/ OT. Pt would be an excellent candidate for IRF- requires intensive PT/OT, physician and nurse close monitoring, and med management. Case management consulted for discharge planning.    SVT (supraventricular tachycardia)  Meds adjusted, cardiology consulted, appreciate recs.     Inadequate nutrition  S/t decreased po intake  Give full liquid diet via g tube  Dietician consult for nutrition recs for bolus feedings per g tube    02/27 CP:  Appreciate dietician recs  Encouraged good po intake, continue full liquid diet, may give po  TF per G tube  Monitor nutrition status and electrolytes     02/28 CP:   Advanced to soft and bite sized diet  Tolerating TF well  Appreciate dietician recs     Respiratory acidosis  ABG with pCO2 of 78  CXR yesterday with new bilateral basilar infiltrates  Lasix 40mg daily   Bipap today   Repeat AGB in 4 hours   Continue IV abx for coverage of impending pneumonia and UTI   Decrease IVFs    02/26 CP:   ABG improving with pCO2 of 59 and pH of 7.335  Repeat chest x-ray today  Continue bipap  Continue IV abx for PNA coverage  Continue Lasix s/t bilateral pleural effusions  Consult internal medicine, appreciate recs     02/27 CP:   Managed per internal medicine  Bipap wean, on NC  Lung sounds improved  Continue increased Lasix  Responding well to current abx regimen    02/28 CP:    Managed per internal medicine   On 4 L NC  Continue Lasix and current abx      Hypophosphatemia  Patient has Abnormal Phosphorus: hypophosphatemia. Will continue to monitor electrolytes closely. Will replace the affected electrolytes and repeat labs to be done after interventions completed. The patient's phosphorus results have been reviewed and are listed below.  Recent Labs   Lab 02/28/24  0339   PHOS 2.2*        On deep vein thrombosis (DVT) prophylaxis  Lovenox therapy.    Hypokalemia  Patient has hypokalemia which is Acute and currently uncontrolled. Most recent potassium levels reviewed-   Lab Results   Component Value Date    K 3.4 (L) 02/28/2024   Will continue potassium replacement per protocol and recheck repeat levels after replacement completed.       Acute cystitis without hematuria  Acute on chronic secondary to colovesical fistula   Treating for Klebsiella   IV Ceftriaxone   Continue vilchis     02/28 CP:   Remove Vilchis  Good hygiene   Continue IV abx        Bilateral pleural effusion  See respiratory acidosis.         CHAVEZ BUTLER  General Surgery  [facility]

## 2024-02-29 LAB
ALBUMIN SERPL BCP-MCNC: 2.4 G/DL (ref 3.5–5.2)
ANION GAP SERPL CALC-SCNC: -1 MMOL/L (ref 3–11)
BUN SERPL-MCNC: 13 MG/DL (ref 8–23)
CALCIUM SERPL-MCNC: 8.1 MG/DL (ref 8.7–10.5)
CHLORIDE SERPL-SCNC: 103 MMOL/L (ref 95–110)
CO2 SERPL-SCNC: 37 MMOL/L (ref 23–29)
CREAT SERPL-MCNC: 0.4 MG/DL (ref 0.5–1.4)
ERYTHROCYTE [DISTWIDTH] IN BLOOD BY AUTOMATED COUNT: 15.9 % (ref 11.5–14.5)
EST. GFR  (NO RACE VARIABLE): >60 ML/MIN/1.73 M^2
GLUCOSE SERPL-MCNC: 96 MG/DL (ref 70–110)
HCT VFR BLD AUTO: 28.4 % (ref 40–54)
HGB BLD-MCNC: 9 G/DL (ref 14–18)
MAGNESIUM SERPL-MCNC: 1.9 MG/DL (ref 1.6–2.6)
MCH RBC QN AUTO: 28.6 PG (ref 27–31)
MCHC RBC AUTO-ENTMCNC: 31.7 G/DL (ref 32–36)
MCV RBC AUTO: 90 FL (ref 82–98)
PHOSPHATE SERPL-MCNC: 3 MG/DL (ref 2.7–4.5)
PLATELET # BLD AUTO: 236 K/UL (ref 150–450)
PMV BLD AUTO: 10.9 FL (ref 9.2–12.9)
POTASSIUM SERPL-SCNC: 3.9 MMOL/L (ref 3.5–5.1)
RBC # BLD AUTO: 3.15 M/UL (ref 4.6–6.2)
SODIUM SERPL-SCNC: 139 MMOL/L (ref 136–145)
WBC # BLD AUTO: 11.69 K/UL (ref 3.9–12.7)

## 2024-02-29 PROCEDURE — 25000003 PHARM REV CODE 250: Performed by: INTERNAL MEDICINE

## 2024-02-29 PROCEDURE — 25000003 PHARM REV CODE 250

## 2024-02-29 PROCEDURE — 63600175 PHARM REV CODE 636 W HCPCS: Performed by: INTERNAL MEDICINE

## 2024-02-29 PROCEDURE — 80069 RENAL FUNCTION PANEL: CPT

## 2024-02-29 PROCEDURE — 94640 AIRWAY INHALATION TREATMENT: CPT

## 2024-02-29 PROCEDURE — 25000003 PHARM REV CODE 250: Performed by: STUDENT IN AN ORGANIZED HEALTH CARE EDUCATION/TRAINING PROGRAM

## 2024-02-29 PROCEDURE — 21400001 HC TELEMETRY ROOM

## 2024-02-29 PROCEDURE — 25000242 PHARM REV CODE 250 ALT 637 W/ HCPCS: Performed by: INTERNAL MEDICINE

## 2024-02-29 PROCEDURE — 97164 PT RE-EVAL EST PLAN CARE: CPT

## 2024-02-29 PROCEDURE — 94761 N-INVAS EAR/PLS OXIMETRY MLT: CPT

## 2024-02-29 PROCEDURE — 94660 CPAP INITIATION&MGMT: CPT

## 2024-02-29 PROCEDURE — 27100171 HC OXYGEN HIGH FLOW UP TO 24 HOURS

## 2024-02-29 PROCEDURE — 63600175 PHARM REV CODE 636 W HCPCS: Performed by: STUDENT IN AN ORGANIZED HEALTH CARE EDUCATION/TRAINING PROGRAM

## 2024-02-29 PROCEDURE — 99233 SBSQ HOSP IP/OBS HIGH 50: CPT | Mod: 24,,, | Performed by: STUDENT IN AN ORGANIZED HEALTH CARE EDUCATION/TRAINING PROGRAM

## 2024-02-29 PROCEDURE — 83735 ASSAY OF MAGNESIUM: CPT

## 2024-02-29 PROCEDURE — 99900031 HC PATIENT EDUCATION (STAT)

## 2024-02-29 PROCEDURE — 99900035 HC TECH TIME PER 15 MIN (STAT)

## 2024-02-29 PROCEDURE — A4216 STERILE WATER/SALINE, 10 ML: HCPCS | Performed by: STUDENT IN AN ORGANIZED HEALTH CARE EDUCATION/TRAINING PROGRAM

## 2024-02-29 PROCEDURE — 36415 COLL VENOUS BLD VENIPUNCTURE: CPT

## 2024-02-29 PROCEDURE — 85027 COMPLETE CBC AUTOMATED: CPT

## 2024-02-29 RX ORDER — POTASSIUM CHLORIDE 7.45 MG/ML
10 INJECTION INTRAVENOUS
Status: COMPLETED | OUTPATIENT
Start: 2024-02-29 | End: 2024-02-29

## 2024-02-29 RX ADMIN — METOPROLOL SUCCINATE 25 MG: 25 TABLET, EXTENDED RELEASE ORAL at 08:02

## 2024-02-29 RX ADMIN — IPRATROPIUM BROMIDE AND ALBUTEROL SULFATE 3 ML: .5; 3 SOLUTION RESPIRATORY (INHALATION) at 07:02

## 2024-02-29 RX ADMIN — POTASSIUM CHLORIDE 10 MEQ: 10 INJECTION, SOLUTION INTRAVENOUS at 03:02

## 2024-02-29 RX ADMIN — IPRATROPIUM BROMIDE AND ALBUTEROL SULFATE 3 ML: .5; 3 SOLUTION RESPIRATORY (INHALATION) at 01:02

## 2024-02-29 RX ADMIN — ATORVASTATIN CALCIUM 80 MG: 80 TABLET, FILM COATED ORAL at 08:02

## 2024-02-29 RX ADMIN — POTASSIUM CHLORIDE 10 MEQ: 10 INJECTION, SOLUTION INTRAVENOUS at 02:02

## 2024-02-29 RX ADMIN — FUROSEMIDE 40 MG: 10 INJECTION, SOLUTION INTRAVENOUS at 08:02

## 2024-02-29 RX ADMIN — CEFTRIAXONE 1 G: 1 INJECTION, POWDER, FOR SOLUTION INTRAMUSCULAR; INTRAVENOUS at 04:02

## 2024-02-29 RX ADMIN — Medication 10 ML: at 12:02

## 2024-02-29 RX ADMIN — Medication 6 MG: at 08:02

## 2024-02-29 RX ADMIN — ENOXAPARIN SODIUM 40 MG: 40 INJECTION SUBCUTANEOUS at 04:02

## 2024-02-29 RX ADMIN — Medication 10 ML: at 06:02

## 2024-02-29 RX ADMIN — SODIUM CHLORIDE: 9 INJECTION, SOLUTION INTRAVENOUS at 01:02

## 2024-02-29 RX ADMIN — POTASSIUM CHLORIDE 10 MEQ: 10 INJECTION, SOLUTION INTRAVENOUS at 10:02

## 2024-02-29 RX ADMIN — LEVOTHYROXINE SODIUM 50 MCG: 50 TABLET ORAL at 07:02

## 2024-02-29 RX ADMIN — POTASSIUM CHLORIDE 10 MEQ: 10 INJECTION, SOLUTION INTRAVENOUS at 12:02

## 2024-02-29 RX ADMIN — DOXYCYCLINE HYCLATE 100 MG: 100 TABLET, COATED ORAL at 08:02

## 2024-02-29 NOTE — ASSESSMENT & PLAN NOTE
Lasix increased.  Wean bipap as tolerated.     2/28:  respiratory decompensation now on AVAPS.  2/29: Patient has done well with AVAPS overnight.  Blood pressure has stabilized.  Patient appears comfortable.  Will wean as tolerated.

## 2024-02-29 NOTE — ASSESSMENT & PLAN NOTE
POD#4 s/p lap sig colectomy with fistula takedown  -CLD via g tube   -passing flatus   -complicated now with respiratory acidosis and hypoxia   -Multimodal, opioid sparing pain control   -Lovenox     02/26 CP: POD#5 s/p lap sig colectomy with fistula takedown  Full liquid diet via g tube  Passing flatus  Continue multimodal pain management   PT / OT  Lovenox    02/27 CP: POD#6 s/p lap sig colectomy with fistula takedown  Surgical dressings removed, staples to incision sites intact-erythema to bottom midline incision in abdominal fold-wound cleanser applied and patted dry, keep area dry  Continue full liquid diet, may give po  Continue multimodal pain management  PT / OT  Passing flatus    02/28 CP: POD #7 s/p lap sig colectomy with fistula takedown  Surgical dressing changed-iodine paint and mepilex  Multiple bms yesterday after starting bolus TF  Advance diet to soft and bite sized  PT / OT    02/29: POD #8 s/p lap sig colectomy with fistula takedown  Surgical dressing changed-iodine paint and mepilex  Contiue diet to soft and bite sized  PT / OT

## 2024-02-29 NOTE — PROGRESS NOTES
"[facility]  Adult [facility]  Progress Note    SUMMARY       Recommendations  1. TF recommendations: Jevity 1.2   Bolus: 1 carton (240mls) 6 times per 24 hours (1 carton q 4 hours)  TF to provide: 1728kcals, 80gPRO, 243gCHO, 1162mls of FW  Flush with 90mls of FW q 6 hours or per md order  2. Monitor TF tolerance and provide adjustment recommendations as appropriate   3. Monitor labs   4. Collaboration with medical providers   Goals: Patient to advance on EN support prior to RD follow up.  Nutrition Goal Status: progressing towards goal  Communication of RD Recs: other (comment) (Documented in POC)    Assessment and Plan    Nutrition Problem  Inadequate nutrition     Related to (etiology):   Inability to consume adequate oral intake     Signs and Symptoms (as evidenced by):   PEG & EN support      Interventions/Recommendations (treatment strategy):  EN Support   Collaboration with medical providers     Nutrition Diagnosis Status:   Improving     Reason for Assessment    Reason For Assessment: RD follow-up  Diagnosis:  (colovesical fistula)  Relevant Medical History: .  Interdisciplinary Rounds: did not attend  General Information Comments: Followed up on pt this afternoon. Pt is tolerating TF and PO intake. Pt's diet was advanced to soft & bite sized. RD to follow and make rec's accordingly.  Nutrition Discharge Planning: Pending medical course    Nutrition Risk Screen    Nutrition Risk Screen: tube feeding or parenteral nutrition    Nutrition/Diet History    Spiritual, Cultural Beliefs, Faith Practices, Values that Affect Care: no  Food Allergies: NKFA  Factors Affecting Nutritional Intake: altered gastrointestinal function    Anthropometrics    Temp: 97.4 °F (36.3 °C)  Height Method: Stated  Height: 5' 8" (172.7 cm)  Height (inches): 68 in  Weight Method: Bed Scale  Weight: 76.7 kg (169 lb 1.5 oz)  Weight (lb): 169.09 lb  Ideal Body Weight (IBW), Male: 154 lb  % Ideal Body Weight, Male (lb): 109.8 " %  BMI (Calculated): 25.7  BMI Grade: 25 - 29.9 - overweight    Lab/Procedures/Meds    Pertinent Labs Reviewed: reviewed  Pertinent Medications Reviewed: reviewed    Estimated/Assessed Needs    Weight Used For Calorie Calculations: 76.7 kg (169 lb 1.5 oz)  Energy Calorie Requirements (kcal): 25-30kcals/kg (1918-2301kcals/day)  Energy Need Method: Kcal/kg  Protein Requirements: 1.2g/kg (92g/day)  Weight Used For Protein Calculations: 76.7 kg (169 lb 1.5 oz)  Fluid Requirements (mL): 1ml/kcal or per md order  Estimated Fluid Requirement Method: RDA Method  RDA Method (mL): 25  CHO Requirement: 250    Nutrition Prescription Ordered    Current Diet Order: full liquids    Evaluation of Received Nutrient/Fluid Intake    % Kcal Needs: <50%  % Protein Needs: <50%  I/O: -1341  Energy Calories Required: not meeting needs  Protein Required: not meeting needs  Fluid Required: not meeting needs  Comments: LBM: 2/24/24  Tolerance: tolerating  % Intake of Estimated Energy Needs: % %  % Meal Intake: 75 - 100 %    Nutrition Risk    Level of Risk/Frequency of Follow-up: moderate     Monitor and Evaluation    Food and Nutrient Intake: energy intake, enteral nutrition intake  Food and Nutrient Adminstration: enteral and parenteral nutrition administration  Knowledge/Beliefs/Attitudes: beliefs and attitudes  Physical Activity and Function: factors affecting access to physical activity, nutrition-related ADLs and IADLs  Anthropometric Measurements: body mass index, weight change, weight, height/length  Biochemical Data, Medical Tests and Procedures: electrolyte and renal panel, gastrointestinal profile, glucose/endocrine profile, inflammatory profile, lipid profile     Nutrition Follow-Up    RD Follow-up?: Yes

## 2024-02-29 NOTE — ASSESSMENT & PLAN NOTE
Secondary to fistula.  Continue rocephin for klebsiella.     Patient has a leukocytosis.  Last trend as follows-   Lab Results   Component Value Date    WBC 11.69 02/29/2024    WBC 10.64 02/28/2024    WBC 10.71 02/27/2024     Plan for 7 days of antimicrobial therapy.

## 2024-02-29 NOTE — ASSESSMENT & PLAN NOTE
* Colovesical fistula  POD#4 s/p lap sig colectomy with fistula takedown  -CLD via g tube   -passing flatus   -complicated now with respiratory acidosis and hypoxia   -Multimodal, opioid sparing pain control   -Lovenox      02/26 CP: POD#5 s/p lap sig colectomy with fistula takedown  Full liquid diet via g tube  Passing flatus  Continue multimodal pain management   PT / OT  Lovenox     02/27 CP: POD#6 s/p lap sig colectomy with fistula takedown  Surgical dressings removed, staples to incision sites intact-erythema to bottom midline incision in abdominal fold-wound cleanser applied and patted dry, keep area dry  Continue full liquid diet, may give po  Continue multimodal pain management  PT / OT  Passing flatus       Continue treatment per GS recommendations.  POD 8

## 2024-02-29 NOTE — PLAN OF CARE
Problem: Physical Therapy  Goal: Physical Therapy Goal  Description: Goals to be met by: 3/8/2024    Patient will increase functional independence with mobility by performin. Supine to sit with Modified Independent.  2. Sit to supine with Modified Independent.  3. Bed to chair transfer with Supervision or Set-up Assistance with rolling walker using Step Transfer technique.  4. Sit to Stand with Supervision or Set-up Assistance with rolling walker.  5. Gait  x 300  feet with Supervision or Set-up Assistance with rolling walker.  6. Lower extremity exercise program x10 reps.     2024 1554 by Anupam Atwood, PT  Outcome: Ongoing, Progressing

## 2024-02-29 NOTE — PT/OT/SLP RE-EVAL
Physical Therapy Re-evaluation    Patient Name:  Richard Farr   MRN:  [id number]    Recommendations:     Discharge Recommendations: Moderate Intensity Therapy  Discharge Equipment Recommendations:  (TBD as patient progresses)   Barriers to discharge: Decreased caregiver support    Assessment:     Richard Farr is a 78 y.o. male admitted with a medical diagnosis of Colovesical fistula.  He presents with the following impairments/functional limitations: weakness, impaired joint extensibility, impaired endurance, decreased ROM, impaired muscle length, impaired self care skills, decreased upper extremity function, impaired fine motor, impaired functional mobility, decreased lower extremity function, gait instability, decreased safety awareness, impaired balance. Patient is presenting seated in bedside recliner this visit. Patient is able to perform transfers with Min A, but he is unable to perform ambulation this visit. He is presenting with fatigue and B LE weakness this visit. He is able to perform sit to stand this visit with no significant SOB noted.     Rehab Prognosis:  good; patient would benefit from acute skilled PT services to address these deficits and reach maximum level of function.      Recent Surgery: Procedure(s) (LRB):  COLECTOMY, LEFT, LAPAROSCOPIC WITH GASTROSTOMY TUBE EXCHANGE (Left) 8 Days Post-Op    Plan:     During this hospitalization, patient to be seen 5 x/week to address the above listed problems via gait training, therapeutic activities, therapeutic exercises, neuromuscular re-education  Plan of Care Expires:  03/08/24  Plan of Care Reviewed with: patient    Subjective     Communicated with nurse prior to session.  Patient found up in chair with peripheral IV, telemetry, oxygen upon PT entry to room, agreeable to evaluation.      Chief Complaint: weakness   Patient comments/goals: return to PLOF   Pain/Comfort:  Pain Rating 1: 0/10    Patients cultural, spiritual, Uatsdin conflicts given  the current situation: no      Objective:     Patient found with: peripheral IV, telemetry, oxygen     General Precautions: Standard, fall  Orthopedic Precautions: N/A  Braces: N/A  Respiratory Status: Nasal cannula, flow 4.0 L/min    Exams:  RLE ROM: Deficits: decreased  RLE Strength: Deficits: decreased grossly   LLE ROM: Deficits: decreased  LLE Strength: Deficits: decreased grossly    Functional Mobility:  Transfers:     Sit to Stand:  minimum assistance with rolling walker  Gait: unable to take steps this visit secondary to reports of weakness     AM-PAC 6 CLICK MOBILITY  Total Score:13       Treatment and Education:   Patient educated on the importance of B LE mobility and exercises while in bed or chair in order to avoid atrophy and ensure good AROM     Patient left up in chair with all lines intact, call button in reach, and nurse notified.    GOALS:   Multidisciplinary Problems       Physical Therapy Goals          Problem: Physical Therapy    Goal Priority Disciplines Outcome Goal Variances Interventions   Physical Therapy Goal     PT, PT/OT Ongoing, Progressing     Description: Goals to be met by: 2024    Patient will increase functional independence with mobility by performin. Supine to sit with Modified Independent.  2. Sit to supine with Modified Independent.  3. Bed to chair transfer with Supervision or Set-up Assistance with rolling walker using Step Transfer technique.  4. Sit to Stand with Supervision or Set-up Assistance with rolling walker.  5. Gait  x 300  feet with Supervision or Set-up Assistance with rolling walker.  6. Lower extremity exercise program x10 reps.                        History:     Past Medical History:   Diagnosis Date    Anal fistula 2024    Anemia     Arthritis     At high risk for falls     Bilateral lower extremity edema     Carpal tunnel syndrome, bilateral     Chronic diastolic congestive heart failure     Colon polyp     Colovesical fistula 2024     Coronary artery disease     Depression due to physical illness     Encounter for blood transfusion     Gastric ulcer     History of herpes zoster     Hyperlipemia     Hypertension     Moderate tricuspid insufficiency     NSVT (nonsustained ventricular tachycardia)     PAC (premature atrial contraction)     Patent foramen ovale with right to left shunt     Pneumonia     Pneumonia due to infectious organism     Pulmonary hypertension     PVC's (premature ventricular contractions)     Severe mitral regurgitation     Shingles     Stroke     MINI TRAUMA; LEFT SIDED WEAKNESS    Thyroid disease     Walker as ambulation aid     Wears contact lenses        Past Surgical History:   Procedure Laterality Date    CARDIAC CATHETERIZATION      COLONOSCOPY N/A 11/23/2020    Procedure: COLONOSCOPY;  Surgeon: Abelino Garcia MD;  Location: [facility];  Service: General;  Laterality: N/A;    COLONOSCOPY N/A 1/8/2024    Procedure: COLONOSCOPY;  Surgeon: Queenie Berman MD;  Location: [facility];  Service: General;  Laterality: N/A;  2nd 0600    CYSTOSCOPY W/ RETROGRADES Bilateral 12/7/2023    Procedure: CYSTOSCOPY WITH RETROGRADE PYELOGRAM;  Surgeon: Juan Villela MD;  Location: [facility] OR;  Service: Urology;  Laterality: Bilateral;    DILATION OF URETHRA N/A 12/7/2023    Procedure: DILATION, URETHRA;  Surgeon: Juan Villela MD;  Location: [facility] OR;  Service: Urology;  Laterality: N/A;    EGD, WITH CLOSED BIOPSY N/A 10/4/2023    Procedure: EGD, WITH CLOSED BIOPSY;  Surgeon: Queenie Berman MD;  Location: [facility];  Service: General;  Laterality: N/A;    ESOPHAGOGASTRODUODENOSCOPY      ESOPHAGOGASTRODUODENOSCOPY N/A 5/21/2021    Procedure: EGD (ESOPHAGOGASTRODUODENOSCOPY);  Surgeon: Sunny Rea MD;  Location: [facility];  Service: Endoscopy;  Laterality: N/A;  COVID-VACCINATED    IMPLANTATION OF MITRAL VALVE LEAFLET CLIP Right 5/18/2021    Procedure: INSERTION, LEAFLET CLIP, MITRAL VALVE;  Surgeon: Zen Reina MD;   Location: [facility] CATH;  Service: Cardiovascular;  Laterality: Right;    INSERTION, PEG TUBE N/A 10/4/2023    Procedure: INSERTION, PEG TUBE;  Surgeon: Queenie Berman MD;  Location: [facility] OR;  Service: General;  Laterality: N/A;    LAPAROSCOPIC LEFT COLECTOMY Left 2/21/2024    Procedure: COLECTOMY, LEFT, LAPAROSCOPIC WITH GASTROSTOMY TUBE EXCHANGE;  Surgeon: Queenie Berman MD;  Location: [facility] OR;  Service: General;  Laterality: Left;  4th 0800    NECK SURGERY      anterior cervical fusion x 2    TONSILLECTOMY      TRANSESOPHAGEAL ECHOCARDIOGRAPHY         Time Tracking:     PT Received On: 02/29/24  PT Start Time: 1540     PT Stop Time: 1556  PT Total Time (min): 16 min     Billable Minutes: Re-eval 16 minutes      02/29/2024

## 2024-02-29 NOTE — ASSESSMENT & PLAN NOTE
Patient has hypokalemia which is Acute and currently uncontrolled. Most recent potassium levels reviewed-   Lab Results   Component Value Date    K 3.9 02/29/2024   . Will continue potassium replacement per protocol and recheck repeat levels after replacement completed.     Keep K greater than 4

## 2024-02-29 NOTE — ASSESSMENT & PLAN NOTE
Patient found to have small pleural effusion on imaging. I have personally reviewed and interpreted the following imaging: Xray. A thoracentesis was deferred. Most likely etiology includes Volume overload not due to CHF. Management to include Diuresis      Volume overload:  Continue to monitor strict intake and output.    I/O last 3 completed shifts:  In: 2884.1 [I.V.:298; NG/GT:2124; IV Piggyback:462.1]  Out: 4376 [Urine:4375; Stool:1]  No intake/output data recorded.     Volume status continues to improve.  Continue strict I's and O's

## 2024-02-29 NOTE — SUBJECTIVE & OBJECTIVE
Interval History:   Patient seen and examined.  Transferred to floor this afternoon.  Sitting in chair.  Alert and oriented on NC.  Tolerating regular diet.  Voices no other complaints.      Medications:  Continuous Infusions:  Scheduled Meds:   albuterol-ipratropium  3 mL Nebulization Q6H WAKE    atorvastatin  80 mg Oral QHS    doxycycline  100 mg Oral Q12H    enoxparin  40 mg Subcutaneous Q24H (prophylaxis, 1700)    furosemide (LASIX) injection  40 mg Intravenous Q12H    levothyroxine  50 mcg Oral Before breakfast    metoprolol succinate  25 mg Oral BID    sodium chloride 0.9%  10 mL Intravenous Q6H     PRN Meds:sodium chloride 0.9%, acetaminophen, LIDOcaine (PF) 10 mg/ml (1%), melatonin, methocarbamoL, morphine, ondansetron, oxyCODONE, sodium chloride 0.9%, Flushing PICC/Midline Protocol **AND** sodium chloride 0.9% **AND** sodium chloride 0.9%, traZODone     Review of patient's allergies indicates:   Allergen Reactions    Ativan [lorazepam]      Adverse reaction.     Objective:     Vital Signs (Most Recent):  Temp: 97.4 °F (36.3 °C) (02/29/24 1101)  Pulse: 78 (02/29/24 1538)  Resp: (!) 36 (02/29/24 1321)  BP: 136/69 (02/29/24 1200)  SpO2: (S) 96 % (02/29/24 1445) Vital Signs (24h Range):  Temp:  [97.3 °F (36.3 °C)-98.1 °F (36.7 °C)] 97.4 °F (36.3 °C)  Pulse:  [68-81] 78  Resp:  [14-39] 36  SpO2:  [93 %-100 %] 96 %  BP: (110-182)/(55-85) 136/69     Weight: 76.7 kg (169 lb 1.5 oz)  Body mass index is 25.71 kg/m².    Intake/Output - Last 3 Shifts         02/27 0700  02/28 0659 02/28 0700  02/29 0659 02/29 0700  03/01 0659    P.O. 0      I.V. (mL/kg)  298 (3.9)     NG/ 1224 60    IV Piggyback 250 462.1     Total Intake(mL/kg) 1150 (15) 1984.1 (25.9) 60 (0.8)    Urine (mL/kg/hr) 2975 (1.6) 2800 (1.5) 1400 (1.7)    Stool 2 1 1    Total Output 2977 2801 1401    Net -1827 -816.9 -1341           Stool Occurrence 1 x 4 x              Physical Exam  Vitals reviewed.   Constitutional:       General: He is not in acute  distress.     Appearance: He is not ill-appearing or toxic-appearing.   Cardiovascular:      Rate and Rhythm: Normal rate and regular rhythm.   Pulmonary:      Effort: Pulmonary effort is normal. No respiratory distress.   Abdominal:      General: There is no distension.      Palpations: Abdomen is soft.      Tenderness: There is no abdominal tenderness. There is no guarding or rebound.      Comments: Abdominal incisions C/D/I with staples in place    Musculoskeletal:      Right lower leg: No edema.      Left lower leg: No edema.   Skin:     General: Skin is warm and dry.      Capillary Refill: Capillary refill takes less than 2 seconds.   Neurological:      Mental Status: He is alert. Mental status is at baseline.          Significant Labs:  I have reviewed all pertinent lab results within the past 24 hours.  CBC:   Recent Labs   Lab 02/29/24  0328   WBC 11.69   RBC 3.15*   HGB 9.0*   HCT 28.4*      MCV 90   MCH 28.6   MCHC 31.7*     CMP:   Recent Labs   Lab 02/29/24  0328   GLU 96   CALCIUM 8.1*   ALBUMIN 2.4*      K 3.9   CO2 37*      BUN 13   CREATININE 0.4*       Significant Diagnostics:  I have reviewed all pertinent imaging results/findings within the past 24 hours.

## 2024-02-29 NOTE — ASSESSMENT & PLAN NOTE
ABG with pCO2 of 78  CXR yesterday with new bilateral basilar infiltrates  Lasix 40mg daily   Bipap today   Repeat AGB in 4 hours   Continue IV abx for coverage of impending pneumonia and UTI   Decrease IVFs    02/26 CP:   ABG improving with pCO2 of 59 and pH of 7.335  Repeat chest x-ray today  Continue bipap  Continue IV abx for PNA coverage  Continue Lasix s/t bilateral pleural effusions  Consult internal medicine, appreciate recs     02/27 CP:   Managed per internal medicine  Bipap wean, on NC  Lung sounds improved  Continue increased Lasix  Responding well to current abx regimen    02/28 CP:   Managed per internal medicine   On 4 L NC  Continue Lasix and current abx    02/29:   Managed per internal medicine - improved today with night time BiPAP   On 4 L NC while awake  BiPAP at night   Continue Lasix and current abx

## 2024-02-29 NOTE — SUBJECTIVE & OBJECTIVE
Interval History:   Patient s/e.  Somnolent.  Increased O2 req overnight.     Medications:  Continuous Infusions:      Scheduled Meds:   albuterol-ipratropium  3 mL Nebulization Q6H WAKE    atorvastatin  80 mg Oral QHS    cefTRIAXone (Rocephin) IV (PEDS and ADULTS)  1 g Intravenous Q24H    doxycycline  100 mg Oral Q12H    enoxparin  40 mg Subcutaneous Q24H (prophylaxis, 1700)    furosemide (LASIX) injection  40 mg Intravenous Q12H    levothyroxine  50 mcg Oral Before breakfast    metoprolol succinate  25 mg Oral BID    sodium chloride 0.9%  10 mL Intravenous Q6H     PRN Meds:sodium chloride 0.9%, acetaminophen, LIDOcaine (PF) 10 mg/ml (1%), melatonin, methocarbamoL, morphine, ondansetron, oxyCODONE, sodium chloride 0.9%, Flushing PICC/Midline Protocol **AND** sodium chloride 0.9% **AND** sodium chloride 0.9%, traZODone     Review of patient's allergies indicates:   Allergen Reactions    Ativan [lorazepam]      Adverse reaction.     Objective:     Vital Signs (Most Recent):  Temp: 97.3 °F (36.3 °C) (02/29/24 0701)  Pulse: 76 (02/29/24 0719)  Resp: 16 (02/29/24 0719)  BP: (!) 124/55 (02/29/24 0600)  SpO2: 99 % (02/29/24 0719) Vital Signs (24h Range):  Temp:  [97.3 °F (36.3 °C)-98.2 °F (36.8 °C)] 97.3 °F (36.3 °C)  Pulse:  [69-85] 76  Resp:  [14-39] 16  SpO2:  [96 %-100 %] 99 %  BP: (110-182)/(55-79) 124/55     Weight: 76.7 kg (169 lb 1.5 oz)  Body mass index is 25.71 kg/m².    Intake/Output - Last 3 Shifts         02/27 0700 02/28 0659 02/28 0700 02/29 0659 02/29 0700 03/01 0659    P.O. 0      I.V. (mL/kg)  298 (3.9)     NG/ 1224     IV Piggyback 250 462.1     Total Intake(mL/kg) 1150 (15) 1984.1 (25.9)     Urine (mL/kg/hr) 2975 (1.6) 2800 (1.5)     Stool 2 1     Total Output 2977 2801     Net -1827 -816.9            Stool Occurrence 1 x 4 x              Physical Exam  Vitals and nursing note reviewed.   Constitutional:       General: He is awake. He is not in acute distress.     Appearance: He is  ill-appearing. He is not toxic-appearing.      Interventions: Nasal cannula in place.   Cardiovascular:      Rate and Rhythm: Normal rate and regular rhythm.      Heart sounds: Murmur heard.      Comments: BLE edema improving   Pulmonary:      Effort: Tachypnea present. No respiratory distress.      Breath sounds: Rhonchi present. No wheezing or rales.      Comments: Coarse breath sounds  Abdominal:      General: Bowel sounds are normal. There is no distension.      Tenderness: There is no abdominal tenderness. There is no guarding or rebound.      Comments: Midline incisions with staples in place, erythema and moisture noted to bottom of midline incision at the abdominal fold.  G tube with dressing in place   Musculoskeletal:      Right lower leg: Edema present.      Left lower leg: Edema present.   Skin:     General: Skin is warm and dry.      Capillary Refill: Capillary refill takes less than 2 seconds.      Findings: Bruising present.   Neurological:      Mental Status: He is alert.      Motor: Weakness present.      Gait: Gait abnormal.   Psychiatric:         Behavior: Behavior is cooperative.          Significant Labs:  I have reviewed all pertinent lab results within the past 24 hours.  CBC:   Recent Labs   Lab 02/29/24  0328   WBC 11.69   RBC 3.15*   HGB 9.0*   HCT 28.4*      MCV 90   MCH 28.6   MCHC 31.7*       CMP:   Recent Labs   Lab 02/29/24  0328   GLU 96   CALCIUM 8.1*   ALBUMIN 2.4*      K 3.9   CO2 37*      BUN 13   CREATININE 0.4*       Recent Labs     02/28/24  1006   PH 7.412   PCO2 61.4*   PO2 63.1*   HCO3 36.3*   POCSATURATED 93.2*          Significant Diagnostics:  I have reviewed all pertinent imaging results/findings within the past 24 hours.  Review of Systems   Unable to perform ROS: Severe respiratory distress

## 2024-02-29 NOTE — PROGRESS NOTES
[facility] Medicine  Progress Note    Patient Name: Richard Farr  MRN: [id number]  Patient Class: IP- Inpatient   Admission Date: 2/21/2024  Length of Stay: 8 days  Attending Physician: Queenie Berman MD  Primary Care Provider: Drew Moreira Jr., MD        Subjective:     Principal Problem:Colovesical fistula        HPI:  History of Present Illness:  77yo male with colovesical fistula who presents for fistula take down and sigmoid resection.        Hospital Course:  02/23 CP: Colovesical fistula s/p fistula take down and sigmoid resection POD#2. Pt is tolerating CLD with + BS, passing flatus, and 3 small bms. Reports pain is controlled. PT OT consulted for early mobilization. Urine culture resulted, on IV Zosyn.  02/26 CP: Colovesical fistula s/p fistula take down and sigmoid resection POD#5. Pt experienced increased O2 requirements with respiratory acidosis and hypoxia. On bipap. Reports pain is controlled. Limited po intake.  02/27 CP: Colovesical fistula s/p fistula take down and sigmoid resection POD#6. Surgical dressings removed, staples to incision sites intact. Pt being weaned off bipap, on supplemental O2 via NC. Responding well to current abx regimen, afebrile and WBC trending down. Diuresing well after Lasix dose increased. Encouraged good po intake. Appreciate internal medicine and dietary recs.    Patient diuresed well overnight.  Wean bipap as tolerated.     Overview/Hospital Course:  2/27/24:  consulted for volume overload post op.  Patient responding to higher doses of lasix.  Wean bipap as tolerated.      2/28/24: Patient more tachypneic per Respiratory therapy.  ABG reveals hypoventilation with hypoxemia.  Patient placed back on AVAPS.  Episodes of SVT.  Cardiology consulted.  Transitioned to ICU for closer monitoring.    02/29/2024: Patient doing well since transitioned to the ICU.  Tolerating BiPAP with significant improvement in vitals.  Appreciate Cardiology  input.  Metoprolol has been increased.  Will continue to wean BiPAP as tolerated.        Interval History:   Patient s/e.  Somnolent.  Increased O2 req overnight.     Medications:  Continuous Infusions:      Scheduled Meds:   albuterol-ipratropium  3 mL Nebulization Q6H WAKE    atorvastatin  80 mg Oral QHS    cefTRIAXone (Rocephin) IV (PEDS and ADULTS)  1 g Intravenous Q24H    doxycycline  100 mg Oral Q12H    enoxparin  40 mg Subcutaneous Q24H (prophylaxis, 1700)    furosemide (LASIX) injection  40 mg Intravenous Q12H    levothyroxine  50 mcg Oral Before breakfast    metoprolol succinate  25 mg Oral BID    sodium chloride 0.9%  10 mL Intravenous Q6H     PRN Meds:sodium chloride 0.9%, acetaminophen, LIDOcaine (PF) 10 mg/ml (1%), melatonin, methocarbamoL, morphine, ondansetron, oxyCODONE, sodium chloride 0.9%, Flushing PICC/Midline Protocol **AND** sodium chloride 0.9% **AND** sodium chloride 0.9%, traZODone     Review of patient's allergies indicates:   Allergen Reactions    Ativan [lorazepam]      Adverse reaction.     Objective:     Vital Signs (Most Recent):  Temp: 97.3 °F (36.3 °C) (02/29/24 0701)  Pulse: 76 (02/29/24 0719)  Resp: 16 (02/29/24 0719)  BP: (!) 124/55 (02/29/24 0600)  SpO2: 99 % (02/29/24 0719) Vital Signs (24h Range):  Temp:  [97.3 °F (36.3 °C)-98.2 °F (36.8 °C)] 97.3 °F (36.3 °C)  Pulse:  [69-85] 76  Resp:  [14-39] 16  SpO2:  [96 %-100 %] 99 %  BP: (110-182)/(55-79) 124/55     Weight: 76.7 kg (169 lb 1.5 oz)  Body mass index is 25.71 kg/m².    Intake/Output - Last 3 Shifts         02/27 0700 02/28 0659 02/28 0700 02/29 0659 02/29 0700 03/01 0659    P.O. 0      I.V. (mL/kg)  298 (3.9)     NG/ 1224     IV Piggyback 250 462.1     Total Intake(mL/kg) 1150 (15) 1984.1 (25.9)     Urine (mL/kg/hr) 2975 (1.6) 2800 (1.5)     Stool 2 1     Total Output 2977 2801     Net -1827 -816.9            Stool Occurrence 1 x 4 x             Physical Exam  Vitals and nursing note reviewed.   Constitutional:        General: He is awake. He is not in acute distress.     Appearance: He is ill-appearing. He is not toxic-appearing.      Interventions: Nasal cannula in place.   Cardiovascular:      Rate and Rhythm: Normal rate and regular rhythm.      Heart sounds: Murmur heard.      Comments: BLE edema improving   Pulmonary:      Effort: Tachypnea present. No respiratory distress.      Breath sounds: Rhonchi present. No wheezing or rales.      Comments: Coarse breath sounds  Abdominal:      General: Bowel sounds are normal. There is no distension.      Tenderness: There is no abdominal tenderness. There is no guarding or rebound.      Comments: Midline incisions with staples in place, erythema and moisture noted to bottom of midline incision at the abdominal fold.  G tube with dressing in place   Musculoskeletal:      Right lower leg: Edema present.      Left lower leg: Edema present.   Skin:     General: Skin is warm and dry.      Capillary Refill: Capillary refill takes less than 2 seconds.      Findings: Bruising present.   Neurological:      Mental Status: He is alert.      Motor: Weakness present.      Gait: Gait abnormal.   Psychiatric:         Behavior: Behavior is cooperative.          Significant Labs:  I have reviewed all pertinent lab results within the past 24 hours.  CBC:   Recent Labs   Lab 02/29/24  0328   WBC 11.69   RBC 3.15*   HGB 9.0*   HCT 28.4*      MCV 90   MCH 28.6   MCHC 31.7*       CMP:   Recent Labs   Lab 02/29/24  0328   GLU 96   CALCIUM 8.1*   ALBUMIN 2.4*      K 3.9   CO2 37*      BUN 13   CREATININE 0.4*       Recent Labs     02/28/24  1006   PH 7.412   PCO2 61.4*   PO2 63.1*   HCO3 36.3*   POCSATURATED 93.2*          Significant Diagnostics:  I have reviewed all pertinent imaging results/findings within the past 24 hours.  Review of Systems   Unable to perform ROS: Severe respiratory distress       Assessment/Plan:      * Colovesical fistula  * Colovesical fistula  POD#4 s/p  lap sig colectomy with fistula takedown  -CLD via g tube   -passing flatus   -complicated now with respiratory acidosis and hypoxia   -Multimodal, opioid sparing pain control   -Lovenox      02/26 CP: POD#5 s/p lap sig colectomy with fistula takedown  Full liquid diet via g tube  Passing flatus  Continue multimodal pain management   PT / OT  Lovenox     02/27 CP: POD#6 s/p lap sig colectomy with fistula takedown  Surgical dressings removed, staples to incision sites intact-erythema to bottom midline incision in abdominal fold-wound cleanser applied and patted dry, keep area dry  Continue full liquid diet, may give po  Continue multimodal pain management  PT / OT  Passing flatus       Continue treatment per GS recommendations.  POD 8      Malnutrition  Patient has protein malnutrition.  Last trend as follows-   Lab Results   Component Value Date    ALBUMIN 2.4 (L) 02/29/2024    ALBUMIN 2.6 (L) 02/28/2024    ALBUMIN 2.6 (L) 02/27/2024           Impaired mobility and ADLs  Continue rehab services.  Consider skilled nursing versus inpatient rehab at time of discharge.      SVT (supraventricular tachycardia)  Metoprolol increased.  Appreciate cardiology input.      Inadequate nutrition    Diet via peg tube  Patient has protein malnutrition.  Last trend as follows-   Lab Results   Component Value Date    ALBUMIN 2.4 (L) 02/29/2024    ALBUMIN 2.6 (L) 02/28/2024    ALBUMIN 2.6 (L) 02/27/2024           Respiratory acidosis  Lasix increased.  Wean bipap as tolerated.     2/28:  respiratory decompensation now on AVAPS.  2/29: Patient has done well with AVAPS overnight.  Blood pressure has stabilized.  Patient appears comfortable.  Will wean as tolerated.      Hypophosphatemia  Patient has Abnormal Phosphorus: hypophosphatemia. Will continue to monitor electrolytes closely. Will replace the affected electrolytes and repeat labs to be done after interventions completed. The patient's phosphorus results have been reviewed and are  listed below.  Recent Labs   Lab 02/29/24  0328   PHOS 3.0          On deep vein thrombosis (DVT) prophylaxis  lovenox      Hypokalemia  Patient has hypokalemia which is Acute and currently uncontrolled. Most recent potassium levels reviewed-   Lab Results   Component Value Date    K 3.9 02/29/2024   . Will continue potassium replacement per protocol and recheck repeat levels after replacement completed.     Keep K greater than 4    Acute cystitis without hematuria  Secondary to fistula.  Continue rocephin for klebsiella.     Patient has a leukocytosis.  Last trend as follows-   Lab Results   Component Value Date    WBC 11.69 02/29/2024    WBC 10.64 02/28/2024    WBC 10.71 02/27/2024     Plan for 7 days of antimicrobial therapy.      Bilateral pleural effusion  Patient found to have small pleural effusion on imaging. I have personally reviewed and interpreted the following imaging: Xray. A thoracentesis was deferred. Most likely etiology includes Volume overload not due to CHF. Management to include Diuresis      Volume overload:  Continue to monitor strict intake and output.    I/O last 3 completed shifts:  In: 2884.1 [I.V.:298; NG/GT:2124; IV Piggyback:462.1]  Out: 4376 [Urine:4375; Stool:1]  No intake/output data recorded.     Volume status continues to improve.  Continue strict I's and O's    History of stroke  Patient debilitated at baseline. PT/OT.  IPR at [place].      VTE Risk Mitigation (From admission, onward)           Ordered     enoxaparin injection 40 mg  Every 24 hours         02/22/24 0947     IP VTE HIGH RISK PATIENT  Once         02/21/24 1622     Place sequential compression device  Until discontinued         02/21/24 1622                    Discharge Planning   JOSÉ:      Code Status: Full Code   Is the patient medically ready for discharge?:     Reason for patient still in hospital (select all that apply): Treatment  Discharge Plan A: Rehab        Cc time 30 min          Drew Moreira Jr,  MD  Department of Hospital Medicine   [place] - Intensive Care

## 2024-02-29 NOTE — CARE UPDATE
02/29/24 0735   Patient Assessment/Suction   Level of Consciousness (AVPU) alert   Respiratory Effort Unlabored   PRE-TX-O2   Device (Oxygen Therapy) (S)  nasal cannula   $ Is the patient on Low Flow Oxygen? Yes   Flow (L/min) (S)  5   Oxygen Concentration (%) 40   SpO2 (S)  99 %   Pulse 73   Resp (!) 32   BP (!) 169/75

## 2024-02-29 NOTE — PROGRESS NOTES
[facility]  General Surgery  Progress Note    Subjective:     History of Present Illness:  77yo male with colovesical fistula who presents for fistula take down and sigmoid resection.      Post-Op Info:  Procedure(s) (LRB):  COLECTOMY, LEFT, LAPAROSCOPIC WITH GASTROSTOMY TUBE EXCHANGE (Left)   8 Days Post-Op     Interval History:   Patient seen and examined.  Transferred to floor this afternoon.  Sitting in chair.  Alert and oriented on NC.  Tolerating regular diet.  Voices no other complaints.      Medications:  Continuous Infusions:  Scheduled Meds:   albuterol-ipratropium  3 mL Nebulization Q6H WAKE    atorvastatin  80 mg Oral QHS    doxycycline  100 mg Oral Q12H    enoxparin  40 mg Subcutaneous Q24H (prophylaxis, 1700)    furosemide (LASIX) injection  40 mg Intravenous Q12H    levothyroxine  50 mcg Oral Before breakfast    metoprolol succinate  25 mg Oral BID    sodium chloride 0.9%  10 mL Intravenous Q6H     PRN Meds:sodium chloride 0.9%, acetaminophen, LIDOcaine (PF) 10 mg/ml (1%), melatonin, methocarbamoL, morphine, ondansetron, oxyCODONE, sodium chloride 0.9%, Flushing PICC/Midline Protocol **AND** sodium chloride 0.9% **AND** sodium chloride 0.9%, traZODone     Review of patient's allergies indicates:   Allergen Reactions    Ativan [lorazepam]      Adverse reaction.     Objective:     Vital Signs (Most Recent):  Temp: 97.4 °F (36.3 °C) (02/29/24 1101)  Pulse: 78 (02/29/24 1538)  Resp: (!) 36 (02/29/24 1321)  BP: 136/69 (02/29/24 1200)  SpO2: (S) 96 % (02/29/24 1445) Vital Signs (24h Range):  Temp:  [97.3 °F (36.3 °C)-98.1 °F (36.7 °C)] 97.4 °F (36.3 °C)  Pulse:  [68-81] 78  Resp:  [14-39] 36  SpO2:  [93 %-100 %] 96 %  BP: (110-182)/(55-85) 136/69     Weight: 76.7 kg (169 lb 1.5 oz)  Body mass index is 25.71 kg/m².    Intake/Output - Last 3 Shifts         02/27 0700 02/28 0659 02/28 0700 02/29 0659 02/29 0700 03/01 0659    P.O. 0      I.V. (mL/kg)  298 (3.9)     NG/ 1224 60    IV Piggyback  250 462.1     Total Intake(mL/kg) 1150 (15) 1984.1 (25.9) 60 (0.8)    Urine (mL/kg/hr) 2975 (1.6) 2800 (1.5) 1400 (1.7)    Stool 2 1 1    Total Output 2977 2801 1401    Net -1827 -816.9 -1341           Stool Occurrence 1 x 4 x              Physical Exam  Vitals reviewed.   Constitutional:       General: He is not in acute distress.     Appearance: He is not ill-appearing or toxic-appearing.   Cardiovascular:      Rate and Rhythm: Normal rate and regular rhythm.   Pulmonary:      Effort: Pulmonary effort is normal. No respiratory distress.   Abdominal:      General: There is no distension.      Palpations: Abdomen is soft.      Tenderness: There is no abdominal tenderness. There is no guarding or rebound.      Comments: Abdominal incisions C/D/I with staples in place    Musculoskeletal:      Right lower leg: No edema.      Left lower leg: No edema.   Skin:     General: Skin is warm and dry.      Capillary Refill: Capillary refill takes less than 2 seconds.   Neurological:      Mental Status: He is alert. Mental status is at baseline.          Significant Labs:  I have reviewed all pertinent lab results within the past 24 hours.  CBC:   Recent Labs   Lab 02/29/24  0328   WBC 11.69   RBC 3.15*   HGB 9.0*   HCT 28.4*      MCV 90   MCH 28.6   MCHC 31.7*     CMP:   Recent Labs   Lab 02/29/24  0328   GLU 96   CALCIUM 8.1*   ALBUMIN 2.4*      K 3.9   CO2 37*      BUN 13   CREATININE 0.4*       Significant Diagnostics:  I have reviewed all pertinent imaging results/findings within the past 24 hours.  Assessment/Plan:     * Colovesical fistula  POD#4 s/p lap sig colectomy with fistula takedown  -CLD via g tube   -passing flatus   -complicated now with respiratory acidosis and hypoxia   -Multimodal, opioid sparing pain control   -Lovenox     02/26 CP: POD#5 s/p lap sig colectomy with fistula takedown  Full liquid diet via g tube  Passing flatus  Continue multimodal pain management   PT / OT  Lovenox    02/27  CP: POD#6 s/p lap sig colectomy with fistula takedown  Surgical dressings removed, staples to incision sites intact-erythema to bottom midline incision in abdominal fold-wound cleanser applied and patted dry, keep area dry  Continue full liquid diet, may give po  Continue multimodal pain management  PT / OT  Passing flatus    02/28 CP: POD #7 s/p lap sig colectomy with fistula takedown  Surgical dressing changed-iodine paint and mepilex  Multiple bms yesterday after starting bolus TF  Advance diet to soft and bite sized  PT / OT    02/29: POD #8 s/p lap sig colectomy with fistula takedown  Surgical dressing changed-iodine paint and mepilex  Contiue diet to soft and bite sized  PT / OT    Impaired mobility and ADLs  Continue PT/ OT. Pt would be an excellent candidate for IRF- requires intensive PT/OT, physician and nurse close monitoring, and med management. Case management consulted for discharge planning.    SVT (supraventricular tachycardia)  Meds adjusted, cardiology consulted, appreciate recs.     Inadequate nutrition  S/t decreased po intake  Give full liquid diet via g tube  Dietician consult for nutrition recs for bolus feedings per g tube    02/27 CP:  Appreciate dietician recs  Encouraged good po intake, continue full liquid diet, may give po  TF per G tube  Monitor nutrition status and electrolytes     02/28 CP:   Advanced to soft and bite sized diet  Tolerating TF well  Appreciate dietician recs     Respiratory acidosis  ABG with pCO2 of 78  CXR yesterday with new bilateral basilar infiltrates  Lasix 40mg daily   Bipap today   Repeat AGB in 4 hours   Continue IV abx for coverage of impending pneumonia and UTI   Decrease IVFs    02/26 CP:   ABG improving with pCO2 of 59 and pH of 7.335  Repeat chest x-ray today  Continue bipap  Continue IV abx for PNA coverage  Continue Lasix s/t bilateral pleural effusions  Consult internal medicine, appreciate recs     02/27 CP:   Managed per internal medicine  Bipap  wean, on NC  Lung sounds improved  Continue increased Lasix  Responding well to current abx regimen    02/28 CP:   Managed per internal medicine   On 4 L NC  Continue Lasix and current abx    02/29:   Managed per internal medicine - improved today with night time BiPAP   On 4 L NC while awake  BiPAP at night   Continue Lasix and current abx      Hypophosphatemia  Patient has Abnormal Phosphorus: hypophosphatemia. Will continue to monitor electrolytes closely. Will replace the affected electrolytes and repeat labs to be done after interventions completed. The patient's phosphorus results have been reviewed and are listed below.  Recent Labs   Lab 02/28/24  0339   PHOS 2.2*        On deep vein thrombosis (DVT) prophylaxis  Lovenox therapy.    Hypokalemia  Patient has hypokalemia which is Acute and currently uncontrolled. Most recent potassium levels reviewed-   Lab Results   Component Value Date    K 3.4 (L) 02/28/2024   Will continue potassium replacement per protocol and recheck repeat levels after replacement completed.       Acute cystitis without hematuria  Acute on chronic secondary to colovesical fistula   Treating for Klebsiella   IV Ceftriaxone   Discontinue vilchis tomorrow since patient clinically stable         Bilateral pleural effusion  See respiratory acidosis.         Queenie Berman MD  General Surgery  [facility]

## 2024-02-29 NOTE — CARE UPDATE
02/29/24 1445   PRE-TX-O2   Device (Oxygen Therapy) (S)  nasal cannula   Flow (L/min) (S)  4   Oxygen Concentration (%) (S)  36   SpO2 (S)  96 %   Pulse Oximetry Type (S)  Continuous   $ Pulse Oximetry - Multiple Charge (S)  Pulse Oximetry - Multiple   Pulse (S)  76

## 2024-02-29 NOTE — PLAN OF CARE
Recommendations  1. TF recommendations: Jevity 1.2   Bolus: 1 carton (240mls) 6 times per 24 hours (1 carton q 4 hours)  TF to provide: 1728kcals, 80gPRO, 243gCHO, 1162mls of FW  Flush with 90mls of FW q 6 hours or per md order  2. Monitor TF tolerance and provide adjustment recommendations as appropriate   3. Monitor labs   4. Collaboration with medical providers   Goals: Patient to advance on EN support prior to RD follow up.  Nutrition Goal Status: progressing towards goal

## 2024-02-29 NOTE — ASSESSMENT & PLAN NOTE
Patient has Abnormal Phosphorus: hypophosphatemia. Will continue to monitor electrolytes closely. Will replace the affected electrolytes and repeat labs to be done after interventions completed. The patient's phosphorus results have been reviewed and are listed below.  Recent Labs   Lab 02/29/24  0328   PHOS 3.0

## 2024-02-29 NOTE — PROGRESS NOTES
[facility] - Intensive Care  Cardiology  Progress Note    Patient Name: Richard Farr  MRN: [id number]  Admission Date: 2/21/2024  Hospital Length of Stay: 8 days  Code Status: Full Code   Attending Provider: Queenie Berman MD   Primary Care Physician: Drew Moreira Jr., MD  Principal Problem:Colovesical fistula    Patient information was obtained from patient, past medical records, and ER records.       Subjective:     Chief Complaint:  SVT     HPI:   This is a 77 yo male with pmhx severe MR s/p mitral clip, mod-severe TR, HTN, HLD, and anemia due to gastric ulcer who is POD #7 colovesical fistula take down and sigmoid resection. He has had a complicated post-op course with respiratory acidosis and hypoxia requiring BiPAP which has been weaned to NC. Pt was volume overloaded post-op and has been diuresing well on lasix. Primary following. Pt respiratory status worsening; he was placed on AVAPS and transferred to ICU for close monitoring and care.     Cardiology consulted for evaluation and management of SVT. Metoprolol succinate increased to 25 mg po qd. He had 3 episodes of HR >160s that were nonsustained and spontaneously resolved. Asymptomatic during the episodes. Currently, rate controlled with no further episodes noted on telemetry. We will increase metoprolol succinate to 25 mg po bid and hold afternoon dose if HR < 60.    2/29/24:   Yesterday, pt was stepped-up to ICU for close monitoring care with respiratory distress. He was placed on AVAPS. His breathing is improved today and continues on BiPAP at night.     His rate remains well controlled with increase in metoprolol with no recurrent episodes of SVT noted on telemetry monitor. Vitals stable.     Past Medical History:   Diagnosis Date    Anal fistula 01/2024    Anemia     Arthritis     At high risk for falls     Bilateral lower extremity edema     Carpal tunnel syndrome, bilateral     Chronic diastolic congestive heart failure     Colon polyp      Colovesical fistula 02/2024    Coronary artery disease     Depression due to physical illness     Encounter for blood transfusion     Gastric ulcer     History of herpes zoster     Hyperlipemia     Hypertension     Moderate tricuspid insufficiency     NSVT (nonsustained ventricular tachycardia)     PAC (premature atrial contraction)     Patent foramen ovale with right to left shunt     Pneumonia     Pneumonia due to infectious organism     Pulmonary hypertension     PVC's (premature ventricular contractions)     Severe mitral regurgitation     Shingles     Stroke     MINI TRAUMA; LEFT SIDED WEAKNESS    Thyroid disease     Walker as ambulation aid     Wears contact lenses        Past Surgical History:   Procedure Laterality Date    CARDIAC CATHETERIZATION      COLONOSCOPY N/A 11/23/2020    Procedure: COLONOSCOPY;  Surgeon: Abelino Garcia MD;  Location: [facility] ENDO;  Service: General;  Laterality: N/A;    COLONOSCOPY N/A 1/8/2024    Procedure: COLONOSCOPY;  Surgeon: Queenie Berman MD;  Location: [facility] ENDO;  Service: General;  Laterality: N/A;  2nd 0600    CYSTOSCOPY W/ RETROGRADES Bilateral 12/7/2023    Procedure: CYSTOSCOPY WITH RETROGRADE PYELOGRAM;  Surgeon: Juan Villela MD;  Location: [facility] OR;  Service: Urology;  Laterality: Bilateral;    DILATION OF URETHRA N/A 12/7/2023    Procedure: DILATION, URETHRA;  Surgeon: Juan Villela MD;  Location: [facility] OR;  Service: Urology;  Laterality: N/A;    EGD, WITH CLOSED BIOPSY N/A 10/4/2023    Procedure: EGD, WITH CLOSED BIOPSY;  Surgeon: Queenie Berman MD;  Location: [facility] OR;  Service: General;  Laterality: N/A;    ESOPHAGOGASTRODUODENOSCOPY      ESOPHAGOGASTRODUODENOSCOPY N/A 5/21/2021    Procedure: EGD (ESOPHAGOGASTRODUODENOSCOPY);  Surgeon: Sunny Rea MD;  Location: [facility];  Service: Endoscopy;  Laterality: N/A;  COVID-VACCINATED    IMPLANTATION OF MITRAL VALVE LEAFLET CLIP Right 5/18/2021    Procedure: INSERTION, LEAFLET CLIP, MITRAL VALVE;   Surgeon: Zen Reina MD;  Location: [facility] CATH;  Service: Cardiovascular;  Laterality: Right;    INSERTION, PEG TUBE N/A 10/4/2023    Procedure: INSERTION, PEG TUBE;  Surgeon: Queenie Dos Santos MD;  Location: [facility] OR;  Service: General;  Laterality: N/A;    LAPAROSCOPIC LEFT COLECTOMY Left 2/21/2024    Procedure: COLECTOMY, LEFT, LAPAROSCOPIC WITH GASTROSTOMY TUBE EXCHANGE;  Surgeon: Queenie Dos Santos MD;  Location: [facility] OR;  Service: General;  Laterality: Left;  4th 0800    NECK SURGERY      anterior cervical fusion x 2    TONSILLECTOMY      TRANSESOPHAGEAL ECHOCARDIOGRAPHY         Review of patient's allergies indicates:   Allergen Reactions    Ativan [lorazepam]      Adverse reaction.       Current Facility-Administered Medications on File Prior to Encounter   Medication    benzocaine 20 % mouth spray    lactated ringers infusion     Current Outpatient Medications on File Prior to Encounter   Medication Sig    aspirin 81 MG Chew Take 81 mg by mouth. OK TO CONTINUE PER DR. DOS SANTOS    atorvastatin (LIPITOR) 80 MG tablet TAKE 1 TABLET BY MOUTH EVERY EVENING    ezetimibe (ZETIA) 10 mg tablet Take 10 mg by mouth once daily.     fenofibrate (TRICOR) 145 MG tablet Take 145 mg by mouth once daily.    furosemide (LASIX) 40 MG tablet Take 0.5 tablets (20 mg total) by mouth once daily. (Patient taking differently: Take 40 mg by mouth once daily.)    KLOR-CON 20 mEq Pack GIVE 2 PACKS BY PER NG TUBE ROUTE ONCE DAILY FOR 30 DOSES    levothyroxine (SYNTHROID) 50 MCG tablet TAKE 1 TABLET BY MOUTH EVERY DAY BEFORE BREAKFAST.    metoprolol succinate (TOPROL-XL) 25 MG 24 hr tablet TAKE 1/2 TABLET BY MOUTH EVERY DAY    omeprazole (PRILOSEC) 40 MG capsule Take 1 capsule (40 mg total) by mouth once daily.     Family History       Problem Relation (Age of Onset)    Cancer Brother, Brother    Heart attack Father    Heart disease Sister, Sister    No Known Problems Sister    Pneumonia Sister    Stroke Mother          Tobacco  Use    Smoking status: Former     Types: Cigars     Start date:      Quit date:      Years since quittin.1    Smokeless tobacco: Never   Substance and Sexual Activity    Alcohol use: Not Currently    Drug use: Never    Sexual activity: Not on file     Comment:  GAYLE     Review of Systems   Unable to perform ROS: Other (somnolent, limiting history)   Cardiovascular:  Negative for chest pain, leg swelling and palpitations.   Respiratory:  Positive for shortness of breath.      Objective:     Vital Signs (Most Recent):  Temp: 97.7 °F (36.5 °C) (24)  Pulse: 80 (24)  Resp: (!) 22 (24)  BP: 132/82 (24)  SpO2: 96 % (24) Vital Signs (24h Range):  Temp:  [97.3 °F (36.3 °C)-98.1 °F (36.7 °C)] 97.7 °F (36.5 °C)  Pulse:  [68-81] 80  Resp:  [14-39] 22  SpO2:  [93 %-100 %] 96 %  BP: (110-182)/(55-85) 132/82     Weight: 76.7 kg (169 lb 1.5 oz)  Body mass index is 25.71 kg/m².    SpO2: 96 %         Intake/Output Summary (Last 24 hours) at 2024  Last data filed at 2024 1905  Gross per 24 hour   Intake 1186.44 ml   Output 3201 ml   Net -2014.56 ml       Lines/Drains/Airways       Drain  Duration                  Gastrostomy/Enterostomy 24 1534 Gastrostomy tube w/ balloon LUQ feeding 8 days         Urethral Catheter 24 1020 Silicone 16 Fr. 8 days              Peripheral Intravenous Line  Duration                  Peripheral IV - Single Lumen 18 G Right Antecubital -- days         Midline Catheter Insertion/Assessment  - Single Lumen 24 1704 Left basilic vein (medial side of arm) 2 days                    Physical Exam  Constitutional:       Appearance: He is ill-appearing.      Comments: Awake and alert   HENT:      Head: Normocephalic and atraumatic.      Nose:      Comments: NC in place     Mouth/Throat:      Mouth: Mucous membranes are moist.      Pharynx: Oropharynx is clear.   Cardiovascular:      Rate and Rhythm:  Regular rhythm. Tachycardia present.      Pulses: Normal pulses.   Pulmonary:      Breath sounds: Wheezing, rhonchi and rales present.   Musculoskeletal:      Right lower leg: Edema (imrpoved) present.      Left lower leg: Edema (improved) present.   Skin:     General: Skin is warm and dry.      Capillary Refill: Capillary refill takes less than 2 seconds.   Neurological:      Mental Status: He is alert, oriented to person, place, and time and easily aroused.      Comments: Awake and alert         Significant Labs: BMP:   Recent Labs   Lab 02/28/24 0339 02/29/24 0328   * 96    139   K 3.4* 3.9    103   CO2 37* 37*   BUN 14 13   CREATININE 0.4* 0.4*   CALCIUM 8.5* 8.1*   MG 2.0 1.9   , CMP   Recent Labs   Lab 02/28/24 0339 02/29/24 0328    139   K 3.4* 3.9    103   CO2 37* 37*   * 96   BUN 14 13   CREATININE 0.4* 0.4*   CALCIUM 8.5* 8.1*   ALBUMIN 2.6* 2.4*   ANIONGAP 1* -1*   , CBC   Recent Labs   Lab 02/28/24 0339 02/29/24 0328   WBC 10.64 11.69   HGB 10.0* 9.0*   HCT 31.9* 28.4*    236   , and All pertinent lab results from the last 24 hours have been reviewed.    Significant Imaging:   TTE 9/2023:      Left Ventricle: The left ventricle is mildly dilated. Normal wall motion. There is normal systolic function with a visually estimated ejection fraction of 55 - 60%. There is normal diastolic function.    Left Atrium: Left atrium is moderately dilated.    Right Ventricle: Normal right ventricular cavity size.    Right Atrium: Right atrium is mildly dilated.    Aortic Valve: The aortic valve is a trileaflet valve. Mildly calcified right cusp. There is mild annular calcification present. Mildly restricted motion.    Mitral Valve: The mitral valve is repaired by MitraClip   (XTw  MitraClip: 5/18/2021- Dr. Reina and Dr. Yanez..  Indication: Severe mitral valve prolapse.  Preop MR: 4+.  Postop MR: 2+) There is moderate regurgitation with an anteromedial eccentrically  directed jet.    Tricuspid Valve: There is mild regurgitation with a centrally directed jet.    Aorta: Calcified atherosclerosis of the ascending aorta.     Coronary angiogram 4/27/2021:       Assessment and Plan:    Nonsustatined SVT, resolved   -3 nonsustained episodes of SVT - no recurrent episodes noted on telemetry  -continue metoprolol succinate to 25 mg po bid - hold pm dose HR < 60   -continue to monitor on telemetry   -12-lead EKG as needed for acute change in rhythm     Colovesical fistula   -POD#8 lap sigmoid colectomy with fistula takedown   -general surgery following     Respiratory acidosis   -acute respiratory distress, improved - transferred to ICU for close monitoring and care   -continue management per primary/medicine; BIPAP at night  -continue to monitor     Electrolyte imbalance   -hypophosphatemia, resolved - phos 3.0 - replace as per medicine team   -hypokalemia, resolved - K 3.9 - replace as needed to keep K>4    Small bilateral pleural effusion  -evident on CXR   -volume status continues to improve   -continue with lasix - strict I/Os   -continue to monitor     HR and rhythm remain stable with increase in metoprolol. No recurrent episodes of SVT noted on telemetry. Please ensure outpatient follow-up with cardiology clinic. Please reconsult as needed.        Active Diagnoses:    Diagnosis Date Noted POA    PRINCIPAL PROBLEM:  Colovesical fistula [N32.1] 01/08/2024 Yes    SVT (supraventricular tachycardia) [I47.10] 02/28/2024 No    Impaired mobility and ADLs [Z74.09, Z78.9] 02/28/2024 Yes    Malnutrition [E46] 02/28/2024 Unknown    Inadequate nutrition [E63.9] 02/26/2024 No    Respiratory acidosis [E87.29] 02/25/2024 No    On deep vein thrombosis (DVT) prophylaxis [Z79.899] 02/23/2024 Not Applicable    Hypophosphatemia [E83.39] 02/23/2024 Yes    Hypokalemia [E87.6] 09/24/2023 Yes    Acute cystitis without hematuria [N30.00] 05/05/2023 Yes    Bilateral pleural effusion [J90] 01/31/2021 Yes     History of stroke [Z86.73] 12/10/2020 Not Applicable     Chronic      Problems Resolved During this Admission:       VTE Risk Mitigation (From admission, onward)           Ordered     enoxaparin injection 40 mg  Every 24 hours         02/22/24 0947     IP VTE HIGH RISK PATIENT  Once         02/21/24 1622     Place sequential compression device  Until discontinued         02/21/24 1622                    Thank you for your consult.    Mia Hidalgo PA-C - scribed for Dr. Beau Yanez MD  Cardiology   [place] - Intensive [facility], [place]

## 2024-02-29 NOTE — ASSESSMENT & PLAN NOTE
Patient has protein malnutrition.  Last trend as follows-   Lab Results   Component Value Date    ALBUMIN 2.4 (L) 02/29/2024    ALBUMIN 2.6 (L) 02/28/2024    ALBUMIN 2.6 (L) 02/27/2024

## 2024-02-29 NOTE — ASSESSMENT & PLAN NOTE
Diet via peg tube  Patient has protein malnutrition.  Last trend as follows-   Lab Results   Component Value Date    ALBUMIN 2.4 (L) 02/29/2024    ALBUMIN 2.6 (L) 02/28/2024    ALBUMIN 2.6 (L) 02/27/2024

## 2024-02-29 NOTE — PLAN OF CARE
Care plan reviewed and on-going. Tolerated bolus feeds. James cath draining without adverse. Called multiple times for bed pan. Small amount each time. SCD's in place. Turn schedule maintained. Denies need for pain medicine last night. Melatonin given to aid in sleep. Oral care completed. Cont with Bipap

## 2024-02-29 NOTE — ASSESSMENT & PLAN NOTE
Acute on chronic secondary to colovesical fistula   Treating for Klebsiella   IV Ceftriaxone   Discontinue vilchis tomorrow since patient clinically stable

## 2024-03-01 VITALS
WEIGHT: 169.06 LBS | HEIGHT: 68 IN | HEART RATE: 74 BPM | SYSTOLIC BLOOD PRESSURE: 144 MMHG | BODY MASS INDEX: 25.62 KG/M2 | TEMPERATURE: 99 F | DIASTOLIC BLOOD PRESSURE: 60 MMHG | OXYGEN SATURATION: 96 % | RESPIRATION RATE: 20 BRPM

## 2024-03-01 PROBLEM — E83.39 HYPOPHOSPHATEMIA: Status: RESOLVED | Noted: 2024-02-23 | Resolved: 2024-03-01

## 2024-03-01 PROBLEM — E87.29 RESPIRATORY ACIDOSIS: Status: RESOLVED | Noted: 2024-02-25 | Resolved: 2024-03-01

## 2024-03-01 PROBLEM — E87.6 HYPOKALEMIA: Status: RESOLVED | Noted: 2023-09-24 | Resolved: 2024-03-01

## 2024-03-01 PROBLEM — N30.00 ACUTE CYSTITIS WITHOUT HEMATURIA: Status: RESOLVED | Noted: 2023-05-05 | Resolved: 2024-03-01

## 2024-03-01 PROBLEM — N32.1 COLOVESICAL FISTULA: Status: RESOLVED | Noted: 2024-01-08 | Resolved: 2024-03-01

## 2024-03-01 LAB
ALBUMIN SERPL BCP-MCNC: 2.7 G/DL (ref 3.5–5.2)
ANION GAP SERPL CALC-SCNC: 1 MMOL/L (ref 3–11)
BUN SERPL-MCNC: 15 MG/DL (ref 8–23)
CALCIUM SERPL-MCNC: 9 MG/DL (ref 8.7–10.5)
CHLORIDE SERPL-SCNC: 102 MMOL/L (ref 95–110)
CO2 SERPL-SCNC: 35 MMOL/L (ref 23–29)
CREAT SERPL-MCNC: 0.4 MG/DL (ref 0.5–1.4)
ERYTHROCYTE [DISTWIDTH] IN BLOOD BY AUTOMATED COUNT: 16 % (ref 11.5–14.5)
EST. GFR  (NO RACE VARIABLE): >60 ML/MIN/1.73 M^2
GLUCOSE SERPL-MCNC: 134 MG/DL (ref 70–110)
HCT VFR BLD AUTO: 30.6 % (ref 40–54)
HGB BLD-MCNC: 9.5 G/DL (ref 14–18)
MAGNESIUM SERPL-MCNC: 2.1 MG/DL (ref 1.6–2.6)
MCH RBC QN AUTO: 28.6 PG (ref 27–31)
MCHC RBC AUTO-ENTMCNC: 31 G/DL (ref 32–36)
MCV RBC AUTO: 92 FL (ref 82–98)
PHOSPHATE SERPL-MCNC: 3.4 MG/DL (ref 2.7–4.5)
PLATELET # BLD AUTO: 266 K/UL (ref 150–450)
PMV BLD AUTO: 10.8 FL (ref 9.2–12.9)
POTASSIUM SERPL-SCNC: 4.3 MMOL/L (ref 3.5–5.1)
RBC # BLD AUTO: 3.32 M/UL (ref 4.6–6.2)
SODIUM SERPL-SCNC: 138 MMOL/L (ref 136–145)
SPECIMEN TO PATHOLOGY - SURGICAL: NORMAL
WBC # BLD AUTO: 10.13 K/UL (ref 3.9–12.7)

## 2024-03-01 PROCEDURE — 99900031 HC PATIENT EDUCATION (STAT)

## 2024-03-01 PROCEDURE — 86580 TB INTRADERMAL TEST: CPT | Performed by: INTERNAL MEDICINE

## 2024-03-01 PROCEDURE — 30200315 PPD INTRADERMAL TEST REV CODE 302: Performed by: INTERNAL MEDICINE

## 2024-03-01 PROCEDURE — 27100171 HC OXYGEN HIGH FLOW UP TO 24 HOURS

## 2024-03-01 PROCEDURE — 63600175 PHARM REV CODE 636 W HCPCS: Performed by: INTERNAL MEDICINE

## 2024-03-01 PROCEDURE — 99900035 HC TECH TIME PER 15 MIN (STAT)

## 2024-03-01 PROCEDURE — 94660 CPAP INITIATION&MGMT: CPT

## 2024-03-01 PROCEDURE — 36415 COLL VENOUS BLD VENIPUNCTURE: CPT

## 2024-03-01 PROCEDURE — 83735 ASSAY OF MAGNESIUM: CPT

## 2024-03-01 PROCEDURE — 94761 N-INVAS EAR/PLS OXIMETRY MLT: CPT

## 2024-03-01 PROCEDURE — A4216 STERILE WATER/SALINE, 10 ML: HCPCS | Performed by: STUDENT IN AN ORGANIZED HEALTH CARE EDUCATION/TRAINING PROGRAM

## 2024-03-01 PROCEDURE — 80069 RENAL FUNCTION PANEL: CPT

## 2024-03-01 PROCEDURE — 25000003 PHARM REV CODE 250: Performed by: STUDENT IN AN ORGANIZED HEALTH CARE EDUCATION/TRAINING PROGRAM

## 2024-03-01 PROCEDURE — 25000003 PHARM REV CODE 250: Performed by: INTERNAL MEDICINE

## 2024-03-01 PROCEDURE — 25000003 PHARM REV CODE 250

## 2024-03-01 PROCEDURE — 25000242 PHARM REV CODE 250 ALT 637 W/ HCPCS: Performed by: INTERNAL MEDICINE

## 2024-03-01 PROCEDURE — 5A09457 ASSISTANCE WITH RESPIRATORY VENTILATION, 24-96 CONSECUTIVE HOURS, CONTINUOUS POSITIVE AIRWAY PRESSURE: ICD-10-PCS | Performed by: STUDENT IN AN ORGANIZED HEALTH CARE EDUCATION/TRAINING PROGRAM

## 2024-03-01 PROCEDURE — 94640 AIRWAY INHALATION TREATMENT: CPT

## 2024-03-01 PROCEDURE — 97116 GAIT TRAINING THERAPY: CPT

## 2024-03-01 PROCEDURE — 85027 COMPLETE CBC AUTOMATED: CPT

## 2024-03-01 PROCEDURE — 97530 THERAPEUTIC ACTIVITIES: CPT

## 2024-03-01 RX ORDER — HYDROCODONE BITARTRATE AND ACETAMINOPHEN 5; 325 MG/1; MG/1
1 TABLET ORAL EVERY 6 HOURS PRN
Qty: 28 TABLET | Refills: 0 | Status: ON HOLD | OUTPATIENT
Start: 2024-03-01 | End: 2024-03-04

## 2024-03-01 RX ORDER — TIZANIDINE 2 MG/1
2 TABLET ORAL EVERY 8 HOURS PRN
Qty: 30 TABLET | Refills: 0 | Status: SHIPPED | OUTPATIENT
Start: 2024-03-01 | End: 2024-03-11

## 2024-03-01 RX ORDER — DOXYCYCLINE HYCLATE 100 MG
100 TABLET ORAL EVERY 12 HOURS
Qty: 14 TABLET | Refills: 0 | Status: SHIPPED | OUTPATIENT
Start: 2024-03-01 | End: 2024-03-08

## 2024-03-01 RX ORDER — OXYCODONE HYDROCHLORIDE 5 MG/1
5 TABLET ORAL EVERY 6 HOURS PRN
Qty: 28 TABLET | Refills: 0
Start: 2024-03-01 | End: 2024-03-01 | Stop reason: HOSPADM

## 2024-03-01 RX ADMIN — IPRATROPIUM BROMIDE AND ALBUTEROL SULFATE 3 ML: .5; 3 SOLUTION RESPIRATORY (INHALATION) at 07:03

## 2024-03-01 RX ADMIN — FUROSEMIDE 40 MG: 10 INJECTION, SOLUTION INTRAVENOUS at 08:03

## 2024-03-01 RX ADMIN — IPRATROPIUM BROMIDE AND ALBUTEROL SULFATE 3 ML: .5; 3 SOLUTION RESPIRATORY (INHALATION) at 01:03

## 2024-03-01 RX ADMIN — DOXYCYCLINE HYCLATE 100 MG: 100 TABLET, COATED ORAL at 08:03

## 2024-03-01 RX ADMIN — METOPROLOL SUCCINATE 25 MG: 25 TABLET, EXTENDED RELEASE ORAL at 08:03

## 2024-03-01 RX ADMIN — Medication 10 ML: at 06:03

## 2024-03-01 RX ADMIN — Medication 10 ML: at 12:03

## 2024-03-01 RX ADMIN — TUBERCULIN PURIFIED PROTEIN DERIVATIVE 5 UNITS: 5 INJECTION, SOLUTION INTRADERMAL at 08:03

## 2024-03-01 RX ADMIN — LEVOTHYROXINE SODIUM 50 MCG: 50 TABLET ORAL at 06:03

## 2024-03-01 NOTE — PLAN OF CARE
Pt educated about activity, diet, medications, follow up appointment with MD, and adverse S/S. Pt instructed to come to ER for any acute distress. Patient was given discharge instructions and verbalized understanding. IV was discontinued. Pt denies any acute distress at this time.

## 2024-03-01 NOTE — PLAN OF CARE
LOCET for nursing home admissions is completed. PASRR faxed to the Office of Aging and Adult Services for Review

## 2024-03-01 NOTE — PT/OT/SLP PROGRESS
"Physical Therapy Treatment    Patient Name:  Richard Farr   MRN:  [id number]    Recommendations:     Discharge Recommendations: High Intensity Therapy  Discharge Equipment Recommendations: other (see comments) (TBD)  Barriers to discharge: Decreased caregiver support and ongoing medical treatment    Assessment:     Richard Farr is a 78 y.o. male admitted with a medical diagnosis of Colovesical fistula.  He presents with the following impairments/functional limitations: weakness, impaired endurance, impaired self care skills, impaired functional mobility, gait instability, decreased upper extremity function, decreased lower extremity function, decreased safety awareness, impaired fine motor, impaired cardiopulmonary response to activity. These limitations are decreasing the patient's functional mobility and independence.    Patient tolerated treatment well overall. He has moderate LUE weakness from a prior episode of CVA and required min-mod assistance with bed mobility and STS transfer and ambulated with CGA for 115 ft with a RW with signs/ symptoms of SOB. His SpO2 upon exertion was 80% on 4L O2 via nasal cannula and it returned to 95% with seated rest. The patient was left sitting upright in a recliner chair and instructed to perform LE exercise program. Nurse notified.    Rehab Prognosis: Fair; patient would benefit from acute skilled PT services to address these deficits and reach maximum level of function.    Recent Surgery: Procedure(s) (LRB):  COLECTOMY, LEFT, LAPAROSCOPIC WITH GASTROSTOMY TUBE EXCHANGE (Left) 9 Days Post-Op    Plan:     During this hospitalization, patient to be seen 5 x/week to address the identified rehab impairments via gait training, therapeutic activities, therapeutic exercises, neuromuscular re-education and progress toward the following goals:    Plan of Care Expires:  03/08/24    Subjective     Chief Complaint: "I'm getting tired"  Patient/Family Comments/goals: "I'll sit up and do " "my exercises for a while"  Pain/Comfort:  Pain Rating 1: 0/10      Objective:     Communicated with nurse, patient, and spouse prior to session.  Patient found supine with peripheral IV, telemetry, vilchis catheter, PEG Tube, oxygen, central line upon PT entry to room.     General Precautions: Standard, fall  Orthopedic Precautions: N/A  Braces: N/A  Respiratory Status: Nasal cannula, flow 3.5 L/min     Functional Mobility:  Bed Mobility:     Rolling Left:  stand by assistance  Rolling Right: stand by assistance  Scooting: stand by assistance  Supine to Sit: minimum to moderate assistance for trunk management   Transfers:     Sit to Stand:  minimum to moderate assistance with rolling walker  Gait: 115 ft with RW and CGA      AM-PAC 6 CLICK MOBILITY  Turning over in bed (including adjusting bedclothes, sheets and blankets)?: 3  Sitting down on and standing up from a chair with arms (e.g., wheelchair, bedside commode, etc.): 3  Moving from lying on back to sitting on the side of the bed?: 3  Moving to and from a bed to a chair (including a wheelchair)?: 3  Need to walk in hospital room?: 3  Climbing 3-5 steps with a railing?: 3 (clinical judgment)  Basic Mobility Total Score: 18       Treatment & Education:  Bed mobility  Transfers  Gait  Therapeutic exercise x 30 each   Seated marching BLE   Seated LAQ BLE   Seated ankle pumps BLE  Patient educated on role of acute care PT, POC, importance of OOB mobility, transfer safety, and call bell usage.     Patient left up in chair with all lines intact, call button in reach, nurse notified, and spouse present..    GOALS:   Multidisciplinary Problems       Physical Therapy Goals          Problem: Physical Therapy    Goal Priority Disciplines Outcome Goal Variances Interventions   Physical Therapy Goal     PT, PT/OT Ongoing, Progressing     Description: Goals to be met by: 3/8/2024    Patient will increase functional independence with mobility by performin. Supine to sit " with Modified Independent.  2. Sit to supine with Modified Independent.  3. Bed to chair transfer with Supervision or Set-up Assistance with rolling walker using Step Transfer technique.  4. Sit to Stand with Supervision or Set-up Assistance with rolling walker.  5. Gait  x 300  feet with Supervision or Set-up Assistance with rolling walker.  6. Lower extremity exercise program x10 reps.                        Time Tracking:     PT Received On: 03/01/24  PT Start Time: 0926     PT Stop Time: 0951  PT Total Time (min): 25 min     Billable Minutes: Gait Training 15 and Therapeutic Activity 10    Treatment Type: Treatment  PT/PTA: PT     Number of PTA visits since last PT visit: 0     03/01/2024

## 2024-03-01 NOTE — PLAN OF CARE
03/01/24 1506   Medicare Message   Important Message from Medicare regarding Discharge Appeal Rights Given to patient/caregiver;Explained to patient/caregiver;Other (comments)  (Patient unable to write, hand weakness)     Patient awake, explained Medicare IM appeal rights.  Copy of Medicare Important Message handed to patient, he cannot sign due to weakness of hand.

## 2024-03-01 NOTE — PLAN OF CARE
Problem: Adult Inpatient Plan of Care  Goal: Plan of Care Review  Outcome: Met  Goal: Patient-Specific Goal (Individualized)  Outcome: Met  Goal: Absence of Hospital-Acquired Illness or Injury  Outcome: Met  Goal: Optimal Comfort and Wellbeing  Outcome: Met  Goal: Readiness for Transition of Care  Outcome: Met     Problem: Fluid and Electrolyte Imbalance (Acute Kidney Injury/Impairment)  Goal: Fluid and Electrolyte Balance  Outcome: Met     Problem: Oral Intake Inadequate (Acute Kidney Injury/Impairment)  Goal: Optimal Nutrition Intake  Outcome: Met     Problem: Renal Function Impairment (Acute Kidney Injury/Impairment)  Goal: Effective Renal Function  Outcome: Met     Problem: Infection  Goal: Absence of Infection Signs and Symptoms  Outcome: Met     Problem: Fall Injury Risk  Goal: Absence of Fall and Fall-Related Injury  Outcome: Met     Problem: Skin Injury Risk Increased  Goal: Skin Health and Integrity  Outcome: Met     Problem: Gas Exchange Impaired  Goal: Optimal Gas Exchange  Outcome: Met     Problem: Behavioral Health Comorbidity  Goal: Maintenance of Behavioral Health Symptom Control  Outcome: Met     Problem: Heart Failure Comorbidity  Goal: Maintenance of Heart Failure Symptom Control  Outcome: Met     Problem: Hypertension Comorbidity  Goal: Blood Pressure in Desired Range  Outcome: Met     Problem: Pain Chronic (Persistent) (Comorbidity Management)  Goal: Acceptable Pain Control and Functional Ability  Outcome: Met     Problem: Aspiration (Enteral Nutrition)  Goal: Absence of Aspiration Signs and Symptoms  Outcome: Met     Problem: Device-Related Complication Risk (Enteral Nutrition)  Goal: Safe, Effective Therapy Delivery  Outcome: Met     Problem: Feeding Intolerance (Enteral Nutrition)  Goal: Feeding Tolerance  Outcome: Met

## 2024-03-01 NOTE — PLAN OF CARE
Problem: Physical Therapy  Goal: Physical Therapy Goal  Description: Goals to be met by: 3/8/2024    Patient will increase functional independence with mobility by performin. Supine to sit with Modified Independent.  2. Sit to supine with Modified Independent.  3. Bed to chair transfer with Supervision or Set-up Assistance with rolling walker using Step Transfer technique.  4. Sit to Stand with Supervision or Set-up Assistance with rolling walker.  5. Gait  x 300  feet with Supervision or Set-up Assistance with rolling walker.  6. Lower extremity exercise program x10 reps.     Outcome: Ongoing, Progressing

## 2024-03-01 NOTE — SUBJECTIVE & OBJECTIVE
Interval History:   Patient seen and examined.  Transferred to floor this afternoon.  Sitting in chair.  Alert and oriented on NC.  Tolerating regular diet.  Voices no other complaints.      Medications:  Continuous Infusions:  Scheduled Meds:   albuterol-ipratropium  3 mL Nebulization Q6H WAKE    atorvastatin  80 mg Oral QHS    doxycycline  100 mg Oral Q12H    enoxparin  40 mg Subcutaneous Q24H (prophylaxis, 1700)    furosemide (LASIX) injection  40 mg Intravenous Q12H    levothyroxine  50 mcg Oral Before breakfast    metoprolol succinate  25 mg Oral BID    sodium chloride 0.9%  10 mL Intravenous Q6H    tuberculin  5 Units Intradermal Once     PRN Meds:sodium chloride 0.9%, acetaminophen, LIDOcaine (PF) 10 mg/ml (1%), melatonin, methocarbamoL, morphine, ondansetron, oxyCODONE, sodium chloride 0.9%, Flushing PICC/Midline Protocol **AND** sodium chloride 0.9% **AND** sodium chloride 0.9%, traZODone     Review of patient's allergies indicates:   Allergen Reactions    Ativan [lorazepam]      Adverse reaction.     Objective:     Vital Signs (Most Recent):  Temp: 98 °F (36.7 °C) (03/01/24 0745)  Pulse: 90 (03/01/24 0752)  Resp: 20 (03/01/24 0752)  BP: (!) 157/72 (03/01/24 0745)  SpO2: (!) 94 % (03/01/24 0752) Vital Signs (24h Range):  Temp:  [97.4 °F (36.3 °C)-98.1 °F (36.7 °C)] 98 °F (36.7 °C)  Pulse:  [68-90] 90  Resp:  [19-36] 20  SpO2:  [93 %-100 %] 94 %  BP: (117-177)/(59-85) 157/72     Weight: 76.7 kg (169 lb 1.5 oz)  Body mass index is 25.71 kg/m².    Intake/Output - Last 3 Shifts         02/28 0700 02/29 0659 02/29 0700 03/01 0659 03/01 0700 03/02 0659    P.O.  260     I.V. (mL/kg) 298 (3.9) 371.6 (4.8)     NG/GT 1224 2022     IV Piggyback 462.1 94.9     Total Intake(mL/kg) 1984.1 (25.9) 2748.4 (35.8)     Urine (mL/kg/hr) 2800 (1.5) 2700 (1.5)     Stool 1 1     Total Output 2801 2701     Net -816.9 +47.4            Stool Occurrence 4 x 3 x              Physical Exam  Vitals reviewed.   Constitutional:        General: He is not in acute distress.     Appearance: He is not ill-appearing or toxic-appearing.   Cardiovascular:      Rate and Rhythm: Normal rate and regular rhythm.   Pulmonary:      Effort: Pulmonary effort is normal. No respiratory distress.   Abdominal:      General: There is no distension.      Palpations: Abdomen is soft.      Tenderness: There is no abdominal tenderness. There is no guarding or rebound.      Comments: Abdominal incisions C/D/I with staples in place    Musculoskeletal:      Right lower leg: No edema.      Left lower leg: No edema.   Skin:     General: Skin is warm and dry.      Capillary Refill: Capillary refill takes less than 2 seconds.   Neurological:      Mental Status: He is alert. Mental status is at baseline.          Significant Labs:  I have reviewed all pertinent lab results within the past 24 hours.  CBC:   Recent Labs   Lab 03/01/24  0510   WBC 10.13   RBC 3.32*   HGB 9.5*   HCT 30.6*      MCV 92   MCH 28.6   MCHC 31.0*       CMP:   Recent Labs   Lab 03/01/24  0510   *   CALCIUM 9.0   ALBUMIN 2.7*      K 4.3   CO2 35*      BUN 15   CREATININE 0.4*         Significant Diagnostics:  I have reviewed all pertinent imaging results/findings within the past 24 hours.  Review of Systems   Constitutional:  Positive for fatigue. Negative for chills and fever.   HENT:  Negative for rhinorrhea, sneezing and sore throat.    Eyes:  Negative for pain and visual disturbance.   Respiratory:  Negative for cough and shortness of breath.    Cardiovascular:  Negative for chest pain.   Gastrointestinal:  Negative for abdominal pain, constipation and diarrhea.   Endocrine: Negative for cold intolerance and heat intolerance.   Genitourinary:  Negative for difficulty urinating.   Musculoskeletal:  Positive for gait problem and myalgias. Negative for arthralgias and joint swelling.   Skin:  Negative for rash.   Allergic/Immunologic: Negative for environmental allergies.    Neurological:  Negative for dizziness, syncope and weakness.   Hematological:  Does not bruise/bleed easily.   Psychiatric/Behavioral:  Positive for confusion. Negative for dysphoric mood. The patient is not nervous/anxious.

## 2024-03-01 NOTE — DISCHARGE SUMMARY
[facility]  General Surgery  Discharge Summary      Patient Name: Richard Farr  MRN: [id number]  Admission Date: 2/21/2024  Hospital Length of Stay: 9 days  Discharge Date and Time:  03/01/2024 2:50 PM  Attending Physician: Queenie Berman MD   Discharging Provider: Queenie Berman MD  Primary Care Provider: Drew Moreira Jr., MD    HPI:   79yo male with colovesical fistula who presents for fistula take down and sigmoid resection.      Procedure(s) (LRB):  COLECTOMY, LEFT, LAPAROSCOPIC WITH GASTROSTOMY TUBE EXCHANGE (Left)      Indwelling Lines/Drains at time of discharge:   Lines/Drains/Airways       Drain  Duration                  Urethral Catheter 02/21/24 1020 Silicone 16 Fr. 9 days         Gastrostomy/Enterostomy 02/21/24 1534 Gastrostomy tube w/ balloon LUQ feeding 8 days                  Hospital Course: 02/23 CP: Colovesical fistula s/p fistula take down and sigmoid resection POD#2. Pt is tolerating CLD with + BS, passing flatus, and 3 small bms. Reports pain is controlled. PT OT consulted for early mobilization. Urine culture resulted, on IV Zosyn.  02/26 CP: Colovesical fistula s/p fistula take down and sigmoid resection POD#5. Pt experienced increased O2 requirements with respiratory acidosis and hypoxia. On bipap. Reports pain is controlled. Limited po intake.  02/27 CP: Colovesical fistula s/p fistula take down and sigmoid resection POD#6. Surgical dressings removed, staples to incision sites intact. Pt being weaned off bipap, on supplemental O2 via NC. Responding well to current abx regimen, afebrile and WBC trending down. Diuresing well after Lasix dose increased. Encouraged good po intake. Appreciate internal medicine and dietary recs.  02/28 CP: Colovesical fistula s/p fistula take down and sigmoid resection POD#7. Surgical dressing changed-iodine paint and mepilex. Multiple bms yesterday after starting bolus TF. On 4 NC. Runs of SVT noted. Discharge planning to IRF.  2/29:  Improvement with PM BiPAP.  HD stable.  James removed.  Tolerating soft diet.  OK for discharge to SNF.     Goals of Care Treatment Preferences:  Code Status: Full Code      Consults:   Consults (From admission, onward)          Status Ordering Provider     Inpatient consult to Social Work/Case Management  Once        Provider:  (Not yet assigned)    Completed DREW MOREIRA JR     Inpatient consult to Cardiology  Once        Provider:  Vaughn Yanez MD    Completed DREW MOREIRA JR     Inpatient consult to Midline team  Once        Provider:  (Not yet assigned)    Acknowledged OLAMIDE DOS SANTOS     Inpatient consult to Social Work/Case Management  Once        Provider:  (Not yet assigned)    Acknowledged MELBA LUCERO     Inpatient consult to Registered Dietitian/Nutritionist  Once        Provider:  (Not yet assigned)    Completed MELBA LUCERO     Inpatient consult to Internal Medicine  Once        Provider:  Drew Moreira Jr., MD    Acknowledged MELBA LUCERO            Significant Diagnostic Studies: see above    Pending Diagnostic Studies:       None          Final Active Diagnoses:    Diagnosis Date Noted POA    SVT (supraventricular tachycardia) [I47.10] 02/28/2024 No    Impaired mobility and ADLs [Z74.09, Z78.9] 02/28/2024 Yes    Malnutrition [E46] 02/28/2024 Unknown    Inadequate nutrition [E63.9] 02/26/2024 No    On deep vein thrombosis (DVT) prophylaxis [Z79.899] 02/23/2024 Not Applicable    Bilateral pleural effusion [J90] 01/31/2021 Yes    History of stroke [Z86.73] 12/10/2020 Not Applicable     Chronic      Problems Resolved During this Admission:    Diagnosis Date Noted Date Resolved POA    PRINCIPAL PROBLEM:  Colovesical fistula [N32.1] 01/08/2024 03/01/2024 Yes    Respiratory acidosis [E87.29] 02/25/2024 03/01/2024 No    Hypophosphatemia [E83.39] 02/23/2024 03/01/2024 Yes    Hypokalemia [E87.6] 09/24/2023 03/01/2024 Yes    Acute cystitis without hematuria [N30.00] 05/05/2023  03/01/2024 Yes      Discharged Condition: good    Disposition: Skilled Nursing Facility    Follow Up:   Contact information for after-discharge care       Destination       [facility] .    Service: Skilled Nursing  Contact information:  [address]  [facility] [address]  [phone]                     Home Medical Care       [facility] .    Services: Home Health Services, Home Nursing  Contact information:  [address]  [facility] [address]  [phone]                                 Patient Instructions:      BIPAP FOR HOME USE     Order Specific Question Answer Comments   Length of need (1-99 months): 2    Type:  AVAPS    Min IPAP: cmH20 (4-30): 10    Max IPAP: cmH20 (4-30): 22    EPAP: cmH20 (4-25): 6    Tidal Volume (Vte): mL (200-1500mL): 450    *Breath Rate: BPM (Off, 1-30) 16    Humidification (): Heated yes   Choose ONE mask type and its corresponding cushions and/or pillows:  Full Face Mask, 1 per 90 days:  Full Face Cushion, (3 per 90 days)    Choose EITHER Heated or Non-Heated Tubjing  Non-Heated Tubing, 1 per 90 days    All other supplies as needed as listed below:  Headgear, 1 per 180 days    All other supplies as needed as listed below:  Non-Disposable Filter, 1 per 180 days      Diet Adult Regular     Lifting restrictions   Order Comments: No lifting, pushing, or pulling greater than 10lbs for 6 weeks to reduce risk of hernia   You may return to work in one week if you are able to adhere to the lifting restriction above  If you are unable to perform your regular duties with the restrictions, please contact Dr. Berman's office     Notify your health care provider if you experience any of the following:  temperature >100.4     Notify your health care provider if you experience any of the following:  persistent nausea and vomiting or diarrhea     Notify your health care provider if you  experience any of the following:  severe uncontrolled pain     Notify your health care provider if you experience any of the following:  redness, tenderness, or signs of infection (pain, swelling, redness, odor or green/yellow discharge around incision site)     No dressing needed   Order Comments: Cleanse midline wounds daily  Shower daily and allow soapy water to run over incisions  Staples to be removed by Dr. Dos Santos in clinic  Do not scrub or submerge in water for two weeks     Activity as tolerated     Medications:  Reconciled Home Medications:      Medication List        START taking these medications      doxycycline 100 MG tablet  Commonly known as: VIBRA-TABS  Take 1 tablet (100 mg total) by mouth every 12 (twelve) hours. for 7 days     oxyCODONE 5 MG immediate release tablet  Commonly known as: ROXICODONE  Take 1 tablet (5 mg total) by mouth every 6 (six) hours as needed for Pain.     tiZANidine 2 MG tablet  Commonly known as: ZANAFLEX  Take 1 tablet (2 mg total) by mouth every 8 (eight) hours as needed (muscle spasms).            CONTINUE taking these medications      aspirin 81 MG Chew  Take 81 mg by mouth. OK TO CONTINUE PER DR. DOS SANTOS     atorvastatin 80 MG tablet  Commonly known as: LIPITOR  TAKE 1 TABLET BY MOUTH EVERY EVENING     ezetimibe 10 mg tablet  Commonly known as: ZETIA  Take 10 mg by mouth once daily.     fenofibrate 145 MG tablet  Commonly known as: TRICOR  Take 145 mg by mouth once daily.     furosemide 40 MG tablet  Commonly known as: LASIX  Take 0.5 tablets (20 mg total) by mouth once daily.     KLOR-CON 20 mEq Pack  Generic drug: potassium chloride  GIVE 2 PACKS BY PER NG TUBE ROUTE ONCE DAILY FOR 30 DOSES     levothyroxine 50 MCG tablet  Commonly known as: SYNTHROID  TAKE 1 TABLET BY MOUTH EVERY DAY BEFORE BREAKFAST.     metoprolol succinate 25 MG 24 hr tablet  Commonly known as: TOPROL-XL  TAKE 1/2 TABLET BY MOUTH EVERY DAY     omeprazole 40 MG capsule  Commonly known as:  PRILOSEC  Take 1 capsule (40 mg total) by mouth once daily.            Time spent on the discharge of patient: 7 minutes    Queenie Berman MD  General Surgery  [facility]

## 2024-03-01 NOTE — ASSESSMENT & PLAN NOTE
* Colovesical fistula  POD#4 s/p lap sig colectomy with fistula takedown  -CLD via g tube   -passing flatus   -complicated now with respiratory acidosis and hypoxia   -Multimodal, opioid sparing pain control   -Lovenox      02/26 CP: POD#5 s/p lap sig colectomy with fistula takedown  Full liquid diet via g tube  Passing flatus  Continue multimodal pain management   PT / OT  Lovenox     02/27 CP: POD#6 s/p lap sig colectomy with fistula takedown  Surgical dressings removed, staples to incision sites intact-erythema to bottom midline incision in abdominal fold-wound cleanser applied and patted dry, keep area dry  Continue full liquid diet, may give po  Continue multimodal pain management  PT / OT  Passing flatus       Continue treatment per GS recommendations.  POD 9

## 2024-03-01 NOTE — PROGRESS NOTES
[facility] Medicine  Progress Note    Patient Name: Richard Farr  MRN: [id number]  Patient Class: IP- Inpatient   Admission Date: 2/21/2024  Length of Stay: 9 days  Attending Physician: Queenie Berman MD  Primary Care Provider: Drew Moreira Jr., MD        Subjective:     Principal Problem:Colovesical fistula        HPI:  History of Present Illness:  77yo male with colovesical fistula who presents for fistula take down and sigmoid resection.        Hospital Course:  02/23 CP: Colovesical fistula s/p fistula take down and sigmoid resection POD#2. Pt is tolerating CLD with + BS, passing flatus, and 3 small bms. Reports pain is controlled. PT OT consulted for early mobilization. Urine culture resulted, on IV Zosyn.  02/26 CP: Colovesical fistula s/p fistula take down and sigmoid resection POD#5. Pt experienced increased O2 requirements with respiratory acidosis and hypoxia. On bipap. Reports pain is controlled. Limited po intake.  02/27 CP: Colovesical fistula s/p fistula take down and sigmoid resection POD#6. Surgical dressings removed, staples to incision sites intact. Pt being weaned off bipap, on supplemental O2 via NC. Responding well to current abx regimen, afebrile and WBC trending down. Diuresing well after Lasix dose increased. Encouraged good po intake. Appreciate internal medicine and dietary recs.    Patient diuresed well overnight.  Wean bipap as tolerated.     Overview/Hospital Course:  2/27/24:  consulted for volume overload post op.  Patient responding to higher doses of lasix.  Wean bipap as tolerated.      2/28/24: Patient more tachypneic per Respiratory therapy.  ABG reveals hypoventilation with hypoxemia.  Patient placed back on AVAPS.  Episodes of SVT.  Cardiology consulted.  Transitioned to ICU for closer monitoring.    02/29/2024: Patient doing well since transitioned to the ICU.  Tolerating BiPAP with significant improvement in vitals.  Appreciate Cardiology input.   Metoprolol has been increased.  Will continue to wean BiPAP as tolerated.    03/01/2024:  No acute events overnight.  Patient tolerating nasal cannula this morning.  Appears much more comfortable.  Diuresing nicely.  Discussed with case management the pursuit of skilled nursing at time of discharge.    Interval History:   Patient seen and examined.  Transferred to floor this afternoon.  Sitting in chair.  Alert and oriented on NC.  Tolerating regular diet.  Voices no other complaints.      Medications:  Continuous Infusions:  Scheduled Meds:   albuterol-ipratropium  3 mL Nebulization Q6H WAKE    atorvastatin  80 mg Oral QHS    doxycycline  100 mg Oral Q12H    enoxparin  40 mg Subcutaneous Q24H (prophylaxis, 1700)    furosemide (LASIX) injection  40 mg Intravenous Q12H    levothyroxine  50 mcg Oral Before breakfast    metoprolol succinate  25 mg Oral BID    sodium chloride 0.9%  10 mL Intravenous Q6H    tuberculin  5 Units Intradermal Once     PRN Meds:sodium chloride 0.9%, acetaminophen, LIDOcaine (PF) 10 mg/ml (1%), melatonin, methocarbamoL, morphine, ondansetron, oxyCODONE, sodium chloride 0.9%, Flushing PICC/Midline Protocol **AND** sodium chloride 0.9% **AND** sodium chloride 0.9%, traZODone     Review of patient's allergies indicates:   Allergen Reactions    Ativan [lorazepam]      Adverse reaction.     Objective:     Vital Signs (Most Recent):  Temp: 98 °F (36.7 °C) (03/01/24 0745)  Pulse: 90 (03/01/24 0752)  Resp: 20 (03/01/24 0752)  BP: (!) 157/72 (03/01/24 0745)  SpO2: (!) 94 % (03/01/24 0752) Vital Signs (24h Range):  Temp:  [97.4 °F (36.3 °C)-98.1 °F (36.7 °C)] 98 °F (36.7 °C)  Pulse:  [68-90] 90  Resp:  [19-36] 20  SpO2:  [93 %-100 %] 94 %  BP: (117-177)/(59-85) 157/72     Weight: 76.7 kg (169 lb 1.5 oz)  Body mass index is 25.71 kg/m².    Intake/Output - Last 3 Shifts         02/28 0700 02/29 0659 02/29 0700 03/01 0659 03/01 0700 03/02 0659    P.O.  260     I.V. (mL/kg) 298 (3.9) 371.6 (4.8)      NG/GT 1224 2022     IV Piggyback 462.1 94.9     Total Intake(mL/kg) 1984.1 (25.9) 2748.4 (35.8)     Urine (mL/kg/hr) 2800 (1.5) 2700 (1.5)     Stool 1 1     Total Output 2801 2701     Net -816.9 +47.4            Stool Occurrence 4 x 3 x             Physical Exam  Vitals reviewed.   Constitutional:       General: He is not in acute distress.     Appearance: He is not ill-appearing or toxic-appearing.   Cardiovascular:      Rate and Rhythm: Normal rate and regular rhythm.   Pulmonary:      Effort: Pulmonary effort is normal. No respiratory distress.   Abdominal:      General: There is no distension.      Palpations: Abdomen is soft.      Tenderness: There is no abdominal tenderness. There is no guarding or rebound.      Comments: Abdominal incisions C/D/I with staples in place    Musculoskeletal:      Right lower leg: No edema.      Left lower leg: No edema.   Skin:     General: Skin is warm and dry.      Capillary Refill: Capillary refill takes less than 2 seconds.   Neurological:      Mental Status: He is alert. Mental status is at baseline.          Significant Labs:  I have reviewed all pertinent lab results within the past 24 hours.  CBC:   Recent Labs   Lab 03/01/24  0510   WBC 10.13   RBC 3.32*   HGB 9.5*   HCT 30.6*      MCV 92   MCH 28.6   MCHC 31.0*       CMP:   Recent Labs   Lab 03/01/24  0510   *   CALCIUM 9.0   ALBUMIN 2.7*      K 4.3   CO2 35*      BUN 15   CREATININE 0.4*         Significant Diagnostics:  I have reviewed all pertinent imaging results/findings within the past 24 hours.  Review of Systems   Constitutional:  Positive for fatigue. Negative for chills and fever.   HENT:  Negative for rhinorrhea, sneezing and sore throat.    Eyes:  Negative for pain and visual disturbance.   Respiratory:  Negative for cough and shortness of breath.    Cardiovascular:  Negative for chest pain.   Gastrointestinal:  Negative for abdominal pain, constipation and diarrhea.   Endocrine:  Negative for cold intolerance and heat intolerance.   Genitourinary:  Negative for difficulty urinating.   Musculoskeletal:  Positive for gait problem and myalgias. Negative for arthralgias and joint swelling.   Skin:  Negative for rash.   Allergic/Immunologic: Negative for environmental allergies.   Neurological:  Negative for dizziness, syncope and weakness.   Hematological:  Does not bruise/bleed easily.   Psychiatric/Behavioral:  Positive for confusion. Negative for dysphoric mood. The patient is not nervous/anxious.        Assessment/Plan:      * Colovesical fistula  * Colovesical fistula  POD#4 s/p lap sig colectomy with fistula takedown  -CLD via g tube   -passing flatus   -complicated now with respiratory acidosis and hypoxia   -Multimodal, opioid sparing pain control   -Lovenox      02/26 CP: POD#5 s/p lap sig colectomy with fistula takedown  Full liquid diet via g tube  Passing flatus  Continue multimodal pain management   PT / OT  Lovenox     02/27 CP: POD#6 s/p lap sig colectomy with fistula takedown  Surgical dressings removed, staples to incision sites intact-erythema to bottom midline incision in abdominal fold-wound cleanser applied and patted dry, keep area dry  Continue full liquid diet, may give po  Continue multimodal pain management  PT / OT  Passing flatus       Continue treatment per GS recommendations.  POD 9      Malnutrition  Patient has protein malnutrition.  Last trend as follows-   Lab Results   Component Value Date    ALBUMIN 2.7 (L) 03/01/2024    ALBUMIN 2.4 (L) 02/29/2024    ALBUMIN 2.6 (L) 02/28/2024           Impaired mobility and ADLs  Continue rehab services.  Consider skilled nursing versus inpatient rehab at time of discharge.      SVT (supraventricular tachycardia)  Metoprolol increased.  Appreciate cardiology input.      Inadequate nutrition    Diet via peg tube  Patient has protein malnutrition.  Last trend as follows-   Lab Results   Component Value Date    ALBUMIN 2.7 (L)  03/01/2024    ALBUMIN 2.4 (L) 02/29/2024    ALBUMIN 2.6 (L) 02/28/2024           Respiratory acidosis  Lasix increased.  Wean bipap as tolerated.     2/28:  respiratory decompensation now on AVAPS.  2/29: Patient has done well with AVAPS overnight.  Blood pressure has stabilized.  Patient appears comfortable.  Will wean as tolerated.    3/1: Tolerating BiPAP at night only at this time.  Now on nasal cannula appears much more comfortable.      Hypophosphatemia  Patient has Abnormal Phosphorus: hypophosphatemia. Will continue to monitor electrolytes closely. Will replace the affected electrolytes and repeat labs to be done after interventions completed. The patient's phosphorus results have been reviewed and are listed below.  Recent Labs   Lab 03/01/24  0510   PHOS 3.4          On deep vein thrombosis (DVT) prophylaxis  lovenox      Hypokalemia  Patient has hypokalemia which is Acute and currently uncontrolled. Most recent potassium levels reviewed-   Lab Results   Component Value Date    K 4.3 03/01/2024   . Will continue potassium replacement per protocol and recheck repeat levels after replacement completed.     Keep K greater than 4    Acute cystitis without hematuria  Secondary to fistula.  Continue rocephin for klebsiella.     Patient has a leukocytosis.  Last trend as follows-   Lab Results   Component Value Date    WBC 10.13 03/01/2024    WBC 11.69 02/29/2024    WBC 10.64 02/28/2024     Plan for 7 days of antimicrobial therapy.      Bilateral pleural effusion  Patient found to have small pleural effusion on imaging. I have personally reviewed and interpreted the following imaging: Xray. A thoracentesis was deferred. Most likely etiology includes Volume overload not due to CHF. Management to include Diuresis      Volume overload:  Continue to monitor strict intake and output.    I/O last 3 completed shifts:  In: 4158.6 [P.O.:260; I.V.:659.6; NG/GT:2682; IV Piggyback:557]  Out: 4501 [Urine:4500; Stool:1]  No  intake/output data recorded.     Volume status continues to improve.  Continue strict I's and O's    History of stroke  Patient debilitated at baseline. PT/OT.  Skilled at dc.       VTE Risk Mitigation (From admission, onward)           Ordered     enoxaparin injection 40 mg  Every 24 hours         02/22/24 0947     IP VTE HIGH RISK PATIENT  Once         02/21/24 1622     Place sequential compression device  Until discontinued         02/21/24 1622                    Discharge Planning   JOSÉ:      Code Status: Full Code   Is the patient medically ready for discharge?:     Reason for patient still in hospital (select all that apply): Treatment and Pending disposition  Discharge Plan A: Rehab                  Drew Moreira Jr, MD  Department of Hospital Medicine   [facility]

## 2024-03-01 NOTE — ASSESSMENT & PLAN NOTE
Patient has protein malnutrition.  Last trend as follows-   Lab Results   Component Value Date    ALBUMIN 2.7 (L) 03/01/2024    ALBUMIN 2.4 (L) 02/29/2024    ALBUMIN 2.6 (L) 02/28/2024

## 2024-03-01 NOTE — ASSESSMENT & PLAN NOTE
Secondary to fistula.  Continue rocephin for klebsiella.     Patient has a leukocytosis.  Last trend as follows-   Lab Results   Component Value Date    WBC 10.13 03/01/2024    WBC 11.69 02/29/2024    WBC 10.64 02/28/2024     Plan for 7 days of antimicrobial therapy.

## 2024-03-01 NOTE — ASSESSMENT & PLAN NOTE
Lasix increased.  Wean bipap as tolerated.     2/28:  respiratory decompensation now on AVAPS.  2/29: Patient has done well with AVAPS overnight.  Blood pressure has stabilized.  Patient appears comfortable.  Will wean as tolerated.    3/1: Tolerating BiPAP at night only at this time.  Now on nasal cannula appears much more comfortable.

## 2024-03-01 NOTE — ASSESSMENT & PLAN NOTE
Patient has Abnormal Phosphorus: hypophosphatemia. Will continue to monitor electrolytes closely. Will replace the affected electrolytes and repeat labs to be done after interventions completed. The patient's phosphorus results have been reviewed and are listed below.  Recent Labs   Lab 03/01/24  0510   PHOS 3.4

## 2024-03-01 NOTE — PT/OT/SLP DISCHARGE
Physical Therapy Discharge Summary    Name: Richard Farr  MRN: [id number]   Principal Problem: Colovesical fistula     Patient Discharged from acute Physical Therapy on 3/1/2024.  Please refer to prior PT note dated  3/1/2024 for functional status.     Assessment:     Patient appropriate for care in another setting.    Objective:     GOALS:   Multidisciplinary Problems       Physical Therapy Goals          Problem: Physical Therapy    Goal Priority Disciplines Outcome Goal Variances Interventions   Physical Therapy Goal     PT, PT/OT Adequate for Care Transition     Description: Goals to be met by: 3/8/2024    Patient will increase functional independence with mobility by performin. Supine to sit with Modified Independent.  2. Sit to supine with Modified Independent.  3. Bed to chair transfer with Supervision or Set-up Assistance with rolling walker using Step Transfer technique.  4. Sit to Stand with Supervision or Set-up Assistance with rolling walker.  5. Gait  x 300  feet with Supervision or Set-up Assistance with rolling walker.  6. Lower extremity exercise program x10 reps.                        Reasons for Discontinuation of Therapy Services  Transfer to alternate level of care.      Plan:     Patient Discharged to: Skilled Nursing Facility.      3/1/2024

## 2024-03-01 NOTE — PLAN OF CARE
Problem: Physical Therapy  Goal: Physical Therapy Goal  Description: Goals to be met by: 3/8/2024    Patient will increase functional independence with mobility by performin. Supine to sit with Modified Independent.  2. Sit to supine with Modified Independent.  3. Bed to chair transfer with Supervision or Set-up Assistance with rolling walker using Step Transfer technique.  4. Sit to Stand with Supervision or Set-up Assistance with rolling walker.  5. Gait  x 300  feet with Supervision or Set-up Assistance with rolling walker.  6. Lower extremity exercise program x10 reps.   3/1/2024 1700 by Betty Lyman, PT  Outcome: Adequate for Care Transition

## 2024-03-01 NOTE — PLAN OF CARE
This patient is accepted for placement at [facility], per Catalino, with [facility]. The patient is requiring a BiPAP for night usage, and they are in the process of ordering the equipment. Ivy or Sukhdev plan to let me know if the will be able to take the patient today.

## 2024-03-01 NOTE — ASSESSMENT & PLAN NOTE
Patient found to have small pleural effusion on imaging. I have personally reviewed and interpreted the following imaging: Xray. A thoracentesis was deferred. Most likely etiology includes Volume overload not due to CHF. Management to include Diuresis      Volume overload:  Continue to monitor strict intake and output.    I/O last 3 completed shifts:  In: 4158.6 [P.O.:260; I.V.:659.6; NG/GT:2682; IV Piggyback:557]  Out: 4501 [Urine:4500; Stool:1]  No intake/output data recorded.     Volume status continues to improve.  Continue strict I's and O's

## 2024-03-01 NOTE — PLAN OF CARE
Problem: Adult Inpatient Plan of Care  Goal: Optimal Comfort and Wellbeing  Outcome: Ongoing, Not Progressing  Goal: Readiness for Transition of Care  Outcome: Ongoing, Not Progressing     Problem: Gas Exchange Impaired  Goal: Optimal Gas Exchange  Outcome: Ongoing, Not Progressing         Problem: Adult Inpatient Plan of Care  Goal: Plan of Care Review  Outcome: Ongoing, Progressing  Goal: Patient-Specific Goal (Individualized)  Outcome: Ongoing, Progressing  Goal: Absence of Hospital-Acquired Illness or Injury  Outcome: Ongoing, Progressing     Problem: Fluid and Electrolyte Imbalance (Acute Kidney Injury/Impairment)  Goal: Fluid and Electrolyte Balance  Outcome: Ongoing, Progressing     Problem: Oral Intake Inadequate (Acute Kidney Injury/Impairment)  Goal: Optimal Nutrition Intake  Outcome: Ongoing, Progressing     Problem: Renal Function Impairment (Acute Kidney Injury/Impairment)  Goal: Effective Renal Function  Outcome: Ongoing, Progressing     Problem: Infection  Goal: Absence of Infection Signs and Symptoms  Outcome: Ongoing, Progressing     Problem: Fall Injury Risk  Goal: Absence of Fall and Fall-Related Injury  Outcome: Ongoing, Progressing     Problem: Skin Injury Risk Increased  Goal: Skin Health and Integrity  Outcome: Ongoing, Progressing     Problem: Behavioral Health Comorbidity  Goal: Maintenance of Behavioral Health Symptom Control  Outcome: Ongoing, Progressing     Problem: Heart Failure Comorbidity  Goal: Maintenance of Heart Failure Symptom Control  Outcome: Ongoing, Progressing     Problem: Hypertension Comorbidity  Goal: Blood Pressure in Desired Range  Outcome: Ongoing, Progressing     Problem: Pain Chronic (Persistent) (Comorbidity Management)  Goal: Acceptable Pain Control and Functional Ability  Outcome: Ongoing, Progressing     Problem: Aspiration (Enteral Nutrition)  Goal: Absence of Aspiration Signs and Symptoms  Outcome: Ongoing, Progressing     Problem: Device-Related Complication  Risk (Enteral Nutrition)  Goal: Safe, Effective Therapy Delivery  Outcome: Ongoing, Progressing     Problem: Feeding Intolerance (Enteral Nutrition)  Goal: Feeding Tolerance  Outcome: Ongoing, Progressing

## 2024-03-01 NOTE — ASSESSMENT & PLAN NOTE
Diet via peg tube  Patient has protein malnutrition.  Last trend as follows-   Lab Results   Component Value Date    ALBUMIN 2.7 (L) 03/01/2024    ALBUMIN 2.4 (L) 02/29/2024    ALBUMIN 2.6 (L) 02/28/2024

## 2024-03-01 NOTE — ASSESSMENT & PLAN NOTE
Patient has hypokalemia which is Acute and currently uncontrolled. Most recent potassium levels reviewed-   Lab Results   Component Value Date    K 4.3 03/01/2024   . Will continue potassium replacement per protocol and recheck repeat levels after replacement completed.     Keep K greater than 4

## 2024-03-03 ENCOUNTER — HOSPITAL ENCOUNTER (INPATIENT)
Facility: HOSPITAL | Age: 79
LOS: 3 days | Discharge: NURSING, REHAB OR LONG-TERM CARE | DRG: 291 | End: 2024-03-06
Attending: EMERGENCY MEDICINE | Admitting: INTERNAL MEDICINE
Payer: MEDICARE

## 2024-03-03 DIAGNOSIS — J96.21 ACUTE ON CHRONIC RESPIRATORY FAILURE WITH HYPOXIA AND HYPERCAPNIA: ICD-10-CM

## 2024-03-03 DIAGNOSIS — E46 MALNUTRITION, UNSPECIFIED TYPE: ICD-10-CM

## 2024-03-03 DIAGNOSIS — J96.22 ACUTE ON CHRONIC RESPIRATORY FAILURE WITH HYPOXIA AND HYPERCAPNIA: ICD-10-CM

## 2024-03-03 DIAGNOSIS — E46 MALNUTRITION: ICD-10-CM

## 2024-03-03 DIAGNOSIS — I50.9 ACUTE ON CHRONIC CONGESTIVE HEART FAILURE, UNSPECIFIED HEART FAILURE TYPE: Primary | ICD-10-CM

## 2024-03-03 DIAGNOSIS — I50.32 CHRONIC HEART FAILURE WITH PRESERVED EJECTION FRACTION (HFPEF): ICD-10-CM

## 2024-03-03 DIAGNOSIS — N32.1 COLOVESICAL FISTULA: ICD-10-CM

## 2024-03-03 DIAGNOSIS — R06.02 SHORTNESS OF BREATH: ICD-10-CM

## 2024-03-03 DIAGNOSIS — I47.10 SVT (SUPRAVENTRICULAR TACHYCARDIA): ICD-10-CM

## 2024-03-03 LAB
ALBUMIN SERPL BCP-MCNC: 2.8 G/DL (ref 3.5–5.2)
ALP SERPL-CCNC: 87 U/L (ref 55–135)
ALT SERPL W/O P-5'-P-CCNC: 42 U/L (ref 10–44)
ANION GAP SERPL CALC-SCNC: 4 MMOL/L (ref 3–11)
AST SERPL-CCNC: 46 U/L (ref 10–40)
BASOPHILS # BLD AUTO: 0.08 K/UL (ref 0–0.2)
BASOPHILS NFR BLD: 0.5 % (ref 0–1.9)
BILIRUB SERPL-MCNC: 1.3 MG/DL (ref 0.1–1)
BIPAP: ABNORMAL
BUN SERPL-MCNC: 14 MG/DL (ref 8–23)
CALCIUM SERPL-MCNC: 8.5 MG/DL (ref 8.7–10.5)
CHLORIDE SERPL-SCNC: 106 MMOL/L (ref 95–110)
CO2 SERPL-SCNC: 29 MMOL/L (ref 23–29)
CREAT SERPL-MCNC: 0.5 MG/DL (ref 0.5–1.4)
DIFFERENTIAL METHOD BLD: ABNORMAL
EOSINOPHIL # BLD AUTO: 0.2 K/UL (ref 0–0.5)
EOSINOPHIL NFR BLD: 1.3 % (ref 0–8)
ERYTHROCYTE [DISTWIDTH] IN BLOOD BY AUTOMATED COUNT: 16.2 % (ref 11.5–14.5)
EST. GFR  (NO RACE VARIABLE): >60 ML/MIN/1.73 M^2
FIO2: 50 %
GLUCOSE SERPL-MCNC: 136 MG/DL (ref 70–110)
HCT VFR BLD AUTO: 31.3 % (ref 40–54)
HGB BLD-MCNC: 9.8 G/DL (ref 14–18)
IMM GRANULOCYTES # BLD AUTO: 0.12 K/UL (ref 0–0.04)
IMM GRANULOCYTES NFR BLD AUTO: 0.8 % (ref 0–0.5)
INR PPP: 1.2 (ref 0.8–1.2)
LYMPHOCYTES # BLD AUTO: 1.9 K/UL (ref 1–4.8)
LYMPHOCYTES NFR BLD: 13.3 % (ref 18–48)
MAGNESIUM SERPL-MCNC: 1.9 MG/DL (ref 1.6–2.6)
MCH RBC QN AUTO: 28.2 PG (ref 27–31)
MCHC RBC AUTO-ENTMCNC: 31.3 G/DL (ref 32–36)
MCV RBC AUTO: 90 FL (ref 82–98)
MODIFIED ALLEN'S TEST: ABNORMAL
MONOCYTES # BLD AUTO: 1.4 K/UL (ref 0.3–1)
MONOCYTES NFR BLD: 9.6 % (ref 4–15)
NEUTROPHILS # BLD AUTO: 10.9 K/UL (ref 1.8–7.7)
NEUTROPHILS NFR BLD: 74.5 % (ref 38–73)
NOTIFIED BY: ABNORMAL
NRBC BLD-RTO: 0 /100 WBC
NT-PROBNP SERPL-MCNC: ABNORMAL PG/ML (ref 5–1800)
O2DEVICE: ABNORMAL
PCO2 BLDA: 48.3 MMHG (ref 35–45)
PH SMN: 7.37 [PH] (ref 7.34–7.45)
PLATELET # BLD AUTO: 340 K/UL (ref 150–450)
PMV BLD AUTO: 10.6 FL (ref 9.2–12.9)
PO2 BLDA: 140 MMHG (ref 80–100)
POC BASE DEFICIT: 2.6 MMOL/L (ref -2–2)
POC HCO3: 26.3 MMOL/L (ref 24–28)
POC NOTIFIED NOTE: ABNORMAL
POC PERFORMED BY: ABNORMAL
POC SATURATED O2: 99.8 % (ref 95–100)
POC TEMPERATURE: 37 C
POTASSIUM SERPL-SCNC: 4 MMOL/L (ref 3.5–5.1)
PROT SERPL-MCNC: 6.6 G/DL (ref 6–8.4)
PROTHROMBIN TIME: 13.1 SEC (ref 9–12.5)
PROVIDER NOTIFIED: ABNORMAL
RBC # BLD AUTO: 3.48 M/UL (ref 4.6–6.2)
SODIUM SERPL-SCNC: 139 MMOL/L (ref 136–145)
SPECIMEN SOURCE: ABNORMAL
WBC # BLD AUTO: 14.6 K/UL (ref 3.9–12.7)

## 2024-03-03 PROCEDURE — 36415 COLL VENOUS BLD VENIPUNCTURE: CPT | Performed by: EMERGENCY MEDICINE

## 2024-03-03 PROCEDURE — 80053 COMPREHEN METABOLIC PANEL: CPT | Performed by: EMERGENCY MEDICINE

## 2024-03-03 PROCEDURE — 99900035 HC TECH TIME PER 15 MIN (STAT)

## 2024-03-03 PROCEDURE — 99900031 HC PATIENT EDUCATION (STAT)

## 2024-03-03 PROCEDURE — 11000001 HC ACUTE MED/SURG PRIVATE ROOM

## 2024-03-03 PROCEDURE — 94660 CPAP INITIATION&MGMT: CPT

## 2024-03-03 PROCEDURE — 36600 WITHDRAWAL OF ARTERIAL BLOOD: CPT

## 2024-03-03 PROCEDURE — 85025 COMPLETE CBC W/AUTO DIFF WBC: CPT | Performed by: EMERGENCY MEDICINE

## 2024-03-03 PROCEDURE — 5A09357 ASSISTANCE WITH RESPIRATORY VENTILATION, LESS THAN 24 CONSECUTIVE HOURS, CONTINUOUS POSITIVE AIRWAY PRESSURE: ICD-10-PCS | Performed by: EMERGENCY MEDICINE

## 2024-03-03 PROCEDURE — 83880 ASSAY OF NATRIURETIC PEPTIDE: CPT | Performed by: EMERGENCY MEDICINE

## 2024-03-03 PROCEDURE — 93005 ELECTROCARDIOGRAM TRACING: CPT

## 2024-03-03 PROCEDURE — 99291 CRITICAL CARE FIRST HOUR: CPT

## 2024-03-03 PROCEDURE — 83735 ASSAY OF MAGNESIUM: CPT | Performed by: EMERGENCY MEDICINE

## 2024-03-03 PROCEDURE — 94761 N-INVAS EAR/PLS OXIMETRY MLT: CPT | Mod: XB

## 2024-03-03 PROCEDURE — 93010 ELECTROCARDIOGRAM REPORT: CPT | Mod: ,,, | Performed by: INTERNAL MEDICINE

## 2024-03-03 PROCEDURE — 27000190 HC CPAP FULL FACE MASK W/VALVE

## 2024-03-03 PROCEDURE — 27100171 HC OXYGEN HIGH FLOW UP TO 24 HOURS

## 2024-03-03 PROCEDURE — 21400001 HC TELEMETRY ROOM

## 2024-03-03 PROCEDURE — 85610 PROTHROMBIN TIME: CPT | Performed by: EMERGENCY MEDICINE

## 2024-03-03 PROCEDURE — 82803 BLOOD GASES ANY COMBINATION: CPT

## 2024-03-03 RX ORDER — FUROSEMIDE 10 MG/ML
60 INJECTION INTRAMUSCULAR; INTRAVENOUS
Status: DISCONTINUED | OUTPATIENT
Start: 2024-03-04 | End: 2024-03-03

## 2024-03-04 PROBLEM — J96.21 ACUTE ON CHRONIC RESPIRATORY FAILURE WITH HYPOXIA AND HYPERCAPNIA: Status: ACTIVE | Noted: 2024-03-04

## 2024-03-04 PROBLEM — J96.22 ACUTE ON CHRONIC RESPIRATORY FAILURE WITH HYPOXIA AND HYPERCAPNIA: Status: ACTIVE | Noted: 2024-03-04

## 2024-03-04 LAB
OHS QRS DURATION: 112 MS
OHS QTC CALCULATION: 521 MS

## 2024-03-04 PROCEDURE — 63600175 PHARM REV CODE 636 W HCPCS: Performed by: EMERGENCY MEDICINE

## 2024-03-04 PROCEDURE — 97166 OT EVAL MOD COMPLEX 45 MIN: CPT

## 2024-03-04 PROCEDURE — 25000003 PHARM REV CODE 250: Performed by: INTERNAL MEDICINE

## 2024-03-04 PROCEDURE — 27100171 HC OXYGEN HIGH FLOW UP TO 24 HOURS

## 2024-03-04 PROCEDURE — 21400001 HC TELEMETRY ROOM

## 2024-03-04 PROCEDURE — 25000003 PHARM REV CODE 250: Performed by: EMERGENCY MEDICINE

## 2024-03-04 PROCEDURE — 94660 CPAP INITIATION&MGMT: CPT

## 2024-03-04 PROCEDURE — 97162 PT EVAL MOD COMPLEX 30 MIN: CPT

## 2024-03-04 PROCEDURE — 94761 N-INVAS EAR/PLS OXIMETRY MLT: CPT

## 2024-03-04 PROCEDURE — 99900035 HC TECH TIME PER 15 MIN (STAT)

## 2024-03-04 PROCEDURE — 63600175 PHARM REV CODE 636 W HCPCS: Performed by: INTERNAL MEDICINE

## 2024-03-04 PROCEDURE — 99900031 HC PATIENT EDUCATION (STAT)

## 2024-03-04 RX ORDER — NAPROXEN SODIUM 220 MG/1
81 TABLET, FILM COATED ORAL DAILY
Status: DISCONTINUED | OUTPATIENT
Start: 2024-03-04 | End: 2024-03-06 | Stop reason: HOSPADM

## 2024-03-04 RX ORDER — ACETAMINOPHEN 325 MG/1
650 TABLET ORAL EVERY 8 HOURS PRN
Status: DISCONTINUED | OUTPATIENT
Start: 2024-03-04 | End: 2024-03-06 | Stop reason: HOSPADM

## 2024-03-04 RX ORDER — OXYCODONE HYDROCHLORIDE 30 MG/1
5 TABLET ORAL EVERY 6 HOURS PRN
COMMUNITY
End: 2024-04-02

## 2024-03-04 RX ORDER — ATORVASTATIN CALCIUM 80 MG/1
80 TABLET, FILM COATED ORAL NIGHTLY
Status: DISCONTINUED | OUTPATIENT
Start: 2024-03-04 | End: 2024-03-06 | Stop reason: HOSPADM

## 2024-03-04 RX ORDER — FAMOTIDINE 20 MG/1
20 TABLET, FILM COATED ORAL 2 TIMES DAILY
Status: DISCONTINUED | OUTPATIENT
Start: 2024-03-04 | End: 2024-03-06 | Stop reason: HOSPADM

## 2024-03-04 RX ORDER — EZETIMIBE 10 MG/1
10 TABLET ORAL DAILY
Status: DISCONTINUED | OUTPATIENT
Start: 2024-03-04 | End: 2024-03-06 | Stop reason: HOSPADM

## 2024-03-04 RX ORDER — SODIUM CHLORIDE 0.9 % (FLUSH) 0.9 %
10 SYRINGE (ML) INJECTION
Status: DISCONTINUED | OUTPATIENT
Start: 2024-03-04 | End: 2024-03-06 | Stop reason: HOSPADM

## 2024-03-04 RX ORDER — TALC
6 POWDER (GRAM) TOPICAL NIGHTLY PRN
Status: DISCONTINUED | OUTPATIENT
Start: 2024-03-04 | End: 2024-03-06 | Stop reason: HOSPADM

## 2024-03-04 RX ORDER — MORPHINE SULFATE 2 MG/ML
2 INJECTION, SOLUTION INTRAMUSCULAR; INTRAVENOUS EVERY 4 HOURS PRN
Status: DISCONTINUED | OUTPATIENT
Start: 2024-03-04 | End: 2024-03-06 | Stop reason: HOSPADM

## 2024-03-04 RX ORDER — LEVOTHYROXINE SODIUM 50 UG/1
50 TABLET ORAL
Status: DISCONTINUED | OUTPATIENT
Start: 2024-03-04 | End: 2024-03-06 | Stop reason: HOSPADM

## 2024-03-04 RX ORDER — MORPHINE SULFATE 4 MG/ML
4 INJECTION, SOLUTION INTRAMUSCULAR; INTRAVENOUS EVERY 4 HOURS PRN
Status: DISCONTINUED | OUTPATIENT
Start: 2024-03-04 | End: 2024-03-06 | Stop reason: HOSPADM

## 2024-03-04 RX ORDER — ONDANSETRON HYDROCHLORIDE 2 MG/ML
8 INJECTION, SOLUTION INTRAVENOUS EVERY 6 HOURS PRN
Status: DISCONTINUED | OUTPATIENT
Start: 2024-03-04 | End: 2024-03-06 | Stop reason: HOSPADM

## 2024-03-04 RX ADMIN — FUROSEMIDE 10 MG/HR: 10 INJECTION, SOLUTION INTRAMUSCULAR; INTRAVENOUS at 11:03

## 2024-03-04 RX ADMIN — ATORVASTATIN CALCIUM 80 MG: 80 TABLET, FILM COATED ORAL at 08:03

## 2024-03-04 RX ADMIN — FUROSEMIDE 10 MG/HR: 10 INJECTION, SOLUTION INTRAMUSCULAR; INTRAVENOUS at 01:03

## 2024-03-04 RX ADMIN — Medication 6 MG: at 08:03

## 2024-03-04 RX ADMIN — METOPROLOL SUCCINATE 12.5 MG: 25 TABLET, EXTENDED RELEASE ORAL at 09:03

## 2024-03-04 RX ADMIN — ASPIRIN 81 MG: 81 TABLET, CHEWABLE ORAL at 09:03

## 2024-03-04 RX ADMIN — EZETIMIBE 10 MG: 10 TABLET ORAL at 09:03

## 2024-03-04 RX ADMIN — FAMOTIDINE 20 MG: 20 TABLET, FILM COATED ORAL at 08:03

## 2024-03-04 RX ADMIN — FAMOTIDINE 20 MG: 20 TABLET, FILM COATED ORAL at 09:03

## 2024-03-04 RX ADMIN — DOXYCYCLINE 100 MG: 100 INJECTION, POWDER, LYOPHILIZED, FOR SOLUTION INTRAVENOUS at 01:03

## 2024-03-04 NOTE — PLAN OF CARE
[facility]  Initial Discharge Assessment       Primary Care Provider: Drew Moreira Jr., MD    Admission Diagnosis: Shortness of breath [R06.02]  Acute on chronic congestive heart failure, unspecified heart failure type [I50.9]    Admission Date: 3/3/2024  Expected Discharge Date:     Transition of Care Barriers: None    Payor: MEDICARE / Plan: MEDICARE PART A & B / Product Type: Government /     Extended Emergency Contact Information  Primary Emergency Contact: Linda Farr  Mobile Phone: [phone]  Relation: Spouse  Secondary Emergency Contact: LUIS NEVAREZ  Mobile Phone: [phone]  Relation: Daughter  Preferred language: English   needed? No    Discharge Plan A: Skilled Nursing Facility  Discharge Plan B: Return to Nursing Home      [facility] - [place], [place] - [address]  [address] [address]  Phone: [phone] Fax: [phone]    [facility] - [place], [place] - [address]  [address]  [facility] [address]  Phone: [phone] Fax: [phone]    [facility] - [place], [place] - [address]  [address] [address]  Phone: [phone] Fax: [phone]      Initial Assessment (most recent)       Adult Discharge Assessment - 03/04/24 1236          Discharge Assessment    Assessment Type Discharge Planning Assessment     Confirmed/corrected address, phone number and insurance Yes     Source of Information health record;patient;family     Reason For Admission Acute on chronic respiratory failure with hypoxia and hypercapnia     People in Home facility resident     Facility Arrived From: [facility]     Do you expect to return to your current living situation? Yes     Do you have help at home or someone to help you manage your care at home? Yes     Who are your caregiver(s) and their phone number(s)? nursing staff at [facility]  [phone]     Prior to hospitilization cognitive status: --   According to the patient's chart, he was alert.    Current cognitive status: Alert/Oriented     Walking or Climbing Stairs Difficulty yes     Walking or Climbing Stairs ambulation difficulty, requires equipment;ambulation difficulty, assistance 1 person;transferring difficulty, assistance 1 person;stair climbing difficulty, assistance 1 person   Equipment provided by [facility]    Dressing/Bathing Difficulty yes     Dressing/Bathing bathing difficulty, requires equipment;bathing difficulty, assistance 1 person;dressing difficulty, assistance 1 person   Equipment provided by [facility]    Do you have any problems with: Needs other help;Errands/Grocery     Home Accessibility wheelchair accessible     Home Layout Able to live on 1st floor     Readmission within 30 days? Yes     Patient currently being followed by outpatient case management? No     Do you currently have service(s) that help you manage your care at home? No     Do you take prescription medications? Yes     Who is going to help you get home at discharge? [facility]     How do you get to doctors appointments? other (see comments);family or friend will provide     Are you on dialysis? No     Do you take coumadin? No     Discharge Plan A Skilled Nursing Facility     Discharge Plan B Return to Nursing Home     DME Needed Upon Discharge  other (see comments)   TRILOGY VENTILATOR    Discharge Plan discussed with: Patient;Spouse/sig other     Name(s) and Number(s) Linda (Spouse) [phone] (Mobile)     Transition of Care Barriers None                     Readmission Assessment (most recent)       Readmission Assessment - 03/04/24 1402          Readmission    Why were you hospitalized in the last 30 days? Colovesical fistula     Why were you readmitted? Alarmed about signs/symptoms     When you left the hospital where did you go? SNF     Tell me about what happened  between when you left the hospital and the day you returned. Unable to determine     When did you start not feeling well? Unable to determine                 Initial discharge assessment is completed. The patient is a readmit due to having complications with the patient's BiPAP at [facility] [place]. I spoke the patient and his spouse, and the plan is for the patient to return back to [facility] once his triology is delivered. The patient does require assistance with his ADLs. Contact information was left in the patient's room. RAI/SERENE will remain available.

## 2024-03-04 NOTE — NURSING
Received pt to Room 611 via ED stretcher on O2 with PEG tube in place, spouse at bedside. Pt oriented to room and assessed, telemetry placed, BiPAP to be placed, no distress noted. Orders acknowledge, reviewed POC with pt, needs and concerns addressed. Will continue to monitor.    Report received from Lori Brewer, ED RN @ 00:20.    Pt is still lethargic but responds to voice and able to follow commands. Spouse reports possible fall at nursing home and unsure of meds pt takes other than an abx and muscle relaxant. No NH paperwork available to do admit, will attempt to obtain paperwork from [facility].

## 2024-03-04 NOTE — ASSESSMENT & PLAN NOTE
Patient with Hypercapnic and Hypoxic Respiratory failure which is Acute on chronic.  he is on home oxygen at 2 LPM. Supplemental oxygen was provided and noted- Oxygen Concentration (%):  [40] 40    .   Signs/symptoms of respiratory failure include- tachypnea, increased work of breathing, respiratory distress, and use of accessory muscles. Contributing diagnoses includes - CHF, COPD, Interstitial lung disease, and Pleural effusion Labs and images were reviewed. Patient Has recent ABG, which has been reviewed. Will treat underlying causes and adjust management of respiratory failure as follows- continue bipap and diuresis as tolerated.

## 2024-03-04 NOTE — PHYSICIAN QUERY
PT Name: Richard Farr  MR #: [id number]     DOCUMENTATION CLARIFICATION     CDS: Brandy Capley, RN  Email: [email or web address]    This form is a permanent document in the medical record.     Query Date: March 4, 2024    By submitting this query, we are merely seeking further clarification of documentation.  Please utilize your independent clinical judgment when addressing the question(s) below.  The Medical Record contains the following   Indicators   Supporting Clinical Findings Location in Medical Record    Respiratory failure     X Subjective Respiratory Signs/Symptoms: SOB, CHACON, Cough, etc. shortness of breath   Hospital medicine consult 2/27   X Objective Respiratory Signs/Symptoms: Respiratory distress, Accessory muscle use, tachypnea, wheezing, etc. Upon entering room, patient noted to have labored respirations.     somnolent     Tachypnea  Decreased breath sounds, rhonchi and rales    Respiratory distress   RT care update filed 2/24 0529    General surgery progress notes 2/25    General surgery progress notes 2/26      Hospital medicine consult 2/27   X RR         O2 sat         O2 use Called to room by RN due to patient oxygen saturation 82-86% on NC 3L.   Placed on venti mask 12L/50% with improvement of oxygen saturation to 93%, as well as improvement in work of breathing.     Resp:  [16-20] 20  SpO2:  [85 %-98 %] 96 %    Resp:  [16-36] 36  SpO2:  [91 %-96 %] 95 %    Resp:  [14-27] 21  SpO2:  [95 %-98 %] 97 %    Resp:  [22-25] 25  SpO2:  [95 %-100 %] 99 %    Resp:  [20-26] 22  SpO2:  [94 %-98 %] 98 %   RT care update filed 2/24 0529        General surgery progress notes 2/24      General surgery progress notes 2/25      General surgery progress notes 2/26      General surgery progress notes 2/27      General surgery progress notes 2/28   X Blood Gas (ABG or VBG)  02/25/24 10:19 02/25/24 14:32 02/26/24 08:39 02/28/24 10:06   POC PH 7.190 (LL) 7.279 (LL) 7.335 (L) 7.412   POC PCO2 78.2 (HH) 64.3 (HH) 59.2  (HH) 61.4 (HH)   POC PO2 72.6 (L) 148 (H) 99.3 63.1 (L)   POC HCO3 24.7 26.3 28.6 (H) 36.3 (H)   POC SATURATED O2 93.1 (L) 99.9 98.5 93.2 (L)   Specimen source Arterial Arterial Arterial Arterial   Base Deficit 1.7 3.4 (H) 5.7 (H) 14.5 (H)   FiO2 50.0 50.0 40.0 36.0   O2DEVICE Venti Mask Other Other Nasal Cannula   Vt  450        ABG's 2/25-2/28   X Hypoxia/Hypercapnia respiratory acidosis and hypoxia    General surgery progress notes 2/25   X BiPAP/Intubation/Mechanical Ventilation Device (Oxygen Therapy) BIPAP(AVAPS)  $ Is the patient on High Flow Oxygen? Yes  Oxygen Concentration (%) 50  SpO2 95 %    4L NC  SpO2 94- 98    more tachypneic per Respiratory therapy. ABG reveals hypoventilation with hypoxemia. Patient placed back on AVAPS. Episodes of SVT.     4L NC  SpO2 93-96   RT care update filed 2/25 1102          RT PCS flowsheet 2/27-2/28      Hospital medicine progress notes 2/28      RT PCS flowsheets 3/1    Home O2, Oxygen Dependence     X Radiology findings Suspect new small bilateral pleural effusions  Elevated right hemidiaphragm, unchanged    Stable small bilateral pleural fluid collections.     Similar bilateral pleural fluid collections. Mild increase in right lower lung atelectasis.    CXR 2/24      CXR 2/26    CXR 2/29   X Acute/Chronic Illness Date of Procedure: 2/21/2024     Procedure: Procedure(s) (LRB):  COLECTOMY, LEFT, LAPAROSCOPIC WITH GASTROSTOMY TUBE EXCHANGE:       Pre-Operative Diagnosis: Pre-Op Diagnosis Codes:Colovesical fistula   Post-Operative Diagnosis: Same      pmhx severe MR s/p mitral clip, mod-severe TR, HTN, HLD, and anemia     Pneumonia COPD, mild   Shortness of breath    Op note 2/22                Cardiology consult 2/28    Anesthesia record filed 2/21   X Treatment Lasix 40mg daily   Bipap today   Repeat AGB in 4 hours   Continue IV abx for coverage of impending pneumonia and UTI   Decrease IVFs    Diuresing well after Lasix dose increased.    General surgery progress notes  2/25            General surgery progress notes 2/26   X Other Pleural effusion  Patient found to have small pleural effusion on imaging. I have personally reviewed and interpreted the following imaging: Xray. A thoracentesis was deferred. Most likely etiology includes Volume overload not due to CHF. Management to include Diuresis    CTAB; breaths non-labored and symmetrical   No interval change since the below obtained H&P     Chest/Lungs:  Clear to auscultation   [facility] medicine consult 2/27          H&P 1/25, filed 1/29  Interval H&P 2/21    Anesthesia record filed 2/21       The clinical guidelines noted are only a system guideline. It does not replace the providers clinical judgment.      [facility] Approved Diagnostic Criteria      Acute Respiratory Failure    Hypoxic: ABG pO2<60 mmHg or O2 sat of <91% on RA   AND/OR   Hypercapnic: ABG pCO2>50 mmHg with pH <7.35   AND   Respiratory symptoms documented (Subjective: SOB; Objective: Tachypnea, respiratory distress, increased work of breathing, unable to speak in complete sentences, labored breathing, use of accessory muscles, RR>26, cyanosis, dyspnea, wheezing, stridor, lethargy)      Chronic Respiratory Failure   Hypoxic: Continuous home oxygen    AND/OR   Hypercapnic: Normal pH with high CO2 (ex. COPD)      Acute on Chronic Respiratory Failure   Hypoxic: ABG pO2>10mmHg below baseline OR ABG pO2<60 mmHg OR SpO2<91% on usual home O2  OR O2>2L/min over baseline home O2   AND/OR   Hypercapnic: ABG pCO2>50 mmHg OR pCO2>10mmHg over baseline and pH <7.35   AND   Respiratory symptoms documented      Acute Respiratory Distress Syndrome (ARDS) - an acute, diffuse, inflammatory form of lung injury. Suspect with progressive dyspnea, hypoxemic respiratory failure, and bilateral alveolar infiltrates on chest imaging within 6 to 72 hours of an inciting event.   Acute Respiratory Distress - Generally describes less severe respiratory symptoms (tachypnea, in respiratory  distress, increased work of breathing, unable to speak in complete sentences, labored breathing, use of accessory muscles, RR> 24, cyanosis, dyspnea, wheezing, stridor, lethargy) without sufficient measurements (pO2, SpO2, pH, and pCO2) to meet criteria for respiratory failure)   Acute Respiratory Insufficiency - Generally describes less severe respiratory symptoms and measurements (pO2, SpO2, pH, and pCO2) not meeting criteria for respiratory failure         Provider, please specify the diagnosis or diagnoses associated with above clinical findings.   [    ] Acute Respiratory Failure with Hypoxia   [    ] Acute Respiratory Failure with Hypercapnia   [ x   ] Acute Respiratory Failure with Hypoxia and Hypercapnia   [    ] Acute Respiratory Distress   [    ] Acute Respiratory Insufficiency   [    ] Hypoxia Only   [    ] Other Respiratory Diagnosis (please specify): _________________         Please document in your progress notes daily for the duration of treatment until resolved and include in your discharge summary.     Reference:    SHARI Quan MD. (2020, March 13). Acute respiratory distress syndrome: Clinical features, diagnosis, and complications in adults ([id number] SANDRA Murillo MD & [id number] RAGHU Mills MD, Eds.). Retrieved November 13, 2020, from https://[email or web address]/contents/acute-respiratory-distress-syndrome-[email or web address]-features-diagnosis-and-complications-[email or web address]-adults?search=ards&source=search_result&selectedTitle=1~150&usage_type=default&display_rank=1  Form No. [id number]

## 2024-03-04 NOTE — PLAN OF CARE
Plan of care reviewed with patient and spouse. Pt is free of falls and injury. NADN, VSS, afebrile, AMS, often tries to get OOB and pull everything off, on BiPAP and telemetry. Pt tolerating medications well, Lasix gtt infusing. Pain controlled with PRN pain meds. Questions and concerns addressed. Pt's spouse to come back today.    Problem: Adult Inpatient Plan of Care  Goal: Plan of Care Review  Outcome: Ongoing, Progressing  Goal: Patient-Specific Goal (Individualized)  Outcome: Ongoing, Progressing  Goal: Absence of Hospital-Acquired Illness or Injury  Outcome: Ongoing, Progressing  Goal: Optimal Comfort and Wellbeing  Outcome: Ongoing, Progressing  Goal: Readiness for Transition of Care  Outcome: Ongoing, Progressing     Problem: Fluid and Electrolyte Imbalance (Acute Kidney Injury/Impairment)  Goal: Fluid and Electrolyte Balance  Outcome: Ongoing, Progressing     Problem: Oral Intake Inadequate (Acute Kidney Injury/Impairment)  Goal: Optimal Nutrition Intake  Outcome: Ongoing, Progressing     Problem: Renal Function Impairment (Acute Kidney Injury/Impairment)  Goal: Effective Renal Function  Outcome: Ongoing, Progressing     Problem: Skin Injury Risk Increased  Goal: Skin Health and Integrity  Outcome: Ongoing, Progressing     Problem: Fall Injury Risk  Goal: Absence of Fall and Fall-Related Injury  Outcome: Ongoing, Progressing     Problem: Behavioral Health Comorbidity  Goal: Maintenance of Behavioral Health Symptom Control  Outcome: Ongoing, Progressing     Problem: Heart Failure Comorbidity  Goal: Maintenance of Heart Failure Symptom Control  Outcome: Ongoing, Progressing     Problem: Hypertension Comorbidity  Goal: Blood Pressure in Desired Range  Outcome: Ongoing, Progressing     Problem: Pain Chronic (Persistent) (Comorbidity Management)  Goal: Acceptable Pain Control and Functional Ability  Outcome: Ongoing, Progressing     Problem: Mobility Impairment  Goal: Optimal Mobility  Outcome: Ongoing,  Progressing      Problem: Adult Inpatient Plan of Care  Goal: Plan of Care Review  Outcome: Ongoing, Progressing  Goal: Patient-Specific Goal (Individualized)  Outcome: Ongoing, Progressing  Goal: Absence of Hospital-Acquired Illness or Injury  Outcome: Ongoing, Progressing  Goal: Optimal Comfort and Wellbeing  Outcome: Ongoing, Progressing  Goal: Readiness for Transition of Care  Outcome: Ongoing, Progressing     Problem: Fluid and Electrolyte Imbalance (Acute Kidney Injury/Impairment)  Goal: Fluid and Electrolyte Balance  Outcome: Ongoing, Progressing     Problem: Oral Intake Inadequate (Acute Kidney Injury/Impairment)  Goal: Optimal Nutrition Intake  Outcome: Ongoing, Progressing     Problem: Renal Function Impairment (Acute Kidney Injury/Impairment)  Goal: Effective Renal Function  Outcome: Ongoing, Progressing     Problem: Skin Injury Risk Increased  Goal: Skin Health and Integrity  Outcome: Ongoing, Progressing     Problem: Fall Injury Risk  Goal: Absence of Fall and Fall-Related Injury  Outcome: Ongoing, Progressing     Problem: Behavioral Health Comorbidity  Goal: Maintenance of Behavioral Health Symptom Control  Outcome: Ongoing, Progressing     Problem: Heart Failure Comorbidity  Goal: Maintenance of Heart Failure Symptom Control  Outcome: Ongoing, Progressing     Problem: Hypertension Comorbidity  Goal: Blood Pressure in Desired Range  Outcome: Ongoing, Progressing     Problem: Pain Chronic (Persistent) (Comorbidity Management)  Goal: Acceptable Pain Control and Functional Ability  Outcome: Ongoing, Progressing     Problem: Mobility Impairment  Goal: Optimal Mobility  Outcome: Ongoing, Progressing     Problem: Gas Exchange Impaired  Goal: Optimal Gas Exchange  Outcome: Ongoing, Progressing     Problem: Breathing Pattern Ineffective  Goal: Effective Breathing Pattern  Outcome: Ongoing, Progressing     Problem: Cerebral Tissue Perfusion (Stroke, Ischemic/Transient Ischemic Attack)  Goal: Optimal Cerebral  Tissue Perfusion  Outcome: Ongoing, Progressing     Problem: Cognitive Impairment (Stroke, Ischemic/Transient Ischemic Attack)  Goal: Optimal Cognitive Function  Outcome: Ongoing, Progressing     Problem: Communication Impairment (Stroke, Ischemic/Transient Ischemic Attack)  Goal: Improved Communication Skills  Outcome: Ongoing, Progressing     Problem: Functional Ability Impaired (Stroke, Ischemic/Transient Ischemic Attack)  Goal: Optimal Functional Ability  Outcome: Ongoing, Progressing     Problem: Swallowing Impairment (Stroke, Ischemic/Transient Ischemic Attack)  Goal: Optimal Eating and Swallowing without Aspiration  Outcome: Ongoing, Progressing

## 2024-03-04 NOTE — PT/OT/SLP PROGRESS
Physical Therapy      Patient Name:  Richard Farr   MRN:  [id number]    Patient not seen today secondary to Patient unwilling to participate. Patient was woken up from sleeping. He reports he is unwilling top participate because he is fatigue. Will follow-up 3/5/24.

## 2024-03-04 NOTE — PT/OT/SLP EVAL
Physical Therapy Evaluation     Patient Name: Richard Farr   MRN: [id number]  Recent Surgery: * No surgery found *      Recommendations:     Discharge Recommendations: Moderate Intensity Therapy   Discharge Equipment Recommendations: none   Barriers to discharge: None    Assessment:     Richard Farr is a 78 y.o. male admitted with a medical diagnosis of Acute on chronic respiratory failure with hypoxia and hypercapnia. He presents with the following impairments/functional limitations: weakness, impaired joint extensibility, abnormal tone, impaired endurance, decreased ROM, impaired muscle length, decreased coordination, impaired self care skills, decreased upper extremity function, impaired fine motor, impaired functional mobility, decreased lower extremity function, gait instability, impaired balance, impaired cardiopulmonary response to activity. Patient was recently discharged from this hospital due to abdominal surgery, discharge to the nursing home and is now re-admitted due to resp. failure due to BI-PAP issues at the nursing home. Patient reports that he used to lived  with his wife and his children lives close by and assist in his care prior to the nursing home admission.  He was ambulatory with a rollator with supervision, he has history of CVA with left residual weakness.  At the time of evaluation,  required min/mod assist with supine to sit, min assist with  sit <> stand from the bed and from the regular toilet, he had a continent bowel episode on the toilet, ambulated ~310 feet with RW with  4 liters of O2 via nasal cannula. SPO2 post ambulation is 97% and HR of 96 bpm.  Patient is left supine in bed with HOB elevated, all lines intact and call light  within reach, nurse Nayla Benitez RN aware.  We will progress patient's mobility as tolerated.        Rehab Prognosis: Fair; patient would benefit from acute PT services to address these deficits and reach maximum level of function.    Plan:     During  "this hospitalization, patient to be seen 5 x/week to address the above listed problems via gait training, therapeutic activities, therapeutic exercises, neuromuscular re-education    Plan of Care Expires: 03/11/24    Subjective     Chief Complaint: "I need to use the bathroom."   Patient Comments/Goals: Get stronger.  Pain/Comfort:  Pain Rating 1: 0/10  Pain Rating Post-Intervention 1: 0/10    Social History:  Living Environment: Patient is in a nursing home for therapy after his recent hospital admission   Prior Level of Function: Prior to admission, patient ambulated household distances using rolling walker  Equipment Used at Home: walker, rolling, oxygen, BIPAP, bedside commode  DME owned (not currently used): none  Assistance Upon Discharge: facility staff    Objective:     Communicated with nurse and patient  prior to session. Patient found HOB elevated with PureWick, peripheral IV, oxygen upon PT entry to room.    General Precautions: Standard, fall   Orthopedic Precautions: N/A   Braces: N/A    Respiratory Status: Nasal cannula, flow 4 L/min    Exams:  Cognition: Patient is oriented to Person and Place  RLE ROM: WFL  RLE Strength:  grossly graded 4/5  LLE ROM: WFL  LLE Strength:  grossly graded 3/5, history of CVA with residual left sided weakness  Gross Motor Coordination: decrease on left UE  Postural Exam: Patient presented with the following abnormalities:    -       Rounded shoulders  -       Forward head  Skin Integrity/Edema:     -       Skin integrity: Visible skin intact    Functional Mobility:  Gait belt applied - Yes  Bed Mobility  Rolling Left: minimum assistance  Rolling Right: minimum assistance  Scooting: minimum assistance  Supine to Sit: minimum assistance and moderate assistance for LE management and trunk management  Sit to Supine: minimum assistance for LE management and trunk management  Transfers  Sit to Stand: minimum assistance with rolling walker and with cues for hand placement and " foot placement  Toilet Transfer: minimum assistance with rolling walker and with cues for hand placement and foot placement using Step Transfer  Gait  Patient ambulated ~310 feet  with rolling walker and contact guard assistance. Patient demonstrates steady gait, decreased step length, and ambulates outside HERB of RW. .. All lines remained intact throughout ambulation trail and portable Supplemental O2 4L utilized.  Balance  Sitting: stand by assistance  Standing: contact guard assistance      Therapeutic Activities and Exercises:   Patient educated on role of acute care PT and PT POC, safety while in hospital including calling nurse for mobility, and call light usage  Patient educated about importance of OOB mobility and remaining up in chair most of the day.  Toileting and toilet transfers     AM-PAC 6 CLICK MOBILITY  Total Score:18    Patient left HOB elevated with all lines intact, call button in reach, and RN notified.    GOALS:   Multidisciplinary Problems       Physical Therapy Goals          Problem: Physical Therapy    Goal Priority Disciplines Outcome Goal Variances Interventions   Physical Therapy Goal     PT, PT/OT Ongoing, Progressing     Description: Goals to be met by: 3/11/2024    Patient will increase functional independence with mobility by performin. Supine to sit with Standby Assistance.  2. Sit to supine with Standby Assistance.  3. Bed to chair transfer with Standby Assistance with rolling walker using Step Transfer technique.  4. Sit to Stand with Standby Assistance with rolling walker.  5. Gait  x 500  feet with Standby Assistance with RW and O2 supplement  with SPO2 > 90%.  6. Lower extremity exercise program x10 reps.                          History:     Past Medical History:   Diagnosis Date    Anal fistula 2024    Anemia     Arthritis     At high risk for falls     Bilateral lower extremity edema     Carpal tunnel syndrome, bilateral     Chronic diastolic congestive heart  failure     Colon polyp     Colovesical fistula 02/2024    Coronary artery disease     Depression due to physical illness     Encounter for blood transfusion     Gastric ulcer     History of herpes zoster     Hyperlipemia     Hypertension     Moderate tricuspid insufficiency     NSVT (nonsustained ventricular tachycardia)     PAC (premature atrial contraction)     Patent foramen ovale with right to left shunt     Pneumonia     Pneumonia due to infectious organism     Pulmonary hypertension     PVC's (premature ventricular contractions)     Severe mitral regurgitation     Shingles     Stroke     MINI TRAUMA; LEFT SIDED WEAKNESS    Thyroid disease     Walker as ambulation aid     Wears contact lenses        Past Surgical History:   Procedure Laterality Date    CARDIAC CATHETERIZATION      COLONOSCOPY N/A 11/23/2020    Procedure: COLONOSCOPY;  Surgeon: Abelino Garcia MD;  Location: [facility] ENDO;  Service: General;  Laterality: N/A;    COLONOSCOPY N/A 1/8/2024    Procedure: COLONOSCOPY;  Surgeon: Queenie Berman MD;  Location: [facility] ENDO;  Service: General;  Laterality: N/A;  2nd 0600    CYSTOSCOPY W/ RETROGRADES Bilateral 12/7/2023    Procedure: CYSTOSCOPY WITH RETROGRADE PYELOGRAM;  Surgeon: Juan Villela MD;  Location: [facility] OR;  Service: Urology;  Laterality: Bilateral;    DILATION OF URETHRA N/A 12/7/2023    Procedure: DILATION, URETHRA;  Surgeon: Juan Villela MD;  Location: [facility] OR;  Service: Urology;  Laterality: N/A;    EGD, WITH CLOSED BIOPSY N/A 10/4/2023    Procedure: EGD, WITH CLOSED BIOPSY;  Surgeon: Queenie Berman MD;  Location: [facility] OR;  Service: General;  Laterality: N/A;    ESOPHAGOGASTRODUODENOSCOPY      ESOPHAGOGASTRODUODENOSCOPY N/A 5/21/2021    Procedure: EGD (ESOPHAGOGASTRODUODENOSCOPY);  Surgeon: Sunny Rea MD;  Location: [facility];  Service: Endoscopy;  Laterality: N/A;  COVID-VACCINATED    IMPLANTATION OF MITRAL VALVE LEAFLET CLIP Right 5/18/2021    Procedure: INSERTION,  LEAFLET CLIP, MITRAL VALVE;  Surgeon: Zen Reina MD;  Location: [facility] CATH;  Service: Cardiovascular;  Laterality: Right;    INSERTION, PEG TUBE N/A 10/4/2023    Procedure: INSERTION, PEG TUBE;  Surgeon: Queenie Berman MD;  Location: [facility] OR;  Service: General;  Laterality: N/A;    LAPAROSCOPIC LEFT COLECTOMY Left 2/21/2024    Procedure: COLECTOMY, LEFT, LAPAROSCOPIC WITH GASTROSTOMY TUBE EXCHANGE;  Surgeon: Queenie Berman MD;  Location: [facility];  Service: General;  Laterality: Left;  4th 0800    NECK SURGERY      anterior cervical fusion x 2    TONSILLECTOMY      TRANSESOPHAGEAL ECHOCARDIOGRAPHY         Time Tracking:     PT Received On: 03/04/24  PT Start Time: 1530  PT Stop Time: 1600  PT Total Time (min): 30 min     Billable Minutes: Evaluation moderate complexity     3/4/2024

## 2024-03-04 NOTE — PT/OT/SLP EVAL
Occupational Therapy   Evaluation    Name: Richard Farr  MRN: [id number]  Admitting Diagnosis: Acute on chronic respiratory failure with hypoxia and hypercapnia  Recent Surgery: * No surgery found *      Recommendations:     Discharge Recommendations: Moderate Intensity Therapy  Discharge Equipment Recommendations:  none ([facility] will provide)  Barriers to discharge:  Other (Comment) (Medical status)    Assessment:     Richard Farr is a 78 y.o. male with a medical diagnosis of Acute on chronic respiratory failure with hypoxia and hypercapnia.  He presents with functional deficits impacting independence with ADL's including functional mobility. Performance deficits affecting function: weakness, impaired endurance, impaired self care skills, impaired functional mobility, impaired balance, decreased coordination, decreased upper extremity function, decreased lower extremity function, decreased safety awareness, pain, decreased ROM, impaired fine motor, impaired cardiopulmonary response to activity, impaired joint extensibility, impaired muscle length, impaired sensation.      Rehab Prognosis: Fair; patient would benefit from acute skilled OT services to address these deficits and reach maximum level of function.       Plan:     Patient to be seen 5 x/week to address the above listed problems via self-care/home management, therapeutic activities, therapeutic exercises, sensory integration  Plan of Care Expires: 03/11/24  Plan of Care Reviewed with: patient    Subjective     Chief Complaint: SOB and weakness  Patient/Family Comments/goals: Pt would like to improve overall independence in order to eventually return home with spouse.     Occupational Profile:  Living Environment: Pt was a recent skilled nursing home admit prior.   Previous level of function: Assistance from staff and spouse  Roles and Routines: ADL's  Equipment Used at Home: walker, rolling, bedside commode  Assistance upon Discharge: NH  staff    Pain/Comfort:  Pain Rating 1: 3/10  Location - Orientation 1: lower  Location 1: abdomen  Pain Addressed 1: Reposition, Nurse notified, Distraction  Pain Rating Post-Intervention 1: 2/10    Patients cultural, spiritual, Church conflicts given the current situation: no    Objective:     Communicated with: nurse prior to session.  Patient found HOB elevated with PureWick, oxygen, peripheral IV upon OT entry to room.    General Precautions: Standard, fall  Orthopedic Precautions: N/A  Braces: N/A  Respiratory Status: Nasal cannula, flow 4 L/min    Occupational Performance:    Bed Mobility:    Patient completed Rolling/Turning to Left with  moderate assistance  Patient completed Rolling/Turning to Right with moderate assistance  Patient completed Scooting/Bridging with moderate assistance  Patient completed Supine to Sit with moderate assistance  Patient completed Sit to Supine with moderate assistance    Functional Mobility/Transfers:  Patient completed Sit <> Stand Transfer with minimum assistance  with  rolling walker   Patient completed Bed <> Chair Transfer using Step Transfer technique with minimum assistance with rolling walker  Patient completed Toilet Transfer Step Transfer technique with minimum assistance with  bedside commode  Functional Mobility: Pt ambulated 8' between surfaces requiring steadying assist utilizing RW.    Activities of Daily Living:  Feeding:  setup assistance    Grooming: minimum assistance    Bathing: maximal assistance    Upper Body Dressing: maximal assistance    Lower Body Dressing: maximal assistance    Toileting: maximal assistance      Cognitive/Visual Perceptual:  Cognitive/Psychosocial Skills:  -       Oriented to: Person, Place, and Situation   -       Follows Commands/attention:Follows multistep  commands  -       Communication: anomia and dysarthria  -       Memory: Impaired STM  -       Safety awareness/insight to disability: impaired   -       Mood/Affect/Coping  skills/emotional control: Cooperative    Physical Exam:  Postural examination/scapula alignment: -       Rounded shoulders  -       Forward head  Sensation: -       Impaired  light/touch (L) UE  Dominant hand: -       Right  Upper Extremity Range of Motion:  -       Right Upper Extremity: Deficits: Poor active shoulder ROM including flexion and abduction with additional deficits with extension and external rotation; however, elbow, wrist and hand WFL  -       Left Upper Extremity: Deficits: Poor active shoulder, elbow, and forearm ROM, but wrist and hand WFL  Upper Extremity Strength: -       Right Upper Extremity: Deficits: 2+ to 4-/5  -       Left Upper Extremity: Deficits: 2 to 3-/5  Fine Motor Coordination: -       Impaired  Left hand, manipulation of objects    Gross motor coordination: (L) UE hemiplegia/paresis    AMPAC 6 Click ADL:  AMPAC Total Score: 14    Treatment & Education:  Pt was provided education / instruction regarding role of OT and established OT POC.    Patient left HOB elevated with all lines intact, call button in reach, and nurse notified    GOALS:   Goals to be met by: 03/11/24     Patient will increase functional independence with ADLs by performing:    UE Dressing with Moderate Assistance.  LE Dressing with Moderate Assistance.  Grooming while seated at sink with Set-up Assistance.  Toileting from toilet with Moderate Assistance for hygiene and clothing management.   Bathing from  shower chair/bench with Moderate Assistance.  Toilet transfer to toilet with Contact Guard Assistance.      History:     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    Coronary artery disease     Depression due to physical illness     Encounter for blood transfusion     Gastric  ulcer     History of herpes zoster     Hyperlipemia     Hypertension     Moderate tricuspid insufficiency     NSVT (nonsustained ventricular tachycardia)     PAC (premature atrial contraction)     Patent foramen ovale with right to left shunt     Pneumonia     Pneumonia due to infectious organism     Pulmonary hypertension     PVC's (premature ventricular contractions)     Severe mitral regurgitation     Shingles     Stroke     MINI TRAUMA; LEFT SIDED WEAKNESS    Thyroid disease     Walker as ambulation aid     Wears contact lenses          Past Surgical History:   Procedure Laterality Date    CARDIAC CATHETERIZATION      COLONOSCOPY N/A 11/23/2020    Procedure: COLONOSCOPY;  Surgeon: Abelino Garcia MD;  Location: [facility] ENDO;  Service: General;  Laterality: N/A;    COLONOSCOPY N/A 1/8/2024    Procedure: COLONOSCOPY;  Surgeon: Queenie Berman MD;  Location: [facility] ENDO;  Service: General;  Laterality: N/A;  2nd 0600    CYSTOSCOPY W/ RETROGRADES Bilateral 12/7/2023    Procedure: CYSTOSCOPY WITH RETROGRADE PYELOGRAM;  Surgeon: Juan Villela MD;  Location: [facility] OR;  Service: Urology;  Laterality: Bilateral;    DILATION OF URETHRA N/A 12/7/2023    Procedure: DILATION, URETHRA;  Surgeon: Juan Villela MD;  Location: [facility] OR;  Service: Urology;  Laterality: N/A;    EGD, WITH CLOSED BIOPSY N/A 10/4/2023    Procedure: EGD, WITH CLOSED BIOPSY;  Surgeon: Queenie Berman MD;  Location: [facility] OR;  Service: General;  Laterality: N/A;    ESOPHAGOGASTRODUODENOSCOPY      ESOPHAGOGASTRODUODENOSCOPY N/A 5/21/2021    Procedure: EGD (ESOPHAGOGASTRODUODENOSCOPY);  Surgeon: Sunny Rea MD;  Location: [facility] ENDO;  Service: Endoscopy;  Laterality: N/A;  COVID-VACCINATED    IMPLANTATION OF MITRAL VALVE LEAFLET CLIP Right 5/18/2021    Procedure: INSERTION, LEAFLET CLIP, MITRAL VALVE;  Surgeon: Zen Reina MD;  Location: [facility] CATH;  Service: Cardiovascular;  Laterality: Right;    INSERTION, PEG TUBE N/A 10/4/2023     Procedure: INSERTION, PEG TUBE;  Surgeon: Queenie Berman MD;  Location: [facility];  Service: General;  Laterality: N/A;    LAPAROSCOPIC LEFT COLECTOMY Left 2/21/2024    Procedure: COLECTOMY, LEFT, LAPAROSCOPIC WITH GASTROSTOMY TUBE EXCHANGE;  Surgeon: Queenie Berman MD;  Location: [facility];  Service: General;  Laterality: Left;  4th 0800    NECK SURGERY      anterior cervical fusion x 2    TONSILLECTOMY      TRANSESOPHAGEAL ECHOCARDIOGRAPHY         Time Tracking:     OT Date of Treatment: 03/04/24  OT Start Time: 1335  OT Stop Time: 1418  OT Total Time (min): 43 min    Billable Minutes:Evaluation 43    3/4/2024

## 2024-03-04 NOTE — ED PROVIDER NOTES
Encounter Date: 3/3/2024       History     Chief Complaint   Patient presents with    Respiratory Distress     Pt arrived via EMS from [facility] with reported respiratory distress. EMS reports pt's room air oxygen saturation was 88%, pt put on non-rebreather mask and saturation came up to 95%. 125 solumedrol given by EMS, audible crackles heard. EMS reports GCS of 12. Pt recently had surgery (1 week) for colon resection. Nursing home reports pt is supposed to be on Bipap but doesn't have machine.     78-year-old male with a complicated medical history, recent colovesicular fistula takedown with sigmoidectomy, discharged March 1st by General surgery.  Was sent to a nursing home, usually wears BiPAP, but they do not have a piece of the machines unable to get BiPAP.  Patient with increased confusion.  History of respiratory acidosis in the past as well as acute on chronic congestive heart failure.  Afebrile.  Oxygen saturations 88% on room air per EMS, placed on a non-rebreather, oxygen sat in the emergency department is 100%.  Given Solu-Medrol for some reason by EMS prior to arrival      Review of patient's allergies indicates:   Allergen Reactions    Ativan [lorazepam]      Adverse reaction.     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    Coronary artery disease     Depression due to physical illness     Encounter for blood transfusion     Gastric ulcer     History of herpes zoster     Hyperlipemia     Hypertension     Moderate tricuspid insufficiency     NSVT (nonsustained ventricular tachycardia)     PAC (premature atrial contraction)     Patent foramen ovale with right to left shunt     Pneumonia     Pneumonia due to infectious organism     Pulmonary hypertension      PVC's (premature ventricular contractions)     Severe mitral regurgitation     Shingles     Stroke     MINI TRAUMA; LEFT SIDED WEAKNESS    Thyroid disease     Walker as ambulation aid     Wears contact lenses      Past Surgical History:   Procedure Laterality Date    CARDIAC CATHETERIZATION      COLONOSCOPY N/A 11/23/2020    Procedure: COLONOSCOPY;  Surgeon: Abelino Garcia MD;  Location: [facility] ENDO;  Service: General;  Laterality: N/A;    COLONOSCOPY N/A 1/8/2024    Procedure: COLONOSCOPY;  Surgeon: Queenie Berman MD;  Location: [facility] ENDO;  Service: General;  Laterality: N/A;  2nd 0600    CYSTOSCOPY W/ RETROGRADES Bilateral 12/7/2023    Procedure: CYSTOSCOPY WITH RETROGRADE PYELOGRAM;  Surgeon: Juan Villela MD;  Location: [facility] OR;  Service: Urology;  Laterality: Bilateral;    DILATION OF URETHRA N/A 12/7/2023    Procedure: DILATION, URETHRA;  Surgeon: Juan Villela MD;  Location: [facility] OR;  Service: Urology;  Laterality: N/A;    EGD, WITH CLOSED BIOPSY N/A 10/4/2023    Procedure: EGD, WITH CLOSED BIOPSY;  Surgeon: Queenie Berman MD;  Location: [facility] OR;  Service: General;  Laterality: N/A;    ESOPHAGOGASTRODUODENOSCOPY      ESOPHAGOGASTRODUODENOSCOPY N/A 5/21/2021    Procedure: EGD (ESOPHAGOGASTRODUODENOSCOPY);  Surgeon: Sunny Rea MD;  Location: [facility] ENDO;  Service: Endoscopy;  Laterality: N/A;  COVID-VACCINATED    IMPLANTATION OF MITRAL VALVE LEAFLET CLIP Right 5/18/2021    Procedure: INSERTION, LEAFLET CLIP, MITRAL VALVE;  Surgeon: Zen Reina MD;  Location: [facility] CATH;  Service: Cardiovascular;  Laterality: Right;    INSERTION, PEG TUBE N/A 10/4/2023    Procedure: INSERTION, PEG TUBE;  Surgeon: Queenie Berman MD;  Location: [facility] OR;  Service: General;  Laterality: N/A;    LAPAROSCOPIC LEFT COLECTOMY Left 2/21/2024    Procedure: COLECTOMY, LEFT, LAPAROSCOPIC WITH GASTROSTOMY TUBE EXCHANGE;  Surgeon: Queenie Berman MD;  Location: [facility] OR;  Service: General;  Laterality:  Left;  4th 0800    NECK SURGERY      anterior cervical fusion x 2    TONSILLECTOMY      TRANSESOPHAGEAL ECHOCARDIOGRAPHY       Family History   Problem Relation Age of Onset    Stroke Mother     Heart attack Father     Heart disease Sister     Pneumonia Sister     No Known Problems Sister     Heart disease Sister     Cancer Brother     Cancer Brother      Social History     Tobacco Use    Smoking status: Former     Types: Cigars     Start date:      Quit date:      Years since quittin.1    Smokeless tobacco: Never   Substance Use Topics    Alcohol use: Not Currently    Drug use: Never     Review of Systems   Unable to perform ROS: Patient nonverbal   All other systems reviewed and are negative.      Physical Exam     Initial Vitals   BP Pulse Resp Temp SpO2   24 2301 24 2259 24 2301 24 2301 24 2259   (!) 142/76 87 (!) 30 97.6 °F (36.4 °C) 99 %      MAP       --                Physical Exam    Constitutional: He is not diaphoretic. No distress.   HENT:   Head: Normocephalic and atraumatic.   Mouth/Throat: Oropharynx is clear and moist.   Eyes: Conjunctivae and EOM are normal. Pupils are equal, round, and reactive to light.   Neck: Neck supple. No tracheal deviation present. No JVD present.   Normal range of motion.  Cardiovascular:  Normal rate, regular rhythm and normal heart sounds.           Pulmonary/Chest: No stridor. No respiratory distress. He has no wheezes. He has no rhonchi. He has rales.   Abdominal: Abdomen is soft. He exhibits no distension.   Staples in place, no signs of infection, clean dry and intact There is no guarding.   Musculoskeletal:         General: No edema.      Cervical back: Normal range of motion and neck supple.     Neurological: He is alert.   Patient opens his eyes spontaneously, some purposeful movement, withdraws from pain, in audible sounds.  Unsure of his baseline   Skin: Skin is warm. Capillary refill takes less than 2 seconds.          ED Course   Critical Care    Date/Time: 3/3/2024 11:54 PM    Performed by: Jefferson Martinez MD  Authorized by: Drew Moreira Jr., MD  Direct patient critical care time: 10 minutes  Additional history critical care time: 10 minutes  Ordering / reviewing critical care time: 10 minutes  Documentation critical care time: 10 minutes  Consulting other physicians critical care time: 10 minutes  Consult with family critical care time: 10 minutes  Total critical care time (exclusive of procedural time) : 60 minutes  Critical care was necessary to treat or prevent imminent or life-threatening deterioration of the following conditions: Acute on chronic respiratory failure due to congestive heart failure needing BiPAP management.  Critical care was time spent personally by me on the following activities: review of old charts, re-evaluation of patient's condition, pulse oximetry, ordering and review of radiographic studies, ordering and review of laboratory studies, ordering and performing treatments and interventions, obtaining history from patient or surrogate, examination of patient, evaluation of patient's response to treatment and development of treatment plan with patient or surrogate.        Labs Reviewed   NT-PRO NATRIURETIC PEPTIDE - Abnormal; Notable for the following components:       Result Value    NT-proBNP 53989 (*)     All other components within normal limits   CBC W/ AUTO DIFFERENTIAL - Abnormal; Notable for the following components:    WBC 14.60 (*)     RBC 3.48 (*)     Hemoglobin 9.8 (*)     Hematocrit 31.3 (*)     MCHC 31.3 (*)     RDW 16.2 (*)     Immature Granulocytes 0.8 (*)     Gran # (ANC) 10.9 (*)     Immature Grans (Abs) 0.12 (*)     Mono # 1.4 (*)     Gran % 74.5 (*)     Lymph % 13.3 (*)     All other components within normal limits   COMPREHENSIVE METABOLIC PANEL - Abnormal; Notable for the following components:    Glucose 136 (*)     Calcium 8.5 (*)     Albumin 2.8 (*)     Total  Bilirubin 1.3 (*)     AST 46 (*)     All other components within normal limits   PROTIME-INR - Abnormal; Notable for the following components:    Prothrombin Time 13.1 (*)     All other components within normal limits   MAGNESIUM     EKG Readings: (Independently Interpreted)   Initial Reading: No STEMI. Rhythm: Normal Sinus Rhythm. Heart Rate: 86. Ectopy: No Ectopy. Conduction: Normal. ST Segments: Normal ST Segments. T Waves Elevated: V4, V3, V2 and III. Axis: Normal. Clinical Impression: Normal Sinus Rhythm       Imaging Results              X-Ray Chest 1 View (In process)                      Medications - No data to display  Medical Decision Making  Amount and/or Complexity of Data Reviewed  Labs: ordered. Decision-making details documented in ED Course.  Radiology: ordered.    Risk  Decision regarding hospitalization.               ED Course as of 03/03/24 2355   Sun Mar 03, 2024   2310 Chest x-ray consistent with pulmonary edema as well as bilateral pleural effusion [SD]   2347 NT-proBNP(!): 16810 [SD]   2347 Laboratory values consistent with acute on chronic congestive heart failure with pulmonary edema. [SD]      ED Course User Index  [SD] Jefferson Martinez MD               Medical Decision Making:   Differential Diagnosis:   Acute on chronic respiratory failure, hypercapnia, electrolyte abnormality  ED Management:  Discussed case with , will admit for BiPAP therapy since the nursing home is not capable of providing such care             Clinical Impression:  Final diagnoses:  [R06.02] Shortness of breath  [I50.9] Acute on chronic congestive heart failure, unspecified heart failure type (Primary)          ED Disposition Condition    Admit Stable                Jefferson Martinez MD  03/03/24 8172

## 2024-03-04 NOTE — PLAN OF CARE
Staples removed from abdominal incisions, cleaned with wound prep, patted dry, steri strips placed. Edges approximated. Lower midline incision- every other staple removed, cleaned with wound prep, patted dry, painted with iodine, and steri strips placed. Pt tolerated well.

## 2024-03-04 NOTE — PLAN OF CARE
Problem: Occupational Therapy  Goal: Occupational Therapy Goal  Description: Goals to be met by: 03/11/24     Patient will increase functional independence with ADLs by performing:    UE Dressing with Moderate Assistance.  LE Dressing with Moderate Assistance.  Grooming while seated at sink with Set-up Assistance.  Toileting from toilet with Moderate Assistance for hygiene and clothing management.   Bathing from  shower chair/bench with Moderate Assistance.  Toilet transfer to toilet with Contact Guard Assistance.    Outcome: Established OT POC

## 2024-03-04 NOTE — ASSESSMENT & PLAN NOTE
Chronic, controlled. Latest blood pressure and vitals reviewed-     Temp:  [96.4 °F (35.8 °C)-98.3 °F (36.8 °C)]   Pulse:  [65-93]   Resp:  [16-30]   BP: (125-156)/(62-76)   SpO2:  [97 %-100 %] .   Home meds for hypertension were reviewed and noted below.   Hypertension Medications               furosemide (LASIX) 40 MG tablet Take 0.5 tablets (20 mg total) by mouth once daily.    metoprolol succinate (TOPROL-XL) 25 MG 24 hr tablet TAKE 1/2 TABLET BY MOUTH EVERY DAY            While in the hospital, will manage blood pressure as follows; Continue home antihypertensive regimen    Will utilize p.r.n. blood pressure medication only if patient's blood pressure greater than 180/110 and he develops symptoms such as worsening chest pain or shortness of breath.

## 2024-03-04 NOTE — PLAN OF CARE
03/04/24 1154   Post-Acute Status   Post-Acute Authorization HME   HME Status Referrals Sent     [facility] Mon[facility]OR[facility] notified. Awaiting MD signature on settings for Astrol.

## 2024-03-04 NOTE — H&P
[facility]  History & Physical    Patient Name: Richard Farr  MRN: [id number]  Patient Class: IP- Inpatient  Admission Date: 3/3/2024  Attending Physician: Drew Moreira Jr., MD   Primary Care Provider: Drew Moreira Jr., MD         Patient information was obtained from ER records.     Subjective:     Principal Problem:Acute on chronic respiratory failure with hypoxia and hypercapnia    Chief Complaint:   Chief Complaint   Patient presents with    Respiratory Distress     Pt arrived via EMS from [facility] with reported respiratory distress. EMS reports pt's room air oxygen saturation was 88%, pt put on non-rebreather mask and saturation came up to 95%. 125 solumedrol given by EMS, audible crackles heard. EMS reports GCS of 12. Pt recently had surgery (1 week) for colon resection. Nursing home reports pt is supposed to be on Bipap but doesn't have machine.        HPI:   Chief Complaint   Patient presents with    Respiratory Distress       Pt arrived via EMS from [facility] with reported respiratory distress. EMS reports pt's room air oxygen saturation was 88%, pt put on non-rebreather mask and saturation came up to 95%. 125 solumedrol given by EMS, audible crackles heard. EMS reports GCS of 12. Pt recently had surgery (1 week) for colon resection. Nursing home reports pt is supposed to be on Bipap but doesn't have machine.      78-year-old male with a complicated medical history, recent colovesicular fistula takedown with sigmoidectomy, discharged March 1st by General surgery.  Was sent to a nursing home, usually wears BiPAP, but they do not have a piece of the machines unable to get BiPAP.  Patient with increased confusion.  History of respiratory acidosis in the past as well as acute on chronic congestive heart failure.  Afebrile.  Oxygen saturations 88% on room air per EMS, placed on a non-rebreather, oxygen sat in the emergency department is 100%.  Given  Solu-Medrol for some reason by EMS prior to arrival       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    Coronary artery disease     Depression due to physical illness     Encounter for blood transfusion     Gastric ulcer     History of herpes zoster     Hyperlipemia     Hypertension     Moderate tricuspid insufficiency     NSVT (nonsustained ventricular tachycardia)     PAC (premature atrial contraction)     Patent foramen ovale with right to left shunt     Pneumonia     Pneumonia due to infectious organism     Pulmonary hypertension     PVC's (premature ventricular contractions)     Severe mitral regurgitation     Shingles     Stroke     MINI TRAUMA; LEFT SIDED WEAKNESS    Thyroid disease     Walker as ambulation aid     Wears contact lenses        Past Surgical History:   Procedure Laterality Date    CARDIAC CATHETERIZATION      COLONOSCOPY N/A 11/23/2020    Procedure: COLONOSCOPY;  Surgeon: Abelino Garcia MD;  Location: [facility];  Service: General;  Laterality: N/A;    COLONOSCOPY N/A 1/8/2024    Procedure: COLONOSCOPY;  Surgeon: Queenie Berman MD;  Location: [facility];  Service: General;  Laterality: N/A;  2nd 0600    CYSTOSCOPY W/ RETROGRADES Bilateral 12/7/2023    Procedure: CYSTOSCOPY WITH RETROGRADE PYELOGRAM;  Surgeon: Juan Villela MD;  Location: [facility] OR;  Service: Urology;  Laterality: Bilateral;    DILATION OF URETHRA N/A 12/7/2023    Procedure: DILATION, URETHRA;  Surgeon: Juan Villela MD;  Location: [facility] OR;  Service: Urology;  Laterality: N/A;    EGD, WITH CLOSED BIOPSY N/A 10/4/2023    Procedure: EGD, WITH CLOSED BIOPSY;  Surgeon: Queenie Berman MD;  Location: [facility] OR;  Service: General;  Laterality: N/A;    ESOPHAGOGASTRODUODENOSCOPY       ESOPHAGOGASTRODUODENOSCOPY N/A 5/21/2021    Procedure: EGD (ESOPHAGOGASTRODUODENOSCOPY);  Surgeon: Sunny Rea MD;  Location: [facility] ENDO;  Service: Endoscopy;  Laterality: N/A;  COVID-VACCINATED    IMPLANTATION OF MITRAL VALVE LEAFLET CLIP Right 5/18/2021    Procedure: INSERTION, LEAFLET CLIP, MITRAL VALVE;  Surgeon: Zen Reina MD;  Location: [facility] CATH;  Service: Cardiovascular;  Laterality: Right;    INSERTION, PEG TUBE N/A 10/4/2023    Procedure: INSERTION, PEG TUBE;  Surgeon: Queenie Dos Santos MD;  Location: [facility] OR;  Service: General;  Laterality: N/A;    LAPAROSCOPIC LEFT COLECTOMY Left 2/21/2024    Procedure: COLECTOMY, LEFT, LAPAROSCOPIC WITH GASTROSTOMY TUBE EXCHANGE;  Surgeon: Queenie Dos Santos MD;  Location: [facility] OR;  Service: General;  Laterality: Left;  4th 0800    NECK SURGERY      anterior cervical fusion x 2    TONSILLECTOMY      TRANSESOPHAGEAL ECHOCARDIOGRAPHY         Review of patient's allergies indicates:   Allergen Reactions    Ativan [lorazepam]      Adverse reaction.       Current Facility-Administered Medications on File Prior to Encounter   Medication    benzocaine 20 % mouth spray    lactated ringers infusion     Current Outpatient Medications on File Prior to Encounter   Medication Sig    aspirin 81 MG Chew Take 81 mg by mouth. OK TO CONTINUE PER DR. DOS SANTOS    atorvastatin (LIPITOR) 80 MG tablet TAKE 1 TABLET BY MOUTH EVERY EVENING    doxycycline (VIBRA-TABS) 100 MG tablet Take 1 tablet (100 mg total) by mouth every 12 (twelve) hours. for 7 days    ezetimibe (ZETIA) 10 mg tablet Take 10 mg by mouth once daily.     fenofibrate (TRICOR) 145 MG tablet Take 145 mg by mouth once daily.    furosemide (LASIX) 40 MG tablet Take 0.5 tablets (20 mg total) by mouth once daily.    KLOR-CON 20 mEq Pack GIVE 2 PACKS BY PER NG TUBE ROUTE ONCE DAILY FOR 30 DOSES    levothyroxine (SYNTHROID) 50 MCG tablet TAKE 1 TABLET BY MOUTH EVERY DAY BEFORE BREAKFAST.    metoprolol succinate  (TOPROL-XL) 25 MG 24 hr tablet TAKE 1/2 TABLET BY MOUTH EVERY DAY    omeprazole (PRILOSEC) 40 MG capsule Take 1 capsule (40 mg total) by mouth once daily.    oxyCODONE (ROXICODONE) 30 MG Tab Take 5 mg by mouth every 6 (six) hours as needed (pain).    tiZANidine (ZANAFLEX) 2 MG tablet Take 1 tablet (2 mg total) by mouth every 8 (eight) hours as needed (muscle spasms).    HYDROcodone-acetaminophen (NORCO) 5-325 mg per tablet Take 1 tablet by mouth every 6 (six) hours as needed for Pain.     Family History       Problem Relation (Age of Onset)    Cancer Brother, Brother    Heart attack Father    Heart disease Sister, Sister    No Known Problems Sister    Pneumonia Sister    Stroke Mother          Tobacco Use    Smoking status: Former     Types: Cigars     Start date:      Quit date:      Years since quittin.1    Smokeless tobacco: Never   Substance and Sexual Activity    Alcohol use: Not Currently    Drug use: Never    Sexual activity: Not on file     Comment:  GAYLE     Review of Systems   Unable to perform ROS: Mental status change     Objective:     Vital Signs (Most Recent):  Temp: 98.3 °F (36.8 °C) (24 0716)  Pulse: 79 (24 0755)  Resp: (!) 22 (24 0755)  BP: (!) 140/67 (24 0716)  SpO2: 97 % (24 0755) Vital Signs (24h Range):  Temp:  [96.4 °F (35.8 °C)-98.3 °F (36.8 °C)] 98.3 °F (36.8 °C)  Pulse:  [65-93] 79  Resp:  [16-30] 22  SpO2:  [97 %-100 %] 97 %  BP: (125-156)/(62-76) 140/67     Weight: 75.8 kg (167 lb)  Body mass index is 25.39 kg/m².     Physical Exam  Vitals reviewed.   Constitutional:       General: He is not in acute distress.     Appearance: He is not ill-appearing or toxic-appearing.   Cardiovascular:      Rate and Rhythm: Normal rate and regular rhythm.   Pulmonary:      Effort: Pulmonary effort is normal. No respiratory distress.      Breath sounds: Rales present.      Comments: BiPap  Abdominal:      General: There is no distension.       Palpations: Abdomen is soft.      Tenderness: There is no abdominal tenderness. There is no guarding or rebound.      Comments: Abdominal incisions C/D/I with staples in place    Musculoskeletal:      Right lower leg: No edema.      Left lower leg: No edema.   Skin:     General: Skin is warm and dry.      Capillary Refill: Capillary refill takes less than 2 seconds.   Neurological:      Mental Status: He is alert. Mental status is at baseline.   Psychiatric:      Comments: somnolent                Significant Labs: All pertinent labs within the past 24 hours have been reviewed.    Significant Imaging: I have reviewed all pertinent imaging results/findings within the past 24 hours.  Assessment/Plan:     * Acute on chronic respiratory failure with hypoxia and hypercapnia  Patient with Hypercapnic and Hypoxic Respiratory failure which is Acute on chronic.  he is on home oxygen at 2 LPM. Supplemental oxygen was provided and noted- Oxygen Concentration (%):  [40] 40    .   Signs/symptoms of respiratory failure include- tachypnea, increased work of breathing, respiratory distress, and use of accessory muscles. Contributing diagnoses includes - CHF, COPD, Interstitial lung disease, and Pleural effusion Labs and images were reviewed. Patient Has recent ABG, which has been reviewed. Will treat underlying causes and adjust management of respiratory failure as follows- continue bipap and diuresis as tolerated.     Weakness  PT/OT while hospitalized.       Acute on chronic combined systolic and diastolic heart failure  Appears compensated restart home medical therapy.     Volume overload:  Continue to monitor strict intake and output.    I/O last 3 completed shifts:  In: 120.6 [I.V.:20.6; IV Piggyback:100]  Out: 400 [Urine:400]  No intake/output data recorded.           Essential hypertension  Chronic, controlled. Latest blood pressure and vitals reviewed-     Temp:  [96.4 °F (35.8 °C)-98.3 °F (36.8 °C)]   Pulse:  [65-93]    Resp:  [16-30]   BP: (125-156)/(62-76)   SpO2:  [97 %-100 %] .   Home meds for hypertension were reviewed and noted below.   Hypertension Medications               furosemide (LASIX) 40 MG tablet Take 0.5 tablets (20 mg total) by mouth once daily.    metoprolol succinate (TOPROL-XL) 25 MG 24 hr tablet TAKE 1/2 TABLET BY MOUTH EVERY DAY            While in the hospital, will manage blood pressure as follows; Continue home antihypertensive regimen    Will utilize p.r.n. blood pressure medication only if patient's blood pressure greater than 180/110 and he develops symptoms such as worsening chest pain or shortness of breath.    Mixed hyperlipidemia  Continue home medical therapy      History of stroke  Patient debilitated at baseline        VTE Risk Mitigation (From admission, onward)           Ordered     IP VTE HIGH RISK PATIENT  Once         03/04/24 0119     Place sequential compression device  Until discontinued         03/04/24 0119     Place AVINASH hose  Until discontinued         03/04/24 0119                                    Drew Moreira Jr, MD  Department of Hospital Medicine  [facility] Surg

## 2024-03-04 NOTE — PLAN OF CARE
Spoke to Ivy with [facility] who reports that when the patient arrived to the facility, the strap broke on the BiPAP machine, but they plan to order a new machine.

## 2024-03-04 NOTE — PLAN OF CARE
Problem: Physical Therapy  Goal: Physical Therapy Goal  Description: Goals to be met by: 3/11/2024    Patient will increase functional independence with mobility by performin. Supine to sit with Standby Assistance.  2. Sit to supine with Standby Assistance.  3. Bed to chair transfer with Standby Assistance with rolling walker using Step Transfer technique.  4. Sit to Stand with Standby Assistance with rolling walker.  5. Gait  x 500  feet with Standby Assistance with RW and O2 supplement  with SPO2 > 90%.  6. Lower extremity exercise program x10 reps.     Outcome: Plan of care established

## 2024-03-04 NOTE — ASSESSMENT & PLAN NOTE
Appears compensated restart home medical therapy.     Volume overload:  Continue to monitor strict intake and output.    I/O last 3 completed shifts:  In: 120.6 [I.V.:20.6; IV Piggyback:100]  Out: 400 [Urine:400]  No intake/output data recorded.

## 2024-03-04 NOTE — HPI
Chief Complaint   Patient presents with    Respiratory Distress       Pt arrived via EMS from [facility] with reported respiratory distress. EMS reports pt's room air oxygen saturation was 88%, pt put on non-rebreather mask and saturation came up to 95%. 125 solumedrol given by EMS, audible crackles heard. EMS reports GCS of 12. Pt recently had surgery (1 week) for colon resection. Nursing home reports pt is supposed to be on Bipap but doesn't have machine.      78-year-old male with a complicated medical history, recent colovesicular fistula takedown with sigmoidectomy, discharged March 1st by General surgery.  Was sent to a nursing home, usually wears BiPAP, but they do not have a piece of the machines unable to get BiPAP.  Patient with increased confusion.  History of respiratory acidosis in the past as well as acute on chronic congestive heart failure.  Afebrile.  Oxygen saturations 88% on room air per EMS, placed on a non-rebreather, oxygen sat in the emergency department is 100%.  Given Solu-Medrol for some reason by EMS prior to arrival

## 2024-03-04 NOTE — SUBJECTIVE & OBJECTIVE
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    Coronary artery disease     Depression due to physical illness     Encounter for blood transfusion     Gastric ulcer     History of herpes zoster     Hyperlipemia     Hypertension     Moderate tricuspid insufficiency     NSVT (nonsustained ventricular tachycardia)     PAC (premature atrial contraction)     Patent foramen ovale with right to left shunt     Pneumonia     Pneumonia due to infectious organism     Pulmonary hypertension     PVC's (premature ventricular contractions)     Severe mitral regurgitation     Shingles     Stroke     MINI TRAUMA; LEFT SIDED WEAKNESS    Thyroid disease     Walker as ambulation aid     Wears contact lenses        Past Surgical History:   Procedure Laterality Date    CARDIAC CATHETERIZATION      COLONOSCOPY N/A 11/23/2020    Procedure: COLONOSCOPY;  Surgeon: Abelino Garcia MD;  Location: [facility] ENDO;  Service: General;  Laterality: N/A;    COLONOSCOPY N/A 1/8/2024    Procedure: COLONOSCOPY;  Surgeon: Queenie Berman MD;  Location: [facility] ENDO;  Service: General;  Laterality: N/A;  2nd 0600    CYSTOSCOPY W/ RETROGRADES Bilateral 12/7/2023    Procedure: CYSTOSCOPY WITH RETROGRADE PYELOGRAM;  Surgeon: Juan Villela MD;  Location: [facility] OR;  Service: Urology;  Laterality: Bilateral;    DILATION OF URETHRA N/A 12/7/2023    Procedure: DILATION, URETHRA;  Surgeon: Juan Villela MD;  Location: [facility] OR;  Service: Urology;  Laterality: N/A;    EGD, WITH CLOSED BIOPSY N/A 10/4/2023    Procedure: EGD, WITH CLOSED BIOPSY;  Surgeon: Queenie Berman MD;  Location: [facility] OR;  Service: General;  Laterality: N/A;    ESOPHAGOGASTRODUODENOSCOPY      ESOPHAGOGASTRODUODENOSCOPY N/A 5/21/2021    Procedure: EGD  (ESOPHAGOGASTRODUODENOSCOPY);  Surgeon: Sunny Rea MD;  Location: [facility] ENDO;  Service: Endoscopy;  Laterality: N/A;  COVID-VACCINATED    IMPLANTATION OF MITRAL VALVE LEAFLET CLIP Right 5/18/2021    Procedure: INSERTION, LEAFLET CLIP, MITRAL VALVE;  Surgeon: Zen Reina MD;  Location: [facility] CATH;  Service: Cardiovascular;  Laterality: Right;    INSERTION, PEG TUBE N/A 10/4/2023    Procedure: INSERTION, PEG TUBE;  Surgeon: Queenie Dos Santos MD;  Location: [facility] OR;  Service: General;  Laterality: N/A;    LAPAROSCOPIC LEFT COLECTOMY Left 2/21/2024    Procedure: COLECTOMY, LEFT, LAPAROSCOPIC WITH GASTROSTOMY TUBE EXCHANGE;  Surgeon: Queenie Dos Santos MD;  Location: [facility] OR;  Service: General;  Laterality: Left;  4th 0800    NECK SURGERY      anterior cervical fusion x 2    TONSILLECTOMY      TRANSESOPHAGEAL ECHOCARDIOGRAPHY         Review of patient's allergies indicates:   Allergen Reactions    Ativan [lorazepam]      Adverse reaction.       Current Facility-Administered Medications on File Prior to Encounter   Medication    benzocaine 20 % mouth spray    lactated ringers infusion     Current Outpatient Medications on File Prior to Encounter   Medication Sig    aspirin 81 MG Chew Take 81 mg by mouth. OK TO CONTINUE PER DR. DOS SANTOS    atorvastatin (LIPITOR) 80 MG tablet TAKE 1 TABLET BY MOUTH EVERY EVENING    doxycycline (VIBRA-TABS) 100 MG tablet Take 1 tablet (100 mg total) by mouth every 12 (twelve) hours. for 7 days    ezetimibe (ZETIA) 10 mg tablet Take 10 mg by mouth once daily.     fenofibrate (TRICOR) 145 MG tablet Take 145 mg by mouth once daily.    furosemide (LASIX) 40 MG tablet Take 0.5 tablets (20 mg total) by mouth once daily.    KLOR-CON 20 mEq Pack GIVE 2 PACKS BY PER NG TUBE ROUTE ONCE DAILY FOR 30 DOSES    levothyroxine (SYNTHROID) 50 MCG tablet TAKE 1 TABLET BY MOUTH EVERY DAY BEFORE BREAKFAST.    metoprolol succinate (TOPROL-XL) 25 MG 24 hr tablet TAKE 1/2 TABLET BY MOUTH EVERY DAY     omeprazole (PRILOSEC) 40 MG capsule Take 1 capsule (40 mg total) by mouth once daily.    oxyCODONE (ROXICODONE) 30 MG Tab Take 5 mg by mouth every 6 (six) hours as needed (pain).    tiZANidine (ZANAFLEX) 2 MG tablet Take 1 tablet (2 mg total) by mouth every 8 (eight) hours as needed (muscle spasms).    HYDROcodone-acetaminophen (NORCO) 5-325 mg per tablet Take 1 tablet by mouth every 6 (six) hours as needed for Pain.     Family History       Problem Relation (Age of Onset)    Cancer Brother, Brother    Heart attack Father    Heart disease Sister, Sister    No Known Problems Sister    Pneumonia Sister    Stroke Mother          Tobacco Use    Smoking status: Former     Types: Cigars     Start date:      Quit date:      Years since quittin.1    Smokeless tobacco: Never   Substance and Sexual Activity    Alcohol use: Not Currently    Drug use: Never    Sexual activity: Not on file     Comment:  GAYLE     Review of Systems   Unable to perform ROS: Mental status change     Objective:     Vital Signs (Most Recent):  Temp: 98.3 °F (36.8 °C) (24 0716)  Pulse: 79 (24 0755)  Resp: (!) 22 (24 0755)  BP: (!) 140/67 (24 0716)  SpO2: 97 % (24 0755) Vital Signs (24h Range):  Temp:  [96.4 °F (35.8 °C)-98.3 °F (36.8 °C)] 98.3 °F (36.8 °C)  Pulse:  [65-93] 79  Resp:  [16-30] 22  SpO2:  [97 %-100 %] 97 %  BP: (125-156)/(62-76) 140/67     Weight: 75.8 kg (167 lb)  Body mass index is 25.39 kg/m².     Physical Exam  Vitals reviewed.   Constitutional:       General: He is not in acute distress.     Appearance: He is not ill-appearing or toxic-appearing.   Cardiovascular:      Rate and Rhythm: Normal rate and regular rhythm.   Pulmonary:      Effort: Pulmonary effort is normal. No respiratory distress.      Breath sounds: Rales present.      Comments: BiPap  Abdominal:      General: There is no distension.      Palpations: Abdomen is soft.      Tenderness: There is no abdominal  tenderness. There is no guarding or rebound.      Comments: Abdominal incisions C/D/I with staples in place    Musculoskeletal:      Right lower leg: No edema.      Left lower leg: No edema.   Skin:     General: Skin is warm and dry.      Capillary Refill: Capillary refill takes less than 2 seconds.   Neurological:      Mental Status: He is alert. Mental status is at baseline.   Psychiatric:      Comments: somnolent                Significant Labs: All pertinent labs within the past 24 hours have been reviewed.    Significant Imaging: I have reviewed all pertinent imaging results/findings within the past 24 hours.

## 2024-03-05 LAB
ALBUMIN SERPL BCP-MCNC: 3.1 G/DL (ref 3.5–5.2)
ALP SERPL-CCNC: 85 U/L (ref 55–135)
ALT SERPL W/O P-5'-P-CCNC: 37 U/L (ref 10–44)
ANION GAP SERPL CALC-SCNC: 0 MMOL/L (ref 3–11)
AST SERPL-CCNC: 31 U/L (ref 10–40)
BASOPHILS # BLD AUTO: 0.03 K/UL (ref 0–0.2)
BASOPHILS NFR BLD: 0.2 % (ref 0–1.9)
BILIRUB SERPL-MCNC: 1.3 MG/DL (ref 0.1–1)
BUN SERPL-MCNC: 21 MG/DL (ref 8–23)
CALCIUM SERPL-MCNC: 8.9 MG/DL (ref 8.7–10.5)
CHLORIDE SERPL-SCNC: 103 MMOL/L (ref 95–110)
CO2 SERPL-SCNC: 37 MMOL/L (ref 23–29)
CREAT SERPL-MCNC: 0.6 MG/DL (ref 0.5–1.4)
DIFFERENTIAL METHOD BLD: ABNORMAL
EOSINOPHIL # BLD AUTO: 0 K/UL (ref 0–0.5)
EOSINOPHIL NFR BLD: 0.1 % (ref 0–8)
ERYTHROCYTE [DISTWIDTH] IN BLOOD BY AUTOMATED COUNT: 17.2 % (ref 11.5–14.5)
EST. GFR  (NO RACE VARIABLE): >60 ML/MIN/1.73 M^2
GLUCOSE SERPL-MCNC: 133 MG/DL (ref 70–110)
HCT VFR BLD AUTO: 35.2 % (ref 40–54)
HGB BLD-MCNC: 11 G/DL (ref 14–18)
IMM GRANULOCYTES # BLD AUTO: 0.08 K/UL (ref 0–0.04)
IMM GRANULOCYTES NFR BLD AUTO: 0.5 % (ref 0–0.5)
LYMPHOCYTES # BLD AUTO: 2 K/UL (ref 1–4.8)
LYMPHOCYTES NFR BLD: 13.2 % (ref 18–48)
MCH RBC QN AUTO: 28.8 PG (ref 27–31)
MCHC RBC AUTO-ENTMCNC: 31.3 G/DL (ref 32–36)
MCV RBC AUTO: 92 FL (ref 82–98)
MONOCYTES # BLD AUTO: 1.1 K/UL (ref 0.3–1)
MONOCYTES NFR BLD: 7.3 % (ref 4–15)
NEUTROPHILS # BLD AUTO: 12.1 K/UL (ref 1.8–7.7)
NEUTROPHILS NFR BLD: 78.7 % (ref 38–73)
NRBC BLD-RTO: 0 /100 WBC
PLATELET # BLD AUTO: 430 K/UL (ref 150–450)
PMV BLD AUTO: 11.1 FL (ref 9.2–12.9)
POTASSIUM SERPL-SCNC: 3.8 MMOL/L (ref 3.5–5.1)
PROT SERPL-MCNC: 7.2 G/DL (ref 6–8.4)
RBC # BLD AUTO: 3.82 M/UL (ref 4.6–6.2)
SODIUM SERPL-SCNC: 140 MMOL/L (ref 136–145)
WBC # BLD AUTO: 15.35 K/UL (ref 3.9–12.7)

## 2024-03-05 PROCEDURE — 94660 CPAP INITIATION&MGMT: CPT

## 2024-03-05 PROCEDURE — 94761 N-INVAS EAR/PLS OXIMETRY MLT: CPT

## 2024-03-05 PROCEDURE — 85025 COMPLETE CBC W/AUTO DIFF WBC: CPT | Performed by: INTERNAL MEDICINE

## 2024-03-05 PROCEDURE — 25000003 PHARM REV CODE 250: Performed by: INTERNAL MEDICINE

## 2024-03-05 PROCEDURE — 87449 NOS EACH ORGANISM AG IA: CPT | Performed by: INTERNAL MEDICINE

## 2024-03-05 PROCEDURE — 97116 GAIT TRAINING THERAPY: CPT

## 2024-03-05 PROCEDURE — 27100171 HC OXYGEN HIGH FLOW UP TO 24 HOURS

## 2024-03-05 PROCEDURE — 97535 SELF CARE MNGMENT TRAINING: CPT | Mod: CO

## 2024-03-05 PROCEDURE — 99900031 HC PATIENT EDUCATION (STAT)

## 2024-03-05 PROCEDURE — 63600175 PHARM REV CODE 636 W HCPCS: Performed by: INTERNAL MEDICINE

## 2024-03-05 PROCEDURE — 99900035 HC TECH TIME PER 15 MIN (STAT)

## 2024-03-05 PROCEDURE — 27000207 HC ISOLATION

## 2024-03-05 PROCEDURE — 36415 COLL VENOUS BLD VENIPUNCTURE: CPT | Performed by: INTERNAL MEDICINE

## 2024-03-05 PROCEDURE — 21400001 HC TELEMETRY ROOM

## 2024-03-05 PROCEDURE — 97110 THERAPEUTIC EXERCISES: CPT

## 2024-03-05 PROCEDURE — 80053 COMPREHEN METABOLIC PANEL: CPT | Performed by: INTERNAL MEDICINE

## 2024-03-05 PROCEDURE — 97530 THERAPEUTIC ACTIVITIES: CPT | Mod: CO

## 2024-03-05 PROCEDURE — 97530 THERAPEUTIC ACTIVITIES: CPT

## 2024-03-05 RX ADMIN — METOPROLOL SUCCINATE 12.5 MG: 25 TABLET, EXTENDED RELEASE ORAL at 09:03

## 2024-03-05 RX ADMIN — FAMOTIDINE 20 MG: 20 TABLET, FILM COATED ORAL at 09:03

## 2024-03-05 RX ADMIN — ASPIRIN 81 MG: 81 TABLET, CHEWABLE ORAL at 09:03

## 2024-03-05 RX ADMIN — DOXYCYCLINE 100 MG: 100 INJECTION, POWDER, LYOPHILIZED, FOR SOLUTION INTRAVENOUS at 01:03

## 2024-03-05 RX ADMIN — EZETIMIBE 10 MG: 10 TABLET ORAL at 09:03

## 2024-03-05 RX ADMIN — ATORVASTATIN CALCIUM 80 MG: 80 TABLET, FILM COATED ORAL at 10:03

## 2024-03-05 RX ADMIN — DOXYCYCLINE 100 MG: 100 INJECTION, POWDER, LYOPHILIZED, FOR SOLUTION INTRAVENOUS at 02:03

## 2024-03-05 RX ADMIN — LEVOTHYROXINE SODIUM 50 MCG: 50 TABLET ORAL at 06:03

## 2024-03-05 RX ADMIN — FAMOTIDINE 20 MG: 20 TABLET, FILM COATED ORAL at 10:03

## 2024-03-05 RX ADMIN — FUROSEMIDE 10 MG/HR: 10 INJECTION, SOLUTION INTRAMUSCULAR; INTRAVENOUS at 10:03

## 2024-03-05 NOTE — SUBJECTIVE & OBJECTIVE
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    Coronary artery disease     Depression due to physical illness     Encounter for blood transfusion     Gastric ulcer     History of herpes zoster     Hyperlipemia     Hypertension     Moderate tricuspid insufficiency     NSVT (nonsustained ventricular tachycardia)     PAC (premature atrial contraction)     Patent foramen ovale with right to left shunt     Pneumonia     Pneumonia due to infectious organism     Pulmonary hypertension     PVC's (premature ventricular contractions)     Severe mitral regurgitation     Shingles     Stroke     MINI TRAUMA; LEFT SIDED WEAKNESS    Thyroid disease     Walker as ambulation aid     Wears contact lenses        Past Surgical History:   Procedure Laterality Date    CARDIAC CATHETERIZATION      COLONOSCOPY N/A 11/23/2020    Procedure: COLONOSCOPY;  Surgeon: Abelino Garcia MD;  Location: [facility] ENDO;  Service: General;  Laterality: N/A;    COLONOSCOPY N/A 1/8/2024    Procedure: COLONOSCOPY;  Surgeon: Queenie Berman MD;  Location: [facility] ENDO;  Service: General;  Laterality: N/A;  2nd 0600    CYSTOSCOPY W/ RETROGRADES Bilateral 12/7/2023    Procedure: CYSTOSCOPY WITH RETROGRADE PYELOGRAM;  Surgeon: Juan Villela MD;  Location: [facility] OR;  Service: Urology;  Laterality: Bilateral;    DILATION OF URETHRA N/A 12/7/2023    Procedure: DILATION, URETHRA;  Surgeon: Juan Villela MD;  Location: [facility] OR;  Service: Urology;  Laterality: N/A;    EGD, WITH CLOSED BIOPSY N/A 10/4/2023    Procedure: EGD, WITH CLOSED BIOPSY;  Surgeon: Queenie Berman MD;  Location: [facility] OR;  Service: General;  Laterality: N/A;    ESOPHAGOGASTRODUODENOSCOPY      ESOPHAGOGASTRODUODENOSCOPY N/A 5/21/2021    Procedure: EGD  (ESOPHAGOGASTRODUODENOSCOPY);  Surgeon: Sunny Rea MD;  [facility];  Service: Endoscopy;  Laterality: N/A;  COVID-VACCINATED    IMPLANTATION OF MITRAL VALVE LEAFLET CLIP Right 5/18/2021    Procedure: INSERTION, LEAFLET CLIP, MITRAL VALVE;  Surgeon: Zen Reina MD;  [facility];  Service: Cardiovascular;  Laterality: Right;    INSERTION, PEG TUBE N/A 10/4/2023    Procedure: INSERTION, PEG TUBE;  Surgeon: Queenie Dos Santos MD;  Location: [facility] OR;  Service: General;  Laterality: N/A;    LAPAROSCOPIC LEFT COLECTOMY Left 2/21/2024    Procedure: COLECTOMY, LEFT, LAPAROSCOPIC WITH GASTROSTOMY TUBE EXCHANGE;  Surgeon: Queenie Dos Santos MD;  Location: [facility] OR;  Service: General;  Laterality: Left;  4th 0800    NECK SURGERY      anterior cervical fusion x 2    TONSILLECTOMY      TRANSESOPHAGEAL ECHOCARDIOGRAPHY         Review of patient's allergies indicates:   Allergen Reactions    Ativan [lorazepam]      Adverse reaction.       Current Facility-Administered Medications on File Prior to Encounter   Medication    benzocaine 20 % mouth spray    lactated ringers infusion     Current Outpatient Medications on File Prior to Encounter   Medication Sig    aspirin 81 MG Chew Take 81 mg by mouth. OK TO CONTINUE PER DR. DOS SANTOS    atorvastatin (LIPITOR) 80 MG tablet TAKE 1 TABLET BY MOUTH EVERY EVENING    doxycycline (VIBRA-TABS) 100 MG tablet Take 1 tablet (100 mg total) by mouth every 12 (twelve) hours. for 7 days    ezetimibe (ZETIA) 10 mg tablet Take 10 mg by mouth once daily.     fenofibrate (TRICOR) 145 MG tablet Take 145 mg by mouth once daily.    furosemide (LASIX) 40 MG tablet Take 0.5 tablets (20 mg total) by mouth once daily.    KLOR-CON 20 mEq Pack GIVE 2 PACKS BY PER NG TUBE ROUTE ONCE DAILY FOR 30 DOSES    levothyroxine (SYNTHROID) 50 MCG tablet TAKE 1 TABLET BY MOUTH EVERY DAY BEFORE BREAKFAST.    metoprolol succinate (TOPROL-XL) 25 MG 24 hr tablet TAKE 1/2 TABLET BY MOUTH EVERY DAY     omeprazole (PRILOSEC) 40 MG capsule Take 1 capsule (40 mg total) by mouth once daily.    oxyCODONE (ROXICODONE) 30 MG Tab Take 5 mg by mouth every 6 (six) hours as needed (pain).    tiZANidine (ZANAFLEX) 2 MG tablet Take 1 tablet (2 mg total) by mouth every 8 (eight) hours as needed (muscle spasms).     Family History       Problem Relation (Age of Onset)    Cancer Brother, Brother    Heart attack Father    Heart disease Sister, Sister    No Known Problems Sister    Pneumonia Sister    Stroke Mother          Tobacco Use    Smoking status: Former     Types: Cigars     Start date:      Quit date:      Years since quittin.1    Smokeless tobacco: Never   Substance and Sexual Activity    Alcohol use: Not Currently    Drug use: Never    Sexual activity: Not on file     Comment:  GAYLE     Review of Systems   Unable to perform ROS: Mental status change   Constitutional:  Negative for chills and fever.   HENT:  Negative for rhinorrhea, sneezing and sore throat.    Eyes:  Negative for pain and visual disturbance.   Respiratory:  Positive for shortness of breath. Negative for cough.    Cardiovascular:  Negative for chest pain.   Gastrointestinal:  Negative for abdominal pain, constipation and diarrhea.   Endocrine: Negative for cold intolerance and heat intolerance.   Genitourinary:  Negative for difficulty urinating.   Musculoskeletal:  Negative for arthralgias and joint swelling.   Skin:  Negative for rash.   Allergic/Immunologic: Negative for environmental allergies.   Neurological:  Negative for dizziness, syncope and weakness.   Hematological:  Does not bruise/bleed easily.   Psychiatric/Behavioral:  Negative for dysphoric mood. The patient is not nervous/anxious.      Objective:     Vital Signs (Most Recent):  Temp: 97.6 °F (36.4 °C) (24)  Pulse: 69 (24)  Resp: 20 (24)  BP: (!) 142/66 (24)  SpO2: 97 % (24) Vital Signs (24h Range):  Temp:   [97.5 °F (36.4 °C)-98.9 °F (37.2 °C)] 97.6 °F (36.4 °C)  Pulse:  [67-88] 69  Resp:  [16-20] 20  SpO2:  [93 %-99 %] 97 %  BP: (112-142)/(56-67) 142/66     Weight: 75.8 kg (167 lb)  Body mass index is 25.39 kg/m².     Physical Exam  Vitals reviewed.   Constitutional:       General: He is not in acute distress.     Appearance: He is not ill-appearing or toxic-appearing.   Cardiovascular:      Rate and Rhythm: Normal rate and regular rhythm.   Pulmonary:      Effort: Pulmonary effort is normal. No respiratory distress.      Breath sounds: No rales.      Comments: On NC  Abdominal:      General: There is no distension.      Palpations: Abdomen is soft.      Tenderness: There is no abdominal tenderness. There is no guarding or rebound.      Comments: Abdominal incisions C/D/I with staples in place    Musculoskeletal:      Right lower leg: No edema.      Left lower leg: No edema.   Skin:     General: Skin is warm and dry.      Capillary Refill: Capillary refill takes less than 2 seconds.   Neurological:      Mental Status: He is alert. Mental status is at baseline.   Psychiatric:      Comments: somnolent                Significant Labs: All pertinent labs within the past 24 hours have been reviewed.    Significant Imaging: I have reviewed all pertinent imaging results/findings within the past 24 hours.

## 2024-03-05 NOTE — PLAN OF CARE
Problem: Physical Therapy  Goal: Physical Therapy Goal  Description: Goals to be met by: 3/11/2024    Patient will increase functional independence with mobility by performin. Supine to sit with Standby Assistance.  2. Sit to supine with Standby Assistance.  3. Bed to chair transfer with Standby Assistance with rolling walker using Step Transfer technique.  4. Sit to Stand with Standby Assistance with rolling walker.  5. Gait  x 500  feet with Standby Assistance with RW and O2 supplement  with SPO2 > 90%.  6. Lower extremity exercise program x10 reps.     Outcome: Ongoing, Progressing

## 2024-03-05 NOTE — PLAN OF CARE
03/05/24 1600   Medicare Message   Important Message from Medicare regarding Discharge Appeal Rights Given to patient/caregiver;Explained to patient/caregiver;Signed/date by patient/caregiver   Date IMM was signed 03/05/24   Time IMM was signed 1600

## 2024-03-05 NOTE — PROGRESS NOTES
[facility] Medicine  Progress Note    Patient Name: Richard Farr  MRN: [id number]  Patient Class: IP- Inpatient   Admission Date: 3/3/2024  Length of Stay: 2 days  Attending Physician: Drew Moreira Jr., MD  Primary Care Provider: Drew Moreira Jr., MD        Subjective:     Principal Problem:Acute on chronic respiratory failure with hypoxia and hypercapnia        HPI:  Chief Complaint   Patient presents with    Respiratory Distress       Pt arrived via EMS from [facility] with reported respiratory distress. EMS reports pt's room air oxygen saturation was 88%, pt put on non-rebreather mask and saturation came up to 95%. 125 solumedrol given by EMS, audible crackles heard. EMS reports GCS of 12. Pt recently had surgery (1 week) for colon resection. Nursing home reports pt is supposed to be on Bipap but doesn't have machine.      78-year-old male with a complicated medical history, recent colovesicular fistula takedown with sigmoidectomy, discharged March 1st by General surgery.  Was sent to a nursing home, usually wears BiPAP, but they do not have a piece of the machines unable to get BiPAP.  Patient with increased confusion.  History of respiratory acidosis in the past as well as acute on chronic congestive heart failure.  Afebrile.  Oxygen saturations 88% on room air per EMS, placed on a non-rebreather, oxygen sat in the emergency department is 100%.  Given Solu-Medrol for some reason by EMS prior to arrival       Overview/Hospital Course:  03/05/2024:  No acute events overnight.  Patient appears to be resting comfortably in bed in no respiratory distress.  Labs pending.  Awaiting equipment for transfer back to nursing facility.    Past Medical History:   Diagnosis Date    Anal fistula 01/2024    Anemia     Arthritis     At high risk for falls     Bilateral lower extremity edema     Carpal tunnel syndrome, bilateral     Chronic diastolic congestive heart failure     Colon polyp      Colovesical fistula 02/2024    Coronary artery disease     Depression due to physical illness     Encounter for blood transfusion     Gastric ulcer     History of herpes zoster     Hyperlipemia     Hypertension     Moderate tricuspid insufficiency     NSVT (nonsustained ventricular tachycardia)     PAC (premature atrial contraction)     Patent foramen ovale with right to left shunt     Pneumonia     Pneumonia due to infectious organism     Pulmonary hypertension     PVC's (premature ventricular contractions)     Severe mitral regurgitation     Shingles     Stroke     MINI TRAUMA; LEFT SIDED WEAKNESS    Thyroid disease     Walker as ambulation aid     Wears contact lenses        Past Surgical History:   Procedure Laterality Date    CARDIAC CATHETERIZATION      COLONOSCOPY N/A 11/23/2020    Procedure: COLONOSCOPY;  Surgeon: Abelino Garcia MD;  Location: [facility] ENDO;  Service: General;  Laterality: N/A;    COLONOSCOPY N/A 1/8/2024    Procedure: COLONOSCOPY;  Surgeon: Queenie Berman MD;  Location: [facility] ENDO;  Service: General;  Laterality: N/A;  2nd 0600    CYSTOSCOPY W/ RETROGRADES Bilateral 12/7/2023    Procedure: CYSTOSCOPY WITH RETROGRADE PYELOGRAM;  Surgeon: Juan Villela MD;  Location: [facility] OR;  Service: Urology;  Laterality: Bilateral;    DILATION OF URETHRA N/A 12/7/2023    Procedure: DILATION, URETHRA;  Surgeon: Juan Villela MD;  Location: [facility] OR;  Service: Urology;  Laterality: N/A;    EGD, WITH CLOSED BIOPSY N/A 10/4/2023    Procedure: EGD, WITH CLOSED BIOPSY;  Surgeon: Queenie eBrman MD;  Location: [facility] OR;  Service: General;  Laterality: N/A;    ESOPHAGOGASTRODUODENOSCOPY      ESOPHAGOGASTRODUODENOSCOPY N/A 5/21/2021    Procedure: EGD (ESOPHAGOGASTRODUODENOSCOPY);  Surgeon: Sunny Rea MD;  Location: [facility];  Service: Endoscopy;  Laterality: N/A;  COVID-VACCINATED    IMPLANTATION OF MITRAL VALVE LEAFLET CLIP Right 5/18/2021    Procedure: INSERTION, LEAFLET CLIP, MITRAL VALVE;   Surgeon: Zen Reina MD;  Location: [facility] CATH;  Service: Cardiovascular;  Laterality: Right;    INSERTION, PEG TUBE N/A 10/4/2023    Procedure: INSERTION, PEG TUBE;  Surgeon: Queenie Dos Santos MD;  Location: [facility] OR;  Service: General;  Laterality: N/A;    LAPAROSCOPIC LEFT COLECTOMY Left 2/21/2024    Procedure: COLECTOMY, LEFT, LAPAROSCOPIC WITH GASTROSTOMY TUBE EXCHANGE;  Surgeon: Queenie Dos Santos MD;  Location: [facility] OR;  Service: General;  Laterality: Left;  4th 0800    NECK SURGERY      anterior cervical fusion x 2    TONSILLECTOMY      TRANSESOPHAGEAL ECHOCARDIOGRAPHY         Review of patient's allergies indicates:   Allergen Reactions    Ativan [lorazepam]      Adverse reaction.       Current Facility-Administered Medications on File Prior to Encounter   Medication    benzocaine 20 % mouth spray    lactated ringers infusion     Current Outpatient Medications on File Prior to Encounter   Medication Sig    aspirin 81 MG Chew Take 81 mg by mouth. OK TO CONTINUE PER DR. DOS SANTOS    atorvastatin (LIPITOR) 80 MG tablet TAKE 1 TABLET BY MOUTH EVERY EVENING    doxycycline (VIBRA-TABS) 100 MG tablet Take 1 tablet (100 mg total) by mouth every 12 (twelve) hours. for 7 days    ezetimibe (ZETIA) 10 mg tablet Take 10 mg by mouth once daily.     fenofibrate (TRICOR) 145 MG tablet Take 145 mg by mouth once daily.    furosemide (LASIX) 40 MG tablet Take 0.5 tablets (20 mg total) by mouth once daily.    KLOR-CON 20 mEq Pack GIVE 2 PACKS BY PER NG TUBE ROUTE ONCE DAILY FOR 30 DOSES    levothyroxine (SYNTHROID) 50 MCG tablet TAKE 1 TABLET BY MOUTH EVERY DAY BEFORE BREAKFAST.    metoprolol succinate (TOPROL-XL) 25 MG 24 hr tablet TAKE 1/2 TABLET BY MOUTH EVERY DAY    omeprazole (PRILOSEC) 40 MG capsule Take 1 capsule (40 mg total) by mouth once daily.    oxyCODONE (ROXICODONE) 30 MG Tab Take 5 mg by mouth every 6 (six) hours as needed (pain).    tiZANidine (ZANAFLEX) 2 MG tablet Take 1 tablet (2 mg total) by mouth  every 8 (eight) hours as needed (muscle spasms).     Family History       Problem Relation (Age of Onset)    Cancer Brother, Brother    Heart attack Father    Heart disease Sister, Sister    No Known Problems Sister    Pneumonia Sister    Stroke Mother          Tobacco Use    Smoking status: Former     Types: Cigars     Start date:      Quit date:      Years since quittin.1    Smokeless tobacco: Never   Substance and Sexual Activity    Alcohol use: Not Currently    Drug use: Never    Sexual activity: Not on file     Comment:  GAYLE     Review of Systems   Unable to perform ROS: Mental status change   Constitutional:  Negative for chills and fever.   HENT:  Negative for rhinorrhea, sneezing and sore throat.    Eyes:  Negative for pain and visual disturbance.   Respiratory:  Positive for shortness of breath. Negative for cough.    Cardiovascular:  Negative for chest pain.   Gastrointestinal:  Negative for abdominal pain, constipation and diarrhea.   Endocrine: Negative for cold intolerance and heat intolerance.   Genitourinary:  Negative for difficulty urinating.   Musculoskeletal:  Negative for arthralgias and joint swelling.   Skin:  Negative for rash.   Allergic/Immunologic: Negative for environmental allergies.   Neurological:  Negative for dizziness, syncope and weakness.   Hematological:  Does not bruise/bleed easily.   Psychiatric/Behavioral:  Negative for dysphoric mood. The patient is not nervous/anxious.      Objective:     Vital Signs (Most Recent):  Temp: 97.6 °F (36.4 °C) (24 0739)  Pulse: 69 (24 0741)  Resp: 20 (24 0739)  BP: (!) 142/66 (24 0739)  SpO2: 97 % (24 0739) Vital Signs (24h Range):  Temp:  [97.5 °F (36.4 °C)-98.9 °F (37.2 °C)] 97.6 °F (36.4 °C)  Pulse:  [67-88] 69  Resp:  [16-20] 20  SpO2:  [93 %-99 %] 97 %  BP: (112-142)/(56-67) 142/66     Weight: 75.8 kg (167 lb)  Body mass index is 25.39 kg/m².     Physical Exam  Vitals reviewed.    Constitutional:       General: He is not in acute distress.     Appearance: He is not ill-appearing or toxic-appearing.   Cardiovascular:      Rate and Rhythm: Normal rate and regular rhythm.   Pulmonary:      Effort: Pulmonary effort is normal. No respiratory distress.      Breath sounds: No rales.      Comments: On NC  Abdominal:      General: There is no distension.      Palpations: Abdomen is soft.      Tenderness: There is no abdominal tenderness. There is no guarding or rebound.      Comments: Abdominal incisions C/D/I with staples in place    Musculoskeletal:      Right lower leg: No edema.      Left lower leg: No edema.   Skin:     General: Skin is warm and dry.      Capillary Refill: Capillary refill takes less than 2 seconds.   Neurological:      Mental Status: He is alert. Mental status is at baseline.   Psychiatric:      Comments: somnolent                Significant Labs: All pertinent labs within the past 24 hours have been reviewed.    Significant Imaging: I have reviewed all pertinent imaging results/findings within the past 24 hours.    Assessment/Plan:      * Acute on chronic respiratory failure with hypoxia and hypercapnia  Patient with Hypercapnic and Hypoxic Respiratory failure which is Acute on chronic.  he is on home oxygen at 2 LPM. Supplemental oxygen was provided and noted- Oxygen Concentration (%):  [32-40] 40    .   Signs/symptoms of respiratory failure include- tachypnea, increased work of breathing, respiratory distress, and use of accessory muscles. Contributing diagnoses includes - CHF, COPD, Interstitial lung disease, and Pleural effusion Labs and images were reviewed. Patient Has recent ABG, which has been reviewed. Will treat underlying causes and adjust management of respiratory failure as follows- continue bipap and diuresis as tolerated.     Weakness  PT/OT while hospitalized.       Acute on chronic combined systolic and diastolic heart failure  Appears compensated restart  home medical therapy.     Volume overload:  Continue to monitor strict intake and output.    I/O last 3 completed shifts:  In: 1665.6 [P.O.:1250; I.V.:122.7; IV Piggyback:292.9]  Out: 2400 [Urine:2400]  No intake/output data recorded.           Essential hypertension  Chronic, controlled. Latest blood pressure and vitals reviewed-     Temp:  [97.5 °F (36.4 °C)-98.9 °F (37.2 °C)]   Pulse:  [67-88]   Resp:  [16-20]   BP: (112-142)/(56-67)   SpO2:  [93 %-99 %] .   Home meds for hypertension were reviewed and noted below.   Hypertension Medications               furosemide (LASIX) 40 MG tablet Take 0.5 tablets (20 mg total) by mouth once daily.    metoprolol succinate (TOPROL-XL) 25 MG 24 hr tablet TAKE 1/2 TABLET BY MOUTH EVERY DAY            While in the hospital, will manage blood pressure as follows; Continue home antihypertensive regimen    Will utilize p.r.n. blood pressure medication only if patient's blood pressure greater than 180/110 and he develops symptoms such as worsening chest pain or shortness of breath.    Mixed hyperlipidemia  Continue home medical therapy      History of stroke  Patient debilitated at baseline        VTE Risk Mitigation (From admission, onward)           Ordered     IP VTE HIGH RISK PATIENT  Once         03/04/24 0119     Place sequential compression device  Until discontinued         03/04/24 0119     Place AIVNASH hose  Until discontinued         03/04/24 0119                    Discharge Planning   JOSÉ:      Code Status: Full Code   Is the patient medically ready for discharge?:     Reason for patient still in hospital (select all that apply): Treatment  Discharge Plan A: Skilled Nursing Facility                  Drew Moreira Jr, MD  Department of Hospital Medicine   [facility]

## 2024-03-05 NOTE — PLAN OF CARE
03/05/24 1200   Post-Acute Status   Post-Acute Authorization HME   HME Status (!) Pending Delivery     Call received from Ivy FLORES With [facility]. All ordered sent to long term department with Sandra. Kimo NIP[facility] is pending delivery once long term contract completed by SNF.

## 2024-03-05 NOTE — HOSPITAL COURSE
03/05/2024:  No acute events overnight.  Patient appears to be resting comfortably in bed in no respiratory distress.  Labs pending.  Awaiting equipment for transfer back to nursing facility.    3/6: Patient appears comfortable this morning resting on nasal cannula.  Equipment has been coordinated and set up at the skilled facility.  Will transition back at this time.

## 2024-03-05 NOTE — ASSESSMENT & PLAN NOTE
Patient with Hypercapnic and Hypoxic Respiratory failure which is Acute on chronic.  he is on home oxygen at 2 LPM. Supplemental oxygen was provided and noted- Oxygen Concentration (%):  [32-40] 40    .   Signs/symptoms of respiratory failure include- tachypnea, increased work of breathing, respiratory distress, and use of accessory muscles. Contributing diagnoses includes - CHF, COPD, Interstitial lung disease, and Pleural effusion Labs and images were reviewed. Patient Has recent ABG, which has been reviewed. Will treat underlying causes and adjust management of respiratory failure as follows- continue bipap and diuresis as tolerated.

## 2024-03-05 NOTE — PLAN OF CARE
Plan of care reviewed with the patient. Stool sampled collected and sent to lab. Patient able to get up to bedside commode with assistance. Patient is on 3L nasal cannula with sats remaining above 90%.

## 2024-03-05 NOTE — ASSESSMENT & PLAN NOTE
Chronic, controlled. Latest blood pressure and vitals reviewed-     Temp:  [97.5 °F (36.4 °C)-98.9 °F (37.2 °C)]   Pulse:  [67-88]   Resp:  [16-20]   BP: (112-142)/(56-67)   SpO2:  [93 %-99 %] .   Home meds for hypertension were reviewed and noted below.   Hypertension Medications               furosemide (LASIX) 40 MG tablet Take 0.5 tablets (20 mg total) by mouth once daily.    metoprolol succinate (TOPROL-XL) 25 MG 24 hr tablet TAKE 1/2 TABLET BY MOUTH EVERY DAY            While in the hospital, will manage blood pressure as follows; Continue home antihypertensive regimen    Will utilize p.r.n. blood pressure medication only if patient's blood pressure greater than 180/110 and he develops symptoms such as worsening chest pain or shortness of breath.

## 2024-03-05 NOTE — PLAN OF CARE
Problem: Occupational Therapy  Goal: Occupational Therapy Goal  Description: Goals to be met by: 03/11/24     Patient will increase functional independence with ADLs by performing:    UE Dressing with Moderate Assistance.  LE Dressing with Moderate Assistance.  Grooming while seated at sink with Set-up Assistance.  Toileting from toilet with Moderate Assistance for hygiene and clothing management.   Bathing from  shower chair/bench with Moderate Assistance.  Toilet transfer to toilet with Contact Guard Assistance.    Outcome: Ongoing, Progressing

## 2024-03-05 NOTE — PT/OT/SLP PROGRESS
"Physical Therapy Treatment    Patient Name:  Richard Farr   MRN:  [id number]    Recommendations:     Discharge Recommendations: Moderate Intensity Therapy  Discharge Equipment Recommendations: none  Barriers to discharge: None    Assessment:     Richard Farr is a 78 y.o. male admitted with a medical diagnosis of Acute on chronic respiratory failure with hypoxia and hypercapnia.  He presents with the following impairments/functional limitations: weakness, impaired joint extensibility, impaired endurance, impaired muscle length, impaired self care skills, impaired functional mobility, gait instability, decreased safety awareness, impaired balance, impaired cardiopulmonary response to activity Awaiting delivery of the BI-PAP machine at the nursing home prior to discharge back to the skilled nursing unit.  Patient was found supine in bed soaked with urine, requesting .to be changed, wife present.  Patient required min assist with supine to sit, CGA/min assist with sit <> stand using a RW , CGA with toilet transfers, patient sat on the toilet for about 15 minutes, had a continent bowel and bladder episode  on the toilet, ambulated ~800 feet with RW and 3 liters of O2 via nasal cannula with CGA/SBA, no LOB or SOB reported.  Patient was left sitting at the edge of the bed to eat breakfast with wife present and nurse aware.     Rehab Prognosis: Good and Fair; patient would benefit from acute skilled PT services to address these deficits and reach maximum level of function.    Recent Surgery: * No surgery found *      Plan:     During this hospitalization, patient to be seen 5 x/week to address the identified rehab impairments via gait training, therapeutic activities, therapeutic exercises, neuromuscular re-education and progress toward the following goals:    Plan of Care Expires:  03/11/24    Subjective     Chief Complaint: "I need to be changed."   Patient/Family Comments/goals: Get the machine fixed at the nursing " home.   Pain/Comfort:  Pain Rating 1: 0/10  Pain Rating Post-Intervention 1: 0/10      Objective:     Communicated with nurse and patient  and wife prior to session.  Patient found supine with peripheral IV, telemetry upon PT entry to room.     General Precautions: Standard, fall, respiratory  Orthopedic Precautions: N/A  Braces: N/A  Respiratory Status: Nasal cannula, flow 3 L/min     Functional Mobility:  Bed Mobility:     Rolling Right: minimum assistance  Scooting: minimum assistance  Supine to Sit: minimum assistance  Transfers:     Sit to Stand:  contact guard assistance and minimum assistance with rolling walker  Toilet Transfer: contact guard assistance and minimum assistance with  rolling walker  using  Step Transfer  Gait: ~800 feet with RW and 3 liters of O2 via nasal cannula CGA/SBA   Balance: independent with static sitting, CGA with static standing using a RW       AM-PAC 6 CLICK MOBILITY  Turning over in bed (including adjusting bedclothes, sheets and blankets)?: 3  Sitting down on and standing up from a chair with arms (e.g., wheelchair, bedside commode, etc.): 3  Moving from lying on back to sitting on the side of the bed?: 3  Moving to and from a bed to a chair (including a wheelchair)?: 3  Need to walk in hospital room?: 3  Climbing 3-5 steps with a railing?: 3 (based on clinical judgement)  Basic Mobility Total Score: 18       Treatment & Education:  Rolling to right  side  Supine > sit  Scooting to the edge  of the bed   Sitting balance/tolerance  Sit <> stand with RW  Standing balance/tolerance   Gait with RW   Toileting   Toilet transfers     Patient left sitting edge of bed with all lines intact, call button in reach, nurse  notified, and wife  present..    GOALS:   Multidisciplinary Problems       Physical Therapy Goals          Problem: Physical Therapy    Goal Priority Disciplines Outcome Goal Variances Interventions   Physical Therapy Goal     PT, PT/OT Ongoing, Progressing      Description: Goals to be met by: 3/11/2024    Patient will increase functional independence with mobility by performin. Supine to sit with Standby Assistance.  2. Sit to supine with Standby Assistance.  3. Bed to chair transfer with Standby Assistance with rolling walker using Step Transfer technique.  4. Sit to Stand with Standby Assistance with rolling walker.  5. Gait  x 500  feet with Standby Assistance with RW and O2 supplement  with SPO2 > 90%.  6. Lower extremity exercise program x10 reps.                          Time Tracking:     PT Received On: 24  PT Start Time: 806     PT Stop Time: 853  PT Total Time (min): 47 min     Billable Minutes: Gait Training 17, Therapeutic Activity 15, and Therapeutic Exercise 15    Treatment Type: Treatment  PT/PTA: PT     Number of PTA visits since last PT visit: 0     2024

## 2024-03-05 NOTE — ASSESSMENT & PLAN NOTE
Appears compensated restart home medical therapy.     Volume overload:  Continue to monitor strict intake and output.    I/O last 3 completed shifts:  In: 1665.6 [P.O.:1250; I.V.:122.7; IV Piggyback:292.9]  Out: 2400 [Urine:2400]  No intake/output data recorded.

## 2024-03-05 NOTE — PT/OT/SLP PROGRESS
Occupational Therapy   Treatment    Name: Richard Farr  MRN: [id number]  Admitting Diagnosis:  Acute on chronic respiratory failure with hypoxia and hypercapnia       Recommendations:     Discharge Recommendations: Moderate Intensity Therapy  Discharge Equipment Recommendations:  none ([facility] will provide)  Barriers to discharge:       Assessment:     Richard Farr is a 78 y.o. male with a medical diagnosis of Acute on chronic respiratory failure with hypoxia and hypercapnia.  He presents with willingness to participate in activities in order to progress towards goals. Performance deficits affecting function are weakness, impaired endurance, impaired self care skills, impaired functional mobility, impaired balance, decreased coordination, decreased upper extremity function, decreased lower extremity function, decreased safety awareness, pain, decreased ROM, impaired fine motor, impaired cardiopulmonary response to activity, impaired joint extensibility, impaired muscle length, impaired sensation.     Rehab Prognosis:  Good; patient would benefit from acute skilled OT services to address these deficits and reach maximum level of function.       Plan:     Patient to be seen 5 x/week to address the above listed problems via self-care/home management, therapeutic activities, therapeutic exercises, sensory integration  Plan of Care Expires: 03/11/24  Plan of Care Reviewed with: patient    Subjective     Chief Complaint: none   Patient/Family Comments/goals: Patient wants to get stronger  Pain/Comfort:  Pain Rating 1: 0/10    Objective:     Communicated with: Nurse and OT prior to session.  Patient found sitting edge of bed with peripheral IV, telemetry, PureWick, oxygen upon OT entry to room.    General Precautions: Standard, fall    Orthopedic Precautions:N/A  Braces: N/A  Respiratory Status: Nasal cannula, flow 4 L/min     Occupational Performance:       Functional Mobility/Transfers:  Patient completed Sit <> Stand  Transfer with minimum assistance  with  rolling walker  Patient completed 3 sets of 10 with rest breaks in between sets.       [facility] 6 Click ADL:  14    Treatment & Education:  Patient was educated on roles and goals of OT. Educated patient and spouse on don/doff shirts with left sided deficit.     Patient left sitting edge of bed with all lines intact, call button in reach, and spouse present    GOALS:   Multidisciplinary Problems       Occupational Therapy Goals          Problem: Occupational Therapy    Goal Priority Disciplines Outcome Interventions   Occupational Therapy Goal     OT, PT/OT Ongoing, Progressing    Description: Goals to be met by: 03/11/24     Patient will increase functional independence with ADLs by performing:    UE Dressing with Moderate Assistance.  LE Dressing with Moderate Assistance.  Grooming while seated at sink with Set-up Assistance.  Toileting from toilet with Moderate Assistance for hygiene and clothing management.   Bathing from  shower chair/bench with Moderate Assistance.  Toilet transfer to toilet with Contact Guard Assistance.                         Time Tracking:     OT Date of Treatment: 03/05/24  OT Start Time: 0916  OT Stop Time: 0940  OT Total Time (min): 24 min    Billable Minutes:Self Care/Home Management 8  Therapeutic Activity 16    OT/SIRI: SIRI     Number of SIRI visits since last OT visit: 1    3/5/2024

## 2024-03-05 NOTE — PLAN OF CARE
Plan of care reviewed and ongoing with patient. 18 gauge IV to L FA infusing IV Lasix continuous at 5 mL/hr; BP WDL during the night. 3L NC tolerating well with BiPAP during hs. Purewick and tele connections intact, bedpan at the bedside for BM. Tolerated meds well by mouth, taking one at a time. Steri strips intact to laparoscopic incisions, staples intact to midline incision. Safety camera pointing at patient with monitor in view of nurse. Patient free of pain and complaints during the night. Call light and personal items within reach.       Problem: Adult Inpatient Plan of Care  Goal: Plan of Care Review  Outcome: Ongoing, Progressing  Goal: Absence of Hospital-Acquired Illness or Injury  Outcome: Ongoing, Progressing  Goal: Optimal Comfort and Wellbeing  Outcome: Ongoing, Progressing     Problem: Fluid and Electrolyte Imbalance (Acute Kidney Injury/Impairment)  Goal: Fluid and Electrolyte Balance  Outcome: Ongoing, Progressing     Problem: Oral Intake Inadequate (Acute Kidney Injury/Impairment)  Goal: Optimal Nutrition Intake  Outcome: Ongoing, Progressing     Problem: Renal Function Impairment (Acute Kidney Injury/Impairment)  Goal: Effective Renal Function  Outcome: Ongoing, Progressing     Problem: Skin Injury Risk Increased  Goal: Skin Health and Integrity  Outcome: Ongoing, Progressing     Problem: Fall Injury Risk  Goal: Absence of Fall and Fall-Related Injury  Outcome: Ongoing, Progressing     Problem: Behavioral Health Comorbidity  Goal: Maintenance of Behavioral Health Symptom Control  Outcome: Ongoing, Progressing     Problem: Hypertension Comorbidity  Goal: Blood Pressure in Desired Range  Outcome: Ongoing, Progressing     Problem: Pain Chronic (Persistent) (Comorbidity Management)  Goal: Acceptable Pain Control and Functional Ability  Outcome: Ongoing, Progressing     Problem: Mobility Impairment  Goal: Optimal Mobility  Outcome: Ongoing, Progressing     Problem: Gas Exchange Impaired  Goal: Optimal  Gas Exchange  Outcome: Ongoing, Progressing     Problem: Breathing Pattern Ineffective  Goal: Effective Breathing Pattern  Outcome: Ongoing, Progressing     Problem: Cerebral Tissue Perfusion (Stroke, Ischemic/Transient Ischemic Attack)  Goal: Optimal Cerebral Tissue Perfusion  Outcome: Ongoing, Progressing     Problem: Cognitive Impairment (Stroke, Ischemic/Transient Ischemic Attack)  Goal: Optimal Cognitive Function  Outcome: Ongoing, Progressing     Problem: Communication Impairment (Stroke, Ischemic/Transient Ischemic Attack)  Goal: Improved Communication Skills  Outcome: Ongoing, Progressing     Problem: Functional Ability Impaired (Stroke, Ischemic/Transient Ischemic Attack)  Goal: Optimal Functional Ability  Outcome: Ongoing, Progressing     Problem: Swallowing Impairment (Stroke, Ischemic/Transient Ischemic Attack)  Goal: Optimal Eating and Swallowing without Aspiration  Outcome: Ongoing, Progressing     Problem: Adult Inpatient Plan of Care  Goal: Patient-Specific Goal (Individualized)  Outcome: Ongoing, Not Progressing  Goal: Readiness for Transition of Care  Outcome: Ongoing, Not Progressing     Problem: Heart Failure Comorbidity  Goal: Maintenance of Heart Failure Symptom Control  Outcome: Ongoing, Not Progressing

## 2024-03-06 VITALS
WEIGHT: 167 LBS | OXYGEN SATURATION: 98 % | BODY MASS INDEX: 25.31 KG/M2 | SYSTOLIC BLOOD PRESSURE: 126 MMHG | TEMPERATURE: 97 F | RESPIRATION RATE: 18 BRPM | HEART RATE: 60 BPM | DIASTOLIC BLOOD PRESSURE: 62 MMHG | HEIGHT: 68 IN

## 2024-03-06 LAB
ALBUMIN SERPL BCP-MCNC: 3.5 G/DL (ref 3.5–5.2)
ALP SERPL-CCNC: 99 U/L (ref 55–135)
ALT SERPL W/O P-5'-P-CCNC: 43 U/L (ref 10–44)
ANION GAP SERPL CALC-SCNC: 1 MMOL/L (ref 3–11)
AST SERPL-CCNC: 42 U/L (ref 10–40)
BASOPHILS # BLD AUTO: 0.07 K/UL (ref 0–0.2)
BASOPHILS NFR BLD: 0.5 % (ref 0–1.9)
BILIRUB SERPL-MCNC: 1.5 MG/DL (ref 0.1–1)
BUN SERPL-MCNC: 20 MG/DL (ref 8–23)
C DIFF GDH STL QL: NEGATIVE
C DIFF TOX A+B STL QL IA: NEGATIVE
CALCIUM SERPL-MCNC: 9.3 MG/DL (ref 8.7–10.5)
CHLORIDE SERPL-SCNC: 100 MMOL/L (ref 95–110)
CO2 SERPL-SCNC: 40 MMOL/L (ref 23–29)
CREAT SERPL-MCNC: 0.6 MG/DL (ref 0.5–1.4)
DIFFERENTIAL METHOD BLD: ABNORMAL
EOSINOPHIL # BLD AUTO: 0.2 K/UL (ref 0–0.5)
EOSINOPHIL NFR BLD: 1.1 % (ref 0–8)
ERYTHROCYTE [DISTWIDTH] IN BLOOD BY AUTOMATED COUNT: 17.6 % (ref 11.5–14.5)
EST. GFR  (NO RACE VARIABLE): >60 ML/MIN/1.73 M^2
GLUCOSE SERPL-MCNC: 110 MG/DL (ref 70–110)
HCT VFR BLD AUTO: 39.2 % (ref 40–54)
HGB BLD-MCNC: 12.1 G/DL (ref 14–18)
IMM GRANULOCYTES # BLD AUTO: 0.07 K/UL (ref 0–0.04)
IMM GRANULOCYTES NFR BLD AUTO: 0.5 % (ref 0–0.5)
LYMPHOCYTES # BLD AUTO: 2.5 K/UL (ref 1–4.8)
LYMPHOCYTES NFR BLD: 17.3 % (ref 18–48)
MCH RBC QN AUTO: 28.5 PG (ref 27–31)
MCHC RBC AUTO-ENTMCNC: 30.9 G/DL (ref 32–36)
MCV RBC AUTO: 93 FL (ref 82–98)
MONOCYTES # BLD AUTO: 1.3 K/UL (ref 0.3–1)
MONOCYTES NFR BLD: 9 % (ref 4–15)
NEUTROPHILS # BLD AUTO: 10.2 K/UL (ref 1.8–7.7)
NEUTROPHILS NFR BLD: 71.6 % (ref 38–73)
NRBC BLD-RTO: 0 /100 WBC
PLATELET # BLD AUTO: 458 K/UL (ref 150–450)
PMV BLD AUTO: 10.8 FL (ref 9.2–12.9)
POTASSIUM SERPL-SCNC: 3.3 MMOL/L (ref 3.5–5.1)
PROT SERPL-MCNC: 7.9 G/DL (ref 6–8.4)
RBC # BLD AUTO: 4.24 M/UL (ref 4.6–6.2)
SODIUM SERPL-SCNC: 141 MMOL/L (ref 136–145)
WBC # BLD AUTO: 14.26 K/UL (ref 3.9–12.7)

## 2024-03-06 PROCEDURE — 25000003 PHARM REV CODE 250: Performed by: INTERNAL MEDICINE

## 2024-03-06 PROCEDURE — 27000221 HC OXYGEN, UP TO 24 HOURS

## 2024-03-06 PROCEDURE — 85025 COMPLETE CBC W/AUTO DIFF WBC: CPT | Performed by: INTERNAL MEDICINE

## 2024-03-06 PROCEDURE — 80053 COMPREHEN METABOLIC PANEL: CPT | Performed by: INTERNAL MEDICINE

## 2024-03-06 PROCEDURE — 97530 THERAPEUTIC ACTIVITIES: CPT | Mod: CO

## 2024-03-06 PROCEDURE — 99900035 HC TECH TIME PER 15 MIN (STAT)

## 2024-03-06 PROCEDURE — 94761 N-INVAS EAR/PLS OXIMETRY MLT: CPT

## 2024-03-06 PROCEDURE — 99900031 HC PATIENT EDUCATION (STAT)

## 2024-03-06 PROCEDURE — 36415 COLL VENOUS BLD VENIPUNCTURE: CPT | Performed by: INTERNAL MEDICINE

## 2024-03-06 PROCEDURE — 97535 SELF CARE MNGMENT TRAINING: CPT | Mod: CO

## 2024-03-06 PROCEDURE — 94660 CPAP INITIATION&MGMT: CPT

## 2024-03-06 RX ADMIN — FAMOTIDINE 20 MG: 20 TABLET, FILM COATED ORAL at 09:03

## 2024-03-06 RX ADMIN — METOPROLOL SUCCINATE 12.5 MG: 25 TABLET, EXTENDED RELEASE ORAL at 10:03

## 2024-03-06 RX ADMIN — DOXYCYCLINE 100 MG: 100 INJECTION, POWDER, LYOPHILIZED, FOR SOLUTION INTRAVENOUS at 02:03

## 2024-03-06 RX ADMIN — EZETIMIBE 10 MG: 10 TABLET ORAL at 10:03

## 2024-03-06 RX ADMIN — LEVOTHYROXINE SODIUM 50 MCG: 50 TABLET ORAL at 06:03

## 2024-03-06 RX ADMIN — ASPIRIN 81 MG: 81 TABLET, CHEWABLE ORAL at 10:03

## 2024-03-06 NOTE — PLAN OF CARE
POC reviewed. No acute events over night, pt continues to have loose BM's c.diff-negative, Lasix infusing per MD orders, I&O monitored, pt denies needs/pain at this time, will continue to monitor.       Problem: Adult Inpatient Plan of Care  Goal: Plan of Care Review  Outcome: Ongoing, Progressing  Goal: Patient-Specific Goal (Individualized)  Outcome: Ongoing, Progressing  Goal: Absence of Hospital-Acquired Illness or Injury  Outcome: Ongoing, Progressing  Goal: Optimal Comfort and Wellbeing  Outcome: Ongoing, Progressing  Goal: Readiness for Transition of Care  Outcome: Ongoing, Progressing     Problem: Fluid and Electrolyte Imbalance (Acute Kidney Injury/Impairment)  Goal: Fluid and Electrolyte Balance  Outcome: Ongoing, Progressing     Problem: Oral Intake Inadequate (Acute Kidney Injury/Impairment)  Goal: Optimal Nutrition Intake  Outcome: Ongoing, Progressing     Problem: Renal Function Impairment (Acute Kidney Injury/Impairment)  Goal: Effective Renal Function  Outcome: Ongoing, Progressing     Problem: Skin Injury Risk Increased  Goal: Skin Health and Integrity  Outcome: Ongoing, Progressing     Problem: Fall Injury Risk  Goal: Absence of Fall and Fall-Related Injury  Outcome: Ongoing, Progressing     Problem: Behavioral Health Comorbidity  Goal: Maintenance of Behavioral Health Symptom Control  Outcome: Ongoing, Progressing     Problem: Heart Failure Comorbidity  Goal: Maintenance of Heart Failure Symptom Control  Outcome: Ongoing, Progressing     Problem: Hypertension Comorbidity  Goal: Blood Pressure in Desired Range  Outcome: Ongoing, Progressing     Problem: Pain Chronic (Persistent) (Comorbidity Management)  Goal: Acceptable Pain Control and Functional Ability  Outcome: Ongoing, Progressing     Problem: Mobility Impairment  Goal: Optimal Mobility  Outcome: Ongoing, Progressing     Problem: Gas Exchange Impaired  Goal: Optimal Gas Exchange  Outcome: Ongoing, Progressing     Problem: Breathing Pattern  Ineffective  Goal: Effective Breathing Pattern  Outcome: Ongoing, Progressing     Problem: Cerebral Tissue Perfusion (Stroke, Ischemic/Transient Ischemic Attack)  Goal: Optimal Cerebral Tissue Perfusion  Outcome: Ongoing, Progressing     Problem: Cognitive Impairment (Stroke, Ischemic/Transient Ischemic Attack)  Goal: Optimal Cognitive Function  Outcome: Ongoing, Progressing     Problem: Communication Impairment (Stroke, Ischemic/Transient Ischemic Attack)  Goal: Improved Communication Skills  Outcome: Ongoing, Progressing     Problem: Functional Ability Impaired (Stroke, Ischemic/Transient Ischemic Attack)  Goal: Optimal Functional Ability  Outcome: Ongoing, Progressing     Problem: Swallowing Impairment (Stroke, Ischemic/Transient Ischemic Attack)  Goal: Optimal Eating and Swallowing without Aspiration  Outcome: Ongoing, Progressing     Problem: Infection  Goal: Absence of Infection Signs and Symptoms  Outcome: Ongoing, Progressing

## 2024-03-06 NOTE — ASSESSMENT & PLAN NOTE
Appears compensated restart home medical therapy.     Volume overload:  Continue to monitor strict intake and output.    I/O last 3 completed shifts:  In: 1392 [P.O.:1000; I.V.:102; IV Piggyback:290]  Out: 600 [Urine:600]  I/O this shift:  In: 465 [P.O.:465]  Out: 200 [Urine:200]

## 2024-03-06 NOTE — ASSESSMENT & PLAN NOTE
Patient with Hypercapnic and Hypoxic Respiratory failure which is Acute on chronic.  he is on home oxygen at 2 LPM. Supplemental oxygen was provided and noted- Oxygen Concentration (%):  [32] 32    .   Signs/symptoms of respiratory failure include- tachypnea, increased work of breathing, respiratory distress, and use of accessory muscles. Contributing diagnoses includes - CHF, COPD, Interstitial lung disease, and Pleural effusion Labs and images were reviewed. Patient Has recent ABG, which has been reviewed. Will treat underlying causes and adjust management of respiratory failure as follows- continue bipap and diuresis as tolerated.

## 2024-03-06 NOTE — PT/OT/SLP DISCHARGE
Physical Therapy Discharge Summary    Name: Richard Farr  MRN: [id number]   Principal Problem: Acute on chronic respiratory failure with hypoxia and hypercapnia     Patient Discharged from acute Physical Therapy on 3/6/2024.  Please refer to prior PT note dated  3/5/2024 for functional status.     Assessment:     Patient appropriate for care in another setting.    Objective:     GOALS:   Multidisciplinary Problems       Physical Therapy Goals          Problem: Physical Therapy    Goal Priority Disciplines Outcome Goal Variances Interventions   Physical Therapy Goal     PT, PT/OT Adequate for Care Transition     Description: Goals to be met by: 3/11/2024    Patient will increase functional independence with mobility by performin. Supine to sit with Standby Assistance.  2. Sit to supine with Standby Assistance.  3. Bed to chair transfer with Standby Assistance with rolling walker using Step Transfer technique.  4. Sit to Stand with Standby Assistance with rolling walker.  5. Gait  x 500  feet with Standby Assistance with RW and O2 supplement  with SPO2 > 90%.  6. Lower extremity exercise program x10 reps.                          Reasons for Discontinuation of Therapy Services  Transfer to alternate level of care.      Plan:     Patient Discharged to: Skilled Nursing Facility.      3/6/2024

## 2024-03-06 NOTE — PT/OT/SLP PROGRESS
Occupational Therapy   Treatment    Name: Richard Farr  MRN: [id number]  Admitting Diagnosis:  Acute on chronic respiratory failure with hypoxia and hypercapnia       Recommendations:     Discharge Recommendations: Moderate Intensity Therapy  Discharge Equipment Recommendations:  none ([facility] will provide)  Barriers to discharge:       Assessment:     Richard Farr is a 78 y.o. male with a medical diagnosis of Acute on chronic respiratory failure with hypoxia and hypercapnia.  He presents with performance deficits affecting function are weakness, impaired endurance, impaired self care skills, impaired functional mobility, impaired balance, decreased coordination, decreased upper extremity function, decreased lower extremity function, decreased safety awareness, pain, decreased ROM, impaired fine motor, impaired cardiopulmonary response to activity, impaired joint extensibility, impaired muscle length, impaired sensation.     Rehab Prognosis:  Good; patient would benefit from acute skilled OT services to address these deficits and reach maximum level of function.       Plan:     Patient to be seen 5 x/week to address the above listed problems via self-care/home management, therapeutic activities, therapeutic exercises, sensory integration  Plan of Care Expires: 03/11/24  Plan of Care Reviewed with: patient    Subjective     Chief Complaint: none  Patient/Family Comments/goals: Pt ready to go to rehab at [facility]  Pain/Comfort:  Pain Rating 1: 0/10  Pain Rating Post-Intervention 1: 0/10    Objective:     Communicated with: Nurse prior to session.  Patient found sitting edge of bed with peripheral IV, PureWick, telemetry, oxygen upon OT entry to room.    General Precautions: Standard, fall    Orthopedic Precautions:N/A  Braces: N/A  Respiratory Status: Nasal cannula, flow 4 L/min     Occupational Performance:     Bed Mobility:    Patient completed Scooting/Bridging with minimum assistance  Patient completed Sit to Supine with  minimum assistance     Functional Mobility/Transfers:  Patient completed Sit <> Stand Transfer with minimum assistance  with  rolling walker   Patient completed Toilet Transfer Step Transfer technique with minimum assistance with  rolling walker and bedside commode  Functional Mobility: Pt was able to tolerate standing >5 minutes to be cleaned.       [facility] 6 Click ADL:  14    Treatment & Education:  Pt educated on roles and goals of OT.     Patient left HOB elevated with all lines intact, call button in reach, and nurse notified    GOALS:   Multidisciplinary Problems       Occupational Therapy Goals          Problem: Occupational Therapy    Goal Priority Disciplines Outcome Interventions   Occupational Therapy Goal     OT, PT/OT Ongoing, Progressing    Description: Goals to be met by: 03/11/24     Patient will increase functional independence with ADLs by performing:    UE Dressing with Moderate Assistance.  LE Dressing with Moderate Assistance.  Grooming while seated at sink with Set-up Assistance.  Toileting from toilet with Moderate Assistance for hygiene and clothing management.   Bathing from  shower chair/bench with Moderate Assistance.  Toilet transfer to toilet with Contact Guard Assistance.                         Time Tracking:     OT Date of Treatment: 03/06/24  OT Start Time: 0906  OT Stop Time: 0943  OT Total Time (min): 37 min    Billable Minutes:Self Care/Home Management 27  Therapeutic Activity 10    OT/SIRI: SIRI     Number of SIRI visits since last OT visit: 2    3/6/2024

## 2024-03-06 NOTE — PLAN OF CARE
Problem: Physical Therapy  Goal: Physical Therapy Goal  Description: Goals to be met by: 3/11/2024    Patient will increase functional independence with mobility by performin. Supine to sit with Standby Assistance.  2. Sit to supine with Standby Assistance.  3. Bed to chair transfer with Standby Assistance with rolling walker using Step Transfer technique.  4. Sit to Stand with Standby Assistance with rolling walker.  5. Gait  x 500  feet with Standby Assistance with RW and O2 supplement  with SPO2 > 90%.  6. Lower extremity exercise program x10 reps.     Outcome: Adequate for Care Transition

## 2024-03-06 NOTE — ASSESSMENT & PLAN NOTE
Chronic, controlled. Latest blood pressure and vitals reviewed-     Temp:  [97 °F (36.1 °C)-98.5 °F (36.9 °C)]   Pulse:  [47-84]   Resp:  [17-20]   BP: (115-155)/(56-73)   SpO2:  [92 %-100 %] .   Home meds for hypertension were reviewed and noted below.   Hypertension Medications               furosemide (LASIX) 40 MG tablet Take 0.5 tablets (20 mg total) by mouth once daily.    metoprolol succinate (TOPROL-XL) 25 MG 24 hr tablet TAKE 1/2 TABLET BY MOUTH EVERY DAY            While in the hospital, will manage blood pressure as follows; Continue home antihypertensive regimen    Will utilize p.r.n. blood pressure medication only if patient's blood pressure greater than 180/110 and he develops symptoms such as worsening chest pain or shortness of breath.

## 2024-03-06 NOTE — DISCHARGE SUMMARY
[facility] Medicine  Discharge Summary      Patient Name: Richard Farr  MRN: [id number]  ESTEFANY: [id number]  Patient Class: IP- Inpatient  Admission Date: 3/3/2024  Hospital Length of Stay: 3 days  Discharge Date and Time:  03/06/2024 10:52 AM  Attending Physician: Drew Moreira Jr., MD   Discharging Provider: Drew Moreira Jr, MD  Primary Care Provider: Drew Moreira Jr., MD    Primary Care Team: Networked reference to record PCT     HPI:   Chief Complaint   Patient presents with    Respiratory Distress       Pt arrived via EMS from [facility] with reported respiratory distress. EMS reports pt's room air oxygen saturation was 88%, pt put on non-rebreather mask and saturation came up to 95%. 125 solumedrol given by EMS, audible crackles heard. EMS reports GCS of 12. Pt recently had surgery (1 week) for colon resection. Nursing home reports pt is supposed to be on Bipap but doesn't have machine.      78-year-old male with a complicated medical history, recent colovesicular fistula takedown with sigmoidectomy, discharged March 1st by General surgery.  Was sent to a nursing home, usually wears BiPAP, but they do not have a piece of the machines unable to get BiPAP.  Patient with increased confusion.  History of respiratory acidosis in the past as well as acute on chronic congestive heart failure.  Afebrile.  Oxygen saturations 88% on room air per EMS, placed on a non-rebreather, oxygen sat in the emergency department is 100%.  Given Solu-Medrol for some reason by EMS prior to arrival       * No surgery found *      Hospital Course:   03/05/2024:  No acute events overnight.  Patient appears to be resting comfortably in bed in no respiratory distress.  Labs pending.  Awaiting equipment for transfer back to nursing facility.    3/6: Patient appears comfortable this morning resting on nasal cannula.  Equipment has been coordinated and set up at the skilled facility.  Will transition back  at this time.     Goals of Care Treatment Preferences:  Code Status: Full Code      Consults:   Consults (From admission, onward)          Status Ordering Provider     Inpatient consult to Social Work/Case Management  Once        Provider:  (Not yet assigned)    Acknowledged [facility] BARKER JR            Neuro  History of stroke  Patient debilitated at baseline      Pulmonary  * Acute on chronic respiratory failure with hypoxia and hypercapnia  Patient with Hypercapnic and Hypoxic Respiratory failure which is Acute on chronic.  he is on home oxygen at 2 LPM. Supplemental oxygen was provided and noted- Oxygen Concentration (%):  [32] 32    .   Signs/symptoms of respiratory failure include- tachypnea, increased work of breathing, respiratory distress, and use of accessory muscles. Contributing diagnoses includes - CHF, COPD, Interstitial lung disease, and Pleural effusion Labs and images were reviewed. Patient Has recent ABG, which has been reviewed. Will treat underlying causes and adjust management of respiratory failure as follows- continue bipap and diuresis as tolerated.     Cardiac/Vascular  Acute on chronic combined systolic and diastolic heart failure  Appears compensated restart home medical therapy.     Volume overload:  Continue to monitor strict intake and output.    I/O last 3 completed shifts:  In: 1392 [P.O.:1000; I.V.:102; IV Piggyback:290]  Out: 600 [Urine:600]  I/O this shift:  In: 465 [P.O.:465]  Out: 200 [Urine:200]           Essential hypertension  Chronic, controlled. Latest blood pressure and vitals reviewed-     Temp:  [97 °F (36.1 °C)-98.5 °F (36.9 °C)]   Pulse:  [47-84]   Resp:  [17-20]   BP: (115-155)/(56-73)   SpO2:  [92 %-100 %] .   Home meds for hypertension were reviewed and noted below.   Hypertension Medications               furosemide (LASIX) 40 MG tablet Take 0.5 tablets (20 mg total) by mouth once daily.    metoprolol succinate (TOPROL-XL) 25 MG 24 hr tablet TAKE 1/2 TABLET BY  "MOUTH EVERY DAY            While in the hospital, will manage blood pressure as follows; Continue home antihypertensive regimen    Will utilize p.r.n. blood pressure medication only if patient's blood pressure greater than 180/110 and he develops symptoms such as worsening chest pain or shortness of breath.    Mixed hyperlipidemia  Continue home medical therapy      Other  Weakness  PT/OT while hospitalized.         Final Active Diagnoses:    Diagnosis Date Noted POA    PRINCIPAL PROBLEM:  Acute on chronic respiratory failure with hypoxia and hypercapnia [J96.21, J96.22] 03/04/2024 Yes    Weakness [R53.1] 03/18/2021 Yes    Acute on chronic combined systolic and diastolic heart failure [I50.43] 02/01/2021 Yes    History of stroke [Z86.73] 12/10/2020 Not Applicable     Chronic    Mixed hyperlipidemia [E78.2] 12/10/2020 Yes     Chronic    Essential hypertension [I10] 12/10/2020 Yes     Chronic      Problems Resolved During this Admission:       Discharged Condition: fair    Disposition:     Follow Up:   Follow-up Information       Drew Moreira Jr., MD Follow up in 1 week(s).    Specialty: Internal Medicine  Contact information:  [address] [place] [address]  [phone]                           Patient Instructions:      NIPPV FOR HOME USE   Order Comments: Astrol Non-invasive mechanical ventilator     Order Specific Question Answer Comments   Height: 5' 8" (1.727 m)    Weight: 75.8 kg (167 lb)    Length of need (1-99 months): 99        Significant Diagnostic Studies: Labs: All labs within the past 24 hours have been reviewed    Pending Diagnostic Studies:       None           Medications:  Reconciled Home Medications:      Medication List        CONTINUE taking these medications      aspirin 81 MG Chew  Take 81 mg by mouth. OK TO CONTINUE PER DR. DOS SANTOS     atorvastatin 80 MG tablet  Commonly known as: LIPITOR  TAKE 1 TABLET BY MOUTH EVERY EVENING   "   doxycycline 100 MG tablet  Commonly known as: VIBRA-TABS  Take 1 tablet (100 mg total) by mouth every 12 (twelve) hours. for 7 days     ezetimibe 10 mg tablet  Commonly known as: ZETIA  Take 10 mg by mouth once daily.     fenofibrate 145 MG tablet  Commonly known as: TRICOR  Take 145 mg by mouth once daily.     furosemide 40 MG tablet  Commonly known as: LASIX  Take 0.5 tablets (20 mg total) by mouth once daily.     KLOR-CON 20 mEq Pack  Generic drug: potassium chloride  GIVE 2 PACKS BY PER NG TUBE ROUTE ONCE DAILY FOR 30 DOSES     levothyroxine 50 MCG tablet  Commonly known as: SYNTHROID  TAKE 1 TABLET BY MOUTH EVERY DAY BEFORE BREAKFAST.     metoprolol succinate 25 MG 24 hr tablet  Commonly known as: TOPROL-XL  TAKE 1/2 TABLET BY MOUTH EVERY DAY     omeprazole 40 MG capsule  Commonly known as: PRILOSEC  Take 1 capsule (40 mg total) by mouth once daily.     oxyCODONE 30 MG Tab  Commonly known as: ROXICODONE  Take 5 mg by mouth every 6 (six) hours as needed (pain).     tiZANidine 2 MG tablet  Commonly known as: ZANAFLEX  Take 1 tablet (2 mg total) by mouth every 8 (eight) hours as needed (muscle spasms).              Indwelling Lines/Drains at time of discharge:   Lines/Drains/Airways       Drain  Duration                  Gastrostomy/Enterostomy 02/21/24 1534 Gastrostomy tube w/ balloon LUQ feeding 13 days    Female External Urinary Catheter w/ Suction 03/04/24 0115 2 days                    Time spent on the discharge of patient: 60 minutes         Drew Moreira Jr, MD  Department of Hospital Medicine  [facility]

## 2024-03-06 NOTE — NURSING
Staples removed and report called to [facility]. No nurse available to give report at this time will attempt at later time.

## 2024-03-11 PROBLEM — N39.0 UTI (URINARY TRACT INFECTION): Status: RESOLVED | Noted: 2023-09-21 | Resolved: 2024-03-11

## 2024-04-02 ENCOUNTER — LAB VISIT (OUTPATIENT)
Dept: LAB | Facility: HOSPITAL | Age: 79
End: 2024-04-02
Attending: INTERNAL MEDICINE
Payer: MEDICARE

## 2024-04-02 DIAGNOSIS — Z00.00 ROUTINE GENERAL MEDICAL EXAMINATION AT A HEALTH CARE FACILITY: ICD-10-CM

## 2024-04-02 DIAGNOSIS — R53.1 WEAKNESS: ICD-10-CM

## 2024-04-02 DIAGNOSIS — N39.0 FREQUENT UTI: ICD-10-CM

## 2024-04-02 DIAGNOSIS — E07.9 THYROID DYSFUNCTION: ICD-10-CM

## 2024-04-02 DIAGNOSIS — N17.9 AKI (ACUTE KIDNEY INJURY): ICD-10-CM

## 2024-04-02 DIAGNOSIS — E78.2 MIXED HYPERLIPIDEMIA: Chronic | ICD-10-CM

## 2024-04-02 DIAGNOSIS — Z86.73 HISTORY OF STROKE: Chronic | ICD-10-CM

## 2024-04-02 DIAGNOSIS — G47.00 INSOMNIA, UNSPECIFIED TYPE: ICD-10-CM

## 2024-04-02 DIAGNOSIS — Z79.899 ENCOUNTER FOR LONG-TERM (CURRENT) USE OF OTHER MEDICATIONS: ICD-10-CM

## 2024-04-02 DIAGNOSIS — I10 ESSENTIAL HYPERTENSION: Chronic | ICD-10-CM

## 2024-04-02 DIAGNOSIS — N39.0 RECURRENT UTI: ICD-10-CM

## 2024-04-02 DIAGNOSIS — D50.8 IRON DEFICIENCY ANEMIA SECONDARY TO INADEQUATE DIETARY IRON INTAKE: ICD-10-CM

## 2024-04-02 DIAGNOSIS — J96.22 ACUTE ON CHRONIC RESPIRATORY FAILURE WITH HYPOXIA AND HYPERCAPNIA: ICD-10-CM

## 2024-04-02 DIAGNOSIS — J96.21 ACUTE ON CHRONIC RESPIRATORY FAILURE WITH HYPOXIA AND HYPERCAPNIA: ICD-10-CM

## 2024-04-02 DIAGNOSIS — I50.32 CHRONIC HEART FAILURE WITH PRESERVED EJECTION FRACTION (HFPEF): ICD-10-CM

## 2024-04-02 LAB
ALBUMIN SERPL BCP-MCNC: 3.6 G/DL (ref 3.5–5.2)
ALBUMIN/CREAT UR: NORMAL UG/MG (ref 0–30)
ALP SERPL-CCNC: 76 U/L (ref 55–135)
ALT SERPL W/O P-5'-P-CCNC: 17 U/L (ref 10–44)
ANION GAP SERPL CALC-SCNC: 2 MMOL/L (ref 3–11)
AST SERPL-CCNC: 25 U/L (ref 10–40)
BACTERIA #/AREA URNS HPF: NORMAL /HPF
BASOPHILS # BLD AUTO: 0.09 K/UL (ref 0–0.2)
BASOPHILS NFR BLD: 1.1 % (ref 0–1.9)
BILIRUB SERPL-MCNC: 1.1 MG/DL (ref 0.1–1)
BILIRUB UR QL STRIP: NEGATIVE
BUN SERPL-MCNC: 19 MG/DL (ref 8–23)
CALCIUM SERPL-MCNC: 9.5 MG/DL (ref 8.7–10.5)
CHLORIDE SERPL-SCNC: 106 MMOL/L (ref 95–110)
CHOLEST SERPL-MCNC: 116 MG/DL (ref 120–199)
CHOLEST/HDLC SERPL: 2.5 {RATIO} (ref 2–5)
CLARITY UR: CLEAR
CO2 SERPL-SCNC: 30 MMOL/L (ref 23–29)
COLOR UR: YELLOW
CREAT SERPL-MCNC: 0.7 MG/DL (ref 0.5–1.4)
CREAT UR-MCNC: 74.5 MG/DL (ref 23–375)
DIFFERENTIAL METHOD BLD: ABNORMAL
EOSINOPHIL # BLD AUTO: 0.3 K/UL (ref 0–0.5)
EOSINOPHIL NFR BLD: 3.7 % (ref 0–8)
ERYTHROCYTE [DISTWIDTH] IN BLOOD BY AUTOMATED COUNT: 16.8 % (ref 11.5–14.5)
EST. GFR  (NO RACE VARIABLE): >60 ML/MIN/1.73 M^2
ESTIMATED AVG GLUCOSE: 97 MG/DL (ref 68–131)
GLUCOSE SERPL-MCNC: 93 MG/DL (ref 70–110)
GLUCOSE UR QL STRIP: NEGATIVE
HBA1C MFR BLD: 5 % (ref 4–5.6)
HCT VFR BLD AUTO: 33.4 % (ref 40–54)
HDLC SERPL-MCNC: 47 MG/DL (ref 40–75)
HDLC SERPL: 40.5 % (ref 20–50)
HGB BLD-MCNC: 10.2 G/DL (ref 14–18)
HGB UR QL STRIP: NEGATIVE
HYALINE CASTS #/AREA URNS LPF: 0 /LPF
IMM GRANULOCYTES # BLD AUTO: 0.04 K/UL (ref 0–0.04)
IMM GRANULOCYTES NFR BLD AUTO: 0.5 % (ref 0–0.5)
KETONES UR QL STRIP: NEGATIVE
LDLC SERPL CALC-MCNC: 57.6 MG/DL (ref 63–159)
LEUKOCYTE ESTERASE UR QL STRIP: NEGATIVE
LYMPHOCYTES # BLD AUTO: 2.6 K/UL (ref 1–4.8)
LYMPHOCYTES NFR BLD: 31.9 % (ref 18–48)
MAGNESIUM SERPL-MCNC: 2.1 MG/DL (ref 1.6–2.6)
MCH RBC QN AUTO: 27.6 PG (ref 27–31)
MCHC RBC AUTO-ENTMCNC: 30.5 G/DL (ref 32–36)
MCV RBC AUTO: 90 FL (ref 82–98)
MICROALBUMIN UR DL<=1MG/L-MCNC: <5 MG/L
MICROSCOPIC COMMENT: NORMAL
MONOCYTES # BLD AUTO: 0.9 K/UL (ref 0.3–1)
MONOCYTES NFR BLD: 10.5 % (ref 4–15)
NEUTROPHILS # BLD AUTO: 4.2 K/UL (ref 1.8–7.7)
NEUTROPHILS NFR BLD: 52.3 % (ref 38–73)
NITRITE UR QL STRIP: NEGATIVE
NONHDLC SERPL-MCNC: 69 MG/DL
NRBC BLD-RTO: 0 /100 WBC
PH UR STRIP: 8 [PH] (ref 5–8)
PLATELET # BLD AUTO: 350 K/UL (ref 150–450)
PMV BLD AUTO: 11.1 FL (ref 9.2–12.9)
POTASSIUM SERPL-SCNC: 4.4 MMOL/L (ref 3.5–5.1)
PROT SERPL-MCNC: 7.9 G/DL (ref 6–8.4)
PROT UR QL STRIP: NEGATIVE
RBC # BLD AUTO: 3.7 M/UL (ref 4.6–6.2)
RBC #/AREA URNS HPF: 1 /HPF (ref 0–4)
SODIUM SERPL-SCNC: 138 MMOL/L (ref 136–145)
SP GR UR STRIP: 1.01 (ref 1–1.03)
SQUAMOUS #/AREA URNS HPF: 1 /HPF
TRIGL SERPL-MCNC: 57 MG/DL (ref 30–150)
TSH SERPL DL<=0.005 MIU/L-ACNC: 3.77 UIU/ML (ref 0.4–4)
URN SPEC COLLECT METH UR: NORMAL
UROBILINOGEN UR STRIP-ACNC: 1 EU/DL
WBC # BLD AUTO: 8.09 K/UL (ref 3.9–12.7)
WBC #/AREA URNS HPF: 1 /HPF (ref 0–5)

## 2024-04-02 PROCEDURE — 81000 URINALYSIS NONAUTO W/SCOPE: CPT | Performed by: INTERNAL MEDICINE

## 2024-04-02 PROCEDURE — 85025 COMPLETE CBC W/AUTO DIFF WBC: CPT | Performed by: INTERNAL MEDICINE

## 2024-04-02 PROCEDURE — 80053 COMPREHEN METABOLIC PANEL: CPT | Performed by: INTERNAL MEDICINE

## 2024-04-02 PROCEDURE — 84443 ASSAY THYROID STIM HORMONE: CPT | Performed by: INTERNAL MEDICINE

## 2024-04-02 PROCEDURE — 83036 HEMOGLOBIN GLYCOSYLATED A1C: CPT | Performed by: INTERNAL MEDICINE

## 2024-04-02 PROCEDURE — 80061 LIPID PANEL: CPT | Performed by: INTERNAL MEDICINE

## 2024-04-02 PROCEDURE — 83735 ASSAY OF MAGNESIUM: CPT | Performed by: INTERNAL MEDICINE

## 2024-04-02 PROCEDURE — 36415 COLL VENOUS BLD VENIPUNCTURE: CPT | Performed by: INTERNAL MEDICINE

## 2024-04-02 PROCEDURE — 82043 UR ALBUMIN QUANTITATIVE: CPT | Performed by: INTERNAL MEDICINE

## 2024-04-03 PROBLEM — E88.89 DEFICIENCY OF COENZYME Q10: Status: RESOLVED | Noted: 2022-01-10 | Resolved: 2024-04-03

## 2024-04-03 PROBLEM — I70.0 AORTIC ATHEROSCLEROSIS: Status: RESOLVED | Noted: 2023-11-01 | Resolved: 2024-04-03

## 2024-04-04 ENCOUNTER — OFFICE VISIT (OUTPATIENT)
Dept: SURGERY | Facility: CLINIC | Age: 79
End: 2024-04-04
Payer: MEDICARE

## 2024-04-04 VITALS
SYSTOLIC BLOOD PRESSURE: 115 MMHG | HEART RATE: 66 BPM | HEIGHT: 68 IN | BODY MASS INDEX: 23.93 KG/M2 | DIASTOLIC BLOOD PRESSURE: 63 MMHG | OXYGEN SATURATION: 98 % | WEIGHT: 157.88 LBS

## 2024-04-04 DIAGNOSIS — Z90.49 S/P LAPAROSCOPIC-ASSISTED SIGMOIDECTOMY: Primary | ICD-10-CM

## 2024-04-04 PROCEDURE — 99999 PR PBB SHADOW E&M-EST. PATIENT-LVL III: CPT | Mod: PBBFAC,,, | Performed by: STUDENT IN AN ORGANIZED HEALTH CARE EDUCATION/TRAINING PROGRAM

## 2024-04-04 PROCEDURE — 99213 OFFICE O/P EST LOW 20 MIN: CPT | Mod: PBBFAC | Performed by: STUDENT IN AN ORGANIZED HEALTH CARE EDUCATION/TRAINING PROGRAM

## 2024-04-04 PROCEDURE — 99024 POSTOP FOLLOW-UP VISIT: CPT | Mod: POP,,, | Performed by: STUDENT IN AN ORGANIZED HEALTH CARE EDUCATION/TRAINING PROGRAM

## 2024-04-09 PROBLEM — Z90.49 S/P LAPAROSCOPIC-ASSISTED SIGMOIDECTOMY: Status: ACTIVE | Noted: 2024-04-09

## 2024-04-09 NOTE — PROGRESS NOTES
"  [facility]  General Surgery Clinic Progress Note    HPI:  Richard Farr is a 79 y.o. male with history of colovesical fistula  s/p sigmoidectomy who presents for follow up.  Doing well with CPAP at night since returning home.  Tolerating regular diet without nausea or vomiting.  Last UA negative last week with PCP.  Having daily regular bowel movements.     ROS:  Negative except above    PE:  Vitals:    04/04/24 0932   BP: 115/63   Pulse: 66   SpO2: 98%   Weight: 71.6 kg (157 lb 13.6 oz)   Height: 5' 8" (1.727 m)        Gen: Alert and oriented x3, judgement intact, NAD  CV: RRR  Resp: CTAB; breaths non-labored and symmetrical  Abd: Soft, NT, ND, BS+;  well healed lower midline and RLQ incisions with no SOI  Extremities: No c/c/e    Pathology:      A/P:  79 y.o. male with colovesical fistula s/p lap sigmoidectomy who presents for follow up  -path benign; incisions well healed   -RTC PRN   -    Queenie Berman MD  General Surgery   [phone]        4/9/2024  2:29 PM    "

## 2024-05-07 PROBLEM — N17.9 AKI (ACUTE KIDNEY INJURY): Status: RESOLVED | Noted: 2021-01-30 | Resolved: 2024-05-07

## 2024-06-03 PROBLEM — J96.22 ACUTE ON CHRONIC RESPIRATORY FAILURE WITH HYPOXIA AND HYPERCAPNIA: Status: RESOLVED | Noted: 2024-03-04 | Resolved: 2024-06-03

## 2024-06-03 PROBLEM — J96.21 ACUTE ON CHRONIC RESPIRATORY FAILURE WITH HYPOXIA AND HYPERCAPNIA: Status: RESOLVED | Noted: 2024-03-04 | Resolved: 2024-06-03

## 2024-06-18 ENCOUNTER — LAB VISIT (OUTPATIENT)
Dept: LAB | Facility: HOSPITAL | Age: 79
End: 2024-06-18
Attending: INTERNAL MEDICINE
Payer: MEDICARE

## 2024-06-18 DIAGNOSIS — D64.9 SYMPTOMATIC ANEMIA: ICD-10-CM

## 2024-06-18 DIAGNOSIS — D50.8 IRON DEFICIENCY ANEMIA SECONDARY TO INADEQUATE DIETARY IRON INTAKE: ICD-10-CM

## 2024-06-18 DIAGNOSIS — I50.32 CHRONIC HEART FAILURE WITH PRESERVED EJECTION FRACTION (HFPEF): ICD-10-CM

## 2024-06-18 DIAGNOSIS — E78.2 MIXED HYPERLIPIDEMIA: ICD-10-CM

## 2024-06-18 DIAGNOSIS — I10 ESSENTIAL HYPERTENSION: ICD-10-CM

## 2024-06-18 DIAGNOSIS — Z79.899 ENCOUNTER FOR LONG-TERM (CURRENT) USE OF OTHER MEDICATIONS: ICD-10-CM

## 2024-06-18 LAB
ALBUMIN SERPL BCP-MCNC: 3.7 G/DL (ref 3.5–5.2)
ALP SERPL-CCNC: 70 U/L (ref 55–135)
ALT SERPL W/O P-5'-P-CCNC: 26 U/L (ref 10–44)
ANION GAP SERPL CALC-SCNC: 8 MMOL/L (ref 3–11)
AST SERPL-CCNC: 33 U/L (ref 10–40)
BASOPHILS # BLD AUTO: 0.05 K/UL (ref 0–0.2)
BASOPHILS NFR BLD: 0.6 % (ref 0–1.9)
BILIRUB SERPL-MCNC: 0.5 MG/DL (ref 0.1–1)
BUN SERPL-MCNC: 20 MG/DL (ref 8–23)
CALCIUM SERPL-MCNC: 9 MG/DL (ref 8.7–10.5)
CHLORIDE SERPL-SCNC: 103 MMOL/L (ref 95–110)
CO2 SERPL-SCNC: 28 MMOL/L (ref 23–29)
CREAT SERPL-MCNC: 0.8 MG/DL (ref 0.5–1.4)
DIFFERENTIAL METHOD BLD: ABNORMAL
EOSINOPHIL # BLD AUTO: 0.4 K/UL (ref 0–0.5)
EOSINOPHIL NFR BLD: 4.6 % (ref 0–8)
ERYTHROCYTE [DISTWIDTH] IN BLOOD BY AUTOMATED COUNT: 17.2 % (ref 11.5–14.5)
EST. GFR  (NO RACE VARIABLE): >60 ML/MIN/1.73 M^2
FERRITIN SERPL-MCNC: 11 NG/ML (ref 20–300)
FOLATE SERPL-MCNC: 28.3 NG/ML (ref 4–24)
GLUCOSE SERPL-MCNC: 93 MG/DL (ref 70–110)
HCT VFR BLD AUTO: 33.6 % (ref 40–54)
HGB BLD-MCNC: 10.4 G/DL (ref 14–18)
IMM GRANULOCYTES # BLD AUTO: 0.04 K/UL (ref 0–0.04)
IMM GRANULOCYTES NFR BLD AUTO: 0.5 % (ref 0–0.5)
IRON SATN MFR SERPL: 10 % (ref 20–50)
IRON SERPL-MCNC: 46 UG/DL (ref 45–160)
LYMPHOCYTES # BLD AUTO: 2.4 K/UL (ref 1–4.8)
LYMPHOCYTES NFR BLD: 30.5 % (ref 18–48)
MCH RBC QN AUTO: 25.6 PG (ref 27–31)
MCHC RBC AUTO-ENTMCNC: 31 G/DL (ref 32–36)
MCV RBC AUTO: 83 FL (ref 82–98)
MONOCYTES # BLD AUTO: 0.7 K/UL (ref 0.3–1)
MONOCYTES NFR BLD: 8.5 % (ref 4–15)
NEUTROPHILS # BLD AUTO: 4.4 K/UL (ref 1.8–7.7)
NEUTROPHILS NFR BLD: 55.3 % (ref 38–73)
NRBC BLD-RTO: 0 /100 WBC
PLATELET # BLD AUTO: 274 K/UL (ref 150–450)
PMV BLD AUTO: 11.3 FL (ref 9.2–12.9)
POTASSIUM SERPL-SCNC: 4.5 MMOL/L (ref 3.5–5.1)
PROT SERPL-MCNC: 7.5 G/DL (ref 6–8.4)
RBC # BLD AUTO: 4.07 M/UL (ref 4.6–6.2)
RETICS/RBC NFR AUTO: 1.4 % (ref 0.4–2)
SODIUM SERPL-SCNC: 139 MMOL/L (ref 136–145)
TOTAL IRON BINDING CAPACITY: 471 UG/DL (ref 250–450)
VIT B12 SERPL-MCNC: 424 PG/ML (ref 210–950)
WBC # BLD AUTO: 7.98 K/UL (ref 3.9–12.7)

## 2024-06-18 PROCEDURE — 82728 ASSAY OF FERRITIN: CPT | Performed by: INTERNAL MEDICINE

## 2024-06-18 PROCEDURE — 80053 COMPREHEN METABOLIC PANEL: CPT | Performed by: INTERNAL MEDICINE

## 2024-06-18 PROCEDURE — 82607 VITAMIN B-12: CPT | Performed by: INTERNAL MEDICINE

## 2024-06-18 PROCEDURE — 85045 AUTOMATED RETICULOCYTE COUNT: CPT | Performed by: INTERNAL MEDICINE

## 2024-06-18 PROCEDURE — 83540 ASSAY OF IRON: CPT | Performed by: INTERNAL MEDICINE

## 2024-06-18 PROCEDURE — 82746 ASSAY OF FOLIC ACID SERUM: CPT | Performed by: INTERNAL MEDICINE

## 2024-06-18 PROCEDURE — 36415 COLL VENOUS BLD VENIPUNCTURE: CPT | Performed by: INTERNAL MEDICINE

## 2024-06-18 PROCEDURE — 85025 COMPLETE CBC W/AUTO DIFF WBC: CPT | Performed by: INTERNAL MEDICINE

## 2024-11-15 NOTE — PLAN OF CARE
Problem: Adult Inpatient Plan of Care  Goal: Optimal Comfort and Wellbeing  Outcome: Ongoing, Not Progressing  Goal: Readiness for Transition of Care  Outcome: Ongoing, Not Progressing     Problem: Gas Exchange Impaired  Goal: Optimal Gas Exchange  Outcome: Ongoing, Not Progressing         Problem: Adult Inpatient Plan of Care  Goal: Plan of Care Review  Outcome: Ongoing, Progressing  Goal: Patient-Specific Goal (Individualized)  Outcome: Ongoing, Progressing  Goal: Absence of Hospital-Acquired Illness or Injury  Outcome: Ongoing, Progressing     Problem: Fluid and Electrolyte Imbalance (Acute Kidney Injury/Impairment)  Goal: Fluid and Electrolyte Balance  Outcome: Ongoing, Progressing     Problem: Oral Intake Inadequate (Acute Kidney Injury/Impairment)  Goal: Optimal Nutrition Intake  Outcome: Ongoing, Progressing     Problem: Renal Function Impairment (Acute Kidney Injury/Impairment)  Goal: Effective Renal Function  Outcome: Ongoing, Progressing     Problem: Infection  Goal: Absence of Infection Signs and Symptoms  Outcome: Ongoing, Progressing     Problem: Fall Injury Risk  Goal: Absence of Fall and Fall-Related Injury  Outcome: Ongoing, Progressing     Problem: Skin Injury Risk Increased  Goal: Skin Health and Integrity  Outcome: Ongoing, Progressing      Reminder: Please contact the Cou[facility] at [phone]  when you have medication changes. Examples, new medications, antibiotics, discontinued medications, new supplements, missed doses of warfarin or if you took extra doses of warfarin.  This also includes OTC medications. Notifying us helps reduce the possibility of high and low INR's. In addition, if warfarin needs to be held for any procedures, please have surgeon or physician's office contact us before holding anticoagulant. Thanks, [facility].

## (undated) DEVICE — SYR IRRIGATION BULB STER 60ML

## (undated) DEVICE — GOWN SURGICAL BRTHBL XL

## (undated) DEVICE — KIT VIA CUSTOM PROCEDURE

## (undated) DEVICE — BASIN EMESIS GRAPHITE 500ML

## (undated) DEVICE — STAPLER CIRCULAR 25MM

## (undated) DEVICE — UNDERGLOVES BIOGEL PI SIZE 7.5

## (undated) DEVICE — GLOVE SENSICARE PI SURG 6.5

## (undated) DEVICE — GOWN NONREINF SET-IN SLV XL

## (undated) DEVICE — STAPLER INTERNL CONTOR CV BLU

## (undated) DEVICE — STRAP KNEE & BODY DISP 4X34IN

## (undated) DEVICE — LINER SUCTION 3000CC

## (undated) DEVICE — DRESSING PRIMAPORE 4X3 1/8IN

## (undated) DEVICE — TRAY SKIN SCRUB WET PREMIUM

## (undated) DEVICE — IRRIGATOR HYDRO-SURG PLUS 5MM

## (undated) DEVICE — SPONGE DRY VIA GREEN

## (undated) DEVICE — BLADE ELECTRO EXTENDED.

## (undated) DEVICE — CUTTER PROXIMATE BLUE 75MM

## (undated) DEVICE — LINER GLOVE POWDERFREE SZ 6.5

## (undated) DEVICE — SET PNEUMOCLEAR HEAT HUM SE HF

## (undated) DEVICE — SUT SILK 0 SUTUPAK SA86H

## (undated) DEVICE — KIT BIOGUARD AIR WTR SUC VALVE

## (undated) DEVICE — GLOVE SURG ULTRA TOUCH 7

## (undated) DEVICE — SOL IRRI STRL WATER 1000ML

## (undated) DEVICE — DISSECTOR 5MM ENDOPATH

## (undated) DEVICE — ELECTRODE MEDI-TRACE 855 FOAM

## (undated) DEVICE — SUT 0 60IN PDS II VIO MONO

## (undated) DEVICE — TUBING SUC UNIV W/CONN 12FT

## (undated) DEVICE — LINER MEDI-VAC PPV FLTR 1500CC

## (undated) DEVICE — BITE BLOCK ADULT LATEX FREE

## (undated) DEVICE — SUT 2/0 30IN PROLENE MONO

## (undated) DEVICE — GLOVE SURGICAL LATEX SZ 6.5

## (undated) DEVICE — PACK SURG LITHOTOMY 3 SIRUS

## (undated) DEVICE — TIP YANKAUERS BULB NO VENT

## (undated) DEVICE — SYR ONLY LUER LOCK 20CC

## (undated) DEVICE — CANNULA SSOFT C02 MALE 14FT

## (undated) DEVICE — SOL NORMAL USPCA 0.9%

## (undated) DEVICE — ADHESIVE MASTISOL VIAL 48/BX

## (undated) DEVICE — BITE BLOCK ADULT JUMBO ENDO W/

## (undated) DEVICE — LINER SUCTION CANNISTER REGUGA

## (undated) DEVICE — RELOAD PROXIMATE CUT BLUE 75MM

## (undated) DEVICE — SUT MONOCYRL 4-0 PS2 UND

## (undated) DEVICE — TUBE SUC UNIVERSAL .25XIN 6FT

## (undated) DEVICE — Device

## (undated) DEVICE — SUT SILK SH 2-0 30IN MP BLK

## (undated) DEVICE — SOL NACL IRR 1000ML BTL

## (undated) DEVICE — CONNECTOR WATERJET ENDO

## (undated) DEVICE — TRAY CATH 1-LYR URIMTR 16FR

## (undated) DEVICE — SYR SLIP TIP 60 CC DISP

## (undated) DEVICE — SEALER LIGASURE CRV 18.8CM

## (undated) DEVICE — FORCEP ENDOJAW OVL NDL 2X1550

## (undated) DEVICE — SPONGE LAP 18X18 PREWASHED

## (undated) DEVICE — CORD MONOPOLAR LAP

## (undated) DEVICE — GOWN ECLIPSE REINF LVL4 TWL XL

## (undated) DEVICE — ELECTRODE REM PLYHSV RETURN 9

## (undated) DEVICE — RESUSCITATOR ADULT W/TUBE

## (undated) DEVICE — DRAPE INCISE IOBAN 2 23X23IN

## (undated) DEVICE — COVER OVERHEAD SURG LT BLUE

## (undated) DEVICE — SPONGE POST-OP GAUZE 4X4IN

## (undated) DEVICE — TUBING OXYGEN CONNECT BUBBLE

## (undated) DEVICE — LINER GLOVE POWDERFREE SZ 7

## (undated) DEVICE — ELECTRODE FOAM 535 TEARDROP

## (undated) DEVICE — APPLICATOR CHLORAPREP ORN 26ML

## (undated) DEVICE — TROCAR ENDOPATH XCEL 5X100MM

## (undated) DEVICE — TOWEL OR XRAY WHITE 17X26IN

## (undated) DEVICE — DRESSING MEPILEX 4X10IN

## (undated) DEVICE — JELLY LUBRICATING STERILE 2OZ

## (undated) DEVICE — UNDERPAD DELUXE FLUFF 30X30IN

## (undated) DEVICE — SYS SEE SHARP SCP ANTIFG LNG

## (undated) DEVICE — UNDERPAD DISPOSABLE 30X30IN

## (undated) DEVICE — GOWN ECLIPSE REINF LV4 XLNG XL

## (undated) DEVICE — SEE MEDLINE ITEM 152546

## (undated) DEVICE — BAG DRAIN ANTI REFLUX 2000ML

## (undated) DEVICE — BLADE SURG STAINLESS STEEL #11

## (undated) DEVICE — STAPLER SKIN PROXIMATE WIDE

## (undated) DEVICE — STRIP MEDI WND CLSR 1/4X4IN

## (undated) DEVICE — SPONGE GAUZE 16PLY 4X4

## (undated) DEVICE — SUT SILK 3-0 STRANDS 30IN

## (undated) DEVICE — SUT 0 VICRYL / UR6 (J603)

## (undated) DEVICE — BLANKET UPPER BODY 78.7X29.9IN

## (undated) DEVICE — SKIN MARKER STER DUAL TIP

## (undated) DEVICE — CONNECTOR TUBING 2X4

## (undated) DEVICE — TROCAR ENDOPATH XCEL 12X100MM

## (undated) DEVICE — SUT SILK 2.0 BLK 18

## (undated) DEVICE — SCISSOR 5MMX35CM DIRECT DRIVE

## (undated) DEVICE — SUT CTD VICRYL BR CR/SH VIL

## (undated) DEVICE — TROCAR ENDOPATH XCEL 11MM 10CM